# Patient Record
Sex: FEMALE | Race: WHITE | NOT HISPANIC OR LATINO | Employment: OTHER | URBAN - METROPOLITAN AREA
[De-identification: names, ages, dates, MRNs, and addresses within clinical notes are randomized per-mention and may not be internally consistent; named-entity substitution may affect disease eponyms.]

---

## 2017-02-01 ENCOUNTER — NURSE TRIAGE (OUTPATIENT)
Dept: ADMINISTRATIVE | Facility: CLINIC | Age: 77
End: 2017-02-01

## 2017-02-02 NOTE — TELEPHONE ENCOUNTER
"  Reason for Disposition   Harmless swallowed FB and no symptoms  (all triage questions negative)    Answer Assessment - Initial Assessment Questions  1. OBJECT: "What is it?"       Small pieces of glass  2. SIZE: "How large is it?" (inches or cm, or compare it to standard coins)       Very small  3. ONSET: "How long ago did you swallow it?" (e.g., minutes, hours)       Not sure if swallowed but if anything was, happened within last 20 min  4. MECHANISM: "Tell me how it happened."       Jar of rice was broken along top edge and pieces were in w/ rice  5. OTHER SYMPTOMS: "Are there any other symptoms?" (e.g., pain in neck or chest, difficulty breathing, difficulty swallowing)      denies  6. PREGNANCY: "Is there any chance you are pregnant?" "When was your last menstrual period?"      n/a    Protocols used: ST SWALLOWED FOREIGN BODY-A-  pt found small pieces of glass in cooked rice/ not sure if any swallowed or not/ asymptomatic    Call back for any changes or concerns    Cherie Gallo RN  "

## 2017-02-07 ENCOUNTER — TELEPHONE (OUTPATIENT)
Dept: INTERNAL MEDICINE | Facility: CLINIC | Age: 77
End: 2017-02-07

## 2017-02-07 NOTE — TELEPHONE ENCOUNTER
----- Message from Karlie Shelton sent at 2/7/2017  8:30 AM CST -----  Contact: Patient  Patient would like to get medical advice.  Symptoms (please be specific):  Sinus pain, rash, sore throat   How long has patient had these symptoms:  Over 1 week  Pharmacy name and phone #:  [id number] [place], [place] - [address] [facility] [phone] (Phone)  [phone] (Fax  Any drug allergies:  Please see chart.   Comments: Please call the patient at [phone].     Thanks!

## 2017-02-09 RX ORDER — FLUCONAZOLE 150 MG/1
150 TABLET ORAL DAILY
Qty: 1 TABLET | Refills: 0 | Status: SHIPPED | OUTPATIENT
Start: 2017-02-09 | End: 2017-02-10

## 2017-02-09 RX ORDER — DOXYCYCLINE 100 MG/1
100 CAPSULE ORAL EVERY 12 HOURS
Qty: 20 CAPSULE | Refills: 0 | Status: SHIPPED | OUTPATIENT
Start: 2017-02-09 | End: 2018-10-10

## 2017-02-09 NOTE — TELEPHONE ENCOUNTER
----- Message from Althea Lala sent at 2/8/2017  4:06 PM CST -----  Contact: self/[phone]  Pt called in regards to getting a Rx refill for doxycycline (VIBRA-TABS) 100 MG tablet/ fluconazole (DIFLUCAN) 150 MG Tab.      [facility]  Please advise

## 2017-04-21 ENCOUNTER — OFFICE VISIT (OUTPATIENT)
Dept: DERMATOLOGY | Facility: CLINIC | Age: 77
End: 2017-04-21
Payer: MEDICARE

## 2017-04-21 VITALS — WEIGHT: 139 LBS | BODY MASS INDEX: 24.62 KG/M2

## 2017-04-21 DIAGNOSIS — B00.9 HERPES SIMPLEX: Primary | ICD-10-CM

## 2017-04-21 DIAGNOSIS — L71.9 ROSACEA: ICD-10-CM

## 2017-04-21 DIAGNOSIS — Z87.2 HISTORY OF ACTINIC KERATOSES: ICD-10-CM

## 2017-04-21 DIAGNOSIS — Z85.828 HISTORY OF SKIN CANCER: ICD-10-CM

## 2017-04-21 PROCEDURE — 1159F MED LIST DOCD IN RCRD: CPT | Mod: S$GLB,,, | Performed by: DERMATOLOGY

## 2017-04-21 PROCEDURE — 99999 PR PBB SHADOW E&M-EST. PATIENT-LVL II: CPT | Mod: PBBFAC,,, | Performed by: DERMATOLOGY

## 2017-04-21 PROCEDURE — 1157F ADVNC CARE PLAN IN RCRD: CPT | Mod: S$GLB,,, | Performed by: DERMATOLOGY

## 2017-04-21 PROCEDURE — 1160F RVW MEDS BY RX/DR IN RCRD: CPT | Mod: S$GLB,,, | Performed by: DERMATOLOGY

## 2017-04-21 PROCEDURE — 99213 OFFICE O/P EST LOW 20 MIN: CPT | Mod: S$GLB,,, | Performed by: DERMATOLOGY

## 2017-04-21 RX ORDER — PIMECROLIMUS 10 MG/G
CREAM TOPICAL
Qty: 30 G | Refills: 3 | Status: SHIPPED | OUTPATIENT
Start: 2017-04-21 | End: 2019-01-16

## 2017-04-21 RX ORDER — ACYCLOVIR 200 MG/1
CAPSULE ORAL
Qty: 6 CAPSULE | Refills: 11 | Status: SHIPPED | OUTPATIENT
Start: 2017-04-21 | End: 2017-06-16

## 2017-04-21 NOTE — PROGRESS NOTES
Subjective:       Patient ID:  Betzy Cooley is a 76 y.o. female who presents for   Chief Complaint   Patient presents with    Skin Check     UBSE     HPI Comments: .History of Present Illness: The patient presents with chief complaint of cold sore.  Location: lower lip  Duration: weeks  Signs/Symptoms: stings    Prior treatments: none    No recurrences of the actinic keratoses on her arms, she has flares of rosacea off and on using metrogel.         Review of Systems   Constitutional: Negative for fever.   Skin: Negative for itching and rash.   Hematologic/Lymphatic: Does not bruise/bleed easily.        Objective:    Physical Exam   Constitutional: She appears well-developed and well-nourished. No distress.   Neurological: She is alert and oriented to person, place, and time. She is not disoriented.   Psychiatric: She has a normal mood and affect.   Skin:   Areas Examined (abnormalities noted in diagram):   Head / Face Inspection Performed  Neck Inspection Performed  Chest / Axilla Inspection Performed  Back Inspection Performed  RUE Inspected  LUE Inspection Performed                   Diagram Legend       Surgical scar with no sign of skin cancer recurrence       See annotation      Assessment / Plan:        Herpes simplex  -     acyclovir (ZOVIRAX) 200 MG capsule; Take 2 a day for 3 days prn  Dispense: 6 capsule; Refill: 11    Rosacea  Cont metrogel  -     ELIDEL 1 % cream; AAA bid  Dispense: 30 g; Refill: 3 prn flares    History of skin cancer  Comments:  scc right arm 2007    History of actinic keratoses               Return in about 6 months (around 10/21/2017).

## 2017-04-21 NOTE — MR AVS SNAPSHOT
[place] [facility]   [facility]  [place] [address]  Phone: [phone]  Fax: [phone]                  Betzy Cooley   2017 3:30 PM   Office Visit    Description:  Female : 1940   Provider:  Deneen Pat MD   Department:  [place] [facility]           Reason for Visit     Skin Check           Diagnoses this Visit        Comments    Herpes simplex    -  Primary     Rosacea         History of skin cancer         History of actinic keratoses                To Do List           Future Appointments        Provider Department Dept Phone    2017 10:15 AM [facility] - Optometry [phone]    2017 2:30 PM AUDIOGRAM, AUDIO [facility] - Audiology [phone]    2017 3:00 PM Renan Kuhn MD [facility] - Otorhinolaryngology [phone]      Goals (5 Years of Data)     None      Follow-Up and Disposition     Return in about 6 months (around 10/21/2017).       These Medications        Disp Refills Start End    acyclovir (ZOVIRAX) 200 MG capsule 6 capsule 11 2017     Take 2 a day for 3 days prn    Pharmacy: [facility] [place] [address] [address] [facility] Ph #: [phone]       ELIDEL 1 % cream 30 g 3 2017     AAA bid    Pharmacy: [facility] [address] [place] [address] - [address] [facility] Ph #: [phone]       Notes to Pharmacy: $35i/$75/$100/$125u      [facility]     [facility] Care [facility] -  Assistance  Unless otherwise directed by your provider, please contact [facility], our nurse care line that is available for  assistance.     Registered nurses in the [facility] provide: appointment scheduling, clinical advisement, health education, and other advisory services.  Call: [phone] (toll free)               Medications           Message regarding Medications     Verify the changes and/or additions to your medication regime listed below are the same as  discussed with your clinician today.  If any of these changes or additions are incorrect, please notify your healthcare provider.        START taking these NEW medications        Refills    acyclovir (ZOVIRAX) 200 MG capsule 11    Sig: Take 2 a day for 3 days prn    Class: Normal    ELIDEL 1 % cream 3    Sig: AAA bid    Class: Normal           Verify that the below list of medications is an accurate representation of the medications you are currently taking.  If none reported, the list may be blank. If incorrect, please contact your healthcare provider. Carry this list with you in case of emergency.           Current Medications     aspirin 81 MG Chew Take 81 mg by mouth once daily.    atenolol (TENORMIN) 25 MG tablet Take 1 tablet (25 mg total) by mouth once daily.    azithromycin (ZITHROMAX Z-JOE) 250 MG tablet Use two first day then one a day    calcium-vitamin D3 500 mg(1,250mg) -200 unit per tablet Take 1 tablet by mouth 2 (two) times daily with meals.    co-enzyme Q-10 30 mg capsule Take 30 mg by mouth once daily.    doxycycline (VIBRAMYCIN) 100 MG Cap Take 1 capsule (100 mg total) by mouth every 12 (twelve) hours.    ERGOCALCIFEROL, VITAMIN D2, (VITAMIN D2 ORAL) Take 1 tablet by mouth once daily.    meclizine (ANTIVERT) 25 mg tablet Take 1 tablet (25 mg total) by mouth 3 (three) times daily as needed.    multivitamin (MULTIVITAMIN) per tablet Take 1 tablet by mouth once daily.    ranitidine (ZANTAC) 150 MG capsule Take 1 capsule by mouth Daily.    vitamin A-vitamin C-vit E-min (VISION) Tab Take 1 tablet by mouth once daily.    acyclovir (ZOVIRAX) 200 MG capsule Take 2 a day for 3 days prn    ELIDEL 1 % cream AAA bid    fexofenadine (ALLEGRA) 180 MG tablet Take 1 tablet (180 mg total) by mouth once daily.    metronidazole 1% (METROGEL) 1 % Gel Apply topically once daily.           Clinical Reference Information           Your Vitals Were     Weight BMI             63 kg (139 lb) 24.62 kg/m2         Allergies  as of 4/21/2017     Sulfa (Sulfonamide Antibiotics)      Immunizations Administered on Date of Encounter - 4/21/2017     None      Language Assistance Services     ATTENTION: Language assistance services are available, free of charge. Please call [phone].      ATENCIÓN: Si habla minoo, tiene a zamora disposición servicios gratuitos de asistencia lingüística. Llame al [phone].     CHÚ Ý: N?u b?n nói Ti?ng Vi?t, có các d?ch v? h? tr? ngôn ng? mi?n phí dành cho b?n. G?i s? [phone].         [place] [facility] complies with applicable Federal civil rights laws and does not discriminate on the basis of race, color, national origin, age, disability, or sex.

## 2017-05-08 ENCOUNTER — TELEPHONE (OUTPATIENT)
Dept: INTERNAL MEDICINE | Facility: CLINIC | Age: 77
End: 2017-05-08

## 2017-05-08 ENCOUNTER — OFFICE VISIT (OUTPATIENT)
Dept: OPTOMETRY | Facility: CLINIC | Age: 77
End: 2017-05-08
Payer: MEDICARE

## 2017-05-08 DIAGNOSIS — H43.392 VITREOUS FLOATER, LEFT: ICD-10-CM

## 2017-05-08 DIAGNOSIS — D31.32 CHOROIDAL NEVUS OF LEFT EYE: ICD-10-CM

## 2017-05-08 DIAGNOSIS — H52.4 HYPEROPIA WITH PRESBYOPIA, BILATERAL: ICD-10-CM

## 2017-05-08 DIAGNOSIS — H52.03 HYPEROPIA WITH PRESBYOPIA, BILATERAL: ICD-10-CM

## 2017-05-08 DIAGNOSIS — H43.812 POSTERIOR VITREOUS DETACHMENT, LEFT: ICD-10-CM

## 2017-05-08 DIAGNOSIS — H25.013 CORTICAL SENILE CATARACT, BILATERAL: ICD-10-CM

## 2017-05-08 DIAGNOSIS — H25.13 NUCLEAR SCLEROSIS, BILATERAL: Primary | ICD-10-CM

## 2017-05-08 PROCEDURE — 92015 DETERMINE REFRACTIVE STATE: CPT | Mod: S$GLB,,, | Performed by: OPTOMETRIST

## 2017-05-08 PROCEDURE — 92014 COMPRE OPH EXAM EST PT 1/>: CPT | Mod: S$GLB,,, | Performed by: OPTOMETRIST

## 2017-05-08 PROCEDURE — 99999 PR PBB SHADOW E&M-EST. PATIENT-LVL III: CPT | Mod: PBBFAC,,, | Performed by: OPTOMETRIST

## 2017-05-08 NOTE — PROGRESS NOTES
HPI     Concerns About Ocular Health    Additional comments: Eye exam and refraction.  No acute problems other   than watering/tearing in both eyes secondary to allergy problems.            Comments   Patient's age: 76 y.o. WF  Occupation: retired  Approximate date of last eye examination:  2/11/2016  Name of last eye doctor seen: [facility]/State: [place]  Wears glasses? Yes     If yes, wears  Full-time or part-time?  Part-time  Present glasses are: Bifocal, SV Distance, SV Reading?  PAL  Approximate age of present glasses:  3 years   Got new glasses following last exam, or subsequently?:  no   Any problem with VA with glasses?  no  Wears CLs?:  no  Headaches?  no  Eye pain/discomfort?  no                                                                                     Flashes?  no  Floaters?  Yes-OS-occassionally  Diplopia/Double vision?  no  Patient's Ocular History:         Any eye surgeries? no         Any eye injury?  no         Any treatment for eye disease?  no  Family history of eye disease?  no  Significant patient medical history:         1. Diabetes?  no       If yes, IDDM or NIDDM? no   2. HBP?  no              3. Other (describe):  Acid reflux   ! OTC eyedrops currently using:  Systane   ! Prescription eye meds currently using:  Optivar as needed for   allergy-related symptoms   ! Any history of allergy/adverse reaction to any eye meds used   previously?  no    ! Any history of allergy/adverse reaction to eyedrops used during prior   eye exam(s)? no    ! Any history of allergy/adverse reaction to Novacaine or similar meds?   no   ! Any history of allergy/adverse reaction to Epinephrine or similar meds?   no    ! Patient okay with use of anesthetic eyedrops to check eye pressure?    yes        ! Patient okay with use of eyedrops to dilate pupils today?  yes   !  Allergies/Medications/Medical History/Family History reviewed today?    yes      PD =   61/58  Desired reading distance =   "14.5"                                                                    Last edited by Shashank Camarena, OD on 5/8/2017 11:38 AM. (History)            Assessment /Plan     For exam results, see Encounter Report.    1. Nuclear sclerosis, bilateral     2. Cortical senile cataract, bilateral     3. Choroidal nevus of left eye     4. Posterior vitreous detachment, left     5. Vitreous floater, left     6. Hyperopia with presbyopia, bilateral                    Early nuclear sclerotic/peripheral cortical cataract in both eyes. No need for cataract surgery.  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 Stable.  Otherwise, ocular health appears good in each eye.  Hyperopia in each eye, and presbyopia consistent with age.  New spectacle lens Rx issued for use as desired.   Recheck in 12 -18 months.         "

## 2017-05-08 NOTE — PATIENT INSTRUCTIONS
Early nuclear sclerotic/peripheral cortical cataract in both eyes. No need for cataract surgery.  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 Stable.  Otherwise, ocular health appears good in each eye.  Hyperopia in each eye, and presbyopia consistent with age.  New spectacle lens Rx issued for use as desired.   Recheck in 12 -18 months.

## 2017-05-08 NOTE — MR AVS SNAPSHOT
[facility] - Optometry  [address]  [facility] [address]  Phone: [phone]  Fax: [phone]                  Betzy Cooley   2017 10:15 AM   Office Visit    Description:  Female : 1940   Provider:  Shashank Camarena OD   Department:  [facility]           Reason for Visit     Concerns About Ocular Health                To Do List           Future Appointments        Provider Department Dept Phone    2017 10:30 AM AUDIOGRAM, AUDIO [facility] Audiology [phone]    2017 11:15 AM Renan Kuhn MD [facility] Otorhinolaryngology [phone]      Goals (5 Years of Data)     None      [facility]     [facility] Care Line -  Assistance  Unless otherwise directed by your provider, please contact [facility], our nurse care line that is available for  assistance.     Registered nurses in the [facility] provide: appointment scheduling, clinical advisement, health education, and other advisory services.  Call: [phone] (toll free)               Medications           Message regarding Medications     Verify the changes and/or additions to your medication regime listed below are the same as discussed with your clinician today.  If any of these changes or additions are incorrect, please notify your healthcare provider.             Verify that the below list of medications is an accurate representation of the medications you are currently taking.  If none reported, the list may be blank. If incorrect, please contact your healthcare provider. Carry this list with you in case of emergency.           Current Medications     acyclovir (ZOVIRAX) 200 MG capsule Take 2 a day for 3 days prn    aspirin 81 MG Chew Take 81 mg by mouth once daily.    atenolol (TENORMIN) 25 MG tablet Take 1 tablet (25 mg total) by mouth once daily.    azithromycin (ZITHROMAX Z-JOE) 250 MG tablet Use two first day then one a day    calcium-vitamin D3 500  mg(1,250mg) -200 unit per tablet Take 1 tablet by mouth 2 (two) times daily with meals.    co-enzyme Q-10 30 mg capsule Take 30 mg by mouth once daily.    doxycycline (VIBRAMYCIN) 100 MG Cap Take 1 capsule (100 mg total) by mouth every 12 (twelve) hours.    ELIDEL 1 % cream AAA bid    ERGOCALCIFEROL, VITAMIN D2, (VITAMIN D2 ORAL) Take 1 tablet by mouth once daily.    fexofenadine (ALLEGRA) 180 MG tablet Take 1 tablet (180 mg total) by mouth once daily.    meclizine (ANTIVERT) 25 mg tablet Take 1 tablet (25 mg total) by mouth 3 (three) times daily as needed.    metronidazole 1% (METROGEL) 1 % Gel Apply topically once daily.    multivitamin (MULTIVITAMIN) per tablet Take 1 tablet by mouth once daily.    ranitidine (ZANTAC) 150 MG capsule Take 1 capsule by mouth Daily.    vitamin A-vitamin C-vit E-min (VISION) Tab Take 1 tablet by mouth once daily.           Clinical Reference Information           Allergies as of 5/8/2017     Sulfa (Sulfonamide Antibiotics)      Immunizations Administered on Date of Encounter - 5/8/2017     None      Instructions    Early nuclear sclerotic/peripheral cortical cataract in both eyes. No need for cataract surgery.  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 Stable.  Otherwise, ocular health appears good in each eye.  Hyperopia in each eye, and presbyopia consistent with age.  New spectacle lens Rx issued for use as desired.   Recheck in 12 -18 months.              Language Assistance Services     ATTENTION: Language assistance services are available, free of charge. Please call [phone].      ATENCIÓN: Si habla minoo, tiene a zamora disposición servicios gratuitos de asistencia lingüística. Llame al [phone].     BONNIE Ý: N?u b?n nói Ti?ng Vi?t, có các d?ch v? h? tr? ngôn ng? mi?n phí dành cho b?n. G?i s? [phone].         [facility] complies with applicable Federal civil rights laws and does not discriminate on the basis of  race, color, national origin, age, disability, or sex.

## 2017-05-09 DIAGNOSIS — Z13.9 ENCOUNTER FOR SCREENING: Primary | ICD-10-CM

## 2017-05-09 RX ORDER — ATENOLOL 25 MG/1
25 TABLET ORAL DAILY
Qty: 30 TABLET | Refills: 6 | Status: SHIPPED | OUTPATIENT
Start: 2017-05-09 | End: 2018-07-12 | Stop reason: SDUPTHER

## 2017-05-09 NOTE — TELEPHONE ENCOUNTER
----- Message from Yolanda Moreno sent at 5/9/2017 11:27 AM CDT -----  Contact: Self  Pt is calling in regards of seeing if she can get mmg orders placed in. The pt can be reached at [phone]. Thanks KG

## 2017-05-12 ENCOUNTER — PATIENT OUTREACH (OUTPATIENT)
Dept: ADMINISTRATIVE | Facility: HOSPITAL | Age: 77
End: 2017-05-12

## 2017-05-18 ENCOUNTER — TELEPHONE (OUTPATIENT)
Dept: INTERNAL MEDICINE | Facility: CLINIC | Age: 77
End: 2017-05-18

## 2017-05-18 ENCOUNTER — PATIENT OUTREACH (OUTPATIENT)
Dept: ADMINISTRATIVE | Facility: HOSPITAL | Age: 77
End: 2017-05-18

## 2017-05-18 DIAGNOSIS — Z00.00 PREVENTATIVE HEALTH CARE: Primary | ICD-10-CM

## 2017-05-18 NOTE — PROGRESS NOTES
Spoke with patient. Scheduled labs & EPP w/Dr. Dudley. Had some concerns regarding the ability for them to see Dr. Dudley when they have an urgent issue. I informed her that if there is ever any problems, that she could call and ask for me and I would do what I could to assist.   Also, seeing Dr. Fortune in June and she states that she will get her to order/schedule Mammo & DXA.

## 2017-05-22 ENCOUNTER — LAB VISIT (OUTPATIENT)
Dept: LAB | Facility: HOSPITAL | Age: 77
End: 2017-05-22
Attending: FAMILY MEDICINE
Payer: MEDICARE

## 2017-05-22 DIAGNOSIS — Z00.00 PREVENTATIVE HEALTH CARE: ICD-10-CM

## 2017-05-22 LAB
ALBUMIN SERPL BCP-MCNC: 3.9 G/DL
ALP SERPL-CCNC: 93 U/L
ALT SERPL W/O P-5'-P-CCNC: 15 U/L
ANION GAP SERPL CALC-SCNC: 8 MMOL/L
AST SERPL-CCNC: 21 U/L
BASOPHILS # BLD AUTO: 0.08 K/UL
BASOPHILS NFR BLD: 0.8 %
BILIRUB SERPL-MCNC: 0.5 MG/DL
BUN SERPL-MCNC: 13 MG/DL
CALCIUM SERPL-MCNC: 9.6 MG/DL
CHLORIDE SERPL-SCNC: 106 MMOL/L
CHOLEST/HDLC SERPL: 3 {RATIO}
CO2 SERPL-SCNC: 27 MMOL/L
CREAT SERPL-MCNC: 0.8 MG/DL
DIFFERENTIAL METHOD: NORMAL
EOSINOPHIL # BLD AUTO: 0.3 K/UL
EOSINOPHIL NFR BLD: 2.7 %
ERYTHROCYTE [DISTWIDTH] IN BLOOD BY AUTOMATED COUNT: 13.4 %
EST. GFR  (AFRICAN AMERICAN): >60 ML/MIN/1.73 M^2
EST. GFR  (NON AFRICAN AMERICAN): >60 ML/MIN/1.73 M^2
GLUCOSE SERPL-MCNC: 103 MG/DL
HCT VFR BLD AUTO: 41.4 %
HDL/CHOLESTEROL RATIO: 33 %
HDLC SERPL-MCNC: 176 MG/DL
HDLC SERPL-MCNC: 58 MG/DL
HGB BLD-MCNC: 13.8 G/DL
LDLC SERPL CALC-MCNC: 93.4 MG/DL
LYMPHOCYTES # BLD AUTO: 2.1 K/UL
LYMPHOCYTES NFR BLD: 22.6 %
MCH RBC QN AUTO: 29.9 PG
MCHC RBC AUTO-ENTMCNC: 33.3 %
MCV RBC AUTO: 90 FL
MONOCYTES # BLD AUTO: 1 K/UL
MONOCYTES NFR BLD: 10.1 %
NEUTROPHILS # BLD AUTO: 6 K/UL
NEUTROPHILS NFR BLD: 63.5 %
NONHDLC SERPL-MCNC: 118 MG/DL
PLATELET # BLD AUTO: 279 K/UL
PMV BLD AUTO: 9.9 FL
POTASSIUM SERPL-SCNC: 4 MMOL/L
PROT SERPL-MCNC: 7.7 G/DL
RBC # BLD AUTO: 4.61 M/UL
SODIUM SERPL-SCNC: 141 MMOL/L
TRIGL SERPL-MCNC: 123 MG/DL
WBC # BLD AUTO: 9.47 K/UL

## 2017-05-22 PROCEDURE — 85025 COMPLETE CBC W/AUTO DIFF WBC: CPT

## 2017-05-22 PROCEDURE — 80053 COMPREHEN METABOLIC PANEL: CPT

## 2017-05-22 PROCEDURE — 80061 LIPID PANEL: CPT

## 2017-05-22 PROCEDURE — 36415 COLL VENOUS BLD VENIPUNCTURE: CPT

## 2017-05-25 ENCOUNTER — OFFICE VISIT (OUTPATIENT)
Dept: INTERNAL MEDICINE | Facility: CLINIC | Age: 77
End: 2017-05-25
Payer: MEDICARE

## 2017-05-25 VITALS — HEART RATE: 70 BPM | DIASTOLIC BLOOD PRESSURE: 75 MMHG | SYSTOLIC BLOOD PRESSURE: 130 MMHG

## 2017-05-25 DIAGNOSIS — N36.41 URETHRAL HYPERMOBILITY: Primary | ICD-10-CM

## 2017-05-25 DIAGNOSIS — H93.19 TINNITUS, UNSPECIFIED LATERALITY: ICD-10-CM

## 2017-05-25 DIAGNOSIS — M85.80 OSTEOPENIA, UNSPECIFIED LOCATION: ICD-10-CM

## 2017-05-25 DIAGNOSIS — Z00.00 PREVENTATIVE HEALTH CARE: ICD-10-CM

## 2017-05-25 PROCEDURE — 99397 PER PM REEVAL EST PAT 65+ YR: CPT | Mod: S$GLB,,, | Performed by: FAMILY MEDICINE

## 2017-05-25 PROCEDURE — 99999 PR PBB SHADOW E&M-EST. PATIENT-LVL III: CPT | Mod: PBBFAC,,, | Performed by: FAMILY MEDICINE

## 2017-05-25 NOTE — PROGRESS NOTES
Subjective:       Patient ID: Betzy Cooley is a 76 y.o. female.    Chief Complaint: No chief complaint on file.  Betzy Cooley 76 y.o. female is here for office visit to review care and physical exam, feeling well other than tinnitus, noted for some time now, has seen ENT.  States getting worse, noted at night more.      HPI  Review of Systems   Constitutional: Negative for activity change, fatigue, fever and unexpected weight change.   HENT: Negative for congestion, hearing loss, postnasal drip and rhinorrhea.    Eyes: Negative for redness and visual disturbance.   Respiratory: Negative for chest tightness, shortness of breath and wheezing.    Cardiovascular: Negative for chest pain, palpitations and leg swelling.   Gastrointestinal: Negative for abdominal distention.   Genitourinary: Negative for decreased urine volume, dysuria, flank pain, hematuria, pelvic pain and urgency.   Musculoskeletal: Negative for back pain, gait problem, joint swelling and neck stiffness.   Skin: Negative for color change, rash and wound.   Neurological: Negative for dizziness, syncope, weakness and headaches.   Psychiatric/Behavioral: Negative for behavioral problems, confusion and sleep disturbance. The patient is not nervous/anxious.        Objective:      Physical Exam   Constitutional: She is oriented to person, place, and time. She appears well-developed and well-nourished. No distress.   HENT:   Head: Normocephalic.   Mouth/Throat: No oropharyngeal exudate.   Eyes: EOM are normal. Pupils are equal, round, and reactive to light. No scleral icterus.   Neck: Neck supple. No JVD present. No thyromegaly present.   Cardiovascular: Normal rate, regular rhythm and normal heart sounds.  Exam reveals no gallop and no friction rub.    No murmur heard.  Pulmonary/Chest: Effort normal and breath sounds normal. She has no wheezes. She has no rales.   Abdominal: Soft. Bowel sounds are normal. She exhibits no distension and no mass. There  is no tenderness. There is no guarding.   Musculoskeletal: Normal range of motion. She exhibits no edema.   Lymphadenopathy:     She has no cervical adenopathy.   Neurological: She is alert and oriented to person, place, and time. She has normal reflexes. She displays normal reflexes. No cranial nerve deficit. She exhibits normal muscle tone.   Skin: Skin is warm. No rash noted. No erythema.   Psychiatric: She has a normal mood and affect. Thought content normal.       Assessment:       No diagnosis found.    Plan:       Diagnoses and all orders for this visit:    Preventative health care  -     DXA Bone Density Spine And Hip; Future  -     Mammo Digital Screening Bilateral With CAD; Future    Diagnoses and all orders for this visit:    Urethral hypermobility  - Has gyn appt  Preventative health care  -     DXA Bone Density Spine And Hip; Future  -     Mammo Digital Screening Bilateral With CAD; Future    Osteopenia, unspecified location  - reviewed  Tinnitus, unspecified laterality  - Can see ENT

## 2017-05-25 NOTE — PATIENT INSTRUCTIONS
Results for orders placed or performed in visit on 05/22/17   Comprehensive metabolic panel   Result Value Ref Range    Sodium 141 136 - 145 mmol/L    Potassium 4.0 3.5 - 5.1 mmol/L    Chloride 106 95 - 110 mmol/L    CO2 27 23 - 29 mmol/L    Glucose 103 70 - 110 mg/dL    BUN, Bld 13 8 - 23 mg/dL    Creatinine 0.8 0.5 - 1.4 mg/dL    Calcium 9.6 8.7 - 10.5 mg/dL    Total Protein 7.7 6.0 - 8.4 g/dL    Albumin 3.9 3.5 - 5.2 g/dL    Total Bilirubin 0.5 0.1 - 1.0 mg/dL    Alkaline Phosphatase 93 55 - 135 U/L    AST 21 10 - 40 U/L    ALT 15 10 - 44 U/L    Anion Gap 8 8 - 16 mmol/L    eGFR if African American >60.0 >60 mL/min/1.73 m^2    eGFR if non African American >60.0 >60 mL/min/1.73 m^2   CBC auto differential   Result Value Ref Range    WBC 9.47 3.90 - 12.70 K/uL    RBC 4.61 4.00 - 5.40 M/uL    Hemoglobin 13.8 12.0 - 16.0 g/dL    Hematocrit 41.4 37.0 - 48.5 %    MCV 90 82 - 98 fL    MCH 29.9 27.0 - 31.0 pg    MCHC 33.3 32.0 - 36.0 %    RDW 13.4 11.5 - 14.5 %    Platelets 279 150 - 350 K/uL    MPV 9.9 9.2 - 12.9 fL    Gran # 6.0 1.8 - 7.7 K/uL    Lymph # 2.1 1.0 - 4.8 K/uL    Mono # 1.0 0.3 - 1.0 K/uL    Eos # 0.3 0.0 - 0.5 K/uL    Baso # 0.08 0.00 - 0.20 K/uL    Gran% 63.5 38.0 - 73.0 %    Lymph% 22.6 18.0 - 48.0 %    Mono% 10.1 4.0 - 15.0 %    Eosinophil% 2.7 0.0 - 8.0 %    Basophil% 0.8 0.0 - 1.9 %    Differential Method Automated    Lipid panel   Result Value Ref Range    Cholesterol 176 120 - 199 mg/dL    Triglycerides 123 30 - 150 mg/dL    HDL 58 40 - 75 mg/dL    LDL Cholesterol 93.4 63.0 - 159.0 mg/dL    HDL/Chol Ratio 33.0 20.0 - 50.0 %    Total Cholesterol/HDL Ratio 3.0 2.0 - 5.0    Non-HDL Cholesterol 118 mg/dL

## 2017-05-31 ENCOUNTER — TELEPHONE (OUTPATIENT)
Dept: INTERNAL MEDICINE | Facility: CLINIC | Age: 77
End: 2017-05-31

## 2017-05-31 NOTE — TELEPHONE ENCOUNTER
----- Message from Karlie Shelton sent at 5/30/2017  3:37 PM CDT -----  Contact: Patient, phone [phone]  The patient says Dr. Dudley filled out a form for her but it was not completely filled out.  She would like a call back.     Thanks!

## 2017-05-31 NOTE — TELEPHONE ENCOUNTER
Patient handicap order was not completely filled out. There were areas that need detailed information. Patient  will email form with the other parts needed filling out and a blank form to do redo everything over. Please email completed forms to [email or web address] The forms will be faxed to you at [phone] Please advise

## 2017-06-13 ENCOUNTER — HOSPITAL ENCOUNTER (OUTPATIENT)
Dept: RADIOLOGY | Facility: HOSPITAL | Age: 77
Discharge: HOME | End: 2017-06-13
Attending: FAMILY MEDICINE
Payer: MEDICARE

## 2017-06-13 ENCOUNTER — HOSPITAL ENCOUNTER (OUTPATIENT)
Dept: RADIOLOGY | Facility: CLINIC | Age: 77
Discharge: HOME | End: 2017-06-13
Attending: FAMILY MEDICINE
Payer: MEDICARE

## 2017-06-13 DIAGNOSIS — Z00.00 PREVENTATIVE HEALTH CARE: ICD-10-CM

## 2017-06-13 DIAGNOSIS — Z12.31 VISIT FOR SCREENING MAMMOGRAM: ICD-10-CM

## 2017-06-13 PROCEDURE — 77080 DXA BONE DENSITY AXIAL: CPT | Mod: 26,GA,S$GLB, | Performed by: INTERNAL MEDICINE

## 2017-06-13 PROCEDURE — 77067 SCR MAMMO BI INCL CAD: CPT | Mod: 26,,, | Performed by: RADIOLOGY

## 2017-06-13 PROCEDURE — 77067 SCR MAMMO BI INCL CAD: CPT | Mod: TC

## 2017-06-13 PROCEDURE — 77080 DXA BONE DENSITY AXIAL: CPT | Mod: GA,TC

## 2017-06-16 ENCOUNTER — OFFICE VISIT (OUTPATIENT)
Dept: OBSTETRICS AND GYNECOLOGY | Facility: CLINIC | Age: 77
End: 2017-06-16
Payer: MEDICARE

## 2017-06-16 VITALS
HEIGHT: 62 IN | SYSTOLIC BLOOD PRESSURE: 140 MMHG | WEIGHT: 136.88 LBS | DIASTOLIC BLOOD PRESSURE: 60 MMHG | BODY MASS INDEX: 25.19 KG/M2

## 2017-06-16 DIAGNOSIS — Z90.710 S/P HYSTERECTOMY: ICD-10-CM

## 2017-06-16 DIAGNOSIS — N81.9 VAGINAL VAULT PROLAPSE: ICD-10-CM

## 2017-06-16 DIAGNOSIS — Z01.419 ENCOUNTER FOR GYNECOLOGICAL EXAMINATION WITHOUT ABNORMAL FINDING: Primary | ICD-10-CM

## 2017-06-16 PROCEDURE — G0101 CA SCREEN;PELVIC/BREAST EXAM: HCPCS | Mod: S$GLB,,, | Performed by: OBSTETRICS & GYNECOLOGY

## 2017-06-16 PROCEDURE — 99999 PR PBB SHADOW E&M-EST. PATIENT-LVL III: CPT | Mod: PBBFAC,,, | Performed by: OBSTETRICS & GYNECOLOGY

## 2017-06-19 ENCOUNTER — TELEPHONE (OUTPATIENT)
Dept: INTERNAL MEDICINE | Facility: CLINIC | Age: 77
End: 2017-06-19

## 2017-06-19 DIAGNOSIS — M85.80 OSTEOPENIA, UNSPECIFIED LOCATION: Primary | ICD-10-CM

## 2017-06-19 RX ORDER — ALENDRONATE SODIUM 70 MG/1
70 TABLET ORAL
Qty: 4 TABLET | Refills: 11 | Status: SHIPPED | OUTPATIENT
Start: 2017-06-19 | End: 2018-10-10

## 2017-06-19 NOTE — TELEPHONE ENCOUNTER
Please advise bone density showed decreased bone density, but not to the point of Osteoporosis, Fosamax recommended none the less, sent to pharm, thanks

## 2017-06-22 ENCOUNTER — TELEPHONE (OUTPATIENT)
Dept: INTERNAL MEDICINE | Facility: CLINIC | Age: 77
End: 2017-06-22

## 2017-06-22 DIAGNOSIS — M85.80 DECREASED BONE DENSITY: Primary | ICD-10-CM

## 2017-06-22 NOTE — TELEPHONE ENCOUNTER
----- Message from Shelby Barbosa sent at 6/22/2017 11:58 AM CDT -----  Contact: Self/[phone]  Patient would like a call for clarity on the RX for bone density. Please advise.    Thanks

## 2017-06-30 NOTE — PROGRESS NOTES
"CC: Well woman exam    Betzy Cooley is a 76 y.o. female  presents for a well woman exam.  No issues, problems, or complaints.      Past Medical History:   Diagnosis Date    Arthritis     Cataract     GERD (gastroesophageal reflux disease)     HEARING LOSS     Hypertension     Osteoporosis, postmenopausal     SQUAMOUS CELL CARCINOMA     right forearm    Urinary incontinence     rare mixed     Past Surgical History:   Procedure Laterality Date    FRACTURE SURGERY Left 2008    open radius/ulna fracture    HYSTERECTOMY      TANIA/ovaries remain--fibroids     Family History   Problem Relation Age of Onset    Heart failure Mother     Macular degeneration Mother     Heart disease Mother     COPD Mother     Diabetes Sister     Arthritis Sister      rheumatoid arthritis    COPD Father     No Known Problems Brother     No Known Problems Son     No Known Problems Daughter     No Known Problems Sister     No Known Problems Sister     No Known Problems Sister     No Known Problems Brother     No Known Problems Brother     No Known Problems Brother     No Known Problems Brother     No Known Problems Brother     No Known Problems Daughter     No Known Problems Son     Breast cancer Neg Hx     Ovarian cancer Neg Hx     Amblyopia Neg Hx     Blindness Neg Hx     Cancer Neg Hx     Cataracts Neg Hx     Glaucoma Neg Hx     Hypertension Neg Hx     Retinal detachment Neg Hx     Strabismus Neg Hx     Stroke Neg Hx     Thyroid disease Neg Hx     Cervical cancer Neg Hx     Endometrial cancer Neg Hx     Vaginal cancer Neg Hx     Colon cancer Neg Hx      Social History   Substance Use Topics    Smoking status: Never Smoker    Smokeless tobacco: Never Used    Alcohol use Yes      Comment: 1-2 glasses wine weekly     OB History      Para Term  AB Living    4 4 4     4    SAB TAB Ectopic Multiple Live Births                       BP (!) 140/60   Ht 5' 2" (1.575 m)   Wt " 62.1 kg (136 lb 14.4 oz)   BMI 25.04 kg/m²     ROS:  GENERAL: Denies weight gain or weight loss. Feeling well overall.   SKIN: Denies rash or lesions.   HEAD: Denies head injury or headache.   NODES: Denies enlarged lymph nodes.   CHEST: Denies chest pain or shortness of breath.   CARDIOVASCULAR: Denies palpitations or left sided chest pain.   ABDOMEN: No abdominal pain, constipation, diarrhea, nausea, vomiting or rectal bleeding.   URINARY: No frequency, dysuria, hematuria, or burning on urination.  REPRODUCTIVE: See HPI.   BREASTS: The patient performs breast self-examination and denies pain, lumps, or nipple discharge.   HEMATOLOGIC: No easy bruisability or excessive bleeding.   MUSCULOSKELETAL: Denies joint pain or swelling.   NEUROLOGIC: Denies syncope or weakness.   PSYCHIATRIC: Denies depression, anxiety or mood swings.    PE:   APPEARANCE: Well nourished, well developed, in no acute distress.  AFFECT: WNL, alert and oriented x 3.  SKIN: No acne or hirsutism.  NECK: Neck symmetric without masses or thyromegaly.  NODES: No inguinal, cervical, axillary or femoral lymph node enlargement.  CHEST: Good respiratory effort.   ABDOMEN: Soft. No tenderness or masses. No hepatosplenomegaly. No hernias.  BREASTS: Symmetrical, no skin changes or visible lesions. No palpable masses, nipple discharge bilaterally.  PELVIC: Normal external female genitalia without lesions. Normal hair distribution. Adequate perineal body, normal urethral meatus. Vagina atrophic without lesions or discharge. Vaginal wall prolapse.  Significant cystocele +  rectocele. Bimanual exam shows uterus and cervix to be surgically absent. Adnexa without masses or tenderness.  RECTAL: Rectovaginal exam confirms above with normal sphincter tone, no masses.  EXTREMITIES: No edema.      ICD-10-CM ICD-9-CM    1. Encounter for gynecological examination without abnormal finding Z01.419 V72.31    2. Vaginal vault prolapse N81.9 618.00    3. S/P hysterectomy  Z90.710 V88.01      Discussed possible vaginal wall prolapse.  Pessary treatment vs surgical intervention   Kegel exercises  Consider further management if becomes more problematic      Patient was counseled today on A.C.S. Pap guidelines and recommendations for yearly pelvic exams, mammograms and monthly self breast exams; to see her PCP for other health maintenance.     F/U PRN

## 2017-07-15 NOTE — TELEPHONE ENCOUNTER
----- Message from Sandy Fine LPN sent at 5/18/2017 10:50 AM CDT -----  Dr. Dudley,     Mrs. Cooley is coming to see you on 5/24 for EPP and 5/22 for labs. Can you please place orders for her to have these done prior to her appointment?   She is seeing OB/GYN in June and will have Dr. Fortune order Mammo & DXA.     Sandy   monitor rhythm, currently SR  hold all AV  ángel blockers  f/u EP recs

## 2017-08-28 PROBLEM — Z00.00 PREVENTATIVE HEALTH CARE: Status: RESOLVED | Noted: 2017-05-25 | Resolved: 2017-08-28

## 2017-12-29 ENCOUNTER — OFFICE VISIT (OUTPATIENT)
Dept: URGENT CARE | Facility: CLINIC | Age: 77
End: 2017-12-29
Payer: MEDICARE

## 2017-12-29 VITALS
HEIGHT: 62 IN | TEMPERATURE: 98 F | RESPIRATION RATE: 18 BRPM | OXYGEN SATURATION: 98 % | BODY MASS INDEX: 25.03 KG/M2 | SYSTOLIC BLOOD PRESSURE: 160 MMHG | WEIGHT: 136 LBS | DIASTOLIC BLOOD PRESSURE: 99 MMHG | HEART RATE: 75 BPM

## 2017-12-29 DIAGNOSIS — J02.9 SORE THROAT: ICD-10-CM

## 2017-12-29 DIAGNOSIS — J32.9 SINUSITIS, UNSPECIFIED CHRONICITY, UNSPECIFIED LOCATION: Primary | ICD-10-CM

## 2017-12-29 LAB
CTP QC/QA: YES
S PYO RRNA THROAT QL PROBE: NEGATIVE

## 2017-12-29 PROCEDURE — 87880 STREP A ASSAY W/OPTIC: CPT | Mod: QW,S$GLB,, | Performed by: PHYSICIAN ASSISTANT

## 2017-12-29 PROCEDURE — 99214 OFFICE O/P EST MOD 30 MIN: CPT | Mod: 25,S$GLB,, | Performed by: PHYSICIAN ASSISTANT

## 2017-12-29 NOTE — PATIENT INSTRUCTIONS
- Rest.    - Drink plenty of fluids.    - Tylenol or Ibuprofen as directed as needed for fever/pain.    - Take over-the-counter claritin, zyrtec, allegra, or xyzal as directed.  - Use over the counter Flonase as directed for sinus congestion and postnasal drip.  - use nasal saline prior to Flonase.  - Antibiotics are not needed at this time.  - Usual course of cold symptom is 10-14 days, but longer if patient is a smoker.   - Use salt water gargle for sore throat.   - Follow up with your PCP or specialty clinic as directed in the next 1-2 weeks if not improved or as needed.  You can call [phone] to schedule an appointment with the appropriate provider.    - Go to the ED if your symptoms worsen.    Sinusitis (No Antibiotics)    The sinuses are air-filled spaces within the bones of the face. They connect to the inside of the nose. Sinusitis is an inflammation of the tissue lining the sinus cavity. Sinus inflammation can occur during a cold. It can also be due to allergies to pollens and other particles in the air. It can cause symptoms such as sinus congestion, headache, sore throat, facial swelling and fullness. It may also cause a low-grade fever. No infection is present, and no antibiotic treatment is needed.  Home care  · Drink plenty of water, hot tea, and other liquids. This may help thin mucus. It also may promote sinus drainage.  · Heat may help soothe painful areas of the face. Use a towel soaked in hot water. Or,  the shower and direct the hot spray onto your face. Using a vaporizer along with a menthol rub at night may also help.   · An expectorant containing guaifenesin may help thin the mucus and promote drainage from the sinuses.  · Over-the-counter decongestants may be used unless a similar medicine was prescribed. Nasal sprays work the fastest. Use one that contains phenylephrine or oxymetazoline. First blow the nose gently. Then use the spray. Do not use these medicines more often than  directed on the label or symptoms may get worse. You may also use tablets containing pseudoephedrine. Avoid products that combine ingredients, because side effects may be increased. Read labels. You can also ask the pharmacist for help. (NOTE: Persons with high blood pressure should not use decongestants. They can raise blood pressure.)  · Over-the-counter antihistamines may help if allergies contributed to your sinusitis.    · Use acetaminophen or ibuprofen to control pain, unless another pain medicine was prescribed. (If you have chronic liver or kidney disease or ever had a stomach ulcer, talk with your doctor before using these medicines. Aspirin should never be used in anyone under 18 years of age who is ill with a fever. It may cause severe liver damage.)  · Use nasal rinses or irrigation as instructed by your health care provider.  · Don't smoke. This can worsen symptoms.  Follow-up care  Follow up with your healthcare provider or our staff if you are not improving within the next week.  When to seek medical advice  Call your healthcare provider if any of these occur:  · Green or yellow discharge from the nose or into the throat  · Facial pain or headache becoming more severe  · Stiff neck  · Unusual drowsiness or confusion  · Swelling of the forehead or eyelids  · Vision problems, including blurred or double vision  · Fever of 100.4ºF (38ºC) or higher, or as directed by your healthcare provider  · Seizure  · Breathing problems  · Symptoms not resolving within 10 days  Date Last Reviewed: 4/13/2015  © 1201-5743 [employer]. [address] [place] [address], [place] [address]. All rights reserved. This information is not intended as a substitute for professional medical care. Always follow your healthcare professional's instructions.

## 2017-12-29 NOTE — PROGRESS NOTES
"Subjective:       Patient ID: Betzy Cooley is a 77 y.o. female.    Vitals:  height is 5' 2" (1.575 m) and weight is 61.7 kg (136 lb). Her tympanic temperature is 97.5 °F (36.4 °C). Her blood pressure is 160/99 (abnormal) and her pulse is 75. Her respiration is 18 and oxygen saturation is 98%.     Chief Complaint: Sinus Problem (sore throat and ear ache 3-4 days)    Present with right side sore throat and ear pain for 3-4 days.        Sinus Problem   This is a new problem. The current episode started in the past 7 days (3-4 days ). The problem has been gradually worsening since onset. There has been no fever. Her pain is at a severity of 3/10. The pain is mild. Associated symptoms include congestion, coughing, ear pain (right ), headaches, sinus pressure and a sore throat (right ). Pertinent negatives include no chills, hoarse voice or shortness of breath. Past treatments include spray decongestants. The treatment provided no relief.     Review of Systems   Constitution: Positive for malaise/fatigue and night sweats. Negative for chills and fever.   HENT: Positive for congestion, ear pain (right ), sinus pressure and sore throat (right ). Negative for hoarse voice.    Eyes: Negative for discharge and redness.   Cardiovascular: Negative for chest pain, dyspnea on exertion and leg swelling.   Respiratory: Positive for cough. Negative for shortness of breath, sputum production and wheezing.    Musculoskeletal: Negative for myalgias.   Gastrointestinal: Negative for abdominal pain and nausea.   Neurological: Positive for headaches.       Objective:      Physical Exam   Constitutional: She is oriented to person, place, and time. She appears well-developed and well-nourished.   HENT:   Head: Normocephalic and atraumatic.   Right Ear: Hearing, tympanic membrane, external ear and ear canal normal.   Left Ear: Hearing, tympanic membrane, external ear and ear canal normal.   Nose: Right sinus exhibits maxillary sinus " tenderness and frontal sinus tenderness. Left sinus exhibits no maxillary sinus tenderness and no frontal sinus tenderness.   Mouth/Throat: Uvula is midline and oropharynx is clear and moist.   Eyes: Conjunctivae are normal.   Neck: Normal range of motion. Neck supple. No thyromegaly present.   Cardiovascular: Normal rate and regular rhythm.  Exam reveals no gallop and no friction rub.    No murmur heard.  Pulmonary/Chest: Effort normal and breath sounds normal. She has no wheezes. She has no rales.   Musculoskeletal: Normal range of motion.   Lymphadenopathy:     She has no cervical adenopathy.   Neurological: She is alert and oriented to person, place, and time.   Skin: Skin is warm and dry. No rash noted. No erythema.   Psychiatric: She has a normal mood and affect. Her behavior is normal. Judgment and thought content normal.   Nursing note and vitals reviewed.      Assessment:       1. Sinusitis, unspecified chronicity, unspecified location    2. Sore throat        Plan:         Sinusitis, unspecified chronicity, unspecified location    Sore throat  -     POCT rapid strep A      Betzy was seen today for sinus problem.    Diagnoses and all orders for this visit:    Sinusitis, unspecified chronicity, unspecified location    Sore throat  -     POCT rapid strep A      Patient Instructions   - Rest.    - Drink plenty of fluids.    - Tylenol or Ibuprofen as directed as needed for fever/pain.    - Take over-the-counter claritin, zyrtec, allegra, or xyzal as directed.  - Use over the counter Flonase as directed for sinus congestion and postnasal drip.  - use nasal saline prior to Flonase.  - Antibiotics are not needed at this time.  - Usual course of cold symptom is 10-14 days, but longer if patient is a smoker.   - Use salt water gargle for sore throat.   - Follow up with your PCP or specialty clinic as directed in the next 1-2 weeks if not improved or as needed.  You can call [phone] to schedule an  appointment with the appropriate provider.    - Go to the ED if your symptoms worsen.    Sinusitis (No Antibiotics)    The sinuses are air-filled spaces within the bones of the face. They connect to the inside of the nose. Sinusitis is an inflammation of the tissue lining the sinus cavity. Sinus inflammation can occur during a cold. It can also be due to allergies to pollens and other particles in the air. It can cause symptoms such as sinus congestion, headache, sore throat, facial swelling and fullness. It may also cause a low-grade fever. No infection is present, and no antibiotic treatment is needed.  Home care  · Drink plenty of water, hot tea, and other liquids. This may help thin mucus. It also may promote sinus drainage.  · Heat may help soothe painful areas of the face. Use a towel soaked in hot water. Or,  the shower and direct the hot spray onto your face. Using a vaporizer along with a menthol rub at night may also help.   · An expectorant containing guaifenesin may help thin the mucus and promote drainage from the sinuses.  · Over-the-counter decongestants may be used unless a similar medicine was prescribed. Nasal sprays work the fastest. Use one that contains phenylephrine or oxymetazoline. First blow the nose gently. Then use the spray. Do not use these medicines more often than directed on the label or symptoms may get worse. You may also use tablets containing pseudoephedrine. Avoid products that combine ingredients, because side effects may be increased. Read labels. You can also ask the pharmacist for help. (NOTE: Persons with high blood pressure should not use decongestants. They can raise blood pressure.)  · Over-the-counter antihistamines may help if allergies contributed to your sinusitis.    · Use acetaminophen or ibuprofen to control pain, unless another pain medicine was prescribed. (If you have chronic liver or kidney disease or ever had a stomach ulcer, talk with your doctor  before using these medicines. Aspirin should never be used in anyone under 18 years of age who is ill with a fever. It may cause severe liver damage.)  · Use nasal rinses or irrigation as instructed by your health care provider.  · Don't smoke. This can worsen symptoms.  Follow-up care  Follow up with your healthcare provider or our staff if you are not improving within the next week.  When to seek medical advice  Call your healthcare provider if any of these occur:  · Green or yellow discharge from the nose or into the throat  · Facial pain or headache becoming more severe  · Stiff neck  · Unusual drowsiness or confusion  · Swelling of the forehead or eyelids  · Vision problems, including blurred or double vision  · Fever of 100.4ºF (38ºC) or higher, or as directed by your healthcare provider  · Seizure  · Breathing problems  · Symptoms not resolving within 10 days  Date Last Reviewed: 4/13/2015  © 6199-8925 [employer]. [address] [place] [address], [place] [address]. All rights reserved. This information is not intended as a substitute for professional medical care. Always follow your healthcare professional's instructions.

## 2018-01-18 RX ORDER — TRIAMCINOLONE ACETONIDE 1 MG/G
CREAM TOPICAL
Qty: 454 G | Refills: 4 | Status: SHIPPED | OUTPATIENT
Start: 2018-01-18 | End: 2019-01-16

## 2018-01-29 ENCOUNTER — PES CALL (OUTPATIENT)
Dept: ADMINISTRATIVE | Facility: CLINIC | Age: 78
End: 2018-01-29

## 2018-04-17 ENCOUNTER — PES CALL (OUTPATIENT)
Dept: ADMINISTRATIVE | Facility: CLINIC | Age: 78
End: 2018-04-17

## 2018-05-22 ENCOUNTER — OFFICE VISIT (OUTPATIENT)
Dept: INTERNAL MEDICINE | Facility: CLINIC | Age: 78
End: 2018-05-22
Payer: MEDICARE

## 2018-05-22 VITALS — BODY MASS INDEX: 24.8 KG/M2 | DIASTOLIC BLOOD PRESSURE: 84 MMHG | WEIGHT: 135.56 LBS | SYSTOLIC BLOOD PRESSURE: 126 MMHG

## 2018-05-22 DIAGNOSIS — I10 ESSENTIAL HYPERTENSION: Primary | ICD-10-CM

## 2018-05-22 DIAGNOSIS — H65.01 RIGHT ACUTE SEROUS OTITIS MEDIA, RECURRENCE NOT SPECIFIED: ICD-10-CM

## 2018-05-22 DIAGNOSIS — M85.80 OSTEOPENIA, UNSPECIFIED LOCATION: ICD-10-CM

## 2018-05-22 DIAGNOSIS — Z00.00 PREVENTATIVE HEALTH CARE: ICD-10-CM

## 2018-05-22 DIAGNOSIS — N36.41 URETHRAL HYPERMOBILITY: ICD-10-CM

## 2018-05-22 DIAGNOSIS — K21.9 GASTROESOPHAGEAL REFLUX DISEASE WITHOUT ESOPHAGITIS: ICD-10-CM

## 2018-05-22 PROCEDURE — 99215 OFFICE O/P EST HI 40 MIN: CPT | Mod: S$GLB,,, | Performed by: FAMILY MEDICINE

## 2018-05-22 PROCEDURE — 3074F SYST BP LT 130 MM HG: CPT | Mod: CPTII,S$GLB,, | Performed by: FAMILY MEDICINE

## 2018-05-22 PROCEDURE — 99999 PR PBB SHADOW E&M-EST. PATIENT-LVL III: CPT | Mod: PBBFAC,,, | Performed by: FAMILY MEDICINE

## 2018-05-22 PROCEDURE — 99499 UNLISTED E&M SERVICE: CPT | Mod: S$PBB,,, | Performed by: FAMILY MEDICINE

## 2018-05-22 PROCEDURE — 3079F DIAST BP 80-89 MM HG: CPT | Mod: CPTII,S$GLB,, | Performed by: FAMILY MEDICINE

## 2018-05-22 RX ORDER — PREDNISOLONE ACETATE 10 MG/ML
1 SUSPENSION/ DROPS OPHTHALMIC 4 TIMES DAILY
Qty: 15 ML | Refills: 3 | Status: SHIPPED | OUTPATIENT
Start: 2018-05-22 | End: 2018-06-01

## 2018-05-22 RX ORDER — CIPROFLOXACIN HYDROCHLORIDE 3 MG/ML
1 SOLUTION/ DROPS OPHTHALMIC
Qty: 10 ML | Refills: 3 | Status: SHIPPED | OUTPATIENT
Start: 2018-05-22 | End: 2019-01-16

## 2018-05-22 RX ORDER — FLUCONAZOLE 150 MG/1
150 TABLET ORAL EVERY OTHER DAY
Qty: 3 TABLET | Refills: 0 | Status: SHIPPED | OUTPATIENT
Start: 2018-05-22 | End: 2018-05-23

## 2018-05-22 NOTE — PROGRESS NOTES
Subjective:       Patient ID: Betzy Cooley is a 77 y.o. female.    Chief Complaint: Follow-up  Betzy Cooley 77 y.o. female is here for office visit to review care and physical exam, reports doing well other than ear discomfort.  Has pessary, sees Gyn, has frequent yeast infections.  ROS otherwise unremarkable.      HPI  Review of Systems   Constitutional: Negative for activity change, fatigue, fever and unexpected weight change.   HENT: Negative for congestion, hearing loss, postnasal drip and rhinorrhea.    Eyes: Negative for redness and visual disturbance.   Respiratory: Negative for chest tightness, shortness of breath and wheezing.    Cardiovascular: Negative for chest pain, palpitations and leg swelling.   Gastrointestinal: Negative for abdominal distention.   Genitourinary: Negative for decreased urine volume, dysuria, flank pain, hematuria, pelvic pain and urgency.   Musculoskeletal: Negative for back pain, gait problem, joint swelling and neck stiffness.   Skin: Negative for color change, rash and wound.   Neurological: Negative for dizziness, syncope, weakness and headaches.   Psychiatric/Behavioral: Negative for behavioral problems, confusion and sleep disturbance. The patient is not nervous/anxious.        Objective:      Physical Exam   Constitutional: She is oriented to person, place, and time. She appears well-developed and well-nourished. No distress.   HENT:   Head: Normocephalic.   Mouth/Throat: No oropharyngeal exudate.   Eyes: EOM are normal. Pupils are equal, round, and reactive to light. No scleral icterus.   Neck: Neck supple. No JVD present. No thyromegaly present.   Cardiovascular: Normal rate, regular rhythm and normal heart sounds.  Exam reveals no gallop and no friction rub.    No murmur heard.  Pulmonary/Chest: Effort normal and breath sounds normal. She has no wheezes. She has no rales.   Abdominal: Soft. Bowel sounds are normal. She exhibits no distension and no mass. There is  no tenderness. There is no guarding.   Musculoskeletal: Normal range of motion. She exhibits no edema.   Lymphadenopathy:     She has no cervical adenopathy.   Neurological: She is alert and oriented to person, place, and time. She has normal reflexes. She displays normal reflexes. No cranial nerve deficit. She exhibits normal muscle tone.   Skin: Skin is warm. No rash noted. No erythema.   Psychiatric: She has a normal mood and affect. Thought content normal.       Assessment:       1. Essential hypertension    2. Osteopenia, unspecified location    3. Gastroesophageal reflux disease without esophagitis    4. Preventative health care    5. Right acute serous otitis media, recurrence not specified    6. Urethral hypermobility        Plan:       Betzy was seen today for follow-up.    Diagnoses and all orders for this visit:    Essential hypertension  - controled  Osteopenia, unspecified location  - discussed  Gastroesophageal reflux disease without esophagitis  - no new concern  Preventative health care  -     Comprehensive metabolic panel; Future  -     Lipid panel; Future  -     CBC auto differential; Future  -     TSH; Future  -     Mammo Digital Screening Bilateral With CAD; Future    Right acute serous otitis media, recurrence not specified  -     prednisoLONE acetate (PRED FORTE) 1 % DrpS; Place 1 drop into the right eye 4 (four) times daily.    Other orders  -     ciprofloxacin HCl (CILOXAN) 0.3 % ophthalmic solution; Place 1 drop into the right eye every 2 (two) hours.

## 2018-06-15 ENCOUNTER — HOSPITAL ENCOUNTER (OUTPATIENT)
Dept: RADIOLOGY | Facility: HOSPITAL | Age: 78
Discharge: HOME | End: 2018-06-15
Attending: FAMILY MEDICINE
Payer: MEDICARE

## 2018-06-15 DIAGNOSIS — Z00.00 PREVENTATIVE HEALTH CARE: ICD-10-CM

## 2018-06-15 DIAGNOSIS — Z12.31 VISIT FOR SCREENING MAMMOGRAM: ICD-10-CM

## 2018-06-15 PROCEDURE — 77067 SCR MAMMO BI INCL CAD: CPT | Mod: 26,,, | Performed by: RADIOLOGY

## 2018-06-15 PROCEDURE — 77067 SCR MAMMO BI INCL CAD: CPT | Mod: TC

## 2018-06-15 PROCEDURE — 77063 BREAST TOMOSYNTHESIS BI: CPT | Mod: 26,,, | Performed by: RADIOLOGY

## 2018-07-02 ENCOUNTER — OFFICE VISIT (OUTPATIENT)
Dept: OPTOMETRY | Facility: CLINIC | Age: 78
End: 2018-07-02
Payer: COMMERCIAL

## 2018-07-02 DIAGNOSIS — H25.13 NUCLEAR SCLEROSIS, BILATERAL: Primary | ICD-10-CM

## 2018-07-02 DIAGNOSIS — H52.203 HYPEROPIA OF BOTH EYES WITH ASTIGMATISM: ICD-10-CM

## 2018-07-02 DIAGNOSIS — D31.32 CHOROIDAL NEVUS OF LEFT EYE: ICD-10-CM

## 2018-07-02 DIAGNOSIS — H52.4 PRESBYOPIA OF BOTH EYES: ICD-10-CM

## 2018-07-02 DIAGNOSIS — H52.03 HYPEROPIA OF BOTH EYES WITH ASTIGMATISM: ICD-10-CM

## 2018-07-02 DIAGNOSIS — H25.013 CORTICAL SENILE CATARACT, BILATERAL: ICD-10-CM

## 2018-07-02 PROCEDURE — 99999 PR PBB SHADOW E&M-EST. PATIENT-LVL III: CPT | Mod: PBBFAC,,, | Performed by: OPTOMETRIST

## 2018-07-02 PROCEDURE — 92014 COMPRE OPH EXAM EST PT 1/>: CPT | Mod: S$GLB,,, | Performed by: OPTOMETRIST

## 2018-07-02 PROCEDURE — 92015 DETERMINE REFRACTIVE STATE: CPT | Mod: S$GLB,,, | Performed by: OPTOMETRIST

## 2018-07-02 NOTE — PATIENT INSTRUCTIONS
Early nuclear sclerotic/peripheral cortical cataract in both eyes. No need for cataract surgery.  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 Stable.  Otherwise, ocular health appears good in each eye.  Hyperopia with astigmatism in each eye, and presbyopia consistent with age.  New spectacle lens Rx issued for use as desired.   Recheck in 12 -18 months.

## 2018-07-02 NOTE — PROGRESS NOTES
"HPI     Concerns About Ocular Health    Additional comments: General eye examination and refraction           Comments   Patient's age: 77 y.o. WF  Occupation: retired  Approximate date of last eye examination:  05/08/2017  Name of last eye doctor seen:    City/State: [place]  Wears glasses? Yes     If yes, wears  Full-time or part-time?  Part-time  Present glasses are: Bifocal, SV Distance, SV Reading?  PAL  Approximate age of present glasses:  3 years   Got new glasses following last exam, or subsequently?:  no   Any problem with [facility] with glasses?  no  Wears CLs?:  no  Headaches?  no  Eye pain/discomfort?  no                                                                                     Flashes?  No  Floaters?  No  Diplopia/Double vision?  no  Patient's Ocular History:         Any eye surgeries? no         Any eye injury?  no         Any treatment for eye disease?  no  Family history of eye disease?  no  Significant patient medical history:         1. Diabetes?  no       If yes, IDDM or NIDDM? no   2. HBP?  no              3. Other (describe):  Acid reflux   ! OTC eyedrops currently using:  Systane   ! Prescription eye meds currently using:  Optivar as needed for   allergy-related symptoms   ! Any history of allergy/adverse reaction to any eye meds used   previously?  no    ! Any history of allergy/adverse reaction to eyedrops used during prior   eye exam(s)? no    ! Any history of allergy/adverse reaction to Novacaine or similar meds?   no   ! Any history of allergy/adverse reaction to Epinephrine or similar meds?   no    ! Patient okay with use of anesthetic eyedrops to check eye pressure?    yes        ! Patient okay with use of eyedrops to dilate pupils today?  yes   !  Allergies/Medications/Medical History/Family History reviewed today?    yes      PD =   61/58  Desired reading distance =  14.5"                                                                    Last edited by Shashank LAZCANO " Nicol, OD on 7/2/2018 11:04 AM. (History)            Assessment /Plan     For exam results, see Encounter Report.    1. Nuclear sclerosis, bilateral     2. Cortical senile cataract, bilateral     3. Choroidal nevus of left eye     4. Hyperopia of both eyes with astigmatism     5. Presbyopia of both eyes                    Early nuclear sclerotic/peripheral cortical cataract in both eyes. No need for cataract surgery.  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 Stable.  Otherwise, ocular health appears good in each eye.  Hyperopia with astigmatism in each eye, and presbyopia consistent with age.  New spectacle lens Rx issued for use as desired.   Recheck in 12 -18 months.

## 2018-07-12 ENCOUNTER — PES CALL (OUTPATIENT)
Dept: ADMINISTRATIVE | Facility: CLINIC | Age: 78
End: 2018-07-12

## 2018-07-12 ENCOUNTER — OFFICE VISIT (OUTPATIENT)
Dept: DERMATOLOGY | Facility: CLINIC | Age: 78
End: 2018-07-12
Payer: MEDICARE

## 2018-07-12 VITALS — BODY MASS INDEX: 24.69 KG/M2 | WEIGHT: 135 LBS

## 2018-07-12 DIAGNOSIS — Z85.828 HISTORY OF SKIN CANCER: ICD-10-CM

## 2018-07-12 DIAGNOSIS — L85.3 XEROSIS CUTIS: ICD-10-CM

## 2018-07-12 DIAGNOSIS — L71.9 ROSACEA: Primary | ICD-10-CM

## 2018-07-12 DIAGNOSIS — Z87.2 HISTORY OF ACTINIC KERATOSES: ICD-10-CM

## 2018-07-12 PROCEDURE — 99999 PR PBB SHADOW E&M-EST. PATIENT-LVL III: CPT | Mod: PBBFAC,,, | Performed by: DERMATOLOGY

## 2018-07-12 PROCEDURE — 99213 OFFICE O/P EST LOW 20 MIN: CPT | Mod: S$GLB,,, | Performed by: DERMATOLOGY

## 2018-07-12 RX ORDER — METRONIDAZOLE 10 MG/G
GEL TOPICAL DAILY
Qty: 60 G | Refills: 3 | Status: SHIPPED | OUTPATIENT
Start: 2018-07-12 | End: 2020-07-30 | Stop reason: SDUPTHER

## 2018-07-12 RX ORDER — ATENOLOL 25 MG/1
25 TABLET ORAL DAILY
Qty: 30 TABLET | Refills: 5 | Status: SHIPPED | OUTPATIENT
Start: 2018-07-12 | End: 2019-04-30 | Stop reason: SDUPTHER

## 2018-07-12 NOTE — PROGRESS NOTES
Subjective:       Patient ID:  Betzy Cooley is a 77 y.o. female who presents for   Chief Complaint   Patient presents with    Skin Check     UBSE    Rosacea     History of Present Illness: The patient presents with chief complaint of dry skin.  Location: face(temples)  Duration: months  Signs/Symptoms: none    Prior treatments: none          Review of Systems   Constitutional: Negative for fever.   Skin: Positive for rash. Negative for itching.   Hematologic/Lymphatic: Does not bruise/bleed easily.        Objective:    Physical Exam   Constitutional: She appears well-developed and well-nourished. No distress.   Neurological: She is alert and oriented to person, place, and time. She is not disoriented.   Psychiatric: She has a normal mood and affect.   Skin:   Areas Examined (abnormalities noted in diagram):   Head / Face Inspection Performed  Neck Inspection Performed  Chest / Axilla Inspection Performed  Abdomen Inspection Performed  Back Inspection Performed  RUE Inspected  LUE Inspection Performed  RLE Inspected  LLE Inspection Performed                   Diagram Legend     Erythematous scaling macule/papule c/w actinic keratosis       Vascular papule c/w angioma      Pigmented verrucoid papule/plaque c/w seborrheic keratosis      Yellow umbilicated papule c/w sebaceous hyperplasia      Irregularly shaped tan macule c/w lentigo     1-2 mm smooth white papules consistent with Milia      Movable subcutaneous cyst with punctum c/w epidermal inclusion cyst      Subcutaneous movable cyst c/w pilar cyst      Firm pink to brown papule c/w dermatofibroma      Pedunculated fleshy papule(s) c/w skin tag(s)      Evenly pigmented macule c/w junctional nevus     Mildly variegated pigmented, slightly irregular-bordered macule c/w mildly atypical nevus      Flesh colored to evenly pigmented papule c/w intradermal nevus       Pink pearly papule/plaque c/w basal cell carcinoma      Erythematous hyperkeratotic cursted  plaque c/w SCC      Surgical scar with no sign of skin cancer recurrence      Open and closed comedones      Inflammatory papules and pustules      Verrucoid papule consistent consistent with wart     Erythematous eczematous patches and plaques     Dystrophic onycholytic nail with subungual debris c/w onychomycosis     Umbilicated papule    Erythematous-base heme-crusted tan verrucoid plaque consistent with inflamed seborrheic keratosis     Erythematous Silvery Scaling Plaque c/w Psoriasis     See annotation      Assessment / Plan:        Rosacea  metronidazole 1% (METROGEL) 1 % Gel; Apply topically once daily.  Dispense: 60 g; Refill: 3 mid face      History of skin cancer  Scc right arm 2007     History of actinic keratoses    Xerosis  cerave healing ointment for lips and cheeks (see PE)  -                Follow-up in about 1 year (around 7/12/2019).

## 2018-10-02 ENCOUNTER — TELEPHONE (OUTPATIENT)
Dept: INTERNAL MEDICINE | Facility: CLINIC | Age: 78
End: 2018-10-02

## 2018-10-02 NOTE — TELEPHONE ENCOUNTER
Spoke with pt to confirm upcoming appt and to advise of over due Health Maintenance. Pt agreed to additional services. Pt will receive Pneumococcal vaccine at appt.

## 2018-10-10 ENCOUNTER — IMMUNIZATION (OUTPATIENT)
Dept: INTERNAL MEDICINE | Facility: CLINIC | Age: 78
End: 2018-10-10
Payer: MEDICARE

## 2018-10-10 ENCOUNTER — TELEPHONE (OUTPATIENT)
Dept: INTERNAL MEDICINE | Facility: CLINIC | Age: 78
End: 2018-10-10

## 2018-10-10 ENCOUNTER — OFFICE VISIT (OUTPATIENT)
Dept: INTERNAL MEDICINE | Facility: CLINIC | Age: 78
End: 2018-10-10
Payer: MEDICARE

## 2018-10-10 VITALS
HEIGHT: 62 IN | WEIGHT: 135.38 LBS | SYSTOLIC BLOOD PRESSURE: 134 MMHG | OXYGEN SATURATION: 98 % | DIASTOLIC BLOOD PRESSURE: 77 MMHG | BODY MASS INDEX: 24.91 KG/M2 | HEART RATE: 69 BPM

## 2018-10-10 DIAGNOSIS — N99.3 VAGINAL VAULT PROLAPSE, POSTHYSTERECTOMY: Primary | ICD-10-CM

## 2018-10-10 DIAGNOSIS — R03.0 ELEVATED BLOOD PRESSURE READING: ICD-10-CM

## 2018-10-10 DIAGNOSIS — Z46.89 PESSARY MAINTENANCE: ICD-10-CM

## 2018-10-10 DIAGNOSIS — B37.31 VAGINAL CANDIDA: ICD-10-CM

## 2018-10-10 DIAGNOSIS — Z23 IMMUNIZATION DUE: ICD-10-CM

## 2018-10-10 PROCEDURE — 99999 PR PBB SHADOW E&M-EST. PATIENT-LVL IV: CPT | Mod: PBBFAC,,, | Performed by: INTERNAL MEDICINE

## 2018-10-10 PROCEDURE — 99214 OFFICE O/P EST MOD 30 MIN: CPT | Mod: PBBFAC,25 | Performed by: INTERNAL MEDICINE

## 2018-10-10 PROCEDURE — 1101F PT FALLS ASSESS-DOCD LE1/YR: CPT | Mod: CPTII,,, | Performed by: INTERNAL MEDICINE

## 2018-10-10 PROCEDURE — 3075F SYST BP GE 130 - 139MM HG: CPT | Mod: CPTII,,, | Performed by: INTERNAL MEDICINE

## 2018-10-10 PROCEDURE — 3078F DIAST BP <80 MM HG: CPT | Mod: CPTII,,, | Performed by: INTERNAL MEDICINE

## 2018-10-10 PROCEDURE — 99214 OFFICE O/P EST MOD 30 MIN: CPT | Mod: S$PBB,,, | Performed by: INTERNAL MEDICINE

## 2018-10-10 PROCEDURE — 90670 PCV13 VACCINE IM: CPT | Mod: PBBFAC

## 2018-10-10 RX ORDER — FLUCONAZOLE 150 MG/1
TABLET ORAL
Qty: 2 TABLET | Refills: 1 | Status: SHIPPED | OUTPATIENT
Start: 2018-10-10 | End: 2021-02-11

## 2018-10-10 NOTE — PROGRESS NOTES
Subjective:      Patient ID: Betzy Cooley is a 78 y.o. female.    Chief Complaint: Follow-up    HPI:  HPI   Patient is here to establish care:  The day before he left she had a sinus infection: Zithromax and developed a yeast infection.    She uses atenolol for increased heart rate.    Annual exam: 5/2018  Colonoscopy 4/9/2014 follow up 7 years  Mammogram 6/2018  Gyn 2017 Pessary   Optho 2018 Dr. Camarena  Flu: done  Tetanus: done  Shingrix: discussed  Pneumovax: done  Prevnar: today  Bone Density        Patient Active Problem List   Diagnosis    Hearing loss, sensorineural    Nuclear sclerosis - Both Eyes    Cortical senile cataract - Both Eyes    Choroidal nevus - Both Eyes    Cystocele    Rectocele    Vaginal vault prolapse, posthysterectomy    Urethral hypermobility    Palpitations    Osteopenia    Vaginal candida    Encounter for screening colonoscopy    GERD (gastroesophageal reflux disease)    History of skin cancer    Tinnitus    Elevated blood pressure reading     Past Medical History:   Diagnosis Date    Arthritis     Cataract     GERD (gastroesophageal reflux disease)     HEARING LOSS     Hypertension     Osteoporosis, postmenopausal     Squamous Cell Carcinoma 2007    right forearm    Urinary incontinence     rare mixed     Past Surgical History:   Procedure Laterality Date    COLONOSCOPY N/A 4/9/2014    Performed by [facility] at [facility] ([facility] F[facility])    FRACTURE SURGERY Left 2008    open radius/ulna fracture    HYSTERECTOMY      TANIA/ovaries remain--fibroids     Family History   Problem Relation Age of Onset    Heart failure Mother     Macular degeneration Mother     Heart disease Mother     COPD Mother     Diabetes Sister     Arthritis Sister         rheumatoid arthritis    COPD Father     No Known Problems Brother     No Known Problems Son     No Known Problems Daughter     No Known Problems Sister     No Known Problems Sister     No Known Problems  "Sister     No Known Problems Brother     No Known Problems Brother     No Known Problems Brother     No Known Problems Brother     No Known Problems Brother     No Known Problems Daughter     No Known Problems Son     Breast cancer Neg Hx     Ovarian cancer Neg Hx     Amblyopia Neg Hx     Blindness Neg Hx     Cancer Neg Hx     Cataracts Neg Hx     Glaucoma Neg Hx     Hypertension Neg Hx     Retinal detachment Neg Hx     Strabismus Neg Hx     Stroke Neg Hx     Thyroid disease Neg Hx     Cervical cancer Neg Hx     Endometrial cancer Neg Hx     Vaginal cancer Neg Hx     Colon cancer Neg Hx      Review of Systems   Constitutional: Negative for chills, fever and unexpected weight change.   HENT: Negative for trouble swallowing.    Respiratory: Negative for cough, shortness of breath and wheezing.    Cardiovascular: Negative for chest pain and palpitations.   Gastrointestinal: Negative for abdominal distention, abdominal pain, blood in stool and vomiting.   Musculoskeletal: Negative for back pain.     Objective:     Vitals:    10/10/18 0836 10/10/18 0929   BP: (!) 158/84 134/77   Pulse: 69    SpO2: 98%    Weight: 61.4 kg (135 lb 5.8 oz)    Height: 5' 2" (1.575 m)    PainSc: 0-No pain      Body mass index is 24.76 kg/m².  Physical Exam   Constitutional: She is oriented to person, place, and time. She appears well-developed and well-nourished. No distress.   Neck: Carotid bruit is not present. No thyromegaly present.   Cardiovascular: Normal rate, regular rhythm and normal heart sounds. PMI is not displaced.   Pulmonary/Chest: Effort normal and breath sounds normal. No respiratory distress.   Abdominal: Soft. Bowel sounds are normal. She exhibits no distension. There is no tenderness.   Musculoskeletal: She exhibits no edema.   Neurological: She is alert and oriented to person, place, and time.     Assessment:     1. Vaginal vault prolapse, posthysterectomy    2. Pessary maintenance    3. Elevated " blood pressure reading    4. Immunization due    5. Vaginal candida      Plan:   Betzy was seen today for follow-up.    Diagnoses and all orders for this visit:    Vaginal vault prolapse, posthysterectomy  Comments:  A; Pessary  P: NP in [facility] for check  Orders:  -     Ambulatory consult to Urogynecology    Pessary maintenance  Comments:  A;Needs yearly monitoring  P Referral to NP in [facility]  Orders:  -     Ambulatory consult to Urogynecology    Elevated blood pressure reading  Comments:  A: For years felt to be white coat as patient checks it 3 times a week  P Check 3 times a week and let me know over 140/90    Immunization due    Vaginal candida  Comments:  A; Needs treatment  P Diflucan    Other orders  -     fluconazole (DIFLUCAN) 150 MG Tab; Take one weekly for 2 weeks        Problem List Items Addressed This Visit     Vaginal vault prolapse, posthysterectomy - Primary    Relevant Orders    Ambulatory consult to Urogynecology    Vaginal candida    Elevated blood pressure reading    Overview     Patient takes BP three times a week at [place] and it is always less than 140/90           Other Visit Diagnoses     Pessary maintenance        A;Needs yearly monitoring  P Referral to NP in [facility]    Relevant Orders    Ambulatory consult to Urogynecology    Immunization due            Orders Placed This Encounter   Procedures    Ambulatory consult to Urogynecology     Referral Priority:   Routine     Referral Type:   Consultation     Referral Reason:   Specialty Services Required     Requested Specialty:   Gynecology     Number of Visits Requested:   1     Follow-up in about 7 months (around 5/22/2019).     Medication List           Accurate as of 10/10/18  5:04 PM. If you have any questions, ask your nurse or doctor.               START taking these medications    fluconazole 150 MG Tab  Commonly known as:  DIFLUCAN  Take one weekly for 2 weeks  Started by:  Nancy Maguire MD        CONTINUE taking these  medications    aspirin 81 MG Chew     atenolol 25 MG tablet  Commonly known as:  TENORMIN  Take 1 tablet (25 mg total) by mouth once daily.     calcium-vitamin D3 500 mg(1,250mg) -200 unit per tablet  Commonly known as:  OS-ESTIVEN 500 + D3     ciprofloxacin HCl 0.3 % ophthalmic solution  Commonly known as:  CILOXAN  Place 1 drop into the right eye every 2 (two) hours.     co-enzyme Q-10 30 mg capsule     ELIDEL 1 % cream  Generic drug:  pimecrolimus  AAA bid     fexofenadine 180 MG tablet  Commonly known as:  ALLEGRA  Take 1 tablet (180 mg total) by mouth once daily.     meclizine 25 mg tablet  Commonly known as:  ANTIVERT  Take 1 tablet (25 mg total) by mouth 3 (three) times daily as needed.     metronidazole 1% 1 % Gel  Commonly known as:  METROGEL  Apply topically once daily.     ONE DAILY MULTIVITAMIN per tablet  Generic drug:  multivitamin     ranitidine 150 MG capsule  Commonly known as:  ZANTAC     triamcinolone acetonide 0.1% 0.1 % cream  Commonly known as:  KENALOG  Apply topically every day for itching     VISION Tab  Generic drug:  vitamin A-vitamin C-vit E-min     VITAMIN D2 ORAL        STOP taking these medications    alendronate 70 MG tablet  Commonly known as:  FOSAMAX  Stopped by:  Nancy Maguire MD     doxycycline 100 MG Cap  Commonly known as:  VIBRAMYCIN  Stopped by:  Nancy Maguire MD           Where to Get Your Medications      These medications were sent to [id number] - [place], [place] - [address]  [address] [address]    Phone:  [phone]   · fluconazole 150 MG Tab

## 2018-10-10 NOTE — PATIENT INSTRUCTIONS
New shingles vaccine: SHINGRIX ( 2018) not live, 90%,  2 shots, one at day zero and the 2nd at 2-6 months: any pharmacy can give it.    Ask for SHINGRIX

## 2019-01-04 ENCOUNTER — HOSPITAL ENCOUNTER (EMERGENCY)
Facility: HOSPITAL | Age: 79
Discharge: HOME | End: 2019-01-04
Attending: EMERGENCY MEDICINE
Payer: MEDICARE

## 2019-01-04 VITALS
RESPIRATION RATE: 16 BRPM | SYSTOLIC BLOOD PRESSURE: 208 MMHG | HEIGHT: 62 IN | OXYGEN SATURATION: 99 % | WEIGHT: 134 LBS | TEMPERATURE: 98 F | DIASTOLIC BLOOD PRESSURE: 97 MMHG | HEART RATE: 73 BPM | BODY MASS INDEX: 24.66 KG/M2

## 2019-01-04 DIAGNOSIS — K62.5 RECTAL BLEEDING: Primary | ICD-10-CM

## 2019-01-04 DIAGNOSIS — K64.9 HEMORRHOIDS, UNSPECIFIED HEMORRHOID TYPE: ICD-10-CM

## 2019-01-04 LAB
ABO + RH BLD: NORMAL
ALBUMIN SERPL BCP-MCNC: 4.2 G/DL
ALP SERPL-CCNC: 102 U/L
ALT SERPL W/O P-5'-P-CCNC: 17 U/L
ANION GAP SERPL CALC-SCNC: 10 MMOL/L
APTT BLDCRRT: 23.4 SEC
AST SERPL-CCNC: 25 U/L
BASOPHILS # BLD AUTO: 0.07 K/UL
BASOPHILS NFR BLD: 0.9 %
BILIRUB SERPL-MCNC: 0.5 MG/DL
BLD GP AB SCN CELLS X3 SERPL QL: NORMAL
BUN SERPL-MCNC: 14 MG/DL
CALCIUM SERPL-MCNC: 10 MG/DL
CHLORIDE SERPL-SCNC: 107 MMOL/L
CO2 SERPL-SCNC: 25 MMOL/L
CREAT SERPL-MCNC: 0.8 MG/DL
DIFFERENTIAL METHOD: NORMAL
EOSINOPHIL # BLD AUTO: 0.1 K/UL
EOSINOPHIL NFR BLD: 1 %
ERYTHROCYTE [DISTWIDTH] IN BLOOD BY AUTOMATED COUNT: 12.9 %
EST. GFR  (AFRICAN AMERICAN): >60 ML/MIN/1.73 M^2
EST. GFR  (NON AFRICAN AMERICAN): >60 ML/MIN/1.73 M^2
GLUCOSE SERPL-MCNC: 104 MG/DL
HCT VFR BLD AUTO: 44.1 %
HGB BLD-MCNC: 14.1 G/DL
IMM GRANULOCYTES # BLD AUTO: 0.02 K/UL
IMM GRANULOCYTES NFR BLD AUTO: 0.2 %
INR PPP: 0.9
LYMPHOCYTES # BLD AUTO: 1.8 K/UL
LYMPHOCYTES NFR BLD: 22.3 %
MCH RBC QN AUTO: 29.6 PG
MCHC RBC AUTO-ENTMCNC: 32 G/DL
MCV RBC AUTO: 93 FL
MONOCYTES # BLD AUTO: 0.6 K/UL
MONOCYTES NFR BLD: 7.2 %
NEUTROPHILS # BLD AUTO: 5.6 K/UL
NEUTROPHILS NFR BLD: 68.4 %
NRBC BLD-RTO: 0 /100 WBC
PLATELET # BLD AUTO: 304 K/UL
PMV BLD AUTO: 9.6 FL
POTASSIUM SERPL-SCNC: 4 MMOL/L
PROT SERPL-MCNC: 8.5 G/DL
PROTHROMBIN TIME: 9.9 SEC
RBC # BLD AUTO: 4.76 M/UL
SODIUM SERPL-SCNC: 142 MMOL/L
WBC # BLD AUTO: 8.21 K/UL

## 2019-01-04 PROCEDURE — 80053 COMPREHEN METABOLIC PANEL: CPT | Mod: HCNC

## 2019-01-04 PROCEDURE — 99283 PR EMERGENCY DEPT VISIT,LEVEL III: ICD-10-PCS | Mod: ,,,

## 2019-01-04 PROCEDURE — 85610 PROTHROMBIN TIME: CPT | Mod: HCNC

## 2019-01-04 PROCEDURE — 99283 EMERGENCY DEPT VISIT LOW MDM: CPT | Mod: HCNC

## 2019-01-04 PROCEDURE — 85025 COMPLETE CBC W/AUTO DIFF WBC: CPT | Mod: HCNC

## 2019-01-04 PROCEDURE — 86901 BLOOD TYPING SEROLOGIC RH(D): CPT | Mod: HCNC

## 2019-01-04 PROCEDURE — 99283 EMERGENCY DEPT VISIT LOW MDM: CPT | Mod: ,,,

## 2019-01-04 PROCEDURE — 85730 THROMBOPLASTIN TIME PARTIAL: CPT | Mod: HCNC

## 2019-01-04 NOTE — DISCHARGE INSTRUCTIONS
Please continue take Metamucil and also take a stool softener such as colace. Encourage follow up with primary care provider in 3 days for repeat hemoglobin and hematocrit.       Our goal in the emergency department is to always give you outstanding care and exceptional service. You may receive a survey by mail or e-mail in the next week regarding your experience in our ED. We would greatly appreciate your completing and returning the survey. Your feedback provides us with a way to recognize our staff who give very good care and it helps us learn how to improve when your experience was below our aspiration of excellence.

## 2019-01-04 NOTE — ED TRIAGE NOTES
Betzy Cooley, a 78 y.o. female presents to the ED c/o hard BM with light bleed after bm.  Reports < 1 tbsp      Chief Complaint   Patient presents with    Rectal Bleeding     Patient passed a large hard stool, then noted bright red blood in the toilet and dripping from her rectum.  Patient states bleeding has now stopped.  Patient states > 1 tbsp.     Review of patient's allergies indicates:   Allergen Reactions    Sulfa (sulfonamide antibiotics) Hives     Other reaction(s): Anaphylaxis     Past Medical History:   Diagnosis Date    Arthritis     Cataract     GERD (gastroesophageal reflux disease)     HEARING LOSS     Hypertension     Osteoporosis, postmenopausal     Squamous Cell Carcinoma 2007    right forearm    Urinary incontinence     rare mixed     LOC: Patient name and date of birth verified. The patient is awake, alert and aware of environment with an appropriate affect, the patient is oriented x 3 and speaking appropriately.   APPEARANCE: Patient resting comfortably, patient is clean and well groomed, patient's clothing is properly fastened.  SKIN: The skin is warm and dry, color consistent with ethnicity, patient has normal skin turgor and moist mucus membranes, skin intact, no breakdown or bruising noted.  MUSCULOSKELETAL: Patient moving all extremities well, no obvious swelling or deformities noted.   RESPIRATORY: Respirations are spontaneous, patient has a normal effort and rate, no accessory muscle use noted.  CARDIAC: Patient has a normal rate and rhythm, no periphreal edema noted, capillary refill < 3 seconds.  ABDOMEN: Soft and non tender to palpation, no distention noted. Bowel sounds present in all four quadrants.  NEUROLOGIC: Eyes open spontaneously, behavior appropriate to situation, follows commands, facial expression symmetrical, bilateral hand grasp equal and even, purposeful motor response noted, normal sensation in all extremities when touched with a finger.

## 2019-01-05 ENCOUNTER — PATIENT MESSAGE (OUTPATIENT)
Dept: INTERNAL MEDICINE | Facility: CLINIC | Age: 79
End: 2019-01-05

## 2019-01-07 ENCOUNTER — OFFICE VISIT (OUTPATIENT)
Dept: INTERNAL MEDICINE | Facility: CLINIC | Age: 79
End: 2019-01-07
Payer: MEDICARE

## 2019-01-07 VITALS
DIASTOLIC BLOOD PRESSURE: 82 MMHG | BODY MASS INDEX: 25.36 KG/M2 | HEART RATE: 78 BPM | WEIGHT: 137.81 LBS | OXYGEN SATURATION: 96 % | SYSTOLIC BLOOD PRESSURE: 132 MMHG | HEIGHT: 62 IN

## 2019-01-07 DIAGNOSIS — K62.5 RECTAL BLEED: Primary | ICD-10-CM

## 2019-01-07 PROCEDURE — 1101F PR PT FALLS ASSESS DOC 0-1 FALLS W/OUT INJ PAST YR: ICD-10-PCS | Mod: CPTII,HCNC,S$GLB, | Performed by: INTERNAL MEDICINE

## 2019-01-07 PROCEDURE — 99999 PR PBB SHADOW E&M-EST. PATIENT-LVL V: CPT | Mod: PBBFAC,HCNC,, | Performed by: INTERNAL MEDICINE

## 2019-01-07 PROCEDURE — 1101F PT FALLS ASSESS-DOCD LE1/YR: CPT | Mod: CPTII,HCNC,S$GLB, | Performed by: INTERNAL MEDICINE

## 2019-01-07 PROCEDURE — 3079F PR MOST RECENT DIASTOLIC BLOOD PRESSURE 80-89 MM HG: ICD-10-PCS | Mod: CPTII,HCNC,S$GLB, | Performed by: INTERNAL MEDICINE

## 2019-01-07 PROCEDURE — 99213 PR OFFICE/OUTPT VISIT, EST, LEVL III, 20-29 MIN: ICD-10-PCS | Mod: HCNC,S$GLB,, | Performed by: INTERNAL MEDICINE

## 2019-01-07 PROCEDURE — 99999 PR PBB SHADOW E&M-EST. PATIENT-LVL V: ICD-10-PCS | Mod: PBBFAC,HCNC,, | Performed by: INTERNAL MEDICINE

## 2019-01-07 PROCEDURE — 3075F SYST BP GE 130 - 139MM HG: CPT | Mod: CPTII,HCNC,S$GLB, | Performed by: INTERNAL MEDICINE

## 2019-01-07 PROCEDURE — 99213 OFFICE O/P EST LOW 20 MIN: CPT | Mod: HCNC,S$GLB,, | Performed by: INTERNAL MEDICINE

## 2019-01-07 PROCEDURE — 3079F DIAST BP 80-89 MM HG: CPT | Mod: CPTII,HCNC,S$GLB, | Performed by: INTERNAL MEDICINE

## 2019-01-07 PROCEDURE — 3075F PR MOST RECENT SYSTOLIC BLOOD PRESS GE 130-139MM HG: ICD-10-PCS | Mod: CPTII,HCNC,S$GLB, | Performed by: INTERNAL MEDICINE

## 2019-01-07 NOTE — PROGRESS NOTES
Subjective:      Patient ID: Betzy Cooley is a 78 y.o. female.    Chief Complaint: Hospital Follow Up    HPI:  HPI   Patient is here for hospital follow up.    She was seen in the ER on 1/4/2019. Reviewing the history :After a very hard bowel movement she had an episode of bleeding. It was felt to be.hemorrhoidal. She is on stool softener now take it twice a day and stool is soft and no further blood.    Her BP was elevated in the ER and now is normal.    Her last colonoscopy was in 4/2014. She states that she has never had any polyps. She does have a pessary    Labs reviewed: normal        Patient Active Problem List   Diagnosis    Hearing loss, sensorineural    Nuclear sclerosis - Both Eyes    Cortical senile cataract - Both Eyes    Choroidal nevus - Both Eyes    Cystocele    Rectocele    Vaginal vault prolapse, posthysterectomy    Urethral hypermobility    Palpitations    Osteopenia    Vaginal candida    Encounter for screening colonoscopy    GERD (gastroesophageal reflux disease)    History of skin cancer    Tinnitus    Elevated blood pressure reading     Past Medical History:   Diagnosis Date    Arthritis     Cataract     GERD (gastroesophageal reflux disease)     HEARING LOSS     Hypertension     Osteoporosis, postmenopausal     Squamous Cell Carcinoma 2007    right forearm    Urinary incontinence     rare mixed     Past Surgical History:   Procedure Laterality Date    COLONOSCOPY N/A 4/9/2014    Performed by [facility] at [facility] ([facility] F[facility])    FRACTURE SURGERY Left 2008    open radius/ulna fracture    HYSTERECTOMY      TANIA/ovaries remain--fibroids     Family History   Problem Relation Age of Onset    Heart failure Mother     Macular degeneration Mother     Heart disease Mother     COPD Mother     Diabetes Sister     Arthritis Sister         rheumatoid arthritis    COPD Father     No Known Problems Brother     No Known Problems Son     No Known Problems  "Daughter     No Known Problems Sister     No Known Problems Sister     No Known Problems Sister     No Known Problems Brother     No Known Problems Brother     No Known Problems Brother     No Known Problems Brother     No Known Problems Brother     No Known Problems Daughter     No Known Problems Son     Breast cancer Neg Hx     Ovarian cancer Neg Hx     Amblyopia Neg Hx     Blindness Neg Hx     Cancer Neg Hx     Cataracts Neg Hx     Glaucoma Neg Hx     Hypertension Neg Hx     Retinal detachment Neg Hx     Strabismus Neg Hx     Stroke Neg Hx     Thyroid disease Neg Hx     Cervical cancer Neg Hx     Endometrial cancer Neg Hx     Vaginal cancer Neg Hx     Colon cancer Neg Hx      Review of Systems   Constitutional: Negative for activity change, chills, fever and unexpected weight change.   Respiratory: Negative for cough, chest tightness, shortness of breath and wheezing.    Cardiovascular: Negative for chest pain, palpitations and leg swelling.     Objective:     Vitals:    01/07/19 1303   BP: 132/82   Pulse: 78   SpO2: 96%   Weight: 62.5 kg (137 lb 12.6 oz)   Height: 5' 2" (1.575 m)   PainSc: 0-No pain     Body mass index is 25.2 kg/m².  Physical Exam   Constitutional: She appears well-developed and well-nourished.   Neck: No JVD present. No thyromegaly present.   Cardiovascular: Normal rate, normal heart sounds and intact distal pulses.   Pulmonary/Chest: Effort normal and breath sounds normal. No respiratory distress.    Rectal, external hemorrhoids no obvious thrombosed hemorrhoids, digital not completed  Assessment:     1. Rectal bleed      Plan:   Bezty was seen today for hospital follow up.    Diagnoses and all orders for this visit:    Rectal bleed  Comments:  Patient doing well, no further bleeding has passed stool, may need anoscopy  Orders:  -     Ambulatory consult to Colorectal Surgery        Problem List Items Addressed This Visit     None      Visit Diagnoses     Rectal " bleed    -  Primary    Patient doing well, no further bleeding has passed stool, may need anoscopy    Relevant Orders    Ambulatory consult to Colorectal Surgery        Orders Placed This Encounter   Procedures    Ambulatory consult to Colorectal Surgery     Referral Priority:   Routine     Referral Type:   Surgical     Referral Reason:   Specialty Services Required     Requested Specialty:   Colon and Rectal Surgery     Number of Visits Requested:   1     No Follow-up on file.     Medication List           Accurate as of 1/7/19  2:19 PM. If you have any questions, ask your nurse or doctor.               CONTINUE taking these medications    aspirin 81 MG Chew     atenolol 25 MG tablet  Commonly known as:  TENORMIN  Take 1 tablet (25 mg total) by mouth once daily.     calcium-vitamin D3 500 mg(1,250mg) -200 unit per tablet  Commonly known as:  OS-ESTIVEN 500 + D3     ciprofloxacin HCl 0.3 % ophthalmic solution  Commonly known as:  CILOXAN  Place 1 drop into the right eye every 2 (two) hours.     co-enzyme Q-10 30 mg capsule     ELIDEL 1 % cream  Generic drug:  pimecrolimus  AAA bid     fexofenadine 180 MG tablet  Commonly known as:  ALLEGRA  Take 1 tablet (180 mg total) by mouth once daily.     fluconazole 150 MG Tab  Commonly known as:  DIFLUCAN  Take one weekly for 2 weeks     meclizine 25 mg tablet  Commonly known as:  ANTIVERT  Take 1 tablet (25 mg total) by mouth 3 (three) times daily as needed.     metronidazole 1% 1 % Gel  Commonly known as:  METROGEL  Apply topically once daily.     ONE DAILY MULTIVITAMIN per tablet  Generic drug:  multivitamin     ranitidine 150 MG capsule  Commonly known as:  ZANTAC     triamcinolone acetonide 0.1% 0.1 % cream  Commonly known as:  KENALOG  Apply topically every day for itching     VISION Tab  Generic drug:  vitamin A-vitamin C-vit E-min     VITAMIN D2 ORAL

## 2019-01-16 ENCOUNTER — OFFICE VISIT (OUTPATIENT)
Dept: SURGERY | Facility: CLINIC | Age: 79
End: 2019-01-16
Payer: MEDICARE

## 2019-01-16 VITALS
WEIGHT: 141.13 LBS | HEART RATE: 62 BPM | BODY MASS INDEX: 25.97 KG/M2 | DIASTOLIC BLOOD PRESSURE: 90 MMHG | HEIGHT: 62 IN | SYSTOLIC BLOOD PRESSURE: 143 MMHG

## 2019-01-16 DIAGNOSIS — K64.8 INTERNAL HEMORRHOIDS: Primary | ICD-10-CM

## 2019-01-16 PROCEDURE — 99999 PR PBB SHADOW E&M-EST. PATIENT-LVL III: ICD-10-PCS | Mod: PBBFAC,HCNC,, | Performed by: NURSE PRACTITIONER

## 2019-01-16 PROCEDURE — 3077F PR MOST RECENT SYSTOLIC BLOOD PRESSURE >= 140 MM HG: ICD-10-PCS | Mod: CPTII,HCNC,S$GLB, | Performed by: NURSE PRACTITIONER

## 2019-01-16 PROCEDURE — 46600 DIAGNOSTIC ANOSCOPY SPX: CPT | Mod: HCNC,S$GLB,, | Performed by: NURSE PRACTITIONER

## 2019-01-16 PROCEDURE — 3077F SYST BP >= 140 MM HG: CPT | Mod: CPTII,HCNC,S$GLB, | Performed by: NURSE PRACTITIONER

## 2019-01-16 PROCEDURE — 3080F PR MOST RECENT DIASTOLIC BLOOD PRESSURE >= 90 MM HG: ICD-10-PCS | Mod: CPTII,HCNC,S$GLB, | Performed by: NURSE PRACTITIONER

## 2019-01-16 PROCEDURE — 1101F PT FALLS ASSESS-DOCD LE1/YR: CPT | Mod: CPTII,HCNC,S$GLB, | Performed by: NURSE PRACTITIONER

## 2019-01-16 PROCEDURE — 99999 PR PBB SHADOW E&M-EST. PATIENT-LVL III: CPT | Mod: PBBFAC,HCNC,, | Performed by: NURSE PRACTITIONER

## 2019-01-16 PROCEDURE — 99204 OFFICE O/P NEW MOD 45 MIN: CPT | Mod: 25,HCNC,S$GLB, | Performed by: NURSE PRACTITIONER

## 2019-01-16 PROCEDURE — 99204 PR OFFICE/OUTPT VISIT, NEW, LEVL IV, 45-59 MIN: ICD-10-PCS | Mod: 25,HCNC,S$GLB, | Performed by: NURSE PRACTITIONER

## 2019-01-16 PROCEDURE — 1101F PR PT FALLS ASSESS DOC 0-1 FALLS W/OUT INJ PAST YR: ICD-10-PCS | Mod: CPTII,HCNC,S$GLB, | Performed by: NURSE PRACTITIONER

## 2019-01-16 PROCEDURE — 46600 PR DIAG2STIC A2SCOPY: ICD-10-PCS | Mod: HCNC,S$GLB,, | Performed by: NURSE PRACTITIONER

## 2019-01-16 PROCEDURE — 3080F DIAST BP >= 90 MM HG: CPT | Mod: CPTII,HCNC,S$GLB, | Performed by: NURSE PRACTITIONER

## 2019-01-16 NOTE — PROGRESS NOTES
Subjective:       Patient ID: Betzy Cooley is a 78 y.o. female.    Chief Complaint: No chief complaint on file.    HPI     78 F who was evaluated in the ER on 1/4/2019. After a very hard bowel movement she had an episode of bleeding. It was felt to be hemorrhoidal. She is on stool softener now takes it twice a day and stool is soft and no further blood. Some occasional rectal pain with sitting for extended periods of time. Denies any pain with bowel movements.        Her last colonoscopy was in 4/2014. She states that she has never had any polyps. She does have a pessary  No family history of colon or rectal cancer       Labs reviewed: normal    Review of Systems   Constitutional: Negative for fatigue, fever and unexpected weight change.   Respiratory: Negative for shortness of breath.    Cardiovascular: Negative for chest pain.   Gastrointestinal: Positive for blood in stool. Negative for abdominal distention, abdominal pain, anal bleeding, constipation, diarrhea, nausea, rectal pain and vomiting.       Objective:      Physical Exam   Constitutional: She is oriented to person, place, and time. She appears well-developed and well-nourished. No distress.   Eyes: Conjunctivae and EOM are normal.   Pulmonary/Chest: Effort normal. No respiratory distress.   Abdominal: Soft. She exhibits no distension. There is no tenderness.   Genitourinary:   Genitourinary Comments: Normal perianal skin. eversion of anus revealed no abnormality or fissure, OSIRIS revealed no masses, blood or stool in vault, normal sphincter tone, anoscopy revealed grade II internal hemorrhoids (right anterior) with no bleeding or stigmata of same   Musculoskeletal: Normal range of motion.   Neurological: She is alert and oriented to person, place, and time.   Skin: Skin is warm and dry.   Psychiatric: She has a normal mood and affect. Her behavior is normal.       Assessment:       1. Internal hemorrhoids        Plan:       Continue stool softeners  and metamucil  Avoid straining or sitting on the toilet for prolonged periods of time   RTC if bleeding returns, would consider RBL in the future

## 2019-01-16 NOTE — LETTER
January 16, 2019      Nancy Maguire MD  [address]  [facility] [address]           [facility]  [address]  [facility] [address]  Phone: [phone]          Patient: Betzy Cooley   MR Number: [id number]   YOB: 1940   Date of Visit: 1/16/2019       Dear Dr. Nancy Maguire:    Thank you for referring Betzy Cooley to me for evaluation. Attached you will find relevant portions of my assessment and plan of care.    If you have questions, please do not hesitate to call me. I look forward to following Betzy Cooley along with you.    Sincerely,    Jenny Kelly, NP    Enclosure  CC:  No Recipients    If you would like to receive this communication electronically, please contact [email or web address] or [phone] to request more information on [employer] Link access.    For providers and/or their staff who would like to refer a patient to [facility], please contact us through our one-stop-shop provider referral line, [facility], at [phone].    If you feel you have received this communication in error or would no longer like to receive these types of communications, please e-mail [email or web address]

## 2019-02-12 ENCOUNTER — OFFICE VISIT (OUTPATIENT)
Dept: SURGERY | Facility: CLINIC | Age: 79
End: 2019-02-12
Payer: MEDICARE

## 2019-02-12 VITALS
SYSTOLIC BLOOD PRESSURE: 155 MMHG | HEART RATE: 67 BPM | HEIGHT: 62 IN | DIASTOLIC BLOOD PRESSURE: 87 MMHG | WEIGHT: 139.75 LBS | BODY MASS INDEX: 25.72 KG/M2

## 2019-02-12 DIAGNOSIS — K64.4 RESIDUAL HEMORRHOIDAL SKIN TAGS: Primary | ICD-10-CM

## 2019-02-12 PROCEDURE — 99213 PR OFFICE/OUTPT VISIT, EST, LEVL III, 20-29 MIN: ICD-10-PCS | Mod: HCNC,S$GLB,, | Performed by: NURSE PRACTITIONER

## 2019-02-12 PROCEDURE — 99999 PR PBB SHADOW E&M-EST. PATIENT-LVL III: ICD-10-PCS | Mod: PBBFAC,HCNC,, | Performed by: NURSE PRACTITIONER

## 2019-02-12 PROCEDURE — 99213 OFFICE O/P EST LOW 20 MIN: CPT | Mod: HCNC,S$GLB,, | Performed by: NURSE PRACTITIONER

## 2019-02-12 PROCEDURE — 3077F SYST BP >= 140 MM HG: CPT | Mod: HCNC,CPTII,S$GLB, | Performed by: NURSE PRACTITIONER

## 2019-02-12 PROCEDURE — 1101F PR PT FALLS ASSESS DOC 0-1 FALLS W/OUT INJ PAST YR: ICD-10-PCS | Mod: HCNC,CPTII,S$GLB, | Performed by: NURSE PRACTITIONER

## 2019-02-12 PROCEDURE — 99999 PR PBB SHADOW E&M-EST. PATIENT-LVL III: CPT | Mod: PBBFAC,HCNC,, | Performed by: NURSE PRACTITIONER

## 2019-02-12 PROCEDURE — 3079F PR MOST RECENT DIASTOLIC BLOOD PRESSURE 80-89 MM HG: ICD-10-PCS | Mod: HCNC,CPTII,S$GLB, | Performed by: NURSE PRACTITIONER

## 2019-02-12 PROCEDURE — 3077F PR MOST RECENT SYSTOLIC BLOOD PRESSURE >= 140 MM HG: ICD-10-PCS | Mod: HCNC,CPTII,S$GLB, | Performed by: NURSE PRACTITIONER

## 2019-02-12 PROCEDURE — 3079F DIAST BP 80-89 MM HG: CPT | Mod: HCNC,CPTII,S$GLB, | Performed by: NURSE PRACTITIONER

## 2019-02-12 PROCEDURE — 1101F PT FALLS ASSESS-DOCD LE1/YR: CPT | Mod: HCNC,CPTII,S$GLB, | Performed by: NURSE PRACTITIONER

## 2019-02-12 RX ORDER — HYDROCORTISONE 25 MG/G
CREAM TOPICAL 2 TIMES DAILY
Qty: 20 G | Refills: 0 | Status: SHIPPED | OUTPATIENT
Start: 2019-02-12 | End: 2024-01-22

## 2019-02-12 NOTE — LETTER
February 12, 2019      Nancy Maguire MD  [address]  [facility] [address]           [facility]  [address]  [facility] [address]  Phone: [phone]          Patient: Betzy Cooley   MR Number: [id number]   YOB: 1940   Date of Visit: 2/12/2019       Dear Dr. Nancy Maguire:    Thank you for referring Betzy Cooley to me for evaluation. Attached you will find relevant portions of my assessment and plan of care.    If you have questions, please do not hesitate to call me. I look forward to following Betzy Cooley along with you.    Sincerely,    Jenny Kelly, NP    Enclosure  CC:  No Recipients    If you would like to receive this communication electronically, please contact [email or web address] or [phone] to request more information on [employer] Link access.    For providers and/or their staff who would like to refer a patient to [facility], please contact us through our one-stop-shop provider referral line, [facility], at [phone].    If you feel you have received this communication in error or would no longer like to receive these types of communications, please e-mail [email or web address]

## 2019-02-12 NOTE — PROGRESS NOTES
Subjective:       Patient ID: Betzy Cooley is a 78 y.o. female.    Chief Complaint: No chief complaint on file.    HPI     78 F who was evaluated in the ER on 1/4/2019. After a very hard bowel movement she had an episode of bleeding. It was felt to be hemorrhoidal. She is on stool softener now takes it twice a day and stool is soft and no further blood. Some occasional rectal pain with sitting for extended periods of time. Denies any pain with bowel movements.        Her last colonoscopy was in 4/2014. She states that she has never had any polyps. She does have a pessary  No family history of colon or rectal cancer    Interval history 2/12/19  Rectal bleeding has resolved. Taking fiber and stool softeners, one daily BM. Thinks she may have external hemorrhoids, would like to get it checked out. Denies any rectal pain. Sometime difficulty with cleaning/     Review of Systems   Constitutional: Negative for fatigue, fever and unexpected weight change.   Respiratory: Negative for shortness of breath.    Cardiovascular: Negative for chest pain.   Gastrointestinal: Positive for blood in stool. Negative for abdominal distention, abdominal pain, anal bleeding, constipation, diarrhea, nausea, rectal pain and vomiting.       Objective:      Physical Exam   Constitutional: She is oriented to person, place, and time. She appears well-developed and well-nourished. No distress.   Eyes: Conjunctivae and EOM are normal.   Pulmonary/Chest: Effort normal. No respiratory distress.   Abdominal: Soft. She exhibits no distension. There is no tenderness.   Genitourinary:   Genitourinary Comments: Residual hemorrhoid skin tags Normal perianal skin. eversion of anus revealed no abnormality or fissure, OSIRIS revealed no masses, blood or stool in vault, normal sphincter tone,   Musculoskeletal: Normal range of motion.   Neurological: She is alert and oriented to person, place, and time.   Skin: Skin is warm and dry.   Psychiatric: She has a  normal mood and affect. Her behavior is normal.       Assessment:       1. Residual hemorrhoidal skin tags        Plan:       Continue stool softeners and metamucil  Hydrocortisone cream BID   RTC prn

## 2019-04-02 ENCOUNTER — PES CALL (OUTPATIENT)
Dept: ADMINISTRATIVE | Facility: CLINIC | Age: 79
End: 2019-04-02

## 2019-04-18 DIAGNOSIS — H93.19 TINNITUS, UNSPECIFIED LATERALITY: Primary | ICD-10-CM

## 2019-04-23 ENCOUNTER — OFFICE VISIT (OUTPATIENT)
Dept: OTOLARYNGOLOGY | Facility: CLINIC | Age: 79
End: 2019-04-23
Payer: MEDICARE

## 2019-04-23 ENCOUNTER — CLINICAL SUPPORT (OUTPATIENT)
Dept: AUDIOLOGY | Facility: CLINIC | Age: 79
End: 2019-04-23
Payer: MEDICARE

## 2019-04-23 VITALS
WEIGHT: 134.5 LBS | DIASTOLIC BLOOD PRESSURE: 84 MMHG | SYSTOLIC BLOOD PRESSURE: 162 MMHG | HEART RATE: 50 BPM | BODY MASS INDEX: 24.6 KG/M2

## 2019-04-23 DIAGNOSIS — H93.19 TINNITUS, UNSPECIFIED LATERALITY: Primary | ICD-10-CM

## 2019-04-23 DIAGNOSIS — H90.3 SENSORINEURAL HEARING LOSS, BILATERAL: Primary | ICD-10-CM

## 2019-04-23 DIAGNOSIS — H90.3 SENSORINEURAL HEARING LOSS (SNHL) OF BOTH EARS: ICD-10-CM

## 2019-04-23 PROCEDURE — 3079F DIAST BP 80-89 MM HG: CPT | Mod: HCNC,CPTII,S$GLB, | Performed by: OTOLARYNGOLOGY

## 2019-04-23 PROCEDURE — 99999 PR PBB SHADOW E&M-EST. PATIENT-LVL III: ICD-10-PCS | Mod: PBBFAC,HCNC,, | Performed by: OTOLARYNGOLOGY

## 2019-04-23 PROCEDURE — 3079F PR MOST RECENT DIASTOLIC BLOOD PRESSURE 80-89 MM HG: ICD-10-PCS | Mod: HCNC,CPTII,S$GLB, | Performed by: OTOLARYNGOLOGY

## 2019-04-23 PROCEDURE — 3077F SYST BP >= 140 MM HG: CPT | Mod: HCNC,CPTII,S$GLB, | Performed by: OTOLARYNGOLOGY

## 2019-04-23 PROCEDURE — 92567 TYMPANOMETRY: CPT | Mod: HCNC,S$GLB,, | Performed by: AUDIOLOGIST

## 2019-04-23 PROCEDURE — 1101F PT FALLS ASSESS-DOCD LE1/YR: CPT | Mod: HCNC,CPTII,S$GLB, | Performed by: OTOLARYNGOLOGY

## 2019-04-23 PROCEDURE — 92557 COMPREHENSIVE HEARING TEST: CPT | Mod: HCNC,S$GLB,, | Performed by: AUDIOLOGIST

## 2019-04-23 PROCEDURE — 99202 PR OFFICE/OUTPT VISIT, NEW, LEVL II, 15-29 MIN: ICD-10-PCS | Mod: HCNC,S$GLB,, | Performed by: OTOLARYNGOLOGY

## 2019-04-23 PROCEDURE — 3077F PR MOST RECENT SYSTOLIC BLOOD PRESSURE >= 140 MM HG: ICD-10-PCS | Mod: HCNC,CPTII,S$GLB, | Performed by: OTOLARYNGOLOGY

## 2019-04-23 PROCEDURE — 92567 PR TYMPA2METRY: ICD-10-PCS | Mod: HCNC,S$GLB,, | Performed by: AUDIOLOGIST

## 2019-04-23 PROCEDURE — 99202 OFFICE O/P NEW SF 15 MIN: CPT | Mod: HCNC,S$GLB,, | Performed by: OTOLARYNGOLOGY

## 2019-04-23 PROCEDURE — 1101F PR PT FALLS ASSESS DOC 0-1 FALLS W/OUT INJ PAST YR: ICD-10-PCS | Mod: HCNC,CPTII,S$GLB, | Performed by: OTOLARYNGOLOGY

## 2019-04-23 PROCEDURE — 92557 PR COMPREHENSIVE HEARING TEST: ICD-10-PCS | Mod: HCNC,S$GLB,, | Performed by: AUDIOLOGIST

## 2019-04-23 PROCEDURE — 99999 PR PBB SHADOW E&M-EST. PATIENT-LVL III: CPT | Mod: PBBFAC,HCNC,, | Performed by: OTOLARYNGOLOGY

## 2019-04-23 NOTE — PROGRESS NOTES
Subjective:       Patient ID: Betzy Cooley is a 78 y.o. female.    Chief Complaint: Hearing Loss and Tinnitus    Hearing Loss:   Chronicity:  Chronic  Onset:  Over 10 years ago  Progression since onset:  Waxing and waning  Frequency:  Constantly  Severity:  Moderate  Hearing loss characteristics:  Moderate, muffled, trouble hearing TV, difficult on telephone and worse background noise   Associated symptoms: tinnitus.  No dizziness, no ear pain, no fever, no headaches and no rhinorrhea.  Aggravated by:  Noise  Risk factors: Presbycusis.  Treatments tried:  NothingNo dizziness.  Ringing in Ears:   Chronicity:  Chronic  Onset:  More than 1 year ago  Progression since onset:  Waxing and waning  Frequency - weeks/days included: Intermittent.  Severity:  Moderate  Ringing in ear characteristics:  Moderate, worse background noise and trouble hearing TV   Associated symptoms: tinnitus.  No dizziness, no ear pain, no fever, no headaches and no rhinorrhea.  Aggravated by:  Noise  Treatments tried:  NothingNo dizziness.    Review of Systems   Constitutional: Negative for activity change, appetite change and fever.   HENT: Positive for hearing loss and tinnitus. Negative for congestion, ear discharge, ear pain, nosebleeds, postnasal drip, rhinorrhea and sneezing.    Eyes: Negative for redness and visual disturbance.   Respiratory: Negative for apnea, cough, shortness of breath and wheezing.    Cardiovascular: Negative for chest pain and palpitations.   Gastrointestinal: Negative for diarrhea and vomiting.   Genitourinary: Negative for difficulty urinating and frequency.   Musculoskeletal: Negative for arthralgias, back pain, gait problem and neck pain.   Skin: Negative for color change and rash.   Neurological: Negative for dizziness, speech difficulty, weakness and headaches.   Hematological: Negative for adenopathy. Does not bruise/bleed easily.   Psychiatric/Behavioral: Negative for agitation and behavioral problems.        Objective:        Constitutional:   Vital signs are normal. She appears well-developed and well-nourished. She is active. Normal speech.      Head:  Normocephalic and atraumatic. Salivary glands normal.  Facial strength is normal.      Ears:  Hearing normal to normal and whispered voice; external ear normal without scars, lesions, or masses; ear canal, tympanic membrane, and middle ear normal..     Nose:  Nose normal including turbinates, nasal mucosa, sinuses and nasal septum.     Mouth/Throat  Oropharynx clear and moist without lesions or asymmetry and normal uvula midline.     Neck:  Neck normal without thyromegaly masses, asymmetry, normal tracheal structure, crepitus, and tenderness, thyroid normal, trachea normal, phonation normal and full range of motion with neck supple.     Psychiatric:   She has a normal mood and affect. Her speech is normal and behavior is normal.     Neurological:   She has neurological normal, alert and oriented.     Skin:   No abrasions, lacerations, lesions, or rashes.          As a result of this patients history and examination findings, a comprehensive audiogram was ordered to determine the level of hearing/hearing loss.          Unchanged from prior study performed in 3/2016.      Assessment:       1. Tinnitus, unspecified laterality    2. Sensorineural hearing loss (SNHL) of both ears        Plan:       1. Hearing conservation strongly recommended.  2. Trial of amplification bilaterally also recommended.  3. Re-check of hearing in 18-24 months or sooner if subjective change noted.  4. F/U with PCP as per schedule.   5. Tinnitus education materials provided along with hearing loss discussion and the benefit of getting fitted with hearing aids.

## 2019-04-23 NOTE — PROGRESS NOTES
Ms. Cooley was seen today for a hearing evaluation.     Pure tone audiometry revealed a mild-moderately severe SNHL, AU  SRT and PTA are in good agreement bilaterally.  Excellent speech discrimination scores bilaterally   Tympanometry revealed Type A (normal middle ear function), bilaterally      Recommendations:  1. Otologic Evaluation  2. Annual Audiogram or repeat audiogram as needed  3. Hearing Protection  4. Hearing Aid Consult

## 2019-04-30 RX ORDER — ATENOLOL 25 MG/1
25 TABLET ORAL DAILY
Qty: 90 TABLET | Refills: 3 | Status: SHIPPED | OUTPATIENT
Start: 2019-04-30 | End: 2020-05-31

## 2019-06-17 ENCOUNTER — PES CALL (OUTPATIENT)
Dept: ADMINISTRATIVE | Facility: CLINIC | Age: 79
End: 2019-06-17

## 2019-06-26 ENCOUNTER — OFFICE VISIT (OUTPATIENT)
Dept: URGENT CARE | Facility: CLINIC | Age: 79
End: 2019-06-26
Payer: MEDICARE

## 2019-06-26 ENCOUNTER — TELEPHONE (OUTPATIENT)
Dept: INTERNAL MEDICINE | Facility: CLINIC | Age: 79
End: 2019-06-26

## 2019-06-26 VITALS
OXYGEN SATURATION: 99 % | DIASTOLIC BLOOD PRESSURE: 86 MMHG | WEIGHT: 142 LBS | BODY MASS INDEX: 26.13 KG/M2 | SYSTOLIC BLOOD PRESSURE: 151 MMHG | HEIGHT: 62 IN | RESPIRATION RATE: 20 BRPM | HEART RATE: 64 BPM | TEMPERATURE: 97 F

## 2019-06-26 DIAGNOSIS — L08.9 INFECTED ABRASION OF LEFT KNEE, INITIAL ENCOUNTER: Primary | ICD-10-CM

## 2019-06-26 DIAGNOSIS — Z12.31 ENCOUNTER FOR SCREENING MAMMOGRAM FOR BREAST CANCER: Primary | ICD-10-CM

## 2019-06-26 DIAGNOSIS — S80.212A INFECTED ABRASION OF LEFT KNEE, INITIAL ENCOUNTER: Primary | ICD-10-CM

## 2019-06-26 PROCEDURE — 3077F PR MOST RECENT SYSTOLIC BLOOD PRESSURE >= 140 MM HG: ICD-10-PCS | Mod: CPTII,S$GLB,, | Performed by: PHYSICIAN ASSISTANT

## 2019-06-26 PROCEDURE — 3079F DIAST BP 80-89 MM HG: CPT | Mod: CPTII,S$GLB,, | Performed by: PHYSICIAN ASSISTANT

## 2019-06-26 PROCEDURE — 3077F SYST BP >= 140 MM HG: CPT | Mod: CPTII,S$GLB,, | Performed by: PHYSICIAN ASSISTANT

## 2019-06-26 PROCEDURE — 99214 PR OFFICE/OUTPT VISIT, EST, LEVL IV, 30-39 MIN: ICD-10-PCS | Mod: S$GLB,,, | Performed by: PHYSICIAN ASSISTANT

## 2019-06-26 PROCEDURE — 3079F PR MOST RECENT DIASTOLIC BLOOD PRESSURE 80-89 MM HG: ICD-10-PCS | Mod: CPTII,S$GLB,, | Performed by: PHYSICIAN ASSISTANT

## 2019-06-26 PROCEDURE — 1101F PT FALLS ASSESS-DOCD LE1/YR: CPT | Mod: CPTII,S$GLB,, | Performed by: PHYSICIAN ASSISTANT

## 2019-06-26 PROCEDURE — 99214 OFFICE O/P EST MOD 30 MIN: CPT | Mod: S$GLB,,, | Performed by: PHYSICIAN ASSISTANT

## 2019-06-26 PROCEDURE — 1101F PR PT FALLS ASSESS DOC 0-1 FALLS W/OUT INJ PAST YR: ICD-10-PCS | Mod: CPTII,S$GLB,, | Performed by: PHYSICIAN ASSISTANT

## 2019-06-26 NOTE — TELEPHONE ENCOUNTER
----- Message from Blanca Zamora sent at 6/26/2019  8:53 AM CDT -----  Contact: [phone]  Type: Orders Request    What orders/ testing are being requested? Mammogram    Is there a future appointment scheduled for the patient with PCP? no    When?    Would you prefer a response via [email or web address]?    Comments: Please call patient to schedule when order is in.  Please advise, thank you.

## 2019-06-27 NOTE — PATIENT INSTRUCTIONS
Apply bactroban ointment 3 times daily.  Return to clinic if for any worsening signs of infection (surrounding redness, pain, swelling, pus drainage, fever).    Please follow up with your primary care provider within 2-5 days if your signs and symptoms have not resolved or worsen.     If your condition worsens or fails to improve we recommend that you receive another evaluation at the emergency room immediately or contact your primary medical clinic to discuss your concerns.   You must understand that you have received an Urgent Care treatment only and that you may be released before all of your medical problems are known or treated. You, the patient, will arrange for follow up care as instructed.         Wound Check, Infection  You have a wound that has become infected. The wound will not heal properly unless the infection is cleared. Infection in a wound may also spread if it is not treated. In most cases, antibiotic medicines are prescribed to treat a wound infection.   Symptoms of a wound infection include:  · Redness or swelling around the wound  · Warmth coming from the wound  · New or worsening pain  · Red streaks around the wound  · Draining pus  · Fever  Home care  Follow all directions you are given to treat the infection.  Medicines  Take all medicines as prescribed.   · If you were given antibiotics, take them until they are gone or your healthcare provider tells you to stop. It is vital to finish the antibiotics even if you feel better. If you do not finish them, the infection may come back and be harder to treat.  · If your infection is not responding to the medicines you are taking, you may be prescribed new medicines.  · Take medicine for pain as directed by your healthcare provider.  Wound care  Care for your wound as directed by your healthcare provider.  · Apply a warm compress (clean cloth soaked in hot water) to the infected area for about 5 to 10 minutes at a time. Be very careful not to burn  yourself. Test the cloth on a non-infected area to make sure it is not too hot.  · Continue to change the dressing daily. If it becomes wet, stained with wound fluid, or dirty, change it sooner. To change it:  ¨ Wash your hands with soap and water before changing the dressing.  ¨ Carefully remove the dressing and tape. If it sticks to the wound, you may need to wet it a little to remove it. (Do not do this if your healthcare provider has told you not to.)  ¨ Gently clean the wound with clean water (or saline) using gauze, a clean washcloth, or cotton swab.  ¨ Do not use soap, alcohol, peroxide or other cleansers.  ¨ If you were told to dry the wound before putting on a new dressing, gently pat. Do not rub.  ¨ Throw out the old dressing.  ¨ Wash your hands again before opening the new, clean dressing.  ¨ Wash your hands again when you are done.  Follow-up care  Follow up with your healthcare provider as advised. If a culture was done, you will be notified if your treatment needs to change. Call as directed for the results.  When to seek medical advice  Call your health care provider right away if any of these occur:  · Symptoms of infection don't start to improve within 2 days of starting antibiotics  · Symptoms of infection get worse  · New symptoms, such as red streaks around the wound  · Fever of 100.4°F (38.0°C) or higher for more than 2 days after starting the antibiotics  Date Last Reviewed: 8/10/2015  © 8697-0685 [employer]. [address] [place] [address], [place] [address]. All rights reserved. This information is not intended as a substitute for professional medical care. Always follow your healthcare professional's instructions.

## 2019-06-27 NOTE — PROGRESS NOTES
"Subjective:       Patient ID: Betzy Cooley is a 78 y.o. female.    Vitals:  height is 5' 2" (1.575 m) and weight is 64.4 kg (142 lb). Her oral temperature is 97.3 °F (36.3 °C). Her blood pressure is 151/86 (abnormal) and her pulse is 64. Her respiration is 20 and oxygen saturation is 99%.     Chief Complaint: Knee Injury (Left Knee, )    This is a 78 y.o. female who presents today with a chief complaint of left knee injury.  Patient states she slipped going upstairs on Monday.  Patient's left knee is red, swollen, and has some abrasions.  She has taken Aleve and Neosporin without relief.     Knee Injury   This is a new problem. The current episode started in the past 7 days (Monday). The problem occurs constantly. The problem has been unchanged. Associated symptoms include arthralgias and joint swelling. Pertinent negatives include no abdominal pain, fatigue, vertigo or weakness. The symptoms are aggravated by walking. Treatments tried: Aleve. The treatment provided mild relief.       Constitution: Negative for fatigue.   HENT: Negative for facial swelling and facial trauma.    Neck: Negative for neck stiffness.   Cardiovascular: Negative for chest trauma.   Eyes: Negative for eye trauma, double vision and blurred vision.   Gastrointestinal: Negative for abdominal trauma, abdominal pain and rectal bleeding.   Genitourinary: Negative for hematuria, missed menses, genital trauma and pelvic pain.   Musculoskeletal: Positive for pain, trauma, joint pain and joint swelling. Negative for abnormal ROM of joint.   Skin: Negative for color change, wound, abrasion, laceration, erythema and bruising.   Neurological: Negative for dizziness, history of vertigo, light-headedness, coordination disturbances, altered mental status and loss of consciousness.   Hematologic/Lymphatic: Negative for history of bleeding disorder.   Psychiatric/Behavioral: Negative for altered mental status.       Objective:      Physical Exam "   Constitutional: She is oriented to person, place, and time. Vital signs are normal. She appears well-developed and well-nourished. She does not appear ill. No distress.   HENT:   Head: Normocephalic and atraumatic.   Right Ear: External ear normal.   Left Ear: External ear normal.   Nose: Nose normal.   Eyes: Conjunctivae, EOM and lids are normal. Right eye exhibits no discharge. Left eye exhibits no discharge.   Neck: Normal range of motion. Neck supple.   Cardiovascular: Normal rate, regular rhythm and normal heart sounds. Exam reveals no gallop and no friction rub.   No murmur heard.  Pulmonary/Chest: Effort normal and breath sounds normal. No stridor. No respiratory distress. She has no decreased breath sounds. She has no wheezes. She has no rhonchi. She has no rales.   Musculoskeletal: Normal range of motion.        Left knee: She exhibits erythema. She exhibits normal range of motion, no swelling, no effusion, no ecchymosis, no deformity, normal alignment, no LCL laxity, normal patellar mobility, no bony tenderness, normal meniscus and no MCL laxity. No tenderness found.   Neurological: She is alert and oriented to person, place, and time.   Skin: Skin is warm and dry. Abrasion noted. No rash noted. She is not diaphoretic. No erythema. No pallor.   Two abrasions of anterior left knee with mild surrounding erythema; no purulent drainage; no edema; no tenderness to palpation   Psychiatric: She has a normal mood and affect. Her behavior is normal.   Nursing note and vitals reviewed.          Assessment:       1. Infected abrasion of left knee, initial encounter        Plan:       No edema or tenderness to palpation.  I believe this infection can be treated with topical antibiotics at this time.  Discussed symptoms for which patient should return to clinic for oral antibiotics.    Infected abrasion of left knee, initial encounter      Patient Instructions   Apply bactroban ointment 3 times daily.  Return to  clinic if for any worsening signs of infection (surrounding redness, pain, swelling, pus drainage, fever).    Please follow up with your primary care provider within 2-5 days if your signs and symptoms have not resolved or worsen.     If your condition worsens or fails to improve we recommend that you receive another evaluation at the emergency room immediately or contact your primary medical clinic to discuss your concerns.   You must understand that you have received an Urgent Care treatment only and that you may be released before all of your medical problems are known or treated. You, the patient, will arrange for follow up care as instructed.         Wound Check, Infection  You have a wound that has become infected. The wound will not heal properly unless the infection is cleared. Infection in a wound may also spread if it is not treated. In most cases, antibiotic medicines are prescribed to treat a wound infection.   Symptoms of a wound infection include:  · Redness or swelling around the wound  · Warmth coming from the wound  · New or worsening pain  · Red streaks around the wound  · Draining pus  · Fever  Home care  Follow all directions you are given to treat the infection.  Medicines  Take all medicines as prescribed.   · If you were given antibiotics, take them until they are gone or your healthcare provider tells you to stop. It is vital to finish the antibiotics even if you feel better. If you do not finish them, the infection may come back and be harder to treat.  · If your infection is not responding to the medicines you are taking, you may be prescribed new medicines.  · Take medicine for pain as directed by your healthcare provider.  Wound care  Care for your wound as directed by your healthcare provider.  · Apply a warm compress (clean cloth soaked in hot water) to the infected area for about 5 to 10 minutes at a time. Be very careful not to burn yourself. Test the cloth on a non-infected area to  make sure it is not too hot.  · Continue to change the dressing daily. If it becomes wet, stained with wound fluid, or dirty, change it sooner. To change it:  ¨ Wash your hands with soap and water before changing the dressing.  ¨ Carefully remove the dressing and tape. If it sticks to the wound, you may need to wet it a little to remove it. (Do not do this if your healthcare provider has told you not to.)  ¨ Gently clean the wound with clean water (or saline) using gauze, a clean washcloth, or cotton swab.  ¨ Do not use soap, alcohol, peroxide or other cleansers.  ¨ If you were told to dry the wound before putting on a new dressing, gently pat. Do not rub.  ¨ Throw out the old dressing.  ¨ Wash your hands again before opening the new, clean dressing.  ¨ Wash your hands again when you are done.  Follow-up care  Follow up with your healthcare provider as advised. If a culture was done, you will be notified if your treatment needs to change. Call as directed for the results.  When to seek medical advice  Call your health care provider right away if any of these occur:  · Symptoms of infection don't start to improve within 2 days of starting antibiotics  · Symptoms of infection get worse  · New symptoms, such as red streaks around the wound  · Fever of 100.4°F (38.0°C) or higher for more than 2 days after starting the antibiotics  Date Last Reviewed: 8/10/2015  © 1616-4304 [employer]. [address] [place] [address], [place] [address]. All rights reserved. This information is not intended as a substitute for professional medical care. Always follow your healthcare professional's instructions.

## 2019-07-02 ENCOUNTER — HOSPITAL ENCOUNTER (OUTPATIENT)
Dept: RADIOLOGY | Facility: HOSPITAL | Age: 79
Discharge: HOME | End: 2019-07-02
Attending: INTERNAL MEDICINE
Payer: MEDICARE

## 2019-07-02 DIAGNOSIS — Z12.31 ENCOUNTER FOR SCREENING MAMMOGRAM FOR BREAST CANCER: ICD-10-CM

## 2019-07-02 PROCEDURE — 77063 BREAST TOMOSYNTHESIS BI: CPT | Mod: 26,HCNC,, | Performed by: RADIOLOGY

## 2019-07-02 PROCEDURE — 77067 SCR MAMMO BI INCL CAD: CPT | Mod: 26,HCNC,, | Performed by: RADIOLOGY

## 2019-07-02 PROCEDURE — 77067 SCR MAMMO BI INCL CAD: CPT | Mod: TC,HCNC

## 2019-07-02 PROCEDURE — 77063 MAMMO DIGITAL SCREENING BILAT WITH TOMOSYNTHESIS_CAD: ICD-10-PCS | Mod: 26,HCNC,, | Performed by: RADIOLOGY

## 2019-07-02 PROCEDURE — 77067 MAMMO DIGITAL SCREENING BILAT WITH TOMOSYNTHESIS_CAD: ICD-10-PCS | Mod: 26,HCNC,, | Performed by: RADIOLOGY

## 2019-07-15 NOTE — PROGRESS NOTES
"CHIEF COMPLAINT:    Mrs. Cooley is a 78 y.o. female presenting for a consultation at the request of Dr. Jovel. Patient presents with pelvic organ prolapse.    PRESENTING ILLNESS:    Betzy Cooley is a 78 y.o. female who states she used to see Dr. Satinder Mcdonough who placed a pessary approximately 10 years.  She removes it herself to clean every 2 weeks. She finds that it shifts within the vagina and is uncomfortable and she is to the point that she is ready for surgical intervention.  She saw Dr. Chatterjee in the remote past and has been seeing Marie Coles NP annually for the pessary checks.  No history of vaginitis.  She does not leak urine with the pessary in place. Frequency x 8, nocturia x 0.  She drinks water copiously.  States she is a "water girl."      Denies any gross hematuria or recurrent UTIs    She states her  sees Dr. Jovel and he gave him my card.      , hysterectomy for fibroids, rarely sexually active,  55 years, bowels are normal    REVIEW OF SYSTEMS:    Review of Systems   Constitutional: Negative.    HENT: Negative.    Eyes: Negative.    Respiratory: Negative.    Cardiovascular: Negative.    Gastrointestinal: Negative.    Genitourinary: Positive for frequency.   Musculoskeletal: Negative.    Skin: Negative.    Neurological: Negative.    Endo/Heme/Allergies: Negative.    Psychiatric/Behavioral: Negative.        PATIENT HISTORY:    Past Medical History:   Diagnosis Date    Arthritis     Cataract     GERD (gastroesophageal reflux disease)     HEARING LOSS     Hypertension     Osteoporosis, postmenopausal     Squamous Cell Carcinoma     right forearm    Urinary incontinence     rare mixed       Past Surgical History:   Procedure Laterality Date    COLONOSCOPY N/A 2014    Performed by [facility] at [facility] ([address] [place] [address])    FRACTURE SURGERY Left 2008    open radius/ulna fracture    HYSTERECTOMY      TANIA/ovaries remain--fibroids       Family History "   Problem Relation Age of Onset    Heart failure Mother     Macular degeneration Mother     Heart disease Mother     COPD Mother     Diabetes Sister     Arthritis Sister         rheumatoid arthritis    COPD Father      Socioeconomic History    Marital status:    Tobacco Use    Smoking status: Never Smoker    Smokeless tobacco: Never Used   Substance and Sexual Activity    Alcohol use: Yes     Comment: 1-2 glasses wine weekly    Drug use: No    Sexual activity: Yes     Partners: Male     Birth control/protection: None       Allergies:  Sulfa (sulfonamide antibiotics)    Medications:  Outpatient Encounter Medications as of 7/19/2019   Medication Sig Dispense Refill    aspirin 81 MG Chew Take 81 mg by mouth once daily.      atenolol (TENORMIN) 25 MG tablet Take 1 tablet (25 mg total) by mouth once daily. 90 tablet 3    calcium-vitamin D3 500 mg(1,250mg) -200 unit per tablet Take 1 tablet by mouth 2 (two) times daily with meals.      co-enzyme Q-10 30 mg capsule Take 30 mg by mouth once daily.      multivitamin (MULTIVITAMIN) per tablet Take 1 tablet by mouth once daily.      ranitidine (ZANTAC) 150 MG capsule Take 1 capsule by mouth Daily.      vitamin A-vitamin C-vit E-min (VISION) Tab Take 1 tablet by mouth once daily.      fexofenadine (ALLEGRA) 180 MG tablet Take 1 tablet (180 mg total) by mouth once daily. 30 tablet 11    fluconazole (DIFLUCAN) 150 MG Tab Take one weekly for 2 weeks 2 tablet 1    hydrocortisone 2.5 % cream Apply topically 2 (two) times daily. for 10 days 20 g 0    metronidazole 1% (METROGEL) 1 % Gel Apply topically once daily. 60 g 3    [DISCONTINUED] ERGOCALCIFEROL, VITAMIN D2, (VITAMIN D2 ORAL) Take 1 tablet by mouth once daily.       No facility-administered encounter medications on file as of 7/19/2019.          PHYSICAL EXAMINATION:    The patient generally appears in good health, is appropriately interactive, and is in no apparent distress.    Skin: No  lesions.    Mental: Cooperative with normal affect.    Neuro: Grossly intact.    HEENT: Normal. No evidence of lymphadenopathy.    Chest:  normal inspiratory effort.    Abdomen:  Soft, non-tender. No masses or organomegaly. Bladder is not palpable. No evidence of flank discomfort. No evidence of inguinal hernia.    Extremities: No clubbing, cyanosis, or edema    Normal external female genitalia  Urethral meatus is normal  Urethra and bladder are nontender to bimanual exam  Stage II cystocele  Rectocele present best appreciated on rectal exam. The defect extends over the lower part of the vagina.   Uterus and cervix are surgically absent comes down 4 cm. TVL 10 cm   No adnexal masses  PVR by catheterization was 195 ml    LABS:    Lab Results   Component Value Date    BUN 14 01/04/2019    CREATININE 0.8 01/04/2019     UA 1.000, pH 6, tr protein, otherwise, negative    IMPRESSION:    Encounter Diagnoses   Name Primary?    Vaginal vault prolapse, posthysterectomy Yes    Lateral cystocele     Rectocele        PLAN:    1.  SUDS to see if she generates a pressure to empty, doubt she will have occult stress incontinence but will go ahead and check   2.  Discussed that she has triple compartment prolapse.  Can be addressed vaginally.    3.  The patient replaced her pessary herself.

## 2019-07-15 NOTE — H&P (VIEW-ONLY)
"CHIEF COMPLAINT:    Mrs. Cooley is a 78 y.o. female presenting for a consultation at the request of Dr. Jovel. Patient presents with pelvic organ prolapse.    PRESENTING ILLNESS:    Betzy Cooley is a 78 y.o. female who states she used to see Dr. Satinder Mcdonough who placed a pessary approximately 10 years.  She removes it herself to clean every 2 weeks. She finds that it shifts within the vagina and is uncomfortable and she is to the point that she is ready for surgical intervention.  She saw Dr. Chatterjee in the remote past and has been seeing Marie Coles NP annually for the pessary checks.  No history of vaginitis.  She does not leak urine with the pessary in place. Frequency x 8, nocturia x 0.  She drinks water copiously.  States she is a "water girl."      Denies any gross hematuria or recurrent UTIs    She states her  sees Dr. Jovel and he gave him my card.      , hysterectomy for fibroids, rarely sexually active,  56 years, bowels are normal    REVIEW OF SYSTEMS:    Review of Systems   Constitutional: Negative.    HENT: Negative.    Eyes: Negative.    Respiratory: Negative.    Cardiovascular: Negative.    Gastrointestinal: Negative.    Genitourinary: Positive for frequency.   Musculoskeletal: Negative.    Skin: Negative.    Neurological: Negative.    Endo/Heme/Allergies: Negative.    Psychiatric/Behavioral: Negative.        PATIENT HISTORY:    Past Medical History:   Diagnosis Date    Arthritis     Cataract     GERD (gastroesophageal reflux disease)     HEARING LOSS     Hypertension     Osteoporosis, postmenopausal     Squamous Cell Carcinoma     right forearm    Urinary incontinence     rare mixed       Past Surgical History:   Procedure Laterality Date    COLONOSCOPY N/A 2014    Performed by Bony Singh MD at [facility] ([address] [place] [address])    FRACTURE SURGERY Left 2008    open radius/ulna fracture    HYSTERECTOMY      TANIA/ovaries remain--fibroids       Family History "   Problem Relation Age of Onset    Heart failure Mother     Macular degeneration Mother     Heart disease Mother     COPD Mother     Diabetes Sister     Arthritis Sister         rheumatoid arthritis    COPD Father      Socioeconomic History    Marital status:    Tobacco Use    Smoking status: Never Smoker    Smokeless tobacco: Never Used   Substance and Sexual Activity    Alcohol use: Yes     Comment: 1-2 glasses wine weekly    Drug use: No    Sexual activity: Yes     Partners: Male     Birth control/protection: None       Allergies:  Sulfa (sulfonamide antibiotics)    Medications:  Outpatient Encounter Medications as of 7/19/2019   Medication Sig Dispense Refill    aspirin 81 MG Chew Take 81 mg by mouth once daily.      atenolol (TENORMIN) 25 MG tablet Take 1 tablet (25 mg total) by mouth once daily. 90 tablet 3    calcium-vitamin D3 500 mg(1,250mg) -200 unit per tablet Take 1 tablet by mouth 2 (two) times daily with meals.      co-enzyme Q-10 30 mg capsule Take 30 mg by mouth once daily.      multivitamin (MULTIVITAMIN) per tablet Take 1 tablet by mouth once daily.      ranitidine (ZANTAC) 150 MG capsule Take 1 capsule by mouth Daily.      vitamin A-vitamin C-vit E-min (VISION) Tab Take 1 tablet by mouth once daily.      fexofenadine (ALLEGRA) 180 MG tablet Take 1 tablet (180 mg total) by mouth once daily. 30 tablet 11    fluconazole (DIFLUCAN) 150 MG Tab Take one weekly for 2 weeks 2 tablet 1    hydrocortisone 2.5 % cream Apply topically 2 (two) times daily. for 10 days 20 g 0    metronidazole 1% (METROGEL) 1 % Gel Apply topically once daily. 60 g 3    [DISCONTINUED] ERGOCALCIFEROL, VITAMIN D2, (VITAMIN D2 ORAL) Take 1 tablet by mouth once daily.       No facility-administered encounter medications on file as of 7/19/2019.          PHYSICAL EXAMINATION:    The patient generally appears in good health, is appropriately interactive, and is in no apparent distress.    Skin: No  lesions.    Mental: Cooperative with normal affect.    Neuro: Grossly intact.    HEENT: Normal. No evidence of lymphadenopathy.    Chest:  normal inspiratory effort.    Abdomen:  Soft, non-tender. No masses or organomegaly. Bladder is not palpable. No evidence of flank discomfort. No evidence of inguinal hernia.    Extremities: No clubbing, cyanosis, or edema    Normal external female genitalia  Urethral meatus is normal  Urethra and bladder are nontender to bimanual exam  Stage II cystocele  Rectocele present best appreciated on rectal exam. The defect extends over the lower part of the vagina.   Uterus and cervix are surgically absent comes down 4 cm. TVL 10 cm   No adnexal masses  PVR by catheterization was 195 ml    LABS:    Lab Results   Component Value Date    BUN 14 01/04/2019    CREATININE 0.8 01/04/2019     UA 1.000, pH 6, tr protein, otherwise, negative    IMPRESSION:    Encounter Diagnoses   Name Primary?    Vaginal vault prolapse, posthysterectomy Yes    Lateral cystocele     Rectocele        PLAN:    1.  SUDS to see if she generates a pressure to empty, doubt she will have occult stress incontinence but will go ahead and check   2.  Discussed that she has triple compartment prolapse.  Can be addressed vaginally.    3.  The patient replaced her pessary herself.

## 2019-07-19 ENCOUNTER — OFFICE VISIT (OUTPATIENT)
Dept: UROLOGY | Facility: CLINIC | Age: 79
End: 2019-07-19
Payer: MEDICARE

## 2019-07-19 VITALS
HEART RATE: 66 BPM | HEIGHT: 62 IN | DIASTOLIC BLOOD PRESSURE: 86 MMHG | WEIGHT: 135.38 LBS | BODY MASS INDEX: 24.91 KG/M2 | SYSTOLIC BLOOD PRESSURE: 140 MMHG

## 2019-07-19 DIAGNOSIS — N81.12 LATERAL CYSTOCELE: ICD-10-CM

## 2019-07-19 DIAGNOSIS — N99.3 VAGINAL VAULT PROLAPSE, POSTHYSTERECTOMY: Primary | ICD-10-CM

## 2019-07-19 DIAGNOSIS — N81.6 RECTOCELE: ICD-10-CM

## 2019-07-19 PROCEDURE — 1101F PT FALLS ASSESS-DOCD LE1/YR: CPT | Mod: HCNC,CPTII,S$GLB, | Performed by: UROLOGY

## 2019-07-19 PROCEDURE — 99999 PR PBB SHADOW E&M-EST. PATIENT-LVL IV: ICD-10-PCS | Mod: PBBFAC,HCNC,, | Performed by: UROLOGY

## 2019-07-19 PROCEDURE — 3077F SYST BP >= 140 MM HG: CPT | Mod: HCNC,CPTII,S$GLB, | Performed by: UROLOGY

## 2019-07-19 PROCEDURE — 3077F PR MOST RECENT SYSTOLIC BLOOD PRESSURE >= 140 MM HG: ICD-10-PCS | Mod: HCNC,CPTII,S$GLB, | Performed by: UROLOGY

## 2019-07-19 PROCEDURE — 99205 OFFICE O/P NEW HI 60 MIN: CPT | Mod: 25,HCNC,S$GLB, | Performed by: UROLOGY

## 2019-07-19 PROCEDURE — 3079F PR MOST RECENT DIASTOLIC BLOOD PRESSURE 80-89 MM HG: ICD-10-PCS | Mod: HCNC,CPTII,S$GLB, | Performed by: UROLOGY

## 2019-07-19 PROCEDURE — 99999 PR PBB SHADOW E&M-EST. PATIENT-LVL IV: CPT | Mod: PBBFAC,HCNC,, | Performed by: UROLOGY

## 2019-07-19 PROCEDURE — 3079F DIAST BP 80-89 MM HG: CPT | Mod: HCNC,CPTII,S$GLB, | Performed by: UROLOGY

## 2019-07-19 PROCEDURE — 99205 PR OFFICE/OUTPT VISIT, NEW, LEVL V, 60-74 MIN: ICD-10-PCS | Mod: 25,HCNC,S$GLB, | Performed by: UROLOGY

## 2019-07-19 PROCEDURE — 51701 INSERT BLADDER CATHETER: CPT | Mod: HCNC,S$GLB,, | Performed by: UROLOGY

## 2019-07-19 PROCEDURE — 1101F PR PT FALLS ASSESS DOC 0-1 FALLS W/OUT INJ PAST YR: ICD-10-PCS | Mod: HCNC,CPTII,S$GLB, | Performed by: UROLOGY

## 2019-07-19 PROCEDURE — 51701 PR INSERTION OF NON-INDWELLING BLADDER CATHETERIZATION FOR RESIDUAL UR: ICD-10-PCS | Mod: HCNC,S$GLB,, | Performed by: UROLOGY

## 2019-07-19 RX ORDER — AMOXICILLIN 250 MG/1
500 CAPSULE ORAL ONCE
Status: CANCELLED | OUTPATIENT
Start: 2019-07-19 | End: 2019-07-19

## 2019-07-19 RX ORDER — LIDOCAINE HYDROCHLORIDE 20 MG/ML
JELLY TOPICAL ONCE
Status: CANCELLED | OUTPATIENT
Start: 2019-07-19 | End: 2019-07-19

## 2019-07-19 NOTE — PATIENT INSTRUCTIONS
Urodynamics Studies     The bladder holds urine until it leaves the body through the urethra.     Urodynamics studies are a series of tests that give your doctor a close look at the working of your bladder and urethra. The tests can help your doctor learn about any problems storing urine or voiding (eliminating) urine from your body.  Understanding the lower urinary tract  The lower part of the urinary tract has several parts.  · The bladder stores urine until youre ready to release it.  · The urethra is the tube that carries urine from the bladder out of the body.  · The sphincter is made up of muscles around the opening of the bladder. The sphincter muscles tighten to hold urine in the bladder. They relax to let urine flow. Signals from the brain tell the sphincter when to tighten and relax. These signals also tell the bladder when to contract to let urine flow out of the body.  Why you need a urodynamics study  This test may be ordered if you:  · Are incontinent (leak urine)  · Have a bladder that does not empty all the way.  · Have symptoms such as the need to urinate often or a constant strong need to urinate  · Have intermittent or weak urine stream  · Have persistent urinary tract infections  Preparing for the study  · Tell your doctor about any medicine youre taking. Ask if you should stop them before the study.  · Keep a diary of your bathroom habits. Do this for a few days before the study. This diary can be a helpful part of the evaluation.  · Ask if you need to arrive for the study with a full bladder.  Date Last Reviewed: 1/1/2017  © 3887-4943 [employer] [address] [place] [address], [place] [address]. All rights reserved. This information is not intended as a substitute for professional medical care. Always follow your healthcare professional's instructions.          Urodynamic Studies     The equipment used for the study varies depending upon the facility and what tests are done.      Urodynamic studies may be done in your doctors office, a clinic, or a hospital. The studies may take up to an hour or more. This depends on which tests your doctor does. The tests are generally painless. You wont need sedating medicine.  Tests that may be done  Uroflowmetry. This measures the amount and speed of urine you void from your bladder. You urinate into a funnel. Its attached to a computer that records your urine flow over time. The amount of urine left in your bladder after you void may also be measured right after this test.  Cystometry. This test evaluates how much your bladder can hold. It also measures how strong your bladder muscle is and how well the signals work that tell you when your bladder is full. Your healthcare provider fills your bladder with sterile water or saline solution, through a catheter. Your doctor will instruct you to report any sensations you feel. Mention if theyre similar to symptoms youve felt at home. Your doctor may ask you to cough, stand and walk, or bear down during this test.  Electromyogram. This helps evaluate the muscle contractions that control urination, such as sphincter muscle contractions. Your healthcare provider may place electrode patches or wires near your rectum or urethra to make the recording. He or she may ask you to try to tighten or relax your sphincter muscles during this test.  Pressure flow study. This test measures your detrusor, urethral, and abdominal pressures. Detrusor is the muscle surrounding the bladder walls that relaxes to allow your bladder to fill, and and contracts to squeeze out urine. A pressure flow study is often done after cystometry. Youre asked to urinate while a probe in your urethra measures pressures.  Video cystourethrography. This takes video pictures of urine flow through your urinary tract. It can help identify blockages or other problems. The bladder is filled with an X-ray contrast fluid. Then X-ray video  pictures are taken as the fluid is urinated out. Ultrasound imaging may also be combined with routine urodynamic studies.  Ambulatory urodynamics. This test can be used to evaluate you while doing usual activities.  Getting your results  After the study, youll get dressed and return to the consultation room. Test results may be ready soon after the study is finished. Or, you may return to your doctors office in a few days for your results. Your doctor can talk with you about the study report and your options.   Date Last Reviewed: 1/1/2017 © 2000-2017 [employer]. [address] [place] [address]. All rights reserved. This information is not intended as a substitute for professional medical care. Always follow your healthcare professional's instructions.          Cystoscopy    Cystoscopy is a procedure that lets your doctor look directly inside your urethra and bladder. It can be used to:  · Help diagnose a problem with your urethra, bladder, or kidneys.  · Take a sample (biopsy) of bladder or urethral tissue.  · Treat certain problems (such as removing kidney stones).  · Place a stent to bypass an obstruction.  · Take special X-rays of the kidneys.  Based on the findings, your doctor may recommend other tests or treatments.  What is a cystoscope?  A cystoscope is a telescope-like instrument that contains lenses and fiberoptics (small glass wires that make bright light). The cystoscope may be straight and rigid, or flexible to bend around curves in the urethra. The doctor may look directly into the cystoscope, or project the image onto a monitor.  Getting ready  · Ask your doctor if you should stop taking any medicines before the procedure.  · Ask whether you should avoid eating or drinking anything after midnight before the procedure.  · Follow any other instructions your doctor gives you.  Tell your doctor before the exam if you:  · Take any medicines, such as aspirin or blood  thinners  · Have allergies to any medicines  · Are pregnant   The procedure  Cystoscopy is done in the doctors office, surgery center, or hospital. The doctor and a nurse are present during the procedure. It takes only a few minutes, longer if a biopsy, X-ray, or treatment needs to be done.  During the procedure:  · You lie on an exam table on your back, knees bent and legs apart. You are covered with a drape.  · Your urethra and the area around it are washed. Anesthetic jelly may be applied to numb the urethra. Other pain medicine is usually not needed. In some cases, you may be offered a mild sedative to help you relax. If a more extensive procedure is to be done, such as a biopsy or kidney stone removal, general anesthesia may be needed.  · The cystoscope is inserted. A sterile fluid is put into the bladder to expand it. You may feel pressure from this fluid.  · When the procedure is done, the cystoscope is removed.  After the procedure  If you had a sedative, general anesthesia, or spinal anesthesia, you must have someone drive you home. Once youre home:  · Drink plenty of fluids.  · You may have burning or light bleeding when you urinate--this is normal.  · Medicines may be prescribed to ease any discomfort or prevent infection. Take these as directed.  · Call your doctor if you have heavy bleeding or blood clots, burning that lasts more than a day, a fever over 100°F  (38° C), or trouble urinating.  Date Last Reviewed: 1/1/2017  © 5662-8724 [employer]. [address] [place] [address], [place] [address]. All rights reserved. This information is not intended as a substitute for professional medical care. Always follow your healthcare professional's instructions.

## 2019-07-19 NOTE — LETTER
July 19, 2019      Nancy Maguire MD  [address]  [facility] [address]           [facility] 4th Floor  [address]  [facility] [address]  Phone: [phone]          Patient: Betzy Cooley   MR Number: [id number]   YOB: 1940   Date of Visit: 7/19/2019       Dear Dr. Nancy Maguire:    Thank you for referring Betzy Cooley to me for evaluation. Attached you will find relevant portions of my assessment and plan of care.    If you have questions, please do not hesitate to call me. I look forward to following Betzy Cooley along with you.    Sincerely,    Alma Dorsey MD    Enclosure  CC:  No Recipients    If you would like to receive this communication electronically, please contact [email or web address] or [phone] to request more information on [employer] access.    For providers and/or their staff who would like to refer a patient to [facility], please contact us through our one-stop-shop provider referral line, [facility], at [phone].    If you feel you have received this communication in error or would no longer like to receive these types of communications, please e-mail [email or web address]

## 2019-07-20 ENCOUNTER — OFFICE VISIT (OUTPATIENT)
Dept: URGENT CARE | Facility: CLINIC | Age: 79
End: 2019-07-20
Payer: MEDICARE

## 2019-07-20 ENCOUNTER — TELEPHONE (OUTPATIENT)
Dept: URGENT CARE | Facility: CLINIC | Age: 79
End: 2019-07-20

## 2019-07-20 VITALS
RESPIRATION RATE: 20 BRPM | DIASTOLIC BLOOD PRESSURE: 83 MMHG | WEIGHT: 132 LBS | TEMPERATURE: 98 F | OXYGEN SATURATION: 98 % | BODY MASS INDEX: 24.29 KG/M2 | SYSTOLIC BLOOD PRESSURE: 122 MMHG | HEART RATE: 69 BPM | HEIGHT: 62 IN

## 2019-07-20 DIAGNOSIS — J01.00 ACUTE MAXILLARY SINUSITIS, RECURRENCE NOT SPECIFIED: Primary | ICD-10-CM

## 2019-07-20 DIAGNOSIS — J01.00 ACUTE MAXILLARY SINUSITIS, RECURRENCE NOT SPECIFIED: ICD-10-CM

## 2019-07-20 PROCEDURE — 3074F PR MOST RECENT SYSTOLIC BLOOD PRESSURE < 130 MM HG: ICD-10-PCS | Mod: CPTII,S$GLB,, | Performed by: NURSE PRACTITIONER

## 2019-07-20 PROCEDURE — 1101F PR PT FALLS ASSESS DOC 0-1 FALLS W/OUT INJ PAST YR: ICD-10-PCS | Mod: CPTII,S$GLB,, | Performed by: NURSE PRACTITIONER

## 2019-07-20 PROCEDURE — 99214 PR OFFICE/OUTPT VISIT, EST, LEVL IV, 30-39 MIN: ICD-10-PCS | Mod: S$GLB,,, | Performed by: NURSE PRACTITIONER

## 2019-07-20 PROCEDURE — 99214 OFFICE O/P EST MOD 30 MIN: CPT | Mod: S$GLB,,, | Performed by: NURSE PRACTITIONER

## 2019-07-20 PROCEDURE — 3079F DIAST BP 80-89 MM HG: CPT | Mod: CPTII,S$GLB,, | Performed by: NURSE PRACTITIONER

## 2019-07-20 PROCEDURE — 3079F PR MOST RECENT DIASTOLIC BLOOD PRESSURE 80-89 MM HG: ICD-10-PCS | Mod: CPTII,S$GLB,, | Performed by: NURSE PRACTITIONER

## 2019-07-20 PROCEDURE — 1101F PT FALLS ASSESS-DOCD LE1/YR: CPT | Mod: CPTII,S$GLB,, | Performed by: NURSE PRACTITIONER

## 2019-07-20 PROCEDURE — 3074F SYST BP LT 130 MM HG: CPT | Mod: CPTII,S$GLB,, | Performed by: NURSE PRACTITIONER

## 2019-07-20 RX ORDER — FLUTICASONE PROPIONATE 50 MCG
SPRAY, SUSPENSION (ML) NASAL
Qty: 48 ML | Refills: 0 | Status: SHIPPED | OUTPATIENT
Start: 2019-07-20 | End: 2023-07-25

## 2019-07-20 RX ORDER — FLUTICASONE PROPIONATE 50 MCG
2 SPRAY, SUSPENSION (ML) NASAL DAILY
Qty: 1 BOTTLE | Refills: 0 | Status: SHIPPED | OUTPATIENT
Start: 2019-07-20 | End: 2019-07-20 | Stop reason: SDUPTHER

## 2019-07-20 RX ORDER — AMOXICILLIN AND CLAVULANATE POTASSIUM 875; 125 MG/1; MG/1
1 TABLET, FILM COATED ORAL 2 TIMES DAILY
Qty: 20 TABLET | Refills: 0 | Status: SHIPPED | OUTPATIENT
Start: 2019-07-20 | End: 2019-07-30

## 2019-07-20 NOTE — PROGRESS NOTES
"Subjective:       Patient ID: Betzy Cooley is a 78 y.o. female.    Vitals:  height is 5' 2" (1.575 m) and weight is 59.9 kg (132 lb). Her tympanic temperature is 98 °F (36.7 °C). Her blood pressure is 122/83 and her pulse is 69. Her respiration is 20 and oxygen saturation is 98%.     Chief Complaint: Sinus Problem    This is a 78 y.o. female who presents today with a chief complaint of sinus problems since last week with [facility].  Patient has sinus pain/pressure, ear ache, nasal congestion, and post nasal drip. Patient denies fever and body aches.   She has tried Aleve and Allegra without relief.     Sinus Problem   This is a new problem. The current episode started in the past 7 days (Wednesday). The problem has been gradually worsening since onset. There has been no fever. Her pain is at a severity of 8/10. The pain is severe. Associated symptoms include congestion, coughing, ear pain, headaches, sinus pressure and a sore throat. Pertinent negatives include no chills, diaphoresis, hoarse voice, neck pain, shortness of breath, sneezing or swollen glands. Treatments tried: Allegra, Aleve. The treatment provided no relief.       Constitution: Negative for chills, sweating, fatigue and fever.   HENT: Positive for ear pain, congestion, postnasal drip, sinus pain, sinus pressure and sore throat. Negative for voice change.    Neck: Negative for neck pain and painful lymph nodes.   Eyes: Negative for eye redness.   Respiratory: Positive for cough. Negative for chest tightness, sputum production, bloody sputum, COPD, shortness of breath, stridor, wheezing and asthma.    Gastrointestinal: Negative for nausea and vomiting.   Musculoskeletal: Negative for muscle ache.   Skin: Negative for rash.   Allergic/Immunologic: Negative for seasonal allergies, asthma and sneezing.   Neurological: Positive for headaches.   Hematologic/Lymphatic: Negative for swollen lymph nodes.       Objective:      Physical Exam   Constitutional: " She is oriented to person, place, and time. She appears well-developed and well-nourished. She is cooperative.  Non-toxic appearance. She does not appear ill. No distress.   HENT:   Head: Normocephalic and atraumatic.   Right Ear: Hearing, external ear and ear canal normal. Tympanic membrane is injected. A middle ear effusion is present.   Left Ear: Hearing, tympanic membrane, external ear and ear canal normal.   Nose: Mucosal edema and rhinorrhea present. No nasal deformity. No epistaxis. Right sinus exhibits maxillary sinus tenderness. Right sinus exhibits no frontal sinus tenderness. Left sinus exhibits no maxillary sinus tenderness and no frontal sinus tenderness.   Mouth/Throat: Uvula is midline and mucous membranes are normal. No trismus in the jaw. Normal dentition. No uvula swelling. Posterior oropharyngeal edema and posterior oropharyngeal erythema (cobblestone with postnasal drip) present. No oropharyngeal exudate.   Eyes: Conjunctivae and lids are normal. No scleral icterus.   Sclera clear bilat   Neck: Trachea normal, full passive range of motion without pain and phonation normal. Neck supple.   Cardiovascular: Normal rate, regular rhythm, normal heart sounds, intact distal pulses and normal pulses.   Pulmonary/Chest: Effort normal and breath sounds normal. No respiratory distress. She has no wheezes.   Abdominal: Soft. Normal appearance and bowel sounds are normal. She exhibits no distension. There is no tenderness.   Musculoskeletal: Normal range of motion. She exhibits no edema or deformity.   Lymphadenopathy:     She has no cervical adenopathy.   Neurological: She is alert and oriented to person, place, and time. She exhibits normal muscle tone. Coordination normal.   Skin: Skin is warm, dry and intact. She is not diaphoretic. No pallor.   Psychiatric: She has a normal mood and affect. Her speech is normal and behavior is normal. Judgment and thought content normal. Cognition and memory are normal.    Nursing note and vitals reviewed.      Assessment:       1. Acute maxillary sinusitis, recurrence not specified        Plan:         Acute maxillary sinusitis, recurrence not specified  -     fluticasone propionate (FLONASE) 50 mcg/actuation nasal spray; 2 sprays (100 mcg total) by Each Nare route once daily.  Dispense: 1 Bottle; Refill: 0  -     amoxicillin-clavulanate 875-125mg (AUGMENTIN) 875-125 mg per tablet; Take 1 tablet by mouth 2 (two) times daily. for 10 days  Dispense: 20 tablet; Refill: 0      Patient Instructions   Please drink plenty of fluids.  Please get plenty of rest.  Please return here or go to the Emergency Department for any concerns or worsening of condition.  If you were prescribed antibiotics, please take them to completion.  If you do not have Hypertension or any history of palpitations, it is ok to take over the counter Sudafed or Mucinex D or Allegra-D or Claritin-D or Zyrtec-D.  If you do take one of the above, it is ok to combine that with plain over the counter Mucinex or Allegra or Claritin or Zyrtec.  If for example you are taking Zyrtec -D, you can combine that with Mucinex, but not Mucinex-D.  If you are taking Mucinex-D, you can combine that with plain Allegra or Claritin or Zyrtec.   If you do have Hypertension or palpitations, it is safe to take Coricidin HBP for relief of sinus symptoms.  We recommend you take over the counter Flonase (Fluticasone) or another nasally inhaled steroid unless you are already taking one.  Nasal irrigation with a saline spray or Netti Pot like device per their directions is also recommended.  If not allergic, please take over the counter Tylenol (Acetaminophen) and/or Motrin (Ibuprofen) as directed for control of pain and/or fever.  Please follow up with your primary care doctor or specialist as needed.    If you  smoke, please stop smoking.  Sinusitis (Antibiotic Treatment)    The sinuses are air-filled spaces within the bones of the face. They  connect to the inside of the nose. Sinusitis is an inflammation of the tissue lining the sinus cavity. Sinus inflammation can occur during a cold. It can also be due to allergies to pollens and other particles in the air. Sinusitis can cause symptoms of sinus congestion and fullness. A sinus infection causes fever, headache and facial pain. There is often green or yellow drainage from the nose or into the back of the throat (post-nasal drip). You have been given antibiotics to treat this condition.  Home care:  · Take the full course of antibiotics as instructed. Do not stop taking them, even if you feel better.  · Drink plenty of water, hot tea, and other liquids. This may help thin mucus. It also may promote sinus drainage.  · Heat may help soothe painful areas of the face. Use a towel soaked in hot water. Or,  the shower and direct the hot spray onto your face. Using a vaporizer along with a menthol rub at night may also help.   · An expectorant containing guaifenesin may help thin the mucus and promote drainage from the sinuses.  · Over-the-counter decongestants may be used unless a similar medicine was prescribed. Nasal sprays work the fastest. Use one that contains phenylephrine or oxymetazoline. First blow the nose gently. Then use the spray. Do not use these medicines more often than directed on the label or symptoms may get worse. You may also use tablets containing pseudoephedrine. Avoid products that combine ingredients, because side effects may be increased. Read labels. You can also ask the pharmacist for help. (NOTE: Persons with high blood pressure should not use decongestants. They can raise blood pressure.)  · Over-the-counter antihistamines may help if allergies contributed to your sinusitis.    · Do not use nasal rinses or irrigation during an acute sinus infection, unless told to by your health care provider. Rinsing may spread the infection to other sinuses.  · Use acetaminophen or  ibuprofen to control pain, unless another pain medicine was prescribed. (If you have chronic liver or kidney disease or ever had a stomach ulcer, talk with your doctor before using these medicines. Aspirin should never be used in anyone under 18 years of age who is ill with a fever. It may cause severe liver damage.)  · Don't smoke. This can worsen symptoms.  Follow-up care  Follow up with your healthcare provider or our staff if you are not improving within the next week.  When to seek medical advice  Call your healthcare provider if any of these occur:  · Facial pain or headache becoming more severe  · Stiff neck  · Unusual drowsiness or confusion  · Swelling of the forehead or eyelids  · Vision problems, including blurred or double vision  · Fever of 100.4ºF (38ºC) or higher, or as directed by your healthcare provider  · Seizure  · Breathing problems  · Symptoms not resolving within 10 days  Date Last Reviewed: 4/13/2015  © 9046-6117 [employer]. [address] [place] [address], [place] [address]. All rights reserved. This information is not intended as a substitute for professional medical care. Always follow your healthcare professional's instructions.

## 2019-07-20 NOTE — PATIENT INSTRUCTIONS
Please drink plenty of fluids.  Please get plenty of rest.  Please return here or go to the Emergency Department for any concerns or worsening of condition.  If you were prescribed antibiotics, please take them to completion.  If you do not have Hypertension or any history of palpitations, it is ok to take over the counter Sudafed or Mucinex D or Allegra-D or Claritin-D or Zyrtec-D.  If you do take one of the above, it is ok to combine that with plain over the counter Mucinex or Allegra or Claritin or Zyrtec.  If for example you are taking Zyrtec -D, you can combine that with Mucinex, but not Mucinex-D.  If you are taking Mucinex-D, you can combine that with plain Allegra or Claritin or Zyrtec.   If you do have Hypertension or palpitations, it is safe to take Coricidin HBP for relief of sinus symptoms.  We recommend you take over the counter Flonase (Fluticasone) or another nasally inhaled steroid unless you are already taking one.  Nasal irrigation with a saline spray or Netti Pot like device per their directions is also recommended.  If not allergic, please take over the counter Tylenol (Acetaminophen) and/or Motrin (Ibuprofen) as directed for control of pain and/or fever.  Please follow up with your primary care doctor or specialist as needed.    If you  smoke, please stop smoking.  Sinusitis (Antibiotic Treatment)    The sinuses are air-filled spaces within the bones of the face. They connect to the inside of the nose. Sinusitis is an inflammation of the tissue lining the sinus cavity. Sinus inflammation can occur during a cold. It can also be due to allergies to pollens and other particles in the air. Sinusitis can cause symptoms of sinus congestion and fullness. A sinus infection causes fever, headache and facial pain. There is often green or yellow drainage from the nose or into the back of the throat (post-nasal drip). You have been given antibiotics to treat this condition.  Home care:  · Take the full  course of antibiotics as instructed. Do not stop taking them, even if you feel better.  · Drink plenty of water, hot tea, and other liquids. This may help thin mucus. It also may promote sinus drainage.  · Heat may help soothe painful areas of the face. Use a towel soaked in hot water. Or,  the shower and direct the hot spray onto your face. Using a vaporizer along with a menthol rub at night may also help.   · An expectorant containing guaifenesin may help thin the mucus and promote drainage from the sinuses.  · Over-the-counter decongestants may be used unless a similar medicine was prescribed. Nasal sprays work the fastest. Use one that contains phenylephrine or oxymetazoline. First blow the nose gently. Then use the spray. Do not use these medicines more often than directed on the label or symptoms may get worse. You may also use tablets containing pseudoephedrine. Avoid products that combine ingredients, because side effects may be increased. Read labels. You can also ask the pharmacist for help. (NOTE: Persons with high blood pressure should not use decongestants. They can raise blood pressure.)  · Over-the-counter antihistamines may help if allergies contributed to your sinusitis.    · Do not use nasal rinses or irrigation during an acute sinus infection, unless told to by your health care provider. Rinsing may spread the infection to other sinuses.  · Use acetaminophen or ibuprofen to control pain, unless another pain medicine was prescribed. (If you have chronic liver or kidney disease or ever had a stomach ulcer, talk with your doctor before using these medicines. Aspirin should never be used in anyone under 18 years of age who is ill with a fever. It may cause severe liver damage.)  · Don't smoke. This can worsen symptoms.  Follow-up care  Follow up with your healthcare provider or our staff if you are not improving within the next week.  When to seek medical advice  Call your healthcare provider  if any of these occur:  · Facial pain or headache becoming more severe  · Stiff neck  · Unusual drowsiness or confusion  · Swelling of the forehead or eyelids  · Vision problems, including blurred or double vision  · Fever of 100.4ºF (38ºC) or higher, or as directed by your healthcare provider  · Seizure  · Breathing problems  · Symptoms not resolving within 10 days  Date Last Reviewed: 4/13/2015  © 8857-3271 [employer]. [address] [place] [address], [place] [id number]. All rights reserved. This information is not intended as a substitute for professional medical care. Always follow your healthcare professional's instructions.

## 2019-07-20 NOTE — TELEPHONE ENCOUNTER
Patient requesting Diflucan for after she takes antibiotic.  She states that often times she gets a yeast infection after taking antibiotics.  Advised her previously on taking a probiotic over-the-counter while taking this medication.  Prescription for Diflucan 150 mg x1 with no refills given.

## 2019-07-23 ENCOUNTER — TELEPHONE (OUTPATIENT)
Dept: URGENT CARE | Facility: CLINIC | Age: 79
End: 2019-07-23

## 2019-07-24 ENCOUNTER — PROCEDURE VISIT (OUTPATIENT)
Dept: UROLOGY | Facility: CLINIC | Age: 79
End: 2019-07-24
Payer: MEDICARE

## 2019-07-24 VITALS
WEIGHT: 132 LBS | TEMPERATURE: 98 F | BODY MASS INDEX: 24.29 KG/M2 | DIASTOLIC BLOOD PRESSURE: 83 MMHG | HEIGHT: 62 IN | HEART RATE: 58 BPM | SYSTOLIC BLOOD PRESSURE: 146 MMHG

## 2019-07-24 DIAGNOSIS — N81.6 RECTOCELE: ICD-10-CM

## 2019-07-24 DIAGNOSIS — N99.3 VAGINAL VAULT PROLAPSE, POSTHYSTERECTOMY: ICD-10-CM

## 2019-07-24 DIAGNOSIS — N81.12 LATERAL CYSTOCELE: ICD-10-CM

## 2019-07-24 PROCEDURE — 51728 CYSTOMETROGRAM W/VP: CPT | Mod: HCNC,S$GLB,, | Performed by: UROLOGY

## 2019-07-24 PROCEDURE — 51797 INTRAABDOMINAL PRESSURE TEST: CPT | Mod: 51,HCNC,S$GLB, | Performed by: UROLOGY

## 2019-07-24 PROCEDURE — 51741 ELECTRO-UROFLOWMETRY FIRST: CPT | Mod: 51,HCNC,S$GLB, | Performed by: UROLOGY

## 2019-07-24 PROCEDURE — 52000 CYSTOURETHROSCOPY: CPT | Mod: 51,HCNC,S$GLB, | Performed by: UROLOGY

## 2019-07-24 PROCEDURE — 51784 PR ANAL/URINARY MUSCLE STUDY: ICD-10-PCS | Mod: 51,HCNC,S$GLB, | Performed by: UROLOGY

## 2019-07-24 PROCEDURE — 51784 ANAL/URINARY MUSCLE STUDY: CPT | Mod: 51,HCNC,S$GLB, | Performed by: UROLOGY

## 2019-07-24 PROCEDURE — 52000 PR CYSTOURETHROSCOPY: ICD-10-PCS | Mod: 51,HCNC,S$GLB, | Performed by: UROLOGY

## 2019-07-24 PROCEDURE — 51797 PR VOIDING PRESS STUDY INTRA-ABDOMINAL VOID: ICD-10-PCS | Mod: 51,HCNC,S$GLB, | Performed by: UROLOGY

## 2019-07-24 PROCEDURE — 51728 PR COMPLEX CYSTOMETROGRAM VOIDING PRESSURE STUDIES: ICD-10-PCS | Mod: HCNC,S$GLB,, | Performed by: UROLOGY

## 2019-07-24 PROCEDURE — 51741 PR UROFLOWMETRY, COMPLEX: ICD-10-PCS | Mod: 51,HCNC,S$GLB, | Performed by: UROLOGY

## 2019-07-24 RX ORDER — LIDOCAINE HYDROCHLORIDE 20 MG/ML
JELLY TOPICAL ONCE
Status: COMPLETED | OUTPATIENT
Start: 2019-07-24 | End: 2019-07-24

## 2019-07-24 RX ORDER — AMOXICILLIN 250 MG/1
500 CAPSULE ORAL ONCE
Status: COMPLETED | OUTPATIENT
Start: 2019-07-24 | End: 2019-07-24

## 2019-07-24 RX ADMIN — LIDOCAINE HYDROCHLORIDE: 20 JELLY TOPICAL at 04:07

## 2019-07-24 RX ADMIN — AMOXICILLIN 500 MG: 250 CAPSULE ORAL at 04:07

## 2019-07-24 NOTE — PATIENT INSTRUCTIONS
SIMPLE URODYNAMIC STUDY (SUDS) & CYSTOSCOPY  UROLOGY CLINIC DISCHARGE INSTRUCTIONS    You have had a procedure that will require time to properly heal. Follow the instructions you have been given on how to care for yourself once you are home. Below is additional information to help in your recovery.    ACTIVITY  · There are no restrictions in activity. Start doing again the things you did before the procedure.  · You may experience a slight burning sensation. You may notice a small amount of blood in your urine. This will clear up within a day. Call the clinic if this continues beyond 48 hours.    DIET  · Continue your normal diet. You may eat the same foods you ate before your procedure.  · Drink plenty of fluids during the first 24-48 hours following your procedure.    MEDICATIONS  · Resume all other previous medications from your prescribing physician.  · Continue any pre=procedure antibiotics until they are all gone.    SIGNS AND SYMPTOMS TO REPORT TO THE DOCTOR  · Chills or fever greater than 101° F within 24 hours of procedure.  · Changes in urination, such as increased bleeding, foul smell, cloudy urine, or painful urination.  · Call your doctor with any questions or concerns.    For any emergency situation, call [phone] immediately or go to your nearest emergency room.    [facility]  [phone]    _                                                                                                                                                                                             If any problems after hours or weekends, you may call [phone] and ask for the urology resident on call.

## 2019-07-25 ENCOUNTER — TELEPHONE (OUTPATIENT)
Dept: UROLOGY | Facility: CLINIC | Age: 79
End: 2019-07-25

## 2019-07-25 DIAGNOSIS — N99.3 VAGINAL VAULT PROLAPSE, POSTHYSTERECTOMY: Primary | ICD-10-CM

## 2019-07-25 DIAGNOSIS — N81.12 LATERAL CYSTOCELE: ICD-10-CM

## 2019-07-25 DIAGNOSIS — N81.6 RECTOCELE: ICD-10-CM

## 2019-07-25 NOTE — PROCEDURES
Procedures     Urodynamic Report    Indication:  Triple compartment pelvic organ prolapse, incomplete bladder emptying    Patient was taken to the Urodynamic Suite with a comfortably full bladder and asked to perform a free uroflow.  Next, the patient was prepped and the urinary residual was drained with a 14 Fr catheter.  A 7 Fr dual lumen catheter was placed to measure intravesical pressures.  A 10 Fr balloon manometer was placed into the rectum for abdominal pressure measurements.  Patch EMG electrodes were placed on the perineum.  The patient was connected to the [employer] Urodynamic machine, using a multichannel technique, the data were interpreted.  The bladder was filled with sterile water at room temperature at a rate of 30 ml/min.  Patient is filled to urgency.  Filling is performed with the patient in the seated position.  Abdominal leak point pressures are checked at 1st desire, then serially at 50cc increments first with Valsalva then with coughing.  The patient was then asked to sit and void for a pressure flow study.    The following are the results of the study:  1.  Uroflow       Q max:  79.7 ml/sec       Voided volume:  273.4 ml       Pattern of the curve:  intermittent    2.  PVR:  125 ml    3.  CMG       Sensation:         First Desire:  168.6 ml         Normal Desire:  302.8 ml         Strong Desire:  406.1 ml         Urgency:  436.9 ml       Capacity:  507.2 ml       Abnormal Contractions:  none       Compliance:  normal    4.  Abdominal Leak Point Pressure:  There was no stress incontinence, reducing the prolapse with a half speculum    5.  EMG:  Normal guarding reflex, relaxed with voiding, there was an aspect of strain pattern at the end.      6.  Voiding phase, note a small vaginal pack was placed for the voiding portion of the study.         Q max:  57.1 ml/sec       P det at Q max:  28.6 cm H2O       Pattern of the curve:  continuous       Voided volume:  282.2 ml       PVR:  225 ml    7.   Analysis:  Normal sensation and capacity.  No occult stress incontinence.  She does generate a small pressure to void    8.  Recommendations:       A.  See cystoscopy       B.  Recommended A/P repair with SSL fixation and cystoscopy       C.  Risks and benefits were discussed and she signed the consent freely    CYSTOSCOPY REPORT    Pre Procedure Diagnosis:  Triple compartment prolapse    Post Procedure Diagnosis:   Cystocele noted just behind the bladder neck, involves the trigone.  UOs were seen readily    Anesthesia: 10 cc 2% lidocaine jelly applied per urethra.    14 FR Flexible Olympus cystoscope used.    FINDINGS:  Dome, anterior, posterior, lateral walls and bladder base free of urothelial abnormalities. Right and left ureteral orifices in the normal postion and configuration, both effluxed clear urine. There is a deep cystocele just behind the bladder neck and involves the trigone.     Specimen:  none    The patient was taken to the cystoscopy suite and placed in dorsal lithotomy position.  The genitalia was prepped and draped  in the usual sterile fashion.  Two percent lidocaine jelly was inserted in the urethra.  After sufficent time had passed to allow good local anesthesia, the cystoscope was inserted in the urethra and passed into the bladder visualizing the urethra along its entire course.  The dome, anterior, posterior and lateral walls were examined systematically.  The ureteral orifices were in their usual position and configuration.  The cystoscope was turned upon itself 180 degrees to visualize the bladder neck.  The cystoscope was then brought to the level of the bladder neck, the water was turned on and the urethra was visualized.  The cystoscope was removed and the patient was instructed to urinate prior to leaving the office.     Post procedure medication:  Amoxicillin 500 mg x 1     ASSESSMENT/PLAN:  79 year old woman status post flexible cystoscopy.  1. Push fluids for 24 hours.  2. May see  blood in the urine, this should gradually improve over the next 2-3 days.  3. The patient was instructed to return to the office or go to the emergency should fever, chills, cloudy urine, or inability to urinate develop.  4. Follow up for surgery.  Will obtain a renal ultrasound though her creatinine in January was 0.8 mg/dL

## 2019-07-26 ENCOUNTER — ANESTHESIA EVENT (OUTPATIENT)
Dept: SURGERY | Facility: HOSPITAL | Age: 79
End: 2019-07-26
Payer: MEDICARE

## 2019-07-26 DIAGNOSIS — Z01.818 PREOPERATIVE TESTING: Primary | ICD-10-CM

## 2019-07-26 NOTE — PRE ADMISSION SCREENING
Anesthesia Assessment: Preoperative EQUATION    Planned Procedure: Procedure(s) (LRB):  COLPORRHAPHY, COMBINED ANTEROPOSTERIOR POSS INTERPOSITION GRAFT (N/A)  CYSTOSCOPY (N/A)  COLPOPEXY SSL FIXATION (N/A)  Requested Anesthesia Type:General  Surgeon: Alma Dorsey MD  Service: Urology  Known or anticipated Date of Surgery:8/6/2019    Surgeon notes: reviewed    Electronic QUestionnaire Assessment completed via nurse interview with patient.      NO AQ    Triage considerations:     The patient has no apparent active cardiac condition (No unstable coronary Syndrome such as severe unstable angina or recent [<1 month] myocardial infarction, decompensated CHF, severe valvular   disease or significant arrhythmia)    Previous anesthesia records:MAC and No problems  4/9/2014 Colonoscopy  Airway/Jaw/Neck:  Airway Findings: Mouth Opening: Normal Tongue: Normal  General Airway Assessment: Adult  Mallampati: I  Jaw/Neck Findings:  Neck ROM: Normal ROM     Last PCP note: 6-12 months ago , within [facility]  Dr. Maguire  Subspecialty notes: ENT, CRS    Other important co-morbidities:  Arthritis, GERD, HTN, hearing loss     Tests already available:  Available tests,  6-12 months ago , within [facility] .  1/4/2019 PT/INR, CBC, CMP            Instructions given. (See in Nurse's note)    Optimization:  Anesthesia Preop Clinic Assessment  Indicated: Not required for this procedure      Plan:    Testing:  Hematology Profile, BMP and EKG      Patient  has previously scheduled Medical Appointment:  8/2/2019    Navigation: Tests Scheduled.              Results will be tracked by Preop Clinic.

## 2019-07-26 NOTE — ANESTHESIA PREPROCEDURE EVALUATION
[facility]  Anesthesia Pre-Operative Evaluation         Patient Name: Betzy Cooley  YOB: 1940  MRN: [id number]    SUBJECTIVE:     Pre-operative evaluation for Procedure(s) (LRB):  COLPORRHAPHY, COMBINED ANTEROPOSTERIOR POSS INTERPOSITION GRAFT (N/A)  CYSTOSCOPY (N/A)  COLPOPEXY SSL FIXATION (N/A)     08/06/2019    Betzy Cooley is a 79 y.o. female w/ a significant PMHx of GERD, HTN, urinary incontinence, vaginal vault prolapse, lateral cystocele, and rectocele who now presents for the above procedure(s).      LDA:        Peripheral IV - Single Lumen 08/06/19 0600 20 G Left Wrist (Active)   Site Assessment Clean;Dry;Intact 8/6/2019  5:59 AM   Line Status Blood return noted 8/6/2019  5:59 AM   Dressing Status Clean;Dry;Intact 8/6/2019  5:59 AM   Dressing Intervention New dressing 8/6/2019  5:59 AM   Number of days: 0       Prev airway: None documented.    Drips:   sodium chloride 0.9%         Patient Active Problem List   Diagnosis    Hearing loss, sensorineural    Nuclear sclerosis - Both Eyes    Cortical senile cataract - Both Eyes    Choroidal nevus - Both Eyes    Lateral cystocele    Rectocele    Vaginal vault prolapse, posthysterectomy    Urethral hypermobility    Palpitations    Osteopenia    Vaginal candida    Encounter for screening colonoscopy    GERD (gastroesophageal reflux disease)    History of skin cancer    Tinnitus    Elevated blood pressure reading    Abnormal EKG    Prolapse of female pelvic organs       Review of patient's allergies indicates:   Allergen Reactions    Sulfa (sulfonamide antibiotics) Hives     Other reaction(s): Anaphylaxis  Itching   Shortness of breath        Current Inpatient Medications:      No current facility-administered medications on file prior to encounter.      Current Outpatient Medications on File Prior to Encounter   Medication Sig Dispense Refill    aspirin 81 MG Chew Take 81 mg by mouth once daily. Not  chewable      atenolol (TENORMIN) 25 MG tablet Take 1 tablet (25 mg total) by mouth once daily. (Patient taking differently: Take 25 mg by mouth as needed. Does not take it on a regular basis   Takes as needed for heart palpations once a week) 90 tablet 3    calcium-vitamin D3 500 mg(1,250mg) -200 unit per tablet Take 1 tablet by mouth 2 (two) times daily with meals.      co-enzyme Q-10 30 mg capsule Take 30 mg by mouth once daily.      fluconazole (DIFLUCAN) 150 MG Tab Take one weekly for 2 weeks 2 tablet 1    fluticasone propionate (FLONASE) 50 mcg/actuation nasal spray SHAKE LIQUID AND USE 2 SPRAYS(100 MCG) IN EACH NOSTRIL EVERY DAY 48 mL 0    multivitamin (MULTIVITAMIN) per tablet Take 1 tablet by mouth once daily.      ranitidine (ZANTAC) 150 MG capsule Take 1 capsule by mouth nightly.       vitamin A-vitamin C-vit E-min (VISION) Tab Take 1 tablet by mouth once daily.      fexofenadine (ALLEGRA) 180 MG tablet Take 1 tablet (180 mg total) by mouth once daily. (Patient taking differently: Take 180 mg by mouth once daily. Takes as needed) 30 tablet 11    hydrocortisone 2.5 % cream Apply topically 2 (two) times daily. for 10 days 20 g 0    metronidazole 1% (METROGEL) 1 % Gel Apply topically once daily. 60 g 3       Past Surgical History:   Procedure Laterality Date    COLONOSCOPY N/A 4/9/2014    Performed [facility] ([facility])    FRACTURE SURGERY Left 2008    open radius/ulna fracture    HYSTERECTOMY      TANIA/ovaries remain--fibroids- in her late 30's / early 40's       Social History     Socioeconomic History    Marital status:      Spouse name: Not on file    Number of children: Not on file    Years of education: Not on file    Highest education level: Not on file   Occupational History    Not on file   Social Needs    Financial resource strain: Not on file    Food insecurity:     Worry: Not on file     Inability: Not on file    Transportation needs:      Medical: Not on file     Non-medical: Not on file   Tobacco Use    Smoking status: Never Smoker    Smokeless tobacco: Never Used   Substance and Sexual Activity    Alcohol use: Yes     Comment: 1-2 glasses wine weekly    Drug use: No    Sexual activity: Yes     Partners: Male     Birth control/protection: None   Lifestyle    Physical activity:     Days per week: Not on file     Minutes per session: Not on file    Stress: Not on file   Relationships    Social connections:     Talks on phone: Not on file     Gets together: Not on file     Attends [employer] service: Not on file     Active member of club or organization: Not on file     Attends meetings of clubs or organizations: Not on file     Relationship status: Not on file   Other Topics Concern    Are you pregnant or think you may be? Not Asked    Breast-feeding Not Asked   Social History Narrative    Not on file       OBJECTIVE:     Vital Signs Range (Last 24H):  Temp:  [36.4 °C (97.6 °F)]   Pulse:  [50]   Resp:  [16]   BP: (156)/(81)   SpO2:  [97 %]       Significant Labs:  Lab Results   Component Value Date    WBC 8.45 2019    HGB 13.3 2019    HCT 41.6 2019     2019    CHOL 195 2018    TRIG 114 2018    HDL 63 2018    ALT 17 2019    AST 25 2019     2019    K 4.0 2019     2019    CREATININE 0.7 2019    BUN 14 2019    CO2 26 2019    TSH 2.121 2018    INR 0.9 2019       Diagnostic Studies:     EK/6/19    Test Reason : Z01.818,    Vent. Rate : 057 BPM     Atrial Rate : 057 BPM     P-R Int : 216 ms          QRS Dur : 090 ms      QT Int : 396 ms       P-R-T Axes : 040 -29 -09 degrees     QTc Int : 385 ms    Sinus bradycardia with 1st degree A-V block  Moderate voltage criteria for LVH, may be normal variant  ST and/or T wave abnormalities suggesting myocardial ischemia  Abnormal ECG  When compared with ECG of 19-SEP-2006 08:24,  T  wave inversion now evident in Anterior leads  Confirmed by Robin Leavitt MD ([id number]) on 7/30/2019 11:12:47 AM    Referred By: RHONDA LEOPOLD           Confirmed By:Robin Leavitt MD    2D ECHO:  No results found for this or any previous visit.      ASSESSMENT/PLAN:         Anesthesia Evaluation         Review of Systems  Anesthesia Hx:  No problems with previous Anesthesia History of prior surgery of interest to airway management or planning: Previous anesthesia: MAC  4/9/2014 Colonoscopy with MAC.  Procedure performed at an [facility] Facility. Denies Family Hx of Anesthesia complications.   Denies Personal Hx of Anesthesia complications.   Social:  No Alcohol Use, Non-Smoker    Hematology/Oncology:  Hematology Normal   Oncology Normal     EENT/Dental:   Ears General/Symptom(s) Hearing Impairment: (hearing loss)    Cardiovascular:   Hypertension, well controlled Denies MI.   Denies CABG/stent.   Denies Angina. ECG has been reviewed.  Functional Capacity good / => 4 METS    Pulmonary:  Pulmonary Normal  Denies Asthma.  Denies Shortness of breath. Pt stated she has a home in [place] and climbs multiple flights stairs, numerous times per day.   Renal/:   Rectocele/cystocele   Hepatic/GI:   GERD, well controlled    Musculoskeletal:   Arthritis     OB/GYN/PEDS:  Vaginal vault prolapse   Neurological:  Neurology Normal Denies TIA.  Denies CVA.    Endocrine:  Endocrine Normal    Dermatological:  Skin Normal    Psych:  Psychiatric Normal           Physical Exam  General:  Well nourished    Airway/Jaw/Neck:  Airway Findings: Mouth Opening: Normal Tongue: Normal  General Airway Assessment: Adult  Mallampati: II  TM Distance: Normal, at least 6 cm  Jaw/Neck Findings:  Neck ROM: Normal ROM  Neck Findings: Normal    Eyes/Ears/Nose:  EYES/EARS/NOSE FINDINGS: Normal   Dental:  Dental Findings: Periodontal disease, Mild   Chest/Lungs:  Chest/Lungs Findings: Clear to auscultation, Normal Respiratory Rate     Heart/Vascular:  Heart  Findings: Rate: Normal  Rhythm: Regular Rhythm  Sounds: Normal  Heart murmur: negative Vascular Findings: Normal    Abdomen:  Abdomen Findings: Normal    Musculoskeletal:  Musculoskeletal Findings: Normal   Skin:  Skin Findings: Normal    Mental Status:  Mental Status Findings:  Cooperative, Alert and Oriented         Anesthesia Plan  Type of Anesthesia, risks & benefits discussed:  Anesthesia Type:  general, MAC  Patient's Preference:   Intra-op Monitoring Plan: standard ASA monitors  Intra-op Monitoring Plan Comments:   Post Op Pain Control Plan: multimodal analgesia and per primary service following discharge from PACU  Post Op Pain Control Plan Comments:   Induction:   IV  Beta Blocker:  Patient is not currently on a Beta-Blocker (No further documentation required).       Informed Consent: Patient understands risks and agrees with Anesthesia plan.  Questions answered. Anesthesia consent signed with patient.  ASA Score: 3     Day of Surgery Review of History & Physical:  There are no significant changes.  H&P update referred to the surgeon.     Anesthesia Plan Notes: Patient has good functional capacity and is without history of CAD also NO renal impairment, cerebrovascular disease, heart failure, or diabetes.          Ready For Surgery From Anesthesia Perspective.     Patient seen by Dr. Knapp on 8/1/19 for optimization.

## 2019-07-27 ENCOUNTER — PATIENT MESSAGE (OUTPATIENT)
Dept: SURGERY | Facility: HOSPITAL | Age: 79
End: 2019-07-27

## 2019-07-29 ENCOUNTER — TELEPHONE (OUTPATIENT)
Dept: PREADMISSION TESTING | Facility: HOSPITAL | Age: 79
End: 2019-07-29

## 2019-07-29 NOTE — TELEPHONE ENCOUNTER
----- Message from Sia Hebert RN sent at 7/26/2019  2:01 PM CDT -----  Surgery date: 8/6/2019  PreOp appt:N/A    Please call Pt and schedule the following preop appts:    Lab  EKG    Thank you!  Sia

## 2019-07-30 ENCOUNTER — HOSPITAL ENCOUNTER (OUTPATIENT)
Dept: CARDIOLOGY | Facility: CLINIC | Age: 79
Discharge: HOME | End: 2019-07-30
Attending: ANESTHESIOLOGY
Payer: MEDICARE

## 2019-07-30 DIAGNOSIS — Z01.818 PREOPERATIVE TESTING: ICD-10-CM

## 2019-07-30 PROCEDURE — 93005 EKG 12-LEAD: ICD-10-PCS | Mod: HCNC,S$GLB,, | Performed by: ANESTHESIOLOGY

## 2019-07-30 PROCEDURE — 93010 ELECTROCARDIOGRAM REPORT: CPT | Mod: HCNC,S$GLB,, | Performed by: INTERNAL MEDICINE

## 2019-07-30 PROCEDURE — 93010 EKG 12-LEAD: ICD-10-PCS | Mod: HCNC,S$GLB,, | Performed by: INTERNAL MEDICINE

## 2019-07-30 PROCEDURE — 93005 ELECTROCARDIOGRAM TRACING: CPT | Mod: HCNC,S$GLB,, | Performed by: ANESTHESIOLOGY

## 2019-08-01 ENCOUNTER — INITIAL CONSULT (OUTPATIENT)
Dept: INTERNAL MEDICINE | Facility: CLINIC | Age: 79
End: 2019-08-01
Payer: MEDICARE

## 2019-08-01 VITALS
WEIGHT: 133.63 LBS | HEIGHT: 62 IN | HEART RATE: 56 BPM | SYSTOLIC BLOOD PRESSURE: 128 MMHG | DIASTOLIC BLOOD PRESSURE: 80 MMHG | OXYGEN SATURATION: 99 % | BODY MASS INDEX: 24.59 KG/M2 | TEMPERATURE: 97 F

## 2019-08-01 DIAGNOSIS — B37.31 VAGINAL CANDIDA: ICD-10-CM

## 2019-08-01 DIAGNOSIS — R94.31 ABNORMAL EKG: ICD-10-CM

## 2019-08-01 DIAGNOSIS — R03.0 ELEVATED BLOOD PRESSURE READING: ICD-10-CM

## 2019-08-01 DIAGNOSIS — Z01.818 PREOP EXAMINATION: Primary | ICD-10-CM

## 2019-08-01 DIAGNOSIS — K21.9 GASTROESOPHAGEAL REFLUX DISEASE, ESOPHAGITIS PRESENCE NOT SPECIFIED: ICD-10-CM

## 2019-08-01 DIAGNOSIS — R00.2 PALPITATIONS: ICD-10-CM

## 2019-08-01 PROCEDURE — 3079F PR MOST RECENT DIASTOLIC BLOOD PRESSURE 80-89 MM HG: ICD-10-PCS | Mod: HCNC,CPTII,S$GLB, | Performed by: HOSPITALIST

## 2019-08-01 PROCEDURE — 99999 PR PBB SHADOW E&M-EST. PATIENT-LVL III: CPT | Mod: PBBFAC,HCNC,, | Performed by: HOSPITALIST

## 2019-08-01 PROCEDURE — 99999 PR PBB SHADOW E&M-EST. PATIENT-LVL III: ICD-10-PCS | Mod: PBBFAC,HCNC,, | Performed by: HOSPITALIST

## 2019-08-01 PROCEDURE — 3074F PR MOST RECENT SYSTOLIC BLOOD PRESSURE < 130 MM HG: ICD-10-PCS | Mod: HCNC,CPTII,S$GLB, | Performed by: HOSPITALIST

## 2019-08-01 PROCEDURE — 1101F PT FALLS ASSESS-DOCD LE1/YR: CPT | Mod: HCNC,CPTII,S$GLB, | Performed by: HOSPITALIST

## 2019-08-01 PROCEDURE — 3079F DIAST BP 80-89 MM HG: CPT | Mod: HCNC,CPTII,S$GLB, | Performed by: HOSPITALIST

## 2019-08-01 PROCEDURE — 3074F SYST BP LT 130 MM HG: CPT | Mod: HCNC,CPTII,S$GLB, | Performed by: HOSPITALIST

## 2019-08-01 PROCEDURE — 1101F PR PT FALLS ASSESS DOC 0-1 FALLS W/OUT INJ PAST YR: ICD-10-PCS | Mod: HCNC,CPTII,S$GLB, | Performed by: HOSPITALIST

## 2019-08-01 PROCEDURE — 99214 OFFICE O/P EST MOD 30 MIN: CPT | Mod: HCNC,S$GLB,, | Performed by: HOSPITALIST

## 2019-08-01 PROCEDURE — 99214 PR OFFICE/OUTPT VISIT, EST, LEVL IV, 30-39 MIN: ICD-10-PCS | Mod: HCNC,S$GLB,, | Performed by: HOSPITALIST

## 2019-08-01 NOTE — LETTER
August 1, 2019      Rhonda G Leopold, MD  [address]  [place] [address]           [facility] - Pre Op Consult  [address] [facility] [address]  Phone: [phone]          Patient: Betzy Cooley   MR Number: [id number]   YOB: 1940   Date of Visit: 8/1/2019       Dear Dr. Rhonda G Leopold:    Thank you for referring Betzy Cooley to me for evaluation. Attached you will find relevant portions of my assessment and plan of care.    If you have questions, please do not hesitate to call me. I look forward to following Betzy Cooley along with you.    Sincerely,    Becky Knapp MD    Enclosure  CC:  Alma Dorsey MD    If you would like to receive this communication electronically, please contact [email or web address] or [phone] to request more information on [employer] access.    For providers and/or their staff who would like to refer a patient to [facility], please contact us through our one-stop-shop provider referral line, [facility], at [phone].    If you feel you have received this communication in error or would no longer like to receive these types of communications, please e-mail [email or web address]

## 2019-08-01 NOTE — OUTPATIENT SUBJECTIVE & OBJECTIVE
"Outpatient Subjective & Objective     Chief complaint-Preoperative evaluation, Perioperative Medical management, complication reduction plan     Active cardiac conditions- none    Revised cardiac risk index predictors- none    Functional capacity -Examples of physical activity , stays active , goes to fitness center three days a week,  can take a flight of stairs holding on to the railing----- She can undertake all the above activities without  chest pain,chest tightness, Shortness of breath ,dizziness,lightheadedness making her exercise tolerance more,   than 4 Mets.        Review of Systems   Constitutional: Negative for chills and fever.          Weight loss intentionally    HENT:        STOPBANG score  1/ 8    Age over 50        Eyes:        No new visual changes   Respiratory:        No cough , phlegm    No Hemoptysis   Cardiovascular:        As noted   Gastrointestinal:        No overt GI/ blood losses  Bowel movements- Regular    Endocrine:        Prednisone use > 20 mg daily for 3 weeks- none    Genitourinary: Negative for dysuria.        No urinary hesitancy    Musculoskeletal:          No unusual, muscle, joint pains   Skin: Negative for rash.   Neurological: Negative for syncope.        No unilateral weakness   Hematological:          Aspirin use for preventive reasons - holding 1 week pre op   Psychiatric/Behavioral:        No Depression,Anxiety     No vascular stenting             No anesthesia, bleeding, cardiac problems , PONV with previous surgeries/procedures.  Medications and Allergies reviewed in epic.   FH- No anesthesia,bleeding / venous thrombosis , early onset heart disease in family      can help post op     Physical Exam  Blood pressure 128/80, pulse (!) 56, temperature 97.4 °F (36.3 °C), height 5' 2" (1.575 m), weight 60.6 kg (133 lb 9.6 oz), SpO2 99 %.     Stress test 2006 - noted       Physical Exam  Constitutional- Vitals - Body mass index is 24.44 kg/m².,   Vitals:    08/01/19 " 1157   BP: 128/80   Pulse: (!) 56   Temp: 97.4 °F (36.3 °C)     General appearance-Conscious,Coherent  Eyes- No conjunctival icterus,pupils  round  and reactive to light   ENT-Oral cavity- moist  , Hearing grossly normal   Neck- No thyromegaly ,Trachea -central, No jugular venous distension,   No Carotid Bruit   Cardiovascular -Heart Sounds- Normal  and  no murmur   , No gallop rhythm   Respiratory - Normal Respiratory Effort, Normal breath sounds,  CrepitationsRt base ,  no wheeze  and  no forced expiratory wheeze    Peripheral pitting pedal edema-- none , no calf pain   Gastrointestinal -Soft abdomen, No palpable masses, Non Tender,Liver,Spleen not palpable. No-- free fluid and shifting dullness  Musculoskeletal- No finger Clubbing. Strength grossly normal   Lymphatic-No Palpable cervical, axillary,Inguinal lymphadenopathy   Psychiatric - normal effect,Orientation  Rt Dorsalis pedis pulses-palpable    Lt Dorsalis pedis pulses- palpable   Rt Posterior tibial pulses -palpable   Left posterior tibial pulses -palpable   Miscellaneous -  no asterixis,  Surgical scarabdomen  ,  no renal bruit and  bowel sounds positive       Investigations  Lab and Imaging have been reviewed in [facility].    Review of Medicine tests    EKG- I had independently reviewed the EKG from--7/30/2019   It was reported to be showing     Sinus bradycardia with 1st degree A-V block  Moderate voltage criteria for LVH, may be normal variant  ST and/or T wave abnormalities suggesting myocardial ischemia  Abnormal ECG  When compared with ECG of 19-SEP-2006 08:24,  T wave inversion now evident in Anterior leads    Review of clinical lab tests:  Lab Results   Component Value Date    CREATININE 0.7 07/30/2019    HGB 13.3 07/30/2019     07/30/2019           Review of old records- Was done and information gathered regards to events leading to surgery and health conditions of significance in the perioperative period.    Outpatient Subjective & Objective

## 2019-08-01 NOTE — HPI
"History of present illness- I had the pleasure of meeting this pleasant 79 y.o. lady in the pre op clinic prior to her elective urogynecological procedure . The patient is new to me . I met her in the past when she accompanied her  for his pre op evaluation   Goes by " Alexia "    I have obtained the history by speaking to the patient and by reviewing the electronic health records.    Events leading up to surgery / History of presenting illness -    Vaginal vault prolapse, posthysterectomy   Rectocele   Lateral cystocele     Started with prolapse ( noticed a bulge in the vaginal area ) about 8- 10 years ago  Started using a pessary since them   She stays active and activity displaces the pessary and she has to reposition     No urinary leakage    Had to strain for bowel movement in Jan 2019 , had rectal bleeding that has since resolved. Had evaluation for it   Keeps bowels moving  By taking Metamucil every day      No pain from this .    Relevant health conditions of significance for the perioperative period/ History of presenting illness -    Subjectively describes health as' very good - excellent     Health conditions of significance for the perioperative period - Acid reflux, Beta blocker     Not known to have heart disease , Diabetes Mellitus    Lives with     for 56 years   2 story house with bed room upstairs   Has a big yard and loves flowers      "

## 2019-08-01 NOTE — PROGRESS NOTES
"[facility] - Pre Op Consult  Progress Note    Patient Name: Betzy Cooley  MRN: [id number]  Date of Evaluation- 08/01/2019  PCP- Nancy Maguire MD    Future cases for Betzy Cooley [[id number]]     Case ID Status Date Time Jack Procedure Provider Location    [id number] [facility] 8/6/2019  9:00  COLPORRHAPHY, COMBINED ANTEROPOSTERIOR POSS INTERPOSITION GRAFT Alma Dorsey MD [[id number]] [facility] OR 2ND FLR          HPI:  History of present illness- I had the pleasure of meeting this pleasant 79 y.o. lady in the pre op clinic prior to her elective urogynecological procedure . The patient is new to me . I met her in the past when she accompanied her  for his pre op evaluation   Goes by " Alexia "    I have obtained the history by speaking to the patient and by reviewing the electronic health records.    Events leading up to surgery / History of presenting illness -    Vaginal vault prolapse, posthysterectomy   Rectocele   Lateral cystocele     Started with prolapse ( noticed a bulge in the vaginal area ) about 8- 10 years ago  Started using a pessary since them   She stays active and activity displaces the pessary and she has to reposition     No urinary leakage    Had to strain for bowel movement in Jan 2019 , had rectal bleeding that has since resolved. Had evaluation for it   Keeps bowels moving  By taking Metamucil every day      No pain from this .    Relevant health conditions of significance for the perioperative period/ History of presenting illness -    Subjectively describes health as' very good - excellent     Health conditions of significance for the perioperative period - Acid reflux, Beta blocker     Not known to have heart disease , Diabetes Mellitus    Lives with     for 56 years   2 story house with bed room upstairs   Has a big yard and loves flowers          Subjective/ Objective:          Chief complaint-Preoperative evaluation, Perioperative Medical management, complication reduction plan " "    Active cardiac conditions- none    Revised cardiac risk index predictors- none    Functional capacity -Examples of physical activity , stays active , goes to fitness center three days a week,  can take a flight of stairs holding on to the railing----- She can undertake all the above activities without  chest pain,chest tightness, Shortness of breath ,dizziness,lightheadedness making her exercise tolerance more,   than 4 Mets.        Review of Systems   Constitutional: Negative for chills and fever.          Weight loss intentionally    HENT:        STOPBANG score  1/ 8    Age over 50        Eyes:        No new visual changes   Respiratory:        No cough , phlegm    No Hemoptysis   Cardiovascular:        As noted   Gastrointestinal:        No overt GI/ blood losses  Bowel movements- Regular    Endocrine:        Prednisone use > 20 mg daily for 3 weeks- none    Genitourinary: Negative for dysuria.        No urinary hesitancy    Musculoskeletal:          No unusual, muscle, joint pains   Skin: Negative for rash.   Neurological: Negative for syncope.        No unilateral weakness   Hematological:          Aspirin use for preventive reasons - holding 1 week pre op   Psychiatric/Behavioral:        No Depression,Anxiety     No vascular stenting             No anesthesia, bleeding, cardiac problems , PONV with previous surgeries/procedures.  Medications and Allergies reviewed in epic.   FH- No anesthesia,bleeding / venous thrombosis , early onset heart disease in family      can help post op     Physical Exam  Blood pressure 128/80, pulse (!) 56, temperature 97.4 °F (36.3 °C), height 5' 2" (1.575 m), weight 60.6 kg (133 lb 9.6 oz), SpO2 99 %.     Stress test 2006 - noted       Physical Exam  Constitutional- Vitals - Body mass index is 24.44 kg/m².,   Vitals:    08/01/19 1157   BP: 128/80   Pulse: (!) 56   Temp: 97.4 °F (36.3 °C)     General appearance-Conscious,Coherent  Eyes- No conjunctival icterus,pupils  " round  and reactive to light   ENT-Oral cavity- moist  , Hearing grossly normal   Neck- No thyromegaly ,Trachea -central, No jugular venous distension,   No Carotid Bruit   Cardiovascular -Heart Sounds- Normal  and  no murmur   , No gallop rhythm   Respiratory - Normal Respiratory Effort, Normal breath sounds,  CrepitationsRt base ,  no wheeze  and  no forced expiratory wheeze    Peripheral pitting pedal edema-- none , no calf pain   Gastrointestinal -Soft abdomen, No palpable masses, Non Tender,Liver,Spleen not palpable. No-- free fluid and shifting dullness  Musculoskeletal- No finger Clubbing. Strength grossly normal   Lymphatic-No Palpable cervical, axillary,Inguinal lymphadenopathy   Psychiatric - normal effect,Orientation  Rt Dorsalis pedis pulses-palpable    Lt Dorsalis pedis pulses- palpable   Rt Posterior tibial pulses -palpable   Left posterior tibial pulses -palpable   Miscellaneous -  no asterixis,  Surgical scarabdomen  ,  no renal bruit and  bowel sounds positive       Investigations  Lab and Imaging have been reviewed in epic.    Review of Medicine tests    EKG- I had independently reviewed the EKG from--7/30/2019   It was reported to be showing     Sinus bradycardia with 1st degree A-V block  Moderate voltage criteria for LVH, may be normal variant  ST and/or T wave abnormalities suggesting myocardial ischemia  Abnormal ECG  When compared with ECG of 19-SEP-2006 08:24,  T wave inversion now evident in Anterior leads    Review of clinical lab tests:  Lab Results   Component Value Date    CREATININE 0.7 07/30/2019    HGB 13.3 07/30/2019     07/30/2019           Review of old records- Was done and information gathered regards to events leading to surgery and health conditions of significance in the perioperative period.        Preoperative cardiac risk assessment-  The patient does not have any active cardiac conditions . Revised cardiac risk index predictors- -0--.Functional capacity is more  than 4 Mets. She will be undergoing a Urological procedure that carries a intermediate risk     Risk of a major Cardiac event ( Defined as death, myocardial infarction, or cardiac arrest at 30 days after noncardiac surgery), based on RCRI score     3.9%       No further cardiac work up is indicated prior to proceeding with the surgery          American Society of Anesthesiologists Physical status classification ( ASA ) class: 2     Postoperative pulmonary complication risk assessment:      ARISCAT ( Canet) risk index- risk class -  Low, if duration of surgery is under 3 hours, intermediate, if duration of surgery is over 3 hours       Assessment/Plan:     Elevated blood pressure reading  Has elevated BP in the health care settings   BP at the [facility] center 130/70-80 , 3 times a week     Palpitations  Occasional skipped beat   No symptoms   Long standing   Had Cardiac evaluation done - To her knowledge no CAD, no heart failure   Takes Atenolol when she is aware of heart beating fast with Physical activity- No Chest pain, SOB   Does not take it on a regular basis   Takes as needed for heart palpations once a week    She plans on taking Atenolol on the AM of surgery   Suggest Cardiac monitoring brianna op       Vaginal candida  With antibiotic use   No recent problem     GERD (gastroesophageal reflux disease)  Zantac with evening meal   Not troubled recently     Abnormal EKG  EKG from 7/30/2019 showed possible ischemic changes   No chest pain history  Stays very active and goes to gym 3 days a week   Asymptomatic with good functional capacity   We discussed pre op Cardiology evaluation , but given the above , we deferred it           Preventive perioperative care    Thromboembolic prophylaxis:  Her risk factors for thrombosis include surgical procedure and age.I suggest  thromboembolic prophylaxis ( mechanical/pharmacological, weighing the risk benefits of pharmacological agent use considering brianna procedural bleeding )   during the perioperative period.I suggested being active in the post operative period.      Postoperative pulmonary complication prophylaxis-Risk factors for post operative pulmonary complications include age over 65 years- I suggest incentive spirometry use, early ambulation and end tidal carbon dioxide monitoring  , oral care , head end of bed elevation      Renal complication prophylaxis-. I suggest keeping her well hydrated  in the perioperative period  Stays hydrated      Surgical site Infection Prophylaxis-I  suggest appropriate antibiotic for Prophylaxis against Surgical site infections     Delirium prophylaxis-Risk factors - Advanced Age - I suggest avoidance / minimizing the use of  Benzodiazepines ( unless the patient has been taking it on a regular basis ),Anticholinergic medication,Antihistamines ( like  Benadryl).I suggest minimizing the use of opioid medication and use of IV tylenol,if it is appropriate. I suggest using the lowest possible dose of opioids for the shortest duration possible in the perioperative period. I suggest to Keep shades/blinds open during the day, lights off and shades closed at night to encourage normal sleep/wake cycle.I encourage the presence of the family member with the patient at all times, if at all possible as mental status changes can be picked up early by the family members and they help with reorientation. I encouraged the presence of family to help with orientation in the perioperative period. Benadryl avoidance suggested      In view of urological procedure the patient  is at risk of postoperative urinary retention.  I suggest avoidance / minimizing the of  Benzodiazepines,Anticholinergic medication,antihistamines ( Benadryl) , if possible in the perioperative period. I suggest using the minimum possible use of opioids for the minimum period of time in the perioperative period. Benadryl avoidance suggested      This visit was focused on Preoperative evaluation,  Perioperative Medical management, complication reduction plans. I suggest that the patient follows up with primary care or relevant sub specialists for ongoing health care.    I appreciate the opportunity to be involved in this patients care. Please feel free to contact me if there were any questions about this consultation.    Patient is optimized     Patient was instructed to call and update me about any changes to health,  medication, office visits ,testing out side of the [facility] operative care center , hospitalizations between now and surgery     Becky Knapp MD  Perioperative Medicine  [facility]   Pager [phone]

## 2019-08-01 NOTE — ASSESSMENT & PLAN NOTE
EKG from 7/30/2019 showed possible ischemic changes   No chest pain history  Stays very active and goes to gym 3 days a week   Asymptomatic with good functional capacity   We discussed pre op Cardiology evaluation , but given the above , we deferred it

## 2019-08-01 NOTE — ASSESSMENT & PLAN NOTE
Has elevated BP in the health care settings   BP at the fitness center 130/70-80 , 3 times a week

## 2019-08-01 NOTE — ASSESSMENT & PLAN NOTE
Occasional skipped beat   No symptoms   Long standing   Had Cardiac evaluation done - To her knowledge no CAD, no heart failure   Takes Atenolol when she is aware of heart beating fast with Physical activity- No Chest pain, SOB   Does not take it on a regular basis   Takes as needed for heart palpations once a week    She plans on taking Atenolol on the AM of surgery   Suggest Cardiac monitoring brianna op

## 2019-08-02 ENCOUNTER — OFFICE VISIT (OUTPATIENT)
Dept: DERMATOLOGY | Facility: CLINIC | Age: 79
End: 2019-08-02
Payer: MEDICARE

## 2019-08-02 VITALS — WEIGHT: 133 LBS | BODY MASS INDEX: 24.33 KG/M2

## 2019-08-02 DIAGNOSIS — Z85.828 HISTORY OF SKIN CANCER: ICD-10-CM

## 2019-08-02 DIAGNOSIS — L71.9 ROSACEA: ICD-10-CM

## 2019-08-02 DIAGNOSIS — L57.0 MULTIPLE ACTINIC KERATOSES: Primary | ICD-10-CM

## 2019-08-02 DIAGNOSIS — L81.4 LENTIGINES: ICD-10-CM

## 2019-08-02 PROCEDURE — 17003 DESTRUCTION, PREMALIGNANT LESIONS; SECOND THROUGH 14 LESIONS: ICD-10-PCS | Mod: HCNC,S$GLB,, | Performed by: DERMATOLOGY

## 2019-08-02 PROCEDURE — 99999 PR PBB SHADOW E&M-EST. PATIENT-LVL III: CPT | Mod: PBBFAC,HCNC,, | Performed by: DERMATOLOGY

## 2019-08-02 PROCEDURE — 1101F PT FALLS ASSESS-DOCD LE1/YR: CPT | Mod: HCNC,CPTII,S$GLB, | Performed by: DERMATOLOGY

## 2019-08-02 PROCEDURE — 1101F PR PT FALLS ASSESS DOC 0-1 FALLS W/OUT INJ PAST YR: ICD-10-PCS | Mod: HCNC,CPTII,S$GLB, | Performed by: DERMATOLOGY

## 2019-08-02 PROCEDURE — 99213 OFFICE O/P EST LOW 20 MIN: CPT | Mod: 25,HCNC,S$GLB, | Performed by: DERMATOLOGY

## 2019-08-02 PROCEDURE — 17000 DESTRUCT PREMALG LESION: CPT | Mod: HCNC,S$GLB,, | Performed by: DERMATOLOGY

## 2019-08-02 PROCEDURE — 99999 PR PBB SHADOW E&M-EST. PATIENT-LVL III: ICD-10-PCS | Mod: PBBFAC,HCNC,, | Performed by: DERMATOLOGY

## 2019-08-02 PROCEDURE — 99213 PR OFFICE/OUTPT VISIT, EST, LEVL III, 20-29 MIN: ICD-10-PCS | Mod: 25,HCNC,S$GLB, | Performed by: DERMATOLOGY

## 2019-08-02 PROCEDURE — 17003 DESTRUCT PREMALG LES 2-14: CPT | Mod: HCNC,S$GLB,, | Performed by: DERMATOLOGY

## 2019-08-02 PROCEDURE — 17000 PR DESTRUCTION(LASER SURGERY,CRYOSURGERY,CHEMOSURGERY),PREMALIGNANT LESIONS,FIRST LESION: ICD-10-PCS | Mod: HCNC,S$GLB,, | Performed by: DERMATOLOGY

## 2019-08-02 NOTE — PROGRESS NOTES
Subjective:       Patient ID:  Betzy Cooley is a 79 y.o. female who presents for   Chief Complaint   Patient presents with    Follow-up     face. UBSE     This is a high risk patient here to check for the development of new lesions.  History of Present Illness: The patient presents with chief complaint of spots.  Location: face  Duration: months  Signs/Symptoms: none    Prior treatments: none        Review of Systems   Constitutional: Negative for fever, chills, weight loss, weight gain, fatigue, night sweats and malaise.   Skin: Positive for daily sunscreen use, activity-related sunscreen use and wears hat.   Hematologic/Lymphatic: Bruises/bleeds easily.        Objective:    Physical Exam   Constitutional: She appears well-developed and well-nourished. No distress.   Neurological: She is alert and oriented to person, place, and time. She is not disoriented.   Psychiatric: She has a normal mood and affect.   Skin:   Areas Examined (abnormalities noted in diagram):   Head / Face Inspection Performed  Neck Inspection Performed  Chest / Axilla Inspection Performed  Abdomen Inspection Performed  Back Inspection Performed  RUE Inspected  LUE Inspection Performed  RLE Inspected  LLE Inspection Performed                   Diagram Legend     Erythematous scaling macule/papule c/w actinic keratosis       Vascular papule c/w angioma      Pigmented verrucoid papule/plaque c/w seborrheic keratosis      Yellow umbilicated papule c/w sebaceous hyperplasia      Irregularly shaped tan macule c/w lentigo     1-2 mm smooth white papules consistent with Milia      Movable subcutaneous cyst with punctum c/w epidermal inclusion cyst      Subcutaneous movable cyst c/w pilar cyst      Firm pink to brown papule c/w dermatofibroma      Pedunculated fleshy papule(s) c/w skin tag(s)      Evenly pigmented macule c/w junctional nevus     Mildly variegated pigmented, slightly irregular-bordered macule c/w mildly atypical nevus      Flesh  "colored to evenly pigmented papule c/w intradermal nevus       Pink pearly papule/plaque c/w basal cell carcinoma      Erythematous hyperkeratotic cursted plaque c/w SCC      Surgical scar with no sign of skin cancer recurrence      Open and closed comedones      Inflammatory papules and pustules      Verrucoid papule consistent consistent with wart     Erythematous eczematous patches and plaques     Dystrophic onycholytic nail with subungual debris c/w onychomycosis     Umbilicated papule    Erythematous-base heme-crusted tan verrucoid plaque consistent with inflamed seborrheic keratosis     Erythematous Silvery Scaling Plaque c/w Psoriasis     See annotation      Assessment / Plan:        Multiple actinic keratoses   Cryosurgery Procedure Note    Verbal consent from the patient is obtained and the patient is aware of the precancerous quality and need for treatment of these lesions. Liquid nitrogen cryosurgery is applied to the 6 actinic keratoses, as detailed in the physical exam, to produce a freeze injury.      Lentigines  Sunscreen  The "ABCD" rules to observe pigmented lesions were reviewed.      History of skin cancer  Scc right arm no recurrence    Rosacea  Cont metrogel             Follow up in about 6 months (around 2/2/2020).  "

## 2019-08-05 ENCOUNTER — TELEPHONE (OUTPATIENT)
Dept: UROLOGY | Facility: CLINIC | Age: 79
End: 2019-08-05

## 2019-08-05 NOTE — TELEPHONE ENCOUNTER
Called pt to confirm 5am arrival time for procedure. Gave pt NPO instructions and gave pt oportunity to ask questions. Pt verbalized understanding.

## 2019-08-06 ENCOUNTER — HOSPITAL ENCOUNTER (OUTPATIENT)
Facility: HOSPITAL | Age: 79
Discharge: HOME | End: 2019-08-07
Attending: UROLOGY | Admitting: UROLOGY
Payer: MEDICARE

## 2019-08-06 ENCOUNTER — ANESTHESIA (OUTPATIENT)
Dept: SURGERY | Facility: HOSPITAL | Age: 79
End: 2019-08-06
Payer: MEDICARE

## 2019-08-06 DIAGNOSIS — N81.9 PROLAPSE OF FEMALE PELVIC ORGANS: ICD-10-CM

## 2019-08-06 DIAGNOSIS — N81.12 LATERAL CYSTOCELE: Primary | ICD-10-CM

## 2019-08-06 DIAGNOSIS — N81.6 RECTOCELE: ICD-10-CM

## 2019-08-06 DIAGNOSIS — N99.3 VAGINAL VAULT PROLAPSE, POSTHYSTERECTOMY: ICD-10-CM

## 2019-08-06 DIAGNOSIS — N36.41 URETHRAL HYPERMOBILITY: ICD-10-CM

## 2019-08-06 PROCEDURE — 71000033 HC RECOVERY, INTIAL HOUR: Mod: HCNC | Performed by: UROLOGY

## 2019-08-06 PROCEDURE — 63600175 PHARM REV CODE 636 W HCPCS: Mod: HCNC | Performed by: ANESTHESIOLOGY

## 2019-08-06 PROCEDURE — 94761 N-INVAS EAR/PLS OXIMETRY MLT: CPT | Mod: HCNC

## 2019-08-06 PROCEDURE — 88305 TISSUE EXAM BY PATHOLOGIST: CPT | Mod: 26,HCNC,, | Performed by: PATHOLOGY

## 2019-08-06 PROCEDURE — 27201423 OPTIME MED/SURG SUP & DEVICES STERILE SUPPLY: Mod: HCNC | Performed by: UROLOGY

## 2019-08-06 PROCEDURE — 88305 TISSUE EXAM BY PATHOLOGIST: CPT | Mod: HCNC | Performed by: PATHOLOGY

## 2019-08-06 PROCEDURE — 25000003 PHARM REV CODE 250: Mod: HCNC | Performed by: STUDENT IN AN ORGANIZED HEALTH CARE EDUCATION/TRAINING PROGRAM

## 2019-08-06 PROCEDURE — 25000003 PHARM REV CODE 250: Mod: HCNC | Performed by: NURSE ANESTHETIST, CERTIFIED REGISTERED

## 2019-08-06 PROCEDURE — 63600175 PHARM REV CODE 636 W HCPCS: Mod: HCNC | Performed by: UROLOGY

## 2019-08-06 PROCEDURE — 57260 PR COMBINED ANT/POST COLPORRHAPHY: ICD-10-PCS | Mod: HCNC,,, | Performed by: UROLOGY

## 2019-08-06 PROCEDURE — 36000708 HC OR TIME LEV III 1ST 15 MIN: Mod: HCNC | Performed by: UROLOGY

## 2019-08-06 PROCEDURE — 37000009 HC ANESTHESIA EA ADD 15 MINS: Mod: HCNC | Performed by: UROLOGY

## 2019-08-06 PROCEDURE — 37000008 HC ANESTHESIA 1ST 15 MINUTES: Mod: HCNC | Performed by: UROLOGY

## 2019-08-06 PROCEDURE — 71000039 HC RECOVERY, EACH ADD'L HOUR: Mod: HCNC | Performed by: UROLOGY

## 2019-08-06 PROCEDURE — 63600175 PHARM REV CODE 636 W HCPCS: Mod: HCNC | Performed by: NURSE ANESTHETIST, CERTIFIED REGISTERED

## 2019-08-06 PROCEDURE — 88305 TISSUE SPECIMEN TO PATHOLOGY - SURGERY: ICD-10-PCS | Mod: 26,HCNC,, | Performed by: PATHOLOGY

## 2019-08-06 PROCEDURE — D9220A PRA ANESTHESIA: Mod: HCNC,ANES,, | Performed by: ANESTHESIOLOGY

## 2019-08-06 PROCEDURE — 57260 CMBN ANT PST COLPRHY: CPT | Mod: HCNC,,, | Performed by: UROLOGY

## 2019-08-06 PROCEDURE — 36000709 HC OR TIME LEV III EA ADD 15 MIN: Mod: HCNC | Performed by: UROLOGY

## 2019-08-06 PROCEDURE — D9220A PRA ANESTHESIA: ICD-10-PCS | Mod: HCNC,ANES,, | Performed by: ANESTHESIOLOGY

## 2019-08-06 PROCEDURE — 25000003 PHARM REV CODE 250: Mod: HCNC | Performed by: UROLOGY

## 2019-08-06 PROCEDURE — C2631 REP DEV, URINARY, W/O SLING: HCPCS | Mod: HCNC | Performed by: UROLOGY

## 2019-08-06 PROCEDURE — 63600175 PHARM REV CODE 636 W HCPCS: Mod: HCNC | Performed by: STUDENT IN AN ORGANIZED HEALTH CARE EDUCATION/TRAINING PROGRAM

## 2019-08-06 RX ORDER — LIDOCAINE HYDROCHLORIDE 10 MG/ML
INJECTION, SOLUTION EPIDURAL; INFILTRATION; INTRACAUDAL; PERINEURAL
Status: DISCONTINUED | OUTPATIENT
Start: 2019-08-06 | End: 2019-08-06 | Stop reason: HOSPADM

## 2019-08-06 RX ORDER — PHENYLEPHRINE HYDROCHLORIDE 10 MG/ML
INJECTION INTRAVENOUS
Status: DISCONTINUED | OUTPATIENT
Start: 2019-08-06 | End: 2019-08-06

## 2019-08-06 RX ORDER — ROCURONIUM BROMIDE 10 MG/ML
INJECTION, SOLUTION INTRAVENOUS
Status: DISCONTINUED | OUTPATIENT
Start: 2019-08-06 | End: 2019-08-06

## 2019-08-06 RX ORDER — HYDROCODONE BITARTRATE AND ACETAMINOPHEN 5; 325 MG/1; MG/1
1 TABLET ORAL EVERY 4 HOURS PRN
Status: DISCONTINUED | OUTPATIENT
Start: 2019-08-06 | End: 2019-08-07 | Stop reason: HOSPADM

## 2019-08-06 RX ORDER — ONDANSETRON 2 MG/ML
INJECTION INTRAMUSCULAR; INTRAVENOUS
Status: DISCONTINUED | OUTPATIENT
Start: 2019-08-06 | End: 2019-08-06

## 2019-08-06 RX ORDER — POLYETHYLENE GLYCOL 3350 17 G/17G
17 POWDER, FOR SOLUTION ORAL DAILY
Status: DISCONTINUED | OUTPATIENT
Start: 2019-08-07 | End: 2019-08-07 | Stop reason: HOSPADM

## 2019-08-06 RX ORDER — LIDOCAINE HCL/PF 100 MG/5ML
SYRINGE (ML) INTRAVENOUS
Status: DISCONTINUED | OUTPATIENT
Start: 2019-08-06 | End: 2019-08-06

## 2019-08-06 RX ORDER — CEFAZOLIN SODIUM 1 G/3ML
2 INJECTION, POWDER, FOR SOLUTION INTRAMUSCULAR; INTRAVENOUS
Status: COMPLETED | OUTPATIENT
Start: 2019-08-06 | End: 2019-08-06

## 2019-08-06 RX ORDER — SODIUM CHLORIDE 9 MG/ML
INJECTION, SOLUTION INTRAVENOUS CONTINUOUS
Status: DISCONTINUED | OUTPATIENT
Start: 2019-08-06 | End: 2019-08-07 | Stop reason: HOSPADM

## 2019-08-06 RX ORDER — BUPIVACAINE HYDROCHLORIDE 2.5 MG/ML
INJECTION, SOLUTION EPIDURAL; INFILTRATION; INTRACAUDAL
Status: DISCONTINUED | OUTPATIENT
Start: 2019-08-06 | End: 2019-08-06 | Stop reason: HOSPADM

## 2019-08-06 RX ORDER — GLYCOPYRROLATE 0.2 MG/ML
INJECTION INTRAMUSCULAR; INTRAVENOUS
Status: DISCONTINUED | OUTPATIENT
Start: 2019-08-06 | End: 2019-08-06

## 2019-08-06 RX ORDER — NEOSTIGMINE METHYLSULFATE 1 MG/ML
INJECTION, SOLUTION INTRAVENOUS
Status: DISCONTINUED | OUTPATIENT
Start: 2019-08-06 | End: 2019-08-06

## 2019-08-06 RX ORDER — HYDROMORPHONE HYDROCHLORIDE 1 MG/ML
0.2 INJECTION, SOLUTION INTRAMUSCULAR; INTRAVENOUS; SUBCUTANEOUS EVERY 5 MIN PRN
Status: DISCONTINUED | OUTPATIENT
Start: 2019-08-06 | End: 2019-08-06 | Stop reason: HOSPADM

## 2019-08-06 RX ORDER — FAMOTIDINE 20 MG/1
20 TABLET, FILM COATED ORAL NIGHTLY
Status: DISCONTINUED | OUTPATIENT
Start: 2019-08-06 | End: 2019-08-07 | Stop reason: HOSPADM

## 2019-08-06 RX ORDER — LIDOCAINE HYDROCHLORIDE 10 MG/ML
1 INJECTION, SOLUTION EPIDURAL; INFILTRATION; INTRACAUDAL; PERINEURAL ONCE
Status: COMPLETED | OUTPATIENT
Start: 2019-08-06 | End: 2019-08-06

## 2019-08-06 RX ORDER — ONDANSETRON 2 MG/ML
4 INJECTION INTRAMUSCULAR; INTRAVENOUS EVERY 12 HOURS PRN
Status: DISCONTINUED | OUTPATIENT
Start: 2019-08-06 | End: 2019-08-07 | Stop reason: HOSPADM

## 2019-08-06 RX ORDER — ATENOLOL 25 MG/1
25 TABLET ORAL DAILY PRN
Status: DISCONTINUED | OUTPATIENT
Start: 2019-08-06 | End: 2019-08-07 | Stop reason: HOSPADM

## 2019-08-06 RX ORDER — PROPOFOL 10 MG/ML
VIAL (ML) INTRAVENOUS
Status: DISCONTINUED | OUTPATIENT
Start: 2019-08-06 | End: 2019-08-06

## 2019-08-06 RX ORDER — BACITRACIN 50000 [IU]/1
INJECTION, POWDER, FOR SOLUTION INTRAMUSCULAR
Status: DISCONTINUED | OUTPATIENT
Start: 2019-08-06 | End: 2019-08-06 | Stop reason: HOSPADM

## 2019-08-06 RX ORDER — FENTANYL CITRATE 50 UG/ML
INJECTION, SOLUTION INTRAMUSCULAR; INTRAVENOUS
Status: DISCONTINUED | OUTPATIENT
Start: 2019-08-06 | End: 2019-08-06

## 2019-08-06 RX ORDER — DEXAMETHASONE SODIUM PHOSPHATE 4 MG/ML
INJECTION, SOLUTION INTRA-ARTICULAR; INTRALESIONAL; INTRAMUSCULAR; INTRAVENOUS; SOFT TISSUE
Status: DISCONTINUED | OUTPATIENT
Start: 2019-08-06 | End: 2019-08-06

## 2019-08-06 RX ORDER — POLYETHYLENE GLYCOL 3350 17 G/17G
17 POWDER, FOR SOLUTION ORAL DAILY
Status: DISCONTINUED | OUTPATIENT
Start: 2019-08-06 | End: 2019-08-06

## 2019-08-06 RX ADMIN — LIDOCAINE HYDROCHLORIDE 2 MG: 10 INJECTION, SOLUTION EPIDURAL; INFILTRATION; INTRACAUDAL; PERINEURAL at 06:08

## 2019-08-06 RX ADMIN — INDIGO CARMINE 5 ML: 8 INJECTION, SOLUTION INTRAMUSCULAR; INTRAVENOUS at 09:08

## 2019-08-06 RX ADMIN — POLYETHYLENE GLYCOL 3350 17 G: 17 POWDER, FOR SOLUTION ORAL at 05:08

## 2019-08-06 RX ADMIN — SODIUM CHLORIDE 1000 ML: 0.9 INJECTION, SOLUTION INTRAVENOUS at 06:08

## 2019-08-06 RX ADMIN — ROCURONIUM BROMIDE 20 MG: 10 INJECTION, SOLUTION INTRAVENOUS at 07:08

## 2019-08-06 RX ADMIN — SODIUM CHLORIDE, SODIUM GLUCONATE, SODIUM ACETATE, POTASSIUM CHLORIDE, MAGNESIUM CHLORIDE, SODIUM PHOSPHATE, DIBASIC, AND POTASSIUM PHOSPHATE: .53; .5; .37; .037; .03; .012; .00082 INJECTION, SOLUTION INTRAVENOUS at 07:08

## 2019-08-06 RX ADMIN — FAMOTIDINE 20 MG: 20 TABLET, FILM COATED ORAL at 08:08

## 2019-08-06 RX ADMIN — HYDROMORPHONE HYDROCHLORIDE 0.2 MG: 1 INJECTION, SOLUTION INTRAMUSCULAR; INTRAVENOUS; SUBCUTANEOUS at 02:08

## 2019-08-06 RX ADMIN — PHENYLEPHRINE HYDROCHLORIDE 100 MCG: 10 INJECTION INTRAVENOUS at 08:08

## 2019-08-06 RX ADMIN — LIDOCAINE HYDROCHLORIDE 80 MG: 20 INJECTION, SOLUTION INTRAVENOUS at 07:08

## 2019-08-06 RX ADMIN — PHENYLEPHRINE HYDROCHLORIDE 100 MCG: 10 INJECTION INTRAVENOUS at 07:08

## 2019-08-06 RX ADMIN — ROCURONIUM BROMIDE 50 MG: 10 INJECTION, SOLUTION INTRAVENOUS at 07:08

## 2019-08-06 RX ADMIN — CEFAZOLIN 2 G: 330 INJECTION, POWDER, FOR SOLUTION INTRAMUSCULAR; INTRAVENOUS at 07:08

## 2019-08-06 RX ADMIN — ONDANSETRON 4 MG: 2 INJECTION INTRAMUSCULAR; INTRAVENOUS at 10:08

## 2019-08-06 RX ADMIN — HYDROCODONE BITARTRATE AND ACETAMINOPHEN 1 TABLET: 5; 325 TABLET ORAL at 03:08

## 2019-08-06 RX ADMIN — FENTANYL CITRATE 50 MCG: 50 INJECTION, SOLUTION INTRAMUSCULAR; INTRAVENOUS at 07:08

## 2019-08-06 RX ADMIN — FENTANYL CITRATE 50 MCG: 50 INJECTION, SOLUTION INTRAMUSCULAR; INTRAVENOUS at 08:08

## 2019-08-06 RX ADMIN — HYDROCODONE BITARTRATE AND ACETAMINOPHEN 1 TABLET: 5; 325 TABLET ORAL at 11:08

## 2019-08-06 RX ADMIN — NEOSTIGMINE METHYLSULFATE 4 MG: 1 INJECTION INTRAVENOUS at 10:08

## 2019-08-06 RX ADMIN — GLYCOPYRROLATE 0.2 MG: 0.2 INJECTION, SOLUTION INTRAMUSCULAR; INTRAVENOUS at 07:08

## 2019-08-06 RX ADMIN — GLYCOPYRROLATE 0.4 MG: 0.2 INJECTION, SOLUTION INTRAMUSCULAR; INTRAVENOUS at 10:08

## 2019-08-06 RX ADMIN — FENTANYL CITRATE 50 MCG: 50 INJECTION, SOLUTION INTRAMUSCULAR; INTRAVENOUS at 10:08

## 2019-08-06 RX ADMIN — PHENYLEPHRINE HYDROCHLORIDE 50 MCG: 10 INJECTION INTRAVENOUS at 09:08

## 2019-08-06 RX ADMIN — FENTANYL CITRATE 50 MCG: 50 INJECTION, SOLUTION INTRAMUSCULAR; INTRAVENOUS at 09:08

## 2019-08-06 RX ADMIN — PHENYLEPHRINE HYDROCHLORIDE 100 MCG: 10 INJECTION INTRAVENOUS at 09:08

## 2019-08-06 RX ADMIN — PROPOFOL 150 MG: 10 INJECTION, EMULSION INTRAVENOUS at 07:08

## 2019-08-06 RX ADMIN — PHENYLEPHRINE HYDROCHLORIDE 100 MCG: 10 INJECTION INTRAVENOUS at 10:08

## 2019-08-06 RX ADMIN — INDIGO CARMINE 5 ML: 8 INJECTION, SOLUTION INTRAMUSCULAR; INTRAVENOUS at 08:08

## 2019-08-06 RX ADMIN — DEXAMETHASONE SODIUM PHOSPHATE 4 MG: 4 INJECTION, SOLUTION INTRAMUSCULAR; INTRAVENOUS at 07:08

## 2019-08-06 NOTE — TRANSFER OF CARE
"Anesthesia Transfer of Care Note    Patient: Betzy Cooley    Procedure(s) Performed: Procedure(s) (LRB):  COLPORRHAPHY, COMBINED ANTEROPOSTERIOR (N/A)  CYSTOSCOPY (N/A)  COLPOPEXY SSL FIXATION (N/A)    Patient location: PACU    Anesthesia Type: general    Transport from OR: Transported from OR on 6-10 L/min O2 by face mask with adequate spontaneous ventilation    Post pain: adequate analgesia    Post assessment: no apparent anesthetic complications    Post vital signs: stable    Level of consciousness: awake, alert and oriented    Nausea/Vomiting: no nausea/vomiting    Complications: none    Transfer of care protocol was followed      Last vitals:   Visit Vitals  BP (!) 156/81 (BP Location: Right arm, Patient Position: Lying)   Pulse (!) 50   Temp 36.4 °C (97.6 °F) (Oral)   Resp 16   Ht 5' 2" (1.575 m)   Wt 59.9 kg (132 lb)   SpO2 97%   Breastfeeding? No   BMI 24.14 kg/m²     "

## 2019-08-06 NOTE — INTERVAL H&P NOTE
The patient has been examined and the H&P has been reviewed:    I concur with the findings and no changes have occurred since H&P was written.     UA dip negative for all components.     Anesthesia/Surgery risks, benefits and alternative options discussed and understood by patient/family.          Active Hospital Problems    Diagnosis  POA    Prolapse of female pelvic organs [N81.9]  Yes      Resolved Hospital Problems   No resolved problems to display.     Patient seen in holding.  No changes in clinical condition.  Proceed with planned procedure.

## 2019-08-06 NOTE — NURSING TRANSFER
Nursing Transfer Note      8/6/2019     Transfer To: 540B    Transfer via stretcher    Transfer with  to O2 2 Liters NC    Transported by PCT    Medicines sent: None    Chart send with patient: Yes    Notified: spouse    Patient reassessed at: 8/6/19 1415    Upon arrival to floor: patient oriented to room, call bell in reach and bed in lowest position

## 2019-08-06 NOTE — ANESTHESIA POSTPROCEDURE EVALUATION
Anesthesia Post Evaluation    Patient: Betzy Cooley    Procedure(s) Performed: Procedure(s) (LRB):  COLPORRHAPHY, COMBINED ANTEROPOSTERIOR (N/A)  CYSTOSCOPY (N/A)  COLPOPEXY SSL FIXATION (N/A)    Final Anesthesia Type: general  Patient location during evaluation: PACU  Patient participation: Yes- Able to Participate  Level of consciousness: awake and alert  Post-procedure vital signs: reviewed and stable  Pain management: adequate  Airway patency: patent  PONV status at discharge: No PONV  Anesthetic complications: no      Cardiovascular status: blood pressure returned to baseline and hemodynamically stable  Respiratory status: unassisted and spontaneous ventilation  Hydration status: euvolemic  Follow-up not needed.          Vitals Value Taken Time   /63 8/6/2019 11:46 AM   Temp 36.4 °C (97.5 °F) 8/6/2019 10:47 AM   Pulse 46 8/6/2019 11:57 AM   Resp 12 8/6/2019 11:57 AM   SpO2 99 % 8/6/2019 11:57 AM   Vitals shown include unvalidated device data.      No case tracking events are documented in the log.      Pain/Bambi Score: Pain Rating Prior to Med Admin: 4 (8/6/2019 11:13 AM)  Bambi Score: 9 (8/6/2019 11:45 AM)

## 2019-08-06 NOTE — PLAN OF CARE
Problem: Pain Acute  Goal: Optimal Pain Control  Outcome: Ongoing (interventions implemented as appropriate)  Monitor and assess pt pain. Admin pain medication as needed prn

## 2019-08-06 NOTE — OP NOTE
[facility]  Operative Note    Date: 08/06/2019    Pre-Op Diagnosis: anterior compartment prolapse    Post-Op Diagnosis: same    Procedure(s) Performed:   1. Anterior colporrhaphy  2. Posterior colporrhaphy   2. Cystoscopy    Specimen(s): none    Staff Surgeon: Alma Dorsey MD    Assistant Surgeon: Marjan Katz MD    Anesthesia: General endotracheal anesthesia    Indications: Betzy Cooley is a 79 y.o. female with anteroposterior compartment and vaginal vault prolapse, electing for repair.     Findings:   - cystocele and reduced   - SSL fixation performed without complication    Estimated Blood Loss: minimal    Drains: 16 Fr ortiz catheter    Procedure in detail:  After the risks and benefits of the procedure were explained, informed consent was obtained. The patient was taken to the operating suite and placed in the supine position.  General anesthesia was administered.  The patient was then placed in the dorsal lithotomy position and prepped and draped in the usual sterile fashion. Timeout was performed and pre-operative antibiotics were confirmed.    A rigid cystoscope was inserted per urethra. The cystocele was demonstrated on cystoscopy and visualized after manual reduction. The bladder was emptied and the scope was removed.     Vaginal length was measured and found to be 8 cm.     A 16 Fr ortiz catheter was placed.      The apex was identified and two 2-0 silk sutures were placed on each fornix of the apex. An allis clamp was placed on the vaginal epithelium between the urethra and the cystocele.  Hydro-dissection was then performed on the anterior vaginal epithelium with 1% lidocaine and 0.25% marcaine.  An incision was made with a 15 blade extending along the vaginal epithelium over cystocele.  The [place] retractor was assembled and helped to display the pubocervical fascia. Dissection was then begun using tenotomy scissors in the plane between the pubocervical fascia and the  vaginal epithelium.  This dissection was performed circumferentially and taken down to the apex on both sides.     At this point we then entered the endopelvic fascia on each side and palpated for the Iliac spine. The sacrospinous ligament could be palpated medial to the spine. A capio needle was then used to enter the sacrospinous ligament. This was then repeated a second time.    The cystocele was then reduced and the pubocervical fascia was imbricated using 3-0 vicryl sutures in a simple interrupted fashion. Once the cystocele was adequately reduced, the uterosacral ligaments on the underside of the vaginal epithelium was identified on each side. The other end of the capio suture was then taken to uterosacrals using 3 half hitches. This was repeated on the contralateral side. The vagina was closed with 2-0 Vicryl sutures in interrupted vertical mattress sutures about half way.  The Ethibond sutures were then tied down to the SSL suspending the apex.      Indigo carmine had been administered 10 minutes previously.  A cystoscopy was repeated which demonstrated brisk efflux from both the right and left ureteral orifices however the blue was diminished likely due to early administration. Her bladder appeared normal. The cystocele was reduced as evidenced on the cystoscope. The urethra was examined. The cystoscope was then removed and the ortiz was replaced in the bladder.     The capio sutures were then cut and the remaining overlying vaginal epithelium was closed with 2-0 vicryl in an  interrupted vertical mattress sutures.      The rectocele was then examined on digital rectal exam and demonstrated again and identifying the defect.  The midline was marked. Hydro-dissection was then performed on the posterior vaginal epithelium with 1% lidocaine and 0.25% marcaine. An incision was made with a 15 blade extending along the vaginal epithelium over rectocele. Dissection was then begun using tenotomy scissors in the  plane between the pubocervical fascia and the vaginal epithelium. This dissection was performed circumferentially and taken down to the apex on both sides.     The rectocele was then reduced and the pubocervical fascia was brought together in a site specific fashion, using 3-0 vicryl sutures in multiple simple interrupted and U-stitch fashion. The rectocele was satisfactorily reduced. During the repair, multiple rectal exams were done to be assured that all of the defects were repaired.  Gloves were changed after each exam.       The posteror vaginal epithelium was closed with 2-0 vicryl vertical mattress interrupted sutures.     A vaginal pack was placed. The patient tolerated the procedure well and was transferred back to recovery in stable condition.       Disposition:  The patient will be admitted overnight for observation.  Plan for ortiz removal and vaginal packing removal in AM.    MD ANITA Figueroa was present for the entire case and agree with the above note.

## 2019-08-06 NOTE — NURSING
Pt arrived on floor from PACU. Nad. resp unlabored. Pt and  oriented to room and call bell instructed to call for assistance when needed. Will monitor

## 2019-08-07 VITALS
BODY MASS INDEX: 24.29 KG/M2 | RESPIRATION RATE: 18 BRPM | TEMPERATURE: 96 F | HEART RATE: 54 BPM | OXYGEN SATURATION: 96 % | HEIGHT: 62 IN | WEIGHT: 132 LBS | DIASTOLIC BLOOD PRESSURE: 62 MMHG | SYSTOLIC BLOOD PRESSURE: 141 MMHG

## 2019-08-07 LAB
ANION GAP SERPL CALC-SCNC: 7 MMOL/L (ref 8–16)
BASOPHILS # BLD AUTO: 0.04 K/UL (ref 0–0.2)
BASOPHILS NFR BLD: 0.2 % (ref 0–1.9)
BUN SERPL-MCNC: 18 MG/DL (ref 8–23)
CALCIUM SERPL-MCNC: 8.6 MG/DL (ref 8.7–10.5)
CHLORIDE SERPL-SCNC: 101 MMOL/L (ref 95–110)
CO2 SERPL-SCNC: 25 MMOL/L (ref 23–29)
CREAT SERPL-MCNC: 0.6 MG/DL (ref 0.5–1.4)
DIFFERENTIAL METHOD: ABNORMAL
EOSINOPHIL # BLD AUTO: 0 K/UL (ref 0–0.5)
EOSINOPHIL NFR BLD: 0.1 % (ref 0–8)
ERYTHROCYTE [DISTWIDTH] IN BLOOD BY AUTOMATED COUNT: 13 % (ref 11.5–14.5)
EST. GFR  (AFRICAN AMERICAN): >60 ML/MIN/1.73 M^2
EST. GFR  (NON AFRICAN AMERICAN): >60 ML/MIN/1.73 M^2
GLUCOSE SERPL-MCNC: 88 MG/DL (ref 70–110)
HCT VFR BLD AUTO: 34.4 % (ref 37–48.5)
HGB BLD-MCNC: 11 G/DL (ref 12–16)
IMM GRANULOCYTES # BLD AUTO: 0.07 K/UL (ref 0–0.04)
IMM GRANULOCYTES NFR BLD AUTO: 0.4 % (ref 0–0.5)
LYMPHOCYTES # BLD AUTO: 1.8 K/UL (ref 1–4.8)
LYMPHOCYTES NFR BLD: 11 % (ref 18–48)
MAGNESIUM SERPL-MCNC: 2 MG/DL (ref 1.6–2.6)
MCH RBC QN AUTO: 30 PG (ref 27–31)
MCHC RBC AUTO-ENTMCNC: 32 G/DL (ref 32–36)
MCV RBC AUTO: 94 FL (ref 82–98)
MONOCYTES # BLD AUTO: 1.2 K/UL (ref 0.3–1)
MONOCYTES NFR BLD: 7.4 % (ref 4–15)
NEUTROPHILS # BLD AUTO: 13.4 K/UL (ref 1.8–7.7)
NEUTROPHILS NFR BLD: 80.9 % (ref 38–73)
NRBC BLD-RTO: 0 /100 WBC
PHOSPHATE SERPL-MCNC: 2.9 MG/DL (ref 2.7–4.5)
PLATELET # BLD AUTO: 217 K/UL (ref 150–350)
PMV BLD AUTO: 10 FL (ref 9.2–12.9)
POTASSIUM SERPL-SCNC: 4.5 MMOL/L (ref 3.5–5.1)
RBC # BLD AUTO: 3.67 M/UL (ref 4–5.4)
SODIUM SERPL-SCNC: 133 MMOL/L (ref 136–145)
WBC # BLD AUTO: 16.62 K/UL (ref 3.9–12.7)

## 2019-08-07 PROCEDURE — 83735 ASSAY OF MAGNESIUM: CPT | Mod: HCNC

## 2019-08-07 PROCEDURE — 85025 COMPLETE CBC W/AUTO DIFF WBC: CPT | Mod: HCNC

## 2019-08-07 PROCEDURE — 80048 BASIC METABOLIC PNL TOTAL CA: CPT | Mod: HCNC

## 2019-08-07 PROCEDURE — 36415 COLL VENOUS BLD VENIPUNCTURE: CPT | Mod: HCNC

## 2019-08-07 PROCEDURE — 84100 ASSAY OF PHOSPHORUS: CPT | Mod: HCNC

## 2019-08-07 PROCEDURE — 25000003 PHARM REV CODE 250: Mod: HCNC | Performed by: STUDENT IN AN ORGANIZED HEALTH CARE EDUCATION/TRAINING PROGRAM

## 2019-08-07 RX ORDER — POLYETHYLENE GLYCOL 3350 17 G/17G
17 POWDER, FOR SOLUTION ORAL DAILY
Qty: 507 G | Refills: 0 | Status: SHIPPED | OUTPATIENT
Start: 2019-08-07 | End: 2020-05-21

## 2019-08-07 RX ORDER — HYDROCODONE BITARTRATE AND ACETAMINOPHEN 5; 325 MG/1; MG/1
1 TABLET ORAL EVERY 4 HOURS PRN
Qty: 5 TABLET | Refills: 0 | Status: SHIPPED | OUTPATIENT
Start: 2019-08-07 | End: 2019-09-05 | Stop reason: ALTCHOICE

## 2019-08-07 RX ORDER — CEPHALEXIN 500 MG/1
500 CAPSULE ORAL EVERY 12 HOURS
Qty: 6 CAPSULE | Refills: 0 | Status: SHIPPED | OUTPATIENT
Start: 2019-08-07 | End: 2019-08-10

## 2019-08-07 RX ADMIN — POLYETHYLENE GLYCOL 3350 17 G: 17 POWDER, FOR SOLUTION ORAL at 09:08

## 2019-08-07 NOTE — NURSING
Discharge instructions given to pt. RX given to pt. MD came to assess vaginal bleeding at bedside. Okay to discharge home at this time. Pt and  aware to contact MD with any problems or concerns. Awaiting transport to escort pt to car via wheelchair. Pt awake, alert, oriented, ambulating in room nad

## 2019-08-07 NOTE — SUBJECTIVE & OBJECTIVE
Interval History:   S/p AP repair and SSL fixation yesterday  +ambulating, tolerating diet, pain controlled   Urine clear yellow  Stewart catheter and vaginal packing removed at bedside     Review of Systems  Objective:     Temp:  [95.7 °F (35.4 °C)-97.5 °F (36.4 °C)] 95.7 °F (35.4 °C)  Pulse:  [50-70] 70  Resp:  [10-20] 18  SpO2:  [92 %-100 %] 96 %  BP: (113-149)/(59-91) 149/91     Body mass index is 24.14 kg/m².           Drains     Drain                 Urethral Catheter 08/06/19 0802 Non-latex;Straight-tip 16 Fr. less than 1 day                Physical Exam   Constitutional: She is oriented to person, place, and time. She appears well-developed and well-nourished. No distress.   HENT:   Head: Normocephalic and atraumatic.   Eyes: No scleral icterus.   Neck: No tracheal deviation present.   Cardiovascular: Normal rate.    Pulmonary/Chest: Effort normal. No respiratory distress.   Abdominal: Soft. She exhibits no distension. There is no tenderness.   Musculoskeletal: Normal range of motion.   Neurological: She is alert and oriented to person, place, and time.   Skin: Skin is warm and dry.     Psychiatric: She has a normal mood and affect. Her behavior is normal.       Significant Labs:    BMP:  Recent Labs   Lab 08/07/19  0354   *   K 4.5      CO2 25   BUN 18   CREATININE 0.6   CALCIUM 8.6*       CBC:   Recent Labs   Lab 08/07/19  0354   WBC 16.62*   HGB 11.0*   HCT 34.4*          All pertinent labs results from the past 24 hours have been reviewed.    Significant Imaging:  All pertinent imaging results/findings from the past 24 hours have been reviewed.

## 2019-08-07 NOTE — DISCHARGE INSTRUCTIONS
Post Op Instructions     ACTIVITY   The first six weeks is a critical time period for wound healing.  We depend on the scar tissue to grow into the sling to provide the necessary support to treat the stress urinary incontinence.     After receiving an anesthetic you may feel sleepy or nauseated.  It is best to have little activity for the first 24-48 hours.  Gradually increase activity.   No heavy lifting (nothing greater than 10 pounds or a gallon of milk) for 6 weeks.   Pelvic rest (no sexual intercourse, douching or tampons) for 6 weeks.   It is OK to walk around the house.  Avoid riding a bicycle or putting similar pressure over this area.  No strenuous workouts.     Do not drive for the first 48 hours or if you are taking narcotic pain medication.  Before driving, be sure you can press the brakes without difficulty or pain.    WOUND CARE INSTRUCTIONS   You may have small incisions that were closed with a medical grade superglue.  You may wash these and pat them dry after 24 hours.   To protect the sling material from infection, do not immerse yourself in water, (i.e. no tub baths, swimming or hot tubbing) for 6 weeks.  You may shower.     It is normal to have some light bleeding which should gradually resolve over the next week.  This will look like a light period.    DIET   In the first 24 hours, it is common to experience some nausea, so take clear liquids.  Once the nausea has resolved, a soft diet is recommended with a gradual increase to your normal diet.    MEDICATIONS   Pain medication should be taken on an as needed basis.  If the narcotic is too much, over-the-counter pain relievers may be taken.  However, do not take additional Tylenol/acetaminophen while taking narcotic pain medication as this can lead to an overdose of the Tylenol/acetaminophen.   Antibiotics, if ordered, should be taken as directed until the prescription has been completed.     A stool softener (Colace or docusate  sodium) is recommended to maintain soft stools after surgery.   If you do not have a bowel movement within 36-48 hours of surgery, take 2 tablespoons of Milk of Magnesia.  If there is still no bowel movement by the next day, take ½ bottle of Magnesium Citrate (refrigerate and drink with a straw.  This is available over-the-counter.)    WHEN TO CALL THE DOCTOR:   For heavy bleeding (vaginal discharge like a light period is expected for the 3rd week)   Redness or swelling around the incision.   Fever over 101oF (38.5oC.)   Significant vaginal drainage.    Persistent pain unrelieved by the pain medication.   Leg swelling.   Shortness of breath.   Burning with urination.   Inability to urinate.   Abdominal pain not improving day by day.      FOR EMERGENCIES   If any unusual problems, questions or concerns, please contact your physician or the resident on call at [phone] after hours or during office hours, [phone].

## 2019-08-07 NOTE — ASSESSMENT & PLAN NOTE
S/p AP repair and SSL fixation 8/6    - vaginal packing and ortiz catheter removed on morning rounds   - Voiding trial today  - DC today if passes voiding trial   - keflex x 3 days   - follow up with Dr. Dorsey in 2 weeks

## 2019-08-07 NOTE — PROGRESS NOTES
[facility]  Urology  Progress Note    Patient Name: Betzy Cooley  MRN: [id number]  Admission Date: 8/6/2019  Hospital Length of Stay: 0 days  Code Status: No Order   Attending Provider: Alma Dorsey MD   Primary Care Physician: Nancy Maguire MD    Subjective:     HPI:  No notes on file    Interval History:   S/p AP repair and SSL fixation yesterday  +ambulating, tolerating diet, pain controlled   Urine clear yellow  Stewart catheter and vaginal packing removed at bedside     Review of Systems  Objective:     Temp:  [95.7 °F (35.4 °C)-97.5 °F (36.4 °C)] 95.7 °F (35.4 °C)  Pulse:  [50-70] 70  Resp:  [10-20] 18  SpO2:  [92 %-100 %] 96 %  BP: (113-149)/(59-91) 149/91     Body mass index is 24.14 kg/m².           Drains     Drain                 Urethral Catheter 08/06/19 0802 Non-latex;Straight-tip 16 Fr. less than 1 day                Physical Exam   Constitutional: She is oriented to person, place, and time. She appears well-developed and well-nourished. No distress.   HENT:   Head: Normocephalic and atraumatic.   Eyes: No scleral icterus.   Neck: No tracheal deviation present.   Cardiovascular: Normal rate.    Pulmonary/Chest: Effort normal. No respiratory distress.   Abdominal: Soft. She exhibits no distension. There is no tenderness.   Musculoskeletal: Normal range of motion.   Neurological: She is alert and oriented to person, place, and time.   Skin: Skin is warm and dry.     Psychiatric: She has a normal mood and affect. Her behavior is normal.       Significant Labs:    BMP:  Recent Labs   Lab 08/07/19  0354   *   K 4.5      CO2 25   BUN 18   CREATININE 0.6   CALCIUM 8.6*       CBC:   Recent Labs   Lab 08/07/19  0354   WBC 16.62*   HGB 11.0*   HCT 34.4*          All pertinent labs results from the past 24 hours have been reviewed.    Significant Imaging:  All pertinent imaging results/findings from the past 24 hours have been  reviewed.                  Assessment/Plan:     * Prolapse of female pelvic organs  S/p AP repair and SSL fixation 8/6    - vaginal packing and ortiz catheter removed on morning rounds   - Voiding trial today  - DC today if passes voiding trial   - keflex x 3 days   - follow up with Dr. Dorsey in 2 weeks          Marjan Katz MD  Urology  [facility]

## 2019-08-07 NOTE — NURSING
Pt ambulated to restroom, complaints of vaginal  bleeding after having bowel movement. Paged Dr. Muller he will come and assess pt prior to dishcharge

## 2019-08-07 NOTE — NURSING
Spoke with Dr. Muller on call for urology informed of pt heart rate decreased this am and pt has some complaints of dizziness. Informed that pt ambulated around unit this am with  without difficulty and pt took atenolol weekly dose on yesterday prior to surgery. Informed MD that pt had small blood clot noted in urine output this am. Okay to discharge home at this time per MD. Pt blood pressure stable this am. NAD. Will inform pt to contact MD office upon discharge with any complications. Pt reports scant amount of blood noted after urinating this am.

## 2019-08-08 NOTE — DISCHARGE SUMMARY
[facility]  DISCHARGE SUMMARY      Admit Date:  8/6/2019    Discharge Date and Time:  8/7/2019  12:00 PM    Attending Physician:  Alma Dorsey MD    Discharge Provider:  Marjan Katz MD     Reason for Admission:  Prolapse of female pelvic organs     Procedures Performed:  Procedure(s) (LRB):  COLPORRHAPHY, COMBINED ANTEROPOSTERIOR (N/A)  CYSTOSCOPY (N/A)  COLPOPEXY SSL FIXATION (N/A)    Hospital Course:  Please see the preoperative H&P and other available documentation for full details related to history prior to this admission.  Briefly, Betzy Cooley is a 79 y.o. female who was admitted following scheduled elective surgery for Prolapse of female pelvic organs    Following a complete preoperative discussion of the risks and benefits of surgery with signed informed consent, the patient was taken to the operating room on 8/6/2019 and underwent the above stated procedures.  The patient tolerated surgery well and there were no complications.  Please see the operative report for full intraoperative findings and details.  Postoperatively, the patient did well and was transferred from the PACU to the floor in stable condition where they had a stable and uncomplicated hospital course.  Labs and vital signs remained stable and appropriate throughout course.  Diet was advanced as tolerated and the patient's pain was controlled on oral pain medications without problem.  Stewart and vaginal packing were removed on POD1. She completed her voiding trial. Currently, the patient is doing well at 1 Days Post-Op and is stable and appropriate for discharge home at this time.      Consults:  None.    Significant Diagnostic Studies:   Recent Labs   Lab 08/07/19  0354   WBC 16.62*   HGB 11.0*   HCT 34.4*        Recent Labs   Lab 08/07/19  0354   *   K 4.5      CO2 25   BUN 18   CREATININE 0.6   GLU 88   CALCIUM 8.6*   MG 2.0   PHOS 2.9   No results for input(s): INR, PTT, LABHEPA,  LACTATE, TROPONINI, CPK, CPKMB, MB, BNP in the last 72 hours.No results for input(s): PH, PCO2, PO2, HCO3 in the last 72 hours.      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       Discharged Condition:  Good    Disposition:  Home or Self Care    Follow Up/Patient Instructions:     Medications:  Reconciled Home Medications:    Discharge Medication List as of 8/7/2019 10:39 AM      START taking these medications    Details   cephALEXin (KEFLEX) 500 MG capsule Take 1 capsule (500 mg total) by mouth every 12 (twelve) hours. for 3 days, Starting Wed 8/7/2019, Until Sat 8/10/2019, Print      HYDROcodone-acetaminophen (NORCO) 5-325 mg per tablet Take 1 tablet by mouth every 4 (four) hours as needed for Pain., Starting Wed 8/7/2019, Print      polyethylene glycol (GLYCOLAX) 17 gram/dose powder Take 17 g by mouth once daily., Starting Wed 8/7/2019, Print         CONTINUE these medications which have NOT CHANGED    Details   aspirin 81 MG Chew Take 81 mg by mouth once daily. Not chewable, Historical Med      atenolol (TENORMIN) 25 MG tablet Take 1 tablet (25 mg total) by mouth once daily., Starting Tue 4/30/2019, Normal      calcium-vitamin D3 500 mg(1,250mg) -200 unit per tablet Take 1 tablet by mouth 2 (two) times daily with meals., Until Discontinued, Historical Med      co-enzyme Q-10 30 mg capsule Take 30 mg by mouth once daily., Until Discontinued, Historical Med      fluconazole (DIFLUCAN) 150 MG Tab Take one weekly for 2 weeks, Normal      fluticasone propionate (FLONASE) 50 mcg/actuation nasal spray SHAKE LIQUID AND  USE 2 SPRAYS(100 MCG) IN EACH NOSTRIL EVERY DAY, Normal      multivitamin (MULTIVITAMIN) per tablet Take 1 tablet by mouth once daily., Until Discontinued, Historical Med      psyllium (KONSYL) Powd Take by mouth once daily. 2 table spoons in 8 ounces of water, Historical Med      ranitidine (ZANTAC) 150 MG capsule Take 1 capsule by mouth nightly. , Starting Mon 3/5/2012, Historical Med      vitamin A-vitamin C-vit E-min (VISION) Tab Take 1 tablet by mouth once daily., Until Discontinued, Historical Med      fexofenadine (ALLEGRA) 180 MG tablet Take 1 tablet (180 mg total) by mouth once daily., Starting 3/29/2016, Until Wed 6/15/16, Normal      hydrocortisone 2.5 % cream Apply topically 2 (two) times daily. for 10 days, Starting Tue 2/12/2019, Until Fri 2/22/2019, Normal      metronidazole 1% (METROGEL) 1 % Gel Apply topically once daily., Starting Thu 7/12/2018, Until Fri 7/12/2019, Print           Discharge Procedure Orders   Notify your health care provider if you experience any of the following:  temperature >100.4     Notify your health care provider if you experience any of the following:  persistent nausea and vomiting or diarrhea     Notify your health care provider if you experience any of the following:  severe uncontrolled pain     Notify your health care provider if you experience any of the following:  redness, tenderness, or signs of infection (pain, swelling, redness, odor or green/yellow discharge around incision site)     Activity as tolerated     Follow-up Information     Alma Dorsey MD In 2 weeks.    Specialty:  Urology  Why:  post op  Contact information:  [address]  [place] [address]  [phone]                   Marjan Katz MD     As above.

## 2019-08-19 ENCOUNTER — OFFICE VISIT (OUTPATIENT)
Dept: UROLOGY | Facility: CLINIC | Age: 79
End: 2019-08-19
Payer: MEDICARE

## 2019-08-19 VITALS
WEIGHT: 130.06 LBS | DIASTOLIC BLOOD PRESSURE: 75 MMHG | BODY MASS INDEX: 23.93 KG/M2 | SYSTOLIC BLOOD PRESSURE: 128 MMHG | HEART RATE: 57 BPM | HEIGHT: 62 IN

## 2019-08-19 DIAGNOSIS — Z98.890 POST-OPERATIVE STATE: Primary | ICD-10-CM

## 2019-08-19 PROCEDURE — 99999 PR PBB SHADOW E&M-EST. PATIENT-LVL III: ICD-10-PCS | Mod: PBBFAC,HCNC,, | Performed by: UROLOGY

## 2019-08-19 PROCEDURE — 99024 POSTOP FOLLOW-UP VISIT: CPT | Mod: HCNC,S$GLB,, | Performed by: UROLOGY

## 2019-08-19 PROCEDURE — 99024 PR POST-OP FOLLOW-UP VISIT: ICD-10-PCS | Mod: HCNC,S$GLB,, | Performed by: UROLOGY

## 2019-08-19 PROCEDURE — 99999 PR PBB SHADOW E&M-EST. PATIENT-LVL III: CPT | Mod: PBBFAC,HCNC,, | Performed by: UROLOGY

## 2019-08-19 PROCEDURE — 81002 PR URINALYSIS NONAUTO W/O SCOPE: ICD-10-PCS | Mod: HCNC,S$GLB,, | Performed by: UROLOGY

## 2019-08-19 PROCEDURE — 81002 URINALYSIS NONAUTO W/O SCOPE: CPT | Mod: HCNC,S$GLB,, | Performed by: UROLOGY

## 2019-08-19 NOTE — PROGRESS NOTES
CHIEF COMPLAINT:    Mrs. Cooley is a 79 y.o. female presenting for a follow up status post A/P repair and cystoscopy on 8/6/2019.    PRESENTING ILLNESS:    Betzy Cooley is a 79 y.o. female who returns for follow up.  She states she is doing very well.  She has no further bleeding (was minimal for the first week or so).  She is able to urinate and defecate with ease.  No longer using the glycolax.  Is now on her normal regimen with Metamucil.  She wears a panty liner mostly for the discharge but finds there is nothing on it now. No symptoms of prolapse bulge or other discomfort.     Allergies:  Sulfa (sulfonamide antibiotics)    Medications:  Outpatient Encounter Medications as of 8/19/2019   Medication Sig Dispense Refill    aspirin 81 MG Chew Take 81 mg by mouth once daily. Not chewable      atenolol (TENORMIN) 25 MG tablet Take 1 tablet (25 mg total) by mouth once daily. (Patient taking differently: Take 25 mg by mouth as needed. Does not take it on a regular basis   Takes as needed for heart palpations once a week) 90 tablet 3    calcium-vitamin D3 500 mg(1,250mg) -200 unit per tablet Take 1 tablet by mouth 2 (two) times daily with meals.      co-enzyme Q-10 30 mg capsule Take 30 mg by mouth once daily.      fexofenadine (ALLEGRA) 180 MG tablet Take 1 tablet (180 mg total) by mouth once daily. (Patient taking differently: Take 180 mg by mouth once daily. Takes as needed) 30 tablet 11    fluconazole (DIFLUCAN) 150 MG Tab Take one weekly for 2 weeks 2 tablet 1    fluticasone propionate (FLONASE) 50 mcg/actuation nasal spray SHAKE LIQUID AND USE 2 SPRAYS(100 MCG) IN EACH NOSTRIL EVERY DAY 48 mL 0    HYDROcodone-acetaminophen (NORCO) 5-325 mg per tablet Take 1 tablet by mouth every 4 (four) hours as needed for Pain. 5 tablet 0    hydrocortisone 2.5 % cream Apply topically 2 (two) times daily. for 10 days 20 g 0    metronidazole 1% (METROGEL) 1 % Gel Apply topically once daily. 60 g 3    multivitamin  (MULTIVITAMIN) per tablet Take 1 tablet by mouth once daily.      polyethylene glycol (GLYCOLAX) 17 gram/dose powder Take 17 g by mouth once daily. 507 g 0    psyllium (KONSYL) Powd Take by mouth once daily. 2 table spoons in 8 ounces of water      ranitidine (ZANTAC) 150 MG capsule Take 1 capsule by mouth nightly.       vitamin A-vitamin C-vit E-min (VISION) Tab Take 1 tablet by mouth once daily.       No facility-administered encounter medications on file as of 8/19/2019.          PHYSICAL EXAMINATION:    The patient generally appears in good health, is appropriately interactive, and is in no apparent distress.    Skin: No lesions.    Mental: Cooperative with normal affect.    Neuro: Grossly intact.    HEENT: Normal. No evidence of lymphadenopathy.    Chest:  normal inspiratory effort.    Abdomen:  Soft, non-tender. No masses or organomegaly. Bladder is not palpable. No evidence of flank discomfort. No evidence of inguinal hernia.    Extremities: No clubbing, cyanosis, or edema      LABS:    Lab Results   Component Value Date    BUN 18 08/07/2019    CREATININE 0.6 08/07/2019     UA 1.005, pH 7, ++ leuk, 250 blood, otherwise, negative    IMPRESSION:    Encounter Diagnoses   Name Primary?    Post-operative state Yes       PLAN:    1.  Reiterated the post op limitations  2.  Discussed she may see the absorbable sutures closer to the 6 week hai  3.  Follow up for 6 week post op for the exam.

## 2019-09-04 ENCOUNTER — PATIENT MESSAGE (OUTPATIENT)
Dept: UROLOGY | Facility: CLINIC | Age: 79
End: 2019-09-04

## 2019-09-05 ENCOUNTER — OFFICE VISIT (OUTPATIENT)
Dept: OPTOMETRY | Facility: CLINIC | Age: 79
End: 2019-09-05
Payer: COMMERCIAL

## 2019-09-05 DIAGNOSIS — H52.203 HYPEROPIA OF BOTH EYES WITH ASTIGMATISM: ICD-10-CM

## 2019-09-05 DIAGNOSIS — H25.13 NUCLEAR SCLEROSIS, BILATERAL: Primary | ICD-10-CM

## 2019-09-05 DIAGNOSIS — D31.32 CHOROIDAL NEVUS OF LEFT EYE: ICD-10-CM

## 2019-09-05 DIAGNOSIS — H52.03 HYPEROPIA OF BOTH EYES WITH ASTIGMATISM: ICD-10-CM

## 2019-09-05 DIAGNOSIS — H52.4 PRESBYOPIA OF BOTH EYES: ICD-10-CM

## 2019-09-05 DIAGNOSIS — H25.013 CORTICAL SENILE CATARACT, BILATERAL: ICD-10-CM

## 2019-09-05 PROCEDURE — 92015 DETERMINE REFRACTIVE STATE: CPT | Mod: S$GLB,,, | Performed by: OPTOMETRIST

## 2019-09-05 PROCEDURE — 92014 COMPRE OPH EXAM EST PT 1/>: CPT | Mod: S$GLB,,, | Performed by: OPTOMETRIST

## 2019-09-05 PROCEDURE — 92015 PR REFRACTION: ICD-10-PCS | Mod: S$GLB,,, | Performed by: OPTOMETRIST

## 2019-09-05 PROCEDURE — 92014 PR EYE EXAM, EST PATIENT,COMPREHESV: ICD-10-PCS | Mod: S$GLB,,, | Performed by: OPTOMETRIST

## 2019-09-05 PROCEDURE — 99999 PR PBB SHADOW E&M-EST. PATIENT-LVL III: CPT | Mod: PBBFAC,,, | Performed by: OPTOMETRIST

## 2019-09-05 PROCEDURE — 99999 PR PBB SHADOW E&M-EST. PATIENT-LVL III: ICD-10-PCS | Mod: PBBFAC,,, | Performed by: OPTOMETRIST

## 2019-09-05 NOTE — PROGRESS NOTES
HPI     Concerns About Ocular Health      Additional comments: General eye examination and refraction.   Patient states vision is stable.  No acute ocular/vision problems.               Comments     Patient's age: 79 y.o. WF  Occupation: Retired  Approximate date of last eye examination:  07/02/2018  Name of last eye doctor seen:    City/State: [facility]  Wears glasses? Yes     If yes, wears  Full-time or part-time?  Part-time  Present glasses are: Bifocal, SV Distance, SV Reading?  Bifocals lens  Approximate age of present glasses:  4+ years   Got new glasses following last exam, or subsequently?:  no   Any problem with VA with glasses?  No changes with vision  Wears CLs?:  no  Headaches?  no  Eye pain/discomfort?  no                                                                                     Flashes?  No  Floaters?  No  Diplopia/Double vision?  no  Patient's Ocular History:         Any eye surgeries? no         Any eye injury?  no         Any treatment for eye disease?  no  Family history of eye disease?  no  Significant patient medical history:         1. Diabetes?  no       If yes, IDDM or NIDDM? no   2. HBP?  no              3. Other (describe):  Acid reflux   ! OTC eyedrops currently using:  Systane prn    ! Prescription eye meds currently using:  Optivar as needed for   allergy-related symptoms   ! Any history of allergy/adverse reaction to any eye meds used   previously?  no    ! Any history of allergy/adverse reaction to eyedrops used during prior   eye exam(s)? no    ! Any history of allergy/adverse reaction to Novacaine or similar meds?   no   ! Any history of allergy/adverse reaction to Epinephrine or similar meds?   no    ! Patient okay with use of anesthetic eyedrops to check eye pressure?    yes        ! Patient okay with use of eyedrops to dilate pupils today?  yes   !  Allergies/Medications/Medical History/Family History reviewed today?    yes      PD =   61/58  Desired reading  "distance =  14.5"                                                                        Last edited by Shashank Camarena, OD on 9/5/2019 10:24 AM. (History)            Assessment /Plan     For exam results, see Encounter Report.    1. Nuclear sclerosis, bilateral     2. Cortical senile cataract, bilateral     3. Choroidal nevus of left eye     4. Hyperopia of both eyes with astigmatism     5. Presbyopia of both eyes                    Early nuclear sclerotic/peripheral cortical cataract in both eyes.   Still no need for cataract surgery.  Continue to monitor.    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   Stable.    Otherwise, ocular health appears good in each eye.    Hyperopia with astigmatism in each eye, and presbyopia consistent with age.  New spectacle lens Rx issued for use as desired.   Recheck in 12 -18 months.        "

## 2019-09-05 NOTE — PATIENT INSTRUCTIONS
Early nuclear sclerotic/peripheral cortical cataract in both eyes.   Still no need for cataract surgery.  Continue to monitor.    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   Stable.    Otherwise, ocular health appears good in each eye.    Hyperopia with astigmatism in each eye, and presbyopia consistent with age.  New spectacle lens Rx issued for use as desired.   Recheck in 12 -18 months.

## 2019-09-09 NOTE — PROGRESS NOTES
CHIEF COMPLAINT:    Mrs. Cooley is a 79 y.o. female presenting for a follow up on POP status post A/P repair and cystoscopy on 8/6/2019.    PRESENTING ILLNESS:    Betzy Cooley is a 79 y.o. female who states she is doing well.  No problems urinating or defecating.  She is very comfortable now that she does not have the prolapse hanging down.  She does note a small amount of spotting on occasion.  She thought it resolved but then had it again.      Allergies:  Sulfa (sulfonamide antibiotics)    Medications:  Outpatient Encounter Medications as of 9/13/2019   Medication Sig Dispense Refill    atenolol (TENORMIN) 25 MG tablet Take 1 tablet (25 mg total) by mouth once daily. (Patient taking differently: Take 25 mg by mouth as needed. Does not take it on a regular basis   Takes as needed for heart palpations once a week) 90 tablet 3    calcium-vitamin D3 500 mg(1,250mg) -200 unit per tablet Take 1 tablet by mouth 2 (two) times daily with meals.      co-enzyme Q-10 30 mg capsule Take 30 mg by mouth once daily.      multivitamin (MULTIVITAMIN) per tablet Take 1 tablet by mouth once daily.      psyllium (KONSYL) Powd Take by mouth once daily. 2 table spoons in 8 ounces of water      ranitidine (ZANTAC) 150 MG capsule Take 1 capsule by mouth nightly.       vitamin A-vitamin C-vit E-min (VISION) Tab Take 1 tablet by mouth once daily.      aspirin 81 MG Chew Take 81 mg by mouth once daily. Not chewable      fexofenadine (ALLEGRA) 180 MG tablet Take 1 tablet (180 mg total) by mouth once daily. (Patient taking differently: Take 180 mg by mouth once daily. Takes as needed) 30 tablet 11    fluconazole (DIFLUCAN) 150 MG Tab Take one weekly for 2 weeks 2 tablet 1    fluticasone propionate (FLONASE) 50 mcg/actuation nasal spray SHAKE LIQUID AND USE 2 SPRAYS(100 MCG) IN EACH NOSTRIL EVERY DAY 48 mL 0    hydrocortisone 2.5 % cream Apply topically 2 (two) times daily. for 10 days 20 g 0    metronidazole 1% (METROGEL) 1 %  Gel Apply topically once daily. 60 g 3    polyethylene glycol (GLYCOLAX) 17 gram/dose powder Take 17 g by mouth once daily. 507 g 0    [DISCONTINUED] HYDROcodone-acetaminophen (NORCO) 5-325 mg per tablet Take 1 tablet by mouth every 4 (four) hours as needed for Pain. 5 tablet 0     No facility-administered encounter medications on file as of 9/13/2019.          PHYSICAL EXAMINATION:    The patient generally appears in good health, is appropriately interactive, and is in no apparent distress.    Skin: No lesions.    Mental: Cooperative with normal affect.    Neuro: Grossly intact.    HEENT: Normal. No evidence of lymphadenopathy.    Chest:  normal inspiratory effort.    Abdomen:  Soft, non-tender. No masses or organomegaly. Bladder is not palpable. No evidence of flank discomfort. No evidence of inguinal hernia.    Extremities: No clubbing, cyanosis, or edema    Normal external female genitalia  Urethral meatus is normal  Urethra and bladder are nontender to bimanual exam  Well supported anteriorly and posteriorly. On the anterior wall there was a small amount of hyperplastic granulation tissue.  Silver nitrate was applied.  She still has some sutures from the anterior repair.  The posterior side is well supported and well healed.   Uterus and cervix are surgically absent and well supported  No adnexal masses    LABS:    Lab Results   Component Value Date    BUN 18 08/07/2019    CREATININE 0.6 08/07/2019     UA 1.005, pH 6, ++ leuk, tr blood, otherwise, negative    IMPRESSION:    Encounter Diagnoses   Name Primary?    Post-operative state Yes       PLAN:    1.  Warned she may see black since the silver nitrate was applied.  The sutures will come out.  2.  They are going to [facility].  She will not be lifting the luggage.  No concerns for the trip  3.  Follow up in 3 months.    4.  May resume normal house keeping activities.

## 2019-09-13 ENCOUNTER — OFFICE VISIT (OUTPATIENT)
Dept: UROLOGY | Facility: CLINIC | Age: 79
End: 2019-09-13
Payer: MEDICARE

## 2019-09-13 VITALS
WEIGHT: 132.25 LBS | DIASTOLIC BLOOD PRESSURE: 79 MMHG | HEART RATE: 58 BPM | BODY MASS INDEX: 24.34 KG/M2 | SYSTOLIC BLOOD PRESSURE: 153 MMHG | HEIGHT: 62 IN

## 2019-09-13 DIAGNOSIS — Z98.890 POST-OPERATIVE STATE: Primary | ICD-10-CM

## 2019-09-13 PROBLEM — N81.9 PROLAPSE OF FEMALE PELVIC ORGANS: Status: RESOLVED | Noted: 2019-08-06 | Resolved: 2019-09-13

## 2019-09-13 PROCEDURE — 81002 URINALYSIS NONAUTO W/O SCOPE: CPT | Mod: HCNC,S$GLB,, | Performed by: UROLOGY

## 2019-09-13 PROCEDURE — 99999 PR PBB SHADOW E&M-EST. PATIENT-LVL III: ICD-10-PCS | Mod: PBBFAC,HCNC,, | Performed by: UROLOGY

## 2019-09-13 PROCEDURE — 99999 PR PBB SHADOW E&M-EST. PATIENT-LVL III: CPT | Mod: PBBFAC,HCNC,, | Performed by: UROLOGY

## 2019-09-13 PROCEDURE — 99024 POSTOP FOLLOW-UP VISIT: CPT | Mod: HCNC,S$GLB,, | Performed by: UROLOGY

## 2019-09-13 PROCEDURE — 99024 PR POST-OP FOLLOW-UP VISIT: ICD-10-PCS | Mod: HCNC,S$GLB,, | Performed by: UROLOGY

## 2019-09-13 PROCEDURE — 81002 PR URINALYSIS NONAUTO W/O SCOPE: ICD-10-PCS | Mod: HCNC,S$GLB,, | Performed by: UROLOGY

## 2019-09-13 NOTE — LETTER
September 13, 2019      Nancy Maguire MD  [address]  [facility] [address]           [facility]rology 4th Floor  [address]  [facility] [address]  Phone: [phone]          Patient: Betzy Cooley   MR Number: [id number]   YOB: 1940   Date of Visit: 9/13/2019       Dear Dr. Nancy Maguire:    Thank you for referring Betzy Cooley to me for evaluation. Attached you will find relevant portions of my assessment and plan of care.    If you have questions, please do not hesitate to call me. I look forward to following Betzy Cooley along with you.    Sincerely,    Alma Dorsey MD    Enclosure  CC:  No Recipients    If you would like to receive this communication electronically, please contact [email or web address] or [phone] to request more information on [employer] Link access.    For providers and/or their staff who would like to refer a patient to [facility], please contact us through our one-stop-shop provider referral line, [facility], at [phone].    If you feel you have received this communication in error or would no longer like to receive these types of communications, please e-mail [email or web address]

## 2019-10-15 ENCOUNTER — PATIENT MESSAGE (OUTPATIENT)
Dept: UROLOGY | Facility: CLINIC | Age: 79
End: 2019-10-15

## 2019-10-15 NOTE — TELEPHONE ENCOUNTER
Called the patient and discussed that there bicycle has a chair type seat not a bicycle seat.  I told her to start with the lowest setting and gradually increase it.  She also had questions about how to have intercourse comfortably.  Recommended olive oil and suggested ways to be relaxed (woman on top or relaxing pelvis into the mattress when having intercourse.)  If it persists, I would like her to return to the office to re examine her.  She expressed understanding.

## 2019-10-24 ENCOUNTER — PATIENT OUTREACH (OUTPATIENT)
Dept: ADMINISTRATIVE | Facility: HOSPITAL | Age: 79
End: 2019-10-24

## 2019-10-24 DIAGNOSIS — M94.9 DISORDER OF BONE AND ARTICULAR CARTILAGE: Primary | ICD-10-CM

## 2019-10-24 DIAGNOSIS — M89.9 DISORDER OF BONE AND ARTICULAR CARTILAGE: Primary | ICD-10-CM

## 2019-12-06 ENCOUNTER — OFFICE VISIT (OUTPATIENT)
Dept: URGENT CARE | Facility: CLINIC | Age: 79
End: 2019-12-06
Payer: MEDICARE

## 2019-12-06 VITALS
TEMPERATURE: 98 F | BODY MASS INDEX: 23.92 KG/M2 | HEIGHT: 62 IN | RESPIRATION RATE: 17 BRPM | HEART RATE: 68 BPM | WEIGHT: 130 LBS | OXYGEN SATURATION: 98 % | DIASTOLIC BLOOD PRESSURE: 89 MMHG | SYSTOLIC BLOOD PRESSURE: 161 MMHG

## 2019-12-06 DIAGNOSIS — R30.0 DYSURIA: Primary | ICD-10-CM

## 2019-12-06 LAB
BILIRUB UR QL STRIP: NEGATIVE
GLUCOSE UR QL STRIP: NEGATIVE
KETONES UR QL STRIP: NEGATIVE
LEUKOCYTE ESTERASE UR QL STRIP: NEGATIVE
PH, POC UA: 7 (ref 5–8)
POC BLOOD, URINE: NEGATIVE
POC NITRATES, URINE: NEGATIVE
PROT UR QL STRIP: NEGATIVE
SP GR UR STRIP: 1.01 (ref 1–1.03)
UROBILINOGEN UR STRIP-ACNC: NORMAL (ref 0.1–1.1)

## 2019-12-06 PROCEDURE — 81003 URINALYSIS AUTO W/O SCOPE: CPT | Mod: QW,S$GLB,, | Performed by: NURSE PRACTITIONER

## 2019-12-06 PROCEDURE — 87086 URINE CULTURE/COLONY COUNT: CPT | Mod: HCNC

## 2019-12-06 PROCEDURE — 99213 OFFICE O/P EST LOW 20 MIN: CPT | Mod: S$GLB,,, | Performed by: NURSE PRACTITIONER

## 2019-12-06 PROCEDURE — 81003 POCT URINALYSIS, DIPSTICK, AUTOMATED, W/O SCOPE: ICD-10-PCS | Mod: QW,S$GLB,, | Performed by: NURSE PRACTITIONER

## 2019-12-06 PROCEDURE — 99213 PR OFFICE/OUTPT VISIT, EST, LEVL III, 20-29 MIN: ICD-10-PCS | Mod: S$GLB,,, | Performed by: NURSE PRACTITIONER

## 2019-12-06 NOTE — PATIENT INSTRUCTIONS
"Return to Urgent Care or go to ER if symptoms worsen or fail to improve.  Follow up with PCP as recommended for further management.   We will contact you with your urine culture results in 3-5 days.       Dysuria     Painful urination (dysuria) is often caused by a problem in the urinary tract.   Dysuria is pain felt during urination. It is often described as a burning. Learn more about this problem and how it can be treated.  What causes dysuria?  Possible causes include:  · Infection with a bacteria or virus such as a urinary tract infection (UTI or a sexually transmitted infection (STI)  · Sensitivity or allergy to chemicals such as those found in lotions and other products  · Prostate or bladder problems  · Radiation therapy to the pelvic area  How is dysuria diagnosed?  Your healthcare provider will examine you. He or she will ask about your symptoms and health. After talking with you and doing a physical exam, your healthcare provider may know what is causing your dysuria. He or she will usually request  a sample of your urine. Tests of your urine, or a "urinalysis," are done. A urinalysis may include:  · Looking at the urine sample (visual exam)  · Checking for substances (chemical exam)  · Looking at a small amount under a microscope (microscopic exam)  Some parts of the urinalysis may be done in the provider's office and some in a lab. And, the urine sample may be checked for bacteria and yeast (urine culture). Your healthcare provider will tell you more about these tests if they are needed.  How is dysuria treated?  Treatment depends on the cause. If you have a bacterial infection, you may need antibiotics. You may be given medicines to make it easier for you to urinate and help relieve pain. Your healthcare provider can tell you more about your treatment options. Untreated, symptoms may get worse.  When to call your healthcare provider  Call the healthcare provider right away if you have any of the " following:  · Fever of 100.4°F (38°C) or higher   · No improvement after three days of treatment  · Trouble urinating because of pain  · New or increased discharge from the vagina or penis  · Rash or joint pain  · Increased back or abdominal pain  · Enlarged painful lymph nodes (lumps) in the groin   Date Last Reviewed: 1/1/2017  © 0638-2311 [employer]. [address] [place] [address]. All rights reserved. This information is not intended as a substitute for professional medical care. Always follow your healthcare professional's instructions.

## 2019-12-06 NOTE — PROGRESS NOTES
"Subjective:       Patient ID: Betzy Cooley is a 79 y.o. female.    Vitals:  height is 5' 2" (1.575 m) and weight is 59 kg (130 lb). Her oral temperature is 98.4 °F (36.9 °C). Her blood pressure is 161/89 (abnormal) and her pulse is 68. Her respiration is 17 and oxygen saturation is 98%.     Chief Complaint: Dysuria    This is a 79 y.o. female who presents today with a chief complaint of   Urinary frequency, burning and pressure  When urinating. Sx started yesterday. No fever.  No flank pain.  She had bladder suspension surgery in August.  Hasn't had any significant symptoms since surgery until now.  She has an appointment with her urologist in 7 days.     Dysuria    This is a new problem. The current episode started yesterday. The problem occurs every urination. The problem has been gradually worsening. The quality of the pain is described as burning. The pain is at a severity of 3/10. The pain is mild. There has been no fever. She is not sexually active. There is no history of pyelonephritis. Associated symptoms include frequency and urgency. Pertinent negatives include no chills, flank pain, hematuria, nausea, vomiting or rash. She has tried nothing for the symptoms. The treatment provided no relief.       Constitution: Negative for chills and fever.   Neck: Negative for painful lymph nodes.   Gastrointestinal: Negative for abdominal pain, nausea and vomiting.   Genitourinary: Positive for dysuria, frequency and urgency. Negative for urine decreased, flank pain, hematuria, history of kidney stones, painful menstruation, irregular menstruation, missed menses, heavy menstrual bleeding, ovarian cysts, genital trauma, vaginal pain, vaginal discharge, vaginal bleeding, vaginal odor, painful intercourse, genital sore, painful ejaculation and pelvic pain.   Musculoskeletal: Negative for back pain.   Skin: Negative for rash and lesion.   Hematologic/Lymphatic: Negative for swollen lymph nodes.       Objective:    "   Physical Exam   Constitutional: She is oriented to person, place, and time. She appears well-developed and well-nourished.   HENT:   Head: Normocephalic and atraumatic.   Right Ear: External ear normal.   Left Ear: External ear normal.   Nose: Nose normal. No nasal deformity. No epistaxis.   Mouth/Throat: Oropharynx is clear and moist and mucous membranes are normal.   Eyes: Lids are normal.   Neck: Trachea normal, normal range of motion and phonation normal. Neck supple.   Cardiovascular: Normal pulses.   Pulmonary/Chest: Effort normal.   Abdominal: Soft. Normal appearance and bowel sounds are normal. She exhibits no distension. There is no tenderness. There is no rigidity, no rebound, no guarding and no CVA tenderness.   Neurological: She is alert and oriented to person, place, and time.   Skin: Skin is warm, dry and intact.   Psychiatric: She has a normal mood and affect. Her speech is normal and behavior is normal. Cognition and memory are normal.   Nursing note and vitals reviewed.        Results for orders placed or performed in visit on 12/06/19   POCT Urinalysis, Dipstick, Automated, W/O Scope   Result Value Ref Range    POC Blood, Urine Negative Negative    POC Bilirubin, Urine Negative Negative    POC Urobilinogen, Urine normal 0.1 - 1.1    POC Ketones, Urine Negative Negative    POC Protein, Urine Negative Negative    POC Nitrates, Urine Negative Negative    POC Glucose, Urine Negative Negative    pH, UA 7.0 5 - 8    POC Specific Gravity, Urine 1.010 1.003 - 1.029    POC Leukocytes, Urine Negative Negative       Assessment:       1. Dysuria        Plan:         Dysuria  -     POCT Urinalysis, Dipstick, Automated, W/O Scope  -     Culture, Urine

## 2019-12-08 LAB — BACTERIA UR CULT: NORMAL

## 2019-12-09 NOTE — PROGRESS NOTES
CHIEF COMPLAINT:    Mrs. Cooley is a 79 y.o. female presenting for a follow up status post A/P repair and cystoscopy on 8/6/2019..    PRESENTING ILLNESS:    Betzy Cooley is a 79 y.o. female who returns for follow up.  She feels that everything is up.  She and her  went to visit their grandson in [place] on a road trip which took 3 days.  She had a little discomfort around the urethra from vaginal dryness.  She actually went to an urgent care and a UA and urine culture were done and both were negative.    Otherwise, she has no problems urinating or defecating.  The first time she had intercourse, she was a little tender.  Did not try again afterwards. She did use the olive oil.    REVIEW OF SYSTEMS:    Review of Systems   Constitutional: Negative.    HENT: Negative.    Eyes: Negative.    Respiratory: Negative.    Cardiovascular: Negative.    Gastrointestinal: Positive for heartburn.   Genitourinary:        Urethral discomfort from vaginal dryness   Musculoskeletal: Negative.    Skin: Negative.    Neurological: Negative.    Endo/Heme/Allergies: Negative.    Psychiatric/Behavioral: Negative.        PATIENT HISTORY:    Past Medical History:   Diagnosis Date    Arthritis     Cataract     GERD (gastroesophageal reflux disease)     HEARING LOSS     Hypertension     Osteoporosis, postmenopausal     Squamous Cell Carcinoma 2007    right forearm    Urinary incontinence     rare mixed       Past Surgical History:   Procedure Laterality Date    COLPOPEXY N/A 8/6/2019    Procedure: COLPOPEXY SSL FIXATION;  Surgeon: Alma Dorsey MD;  Location: [address];  Service: Urology;  Laterality: N/A;    CYSTOSCOPY N/A 8/6/2019    Procedure: CYSTOSCOPY;  Surgeon: Alma Dorsey MD;  Location: [address];  Service: Urology;  Laterality: N/A;    FRACTURE SURGERY Left 2008    open radius/ulna fracture    HYSTERECTOMY      TANIA/ovaries remain--fibroids- in her late 30's / early 40's       Family  History   Problem Relation Age of Onset    Heart failure Mother     Macular degeneration Mother     Heart disease Mother     COPD Mother     Diabetes Sister     Arthritis Sister         rheumatoid arthritis    COPD Father      Socioeconomic History    Marital status:    Tobacco Use    Smoking status: Never Smoker    Smokeless tobacco: Never Used   Substance and Sexual Activity    Alcohol use: Yes     Comment: 1-2 glasses wine weekly    Drug use: No    Sexual activity: Yes     Partners: Male     Birth control/protection: None       Allergies:  Sulfa (sulfonamide antibiotics)    Medications:  Outpatient Encounter Medications as of 12/13/2019   Medication Sig Dispense Refill    aspirin 81 MG Chew Take 81 mg by mouth once daily. Not chewable      atenolol (TENORMIN) 25 MG tablet Take 1 tablet (25 mg total) by mouth once daily. (Patient taking differently: Take 25 mg by mouth as needed. Does not take it on a regular basis   Takes as needed for heart palpations once a week) 90 tablet 3    calcium-vitamin D3 500 mg(1,250mg) -200 unit per tablet Take 1 tablet by mouth 2 (two) times daily with meals.      co-enzyme Q-10 30 mg capsule Take 30 mg by mouth once daily.      fluticasone propionate (FLONASE) 50 mcg/actuation nasal spray SHAKE LIQUID AND USE 2 SPRAYS(100 MCG) IN EACH NOSTRIL EVERY DAY 48 mL 0    multivitamin (MULTIVITAMIN) per tablet Take 1 tablet by mouth once daily.      polyethylene glycol (GLYCOLAX) 17 gram/dose powder Take 17 g by mouth once daily. 507 g 0    psyllium (KONSYL) Powd Take by mouth once daily. 2 table spoons in 8 ounces of water      ranitidine (ZANTAC) 150 MG capsule Take 1 capsule by mouth nightly.       vitamin A-vitamin C-vit E-min (VISION) Tab Take 1 tablet by mouth once daily.      estradiol (ESTRACE) 0.01 % (0.1 mg/gram) vaginal cream Place 0.5 g vaginally 3 (three) times a week. Place by fingertip application before bedtime three times a week (Monday,  Wednesday, Friday) 45 g 3    fexofenadine (ALLEGRA) 180 MG tablet Take 1 tablet (180 mg total) by mouth once daily. (Patient taking differently: Take 180 mg by mouth once daily. Takes as needed) 30 tablet 11    fluconazole (DIFLUCAN) 150 MG Tab Take one weekly for 2 weeks (Patient not taking: Reported on 12/13/2019) 2 tablet 1    hydrocortisone 2.5 % cream Apply topically 2 (two) times daily. for 10 days 20 g 0    metronidazole 1% (METROGEL) 1 % Gel Apply topically once daily. 60 g 3     No facility-administered encounter medications on file as of 12/13/2019.          PHYSICAL EXAMINATION:    The patient generally appears in good health, is appropriately interactive, and is in no apparent distress.    Skin: No lesions.    Mental: Cooperative with normal affect.    Neuro: Grossly intact.    HEENT: Normal. No evidence of lymphadenopathy.    Chest:  normal inspiratory effort.    Abdomen:  Soft, non-tender. No masses or organomegaly. Bladder is not palpable. No evidence of flank discomfort. No evidence of inguinal hernia.    Extremities: No clubbing, cyanosis, or edema    Normal external female genitalia  Grade II urogenital atrophy  Urethral meatus is normal, small urethral caruncle  Urethra and bladder are nontender to bimanual exam  Well supported anteriorly and posteriorly   Uterus and cervix are surgically absent, descends about 2 cm  No adnexal masses    LABS:    Lab Results   Component Value Date    BUN 18 08/07/2019    CREATININE 0.6 08/07/2019     UA 1.015, pH 6, otherwise, negative    IMPRESSION:    Encounter Diagnoses   Name Primary?    Follow-up examination after urological surgery Yes    Vaginal atrophy     Urethral caruncle        PLAN:    1.  Estrace cream sent to [facility] for compounded rx.  Information given to the patient  2.  Follow up in 6 months  3.  Discussed strategies to retry having intercourse.

## 2019-12-10 ENCOUNTER — TELEPHONE (OUTPATIENT)
Dept: URGENT CARE | Facility: CLINIC | Age: 79
End: 2019-12-10

## 2019-12-10 NOTE — TELEPHONE ENCOUNTER
----- Message from Madalyn Bruner NP sent at 12/8/2019  5:58 PM CST -----  Please let patient know that her urine culture is normal-- no growth.

## 2019-12-13 ENCOUNTER — OFFICE VISIT (OUTPATIENT)
Dept: UROLOGY | Facility: CLINIC | Age: 79
End: 2019-12-13
Payer: MEDICARE

## 2019-12-13 VITALS — BODY MASS INDEX: 24.83 KG/M2 | WEIGHT: 134.94 LBS | HEIGHT: 62 IN

## 2019-12-13 DIAGNOSIS — N36.2 URETHRAL CARUNCLE: ICD-10-CM

## 2019-12-13 DIAGNOSIS — N95.2 VAGINAL ATROPHY: ICD-10-CM

## 2019-12-13 DIAGNOSIS — Z09 FOLLOW-UP EXAMINATION AFTER UROLOGICAL SURGERY: Primary | ICD-10-CM

## 2019-12-13 PROCEDURE — 1159F MED LIST DOCD IN RCRD: CPT | Mod: HCNC,S$GLB,, | Performed by: UROLOGY

## 2019-12-13 PROCEDURE — 1126F PR PAIN SEVERITY QUANTIFIED, NO PAIN PRESENT: ICD-10-PCS | Mod: HCNC,S$GLB,, | Performed by: UROLOGY

## 2019-12-13 PROCEDURE — 1101F PR PT FALLS ASSESS DOC 0-1 FALLS W/OUT INJ PAST YR: ICD-10-PCS | Mod: HCNC,CPTII,S$GLB, | Performed by: UROLOGY

## 2019-12-13 PROCEDURE — 99213 OFFICE O/P EST LOW 20 MIN: CPT | Mod: HCNC,25,S$GLB, | Performed by: UROLOGY

## 2019-12-13 PROCEDURE — 1101F PT FALLS ASSESS-DOCD LE1/YR: CPT | Mod: HCNC,CPTII,S$GLB, | Performed by: UROLOGY

## 2019-12-13 PROCEDURE — 81002 PR URINALYSIS NONAUTO W/O SCOPE: ICD-10-PCS | Mod: HCNC,S$GLB,, | Performed by: UROLOGY

## 2019-12-13 PROCEDURE — 99213 PR OFFICE/OUTPT VISIT, EST, LEVL III, 20-29 MIN: ICD-10-PCS | Mod: HCNC,25,S$GLB, | Performed by: UROLOGY

## 2019-12-13 PROCEDURE — 81002 URINALYSIS NONAUTO W/O SCOPE: CPT | Mod: HCNC,S$GLB,, | Performed by: UROLOGY

## 2019-12-13 PROCEDURE — 99999 PR PBB SHADOW E&M-EST. PATIENT-LVL III: ICD-10-PCS | Mod: PBBFAC,HCNC,, | Performed by: UROLOGY

## 2019-12-13 PROCEDURE — 1159F PR MEDICATION LIST DOCUMENTED IN MEDICAL RECORD: ICD-10-PCS | Mod: HCNC,S$GLB,, | Performed by: UROLOGY

## 2019-12-13 PROCEDURE — 1126F AMNT PAIN NOTED NONE PRSNT: CPT | Mod: HCNC,S$GLB,, | Performed by: UROLOGY

## 2019-12-13 PROCEDURE — 99999 PR PBB SHADOW E&M-EST. PATIENT-LVL III: CPT | Mod: PBBFAC,HCNC,, | Performed by: UROLOGY

## 2019-12-13 RX ORDER — ESTRADIOL 0.1 MG/G
0.5 CREAM VAGINAL
Qty: 45 G | Refills: 3 | Status: SHIPPED | OUTPATIENT
Start: 2019-12-13 | End: 2022-08-01

## 2019-12-13 NOTE — LETTER
December 13, 2019      Nancy Maguire MD  [address]  [facility] [address]           [facility] 4[facility]  [address]  [facility] [address]  Phone: [phone]          Patient: Betzy Cooley   MR Number: [id number]   YOB: 1940   Date of Visit: 12/13/2019       Dear Dr. Nancy Maguire:    Thank you for referring Betzy Cooley to me for evaluation. Attached you will find relevant portions of my assessment and plan of care.    If you have questions, please do not hesitate to call me. I look forward to following Betzy Cooley along with you.    Sincerely,    Alma Dorsey MD    Enclosure  CC:  No Recipients    If you would like to receive this communication electronically, please contact [email or web address] or [phone] to request more information on [employer] Link access.    For providers and/or their staff who would like to refer a patient to [facility], please contact us through our one-stop-shop provider referral line, [facility], at [phone].    If you feel you have received this communication in error or would no longer like to receive these types of communications, please e-mail [email or web address]

## 2020-01-03 ENCOUNTER — OFFICE VISIT (OUTPATIENT)
Dept: URGENT CARE | Facility: CLINIC | Age: 80
End: 2020-01-03
Payer: MEDICARE

## 2020-01-03 VITALS
WEIGHT: 134 LBS | BODY MASS INDEX: 24.66 KG/M2 | HEART RATE: 69 BPM | RESPIRATION RATE: 17 BRPM | DIASTOLIC BLOOD PRESSURE: 79 MMHG | TEMPERATURE: 98 F | SYSTOLIC BLOOD PRESSURE: 124 MMHG | HEIGHT: 62 IN | OXYGEN SATURATION: 98 %

## 2020-01-03 DIAGNOSIS — R14.0 ABDOMINAL BLOATING: Primary | ICD-10-CM

## 2020-01-03 DIAGNOSIS — R10.10 PAIN OF UPPER ABDOMEN: ICD-10-CM

## 2020-01-03 DIAGNOSIS — R19.7 WATERY DIARRHEA: ICD-10-CM

## 2020-01-03 PROCEDURE — 99214 PR OFFICE/OUTPT VISIT, EST, LEVL IV, 30-39 MIN: ICD-10-PCS | Mod: S$GLB,,, | Performed by: PHYSICIAN ASSISTANT

## 2020-01-03 PROCEDURE — 99214 OFFICE O/P EST MOD 30 MIN: CPT | Mod: S$GLB,,, | Performed by: PHYSICIAN ASSISTANT

## 2020-01-03 PROCEDURE — 74019 XR ABDOMEN FLAT AND ERECT: ICD-10-PCS | Mod: S$GLB,,, | Performed by: RADIOLOGY

## 2020-01-03 PROCEDURE — 74019 RADEX ABDOMEN 2 VIEWS: CPT | Mod: S$GLB,,, | Performed by: RADIOLOGY

## 2020-01-03 RX ORDER — SIMETHICONE 80 MG
80 TABLET,CHEWABLE ORAL EVERY 6 HOURS PRN
Qty: 30 TABLET | Refills: 0 | Status: SHIPPED | OUTPATIENT
Start: 2020-01-03 | End: 2023-07-25

## 2020-01-03 NOTE — PATIENT INSTRUCTIONS
General Discharge Instructions   If you were prescribed a narcotic or controlled medication, do not drive or operate heavy equipment or machinery while taking these medications.  If you were prescribed antibiotics, please take them to completion.  You must understand that you've received an Urgent Care treatment only and that you may be released before all your medical problems are known or treated. You, the patient, will arrange for follow up care as instructed.  Follow up with your PCP or specialty clinic as directed in the next 1-2 weeks if not improved or as needed.  You can call [phone] to schedule an appointment with the appropriate provider.  If your condition worsens we recommend that you receive another evaluation at the emergency room immediately or contact your primary medical clinics after hours call service to discuss your concerns.  Please return here or go to the Emergency Department for any concerns or worsening of condition.      Excess Gas  Certain foods produce gas when digested. In some people, these foods make an excessive amount of gas. This may cause bloating, burping, or increased gas passing through the rectum (flatulence).     Foods that cause gas  The following foods are more likely to cause this problem. Limit them, or remove them from your diet:  · Broccoli  · Cauliflower  · [place] sprouts  · Cabbage  · Cooked dried beans  · Fizzy (carbonated) drinks, such as sparkling water, soda, beer, and champagne  Other causes  Other causes of excess gas include:  · Eating too fast or talking while you chew. This may cause you to swallow air. This increases the amount of gas in your stomach. And it may make your symptoms worse. Chew each mouthful completely before you swallow. Take your time.  · Chewing on gum or sucking on hard candy. These cause you to swallow more often. And some of what you are swallowing is air. This leads to more gas in your stomach. Avoid chewing gum and hard  candy.  · Overeating. This may increase the feeling of being bloated and cause more gas. When you are full, stop eating.   · Being constipated. This can increase the amount of normal intestinal gas. Avoid constipation by getting more fiber in your diet. Good sources of fiber include whole-grain cereal, fresh vegetables (except those in the above list), and fresh fruits. High-fiber foods absorb water and carry it out of the body. When adding more fiber to your diet, you also need to drink more water. You should drink at least 8, 8-ounce glasses of water (2 quarts) per day.  Date Last Reviewed: 8/1/2016  © 7153-5529 [employer]. [address] [place] [address], [place] [address]. All rights reserved. This information is not intended as a substitute for professional medical care. Always follow your healthcare professional's instructions.

## 2020-01-03 NOTE — PROGRESS NOTES
"Subjective:       Patient ID: Betzy Cooley is a 79 y.o. female.    Vitals:  height is 5' 2" (1.575 m) and weight is 60.8 kg (134 lb). Her oral temperature is 97.7 °F (36.5 °C). Her blood pressure is 124/79 and her pulse is 69. Her respiration is 17 and oxygen saturation is 98%.     Chief Complaint: Diarrhea    This is a 79 y.o. female who presents today with a chief complaint of abdominal pain, gas, and diarrhea x 3 days. States that SHALA, her and her  had some shrimp cocktails and meatballs with crackers and she felt fine. The next morning around 4am, she woke up feeling "quesy" and had to go to the bathroom. States that since then, she's been going to the bathroom more than 5 times a day. States that it's always been watery with little bits of stool. Taking Imodium helps a little. States that her last BM was this morning and had a little stool with watery diarrhea as well.    Diarrhea    This is a new problem. The current episode started in the past 7 days. The problem occurs 5 to 10 times per day. The problem has been unchanged. The stool consistency is described as watery. The patient states that diarrhea does not awaken her from sleep. Associated symptoms include abdominal pain. Pertinent negatives include no chills, fever or vomiting. Nothing aggravates the symptoms. There are no known risk factors. She has tried anti-motility drug for the symptoms. The treatment provided mild relief.       Constitution: Negative for appetite change, chills, sweating and fever.   HENT: Negative for trouble swallowing.    Cardiovascular: Negative for chest pain.   Respiratory: Negative for shortness of breath.    Gastrointestinal: Positive for abdominal pain and diarrhea. Negative for abdominal trauma, abdominal bloating, history of abdominal surgery, nausea, vomiting, constipation, dark colored stools and heartburn.   Genitourinary: Negative for dysuria, missed menses and pelvic pain.   Musculoskeletal: Negative for " back pain.       Objective:      Physical Exam   Constitutional: She is oriented to person, place, and time. She appears well-developed and well-nourished.   HENT:   Head: Normocephalic and atraumatic.   Right Ear: External ear normal.   Left Ear: External ear normal.   Nose: Nose normal.   Mouth/Throat: Mucous membranes are normal.   Eyes: Conjunctivae and lids are normal.   Neck: Trachea normal and full passive range of motion without pain. Neck supple.   Cardiovascular: Normal rate, regular rhythm and normal heart sounds.   Pulmonary/Chest: Effort normal and breath sounds normal. No respiratory distress.   Abdominal: Soft. Normal appearance and bowel sounds are normal. She exhibits no distension, no abdominal bruit, no pulsatile midline mass and no mass. There is tenderness (mild discomfort) in the right upper quadrant, epigastric area and left upper quadrant.   Musculoskeletal: Normal range of motion. She exhibits no edema.   Neurological: She is alert and oriented to person, place, and time. She has normal strength.   Skin: Skin is warm, dry, intact, not diaphoretic and not pale.   Psychiatric: She has a normal mood and affect. Her speech is normal and behavior is normal. Judgment and thought content normal. Cognition and memory are normal.   Nursing note and vitals reviewed.      X-ray Abdomen Flat And Erect    Result Date: 1/3/2020  EXAMINATION: XR ABDOMEN FLAT AND ERECT CLINICAL HISTORY: Upper abdominal pain, unspecified TECHNIQUE: Flat and erect AP views of the abdomen were performed. COMPARISON: None FINDINGS: No free air in the abdomen.  Scattered bowel gas is identified in the colon and small bowel.  No localized bowel dilatation.  Vascular calcifications noted.     See above Electronically signed by: Bravo Serrano MD Date:    01/03/2020 Time:    12:33    Assessment:       1. Abdominal bloating    2. Pain of upper abdomen    3. Watery diarrhea        Plan:         Abdominal bloating  -     simethicone  (MYLICON) 80 MG chewable tablet; Take 1 tablet (80 mg total) by mouth every 6 (six) hours as needed for Flatulence.  Dispense: 30 tablet; Refill: 0    Pain of upper abdomen  -     X-Ray Abdomen Flat And Erect; Future; Expected date: 01/03/2020    Watery diarrhea  -     Ambulatory referral to Gastroenterology      Patient Instructions   General Discharge Instructions   If you were prescribed a narcotic or controlled medication, do not drive or operate heavy equipment or machinery while taking these medications.  If you were prescribed antibiotics, please take them to completion.  You must understand that you've received an Urgent Care treatment only and that you may be released before all your medical problems are known or treated. You, the patient, will arrange for follow up care as instructed.  Follow up with your PCP or specialty clinic as directed in the next 1-2 weeks if not improved or as needed.  You can call [phone] to schedule an appointment with the appropriate provider.  If your condition worsens we recommend that you receive another evaluation at the emergency room immediately or contact your primary medical clinics after hours call service to discuss your concerns.  Please return here or go to the Emergency Department for any concerns or worsening of condition.      Excess Gas  Certain foods produce gas when digested. In some people, these foods make an excessive amount of gas. This may cause bloating, burping, or increased gas passing through the rectum (flatulence).     Foods that cause gas  The following foods are more likely to cause this problem. Limit them, or remove them from your diet:  · Broccoli  · Cauliflower  · [place] sprouts  · Cabbage  · Cooked dried beans  · Fizzy (carbonated) drinks, such as sparkling water, soda, beer, and champagne  Other causes  Other causes of excess gas include:  · Eating too fast or talking while you chew. This may cause you to swallow air. This increases  the amount of gas in your stomach. And it may make your symptoms worse. Chew each mouthful completely before you swallow. Take your time.  · Chewing on gum or sucking on hard candy. These cause you to swallow more often. And some of what you are swallowing is air. This leads to more gas in your stomach. Avoid chewing gum and hard candy.  · Overeating. This may increase the feeling of being bloated and cause more gas. When you are full, stop eating.   · Being constipated. This can increase the amount of normal intestinal gas. Avoid constipation by getting more fiber in your diet. Good sources of fiber include whole-grain cereal, fresh vegetables (except those in the above list), and fresh fruits. High-fiber foods absorb water and carry it out of the body. When adding more fiber to your diet, you also need to drink more water. You should drink at least 8, 8-ounce glasses of water (2 quarts) per day.  Date Last Reviewed: 8/1/2016  © 2159-7022 [employer]. [address] [place] [address], [place] [address]. All rights reserved. This information is not intended as a substitute for professional medical care. Always follow your healthcare professional's instructions.

## 2020-05-21 ENCOUNTER — OFFICE VISIT (OUTPATIENT)
Dept: UROLOGY | Facility: CLINIC | Age: 80
End: 2020-05-21
Payer: MEDICARE

## 2020-05-21 VITALS
WEIGHT: 139.31 LBS | DIASTOLIC BLOOD PRESSURE: 87 MMHG | HEIGHT: 62 IN | SYSTOLIC BLOOD PRESSURE: 144 MMHG | BODY MASS INDEX: 25.64 KG/M2 | HEART RATE: 60 BPM

## 2020-05-21 DIAGNOSIS — Z09 FOLLOW-UP EXAMINATION AFTER UROLOGICAL SURGERY: Primary | ICD-10-CM

## 2020-05-21 PROCEDURE — 3079F PR MOST RECENT DIASTOLIC BLOOD PRESSURE 80-89 MM HG: ICD-10-PCS | Mod: HCNC,CPTII,S$GLB, | Performed by: UROLOGY

## 2020-05-21 PROCEDURE — 99213 OFFICE O/P EST LOW 20 MIN: CPT | Mod: HCNC,S$GLB,, | Performed by: UROLOGY

## 2020-05-21 PROCEDURE — 1126F PR PAIN SEVERITY QUANTIFIED, NO PAIN PRESENT: ICD-10-PCS | Mod: HCNC,S$GLB,, | Performed by: UROLOGY

## 2020-05-21 PROCEDURE — 99999 PR PBB SHADOW E&M-EST. PATIENT-LVL III: CPT | Mod: PBBFAC,HCNC,, | Performed by: UROLOGY

## 2020-05-21 PROCEDURE — 99999 PR PBB SHADOW E&M-EST. PATIENT-LVL III: ICD-10-PCS | Mod: PBBFAC,HCNC,, | Performed by: UROLOGY

## 2020-05-21 PROCEDURE — 99213 PR OFFICE/OUTPT VISIT, EST, LEVL III, 20-29 MIN: ICD-10-PCS | Mod: HCNC,S$GLB,, | Performed by: UROLOGY

## 2020-05-21 PROCEDURE — 1126F AMNT PAIN NOTED NONE PRSNT: CPT | Mod: HCNC,S$GLB,, | Performed by: UROLOGY

## 2020-05-21 PROCEDURE — 3079F DIAST BP 80-89 MM HG: CPT | Mod: HCNC,CPTII,S$GLB, | Performed by: UROLOGY

## 2020-05-21 PROCEDURE — 1159F MED LIST DOCD IN RCRD: CPT | Mod: HCNC,S$GLB,, | Performed by: UROLOGY

## 2020-05-21 PROCEDURE — 1101F PR PT FALLS ASSESS DOC 0-1 FALLS W/OUT INJ PAST YR: ICD-10-PCS | Mod: HCNC,CPTII,S$GLB, | Performed by: UROLOGY

## 2020-05-21 PROCEDURE — 1159F PR MEDICATION LIST DOCUMENTED IN MEDICAL RECORD: ICD-10-PCS | Mod: HCNC,S$GLB,, | Performed by: UROLOGY

## 2020-05-21 PROCEDURE — 1101F PT FALLS ASSESS-DOCD LE1/YR: CPT | Mod: HCNC,CPTII,S$GLB, | Performed by: UROLOGY

## 2020-05-21 PROCEDURE — 3077F PR MOST RECENT SYSTOLIC BLOOD PRESSURE >= 140 MM HG: ICD-10-PCS | Mod: HCNC,CPTII,S$GLB, | Performed by: UROLOGY

## 2020-05-21 PROCEDURE — 3077F SYST BP >= 140 MM HG: CPT | Mod: HCNC,CPTII,S$GLB, | Performed by: UROLOGY

## 2020-05-21 NOTE — PROGRESS NOTES
CHIEF COMPLAINT:    Mrs. Cooley is a 79 y.o. female presenting for a follow up after A/P repair and cystoscopy on 8/6/2019.    PRESENTING ILLNESS:    Betzy Cooley is a 79 y.o. female who returns for follow up.  She is doing well, feels like everything is up.  No problems urinating, or defecating.  Continues to use the estradiol cream.      COVID check in--She and her  went to their camp in [place], just returned a few days ago. While she was there, was able to garden and tend to her roses. Coping well.      REVIEW OF SYSTEMS:    Review of Systems   Constitutional: Negative.    HENT: Positive for hearing loss.    Eyes: Negative.    Respiratory: Negative.    Cardiovascular: Negative.    Gastrointestinal: Negative.    Genitourinary: Negative.    Musculoskeletal: Negative.    Skin: Negative.    Neurological: Negative.    Endo/Heme/Allergies: Negative.    Psychiatric/Behavioral: Negative.        PATIENT HISTORY:    Past Medical History:   Diagnosis Date    Arthritis     Cataract     GERD (gastroesophageal reflux disease)     HEARING LOSS     Hypertension     Osteoporosis, postmenopausal     Squamous Cell Carcinoma 2007    right forearm    Urinary incontinence     rare mixed       Past Surgical History:   Procedure Laterality Date    COLPOPEXY N/A 8/6/2019    Procedure: COLPOPEXY SSL FIXATION;  Surgeon: Alma Dorsey MD;  Location: [facility] OR [address] [place] [address];  Service: Urology;  Laterality: N/A;    CYSTOSCOPY N/A 8/6/2019    Procedure: CYSTOSCOPY;  Surgeon: Alma Dorsey MD;  Location: [facility] OR [address] [place] [address];  Service: Urology;  Laterality: N/A;    FRACTURE SURGERY Left 2008    open radius/ulna fracture    HYSTERECTOMY      TANIA/ovaries remain--fibroids- in her late 30's / early 40's       Family History   Problem Relation Age of Onset    Heart failure Mother     Macular degeneration Mother     Heart disease Mother     COPD Mother     Diabetes Sister     Arthritis Sister         rheumatoid arthritis     COPD Father      Socioeconomic History    Marital status:    Tobacco Use    Smoking status: Never Smoker    Smokeless tobacco: Never Used   Substance and Sexual Activity    Alcohol use: Yes     Comment: 1-2 glasses wine weekly    Drug use: No    Sexual activity: Yes     Partners: Male     Birth control/protection: None       Allergies:  Sulfa (sulfonamide antibiotics)    Medications:  Outpatient Encounter Medications as of 5/21/2020   Medication Sig Dispense Refill    aspirin 81 MG Chew Take 81 mg by mouth once daily. Not chewable      calcium-vitamin D3 500 mg(1,250mg) -200 unit per tablet Take 1 tablet by mouth 2 (two) times daily with meals.      co-enzyme Q-10 30 mg capsule Take 30 mg by mouth once daily.      estradiol (ESTRACE) 0.01 % (0.1 mg/gram) vaginal cream Place 0.5 g vaginally 3 (three) times a week. Place by fingertip application before bedtime three times a week (Monday, Wednesday, Friday) 45 g 3    multivitamin (MULTIVITAMIN) per tablet Take 1 tablet by mouth once daily.      psyllium (KONSYL) Powd Take by mouth once daily. 2 table spoons in 8 ounces of water      simethicone (MYLICON) 80 MG chewable tablet Take 1 tablet (80 mg total) by mouth every 6 (six) hours as needed for Flatulence. 30 tablet 0    vitamin A-vitamin C-vit E-min (VISION) Tab Take 1 tablet by mouth once daily.      atenolol (TENORMIN) 25 MG tablet Take 1 tablet (25 mg total) by mouth once daily. (Patient not taking: Reported on 5/21/2020) 90 tablet 3    fexofenadine (ALLEGRA) 180 MG tablet Take 1 tablet (180 mg total) by mouth once daily. (Patient taking differently: Take 180 mg by mouth once daily. Takes as needed) 30 tablet 11    fluconazole (DIFLUCAN) 150 MG Tab Take one weekly for 2 weeks (Patient not taking: Reported on 5/21/2020) 2 tablet 1    fluticasone propionate (FLONASE) 50 mcg/actuation nasal spray SHAKE LIQUID AND USE 2 SPRAYS(100 MCG) IN EACH NOSTRIL EVERY DAY (Patient not taking: Reported on  5/21/2020) 48 mL 0    hydrocortisone 2.5 % cream Apply topically 2 (two) times daily. for 10 days 20 g 0    metronidazole 1% (METROGEL) 1 % Gel Apply topically once daily. 60 g 3     No facility-administered encounter medications on file as of 5/21/2020.          PHYSICAL EXAMINATION:    The patient generally appears in good health, is appropriately interactive, and is in no apparent distress.    Skin: No lesions.    Mental: Cooperative with normal affect.    Neuro: Grossly intact.    HEENT: Normal. No evidence of lymphadenopathy.    Chest:  normal inspiratory effort.    Abdomen:  Soft, non-tender. No masses or organomegaly. Bladder is not palpable. No evidence of flank discomfort. No evidence of inguinal hernia.    Extremities: No clubbing, cyanosis, or edema    Normal external female genitalia  Grade II urogenital atrophy  Urethral meatus is normal  Urethra and bladder are nontender to bimanual exam  Well supported posteriorly   Stage I cystocele  Uterus and cervix are surgically absent, cuff descends 1-2 cm.   No adnexal masses    LABS:    Lab Results   Component Value Date    BUN 18 08/07/2019    CREATININE 0.6 08/07/2019     UA 1.005, pH 7, otherwise, negative    IMPRESSION:    Encounter Diagnoses   Name Primary?    Follow-up examination after urological surgery Yes       PLAN:    1.  Follow up in 6 months

## 2020-05-31 RX ORDER — ATENOLOL 25 MG/1
TABLET ORAL
Qty: 90 TABLET | Refills: 3 | Status: SHIPPED | OUTPATIENT
Start: 2020-05-31 | End: 2020-08-25 | Stop reason: SDUPTHER

## 2020-07-14 ENCOUNTER — HOSPITAL ENCOUNTER (OUTPATIENT)
Dept: RADIOLOGY | Facility: HOSPITAL | Age: 80
Discharge: HOME | End: 2020-07-14
Attending: INTERNAL MEDICINE
Payer: MEDICARE

## 2020-07-14 ENCOUNTER — HOSPITAL ENCOUNTER (OUTPATIENT)
Dept: RADIOLOGY | Facility: CLINIC | Age: 80
Discharge: HOME | End: 2020-07-14
Attending: INTERNAL MEDICINE
Payer: MEDICARE

## 2020-07-14 ENCOUNTER — OFFICE VISIT (OUTPATIENT)
Dept: INTERNAL MEDICINE | Facility: CLINIC | Age: 80
End: 2020-07-14
Payer: MEDICARE

## 2020-07-14 VITALS
HEART RATE: 60 BPM | WEIGHT: 135.38 LBS | DIASTOLIC BLOOD PRESSURE: 70 MMHG | BODY MASS INDEX: 24.76 KG/M2 | SYSTOLIC BLOOD PRESSURE: 118 MMHG | OXYGEN SATURATION: 97 %

## 2020-07-14 DIAGNOSIS — H90.5 SENSORINEURAL HEARING LOSS (SNHL), UNSPECIFIED LATERALITY: ICD-10-CM

## 2020-07-14 DIAGNOSIS — D31.30 NEVUS OF CHOROID, UNSPECIFIED LATERALITY: ICD-10-CM

## 2020-07-14 DIAGNOSIS — Z12.31 ENCOUNTER FOR SCREENING MAMMOGRAM FOR MALIGNANT NEOPLASM OF BREAST: ICD-10-CM

## 2020-07-14 DIAGNOSIS — H25.013 CORTICAL SENILE CATARACT OF BOTH EYES: ICD-10-CM

## 2020-07-14 DIAGNOSIS — Z78.0 POSTMENOPAUSAL: ICD-10-CM

## 2020-07-14 DIAGNOSIS — K21.9 GASTROESOPHAGEAL REFLUX DISEASE, ESOPHAGITIS PRESENCE NOT SPECIFIED: ICD-10-CM

## 2020-07-14 DIAGNOSIS — Z00.00 ROUTINE PHYSICAL EXAMINATION: Primary | ICD-10-CM

## 2020-07-14 DIAGNOSIS — E78.5 HYPERLIPIDEMIA, UNSPECIFIED HYPERLIPIDEMIA TYPE: ICD-10-CM

## 2020-07-14 PROCEDURE — 99999 PR PBB SHADOW E&M-EST. PATIENT-LVL IV: ICD-10-PCS | Mod: PBBFAC,HCNC,, | Performed by: INTERNAL MEDICINE

## 2020-07-14 PROCEDURE — 99397 PR PREVENTIVE VISIT,EST,65 & OVER: ICD-10-PCS | Mod: HCNC,S$GLB,, | Performed by: INTERNAL MEDICINE

## 2020-07-14 PROCEDURE — 77080 DXA BONE DENSITY AXIAL: CPT | Mod: 26,HCNC,, | Performed by: INTERNAL MEDICINE

## 2020-07-14 PROCEDURE — 77067 MAMMO DIGITAL SCREENING BILAT WITH TOMOSYNTHESIS_CAD: ICD-10-PCS | Mod: 26,HCNC,, | Performed by: RADIOLOGY

## 2020-07-14 PROCEDURE — 77063 BREAST TOMOSYNTHESIS BI: CPT | Mod: 26,HCNC,, | Performed by: RADIOLOGY

## 2020-07-14 PROCEDURE — 77080 DXA BONE DENSITY AXIAL: CPT | Mod: TC,HCNC

## 2020-07-14 PROCEDURE — 99999 PR PBB SHADOW E&M-EST. PATIENT-LVL IV: CPT | Mod: PBBFAC,HCNC,, | Performed by: INTERNAL MEDICINE

## 2020-07-14 PROCEDURE — 3074F PR MOST RECENT SYSTOLIC BLOOD PRESSURE < 130 MM HG: ICD-10-PCS | Mod: HCNC,CPTII,S$GLB, | Performed by: INTERNAL MEDICINE

## 2020-07-14 PROCEDURE — 3078F PR MOST RECENT DIASTOLIC BLOOD PRESSURE < 80 MM HG: ICD-10-PCS | Mod: HCNC,CPTII,S$GLB, | Performed by: INTERNAL MEDICINE

## 2020-07-14 PROCEDURE — 77063 MAMMO DIGITAL SCREENING BILAT WITH TOMOSYNTHESIS_CAD: ICD-10-PCS | Mod: 26,HCNC,, | Performed by: RADIOLOGY

## 2020-07-14 PROCEDURE — 77067 SCR MAMMO BI INCL CAD: CPT | Mod: TC,HCNC

## 2020-07-14 PROCEDURE — 77067 SCR MAMMO BI INCL CAD: CPT | Mod: 26,HCNC,, | Performed by: RADIOLOGY

## 2020-07-14 PROCEDURE — 99397 PER PM REEVAL EST PAT 65+ YR: CPT | Mod: HCNC,S$GLB,, | Performed by: INTERNAL MEDICINE

## 2020-07-14 PROCEDURE — 3074F SYST BP LT 130 MM HG: CPT | Mod: HCNC,CPTII,S$GLB, | Performed by: INTERNAL MEDICINE

## 2020-07-14 PROCEDURE — 77080 DEXA BONE DENSITY SPINE HIP: ICD-10-PCS | Mod: 26,HCNC,, | Performed by: INTERNAL MEDICINE

## 2020-07-14 PROCEDURE — 3078F DIAST BP <80 MM HG: CPT | Mod: HCNC,CPTII,S$GLB, | Performed by: INTERNAL MEDICINE

## 2020-07-14 NOTE — PROGRESS NOTES
Subjective:       Patient ID: Betzy Cooley is a 79 y.o. female.    Chief Complaint: Establish Care    Patient here to establish care.  New to me.  I see her .  She sees Dermatology optometry and Urology.  She is up-to-date with colon screening but is due for mammogram bone density.  She has a remote history of tachycardia but takes atenolol and that seems to do well.  We will update some blood labs.    Review of Systems   Constitutional: Negative for appetite change, chills and fever.   HENT: Negative for nosebleeds and sore throat.    Eyes: Negative for visual disturbance.   Respiratory: Negative for cough, shortness of breath and wheezing.    Cardiovascular: Positive for palpitations (None recently). Negative for chest pain and leg swelling.   Gastrointestinal: Negative for abdominal pain, constipation and diarrhea.   Genitourinary: Negative for difficulty urinating and hematuria.   Musculoskeletal: Negative for neck pain and neck stiffness.   Integumentary:  Negative for pallor and rash.   Neurological: Negative for headaches.   Psychiatric/Behavioral: Negative for dysphoric mood and suicidal ideas. The patient is not nervous/anxious.          Objective:      Physical Exam  Constitutional:       General: She is not in acute distress.     Appearance: She is well-developed.   HENT:      Head: Normocephalic and atraumatic.      Right Ear: Tympanic membrane, ear canal and external ear normal.      Left Ear: Tympanic membrane, ear canal and external ear normal.      Mouth/Throat:      Pharynx: No oropharyngeal exudate or posterior oropharyngeal erythema.   Eyes:      General: No scleral icterus.     Conjunctiva/sclera: Conjunctivae normal.      Pupils: Pupils are equal, round, and reactive to light.   Neck:      Musculoskeletal: Normal range of motion and neck supple.      Thyroid: No thyromegaly.   Cardiovascular:      Rate and Rhythm: Normal rate and regular rhythm.      Heart sounds: No murmur.    Pulmonary:      Effort: Pulmonary effort is normal.      Breath sounds: Normal breath sounds. No wheezing.   Abdominal:      General: Bowel sounds are normal. There is no distension.      Palpations: Abdomen is soft.      Tenderness: There is no abdominal tenderness.   Musculoskeletal:         General: No tenderness.   Lymphadenopathy:      Cervical: No cervical adenopathy.   Skin:     Findings: No rash.   Neurological:      Mental Status: She is alert and oriented to person, place, and time.         Assessment:       1. Routine physical examination    2. Gastroesophageal reflux disease, esophagitis presence not specified    3. Sensorineural hearing loss (SNHL), unspecified laterality    4. Cortical senile cataract of both eyes    5. Nevus of choroid, unspecified laterality    6. Encounter for screening mammogram for malignant neoplasm of breast    7. Postmenopausal    8. Hyperlipidemia, unspecified hyperlipidemia type        Plan:       Betzy was seen today for establish care.    Diagnoses and all orders for this visit:    Routine physical examination  -     Lipid Panel; Future  -     CBC auto differential; Future  -     Comprehensive metabolic panel; Future    Gastroesophageal reflux disease, esophagitis presence not specified  -     CBC auto differential; Future  -     Comprehensive metabolic panel; Future    Sensorineural hearing loss (SNHL), unspecified laterality  -     CBC auto differential; Future  -     Comprehensive metabolic panel; Future    Cortical senile cataract of both eyes  -     CBC auto differential; Future  -     Comprehensive metabolic panel; Future    Nevus of choroid, unspecified laterality  -     CBC auto differential; Future  -     Comprehensive metabolic panel; Future    Encounter for screening mammogram for malignant neoplasm of breast  -     Mammo Digital Screening Bilat w/ Good; Future  -     CBC auto differential; Future  -     Comprehensive metabolic panel;  Future    Postmenopausal  -     CBC auto differential; Future  -     Comprehensive metabolic panel; Future  -     DXA Bone Density Spine And Hip; Future    Hyperlipidemia, unspecified hyperlipidemia type  -     Lipid Panel; Future

## 2020-07-24 ENCOUNTER — OFFICE VISIT (OUTPATIENT)
Dept: URGENT CARE | Facility: CLINIC | Age: 80
End: 2020-07-24
Payer: MEDICARE

## 2020-07-24 VITALS
RESPIRATION RATE: 18 BRPM | HEART RATE: 68 BPM | BODY MASS INDEX: 24.84 KG/M2 | OXYGEN SATURATION: 97 % | SYSTOLIC BLOOD PRESSURE: 147 MMHG | TEMPERATURE: 99 F | HEIGHT: 62 IN | WEIGHT: 135 LBS | DIASTOLIC BLOOD PRESSURE: 81 MMHG

## 2020-07-24 DIAGNOSIS — J01.90 ACUTE VIRAL SINUSITIS: Primary | ICD-10-CM

## 2020-07-24 DIAGNOSIS — B97.89 ACUTE VIRAL SINUSITIS: Primary | ICD-10-CM

## 2020-07-24 LAB
CTP QC/QA: YES
MOLECULAR STREP A: NEGATIVE

## 2020-07-24 PROCEDURE — U0003 INFECTIOUS AGENT DETECTION BY NUCLEIC ACID (DNA OR RNA); SEVERE ACUTE RESPIRATORY SYNDROME CORONAVIRUS 2 (SARS-COV-2) (CORONAVIRUS DISEASE [COVID-19]), AMPLIFIED PROBE TECHNIQUE, MAKING USE OF HIGH THROUGHPUT TECHNOLOGIES AS DESCRIBED BY CMS-2020-01-R: HCPCS | Mod: HCNC

## 2020-07-24 PROCEDURE — 99214 PR OFFICE/OUTPT VISIT, EST, LEVL IV, 30-39 MIN: ICD-10-PCS | Mod: S$GLB,,, | Performed by: FAMILY MEDICINE

## 2020-07-24 PROCEDURE — 99214 OFFICE O/P EST MOD 30 MIN: CPT | Mod: S$GLB,,, | Performed by: FAMILY MEDICINE

## 2020-07-24 PROCEDURE — 87651 STREP A DNA AMP PROBE: CPT | Mod: QW,S$GLB,, | Performed by: FAMILY MEDICINE

## 2020-07-24 PROCEDURE — 87651 POCT STREP A MOLECULAR: ICD-10-PCS | Mod: QW,S$GLB,, | Performed by: FAMILY MEDICINE

## 2020-07-24 RX ORDER — FLUCONAZOLE 150 MG/1
150 TABLET ORAL DAILY
Qty: 1 TABLET | Refills: 0 | Status: SHIPPED | OUTPATIENT
Start: 2020-07-24 | End: 2020-07-25

## 2020-07-24 RX ORDER — FLUTICASONE PROPIONATE 50 MCG
1 SPRAY, SUSPENSION (ML) NASAL DAILY
Qty: 9.9 ML | Refills: 0 | Status: SHIPPED | OUTPATIENT
Start: 2020-07-24 | End: 2023-07-25

## 2020-07-24 RX ORDER — AMOXICILLIN AND CLAVULANATE POTASSIUM 875; 125 MG/1; MG/1
1 TABLET, FILM COATED ORAL 2 TIMES DAILY
Qty: 20 TABLET | Refills: 0 | Status: SHIPPED | OUTPATIENT
Start: 2020-07-24 | End: 2020-08-03

## 2020-07-24 NOTE — PROGRESS NOTES
"Subjective:       Patient ID: Betzy Cooley is a 80 y.o. female.    Vitals:  height is 5' 2" (1.575 m) and weight is 61.2 kg (135 lb). Her temperature is 98.5 °F (36.9 °C). Her blood pressure is 147/81 (abnormal) and her pulse is 68. Her respiration is 18 and oxygen saturation is 97%.     Chief Complaint: Sinus Problem    This is a 80 y.o. female   with Past Medical History:  No date: Arthritis  No date: Cataract  No date: GERD (gastroesophageal reflux disease)  No date: HEARING LOSS  No date: Hypertension  No date: Osteoporosis, postmenopausal  2007: Squamous Cell Carcinoma      Comment:  right forearm  No date: Urinary incontinence      Comment:  rare mixed   and Past Surgical History:  8/6/2019: COLPOPEXY; N/A      Comment:  Procedure: COLPOPEXY SSL FIXATION;  Surgeon: Alma Dorsey MD;  Location: [facility] OR [address] [place] [address];  Service:                Urology;  Laterality: N/A;  8/6/2019: CYSTOSCOPY; N/A      Comment:  Procedure: CYSTOSCOPY;  Surgeon: Alma Dorsey MD;                 Location: [facility] OR [address] [place] [address];  Service: Urology;                 Laterality: N/A;  2008: FRACTURE SURGERY; Left      Comment:  open radius/ulna fracture  No date: HYSTERECTOMY      Comment:  TANIA/ovaries remain--fibroids- in her late 30's / early                40's  who presents today with a chief complaint of a sinus problem that began five days ago. She's complaining of sinus pressure, sinus pain, congestion, ear pain and a sore throat. She's been using nasal spray to help relieve her symptoms.     Sinus Problem  This is a new problem. The current episode started in the past 7 days. The problem has been gradually worsening since onset. There has been no fever. Her pain is at a severity of 4/10. The pain is mild. Associated symptoms include congestion, ear pain, sinus pressure and a sore throat. Pertinent negatives include no chills, coughing, diaphoresis or shortness of breath. Treatments tried: nasal spray. The " treatment provided no relief.       Constitution: Negative for chills, sweating, fatigue and fever.   HENT: Positive for ear pain, congestion, sinus pain, sinus pressure and sore throat. Negative for trouble swallowing and voice change.    Neck: Negative for painful lymph nodes.   Eyes: Negative for eye redness.   Respiratory: Negative for chest tightness, cough, sputum production, bloody sputum, COPD, shortness of breath, stridor, wheezing and asthma.    Gastrointestinal: Negative for nausea and vomiting.   Musculoskeletal: Negative for muscle ache.   Skin: Negative for rash.   Allergic/Immunologic: Negative for seasonal allergies and asthma.   Hematologic/Lymphatic: Negative for swollen lymph nodes.       Objective:      Physical Exam   HENT:   Head: Normocephalic and atraumatic.   Ears:   Right Ear: Tympanic membrane normal.   Left Ear: Tympanic membrane normal.   Nose: Congestion present. Right sinus exhibits maxillary sinus tenderness.   Mouth/Throat: Mucous membranes are moist. Posterior oropharyngeal edema and posterior oropharyngeal erythema present.   Eyes: Pupils are equal, round, and reactive to light.   Neck: Normal range of motion. Neck supple.   Cardiovascular: Normal rate, regular rhythm, normal heart sounds and normal pulses.   Pulmonary/Chest: Effort normal.   Abdominal: Normal appearance.   Neurological: She is alert.   Nursing note and vitals reviewed.    Results for orders placed or performed in visit on 07/24/20   POCT Strep A, Molecular   Result Value Ref Range    Molecular Strep A, POC Negative Negative     Acceptable Yes          Assessment:       1. Acute viral sinusitis        Plan:         Acute viral sinusitis  -     POCT Strep A, Molecular  -     COVID-19 Routine Screening  -     fluticasone propionate (FLONASE) 50 mcg/actuation nasal spray; 1 spray (50 mcg total) by Each Nostril route once daily.  Dispense: 9.9 mL; Refill: 0  -     amoxicillin-clavulanate 875-125mg  (AUGMENTIN) 875-125 mg per tablet; Take 1 tablet by mouth 2 (two) times daily. for 10 days  Dispense: 20 tablet; Refill: 0  -     fluconazole (DIFLUCAN) 150 MG Tab; Take 1 tablet (150 mg total) by mouth once daily. for 1 day  Dispense: 1 tablet; Refill: 0    to hold antibiotics pending worsening symptoms

## 2020-07-24 NOTE — PATIENT INSTRUCTIONS

## 2020-07-25 ENCOUNTER — TELEPHONE (OUTPATIENT)
Dept: URGENT CARE | Facility: CLINIC | Age: 80
End: 2020-07-25

## 2020-07-25 LAB — SARS-COV-2 RNA RESP QL NAA+PROBE: NOT DETECTED

## 2020-07-25 NOTE — TELEPHONE ENCOUNTER
----- Message from Jose Manuel Muller MD sent at 7/25/2020 11:55 AM CDT -----  These results are normal. Please notify the patient.

## 2020-07-30 RX ORDER — METRONIDAZOLE 10 MG/G
GEL TOPICAL DAILY
Qty: 60 G | Refills: 3 | Status: SHIPPED | OUTPATIENT
Start: 2020-07-30 | End: 2022-08-30 | Stop reason: SDUPTHER

## 2020-07-30 NOTE — TELEPHONE ENCOUNTER
----- Message from Malina Arenas sent at 7/30/2020 11:49 AM CDT -----  Contact: self @ [phone]  Calling to schedule yearly f/u in August around 10:00 - 10:30.  Would like an appt for her  right before or after hers.  Zander Cooley [id number].  Pls call.

## 2020-08-20 ENCOUNTER — PATIENT MESSAGE (OUTPATIENT)
Dept: INTERNAL MEDICINE | Facility: CLINIC | Age: 80
End: 2020-08-20

## 2020-08-21 ENCOUNTER — PATIENT MESSAGE (OUTPATIENT)
Dept: INTERNAL MEDICINE | Facility: CLINIC | Age: 80
End: 2020-08-21

## 2020-08-25 ENCOUNTER — OFFICE VISIT (OUTPATIENT)
Dept: INTERNAL MEDICINE | Facility: CLINIC | Age: 80
End: 2020-08-25
Payer: MEDICARE

## 2020-08-25 VITALS
DIASTOLIC BLOOD PRESSURE: 88 MMHG | SYSTOLIC BLOOD PRESSURE: 134 MMHG | BODY MASS INDEX: 24.19 KG/M2 | WEIGHT: 132.25 LBS | OXYGEN SATURATION: 97 % | HEART RATE: 63 BPM

## 2020-08-25 DIAGNOSIS — K21.9 GASTROESOPHAGEAL REFLUX DISEASE, ESOPHAGITIS PRESENCE NOT SPECIFIED: Primary | ICD-10-CM

## 2020-08-25 DIAGNOSIS — R14.3 INTESTINAL GAS EXCRETION: ICD-10-CM

## 2020-08-25 PROCEDURE — 99999 PR PBB SHADOW E&M-EST. PATIENT-LVL IV: ICD-10-PCS | Mod: PBBFAC,HCNC,, | Performed by: INTERNAL MEDICINE

## 2020-08-25 PROCEDURE — 1126F AMNT PAIN NOTED NONE PRSNT: CPT | Mod: HCNC,S$GLB,, | Performed by: INTERNAL MEDICINE

## 2020-08-25 PROCEDURE — 99214 PR OFFICE/OUTPT VISIT, EST, LEVL IV, 30-39 MIN: ICD-10-PCS | Mod: HCNC,S$GLB,, | Performed by: INTERNAL MEDICINE

## 2020-08-25 PROCEDURE — 99214 OFFICE O/P EST MOD 30 MIN: CPT | Mod: HCNC,S$GLB,, | Performed by: INTERNAL MEDICINE

## 2020-08-25 PROCEDURE — 3079F DIAST BP 80-89 MM HG: CPT | Mod: HCNC,CPTII,S$GLB, | Performed by: INTERNAL MEDICINE

## 2020-08-25 PROCEDURE — 1126F PR PAIN SEVERITY QUANTIFIED, NO PAIN PRESENT: ICD-10-PCS | Mod: HCNC,S$GLB,, | Performed by: INTERNAL MEDICINE

## 2020-08-25 PROCEDURE — 99999 PR PBB SHADOW E&M-EST. PATIENT-LVL IV: CPT | Mod: PBBFAC,HCNC,, | Performed by: INTERNAL MEDICINE

## 2020-08-25 PROCEDURE — 3075F SYST BP GE 130 - 139MM HG: CPT | Mod: HCNC,CPTII,S$GLB, | Performed by: INTERNAL MEDICINE

## 2020-08-25 PROCEDURE — 1101F PT FALLS ASSESS-DOCD LE1/YR: CPT | Mod: HCNC,CPTII,S$GLB, | Performed by: INTERNAL MEDICINE

## 2020-08-25 PROCEDURE — 3075F PR MOST RECENT SYSTOLIC BLOOD PRESS GE 130-139MM HG: ICD-10-PCS | Mod: HCNC,CPTII,S$GLB, | Performed by: INTERNAL MEDICINE

## 2020-08-25 PROCEDURE — 3079F PR MOST RECENT DIASTOLIC BLOOD PRESSURE 80-89 MM HG: ICD-10-PCS | Mod: HCNC,CPTII,S$GLB, | Performed by: INTERNAL MEDICINE

## 2020-08-25 PROCEDURE — 1101F PR PT FALLS ASSESS DOC 0-1 FALLS W/OUT INJ PAST YR: ICD-10-PCS | Mod: HCNC,CPTII,S$GLB, | Performed by: INTERNAL MEDICINE

## 2020-08-25 PROCEDURE — 1159F PR MEDICATION LIST DOCUMENTED IN MEDICAL RECORD: ICD-10-PCS | Mod: HCNC,S$GLB,, | Performed by: INTERNAL MEDICINE

## 2020-08-25 PROCEDURE — 1159F MED LIST DOCD IN RCRD: CPT | Mod: HCNC,S$GLB,, | Performed by: INTERNAL MEDICINE

## 2020-08-25 RX ORDER — ATENOLOL 25 MG/1
25 TABLET ORAL DAILY
Qty: 90 TABLET | Refills: 3 | Status: SHIPPED | OUTPATIENT
Start: 2020-08-25 | End: 2021-09-20

## 2020-08-25 RX ORDER — HYDROGEN PEROXIDE 3 %
20 SOLUTION, NON-ORAL MISCELLANEOUS
Start: 2020-08-25 | End: 2024-01-22

## 2020-08-25 NOTE — PROGRESS NOTES
Subjective:       Patient ID: Betzy Cooley is a 80 y.o. female.    Chief Complaint: Gas and Diarrhea    Intermittent upset stomach with increased gas and occas diarrhea. Last episode was 1 stool about 1 week. And prior was a few months. No blood or mucus. She is due for a C-scope in the Spring. She eats a lot of beans, bananas, broccoli and cauliflower.   At times she was given Nexium for acid reflux but has not been using that.  She also was given simethicone for gas but she has not been using that either.  She was given a consult for gastro but says her symptoms got better so she did not go.    Abdominal Pain  This is a new problem. The current episode started 1 to 4 weeks ago. The onset quality is gradual. The problem occurs 2 to 4 times per day. The problem has been waxing and waning. The pain is located in the epigastric region. The pain is at a severity of 4/10. The pain is moderate. Associated symptoms include anorexia, arthralgias, belching, diarrhea (Intermittent see HPI) and flatus. Pertinent negatives include no constipation, dysuria, fever, frequency, headaches, hematochezia, hematuria, melena, myalgias, nausea, vomiting or weight loss. The pain is aggravated by certain positions and drinking alcohol. The pain is relieved by passing flatus and sitting up. She has tried antacids for the symptoms. The treatment provided no relief. Her past medical history is significant for abdominal surgery and GERD. There is no history of colon cancer, Crohn's disease, gallstones, irritable bowel syndrome, pancreatitis, PUD or ulcerative colitis. Patient's medical history does not include kidney stones and UTI.     Review of Systems   Constitutional: Negative for appetite change, chills, fever and weight loss.   HENT: Negative for nosebleeds and sore throat.    Eyes: Negative for visual disturbance.   Respiratory: Negative for cough, shortness of breath and wheezing.    Cardiovascular: Negative for chest pain and leg  swelling.   Gastrointestinal: Positive for abdominal pain, anorexia, diarrhea (Intermittent see HPI) and flatus. Negative for constipation, hematochezia, melena, nausea and vomiting.   Genitourinary: Negative for difficulty urinating, dysuria, frequency and hematuria.   Musculoskeletal: Positive for arthralgias. Negative for myalgias, neck pain and neck stiffness.   Integumentary:  Negative for pallor and rash.   Neurological: Negative for headaches.   Psychiatric/Behavioral: Negative for dysphoric mood and suicidal ideas. The patient is not nervous/anxious.          Objective:      Physical Exam  Constitutional:       General: She is not in acute distress.     Appearance: She is well-developed.   HENT:      Head: Normocephalic and atraumatic.      Right Ear: External ear normal.      Left Ear: External ear normal.      Mouth/Throat:      Pharynx: No oropharyngeal exudate.   Eyes:      General: No scleral icterus.     Conjunctiva/sclera: Conjunctivae normal.      Pupils: Pupils are equal, round, and reactive to light.   Neck:      Musculoskeletal: Normal range of motion and neck supple.      Thyroid: No thyromegaly.   Cardiovascular:      Rate and Rhythm: Normal rate and regular rhythm.   Pulmonary:      Effort: Pulmonary effort is normal.      Breath sounds: Normal breath sounds. No wheezing.   Abdominal:      General: Abdomen is flat. Bowel sounds are normal. There is no distension.      Palpations: Abdomen is soft. There is no mass.      Tenderness: There is no abdominal tenderness. There is no guarding or rebound.      Hernia: No hernia is present.   Musculoskeletal:         General: No tenderness.   Lymphadenopathy:      Cervical: No cervical adenopathy.   Skin:     Findings: No rash.   Neurological:      Mental Status: She is alert and oriented to person, place, and time.         Assessment:       1. Gastroesophageal reflux disease, esophagitis presence not specified    2. Intestinal gas excretion        Plan:        Betzy was seen today for gas and diarrhea.    Diagnoses and all orders for this visit:    Gastroesophageal reflux disease, esophagitis presence not specified    Intestinal gas excretion    Other orders  -     esomeprazole (NEXIUM) 20 MG capsule; Take 1 capsule (20 mg total) by mouth before breakfast.  -     atenoloL (TENORMIN) 25 MG tablet; Take 1 tablet (25 mg total) by mouth once daily.          Monitor diet including dairy, vegetables.  See patient information sheet.  May use Nexium and simethicone as needed.  Consider gastro consult depending on response as well as colonoscopy in the spring

## 2020-09-06 ENCOUNTER — PATIENT OUTREACH (OUTPATIENT)
Dept: ADMINISTRATIVE | Facility: OTHER | Age: 80
End: 2020-09-06

## 2020-09-09 ENCOUNTER — OFFICE VISIT (OUTPATIENT)
Dept: DERMATOLOGY | Facility: CLINIC | Age: 80
End: 2020-09-09
Payer: MEDICARE

## 2020-09-09 VITALS — BODY MASS INDEX: 24.14 KG/M2 | WEIGHT: 132 LBS

## 2020-09-09 DIAGNOSIS — L71.9 ROSACEA: Primary | ICD-10-CM

## 2020-09-09 DIAGNOSIS — L81.4 LENTIGINES: ICD-10-CM

## 2020-09-09 DIAGNOSIS — L57.0 MULTIPLE ACTINIC KERATOSES: ICD-10-CM

## 2020-09-09 DIAGNOSIS — Z85.828 HISTORY OF SKIN CANCER: ICD-10-CM

## 2020-09-09 PROCEDURE — 1126F AMNT PAIN NOTED NONE PRSNT: CPT | Mod: HCNC,S$GLB,, | Performed by: DERMATOLOGY

## 2020-09-09 PROCEDURE — 1126F PR PAIN SEVERITY QUANTIFIED, NO PAIN PRESENT: ICD-10-PCS | Mod: HCNC,S$GLB,, | Performed by: DERMATOLOGY

## 2020-09-09 PROCEDURE — 99213 PR OFFICE/OUTPT VISIT, EST, LEVL III, 20-29 MIN: ICD-10-PCS | Mod: 25,HCNC,S$GLB, | Performed by: DERMATOLOGY

## 2020-09-09 PROCEDURE — 17000 DESTRUCT PREMALG LESION: CPT | Mod: HCNC,S$GLB,, | Performed by: DERMATOLOGY

## 2020-09-09 PROCEDURE — 17000 PR DESTRUCTION(LASER SURGERY,CRYOSURGERY,CHEMOSURGERY),PREMALIGNANT LESIONS,FIRST LESION: ICD-10-PCS | Mod: HCNC,S$GLB,, | Performed by: DERMATOLOGY

## 2020-09-09 PROCEDURE — 99999 PR PBB SHADOW E&M-EST. PATIENT-LVL III: ICD-10-PCS | Mod: PBBFAC,HCNC,, | Performed by: DERMATOLOGY

## 2020-09-09 PROCEDURE — 17003 DESTRUCT PREMALG LES 2-14: CPT | Mod: HCNC,S$GLB,, | Performed by: DERMATOLOGY

## 2020-09-09 PROCEDURE — 99213 OFFICE O/P EST LOW 20 MIN: CPT | Mod: 25,HCNC,S$GLB, | Performed by: DERMATOLOGY

## 2020-09-09 PROCEDURE — 99999 PR PBB SHADOW E&M-EST. PATIENT-LVL III: CPT | Mod: PBBFAC,HCNC,, | Performed by: DERMATOLOGY

## 2020-09-09 PROCEDURE — 1159F PR MEDICATION LIST DOCUMENTED IN MEDICAL RECORD: ICD-10-PCS | Mod: HCNC,S$GLB,, | Performed by: DERMATOLOGY

## 2020-09-09 PROCEDURE — 1159F MED LIST DOCD IN RCRD: CPT | Mod: HCNC,S$GLB,, | Performed by: DERMATOLOGY

## 2020-09-09 PROCEDURE — 17003 DESTRUCTION, PREMALIGNANT LESIONS; SECOND THROUGH 14 LESIONS: ICD-10-PCS | Mod: HCNC,S$GLB,, | Performed by: DERMATOLOGY

## 2020-09-09 PROCEDURE — 1101F PT FALLS ASSESS-DOCD LE1/YR: CPT | Mod: HCNC,CPTII,S$GLB, | Performed by: DERMATOLOGY

## 2020-09-09 PROCEDURE — 1101F PR PT FALLS ASSESS DOC 0-1 FALLS W/OUT INJ PAST YR: ICD-10-PCS | Mod: HCNC,CPTII,S$GLB, | Performed by: DERMATOLOGY

## 2020-09-09 RX ORDER — OXYMETAZOLINE HYDROCHLORIDE 1 G/100G
CREAM TOPICAL
Qty: 30 G | Refills: 3 | Status: SHIPPED | OUTPATIENT
Start: 2020-09-09 | End: 2023-07-25

## 2020-09-09 NOTE — PROGRESS NOTES
Subjective:       Patient ID:  Betzy Cooley is a 80 y.o. female who presents for   Chief Complaint   Patient presents with    Skin Check     UBSE     Spots on left temple not painful, also would like rx for rosacea.       Review of Systems   Constitutional: Negative for fever, chills, weight loss, weight gain, fatigue, night sweats and malaise.   Skin: Positive for daily sunscreen use, activity-related sunscreen use and wears hat.   Hematologic/Lymphatic: Bruises/bleeds easily.        Objective:    Physical Exam   Constitutional: She appears well-developed and well-nourished. No distress.   Neurological: She is alert and oriented to person, place, and time. She is not disoriented.   Psychiatric: She has a normal mood and affect.   Skin:   Areas Examined (abnormalities noted in diagram):   Head / Face Inspection Performed  Neck Inspection Performed  Chest / Axilla Inspection Performed  Abdomen Inspection Performed  Back Inspection Performed  RUE Inspected  LUE Inspection Performed  RLE Inspected  LLE Inspection Performed                   Diagram Legend     Erythematous scaling macule/papule c/w actinic keratosis       Vascular papule c/w angioma      Pigmented verrucoid papule/plaque c/w seborrheic keratosis      Yellow umbilicated papule c/w sebaceous hyperplasia      Irregularly shaped tan macule c/w lentigo     1-2 mm smooth white papules consistent with Milia      Movable subcutaneous cyst with punctum c/w epidermal inclusion cyst      Subcutaneous movable cyst c/w pilar cyst      Firm pink to brown papule c/w dermatofibroma      Pedunculated fleshy papule(s) c/w skin tag(s)      Evenly pigmented macule c/w junctional nevus     Mildly variegated pigmented, slightly irregular-bordered macule c/w mildly atypical nevus      Flesh colored to evenly pigmented papule c/w intradermal nevus       Pink pearly papule/plaque c/w basal cell carcinoma      Erythematous hyperkeratotic cursted plaque c/w SCC       "Surgical scar with no sign of skin cancer recurrence      Open and closed comedones      Inflammatory papules and pustules      Verrucoid papule consistent consistent with wart     Erythematous eczematous patches and plaques     Dystrophic onycholytic nail with subungual debris c/w onychomycosis     Umbilicated papule    Erythematous-base heme-crusted tan verrucoid plaque consistent with inflamed seborrheic keratosis     Erythematous Silvery Scaling Plaque c/w Psoriasis     See annotation      Assessment / Plan:        Rosacea  -     RHOFADE 1 % Crea; AAA face qday  Dispense: 30 g; Refill: 3    Multiple actinic keratoses   Cryosurgery Procedure Note    Verbal consent from the patient is obtained and the patient is aware of the precancerous quality and need for treatment of these lesions. Liquid nitrogen cryosurgery is applied to the 5 actinic keratoses, as detailed in the physical exam, to produce a freeze injury.      History of skin cancer  Comments:  scc right arm    Lentigines  The "ABCD" rules to observe pigmented lesions were reviewed.                 Follow up in about 6 months (around 3/9/2021).  "

## 2020-09-28 ENCOUNTER — OFFICE VISIT (OUTPATIENT)
Dept: GASTROENTEROLOGY | Facility: CLINIC | Age: 80
End: 2020-09-28
Payer: MEDICARE

## 2020-09-28 ENCOUNTER — TELEPHONE (OUTPATIENT)
Dept: GASTROENTEROLOGY | Facility: CLINIC | Age: 80
End: 2020-09-28

## 2020-09-28 VITALS
WEIGHT: 132.5 LBS | SYSTOLIC BLOOD PRESSURE: 154 MMHG | DIASTOLIC BLOOD PRESSURE: 84 MMHG | HEART RATE: 57 BPM | HEIGHT: 62 IN | BODY MASS INDEX: 24.38 KG/M2

## 2020-09-28 DIAGNOSIS — R14.0 GASSINESS: ICD-10-CM

## 2020-09-28 DIAGNOSIS — R19.7 DIARRHEA, UNSPECIFIED TYPE: ICD-10-CM

## 2020-09-28 DIAGNOSIS — R10.9 ABDOMINAL PAIN, UNSPECIFIED ABDOMINAL LOCATION: Primary | ICD-10-CM

## 2020-09-28 DIAGNOSIS — K21.9 GASTROESOPHAGEAL REFLUX DISEASE, ESOPHAGITIS PRESENCE NOT SPECIFIED: ICD-10-CM

## 2020-09-28 PROCEDURE — 99999 PR PBB SHADOW E&M-EST. PATIENT-LVL IV: CPT | Mod: PBBFAC,HCNC,, | Performed by: NURSE PRACTITIONER

## 2020-09-28 PROCEDURE — 1126F AMNT PAIN NOTED NONE PRSNT: CPT | Mod: HCNC,S$GLB,, | Performed by: NURSE PRACTITIONER

## 2020-09-28 PROCEDURE — 3077F PR MOST RECENT SYSTOLIC BLOOD PRESSURE >= 140 MM HG: ICD-10-PCS | Mod: HCNC,CPTII,S$GLB, | Performed by: NURSE PRACTITIONER

## 2020-09-28 PROCEDURE — 1101F PR PT FALLS ASSESS DOC 0-1 FALLS W/OUT INJ PAST YR: ICD-10-PCS | Mod: HCNC,CPTII,S$GLB, | Performed by: NURSE PRACTITIONER

## 2020-09-28 PROCEDURE — 1126F PR PAIN SEVERITY QUANTIFIED, NO PAIN PRESENT: ICD-10-PCS | Mod: HCNC,S$GLB,, | Performed by: NURSE PRACTITIONER

## 2020-09-28 PROCEDURE — 99204 PR OFFICE/OUTPT VISIT, NEW, LEVL IV, 45-59 MIN: ICD-10-PCS | Mod: HCNC,S$GLB,, | Performed by: NURSE PRACTITIONER

## 2020-09-28 PROCEDURE — 3079F DIAST BP 80-89 MM HG: CPT | Mod: HCNC,CPTII,S$GLB, | Performed by: NURSE PRACTITIONER

## 2020-09-28 PROCEDURE — 3077F SYST BP >= 140 MM HG: CPT | Mod: HCNC,CPTII,S$GLB, | Performed by: NURSE PRACTITIONER

## 2020-09-28 PROCEDURE — 1159F MED LIST DOCD IN RCRD: CPT | Mod: HCNC,S$GLB,, | Performed by: NURSE PRACTITIONER

## 2020-09-28 PROCEDURE — 99999 PR PBB SHADOW E&M-EST. PATIENT-LVL IV: ICD-10-PCS | Mod: PBBFAC,HCNC,, | Performed by: NURSE PRACTITIONER

## 2020-09-28 PROCEDURE — 3079F PR MOST RECENT DIASTOLIC BLOOD PRESSURE 80-89 MM HG: ICD-10-PCS | Mod: HCNC,CPTII,S$GLB, | Performed by: NURSE PRACTITIONER

## 2020-09-28 PROCEDURE — 1101F PT FALLS ASSESS-DOCD LE1/YR: CPT | Mod: HCNC,CPTII,S$GLB, | Performed by: NURSE PRACTITIONER

## 2020-09-28 PROCEDURE — 1159F PR MEDICATION LIST DOCUMENTED IN MEDICAL RECORD: ICD-10-PCS | Mod: HCNC,S$GLB,, | Performed by: NURSE PRACTITIONER

## 2020-09-28 PROCEDURE — 99204 OFFICE O/P NEW MOD 45 MIN: CPT | Mod: HCNC,S$GLB,, | Performed by: NURSE PRACTITIONER

## 2020-09-28 RX ORDER — INFLUENZA A VIRUS A/MICHIGAN/45/2015 X-275 (H1N1) ANTIGEN (FORMALDEHYDE INACTIVATED), INFLUENZA A VIRUS A/SINGAPORE/INFIMH-16-0019/2016 IVR-186 (H3N2) ANTIGEN (FORMALDEHYDE INACTIVATED), INFLUENZA B VIRUS B/PHUKET/3073/2013 ANTIGEN (FORMALDEHYDE INACTIVATED), AND INFLUENZA B VIRUS B/MARYLAND/15/2016 BX-69A ANTIGEN (FORMALDEHYDE INACTIVATED) 60; 60; 60; 60 UG/.7ML; UG/.7ML; UG/.7ML; UG/.7ML
INJECTION, SUSPENSION INTRAMUSCULAR
COMMUNITY
Start: 2020-09-10 | End: 2022-08-01

## 2020-09-28 NOTE — PROGRESS NOTES
[facility] Consultation Note    Reason for Consult:  The primary encounter diagnosis was Abdominal pain, unspecified abdominal location. Diagnoses of Diarrhea, unspecified type, Gassiness, and Gastroesophageal reflux disease, esophagitis presence not specified were also pertinent to this visit.    PCP:   Praful Paul   No address on file    Referring MD:  Aaareferral Self  No address on file    Initial History of Present Illness (HPI):  This is a 80 y.o. female here for evaluation of a number of GI complaints. New patient.   Gas and diarrhea started in Feb. Seems to come and go. She isnt sure what triggers. But happens Maybe once a month. Resolves with imodium.  Long hx of GERD, maybe 15 years. Hx of Nexium use. Will take Tums PRN. Recently started taking Nexium QAM for the past two weeks and it does help with the acid reflux. Had normal EGD for GERD in 2006  Abdominal pain - no  Dysphagia - no   Bowel habits - normal except for the once a month episodes of diarrhea.   GI bleeding - none  NSAID usage - occasionally    ROS:  Constitutional: No fevers, chills, No weight loss  ENT:  +sore throat, + PND  CV: + chest pain, no palpitation  Pulm: No cough, No shortness of breath, no wheezing  Ophtho: No vision changes  GI: see HPI  Derm: No rash, no itching  Heme: No easy bruising  MSK: + arthritis  : No dysuria, No hematuria  Neuro: No syncope, No seizure  Psych:+ anxiety, No uncontrolled depression    Medical History:  has a past medical history of Arthritis, Cataract, GERD (gastroesophageal reflux disease), HEARING LOSS, Hypertension, Osteoporosis, postmenopausal, Squamous Cell Carcinoma (2007), and Urinary incontinence.    Surgical History:  has a past surgical history that includes Fracture surgery (Left, 2008); Hysterectomy; Cystoscopy (N/A, 8/6/2019); and Colpopexy (N/A, 8/6/2019).    Family History: family history includes Arthritis in her sister; COPD in her father and mother;  Diabetes in her sister; Heart disease in her mother; Heart failure in her mother; Macular degeneration in her mother; No Known Problems in her brother, brother, brother, brother, brother, brother, daughter, daughter, sister, sister, sister, son, and son..     Social History:  reports that she has never smoked. She has never used smokeless tobacco. She reports current alcohol use. She reports that she does not use drugs.    Review of patient's allergies indicates:   Allergen Reactions    Sulfa (sulfonamide antibiotics) Hives     Other reaction(s): Anaphylaxis  Itching   Shortness of breath        Medication List with Changes/Refills   Current Medications    ASPIRIN 81 MG CHEW    Take 81 mg by mouth once daily. Not chewable    ATENOLOL (TENORMIN) 25 MG TABLET    Take 1 tablet (25 mg total) by mouth once daily.    CALCIUM-VITAMIN D3 500 MG(1,250MG) -200 UNIT PER TABLET    Take 1 tablet by mouth 2 (two) times daily with meals.    CO-ENZYME Q-10 30 MG CAPSULE    Take 30 mg by mouth once daily.    ESOMEPRAZOLE (NEXIUM) 20 MG CAPSULE    Take 1 capsule (20 mg total) by mouth before breakfast.    ESTRADIOL (ESTRACE) 0.01 % (0.1 MG/GRAM) VAGINAL CREAM    Place 0.5 g vaginally 3 (three) times a week. Place by fingertip application before bedtime three times a week (Monday, Wednesday, Friday)    FEXOFENADINE (ALLEGRA) 180 MG TABLET    Take 1 tablet (180 mg total) by mouth once daily.    FLUCONAZOLE (DIFLUCAN) 150 MG TAB    Take one weekly for 2 weeks    FLUTICASONE PROPIONATE (FLONASE) 50 MCG/ACTUATION NASAL SPRAY    SHAKE LIQUID AND USE 2 SPRAYS(100 MCG) IN EACH NOSTRIL EVERY DAY    FLUTICASONE PROPIONATE (FLONASE) 50 MCG/ACTUATION NASAL SPRAY    1 spray (50 mcg total) by Each Nostril route once daily.    FLUZONE HIGHDOSE QUAD 20-21  MCG/0.7 ML SYRG    ADM 0.7ML IM UTD    HYDROCORTISONE 2.5 % CREAM    Apply topically 2 (two) times daily. for 10 days    METRONIDAZOLE 1% (METROGEL) 1 % GEL    Apply topically once daily.  "   MULTIVITAMIN (MULTIVITAMIN) PER TABLET    Take 1 tablet by mouth once daily.    PSYLLIUM (KONSYL) POWD    Take by mouth once daily. 2 table spoons in 8 ounces of water    RHOFADE 1 % CREA    AAA face qday    SIMETHICONE (MYLICON) 80 MG CHEWABLE TABLET    Take 1 tablet (80 mg total) by mouth every 6 (six) hours as needed for Flatulence.    VITAMIN A-VITAMIN C-VIT E-MIN (VISION) TAB    Take 1 tablet by mouth once daily.         Objective Findings:    Vital Signs:  BP (!) 154/84 (BP Location: Left arm)   Pulse (!) 57   Ht 5' 2" (1.575 m)   Wt 60.1 kg (132 lb 7.9 oz)   BMI 24.23 kg/m²   Body mass index is 24.23 kg/m².    Physical Exam:  General Appearance: Well appearing, NAD noted  Eyes:    No scleral icterus  ENT:  No lesions or masses   Lungs: BBS CTA ,  no wheezes  Heart:  HRRR, S1 & S2 normal, no murmurs heard  Abdomen:  Non distended, soft, no guarding, no rebound, no tenderness, no appreciated ascites  Musculoskeletal:  No major joint deformities  Skin: No petechiae or rash on exposed skin areas  Neurologic:  Alert and oriented x4  Psychiatric:  Normal speech mentation and affect    Labs reviewed:  Lab Results   Component Value Date    WBC 9.12 07/14/2020    HGB 13.1 07/14/2020    HCT 41.5 07/14/2020     07/14/2020    CHOL 174 07/14/2020    TRIG 78 07/14/2020    HDL 58 07/14/2020    ALT 16 07/14/2020    AST 25 07/14/2020     07/14/2020    K 4.4 07/14/2020     07/14/2020    CREATININE 0.9 07/14/2020    BUN 9 07/14/2020    CO2 27 07/14/2020    TSH 2.121 05/22/2018    INR 0.9 01/04/2019           Imaging reviewed:  Abd xray 1/2020  No free air in the abdomen.  Scattered bowel gas is identified in the colon and small bowel.  No localized bowel dilatation.  Vascular calcifications noted.    Endoscopy reviewed:    Colonoscopy 2014.  Poor prep to the cecum.  No specimens removed.  Recommended repeat in 7 years.    Normal EGD in 2006    Medical Decision Making:    Assessment:    Betzy Cooley" is a 80 y.o. female here for:    1. Abdominal pain, unspecified abdominal location    2. Diarrhea, unspecified type    3. Gassiness    4. Gastroesophageal reflux disease, esophagitis presence not specified       GERD improved with getting back on Nexium QAM. She does not have alarm sx, so invasive testing is not necessary. However we did discuss testing to include labs, stool test and EGD. She wants to discuss with her  first before proceeding with testing.       Recommendations:  1. Cut back of veggies as this can cause gas  2. Continue Nexium  3. Labs, stool and EGD in the future if pt would like  4. Continue imodium PRN for the day she has diarrhea once a month  5. Heathers Tummy tamers    F/u prn if sx worsen or change    Order summary:    40 MINUTES TOTAL FACE TO FACE >50% SPENT IN COUNSELING AND COORDINATION OF CARE Discussed her sx and potential treatment plan. Discussed causes of gas           Thank you for allowing me to participate in the care of Betzy Mccollum, LILIANP-C

## 2020-09-28 NOTE — PATIENT INSTRUCTIONS
There is a natural supplement called Heathers Tummy tamers. Found on amazon, this may help with gas.     These are things than can cause bloating and gas  -No artificial sweeteners  -No dairy (milk, cheese, butter, margarine, yogurt, ice cream, coffee creamer, etc.)  No carbonated beverages  No drinking from straws  No chewing gum    If your symptoms resolve completely with the above items, then you may try to slowly introduce the above ONLY one at a time to help determine what may or may not be potentially causing your symptoms. Only one item at a time should be tried for a full week before introducing another item into your diet.

## 2020-10-01 ENCOUNTER — OFFICE VISIT (OUTPATIENT)
Dept: OPTOMETRY | Facility: CLINIC | Age: 80
End: 2020-10-01
Payer: MEDICARE

## 2020-10-01 DIAGNOSIS — H52.4 PRESBYOPIA OF BOTH EYES: ICD-10-CM

## 2020-10-01 DIAGNOSIS — H52.202 HYPEROPIA OF LEFT EYE WITH ASTIGMATISM: ICD-10-CM

## 2020-10-01 DIAGNOSIS — H25.13 NUCLEAR SCLEROSIS, BILATERAL: Primary | ICD-10-CM

## 2020-10-01 DIAGNOSIS — H25.013 CORTICAL SENILE CATARACT, BILATERAL: ICD-10-CM

## 2020-10-01 DIAGNOSIS — D31.32 CHOROIDAL NEVUS OF LEFT EYE: ICD-10-CM

## 2020-10-01 DIAGNOSIS — H52.01 HYPEROPIA OF RIGHT EYE: ICD-10-CM

## 2020-10-01 DIAGNOSIS — H52.02 HYPEROPIA OF LEFT EYE WITH ASTIGMATISM: ICD-10-CM

## 2020-10-01 PROCEDURE — 92014 PR EYE EXAM, EST PATIENT,COMPREHESV: ICD-10-PCS | Mod: HCNC,S$GLB,, | Performed by: OPTOMETRIST

## 2020-10-01 PROCEDURE — 92015 PR REFRACTION: ICD-10-PCS | Mod: HCNC,S$GLB,, | Performed by: OPTOMETRIST

## 2020-10-01 PROCEDURE — 92014 COMPRE OPH EXAM EST PT 1/>: CPT | Mod: HCNC,S$GLB,, | Performed by: OPTOMETRIST

## 2020-10-01 PROCEDURE — 99999 PR PBB SHADOW E&M-EST. PATIENT-LVL III: ICD-10-PCS | Mod: PBBFAC,HCNC,, | Performed by: OPTOMETRIST

## 2020-10-01 PROCEDURE — 92015 DETERMINE REFRACTIVE STATE: CPT | Mod: HCNC,S$GLB,, | Performed by: OPTOMETRIST

## 2020-10-01 PROCEDURE — 99999 PR PBB SHADOW E&M-EST. PATIENT-LVL III: CPT | Mod: PBBFAC,HCNC,, | Performed by: OPTOMETRIST

## 2020-10-01 NOTE — PROGRESS NOTES
"HPI     Annual Exam      Additional comments: Annual general eye exam and refraction.  Uses glasses mostly for reading.  Happy with unaided distance VA.  No complaints.              Comments     Patient's age: 80 y.o. WF  Occupation: Retired  Approximate date of last eye examination:  09/05/2019  Name of last eye doctor seen:    City/State: [facility]  Wears glasses? Yes     If yes, wears  Full-time or part-time?  Part-time  Present glasses are: Bifocal, SV Distance, SV Reading?  Bifocals lens  Approximate age of present glasses:  5 + years   Got new glasses following last exam, or subsequently?:  no   Any problem with VA with glasses?  Pt states vision is stable  Wears CLs?:  no  Headaches?  no  Eye pain/discomfort?  no                                                                        Flashes?  No  Floaters?  No  Diplopia/Double vision?  no  Patient's Ocular History:         Any eye surgeries? no         Any eye injury?  no         Any treatment for eye disease?  no  Family history of eye disease?  no  Significant patient medical history:         1. Diabetes?  no       If yes, IDDM or NIDDM? no   2. HBP?  no              3. Other (describe):  Acid reflux   ! OTC eyedrops currently using:  Systane prn    ! Prescription eye meds currently using:  Optivar as needed for   allergy-related symptoms   ! Any history of allergy/adverse reaction to any eye meds used   previously?  no    ! Any history of allergy/adverse reaction to eyedrops used during prior   eye exam(s)? no    ! Any history of allergy/adverse reaction to Novacaine or similar meds?   no   ! Any history of allergy/adverse reaction to Epinephrine or similar meds?   no    ! Patient okay with use of anesthetic eyedrops to check eye pressure?    yes        ! Patient okay with use of eyedrops to dilate pupils today?  yes   !  Allergies/Medications/Medical History/Family History reviewed today?    yes      PD =   61/58  Desired reading distance =  14.5" "                                                                        Last edited by Shashank Camarena, OD on 10/1/2020 10:04 AM. (History)            Assessment /Plan     For exam results, see Encounter Report.    1. Nuclear sclerosis, bilateral     2. Cortical senile cataract, bilateral     3. Choroidal nevus of left eye     4. Hyperopia of right eye     5. Hyperopia of left eye with astigmatism     6. Presbyopia of both eyes                    Early nuclear sclerotic/peripheral cortical cataract in both eyes.   Still no need for cataract surgery.  Continue to monitor.     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   Stable.     Otherwise, ocular health appears good in each eye.     Hyperopia in the right eye, and hyperopia with astigmatism in the left eye.  Satisfactory best-corrected VA in each eye.  Presbyopia consistent with age.  New spectacle lens Rx issued for use as desired.   Recheck in 12 -18 months.

## 2020-10-01 NOTE — PATIENT INSTRUCTIONS
Early nuclear sclerotic/peripheral cortical cataract in both eyes.   Still no need for cataract surgery.  Continue to monitor.     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   Stable.     Otherwise, ocular health appears good in each eye.     Hyperopia in the right eye, and hyperopia with astigmatism in the left eye.  Satisfactory best-corrected VA in each eye.  Presbyopia consistent with age.  New spectacle lens Rx issued for use as desired.   Recheck in 12 -18 months.

## 2020-10-19 ENCOUNTER — TELEPHONE (OUTPATIENT)
Dept: UROLOGY | Facility: CLINIC | Age: 80
End: 2020-10-19

## 2020-10-19 NOTE — TELEPHONE ENCOUNTER
----- Message from Zbigniew Lemus sent at 10/19/2020  3:07 PM CDT -----  Patient called to confirm she'd be able to make her appt on 11/4 @1:40p

## 2020-11-04 ENCOUNTER — OFFICE VISIT (OUTPATIENT)
Dept: UROLOGY | Facility: CLINIC | Age: 80
End: 2020-11-04
Payer: MEDICARE

## 2020-11-04 VITALS
HEART RATE: 54 BPM | BODY MASS INDEX: 23.53 KG/M2 | DIASTOLIC BLOOD PRESSURE: 77 MMHG | WEIGHT: 127.88 LBS | HEIGHT: 62 IN | SYSTOLIC BLOOD PRESSURE: 137 MMHG

## 2020-11-04 DIAGNOSIS — Z09 FOLLOW-UP EXAMINATION AFTER UROLOGICAL SURGERY: Primary | ICD-10-CM

## 2020-11-04 PROCEDURE — 3078F PR MOST RECENT DIASTOLIC BLOOD PRESSURE < 80 MM HG: ICD-10-PCS | Mod: HCNC,CPTII,S$GLB, | Performed by: UROLOGY

## 2020-11-04 PROCEDURE — 99999 PR PBB SHADOW E&M-EST. PATIENT-LVL III: CPT | Mod: PBBFAC,HCNC,, | Performed by: UROLOGY

## 2020-11-04 PROCEDURE — 99213 OFFICE O/P EST LOW 20 MIN: CPT | Mod: HCNC,S$GLB,, | Performed by: UROLOGY

## 2020-11-04 PROCEDURE — 3075F PR MOST RECENT SYSTOLIC BLOOD PRESS GE 130-139MM HG: ICD-10-PCS | Mod: HCNC,CPTII,S$GLB, | Performed by: UROLOGY

## 2020-11-04 PROCEDURE — 99213 PR OFFICE/OUTPT VISIT, EST, LEVL III, 20-29 MIN: ICD-10-PCS | Mod: HCNC,S$GLB,, | Performed by: UROLOGY

## 2020-11-04 PROCEDURE — 1159F PR MEDICATION LIST DOCUMENTED IN MEDICAL RECORD: ICD-10-PCS | Mod: HCNC,S$GLB,, | Performed by: UROLOGY

## 2020-11-04 PROCEDURE — 1101F PR PT FALLS ASSESS DOC 0-1 FALLS W/OUT INJ PAST YR: ICD-10-PCS | Mod: HCNC,CPTII,S$GLB, | Performed by: UROLOGY

## 2020-11-04 PROCEDURE — 1101F PT FALLS ASSESS-DOCD LE1/YR: CPT | Mod: HCNC,CPTII,S$GLB, | Performed by: UROLOGY

## 2020-11-04 PROCEDURE — 3075F SYST BP GE 130 - 139MM HG: CPT | Mod: HCNC,CPTII,S$GLB, | Performed by: UROLOGY

## 2020-11-04 PROCEDURE — 99999 PR PBB SHADOW E&M-EST. PATIENT-LVL III: ICD-10-PCS | Mod: PBBFAC,HCNC,, | Performed by: UROLOGY

## 2020-11-04 PROCEDURE — 3078F DIAST BP <80 MM HG: CPT | Mod: HCNC,CPTII,S$GLB, | Performed by: UROLOGY

## 2020-11-04 PROCEDURE — 1159F MED LIST DOCD IN RCRD: CPT | Mod: HCNC,S$GLB,, | Performed by: UROLOGY

## 2020-11-04 NOTE — PROGRESS NOTES
CHIEF COMPLAINT:    Mrs. Cooley is a 80 y.o. female presenting for a follow up status post A/P repair and cystoscopy on 8/6/2019.    PRESENTING ILLNESS:    Betzy Cooley is a 80 y.o. female who returns stating that sometimes when she has a bowel movement she feels the prolapse coming down but it goes right back up.  She has no difficulty Michelle or urinary complaints.  She and her  are rarely sexually active, but when they are, there is no pain.  She has no vaginal discharge.    REVIEW OF SYSTEMS:    Review of Systems   Constitutional: Negative.    HENT: Negative.    Eyes: Negative.    Respiratory: Negative.    Cardiovascular: Negative.    Gastrointestinal: Negative.  Negative for constipation.   Genitourinary: Negative.    Musculoskeletal: Positive for joint pain.   Skin: Negative.    Neurological: Negative.    Endo/Heme/Allergies: Negative.    Psychiatric/Behavioral: Negative.        PATIENT HISTORY:    Past Medical History:   Diagnosis Date    Arthritis     Cataract     GERD (gastroesophageal reflux disease)     HEARING LOSS     Hypertension     Osteoporosis, postmenopausal     Squamous Cell Carcinoma 2007    right forearm    Urinary incontinence     rare mixed       Past Surgical History:   Procedure Laterality Date    COLPOPEXY N/A 8/6/2019    Procedure: COLPOPEXY SSL FIXATION;  Surgeon: Alma Dorsey MD;  Location: [facility] OR [address] [place] [address];  Service: Urology;  Laterality: N/A;    CYSTOSCOPY N/A 8/6/2019    Procedure: CYSTOSCOPY;  Surgeon: Alma Dorsey MD;  Location: [facility] OR [address] [place] [address];  Service: Urology;  Laterality: N/A;    FRACTURE SURGERY Left 2008    open radius/ulna fracture    HYSTERECTOMY      TANIA/ovaries remain--fibroids- in her late 30's / early 40's       Family History   Problem Relation Age of Onset    Heart failure Mother     Macular degeneration Mother     Heart disease Mother     COPD Mother     Diabetes Sister     Arthritis Sister         rheumatoid arthritis    COPD  Father      Socioeconomic History    Marital status:    Social Needs    Financial resource strain: Not hard at all    Food insecurity     Worry: Never true     Inability: Never true    Transportation needs     Medical: No     Non-medical: No   Tobacco Use    Smoking status: Never Smoker    Smokeless tobacco: Never Used   Substance and Sexual Activity    Alcohol use: Yes     Frequency: 2-4 times a month     Drinks per session: 1 or 2     Binge frequency: Never     Comment: 1-2 glasses wine weekly    Drug use: No    Sexual activity: Yes     Partners: Male     Birth control/protection: None   Lifestyle    Physical activity     Days per week: 3 days     Minutes per session: 150+ min    Stress: Only a little   Relationships    Social connections     Talks on phone: Twice a week     Gets together: Once a week     Attends Christianity service: Not on file     Active member of club or organization: No     Attends meetings of clubs or organizations: Never     Relationship status:        Allergies:  Sulfa (sulfonamide antibiotics)    Medications:  Outpatient Encounter Medications as of 11/4/2020   Medication Sig Dispense Refill    aspirin 81 MG Chew Take 81 mg by mouth once daily. Not chewable      atenoloL (TENORMIN) 25 MG tablet Take 1 tablet (25 mg total) by mouth once daily. 90 tablet 3    calcium-vitamin D3 500 mg(1,250mg) -200 unit per tablet Take 1 tablet by mouth 2 (two) times daily with meals.      co-enzyme Q-10 30 mg capsule Take 30 mg by mouth once daily.      esomeprazole (NEXIUM) 20 MG capsule Take 1 capsule (20 mg total) by mouth before breakfast.      estradiol (ESTRACE) 0.01 % (0.1 mg/gram) vaginal cream Place 0.5 g vaginally 3 (three) times a week. Place by fingertip application before bedtime three times a week (Monday, Wednesday, Friday) 45 g 3    fluconazole (DIFLUCAN) 150 MG Tab Take one weekly for 2 weeks 2 tablet 1    fluticasone propionate (FLONASE) 50 mcg/actuation  nasal spray SHAKE LIQUID AND USE 2 SPRAYS(100 MCG) IN EACH NOSTRIL EVERY DAY 48 mL 0    fluticasone propionate (FLONASE) 50 mcg/actuation nasal spray 1 spray (50 mcg total) by Each Nostril route once daily. 9.9 mL 0    FLUZONE HIGHDOSE QUAD 20-21  mcg/0.7 mL Syrg ADM 0.7ML IM UTD      metronidazole 1% (METROGEL) 1 % Gel Apply topically once daily. 60 g 3    multivitamin (MULTIVITAMIN) per tablet Take 1 tablet by mouth once daily.      psyllium (KONSYL) Powd Take by mouth once daily. 2 table spoons in 8 ounces of water      RHOFADE 1 % Crea AAA face qday 30 g 3    simethicone (MYLICON) 80 MG chewable tablet Take 1 tablet (80 mg total) by mouth every 6 (six) hours as needed for Flatulence. 30 tablet 0    vitamin A-vitamin C-vit E-min (VISION) Tab Take 1 tablet by mouth once daily.      fexofenadine (ALLEGRA) 180 MG tablet Take 1 tablet (180 mg total) by mouth once daily. (Patient taking differently: Take 180 mg by mouth once daily. Takes as needed) 30 tablet 11    hydrocortisone 2.5 % cream Apply topically 2 (two) times daily. for 10 days 20 g 0     No facility-administered encounter medications on file as of 11/4/2020.          PHYSICAL EXAMINATION:    The patient generally appears in good health, is appropriately interactive, and is in no apparent distress.    Skin: No lesions.    Mental: Cooperative with normal affect.    Neuro: Grossly intact.    HEENT: Normal. No evidence of lymphadenopathy.    Chest:  normal inspiratory effort.    Abdomen:  Soft, non-tender. No masses or organomegaly. Bladder is not palpable. No evidence of flank discomfort. No evidence of inguinal hernia.    Extremities: No clubbing, cyanosis, or edema    Normal external female genitalia  Urethral meatus is normal  Urethra and bladder are nontender to bimanual exam  Well supported posteriorly   Anteriorly, has a stage II Aa is -1, cystocele  Uterus and cervix are surgically absent  No adnexal masses    LABS:    Lab Results    Component Value Date    BUN 9 07/14/2020    CREATININE 0.9 07/14/2020     UA 1.025, pH 5, otherwise, negative    IMPRESSION:    Encounter Diagnoses   Name Primary?    Follow-up examination after urological surgery Yes       PLAN:    1.  Follow up in 1 year, sooner if any concerns

## 2020-11-19 ENCOUNTER — TELEPHONE (OUTPATIENT)
Dept: INTERNAL MEDICINE | Facility: CLINIC | Age: 80
End: 2020-11-19

## 2021-01-15 ENCOUNTER — IMMUNIZATION (OUTPATIENT)
Dept: OBSTETRICS AND GYNECOLOGY | Facility: CLINIC | Age: 81
End: 2021-01-15
Payer: MEDICARE

## 2021-01-15 DIAGNOSIS — Z23 NEED FOR VACCINATION: Primary | ICD-10-CM

## 2021-01-15 PROCEDURE — 91300 COVID-19, MRNA, LNP-S, PF, 30 MCG/0.3 ML DOSE VACCINE: CPT | Mod: PBBFAC | Performed by: FAMILY MEDICINE

## 2021-02-05 ENCOUNTER — IMMUNIZATION (OUTPATIENT)
Dept: OBSTETRICS AND GYNECOLOGY | Facility: CLINIC | Age: 81
End: 2021-02-05
Payer: MEDICARE

## 2021-02-05 DIAGNOSIS — Z23 NEED FOR VACCINATION: Primary | ICD-10-CM

## 2021-02-05 PROCEDURE — 91300 COVID-19, MRNA, LNP-S, PF, 30 MCG/0.3 ML DOSE VACCINE: CPT | Mod: PBBFAC | Performed by: FAMILY MEDICINE

## 2021-02-05 PROCEDURE — 0002A COVID-19, MRNA, LNP-S, PF, 30 MCG/0.3 ML DOSE VACCINE: CPT | Mod: PBBFAC | Performed by: FAMILY MEDICINE

## 2021-02-11 ENCOUNTER — TELEPHONE (OUTPATIENT)
Dept: UROLOGY | Facility: CLINIC | Age: 81
End: 2021-02-11

## 2021-02-11 DIAGNOSIS — B37.31 YEAST VAGINITIS: Primary | ICD-10-CM

## 2021-02-12 RX ORDER — FLUCONAZOLE 150 MG/1
150 TABLET ORAL ONCE
Qty: 1 TABLET | Refills: 0 | Status: SHIPPED | OUTPATIENT
Start: 2021-02-12 | End: 2021-02-12

## 2021-05-17 ENCOUNTER — OFFICE VISIT (OUTPATIENT)
Dept: URGENT CARE | Facility: CLINIC | Age: 81
End: 2021-05-17
Payer: MEDICARE

## 2021-05-17 VITALS
OXYGEN SATURATION: 99 % | HEART RATE: 57 BPM | DIASTOLIC BLOOD PRESSURE: 77 MMHG | BODY MASS INDEX: 23.92 KG/M2 | TEMPERATURE: 98 F | WEIGHT: 130 LBS | SYSTOLIC BLOOD PRESSURE: 149 MMHG | HEIGHT: 62 IN

## 2021-05-17 DIAGNOSIS — M25.511 ACUTE PAIN OF RIGHT SHOULDER: Primary | ICD-10-CM

## 2021-05-17 DIAGNOSIS — S46.811A STRAIN OF RIGHT SUPRASPINATUS MUSCLE, INITIAL ENCOUNTER: ICD-10-CM

## 2021-05-17 DIAGNOSIS — S46.219A: ICD-10-CM

## 2021-05-17 DIAGNOSIS — M75.30 CALCIFIC TENDINITIS OF SHOULDER REGION, UNSPECIFIED LATERALITY: ICD-10-CM

## 2021-05-17 PROCEDURE — 99214 OFFICE O/P EST MOD 30 MIN: CPT | Mod: S$GLB,,, | Performed by: INTERNAL MEDICINE

## 2021-05-17 PROCEDURE — 99214 PR OFFICE/OUTPT VISIT, EST, LEVL IV, 30-39 MIN: ICD-10-PCS | Mod: S$GLB,,, | Performed by: INTERNAL MEDICINE

## 2021-05-17 PROCEDURE — 73030 X-RAY EXAM OF SHOULDER: CPT | Mod: RT,S$GLB,, | Performed by: RADIOLOGY

## 2021-05-17 PROCEDURE — 73030 XR SHOULDER COMPLETE 2 OR MORE VIEWS RIGHT: ICD-10-PCS | Mod: RT,S$GLB,, | Performed by: RADIOLOGY

## 2021-05-17 RX ORDER — NAPROXEN SODIUM 220 MG
220 TABLET ORAL 2 TIMES DAILY WITH MEALS
Qty: 10 TABLET | Refills: 0 | Status: SHIPPED | OUTPATIENT
Start: 2021-05-17 | End: 2021-05-22

## 2021-05-17 RX ORDER — ACETAMINOPHEN 500 MG
500 TABLET ORAL EVERY 6 HOURS PRN
Refills: 0
Start: 2021-05-17 | End: 2023-08-28

## 2021-05-17 RX ORDER — KETOROLAC TROMETHAMINE 30 MG/ML
15 INJECTION, SOLUTION INTRAMUSCULAR; INTRAVENOUS
Status: DISCONTINUED | OUTPATIENT
Start: 2021-05-17 | End: 2021-05-17

## 2021-05-17 RX ORDER — FLUCONAZOLE 150 MG/1
TABLET ORAL
Status: ON HOLD | COMMUNITY
Start: 2021-02-12 | End: 2024-04-01

## 2021-05-18 ENCOUNTER — CLINICAL SUPPORT (OUTPATIENT)
Dept: REHABILITATION | Facility: HOSPITAL | Age: 81
End: 2021-05-18
Attending: INTERNAL MEDICINE
Payer: MEDICARE

## 2021-05-18 DIAGNOSIS — S46.811A STRAIN OF RIGHT SUPRASPINATUS MUSCLE, INITIAL ENCOUNTER: ICD-10-CM

## 2021-05-18 DIAGNOSIS — R29.3 POSTURE IMBALANCE: ICD-10-CM

## 2021-05-18 DIAGNOSIS — S46.219A: ICD-10-CM

## 2021-05-18 DIAGNOSIS — M75.30 CALCIFIC TENDINITIS OF SHOULDER REGION, UNSPECIFIED LATERALITY: ICD-10-CM

## 2021-05-18 DIAGNOSIS — R29.898 SHOULDER WEAKNESS: ICD-10-CM

## 2021-05-18 PROCEDURE — 97161 PT EVAL LOW COMPLEX 20 MIN: CPT

## 2021-05-18 PROCEDURE — 97110 THERAPEUTIC EXERCISES: CPT

## 2021-05-19 ENCOUNTER — OFFICE VISIT (OUTPATIENT)
Dept: DERMATOLOGY | Facility: CLINIC | Age: 81
End: 2021-05-19
Payer: MEDICARE

## 2021-05-19 VITALS — BODY MASS INDEX: 23.78 KG/M2 | WEIGHT: 130 LBS

## 2021-05-19 DIAGNOSIS — L57.0 MULTIPLE ACTINIC KERATOSES: Primary | ICD-10-CM

## 2021-05-19 DIAGNOSIS — Z85.828 HISTORY OF SKIN CANCER: ICD-10-CM

## 2021-05-19 DIAGNOSIS — L81.4 LENTIGINES: ICD-10-CM

## 2021-05-19 DIAGNOSIS — D18.01 CHERRY ANGIOMA: ICD-10-CM

## 2021-05-19 DIAGNOSIS — L82.1 SEBORRHEIC KERATOSES: ICD-10-CM

## 2021-05-19 PROCEDURE — 3288F FALL RISK ASSESSMENT DOCD: CPT | Mod: CPTII,S$GLB,, | Performed by: DERMATOLOGY

## 2021-05-19 PROCEDURE — 1126F AMNT PAIN NOTED NONE PRSNT: CPT | Mod: S$GLB,,, | Performed by: DERMATOLOGY

## 2021-05-19 PROCEDURE — 1159F MED LIST DOCD IN RCRD: CPT | Mod: S$GLB,,, | Performed by: DERMATOLOGY

## 2021-05-19 PROCEDURE — 99999 PR PBB SHADOW E&M-EST. PATIENT-LVL IV: ICD-10-PCS | Mod: PBBFAC,,, | Performed by: DERMATOLOGY

## 2021-05-19 PROCEDURE — 1101F PR PT FALLS ASSESS DOC 0-1 FALLS W/OUT INJ PAST YR: ICD-10-PCS | Mod: CPTII,S$GLB,, | Performed by: DERMATOLOGY

## 2021-05-19 PROCEDURE — 1101F PT FALLS ASSESS-DOCD LE1/YR: CPT | Mod: CPTII,S$GLB,, | Performed by: DERMATOLOGY

## 2021-05-19 PROCEDURE — 99213 OFFICE O/P EST LOW 20 MIN: CPT | Mod: S$GLB,,, | Performed by: DERMATOLOGY

## 2021-05-19 PROCEDURE — 99999 PR PBB SHADOW E&M-EST. PATIENT-LVL IV: CPT | Mod: PBBFAC,,, | Performed by: DERMATOLOGY

## 2021-05-19 PROCEDURE — 99213 PR OFFICE/OUTPT VISIT, EST, LEVL III, 20-29 MIN: ICD-10-PCS | Mod: S$GLB,,, | Performed by: DERMATOLOGY

## 2021-05-19 PROCEDURE — 3288F PR FALLS RISK ASSESSMENT DOCUMENTED: ICD-10-PCS | Mod: CPTII,S$GLB,, | Performed by: DERMATOLOGY

## 2021-05-19 PROCEDURE — 1159F PR MEDICATION LIST DOCUMENTED IN MEDICAL RECORD: ICD-10-PCS | Mod: S$GLB,,, | Performed by: DERMATOLOGY

## 2021-05-19 PROCEDURE — 1126F PR PAIN SEVERITY QUANTIFIED, NO PAIN PRESENT: ICD-10-PCS | Mod: S$GLB,,, | Performed by: DERMATOLOGY

## 2021-05-19 RX ORDER — FLUOROURACIL 50 MG/G
CREAM TOPICAL
Qty: 40 G | Refills: 3 | Status: SHIPPED | OUTPATIENT
Start: 2021-05-19 | End: 2023-08-28

## 2021-05-21 ENCOUNTER — TELEPHONE (OUTPATIENT)
Dept: DERMATOLOGY | Facility: CLINIC | Age: 81
End: 2021-05-21

## 2021-05-31 ENCOUNTER — CLINICAL SUPPORT (OUTPATIENT)
Dept: REHABILITATION | Facility: HOSPITAL | Age: 81
End: 2021-05-31
Attending: INTERNAL MEDICINE
Payer: MEDICARE

## 2021-05-31 DIAGNOSIS — R29.898 SHOULDER WEAKNESS: ICD-10-CM

## 2021-05-31 DIAGNOSIS — R29.3 POSTURE IMBALANCE: ICD-10-CM

## 2021-05-31 PROCEDURE — 97110 THERAPEUTIC EXERCISES: CPT

## 2021-05-31 PROCEDURE — 97140 MANUAL THERAPY 1/> REGIONS: CPT

## 2021-06-07 ENCOUNTER — CLINICAL SUPPORT (OUTPATIENT)
Dept: REHABILITATION | Facility: HOSPITAL | Age: 81
End: 2021-06-07
Attending: INTERNAL MEDICINE
Payer: MEDICARE

## 2021-06-07 DIAGNOSIS — R29.898 SHOULDER WEAKNESS: ICD-10-CM

## 2021-06-07 DIAGNOSIS — R29.3 POSTURE IMBALANCE: ICD-10-CM

## 2021-06-07 PROCEDURE — 97110 THERAPEUTIC EXERCISES: CPT

## 2021-06-07 PROCEDURE — 97140 MANUAL THERAPY 1/> REGIONS: CPT

## 2021-06-10 ENCOUNTER — CLINICAL SUPPORT (OUTPATIENT)
Dept: REHABILITATION | Facility: HOSPITAL | Age: 81
End: 2021-06-10
Attending: INTERNAL MEDICINE
Payer: MEDICARE

## 2021-06-10 DIAGNOSIS — R29.898 SHOULDER WEAKNESS: ICD-10-CM

## 2021-06-10 DIAGNOSIS — R29.3 POSTURE IMBALANCE: ICD-10-CM

## 2021-06-10 PROCEDURE — 97110 THERAPEUTIC EXERCISES: CPT

## 2021-06-10 PROCEDURE — 97140 MANUAL THERAPY 1/> REGIONS: CPT

## 2021-06-17 ENCOUNTER — CLINICAL SUPPORT (OUTPATIENT)
Dept: REHABILITATION | Facility: HOSPITAL | Age: 81
End: 2021-06-17
Attending: INTERNAL MEDICINE
Payer: MEDICARE

## 2021-06-17 DIAGNOSIS — R29.898 SHOULDER WEAKNESS: ICD-10-CM

## 2021-06-17 DIAGNOSIS — R29.3 POSTURE IMBALANCE: ICD-10-CM

## 2021-06-17 PROCEDURE — 97140 MANUAL THERAPY 1/> REGIONS: CPT

## 2021-06-17 PROCEDURE — 97110 THERAPEUTIC EXERCISES: CPT

## 2021-06-21 ENCOUNTER — CLINICAL SUPPORT (OUTPATIENT)
Dept: REHABILITATION | Facility: HOSPITAL | Age: 81
End: 2021-06-21
Attending: INTERNAL MEDICINE
Payer: MEDICARE

## 2021-06-21 DIAGNOSIS — R29.3 POSTURE IMBALANCE: ICD-10-CM

## 2021-06-21 DIAGNOSIS — R29.898 SHOULDER WEAKNESS: ICD-10-CM

## 2021-06-21 PROCEDURE — 97140 MANUAL THERAPY 1/> REGIONS: CPT

## 2021-06-21 PROCEDURE — 97110 THERAPEUTIC EXERCISES: CPT

## 2021-06-30 ENCOUNTER — PES CALL (OUTPATIENT)
Dept: ADMINISTRATIVE | Facility: CLINIC | Age: 81
End: 2021-06-30

## 2021-07-07 ENCOUNTER — CLINICAL SUPPORT (OUTPATIENT)
Dept: REHABILITATION | Facility: HOSPITAL | Age: 81
End: 2021-07-07
Attending: INTERNAL MEDICINE
Payer: MEDICARE

## 2021-07-07 DIAGNOSIS — R29.3 POSTURE IMBALANCE: ICD-10-CM

## 2021-07-07 DIAGNOSIS — R29.898 SHOULDER WEAKNESS: ICD-10-CM

## 2021-07-07 PROCEDURE — 97140 MANUAL THERAPY 1/> REGIONS: CPT

## 2021-07-07 PROCEDURE — 97110 THERAPEUTIC EXERCISES: CPT

## 2021-07-08 ENCOUNTER — DOCUMENTATION ONLY (OUTPATIENT)
Dept: REHABILITATION | Facility: HOSPITAL | Age: 81
End: 2021-07-08

## 2021-07-09 ENCOUNTER — CLINICAL SUPPORT (OUTPATIENT)
Dept: REHABILITATION | Facility: HOSPITAL | Age: 81
End: 2021-07-09
Attending: INTERNAL MEDICINE
Payer: MEDICARE

## 2021-07-09 DIAGNOSIS — R29.3 POSTURE IMBALANCE: ICD-10-CM

## 2021-07-09 DIAGNOSIS — R29.898 SHOULDER WEAKNESS: ICD-10-CM

## 2021-07-09 PROCEDURE — 97110 THERAPEUTIC EXERCISES: CPT

## 2021-07-09 PROCEDURE — 97140 MANUAL THERAPY 1/> REGIONS: CPT

## 2021-07-12 ENCOUNTER — CLINICAL SUPPORT (OUTPATIENT)
Dept: REHABILITATION | Facility: HOSPITAL | Age: 81
End: 2021-07-12
Attending: INTERNAL MEDICINE
Payer: MEDICARE

## 2021-07-12 DIAGNOSIS — R29.3 POSTURE IMBALANCE: ICD-10-CM

## 2021-07-12 DIAGNOSIS — R29.898 SHOULDER WEAKNESS: ICD-10-CM

## 2021-07-12 PROCEDURE — 97110 THERAPEUTIC EXERCISES: CPT

## 2021-07-12 PROCEDURE — 97140 MANUAL THERAPY 1/> REGIONS: CPT

## 2021-07-14 ENCOUNTER — CLINICAL SUPPORT (OUTPATIENT)
Dept: REHABILITATION | Facility: HOSPITAL | Age: 81
End: 2021-07-14
Attending: INTERNAL MEDICINE
Payer: MEDICARE

## 2021-07-14 DIAGNOSIS — R29.3 POSTURE IMBALANCE: ICD-10-CM

## 2021-07-14 DIAGNOSIS — R29.898 SHOULDER WEAKNESS: ICD-10-CM

## 2021-07-14 PROCEDURE — 97140 MANUAL THERAPY 1/> REGIONS: CPT

## 2021-07-14 PROCEDURE — 97110 THERAPEUTIC EXERCISES: CPT

## 2021-07-19 ENCOUNTER — CLINICAL SUPPORT (OUTPATIENT)
Dept: REHABILITATION | Facility: HOSPITAL | Age: 81
End: 2021-07-19
Attending: INTERNAL MEDICINE
Payer: MEDICARE

## 2021-07-19 DIAGNOSIS — R29.3 POSTURE IMBALANCE: ICD-10-CM

## 2021-07-19 DIAGNOSIS — R29.898 SHOULDER WEAKNESS: ICD-10-CM

## 2021-07-19 PROCEDURE — 97110 THERAPEUTIC EXERCISES: CPT

## 2021-07-19 PROCEDURE — 97140 MANUAL THERAPY 1/> REGIONS: CPT

## 2021-07-20 ENCOUNTER — TELEPHONE (OUTPATIENT)
Dept: INTERNAL MEDICINE | Facility: CLINIC | Age: 81
End: 2021-07-20

## 2021-07-30 ENCOUNTER — OFFICE VISIT (OUTPATIENT)
Dept: INTERNAL MEDICINE | Facility: CLINIC | Age: 81
End: 2021-07-30
Payer: MEDICARE

## 2021-07-30 ENCOUNTER — HOSPITAL ENCOUNTER (OUTPATIENT)
Dept: RADIOLOGY | Facility: HOSPITAL | Age: 81
Discharge: HOME | End: 2021-07-30
Attending: INTERNAL MEDICINE
Payer: MEDICARE

## 2021-07-30 VITALS
OXYGEN SATURATION: 98 % | HEART RATE: 64 BPM | WEIGHT: 133.38 LBS | DIASTOLIC BLOOD PRESSURE: 58 MMHG | SYSTOLIC BLOOD PRESSURE: 100 MMHG | HEIGHT: 62 IN | BODY MASS INDEX: 24.54 KG/M2

## 2021-07-30 VITALS — WEIGHT: 132 LBS | HEIGHT: 62 IN | BODY MASS INDEX: 24.29 KG/M2

## 2021-07-30 DIAGNOSIS — E78.5 HYPERLIPIDEMIA, UNSPECIFIED HYPERLIPIDEMIA TYPE: ICD-10-CM

## 2021-07-30 DIAGNOSIS — R29.898 SHOULDER WEAKNESS: ICD-10-CM

## 2021-07-30 DIAGNOSIS — H93.19 TINNITUS, UNSPECIFIED LATERALITY: ICD-10-CM

## 2021-07-30 DIAGNOSIS — Z00.00 ROUTINE PHYSICAL EXAMINATION: Primary | ICD-10-CM

## 2021-07-30 DIAGNOSIS — R29.3 POSTURE IMBALANCE: ICD-10-CM

## 2021-07-30 DIAGNOSIS — Z12.31 ENCOUNTER FOR SCREENING MAMMOGRAM FOR MALIGNANT NEOPLASM OF BREAST: ICD-10-CM

## 2021-07-30 DIAGNOSIS — H25.13 NUCLEAR SCLEROSIS OF BOTH EYES: ICD-10-CM

## 2021-07-30 DIAGNOSIS — K21.9 GASTROESOPHAGEAL REFLUX DISEASE, UNSPECIFIED WHETHER ESOPHAGITIS PRESENT: ICD-10-CM

## 2021-07-30 PROCEDURE — 99499 UNLISTED E&M SERVICE: CPT | Mod: HCNC,S$GLB,, | Performed by: INTERNAL MEDICINE

## 2021-07-30 PROCEDURE — 99397 PR PREVENTIVE VISIT,EST,65 & OVER: ICD-10-PCS | Mod: S$GLB,,, | Performed by: INTERNAL MEDICINE

## 2021-07-30 PROCEDURE — 1160F RVW MEDS BY RX/DR IN RCRD: CPT | Mod: CPTII,S$GLB,, | Performed by: INTERNAL MEDICINE

## 2021-07-30 PROCEDURE — 99397 PER PM REEVAL EST PAT 65+ YR: CPT | Mod: S$GLB,,, | Performed by: INTERNAL MEDICINE

## 2021-07-30 PROCEDURE — 3074F SYST BP LT 130 MM HG: CPT | Mod: CPTII,S$GLB,, | Performed by: INTERNAL MEDICINE

## 2021-07-30 PROCEDURE — 1101F PR PT FALLS ASSESS DOC 0-1 FALLS W/OUT INJ PAST YR: ICD-10-PCS | Mod: CPTII,S$GLB,, | Performed by: INTERNAL MEDICINE

## 2021-07-30 PROCEDURE — 3074F PR MOST RECENT SYSTOLIC BLOOD PRESSURE < 130 MM HG: ICD-10-PCS | Mod: CPTII,S$GLB,, | Performed by: INTERNAL MEDICINE

## 2021-07-30 PROCEDURE — 3288F PR FALLS RISK ASSESSMENT DOCUMENTED: ICD-10-PCS | Mod: CPTII,S$GLB,, | Performed by: INTERNAL MEDICINE

## 2021-07-30 PROCEDURE — 1101F PT FALLS ASSESS-DOCD LE1/YR: CPT | Mod: CPTII,S$GLB,, | Performed by: INTERNAL MEDICINE

## 2021-07-30 PROCEDURE — 77067 SCR MAMMO BI INCL CAD: CPT | Mod: 26,,, | Performed by: RADIOLOGY

## 2021-07-30 PROCEDURE — 77067 SCR MAMMO BI INCL CAD: CPT | Mod: TC

## 2021-07-30 PROCEDURE — 1126F PR PAIN SEVERITY QUANTIFIED, NO PAIN PRESENT: ICD-10-PCS | Mod: CPTII,S$GLB,, | Performed by: INTERNAL MEDICINE

## 2021-07-30 PROCEDURE — 99999 PR PBB SHADOW E&M-EST. PATIENT-LVL V: ICD-10-PCS | Mod: PBBFAC,,, | Performed by: INTERNAL MEDICINE

## 2021-07-30 PROCEDURE — 3078F PR MOST RECENT DIASTOLIC BLOOD PRESSURE < 80 MM HG: ICD-10-PCS | Mod: CPTII,S$GLB,, | Performed by: INTERNAL MEDICINE

## 2021-07-30 PROCEDURE — 77063 MAMMO DIGITAL SCREENING BILAT WITH TOMO: ICD-10-PCS | Mod: 26,,, | Performed by: RADIOLOGY

## 2021-07-30 PROCEDURE — 1159F PR MEDICATION LIST DOCUMENTED IN MEDICAL RECORD: ICD-10-PCS | Mod: CPTII,S$GLB,, | Performed by: INTERNAL MEDICINE

## 2021-07-30 PROCEDURE — 99999 PR PBB SHADOW E&M-EST. PATIENT-LVL V: CPT | Mod: PBBFAC,,, | Performed by: INTERNAL MEDICINE

## 2021-07-30 PROCEDURE — 3288F FALL RISK ASSESSMENT DOCD: CPT | Mod: CPTII,S$GLB,, | Performed by: INTERNAL MEDICINE

## 2021-07-30 PROCEDURE — 77063 BREAST TOMOSYNTHESIS BI: CPT | Mod: 26,,, | Performed by: RADIOLOGY

## 2021-07-30 PROCEDURE — 1126F AMNT PAIN NOTED NONE PRSNT: CPT | Mod: CPTII,S$GLB,, | Performed by: INTERNAL MEDICINE

## 2021-07-30 PROCEDURE — 77067 MAMMO DIGITAL SCREENING BILAT WITH TOMO: ICD-10-PCS | Mod: 26,,, | Performed by: RADIOLOGY

## 2021-07-30 PROCEDURE — 3078F DIAST BP <80 MM HG: CPT | Mod: CPTII,S$GLB,, | Performed by: INTERNAL MEDICINE

## 2021-07-30 PROCEDURE — 1160F PR REVIEW ALL MEDS BY PRESCRIBER/CLIN PHARMACIST DOCUMENTED: ICD-10-PCS | Mod: CPTII,S$GLB,, | Performed by: INTERNAL MEDICINE

## 2021-07-30 PROCEDURE — 1159F MED LIST DOCD IN RCRD: CPT | Mod: CPTII,S$GLB,, | Performed by: INTERNAL MEDICINE

## 2021-07-30 PROCEDURE — 99499 RISK ADDL DX/OHS AUDIT: ICD-10-PCS | Mod: HCNC,S$GLB,, | Performed by: INTERNAL MEDICINE

## 2021-08-02 ENCOUNTER — CLINICAL SUPPORT (OUTPATIENT)
Dept: REHABILITATION | Facility: HOSPITAL | Age: 81
End: 2021-08-02
Attending: INTERNAL MEDICINE
Payer: MEDICARE

## 2021-08-02 DIAGNOSIS — R29.898 SHOULDER WEAKNESS: ICD-10-CM

## 2021-08-02 DIAGNOSIS — R29.3 POSTURE IMBALANCE: ICD-10-CM

## 2021-08-02 PROCEDURE — 97140 MANUAL THERAPY 1/> REGIONS: CPT

## 2021-08-02 PROCEDURE — 97110 THERAPEUTIC EXERCISES: CPT

## 2021-08-03 ENCOUNTER — OFFICE VISIT (OUTPATIENT)
Dept: URGENT CARE | Facility: CLINIC | Age: 81
End: 2021-08-03
Payer: MEDICARE

## 2021-08-03 VITALS
BODY MASS INDEX: 24.29 KG/M2 | SYSTOLIC BLOOD PRESSURE: 184 MMHG | OXYGEN SATURATION: 96 % | DIASTOLIC BLOOD PRESSURE: 102 MMHG | WEIGHT: 132 LBS | RESPIRATION RATE: 14 BRPM | HEIGHT: 62 IN | TEMPERATURE: 98 F | HEART RATE: 67 BPM

## 2021-08-03 DIAGNOSIS — J02.9 SORE THROAT: Primary | ICD-10-CM

## 2021-08-03 LAB
CTP QC/QA: YES
SARS-COV-2 RDRP RESP QL NAA+PROBE: NEGATIVE

## 2021-08-03 PROCEDURE — 1159F MED LIST DOCD IN RCRD: CPT | Mod: CPTII,S$GLB,, | Performed by: FAMILY MEDICINE

## 2021-08-03 PROCEDURE — 99211 OFF/OP EST MAY X REQ PHY/QHP: CPT | Mod: S$GLB,CS,, | Performed by: FAMILY MEDICINE

## 2021-08-03 PROCEDURE — U0002: ICD-10-PCS | Mod: QW,S$GLB,, | Performed by: FAMILY MEDICINE

## 2021-08-03 PROCEDURE — U0002 COVID-19 LAB TEST NON-CDC: HCPCS | Mod: QW,S$GLB,, | Performed by: FAMILY MEDICINE

## 2021-08-03 PROCEDURE — 1159F PR MEDICATION LIST DOCUMENTED IN MEDICAL RECORD: ICD-10-PCS | Mod: CPTII,S$GLB,, | Performed by: FAMILY MEDICINE

## 2021-08-03 PROCEDURE — 3077F PR MOST RECENT SYSTOLIC BLOOD PRESSURE >= 140 MM HG: ICD-10-PCS | Mod: CPTII,S$GLB,, | Performed by: FAMILY MEDICINE

## 2021-08-03 PROCEDURE — 3080F DIAST BP >= 90 MM HG: CPT | Mod: CPTII,S$GLB,, | Performed by: FAMILY MEDICINE

## 2021-08-03 PROCEDURE — 99211 PR OFFICE/OUTPT VISIT, EST, LEVL I: ICD-10-PCS | Mod: S$GLB,CS,, | Performed by: FAMILY MEDICINE

## 2021-08-03 PROCEDURE — 3080F PR MOST RECENT DIASTOLIC BLOOD PRESSURE >= 90 MM HG: ICD-10-PCS | Mod: CPTII,S$GLB,, | Performed by: FAMILY MEDICINE

## 2021-08-03 PROCEDURE — 3077F SYST BP >= 140 MM HG: CPT | Mod: CPTII,S$GLB,, | Performed by: FAMILY MEDICINE

## 2021-09-20 ENCOUNTER — PATIENT MESSAGE (OUTPATIENT)
Dept: INTERNAL MEDICINE | Facility: CLINIC | Age: 81
End: 2021-09-20

## 2021-09-20 RX ORDER — ATENOLOL 25 MG/1
TABLET ORAL
Qty: 90 TABLET | Refills: 3 | Status: SHIPPED | OUTPATIENT
Start: 2021-09-20 | End: 2022-08-01 | Stop reason: SDUPTHER

## 2021-10-02 ENCOUNTER — IMMUNIZATION (OUTPATIENT)
Dept: INTERNAL MEDICINE | Facility: CLINIC | Age: 81
End: 2021-10-02
Payer: MEDICARE

## 2021-10-02 DIAGNOSIS — Z23 NEED FOR VACCINATION: Primary | ICD-10-CM

## 2021-10-02 PROCEDURE — 0003A COVID-19, MRNA, LNP-S, PF, 30 MCG/0.3 ML DOSE VACCINE: CPT | Mod: HCNC,CV19,PBBFAC

## 2021-10-02 PROCEDURE — 91300 COVID-19, MRNA, LNP-S, PF, 30 MCG/0.3 ML DOSE VACCINE: CPT | Mod: HCNC,PBBFAC

## 2021-11-19 ENCOUNTER — OFFICE VISIT (OUTPATIENT)
Dept: UROLOGY | Facility: CLINIC | Age: 81
End: 2021-11-19
Payer: MEDICARE

## 2021-11-19 VITALS
SYSTOLIC BLOOD PRESSURE: 160 MMHG | WEIGHT: 133.63 LBS | HEIGHT: 62 IN | BODY MASS INDEX: 24.59 KG/M2 | HEART RATE: 58 BPM | DIASTOLIC BLOOD PRESSURE: 84 MMHG

## 2021-11-19 DIAGNOSIS — Z09 FOLLOW-UP EXAMINATION AFTER UROLOGICAL SURGERY: Primary | ICD-10-CM

## 2021-11-19 PROCEDURE — 99999 PR PBB SHADOW E&M-EST. PATIENT-LVL IV: ICD-10-PCS | Mod: PBBFAC,HCNC,, | Performed by: UROLOGY

## 2021-11-19 PROCEDURE — 1101F PT FALLS ASSESS-DOCD LE1/YR: CPT | Mod: HCNC,CPTII,S$GLB, | Performed by: UROLOGY

## 2021-11-19 PROCEDURE — 1101F PR PT FALLS ASSESS DOC 0-1 FALLS W/OUT INJ PAST YR: ICD-10-PCS | Mod: HCNC,CPTII,S$GLB, | Performed by: UROLOGY

## 2021-11-19 PROCEDURE — 3077F PR MOST RECENT SYSTOLIC BLOOD PRESSURE >= 140 MM HG: ICD-10-PCS | Mod: HCNC,CPTII,S$GLB, | Performed by: UROLOGY

## 2021-11-19 PROCEDURE — 3288F PR FALLS RISK ASSESSMENT DOCUMENTED: ICD-10-PCS | Mod: HCNC,CPTII,S$GLB, | Performed by: UROLOGY

## 2021-11-19 PROCEDURE — 1126F AMNT PAIN NOTED NONE PRSNT: CPT | Mod: HCNC,CPTII,S$GLB, | Performed by: UROLOGY

## 2021-11-19 PROCEDURE — 99213 PR OFFICE/OUTPT VISIT, EST, LEVL III, 20-29 MIN: ICD-10-PCS | Mod: HCNC,S$GLB,, | Performed by: UROLOGY

## 2021-11-19 PROCEDURE — 99999 PR PBB SHADOW E&M-EST. PATIENT-LVL IV: CPT | Mod: PBBFAC,HCNC,, | Performed by: UROLOGY

## 2021-11-19 PROCEDURE — 3079F PR MOST RECENT DIASTOLIC BLOOD PRESSURE 80-89 MM HG: ICD-10-PCS | Mod: HCNC,CPTII,S$GLB, | Performed by: UROLOGY

## 2021-11-19 PROCEDURE — 1159F MED LIST DOCD IN RCRD: CPT | Mod: HCNC,CPTII,S$GLB, | Performed by: UROLOGY

## 2021-11-19 PROCEDURE — 3288F FALL RISK ASSESSMENT DOCD: CPT | Mod: HCNC,CPTII,S$GLB, | Performed by: UROLOGY

## 2021-11-19 PROCEDURE — 1126F PR PAIN SEVERITY QUANTIFIED, NO PAIN PRESENT: ICD-10-PCS | Mod: HCNC,CPTII,S$GLB, | Performed by: UROLOGY

## 2021-11-19 PROCEDURE — 1159F PR MEDICATION LIST DOCUMENTED IN MEDICAL RECORD: ICD-10-PCS | Mod: HCNC,CPTII,S$GLB, | Performed by: UROLOGY

## 2021-11-19 PROCEDURE — 99213 OFFICE O/P EST LOW 20 MIN: CPT | Mod: HCNC,S$GLB,, | Performed by: UROLOGY

## 2021-11-19 PROCEDURE — 3079F DIAST BP 80-89 MM HG: CPT | Mod: HCNC,CPTII,S$GLB, | Performed by: UROLOGY

## 2021-11-19 PROCEDURE — 3077F SYST BP >= 140 MM HG: CPT | Mod: HCNC,CPTII,S$GLB, | Performed by: UROLOGY

## 2021-12-15 ENCOUNTER — OFFICE VISIT (OUTPATIENT)
Dept: INTERNAL MEDICINE | Facility: CLINIC | Age: 81
End: 2021-12-15
Payer: MEDICARE

## 2021-12-15 VITALS
SYSTOLIC BLOOD PRESSURE: 138 MMHG | HEART RATE: 65 BPM | DIASTOLIC BLOOD PRESSURE: 82 MMHG | OXYGEN SATURATION: 99 % | HEIGHT: 62 IN | BODY MASS INDEX: 24.75 KG/M2 | WEIGHT: 134.5 LBS

## 2021-12-15 DIAGNOSIS — J32.9 SINUSITIS, UNSPECIFIED CHRONICITY, UNSPECIFIED LOCATION: Primary | ICD-10-CM

## 2021-12-15 PROCEDURE — 99999 PR PBB SHADOW E&M-EST. PATIENT-LVL V: CPT | Mod: PBBFAC,HCNC,, | Performed by: INTERNAL MEDICINE

## 2021-12-15 PROCEDURE — 99999 PR PBB SHADOW E&M-EST. PATIENT-LVL V: ICD-10-PCS | Mod: PBBFAC,HCNC,, | Performed by: INTERNAL MEDICINE

## 2021-12-15 PROCEDURE — 99213 OFFICE O/P EST LOW 20 MIN: CPT | Mod: HCNC,S$GLB,, | Performed by: INTERNAL MEDICINE

## 2021-12-15 PROCEDURE — 99213 PR OFFICE/OUTPT VISIT, EST, LEVL III, 20-29 MIN: ICD-10-PCS | Mod: HCNC,S$GLB,, | Performed by: INTERNAL MEDICINE

## 2021-12-15 RX ORDER — AMOXICILLIN 875 MG/1
875 TABLET, FILM COATED ORAL EVERY 12 HOURS
Qty: 20 TABLET | Refills: 0 | Status: SHIPPED | OUTPATIENT
Start: 2021-12-15 | End: 2022-08-01

## 2021-12-22 ENCOUNTER — OFFICE VISIT (OUTPATIENT)
Dept: OPTOMETRY | Facility: CLINIC | Age: 81
End: 2021-12-22
Payer: COMMERCIAL

## 2021-12-22 DIAGNOSIS — H52.4 PRESBYOPIA OF BOTH EYES: ICD-10-CM

## 2021-12-22 DIAGNOSIS — D31.32 CHOROIDAL NEVUS OF LEFT EYE: ICD-10-CM

## 2021-12-22 DIAGNOSIS — H43.812 POSTERIOR VITREOUS DETACHMENT OF LEFT EYE: ICD-10-CM

## 2021-12-22 DIAGNOSIS — H52.01 HYPEROPIA OF RIGHT EYE: ICD-10-CM

## 2021-12-22 DIAGNOSIS — H25.13 NUCLEAR SCLEROSIS, BILATERAL: Primary | ICD-10-CM

## 2021-12-22 DIAGNOSIS — H52.02 HYPEROPIA OF LEFT EYE WITH ASTIGMATISM: ICD-10-CM

## 2021-12-22 DIAGNOSIS — H25.013 CORTICAL SENILE CATARACT, BILATERAL: ICD-10-CM

## 2021-12-22 DIAGNOSIS — H52.202 HYPEROPIA OF LEFT EYE WITH ASTIGMATISM: ICD-10-CM

## 2021-12-22 PROCEDURE — 99999 PR PBB SHADOW E&M-EST. PATIENT-LVL IV: CPT | Mod: PBBFAC,,, | Performed by: OPTOMETRIST

## 2021-12-22 PROCEDURE — 92015 PR REFRACTION: ICD-10-PCS | Mod: S$GLB,,, | Performed by: OPTOMETRIST

## 2021-12-22 PROCEDURE — 92014 COMPRE OPH EXAM EST PT 1/>: CPT | Mod: S$GLB,,, | Performed by: OPTOMETRIST

## 2021-12-22 PROCEDURE — 92014 PR EYE EXAM, EST PATIENT,COMPREHESV: ICD-10-PCS | Mod: S$GLB,,, | Performed by: OPTOMETRIST

## 2021-12-22 PROCEDURE — 99999 PR PBB SHADOW E&M-EST. PATIENT-LVL IV: ICD-10-PCS | Mod: PBBFAC,,, | Performed by: OPTOMETRIST

## 2021-12-22 PROCEDURE — 92015 DETERMINE REFRACTIVE STATE: CPT | Mod: S$GLB,,, | Performed by: OPTOMETRIST

## 2021-12-22 RX ORDER — INFLUENZA A VIRUS A/VICTORIA/2570/2019 IVR-215 (H1N1) ANTIGEN (FORMALDEHYDE INACTIVATED), INFLUENZA A VIRUS A/TASMANIA/503/2020 IVR-221 (H3N2) ANTIGEN (FORMALDEHYDE INACTIVATED), INFLUENZA B VIRUS B/PHUKET/3073/2013 ANTIGEN (FORMALDEHYDE INACTIVATED), AND INFLUENZA B VIRUS B/WASHINGTON/02/2019 ANTIGEN (FORMALDEHYDE INACTIVATED) 60; 60; 60; 60 UG/.7ML; UG/.7ML; UG/.7ML; UG/.7ML
INJECTION, SUSPENSION INTRAMUSCULAR
COMMUNITY
Start: 2021-10-06 | End: 2022-08-01

## 2021-12-27 ENCOUNTER — TELEPHONE (OUTPATIENT)
Dept: INTERNAL MEDICINE | Facility: CLINIC | Age: 81
End: 2021-12-27
Payer: MEDICARE

## 2022-01-19 ENCOUNTER — OFFICE VISIT (OUTPATIENT)
Dept: OTOLARYNGOLOGY | Facility: CLINIC | Age: 82
End: 2022-01-19
Payer: MEDICARE

## 2022-01-19 VITALS — HEART RATE: 65 BPM | SYSTOLIC BLOOD PRESSURE: 139 MMHG | DIASTOLIC BLOOD PRESSURE: 81 MMHG

## 2022-01-19 DIAGNOSIS — H93.8X3 PRESSURE SENSATION IN BOTH EARS: Primary | ICD-10-CM

## 2022-01-19 DIAGNOSIS — H93.13 TINNITUS OF BOTH EARS: ICD-10-CM

## 2022-01-19 DIAGNOSIS — H61.22 IMPACTED CERUMEN, LEFT EAR: ICD-10-CM

## 2022-01-19 DIAGNOSIS — H61.21 CERUMEN DEBRIS ON TYMPANIC MEMBRANE OF RIGHT EAR: ICD-10-CM

## 2022-01-19 PROCEDURE — 1160F RVW MEDS BY RX/DR IN RCRD: CPT | Mod: HCNC,CPTII,S$GLB, | Performed by: OTOLARYNGOLOGY

## 2022-01-19 PROCEDURE — 1126F AMNT PAIN NOTED NONE PRSNT: CPT | Mod: HCNC,CPTII,S$GLB, | Performed by: OTOLARYNGOLOGY

## 2022-01-19 PROCEDURE — 3075F PR MOST RECENT SYSTOLIC BLOOD PRESS GE 130-139MM HG: ICD-10-PCS | Mod: HCNC,CPTII,S$GLB, | Performed by: OTOLARYNGOLOGY

## 2022-01-19 PROCEDURE — 3079F PR MOST RECENT DIASTOLIC BLOOD PRESSURE 80-89 MM HG: ICD-10-PCS | Mod: HCNC,CPTII,S$GLB, | Performed by: OTOLARYNGOLOGY

## 2022-01-19 PROCEDURE — 1126F PR PAIN SEVERITY QUANTIFIED, NO PAIN PRESENT: ICD-10-PCS | Mod: HCNC,CPTII,S$GLB, | Performed by: OTOLARYNGOLOGY

## 2022-01-19 PROCEDURE — 3288F FALL RISK ASSESSMENT DOCD: CPT | Mod: HCNC,CPTII,S$GLB, | Performed by: OTOLARYNGOLOGY

## 2022-01-19 PROCEDURE — 99999 PR PBB SHADOW E&M-EST. PATIENT-LVL IV: CPT | Mod: PBBFAC,HCNC,, | Performed by: OTOLARYNGOLOGY

## 2022-01-19 PROCEDURE — 69210 PR REMOVAL IMPACTED CERUMEN REQUIRING INSTRUMENTATION, UNILATERAL: ICD-10-PCS | Mod: HCNC,S$GLB,, | Performed by: OTOLARYNGOLOGY

## 2022-01-19 PROCEDURE — 69210 REMOVE IMPACTED EAR WAX UNI: CPT | Mod: HCNC,S$GLB,, | Performed by: OTOLARYNGOLOGY

## 2022-01-19 PROCEDURE — 1101F PT FALLS ASSESS-DOCD LE1/YR: CPT | Mod: HCNC,CPTII,S$GLB, | Performed by: OTOLARYNGOLOGY

## 2022-01-19 PROCEDURE — 3079F DIAST BP 80-89 MM HG: CPT | Mod: HCNC,CPTII,S$GLB, | Performed by: OTOLARYNGOLOGY

## 2022-01-19 PROCEDURE — 1101F PR PT FALLS ASSESS DOC 0-1 FALLS W/OUT INJ PAST YR: ICD-10-PCS | Mod: HCNC,CPTII,S$GLB, | Performed by: OTOLARYNGOLOGY

## 2022-01-19 PROCEDURE — 1159F MED LIST DOCD IN RCRD: CPT | Mod: HCNC,CPTII,S$GLB, | Performed by: OTOLARYNGOLOGY

## 2022-01-19 PROCEDURE — 3288F PR FALLS RISK ASSESSMENT DOCUMENTED: ICD-10-PCS | Mod: HCNC,CPTII,S$GLB, | Performed by: OTOLARYNGOLOGY

## 2022-01-19 PROCEDURE — 1159F PR MEDICATION LIST DOCUMENTED IN MEDICAL RECORD: ICD-10-PCS | Mod: HCNC,CPTII,S$GLB, | Performed by: OTOLARYNGOLOGY

## 2022-01-19 PROCEDURE — 1160F PR REVIEW ALL MEDS BY PRESCRIBER/CLIN PHARMACIST DOCUMENTED: ICD-10-PCS | Mod: HCNC,CPTII,S$GLB, | Performed by: OTOLARYNGOLOGY

## 2022-01-19 PROCEDURE — 99999 PR PBB SHADOW E&M-EST. PATIENT-LVL IV: ICD-10-PCS | Mod: PBBFAC,HCNC,, | Performed by: OTOLARYNGOLOGY

## 2022-01-19 PROCEDURE — 99213 OFFICE O/P EST LOW 20 MIN: CPT | Mod: 25,HCNC,S$GLB, | Performed by: OTOLARYNGOLOGY

## 2022-01-19 PROCEDURE — 3075F SYST BP GE 130 - 139MM HG: CPT | Mod: HCNC,CPTII,S$GLB, | Performed by: OTOLARYNGOLOGY

## 2022-01-19 PROCEDURE — 99213 PR OFFICE/OUTPT VISIT, EST, LEVL III, 20-29 MIN: ICD-10-PCS | Mod: 25,HCNC,S$GLB, | Performed by: OTOLARYNGOLOGY

## 2022-01-19 NOTE — PATIENT INSTRUCTIONS
Reviewed ear care. She knows to avoid q-tip usage. She may use a drop of baby oil or olive oil in the ear once a week to loosen the wax and moisturize the skin.    Reviewed causes of long time tinnitus. Recommend minimize caffeine and stress. Use relaxation techniques. Mask sound with other noises. Avoid loud noise.  Control hypertension.    Otherwise, follow up with our clinic for any ear, nose or throat problems ([phone]).

## 2022-01-19 NOTE — PROGRESS NOTES
82 y/o female presents due to ear pressure/ blockage feeling. Feels she may have wax. Did use a q-tip a few weeks. No ear pain or drainage.  No ear surgery. Feels she hears ok.    Last had ears checked 2-3 years ago. Assessed for tinnitus - slightly different from side to side. Unchanged.  No new medical problems.     PMHx, PSHx, Meds, Allergies, SocHx, FamHx reviewed in EPIC    ROS:  Gen - no fever or chills  ENT - as above    PE: /81   Pulse 65    Gen: female, well nourished, well developed, NAD, cooperative, good historian  Ears:  EAC soft cerumen on TM medial and anterior on right; cerumen 65% obstructive laterally on left (cerumen cleared - see below)  & TM translucent with normal bony landmarks bilaterally  Respiratory: Breathing comfortably without retractions  Neuro:  facial movement symmetric, speech fluid, gait stable  Psych: alert & oriented x 3, reasonable, normal affect    Procedure: Removal of cerumen impaction using microsurgical instrumentation.  After explaining the procedure and obtaining verbal assent, the patient was positioned in chair and both external auditory canal visualized with magnification. The obstructing cerumen was removed with microsurgical instrumentation (suction on right, curette and forcep on left) to reveal normal and healthy external auditory canals.  Both ears cleared. The patient tolerated this procedure well without complication.      Impression:   1. Pressure sensation in both ears     2. Tinnitus of both ears     3. Cerumen debris on tympanic membrane of right ear     4. Impacted cerumen, left ear         Discussion and Plan:       Reviewed ear care. She knows to avoid q-tip usage. She may use a drop of baby oil or olive oil in the ear once a week to loosen the wax and moisturize the skin.    Reviewed causes of long time tinnitus. Recommend minimize caffeine and stress. Use relaxation techniques. Mask sound with other noises. Avoid loud noise.  Control  hypertension.    Otherwise, follow up with our clinic for any ear, nose or throat problems ([phone]).        Parts or all of this note were created by voice recognition software; typographical errors in translating may be present.

## 2022-04-28 ENCOUNTER — IMMUNIZATION (OUTPATIENT)
Dept: INTERNAL MEDICINE | Facility: CLINIC | Age: 82
End: 2022-04-28
Payer: MEDICARE

## 2022-04-28 DIAGNOSIS — Z23 NEED FOR VACCINATION: Primary | ICD-10-CM

## 2022-04-28 PROCEDURE — 91300 COVID-19, MRNA, LNP-S, PF, 30 MCG/0.3 ML DOSE VACCINE: CPT | Mod: PBBFAC | Performed by: INTERNAL MEDICINE

## 2022-06-15 ENCOUNTER — PATIENT MESSAGE (OUTPATIENT)
Dept: INTERNAL MEDICINE | Facility: CLINIC | Age: 82
End: 2022-06-15
Payer: MEDICARE

## 2022-06-15 ENCOUNTER — NURSE TRIAGE (OUTPATIENT)
Dept: ADMINISTRATIVE | Facility: CLINIC | Age: 82
End: 2022-06-15
Payer: MEDICARE

## 2022-06-15 ENCOUNTER — TELEPHONE (OUTPATIENT)
Dept: INTERNAL MEDICINE | Facility: CLINIC | Age: 82
End: 2022-06-15
Payer: MEDICARE

## 2022-06-15 NOTE — TELEPHONE ENCOUNTER
----- Message from Kerrie Her sent at 6/15/2022  3:47 PM CDT -----  Contact: [phone]  Pt called to get a status update on the medication that was offered to her for her positive C-19 testing. Please Advise

## 2022-06-15 NOTE — TELEPHONE ENCOUNTER
Contacted pts  r/t his response to [facility] text message, states he thought he was messaging the doctor about his wife, offered triage and accepted.   Pt on the line, states she tested positive for COVID today, feels like her head is stopped up, sneezing a lot and has post nasal drip. Endorses subjective fever yesterday but is asking for paxlovid like her  got. Per protocol advised that I will notify PCP with urgent message and she should get call back by their nurse within 1 hour. verbalized understanding. Denies any further questions or concerns at this time, advised to call back if they have any that come up. Advised pt to call back with any other concerns or worsening symptoms. Verbalized understanding and will route message to provider.        Reason for Disposition   HIGH RISK for severe COVID complications (e.g., weak immune system, age > 64 years, obesity with BMI > 25, pregnant, chronic lung disease or other chronic medical condition) (Exception: Already seen by PCP and no new or worsening symptoms.)    Additional Information   Negative: SEVERE difficulty breathing (e.g., struggling for each breath, speaks in single words)   Negative: Difficult to awaken or acting confused (e.g., disoriented, slurred speech)   Negative: Bluish (or gray) lips or face now   Negative: Shock suspected (e.g., cold/pale/clammy skin, too weak to stand, low BP, rapid pulse)   Negative: Sounds like a life-threatening emergency to the triager   Negative: SEVERE or constant chest pain or pressure  (Exception: Mild central chest pain, present only when coughing.)   Negative: MODERATE difficulty breathing (e.g., speaks in phrases, SOB even at rest, pulse 100-120)   Negative: Headache and stiff neck (can't touch chin to chest)   Negative: Oxygen level (e.g., pulse oximetry) 90 percent or lower   Negative: Chest pain or pressure   Negative: Patient sounds very sick or weak to the triager   Negative: MILD difficulty  breathing (e.g., minimal/no SOB at rest, SOB with walking, pulse <100)   Negative: Fever > 103 F (39.4 C)   Negative: [1] Fever > 101 F (38.3 C) AND [2] over 60 years of age   Negative: [1] Fever > 100.0 F (37.8 C) AND [2] bedridden (e.g., nursing home patient, CVA, chronic illness, recovering from surgery)    Protocols used: CORONAVIRUS (COVID-19) DIAGNOSED OR [id number]

## 2022-06-16 ENCOUNTER — PATIENT MESSAGE (OUTPATIENT)
Dept: INTERNAL MEDICINE | Facility: CLINIC | Age: 82
End: 2022-06-16
Payer: MEDICARE

## 2022-08-01 ENCOUNTER — OFFICE VISIT (OUTPATIENT)
Dept: INTERNAL MEDICINE | Facility: CLINIC | Age: 82
End: 2022-08-01
Payer: MEDICARE

## 2022-08-01 ENCOUNTER — LAB VISIT (OUTPATIENT)
Dept: LAB | Facility: HOSPITAL | Age: 82
End: 2022-08-01
Attending: INTERNAL MEDICINE
Payer: MEDICARE

## 2022-08-01 VITALS
SYSTOLIC BLOOD PRESSURE: 108 MMHG | OXYGEN SATURATION: 99 % | WEIGHT: 133.63 LBS | BODY MASS INDEX: 24.59 KG/M2 | DIASTOLIC BLOOD PRESSURE: 64 MMHG | HEIGHT: 62 IN | HEART RATE: 71 BPM

## 2022-08-01 DIAGNOSIS — Z86.16 HISTORY OF COVID-19: ICD-10-CM

## 2022-08-01 DIAGNOSIS — S80.811A ABRASION OF RIGHT LOWER EXTREMITY, INITIAL ENCOUNTER: ICD-10-CM

## 2022-08-01 DIAGNOSIS — K21.9 GASTROESOPHAGEAL REFLUX DISEASE, UNSPECIFIED WHETHER ESOPHAGITIS PRESENT: ICD-10-CM

## 2022-08-01 DIAGNOSIS — Z00.00 ROUTINE PHYSICAL EXAMINATION: Primary | ICD-10-CM

## 2022-08-01 DIAGNOSIS — Z00.00 ROUTINE PHYSICAL EXAMINATION: ICD-10-CM

## 2022-08-01 DIAGNOSIS — E78.5 HYPERLIPIDEMIA, UNSPECIFIED HYPERLIPIDEMIA TYPE: ICD-10-CM

## 2022-08-01 DIAGNOSIS — Z12.31 ENCOUNTER FOR SCREENING MAMMOGRAM FOR MALIGNANT NEOPLASM OF BREAST: ICD-10-CM

## 2022-08-01 LAB
ALBUMIN SERPL BCP-MCNC: 4.2 G/DL (ref 3.5–5.2)
ALP SERPL-CCNC: 99 U/L (ref 55–135)
ALT SERPL W/O P-5'-P-CCNC: 14 U/L (ref 10–44)
ANION GAP SERPL CALC-SCNC: 11 MMOL/L (ref 8–16)
AST SERPL-CCNC: 20 U/L (ref 10–40)
BASOPHILS # BLD AUTO: 0.11 K/UL (ref 0–0.2)
BASOPHILS NFR BLD: 1.2 % (ref 0–1.9)
BILIRUB SERPL-MCNC: 0.5 MG/DL (ref 0.1–1)
BUN SERPL-MCNC: 12 MG/DL (ref 8–23)
CALCIUM SERPL-MCNC: 9.8 MG/DL (ref 8.7–10.5)
CHLORIDE SERPL-SCNC: 103 MMOL/L (ref 95–110)
CHOLEST SERPL-MCNC: 170 MG/DL (ref 120–199)
CHOLEST/HDLC SERPL: 2.9 {RATIO} (ref 2–5)
CO2 SERPL-SCNC: 27 MMOL/L (ref 23–29)
CREAT SERPL-MCNC: 0.8 MG/DL (ref 0.5–1.4)
DIFFERENTIAL METHOD: NORMAL
EOSINOPHIL # BLD AUTO: 0.3 K/UL (ref 0–0.5)
EOSINOPHIL NFR BLD: 2.9 % (ref 0–8)
ERYTHROCYTE [DISTWIDTH] IN BLOOD BY AUTOMATED COUNT: 13.4 % (ref 11.5–14.5)
EST. GFR  (NO RACE VARIABLE): >60 ML/MIN/1.73 M^2
GLUCOSE SERPL-MCNC: 89 MG/DL (ref 70–110)
HCT VFR BLD AUTO: 39.4 % (ref 37–48.5)
HDLC SERPL-MCNC: 58 MG/DL (ref 40–75)
HDLC SERPL: 34.1 % (ref 20–50)
HGB BLD-MCNC: 12.9 G/DL (ref 12–16)
IMM GRANULOCYTES # BLD AUTO: 0.03 K/UL (ref 0–0.04)
IMM GRANULOCYTES NFR BLD AUTO: 0.3 % (ref 0–0.5)
LDLC SERPL CALC-MCNC: 99.8 MG/DL (ref 63–159)
LYMPHOCYTES # BLD AUTO: 2.3 K/UL (ref 1–4.8)
LYMPHOCYTES NFR BLD: 25.8 % (ref 18–48)
MCH RBC QN AUTO: 30.6 PG (ref 27–31)
MCHC RBC AUTO-ENTMCNC: 32.7 G/DL (ref 32–36)
MCV RBC AUTO: 93 FL (ref 82–98)
MONOCYTES # BLD AUTO: 0.8 K/UL (ref 0.3–1)
MONOCYTES NFR BLD: 8.4 % (ref 4–15)
NEUTROPHILS # BLD AUTO: 5.5 K/UL (ref 1.8–7.7)
NEUTROPHILS NFR BLD: 61.4 % (ref 38–73)
NONHDLC SERPL-MCNC: 112 MG/DL
NRBC BLD-RTO: 0 /100 WBC
PLATELET # BLD AUTO: 303 K/UL (ref 150–450)
PMV BLD AUTO: 10 FL (ref 9.2–12.9)
POTASSIUM SERPL-SCNC: 4.6 MMOL/L (ref 3.5–5.1)
PROT SERPL-MCNC: 7.7 G/DL (ref 6–8.4)
RBC # BLD AUTO: 4.22 M/UL (ref 4–5.4)
SODIUM SERPL-SCNC: 141 MMOL/L (ref 136–145)
TRIGL SERPL-MCNC: 61 MG/DL (ref 30–150)
WBC # BLD AUTO: 9.02 K/UL (ref 3.9–12.7)

## 2022-08-01 PROCEDURE — 1159F PR MEDICATION LIST DOCUMENTED IN MEDICAL RECORD: ICD-10-PCS | Mod: CPTII,S$GLB,, | Performed by: INTERNAL MEDICINE

## 2022-08-01 PROCEDURE — 3078F PR MOST RECENT DIASTOLIC BLOOD PRESSURE < 80 MM HG: ICD-10-PCS | Mod: CPTII,S$GLB,, | Performed by: INTERNAL MEDICINE

## 2022-08-01 PROCEDURE — 3288F FALL RISK ASSESSMENT DOCD: CPT | Mod: CPTII,S$GLB,, | Performed by: INTERNAL MEDICINE

## 2022-08-01 PROCEDURE — 99999 PR PBB SHADOW E&M-EST. PATIENT-LVL IV: ICD-10-PCS | Mod: PBBFAC,,, | Performed by: INTERNAL MEDICINE

## 2022-08-01 PROCEDURE — 1101F PR PT FALLS ASSESS DOC 0-1 FALLS W/OUT INJ PAST YR: ICD-10-PCS | Mod: CPTII,S$GLB,, | Performed by: INTERNAL MEDICINE

## 2022-08-01 PROCEDURE — 99999 PR PBB SHADOW E&M-EST. PATIENT-LVL IV: CPT | Mod: PBBFAC,,, | Performed by: INTERNAL MEDICINE

## 2022-08-01 PROCEDURE — 99397 PR PREVENTIVE VISIT,EST,65 & OVER: ICD-10-PCS | Mod: S$GLB,,, | Performed by: INTERNAL MEDICINE

## 2022-08-01 PROCEDURE — 99397 PER PM REEVAL EST PAT 65+ YR: CPT | Mod: S$GLB,,, | Performed by: INTERNAL MEDICINE

## 2022-08-01 PROCEDURE — 3078F DIAST BP <80 MM HG: CPT | Mod: CPTII,S$GLB,, | Performed by: INTERNAL MEDICINE

## 2022-08-01 PROCEDURE — 1126F AMNT PAIN NOTED NONE PRSNT: CPT | Mod: CPTII,S$GLB,, | Performed by: INTERNAL MEDICINE

## 2022-08-01 PROCEDURE — 1160F PR REVIEW ALL MEDS BY PRESCRIBER/CLIN PHARMACIST DOCUMENTED: ICD-10-PCS | Mod: CPTII,S$GLB,, | Performed by: INTERNAL MEDICINE

## 2022-08-01 PROCEDURE — 85025 COMPLETE CBC W/AUTO DIFF WBC: CPT | Performed by: INTERNAL MEDICINE

## 2022-08-01 PROCEDURE — 3074F PR MOST RECENT SYSTOLIC BLOOD PRESSURE < 130 MM HG: ICD-10-PCS | Mod: CPTII,S$GLB,, | Performed by: INTERNAL MEDICINE

## 2022-08-01 PROCEDURE — 1126F PR PAIN SEVERITY QUANTIFIED, NO PAIN PRESENT: ICD-10-PCS | Mod: CPTII,S$GLB,, | Performed by: INTERNAL MEDICINE

## 2022-08-01 PROCEDURE — 3288F PR FALLS RISK ASSESSMENT DOCUMENTED: ICD-10-PCS | Mod: CPTII,S$GLB,, | Performed by: INTERNAL MEDICINE

## 2022-08-01 PROCEDURE — 1160F RVW MEDS BY RX/DR IN RCRD: CPT | Mod: CPTII,S$GLB,, | Performed by: INTERNAL MEDICINE

## 2022-08-01 PROCEDURE — 80053 COMPREHEN METABOLIC PANEL: CPT | Performed by: INTERNAL MEDICINE

## 2022-08-01 PROCEDURE — 1101F PT FALLS ASSESS-DOCD LE1/YR: CPT | Mod: CPTII,S$GLB,, | Performed by: INTERNAL MEDICINE

## 2022-08-01 PROCEDURE — 80061 LIPID PANEL: CPT | Performed by: INTERNAL MEDICINE

## 2022-08-01 PROCEDURE — 1159F MED LIST DOCD IN RCRD: CPT | Mod: CPTII,S$GLB,, | Performed by: INTERNAL MEDICINE

## 2022-08-01 PROCEDURE — 36415 COLL VENOUS BLD VENIPUNCTURE: CPT | Performed by: INTERNAL MEDICINE

## 2022-08-01 PROCEDURE — 3074F SYST BP LT 130 MM HG: CPT | Mod: CPTII,S$GLB,, | Performed by: INTERNAL MEDICINE

## 2022-08-01 RX ORDER — ATENOLOL 25 MG/1
25 TABLET ORAL DAILY
Qty: 90 TABLET | Refills: 3 | Status: SHIPPED | OUTPATIENT
Start: 2022-08-01 | End: 2022-10-10

## 2022-08-01 NOTE — PROGRESS NOTES
Subjective:       Patient ID: Betzy Cooley is a 82 y.o. female.    Chief Complaint: Annual Exam    Patient here for annual exam.  Overall says she is doing well for 82. 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  She did have COVID but recovered with Paxlovid  No lingering side effects.  She denies any chest pain shortness of breath fevers or chills.  She would like to update labs and mammogram.  She did have a small abrasion or possibly puncture wound from and edge of a small branch she had trimmed.  It stuck into the right lower leg.  No residual drainage.  She is keeping it clean and covered with a Band-Aid.  She has not had a tetanus shot in over 10 years  She does need her atenolol refilled for blood pressure    Review of Systems   Constitutional: Negative for appetite change, chills and fever.   HENT: Negative for nosebleeds and sore throat.    Eyes: Negative for pain and visual disturbance.   Respiratory: Negative for cough, shortness of breath and wheezing.    Cardiovascular: Negative for chest pain and leg swelling.   Gastrointestinal: Negative for abdominal pain, constipation and diarrhea.   Endocrine: Negative for polyuria.   Genitourinary: Negative for difficulty urinating, hematuria and vaginal bleeding.   Musculoskeletal: Negative for arthralgias, back pain, gait problem and neck pain.   Integumentary:  Positive for wound. Negative for pallor and rash.   Neurological: Negative for tremors, seizures and headaches.   Hematological: Does not bruise/bleed easily.   Psychiatric/Behavioral: Negative for dysphoric mood. The patient is not nervous/anxious.            Past Medical History:   Diagnosis Date    Arthritis     Cataract     GERD (gastroesophageal reflux disease)     HEARING LOSS     Hypertension     Osteoporosis, postmenopausal     Squamous Cell Carcinoma 2007    right forearm    Urinary incontinence     rare mixed     Past Surgical History:   Procedure Laterality Date    COLPOPEXY N/A 8/6/2019     Procedure: COLPOPEXY SSL FIXATION;  Surgeon: Alma Dorsey MD;  Location: [facility] OR [address] [place] [address];  Service: Urology;  Laterality: N/A;    CYSTOSCOPY N/A 8/6/2019    Procedure: CYSTOSCOPY;  Surgeon: Alma Dorsey MD;  Location: [facility] OR [address] [place] [address];  Service: Urology;  Laterality: N/A;    FRACTURE SURGERY Left 2008    open radius/ulna fracture    HYSTERECTOMY      TANIA/ovaries remain--fibroids- in her late 30's / early 40's      Patient Active Problem List   Diagnosis    Hearing loss, sensorineural    Nuclear sclerosis - Both Eyes    Cortical senile cataract - Both Eyes    Choroidal nevus - Both Eyes    Palpitations    Osteopenia    Encounter for screening colonoscopy    GERD (gastroesophageal reflux disease)    History of skin cancer    Tinnitus    Elevated blood pressure reading    Abnormal EKG    Posture imbalance    Shoulder weakness        Objective:      Physical Exam  Constitutional:       General: She is not in acute distress.     Appearance: She is well-developed.   HENT:      Head: Normocephalic and atraumatic.      Right Ear: Tympanic membrane, ear canal and external ear normal.      Left Ear: Tympanic membrane, ear canal and external ear normal.      Mouth/Throat:      Pharynx: No oropharyngeal exudate or posterior oropharyngeal erythema.   Eyes:      General: No scleral icterus.     Conjunctiva/sclera: Conjunctivae normal.      Pupils: Pupils are equal, round, and reactive to light.   Neck:      Thyroid: No thyromegaly.   Cardiovascular:      Rate and Rhythm: Normal rate and regular rhythm.      Pulses: Normal pulses.      Heart sounds: No murmur heard.  Pulmonary:      Effort: Pulmonary effort is normal.      Breath sounds: Normal breath sounds. No wheezing.   Abdominal:      General: Bowel sounds are normal. There is no distension.      Palpations: Abdomen is soft.      Tenderness: There is no abdominal tenderness.   Musculoskeletal:         General: No tenderness.      Cervical back:  Normal range of motion and neck supple.      Right lower leg: No edema.      Left lower leg: No edema.   Lymphadenopathy:      Cervical: No cervical adenopathy.   Skin:     Coloration: Skin is not jaundiced.      Findings: Lesion ( small superficial puncture verses abrasion.  No tenderness or drainage.  Just to the lateral aspect of the right shin) present. No rash.   Neurological:      General: No focal deficit present.      Mental Status: She is alert and oriented to person, place, and time.   Psychiatric:         Mood and Affect: Mood normal.         Behavior: Behavior normal.         Assessment:       Problem List Items Addressed This Visit        GI    GERD (gastroesophageal reflux disease)    Relevant Orders    CBC Auto Differential    Comprehensive Metabolic Panel    Lipid Panel      Other Visit Diagnoses     Routine physical examination    -  Primary    Relevant Orders    CBC Auto Differential    Comprehensive Metabolic Panel    Lipid Panel    Hyperlipidemia, unspecified hyperlipidemia type        Relevant Orders    CBC Auto Differential    Comprehensive Metabolic Panel    Lipid Panel    History of COVID-19        June 2022 - Recovered with Paxlovid.     Encounter for screening mammogram for malignant neoplasm of breast        Relevant Orders    Mammo Digital Screening Bilat w/ Good          Plan:         Betzy was seen today for annual exam.    Diagnoses and all orders for this visit:    Routine physical examination  -     CBC Auto Differential; Future  -     Comprehensive Metabolic Panel; Future  -     Lipid Panel; Future    Gastroesophageal reflux disease, unspecified whether esophagitis present  -     CBC Auto Differential; Future  -     Comprehensive Metabolic Panel; Future  -     Lipid Panel; Future    Hyperlipidemia, unspecified hyperlipidemia type  -     CBC Auto Differential; Future  -     Comprehensive Metabolic Panel; Future  -     Lipid Panel; Future    History of COVID-19  Comments:  June  "2022 - Recovered with Paxlovid.     Encounter for screening mammogram for malignant neoplasm of breast  -     Mammo Digital Screening Bilat w/ Good; Future    Other orders  -     atenoloL (TENORMIN) 25 MG tablet; Take 1 tablet (25 mg total) by mouth once daily.             I think the right lower leg skin abrasion is healing but we will update her tetanus shot.  Keep area clean and dry.  May use Neosporin  Mammogram and labs.  Follow-up in 6-12 months        Portions of this note may have been created with voice recognition software. Occasional "wrong-word" or "sound-a-like" substitutions may have occurred due to the inherent limitations of voice recognition software. Please, read the note carefully and recognize, using context, where substitutions have occurred.  "

## 2022-08-16 ENCOUNTER — HOSPITAL ENCOUNTER (OUTPATIENT)
Dept: RADIOLOGY | Facility: HOSPITAL | Age: 82
Discharge: HOME | End: 2022-08-16
Attending: INTERNAL MEDICINE
Payer: MEDICARE

## 2022-08-16 ENCOUNTER — PATIENT MESSAGE (OUTPATIENT)
Dept: INTERNAL MEDICINE | Facility: CLINIC | Age: 82
End: 2022-08-16
Payer: MEDICARE

## 2022-08-16 DIAGNOSIS — Z12.31 ENCOUNTER FOR SCREENING MAMMOGRAM FOR MALIGNANT NEOPLASM OF BREAST: ICD-10-CM

## 2022-08-16 PROCEDURE — 77067 SCR MAMMO BI INCL CAD: CPT | Mod: 26,,, | Performed by: RADIOLOGY

## 2022-08-16 PROCEDURE — 77063 MAMMO DIGITAL SCREENING BILAT WITH TOMO: ICD-10-PCS | Mod: 26,,, | Performed by: RADIOLOGY

## 2022-08-16 PROCEDURE — 77063 BREAST TOMOSYNTHESIS BI: CPT | Mod: TC

## 2022-08-16 PROCEDURE — 77067 MAMMO DIGITAL SCREENING BILAT WITH TOMO: ICD-10-PCS | Mod: 26,,, | Performed by: RADIOLOGY

## 2022-08-16 PROCEDURE — 77067 SCR MAMMO BI INCL CAD: CPT | Mod: TC

## 2022-08-16 PROCEDURE — 77063 BREAST TOMOSYNTHESIS BI: CPT | Mod: 26,,, | Performed by: RADIOLOGY

## 2022-08-17 ENCOUNTER — PATIENT MESSAGE (OUTPATIENT)
Dept: INTERNAL MEDICINE | Facility: CLINIC | Age: 82
End: 2022-08-17
Payer: MEDICARE

## 2022-08-19 ENCOUNTER — PATIENT MESSAGE (OUTPATIENT)
Dept: INTERNAL MEDICINE | Facility: CLINIC | Age: 82
End: 2022-08-19
Payer: MEDICARE

## 2022-08-30 ENCOUNTER — OFFICE VISIT (OUTPATIENT)
Dept: DERMATOLOGY | Facility: CLINIC | Age: 82
End: 2022-08-30
Payer: MEDICARE

## 2022-08-30 VITALS — BODY MASS INDEX: 24.33 KG/M2 | WEIGHT: 133 LBS

## 2022-08-30 DIAGNOSIS — T14.8XXA ABRASION: ICD-10-CM

## 2022-08-30 DIAGNOSIS — L81.4 LENTIGINES: ICD-10-CM

## 2022-08-30 DIAGNOSIS — D18.01 CHERRY ANGIOMA: ICD-10-CM

## 2022-08-30 DIAGNOSIS — L82.1 SEBORRHEIC KERATOSES: ICD-10-CM

## 2022-08-30 DIAGNOSIS — Z85.828 HISTORY OF SKIN CANCER: ICD-10-CM

## 2022-08-30 DIAGNOSIS — L71.9 ROSACEA: Primary | ICD-10-CM

## 2022-08-30 PROCEDURE — 1160F PR REVIEW ALL MEDS BY PRESCRIBER/CLIN PHARMACIST DOCUMENTED: ICD-10-PCS | Mod: CPTII,S$GLB,, | Performed by: DERMATOLOGY

## 2022-08-30 PROCEDURE — 1159F PR MEDICATION LIST DOCUMENTED IN MEDICAL RECORD: ICD-10-PCS | Mod: CPTII,S$GLB,, | Performed by: DERMATOLOGY

## 2022-08-30 PROCEDURE — 99214 OFFICE O/P EST MOD 30 MIN: CPT | Mod: S$GLB,,, | Performed by: DERMATOLOGY

## 2022-08-30 PROCEDURE — 99999 PR PBB SHADOW E&M-EST. PATIENT-LVL III: CPT | Mod: PBBFAC,,, | Performed by: DERMATOLOGY

## 2022-08-30 PROCEDURE — 3288F FALL RISK ASSESSMENT DOCD: CPT | Mod: CPTII,S$GLB,, | Performed by: DERMATOLOGY

## 2022-08-30 PROCEDURE — 99999 PR PBB SHADOW E&M-EST. PATIENT-LVL III: ICD-10-PCS | Mod: PBBFAC,,, | Performed by: DERMATOLOGY

## 2022-08-30 PROCEDURE — 3288F PR FALLS RISK ASSESSMENT DOCUMENTED: ICD-10-PCS | Mod: CPTII,S$GLB,, | Performed by: DERMATOLOGY

## 2022-08-30 PROCEDURE — 99214 PR OFFICE/OUTPT VISIT, EST, LEVL IV, 30-39 MIN: ICD-10-PCS | Mod: S$GLB,,, | Performed by: DERMATOLOGY

## 2022-08-30 PROCEDURE — 1159F MED LIST DOCD IN RCRD: CPT | Mod: CPTII,S$GLB,, | Performed by: DERMATOLOGY

## 2022-08-30 PROCEDURE — 1126F PR PAIN SEVERITY QUANTIFIED, NO PAIN PRESENT: ICD-10-PCS | Mod: CPTII,S$GLB,, | Performed by: DERMATOLOGY

## 2022-08-30 PROCEDURE — 1160F RVW MEDS BY RX/DR IN RCRD: CPT | Mod: CPTII,S$GLB,, | Performed by: DERMATOLOGY

## 2022-08-30 PROCEDURE — 1101F PT FALLS ASSESS-DOCD LE1/YR: CPT | Mod: CPTII,S$GLB,, | Performed by: DERMATOLOGY

## 2022-08-30 PROCEDURE — 1126F AMNT PAIN NOTED NONE PRSNT: CPT | Mod: CPTII,S$GLB,, | Performed by: DERMATOLOGY

## 2022-08-30 PROCEDURE — 1101F PR PT FALLS ASSESS DOC 0-1 FALLS W/OUT INJ PAST YR: ICD-10-PCS | Mod: CPTII,S$GLB,, | Performed by: DERMATOLOGY

## 2022-08-30 RX ORDER — METRONIDAZOLE 10 MG/G
GEL TOPICAL
Qty: 60 G | Refills: 3 | Status: SHIPPED | OUTPATIENT
Start: 2022-08-30 | End: 2023-07-25

## 2022-08-30 RX ORDER — METRONIDAZOLE 10 MG/G
GEL TOPICAL
Qty: 60 G | Refills: 3 | Status: SHIPPED | OUTPATIENT
Start: 2022-08-30 | End: 2022-08-30 | Stop reason: SDUPTHER

## 2022-08-30 NOTE — PROGRESS NOTES
Subjective:       Patient ID:  Betzy Cooley is a 82 y.o. female who presents for   Chief Complaint   Patient presents with    Skin Check     UBSE     Would like skin check, has abrasion on nose for 2 weeks from hitting her glasses with her hand.  She used the efudex on her cheeks worked well. Has a flare of her rosacea with heat out of metrogel.       Review of Systems   Constitutional:  Negative for fever, chills, weight loss, weight gain, fatigue, night sweats and malaise.   Skin:  Positive for daily sunscreen use and activity-related sunscreen use. Negative for wears hat.      Objective:    Physical Exam   Constitutional: She appears well-developed and well-nourished. No distress.   Neurological: She is alert and oriented to person, place, and time. She is not disoriented.   Psychiatric: She has a normal mood and affect.   Skin:   Areas Examined (abnormalities noted in diagram):   Head / Face Inspection Performed  Neck Inspection Performed  Chest / Axilla Inspection Performed  Abdomen Inspection Performed  Back Inspection Performed  RUE Inspected  LUE Inspection Performed                 Diagram Legend     Erythematous scaling macule/papule c/w actinic keratosis       Vascular papule c/w angioma      Pigmented verrucoid papule/plaque c/w seborrheic keratosis      Yellow umbilicated papule c/w sebaceous hyperplasia      Irregularly shaped tan macule c/w lentigo     1-2 mm smooth white papules consistent with Milia      Movable subcutaneous cyst with punctum c/w epidermal inclusion cyst      Subcutaneous movable cyst c/w pilar cyst      Firm pink to brown papule c/w dermatofibroma      Pedunculated fleshy papule(s) c/w skin tag(s)      Evenly pigmented macule c/w junctional nevus     Mildly variegated pigmented, slightly irregular-bordered macule c/w mildly atypical nevus      Flesh colored to evenly pigmented papule c/w intradermal nevus       Pink pearly papule/plaque c/w basal cell carcinoma       "Erythematous hyperkeratotic cursted plaque c/w SCC      Surgical scar with no sign of skin cancer recurrence      Open and closed comedones      Inflammatory papules and pustules      Verrucoid papule consistent consistent with wart     Erythematous eczematous patches and plaques     Dystrophic onycholytic nail with subungual debris c/w onychomycosis     Umbilicated papule    Erythematous-base heme-crusted tan verrucoid plaque consistent with inflamed seborrheic keratosis     Erythematous Silvery Scaling Plaque c/w Psoriasis     See annotation      Assessment / Plan:        Rosacea  -     metronidazole 1% (METROGEL) 1 % Gel; Use hs on face  Dispense: 60 g; Refill: 3        Cerave hydrating cleanser    Abrasion  Call if does not resolve in 2-3 weeks    Lentigines  The "ABCD" rules to observe pigmented lesions were reviewed.      History of skin cancer  Comments:  scc left arm  Area(s) of previous NMSC evaluated with no signs of recurrence.     No lesions suspicious for malignancy noted.    Recommend daily sun protection/avoidance and use of at least SPF 30, broad spectrum sunscreen (OTC drug).       Cherry angiomas  reassurance      Seborrheic keratoses  reassurance             Follow up in about 6 months (around 2/28/2023).  "

## 2022-08-31 DIAGNOSIS — Z78.0 MENOPAUSE: ICD-10-CM

## 2022-10-07 ENCOUNTER — HOSPITAL ENCOUNTER (OUTPATIENT)
Dept: RADIOLOGY | Facility: CLINIC | Age: 82
Discharge: HOME | End: 2022-10-07
Attending: INTERNAL MEDICINE
Payer: MEDICARE

## 2022-10-07 DIAGNOSIS — Z78.0 MENOPAUSE: ICD-10-CM

## 2022-10-07 PROCEDURE — 77080 DXA BONE DENSITY AXIAL: CPT | Mod: 26,,, | Performed by: INTERNAL MEDICINE

## 2022-10-07 PROCEDURE — 77080 DXA BONE DENSITY AXIAL: CPT | Mod: TC

## 2022-10-07 PROCEDURE — 77080 DEXA BONE DENSITY SPINE HIP: ICD-10-PCS | Mod: 26,,, | Performed by: INTERNAL MEDICINE

## 2022-11-17 ENCOUNTER — PATIENT MESSAGE (OUTPATIENT)
Dept: INTERNAL MEDICINE | Facility: CLINIC | Age: 82
End: 2022-11-17
Payer: MEDICARE

## 2022-11-29 ENCOUNTER — HOSPITAL ENCOUNTER (EMERGENCY)
Facility: HOSPITAL | Age: 82
Discharge: HOME | End: 2022-11-29
Attending: EMERGENCY MEDICINE
Payer: MEDICARE

## 2022-11-29 VITALS
SYSTOLIC BLOOD PRESSURE: 182 MMHG | TEMPERATURE: 98 F | RESPIRATION RATE: 17 BRPM | OXYGEN SATURATION: 98 % | DIASTOLIC BLOOD PRESSURE: 87 MMHG | HEART RATE: 54 BPM | BODY MASS INDEX: 24.29 KG/M2 | WEIGHT: 132 LBS | HEIGHT: 62 IN

## 2022-11-29 DIAGNOSIS — S92.511A CLOSED DISPLACED FRACTURE OF PROXIMAL PHALANX OF LESSER TOE OF RIGHT FOOT, INITIAL ENCOUNTER: Primary | ICD-10-CM

## 2022-11-29 DIAGNOSIS — S99.921A RIGHT FOOT INJURY, INITIAL ENCOUNTER: ICD-10-CM

## 2022-11-29 PROCEDURE — 99284 PR EMERGENCY DEPT VISIT,LEVEL IV: ICD-10-PCS | Mod: 57,,, | Performed by: PHYSICIAN ASSISTANT

## 2022-11-29 PROCEDURE — 99283 EMERGENCY DEPT VISIT LOW MDM: CPT

## 2022-11-29 PROCEDURE — 25000003 PHARM REV CODE 250: Performed by: PHYSICIAN ASSISTANT

## 2022-11-29 PROCEDURE — 28510 PR CLOSED RX TOE FX: ICD-10-PCS | Mod: 54,T7,, | Performed by: PHYSICIAN ASSISTANT

## 2022-11-29 PROCEDURE — 99284 EMERGENCY DEPT VISIT MOD MDM: CPT | Mod: 57,,, | Performed by: PHYSICIAN ASSISTANT

## 2022-11-29 PROCEDURE — 28510 TREATMENT OF TOE FRACTURE: CPT | Mod: 54,T7,, | Performed by: PHYSICIAN ASSISTANT

## 2022-11-29 RX ORDER — ACETAMINOPHEN 500 MG
1000 TABLET ORAL
Status: COMPLETED | OUTPATIENT
Start: 2022-11-29 | End: 2022-11-29

## 2022-11-29 RX ADMIN — ACETAMINOPHEN 1000 MG: 500 TABLET ORAL at 05:11

## 2022-11-29 NOTE — ED NOTES
Stubbed right foot on coffee table.  Right foot 2nd and 3rd toe swelling noted.  Able to walk on right foot but it is stiff per patient.

## 2022-11-29 NOTE — ED PROVIDER NOTES
Encounter Date: 11/29/2022       History     Chief Complaint   Patient presents with    Foot Pain     R foot pain x 10 days after stubbing on coffee table.      5:06 PM  Patient is an 82-year-old female with a history of HTN, osteoporosis, arthritis who presents to [facility] ED with right foot pain about 9-10 days ago when she hit her foot on the coffee table.  She has had pain and swelling.  Currently her pain is 2/10.  Last dose of Aleve yesterday.  She denies any paresthesias.  She is still ambulatory on her feet.  She is concerned due to the swelling and discoloration.  She is here with her .  She has no other complaints or concerns at this time.  She took Aleve at home.    Review of patient's allergies indicates:   Allergen Reactions    Sulfa (sulfonamide antibiotics) Hives     Other reaction(s): Anaphylaxis  Itching   Shortness of breath      Past Medical History:   Diagnosis Date    Arthritis     Cataract     GERD (gastroesophageal reflux disease)     HEARING LOSS     Hypertension     Osteoporosis, postmenopausal     Squamous Cell Carcinoma 2007    right forearm    Urinary incontinence     rare mixed     Past Surgical History:   Procedure Laterality Date    COLPOPEXY N/A 8/6/2019    Procedure: COLPOPEXY SSL FIXATION;  Surgeon: Alma Dorsey MD;  Location: [address];  Service: Urology;  Laterality: N/A;    CYSTOSCOPY N/A 8/6/2019    Procedure: CYSTOSCOPY;  Surgeon: Alma Dorsey MD;  Location: [facility] OR [address] [place] [address];  Service: Urology;  Laterality: N/A;    FRACTURE SURGERY Left 2008    open radius/ulna fracture    HYSTERECTOMY      TANIA/ovaries remain--fibroids- in her late 30's / early 40's     Family History   Problem Relation Age of Onset    Heart failure Mother     Macular degeneration Mother     Heart disease Mother     COPD Mother     Diabetes Sister     Arthritis Sister         rheumatoid arthritis    COPD Father     No Known Problems Brother     No Known Problems Son     No Known Problems  Daughter     No Known Problems Sister     No Known Problems Sister     No Known Problems Sister     No Known Problems Brother     No Known Problems Brother     No Known Problems Brother     No Known Problems Brother     No Known Problems Brother     No Known Problems Daughter     No Known Problems Son     Breast cancer Neg Hx     Ovarian cancer Neg Hx     Amblyopia Neg Hx     Blindness Neg Hx     Cancer Neg Hx     Cataracts Neg Hx     Glaucoma Neg Hx     Hypertension Neg Hx     Retinal detachment Neg Hx     Strabismus Neg Hx     Stroke Neg Hx     Thyroid disease Neg Hx     Cervical cancer Neg Hx     Endometrial cancer Neg Hx     Vaginal cancer Neg Hx     Colon cancer Neg Hx      Social History     Tobacco Use    Smoking status: Never    Smokeless tobacco: Never   Substance Use Topics    Alcohol use: Yes     Comment: 1-2 glasses wine weekly    Drug use: No     Review of Systems   Constitutional:  Positive for activity change. Negative for chills and fever.   HENT:  Negative for sore throat.    Respiratory:  Negative for shortness of breath.    Cardiovascular:  Negative for chest pain.   Gastrointestinal:  Negative for nausea.   Genitourinary:  Negative for dysuria.   Musculoskeletal:  Positive for arthralgias and joint swelling. Negative for back pain.   Skin:  Negative for rash.   Neurological:  Negative for weakness.   Hematological:  Does not bruise/bleed easily.     Physical Exam     Initial Vitals [11/29/22 1541]   BP Pulse Resp Temp SpO2   (!) 187/91 68 18 97.6 °F (36.4 °C) 98 %      MAP       --         Physical Exam    Vitals reviewed.  Constitutional: She appears well-developed and well-nourished. She is not diaphoretic. She is cooperative.  Non-toxic appearance. She does not have a sickly appearance. She does not appear ill. No distress. Face mask in place.   HENT:   Head: Normocephalic and atraumatic.   Nose: Nose normal.   Mouth/Throat: No trismus in the jaw.   Eyes: Conjunctivae and EOM are normal.    Neck:   Normal range of motion.  Cardiovascular:            Pulses:       Dorsalis pedis pulses are 2+ on the right side.   Pulmonary/Chest: No accessory muscle usage. No tachypnea. No respiratory distress.   Abdominal: She exhibits no distension.   Musculoskeletal:      Cervical back: Normal range of motion.      Right foot: Decreased range of motion. Swelling, tenderness and bony tenderness present. No laceration.        Feet:      Neurological: She is alert. She has normal strength.   Skin: Skin is warm and dry. No erythema. No pallor.       ED Course   Procedures  Labs Reviewed - No data to display       Imaging Results               X-Ray Foot Complete Right (Final result)  Result time 11/29/22 19:09:41      Final result by Deangelo Dwyer MD (11/29/22 19:09:41)                   Impression:      Acute impacted fracture of the base of the 3rd right proximal phalanx with intra-articular extension, as above.    This report was flagged in Epic as abnormal.    Electronically signed by resident: Paul Lema  Date:    11/29/2022  Time:    18:44    Electronically signed by: Deangelo Dwyer MD  Date:    11/29/2022  Time:    19:09               Narrative:    EXAMINATION:  XR FOOT COMPLETE 3 VIEW RIGHT    CLINICAL HISTORY:  . Unspecified injury of right foot, initial encounter    TECHNIQUE:  AP, lateral, and oblique views of the right foot were performed.    COMPARISON:  None    FINDINGS:  Acute impacted fracture of the base of 3rd right proximal phalanx.  Fracture line extends to the 3rd MTP joint.  Overlying soft tissue edema.  Normal alignment elsewhere.  Lisfranc articulation is congruent.  Baseline minimal DJD elsewhere.  Dorsal and plantar calcaneal enthesophyte.                                       Medications   acetaminophen tablet 1,000 mg (1,000 mg Oral Given 11/29/22 7066)     Medical Decision Making:   History:   Old Medical Records: I decided to obtain old medical records.  Initial Assessment:   Patient is  an 82-year-old female with a history of HTN, osteoporosis, arthritis who presents to [facility] ED with right foot pain about 9-10 days ago when she hit her foot on the coffee table.   Differential Diagnosis:   Includes but is not limited to fractures, dislocations, soft tissue contusion, bony contusion, sprain.  Clinical Tests:   Radiological Study: Ordered and Reviewed  ED Management:  Will obtain x-ray, give Tylenol for pain relief, and continue monitor.           ED Course as of 11/30/22 0107 Tue Nov 29, 2022   1653 BP(!): 187/91 [CL]   1653 Temp: 97.6 °F (36.4 °C) [CL]   1653 Pulse: 68 [CL]   1653 Resp: 18 [CL]   1653 SpO2: 98 % [CL]      ED Course User Index  [CL] Bronwyn Raza PA-C          Patient's x-ray shows acute impacted fracture of the base of the 3rd right proximal phalanx with intra-articular extension.    Patient has a close fracture.  She was updated with her results.  I will lluvia tape her fractured toe to the 3rd toe.  I also Ace wrapped her foot to help with the edema.  She was placed in a postop shoe.  She ambulated without pain and does not need crutches. We discussed follow with Podiatry.  Referral placed.  Her pain is mild and controlled with OTC medication.  Will have her continue that.  Ice.  Elevate higher than the level of the heart.  All of her questions were answered.  Patient  are comfortable with plan, and patient is stable for discharge.       Clinical Impression:   Final diagnoses:  [S99.921A] Right foot injury, initial encounter  [S92.511A] Closed displaced fracture of proximal phalanx of lesser toe of right foot, initial encounter (Primary)        ED Disposition Condition    Discharge Stable          ED Prescriptions    None       Follow-up Information       Follow up With Specialties Details Why Contact Info Additional Information    [facility] Schedule an appointment as soon as possible for a visit   [id number]  [facility]  [id number] [phone] [facility] - Main Building, 5th Floor Please park in [address] and use Atrium elevator    [facility] - Emergency Dept Emergency Medicine  If symptoms worsen [address]  [facility] [id number] [phone]           Future Appointments   Date Time Provider Department Center   12/8/2022 10:00 AM Alma Dorsey MD [facility] UROLOGY [facility]   3/20/2023  8:45 AM Shashank Camarena OD [facility]          Bronwyn Raza PA-C  11/30/22 0107

## 2022-11-29 NOTE — FIRST PROVIDER EVALUATION
Emergency Department TeleTriage Encounter Note      CHIEF COMPLAINT    Chief Complaint   Patient presents with    Foot Pain     R foot pain x 10 days after stubbing on coffee table.        VITAL SIGNS   Initial Vitals [11/29/22 1541]   BP Pulse Resp Temp SpO2   (!) 187/91 68 18 97.6 °F (36.4 °C) 98 %      MAP       --            ALLERGIES    Review of patient's allergies indicates:   Allergen Reactions    Sulfa (sulfonamide antibiotics) Hives     Other reaction(s): Anaphylaxis  Itching   Shortness of breath        PROVIDER TRIAGE NOTE  Neg for n/t.  Able to walk on.  Has taken aleve for discomfort.        ORDERS  Labs Reviewed - No data to display    ED Orders (720h ago, onward)      Start Ordered     Status Ordering Provider    11/29/22 1614 11/29/22 1613  X-Ray Foot Complete Right  1 time imaging         Ordered [facility]              Virtual Visit Note: The provider triage portion of this emergency department evaluation and documentation was performed via [employer], a HIPAA-compliant telemedicine application, in concert with a tele-presenter in the room. A face to face patient evaluation with one of my colleagues will occur once the patient is placed in an emergency department room.      DISCLAIMER: This note was prepared with [employer] voice recognition transcription software. Garbled syntax, mangled pronouns, and other bizarre constructions may be attributed to that software system.

## 2022-11-30 NOTE — DISCHARGE INSTRUCTIONS
You have a fractured 3rd toe at the base.   Keep this lluvia-taped and in a shoe for comfort.  You can take acetaminophen/tylenol 650 mg every 6 hours or 1000 mg every 8 hours for added relief.  Apply ice to the area for 10-20 minutes every 4 hours. You can apply heat 2 days after for the same duration and frequency.  ELEVATE foot higher than the level to your heart to help with swelling.  Follow up with Podiatry.   Return to the ER for new or worsening symptoms.  Future Appointments   Date Time Provider Department Center   12/8/2022 10:00 AM Alma Dorsey MD [facility] UROLOGY [facility]   3/20/2023  8:45 AM Shashank Camarena OD [facility]     Imaging Results               X-Ray Foot Complete Right (Final result)  Result time 11/29/22 19:09:41      Final result by Deangelo Dwyer MD (11/29/22 19:09:41)                   Impression:      Acute impacted fracture of the base of the 3rd right proximal phalanx with intra-articular extension, as above.    This report was flagged in Epic as abnormal.    Electronically signed by resident: Paul Lema  Date:    11/29/2022  Time:    18:44    Electronically signed by: Deangelo Dwyer MD  Date:    11/29/2022  Time:    19:09               Narrative:    EXAMINATION:  XR FOOT COMPLETE 3 VIEW RIGHT    CLINICAL HISTORY:  . Unspecified injury of right foot, initial encounter    TECHNIQUE:  AP, lateral, and oblique views of the right foot were performed.    COMPARISON:  None    FINDINGS:  Acute impacted fracture of the base of 3rd right proximal phalanx.  Fracture line extends to the 3rd MTP joint.  Overlying soft tissue edema.  Normal alignment elsewhere.  Lisfranc articulation is congruent.  Baseline minimal DJD elsewhere.  Dorsal and plantar calcaneal enthesophyte.

## 2022-12-01 ENCOUNTER — OFFICE VISIT (OUTPATIENT)
Dept: PODIATRY | Facility: CLINIC | Age: 82
End: 2022-12-01
Payer: MEDICARE

## 2022-12-01 VITALS
DIASTOLIC BLOOD PRESSURE: 81 MMHG | SYSTOLIC BLOOD PRESSURE: 142 MMHG | WEIGHT: 132.06 LBS | HEART RATE: 69 BPM | BODY MASS INDEX: 24.15 KG/M2

## 2022-12-01 DIAGNOSIS — S92.511A CLOSED DISPLACED FRACTURE OF PROXIMAL PHALANX OF LESSER TOE OF RIGHT FOOT, INITIAL ENCOUNTER: ICD-10-CM

## 2022-12-01 PROCEDURE — 99999 PR PBB SHADOW E&M-EST. PATIENT-LVL IV: CPT | Mod: PBBFAC,,, | Performed by: PODIATRIST

## 2022-12-01 PROCEDURE — 3077F SYST BP >= 140 MM HG: CPT | Mod: CPTII,S$GLB,, | Performed by: PODIATRIST

## 2022-12-01 PROCEDURE — 28510 TREATMENT OF TOE FRACTURE: CPT | Mod: T7,S$GLB,, | Performed by: PODIATRIST

## 2022-12-01 PROCEDURE — 28510 PR CLOSED RX TOE FX: ICD-10-PCS | Mod: T7,S$GLB,, | Performed by: PODIATRIST

## 2022-12-01 PROCEDURE — 99999 PR PBB SHADOW E&M-EST. PATIENT-LVL IV: ICD-10-PCS | Mod: PBBFAC,,, | Performed by: PODIATRIST

## 2022-12-01 PROCEDURE — 3079F DIAST BP 80-89 MM HG: CPT | Mod: CPTII,S$GLB,, | Performed by: PODIATRIST

## 2022-12-01 PROCEDURE — 3077F PR MOST RECENT SYSTOLIC BLOOD PRESSURE >= 140 MM HG: ICD-10-PCS | Mod: CPTII,S$GLB,, | Performed by: PODIATRIST

## 2022-12-01 PROCEDURE — 1125F AMNT PAIN NOTED PAIN PRSNT: CPT | Mod: CPTII,S$GLB,, | Performed by: PODIATRIST

## 2022-12-01 PROCEDURE — 99203 OFFICE O/P NEW LOW 30 MIN: CPT | Mod: 25,S$GLB,, | Performed by: PODIATRIST

## 2022-12-01 PROCEDURE — 99203 PR OFFICE/OUTPT VISIT, NEW, LEVL III, 30-44 MIN: ICD-10-PCS | Mod: 25,S$GLB,, | Performed by: PODIATRIST

## 2022-12-01 PROCEDURE — 3079F PR MOST RECENT DIASTOLIC BLOOD PRESSURE 80-89 MM HG: ICD-10-PCS | Mod: CPTII,S$GLB,, | Performed by: PODIATRIST

## 2022-12-01 PROCEDURE — 1125F PR PAIN SEVERITY QUANTIFIED, PAIN PRESENT: ICD-10-PCS | Mod: CPTII,S$GLB,, | Performed by: PODIATRIST

## 2022-12-01 RX ORDER — COVID-19 MOLECULAR TEST ASSAY
KIT MISCELLANEOUS
COMMUNITY
Start: 2022-06-10 | End: 2022-12-08

## 2022-12-01 RX ORDER — DICLOFENAC SODIUM 10 MG/G
2 GEL TOPICAL 4 TIMES DAILY
Qty: 100 G | Refills: 2 | Status: SHIPPED | OUTPATIENT
Start: 2022-12-01 | End: 2023-08-28

## 2022-12-08 ENCOUNTER — OFFICE VISIT (OUTPATIENT)
Dept: UROLOGY | Facility: CLINIC | Age: 82
End: 2022-12-08
Payer: MEDICARE

## 2022-12-08 VITALS
DIASTOLIC BLOOD PRESSURE: 79 MMHG | HEART RATE: 64 BPM | SYSTOLIC BLOOD PRESSURE: 139 MMHG | WEIGHT: 132.06 LBS | BODY MASS INDEX: 24.3 KG/M2 | HEIGHT: 62 IN

## 2022-12-08 DIAGNOSIS — Z09 FOLLOW-UP EXAMINATION AFTER UROLOGICAL SURGERY: Primary | ICD-10-CM

## 2022-12-08 PROCEDURE — 1101F PT FALLS ASSESS-DOCD LE1/YR: CPT | Mod: CPTII,S$GLB,, | Performed by: UROLOGY

## 2022-12-08 PROCEDURE — 1126F PR PAIN SEVERITY QUANTIFIED, NO PAIN PRESENT: ICD-10-PCS | Mod: CPTII,S$GLB,, | Performed by: UROLOGY

## 2022-12-08 PROCEDURE — 1126F AMNT PAIN NOTED NONE PRSNT: CPT | Mod: CPTII,S$GLB,, | Performed by: UROLOGY

## 2022-12-08 PROCEDURE — 1101F PR PT FALLS ASSESS DOC 0-1 FALLS W/OUT INJ PAST YR: ICD-10-PCS | Mod: CPTII,S$GLB,, | Performed by: UROLOGY

## 2022-12-08 PROCEDURE — 1159F MED LIST DOCD IN RCRD: CPT | Mod: CPTII,S$GLB,, | Performed by: UROLOGY

## 2022-12-08 PROCEDURE — 3075F PR MOST RECENT SYSTOLIC BLOOD PRESS GE 130-139MM HG: ICD-10-PCS | Mod: CPTII,S$GLB,, | Performed by: UROLOGY

## 2022-12-08 PROCEDURE — 81002 PR URINALYSIS NONAUTO W/O SCOPE: ICD-10-PCS | Mod: S$GLB,,, | Performed by: UROLOGY

## 2022-12-08 PROCEDURE — 3288F FALL RISK ASSESSMENT DOCD: CPT | Mod: CPTII,S$GLB,, | Performed by: UROLOGY

## 2022-12-08 PROCEDURE — 99999 PR PBB SHADOW E&M-EST. PATIENT-LVL III: ICD-10-PCS | Mod: PBBFAC,,, | Performed by: UROLOGY

## 2022-12-08 PROCEDURE — 99213 PR OFFICE/OUTPT VISIT, EST, LEVL III, 20-29 MIN: ICD-10-PCS | Mod: S$GLB,,, | Performed by: UROLOGY

## 2022-12-08 PROCEDURE — 3078F DIAST BP <80 MM HG: CPT | Mod: CPTII,S$GLB,, | Performed by: UROLOGY

## 2022-12-08 PROCEDURE — 3288F PR FALLS RISK ASSESSMENT DOCUMENTED: ICD-10-PCS | Mod: CPTII,S$GLB,, | Performed by: UROLOGY

## 2022-12-08 PROCEDURE — 81002 URINALYSIS NONAUTO W/O SCOPE: CPT | Mod: S$GLB,,, | Performed by: UROLOGY

## 2022-12-08 PROCEDURE — 99213 OFFICE O/P EST LOW 20 MIN: CPT | Mod: S$GLB,,, | Performed by: UROLOGY

## 2022-12-08 PROCEDURE — 3075F SYST BP GE 130 - 139MM HG: CPT | Mod: CPTII,S$GLB,, | Performed by: UROLOGY

## 2022-12-08 PROCEDURE — 3078F PR MOST RECENT DIASTOLIC BLOOD PRESSURE < 80 MM HG: ICD-10-PCS | Mod: CPTII,S$GLB,, | Performed by: UROLOGY

## 2022-12-08 PROCEDURE — 99999 PR PBB SHADOW E&M-EST. PATIENT-LVL III: CPT | Mod: PBBFAC,,, | Performed by: UROLOGY

## 2022-12-08 PROCEDURE — 1159F PR MEDICATION LIST DOCUMENTED IN MEDICAL RECORD: ICD-10-PCS | Mod: CPTII,S$GLB,, | Performed by: UROLOGY

## 2022-12-08 NOTE — PROGRESS NOTES
CHIEF COMPLAINT:    Mrs. Cooley is a 82 y.o. female presenting for a follow up after A/P repair 2019  PRESENTING ILLNESS:    Betzy Cooley is a 82 y.o. female who is presents with a history of pelvic organ prolapse who is status post A/P repair and cystoscopy on 8/6/2019.  She states she is doing very well from the standpoint of the prolapse.  She has no problems urinating or having a bowel movement.  If she strains on the toilet, sometimes she feels that the prolapse comes down and she will push it back but it does not come beyond the hymenal ring.  She states that if she is ill, she may have a small amount of stress incontinence. Its not all the time.    She recently broke her right toe after getting caught in a blanket while watching TV, she kicked it off her foot and accidentally kicked the leg of the coffee table.      REVIEW OF SYSTEMS:    Review of Systems   Constitutional: Negative.    HENT: Negative.     Eyes: Negative.    Respiratory: Negative.     Cardiovascular: Negative.    Gastrointestinal: Negative.    Genitourinary: Negative.    Musculoskeletal:  Positive for joint pain.   Skin: Negative.    Neurological: Negative.    Endo/Heme/Allergies: Negative.    Psychiatric/Behavioral: Negative.       PATIENT HISTORY:    Past Medical History:   Diagnosis Date    Arthritis     Cataract     GERD (gastroesophageal reflux disease)     HEARING LOSS     Hypertension     Osteoporosis, postmenopausal     Squamous Cell Carcinoma 2007    right forearm    Urinary incontinence     rare mixed       Past Surgical History:   Procedure Laterality Date    COLPOPEXY N/A 8/6/2019    Procedure: COLPOPEXY SSL FIXATION;  Surgeon: Alma Dorsey MD;  Location: [address];  Service: Urology;  Laterality: N/A;    CYSTOSCOPY N/A 8/6/2019    Procedure: CYSTOSCOPY;  Surgeon: Alma Dorsey MD;  Location: [facility] OR [address] [place] [address];  Service: Urology;  Laterality: N/A;    FRACTURE SURGERY Left 2008    open radius/ulna fracture     HYSTERECTOMY      TANIA/ovaries remain--fibroids- in her late 30's / early 40's       Family History   Problem Relation Age of Onset    Heart failure Mother     Macular degeneration Mother     Heart disease Mother     COPD Mother     Diabetes Sister     Arthritis Sister         rheumatoid arthritis    COPD Father      Social History     Socioeconomic History    Marital status:    Tobacco Use    Smoking status: Never    Smokeless tobacco: Never   Substance and Sexual Activity    Alcohol use: Yes     Comment: 1-2 glasses wine weekly    Drug use: No    Sexual activity: Yes     Partners: Male     Birth control/protection: None     Social Determinants of Health     Financial Resource Strain: Low Risk     Difficulty of Paying Living Expenses: Not hard at all   Food Insecurity: No Food Insecurity    Worried About Running Out of Food in the Last Year: Never true    Ran Out of Food in the Last Year: Never true   Transportation Needs: No Transportation Needs    Lack of Transportation (Medical): No    Lack of Transportation (Non-Medical): No   Physical Activity: Sufficiently Active    Days of Exercise per Week: 6 days    Minutes of Exercise per Session: 60 min   Stress: No Stress Concern Present    Feeling of Stress : Only a little   Social Connections: Unknown    Frequency of Communication with Friends and Family: More than three times a week    Frequency of Social Gatherings with Friends and Family: Twice a week    Active Member of Clubs or Organizations: No    Attends Club or Organization Meetings: Never    Marital Status:    Housing Stability: Low Risk     Unable to Pay for Housing in the Last Year: No    Number of Places Lived in the Last Year: 1    Unstable Housing in the Last Year: No       Allergies:  Sulfa (sulfonamide antibiotics)    Medications:  Outpatient Encounter Medications as of 12/8/2022   Medication Sig Dispense Refill    acetaminophen (TYLENOL) 500 MG tablet Take 1 tablet (500 mg total) by mouth  every 6 (six) hours as needed for Pain.  0    aspirin 81 MG Chew Take 81 mg by mouth once daily. Not chewable      atenoloL (TENORMIN) 25 MG tablet TAKE 1 TABLET(25 MG) BY MOUTH EVERY DAY 90 tablet 3    calcium-vitamin D3 500 mg(1,250mg) -200 unit per tablet Take 1 tablet by mouth 2 (two) times daily with meals.      co-enzyme Q-10 30 mg capsule Take 30 mg by mouth once daily.      diclofenac sodium (VOLTAREN) 1 % Gel Apply 2 g topically 4 (four) times daily. 100 g 2    fluconazole (DIFLUCAN) 150 MG Tab       fluorouraciL (EFUDEX) 5 % cream Use hs for 2 weeks 40 g 3    fluticasone propionate (FLONASE) 50 mcg/actuation nasal spray SHAKE LIQUID AND USE 2 SPRAYS(100 MCG) IN EACH NOSTRIL EVERY DAY 48 mL 0    fluticasone propionate (FLONASE) 50 mcg/actuation nasal spray 1 spray (50 mcg total) by Each Nostril route once daily. 9.9 mL 0    metronidazole 1% (METROGEL) 1 % Gel Use hs on face 60 g 3    multivitamin (THERAGRAN) per tablet Take 1 tablet by mouth once daily.      RHOFADE 1 % Crea AAA face qday 30 g 3    simethicone (MYLICON) 80 MG chewable tablet Take 1 tablet (80 mg total) by mouth every 6 (six) hours as needed for Flatulence. 30 tablet 0    vitamin A-vitamin C-vit E-min Tab Take 1 tablet by mouth once daily.      BINAXNOW COVID-19 AG SELF TEST Kit TEST AS DIRECTED TODAY      esomeprazole (NEXIUM) 20 MG capsule Take 1 capsule (20 mg total) by mouth before breakfast.      fexofenadine (ALLEGRA) 180 MG tablet Take 1 tablet (180 mg total) by mouth once daily. (Patient taking differently: Take 180 mg by mouth once daily. Takes as needed) 30 tablet 11    hydrocortisone 2.5 % cream Apply topically 2 (two) times daily. for 10 days 20 g 0     No facility-administered encounter medications on file as of 12/8/2022.         PHYSICAL EXAMINATION:    The patient generally appears in good health, is appropriately interactive, and is in no apparent distress.    Skin: No lesions.    Mental: Cooperative with normal  affect.    Neuro: Grossly intact.    HEENT: Normal. No evidence of lymphadenopathy.    Chest:  normal inspiratory effort.    Abdomen: Soft, non-tender. No masses or organomegaly. Bladder is not palpable. No evidence of flank discomfort. No evidence of inguinal hernia.    Extremities: No clubbing, cyanosis, or edema    Normal external female genitalia  Grade II urogenital atrophy  Urethral meatus is normal  Urethra and bladder are nontender to bimanual exam  Well supported posteriorly   Stage II midline cystocele  Uterus and cervix are surgically absent.  [place] is well supported  No adnexal masses    LABS:    Lab Results   Component Value Date    BUN 12 08/01/2022    CREATININE 0.8 08/01/2022       UA 1.005, pH 6, tr protein, tr blood, otherwise, negative.     IMPRESSION:    Follow up exam after urologic surgery    PLAN:    1.  Follow up in 1 year

## 2022-12-15 ENCOUNTER — OFFICE VISIT (OUTPATIENT)
Dept: PODIATRY | Facility: CLINIC | Age: 82
End: 2022-12-15
Payer: MEDICARE

## 2022-12-15 ENCOUNTER — HOSPITAL ENCOUNTER (OUTPATIENT)
Dept: RADIOLOGY | Facility: HOSPITAL | Age: 82
Discharge: HOME | End: 2022-12-15
Attending: PODIATRIST
Payer: MEDICARE

## 2022-12-15 VITALS
DIASTOLIC BLOOD PRESSURE: 81 MMHG | HEART RATE: 66 BPM | HEIGHT: 62 IN | BODY MASS INDEX: 24.34 KG/M2 | WEIGHT: 132.25 LBS | SYSTOLIC BLOOD PRESSURE: 144 MMHG

## 2022-12-15 DIAGNOSIS — S92.511A CLOSED DISPLACED FRACTURE OF PROXIMAL PHALANX OF LESSER TOE OF RIGHT FOOT, INITIAL ENCOUNTER: Primary | ICD-10-CM

## 2022-12-15 DIAGNOSIS — S92.511A CLOSED DISPLACED FRACTURE OF PROXIMAL PHALANX OF LESSER TOE OF RIGHT FOOT, INITIAL ENCOUNTER: ICD-10-CM

## 2022-12-15 PROCEDURE — 73630 XR FOOT COMPLETE 3 VIEW RIGHT: ICD-10-PCS | Mod: 26,RT,, | Performed by: RADIOLOGY

## 2022-12-15 PROCEDURE — 99024 POSTOP FOLLOW-UP VISIT: CPT | Mod: S$GLB,,, | Performed by: PODIATRIST

## 2022-12-15 PROCEDURE — 99024 PR POST-OP FOLLOW-UP VISIT: ICD-10-PCS | Mod: S$GLB,,, | Performed by: PODIATRIST

## 2022-12-15 PROCEDURE — 99999 PR PBB SHADOW E&M-EST. PATIENT-LVL IV: ICD-10-PCS | Mod: PBBFAC,,, | Performed by: PODIATRIST

## 2022-12-15 PROCEDURE — 3079F PR MOST RECENT DIASTOLIC BLOOD PRESSURE 80-89 MM HG: ICD-10-PCS | Mod: CPTII,S$GLB,, | Performed by: PODIATRIST

## 2022-12-15 PROCEDURE — 3079F DIAST BP 80-89 MM HG: CPT | Mod: CPTII,S$GLB,, | Performed by: PODIATRIST

## 2022-12-15 PROCEDURE — 1160F RVW MEDS BY RX/DR IN RCRD: CPT | Mod: CPTII,S$GLB,, | Performed by: PODIATRIST

## 2022-12-15 PROCEDURE — 73630 X-RAY EXAM OF FOOT: CPT | Mod: TC,RT

## 2022-12-15 PROCEDURE — 3077F SYST BP >= 140 MM HG: CPT | Mod: CPTII,S$GLB,, | Performed by: PODIATRIST

## 2022-12-15 PROCEDURE — 3077F PR MOST RECENT SYSTOLIC BLOOD PRESSURE >= 140 MM HG: ICD-10-PCS | Mod: CPTII,S$GLB,, | Performed by: PODIATRIST

## 2022-12-15 PROCEDURE — 1126F PR PAIN SEVERITY QUANTIFIED, NO PAIN PRESENT: ICD-10-PCS | Mod: CPTII,S$GLB,, | Performed by: PODIATRIST

## 2022-12-15 PROCEDURE — 73630 X-RAY EXAM OF FOOT: CPT | Mod: 26,RT,, | Performed by: RADIOLOGY

## 2022-12-15 PROCEDURE — 1126F AMNT PAIN NOTED NONE PRSNT: CPT | Mod: CPTII,S$GLB,, | Performed by: PODIATRIST

## 2022-12-15 PROCEDURE — 1160F PR REVIEW ALL MEDS BY PRESCRIBER/CLIN PHARMACIST DOCUMENTED: ICD-10-PCS | Mod: CPTII,S$GLB,, | Performed by: PODIATRIST

## 2022-12-15 PROCEDURE — 1159F PR MEDICATION LIST DOCUMENTED IN MEDICAL RECORD: ICD-10-PCS | Mod: CPTII,S$GLB,, | Performed by: PODIATRIST

## 2022-12-15 PROCEDURE — 99999 PR PBB SHADOW E&M-EST. PATIENT-LVL IV: CPT | Mod: PBBFAC,,, | Performed by: PODIATRIST

## 2022-12-15 PROCEDURE — 1159F MED LIST DOCD IN RCRD: CPT | Mod: CPTII,S$GLB,, | Performed by: PODIATRIST

## 2022-12-15 RX ORDER — MELOXICAM 15 MG/1
15 TABLET ORAL DAILY
Qty: 30 TABLET | Refills: 0 | Status: SHIPPED | OUTPATIENT
Start: 2022-12-15 | End: 2022-12-16

## 2022-12-18 NOTE — PROGRESS NOTES
Subjective:      Patient ID: Betzy Cooley is a 82 y.o. female.    Chief Complaint:   Follow-up (Right foot big toe broken )    Betzy is a 82 y.o. female who presents to the podiatry clinic  with complaint of  right foot pain. Onset of the symptoms was several days ago. Precipitating event: injured bluntly . Current symptoms include: ability to bear weight, but with some pain. Aggravating factors: any weight bearing. Symptoms have gradually worsened. Patient has had no prior foot problems. Evaluation to date: none. Treatment to date: ice. Patients rates pain 5/10 on pain scale.    Review of Systems   Constitutional: Negative for chills, decreased appetite, fever and malaise/fatigue.   HENT:  Negative for congestion, hearing loss, nosebleeds and tinnitus.    Eyes:  Negative for double vision, pain, photophobia and visual disturbance.   Cardiovascular:  Negative for chest pain, claudication, cyanosis and leg swelling.   Respiratory:  Negative for cough, hemoptysis, shortness of breath and wheezing.    Endocrine: Negative for cold intolerance and heat intolerance.   Hematologic/Lymphatic: Negative for adenopathy and bleeding problem.   Skin:  Positive for dry skin and nail changes. Negative for color change, itching and suspicious lesions.   Musculoskeletal:  Negative for arthritis, joint pain, myalgias and stiffness.   Gastrointestinal:  Negative for abdominal pain, jaundice, nausea and vomiting.   Genitourinary:  Negative for dysuria, frequency and hematuria.   Neurological:  Negative for difficulty with concentration, loss of balance, numbness, paresthesias and sensory change.   Psychiatric/Behavioral:  Negative for altered mental status, hallucinations and suicidal ideas. The patient is not nervous/anxious.    Allergic/Immunologic: Negative for environmental allergies and persistent infections.         Objective:      Physical Exam  Vitals reviewed.   Constitutional:       Appearance: She is  well-developed.   HENT:      Head: Normocephalic and atraumatic.   Cardiovascular:      Pulses:           Dorsalis pedis pulses are 2+ on the right side and 2+ on the left side.        Posterior tibial pulses are 2+ on the right side and 2+ on the left side.   Pulmonary:      Effort: Pulmonary effort is normal.   Musculoskeletal:         General: Normal range of motion.      Comments: Inspection and palpation of the muscles joints and bones of both lower extremities reveal that muscle strength for the anterior lateral and posterior muscle groups and intrinsic muscle groups of the foot are all 5 over 5 symmetrical.    Tenderness right 3rd toe and mpj.   Skin:     General: Skin is warm and dry.      Capillary Refill: Capillary refill takes 2 to 3 seconds.      Comments: Skin turgor is normal bilaterally.  Skin texture is well hydrated to both lower extremities.  No lesions or rashes or wounds appreciated bilaterally.  Nail plates 1 through 5 bilaterally are within normal limits for length and thickness.  No nail clubbing or incurvation noted.   Neurological:      Mental Status: She is alert and oriented to person, place, and time.      Comments: Sharp dull light touch vibratory proprioceptive sensation are intact bilaterally.  Deep tendon reflexes to patellar and Achilles tendon are symmetrical 2 over 4 bilaterally.  No ankle clonus or Babinski reflexes noted bilaterally.  Coordination is normal to both feet and lower extremities.   Psychiatric:         Behavior: Behavior normal.           Assessment:       Encounter Diagnosis   Name Primary?    Closed displaced fracture of proximal phalanx of lesser toe of right foot, initial encounter      Independent visualization of imaging was performed.  Results were reviewed in detail with patient.       Plan:       Betzy was seen today for follow-up.    Diagnoses and all orders for this visit:    Closed displaced fracture of proximal phalanx of lesser toe of right foot,  initial encounter  -     Ambulatory referral/consult to Podiatry  -     X-Ray Foot Complete Right; Future    Other orders  -     diclofenac sodium (VOLTAREN) 1 % Gel; Apply 2 g topically 4 (four) times daily.      I counseled the patient on her conditions, their implications and medical management.    The nature of the condition, options for management, as well as potential risks and complications were discussed in detail with patient. Patient was amenable to my recommendations and left my office fully informed and will follow up as instructed or sooner if necessary.      Injured toe was lluvia splinted to the adjacent toe. Instructions on daily splint changes given to patient.    Fracture care performed: xray evaluated and fracture noted to be in good alignment with minimal displacement. Fracture healing prognosis and education discussed. Risks and benefits and alternative treatments discussed. Patients elects to undergo non surgical treatment.     Follow 3 weeks.

## 2022-12-26 NOTE — PROGRESS NOTES
Subjective:      Patient ID: Betzy Cooley is a 82 y.o. female.    Chief Complaint:   Foot Injury    Betzy is a 82 y.o. female who presents to the podiatry clinic  with complaint of  right foot pain. Onset of the symptoms was several days ago. Precipitating event: injured bluntly . Current symptoms include: ability to bear weight, but with some pain. Aggravating factors: any weight bearing. Symptoms have gradually worsened. Patient has had no prior foot problems. Evaluation to date: none. Treatment to date: ice rest and walking boot.  She is improving.    Review of Systems   Constitutional: Negative for chills, decreased appetite, fever and malaise/fatigue.   HENT:  Negative for congestion, hearing loss, nosebleeds and tinnitus.    Eyes:  Negative for double vision, pain, photophobia and visual disturbance.   Cardiovascular:  Negative for chest pain, claudication, cyanosis and leg swelling.   Respiratory:  Negative for cough, hemoptysis, shortness of breath and wheezing.    Endocrine: Negative for cold intolerance and heat intolerance.   Hematologic/Lymphatic: Negative for adenopathy and bleeding problem.   Skin:  Positive for dry skin and nail changes. Negative for color change, itching and suspicious lesions.   Musculoskeletal:  Negative for arthritis, joint pain, myalgias and stiffness.   Gastrointestinal:  Negative for abdominal pain, jaundice, nausea and vomiting.   Genitourinary:  Negative for dysuria, frequency and hematuria.   Neurological:  Negative for difficulty with concentration, loss of balance, numbness, paresthesias and sensory change.   Psychiatric/Behavioral:  Negative for altered mental status, hallucinations and suicidal ideas. The patient is not nervous/anxious.    Allergic/Immunologic: Negative for environmental allergies and persistent infections.         Objective:      Physical Exam  Vitals reviewed.   Constitutional:       Appearance: She is well-developed.   HENT:      Head:  Normocephalic and atraumatic.   Cardiovascular:      Pulses:           Dorsalis pedis pulses are 2+ on the right side and 2+ on the left side.        Posterior tibial pulses are 2+ on the right side and 2+ on the left side.   Pulmonary:      Effort: Pulmonary effort is normal.   Musculoskeletal:         General: Normal range of motion.      Comments: Inspection and palpation of the muscles joints and bones of both lower extremities reveal that muscle strength for the anterior lateral and posterior muscle groups and intrinsic muscle groups of the foot are all 5 over 5 symmetrical.    Tenderness right 3rd toe and mpj.   Skin:     General: Skin is warm and dry.      Capillary Refill: Capillary refill takes 2 to 3 seconds.      Comments: Skin turgor is normal bilaterally.  Skin texture is well hydrated to both lower extremities.  No lesions or rashes or wounds appreciated bilaterally.  Nail plates 1 through 5 bilaterally are within normal limits for length and thickness.  No nail clubbing or incurvation noted.   Neurological:      Mental Status: She is alert and oriented to person, place, and time.      Comments: Sharp dull light touch vibratory proprioceptive sensation are intact bilaterally.  Deep tendon reflexes to patellar and Achilles tendon are symmetrical 2 over 4 bilaterally.  No ankle clonus or Babinski reflexes noted bilaterally.  Coordination is normal to both feet and lower extremities.   Psychiatric:         Behavior: Behavior normal.           Assessment:       Encounter Diagnosis   Name Primary?    Closed displaced fracture of proximal phalanx of lesser toe of right foot, initial encounter Yes     Independent visualization of imaging was performed.  Results were reviewed in detail with patient.       Plan:       Betzy was seen today for foot injury.    Diagnoses and all orders for this visit:    Closed displaced fracture of proximal phalanx of lesser toe of right foot, initial encounter    Other  orders  -     Discontinue: meloxicam (MOBIC) 15 MG tablet; Take 1 tablet (15 mg total) by mouth once daily.    I counseled the patient on her conditions, their implications and medical management.    The nature of the condition, options for management, as well as potential risks and complications were discussed in detail with patient. Patient was amenable to my recommendations and left my office fully informed and will follow up as instructed or sooner if necessary.      Injured toe was lluvia splinted to the adjacent toe. Instructions on daily splint changes given to patient.    Fracture care performed: xray evaluated and fracture noted to be in good alignment with minimal displacement. Fracture healing prognosis and education discussed. Risks and benefits and alternative treatments discussed. Patients elects to undergo non surgical treatment.     Follow 3 weeks.

## 2022-12-29 ENCOUNTER — PATIENT MESSAGE (OUTPATIENT)
Dept: PODIATRY | Facility: CLINIC | Age: 82
End: 2022-12-29
Payer: MEDICARE

## 2022-12-29 DIAGNOSIS — S92.511A CLOSED DISPLACED FRACTURE OF PROXIMAL PHALANX OF LESSER TOE OF RIGHT FOOT, INITIAL ENCOUNTER: Primary | ICD-10-CM

## 2022-12-30 ENCOUNTER — PATIENT MESSAGE (OUTPATIENT)
Dept: PODIATRY | Facility: CLINIC | Age: 82
End: 2022-12-30
Payer: MEDICARE

## 2023-01-11 ENCOUNTER — HOSPITAL ENCOUNTER (OUTPATIENT)
Dept: RADIOLOGY | Facility: HOSPITAL | Age: 83
Discharge: HOME | End: 2023-01-11
Attending: PODIATRIST
Payer: MEDICARE

## 2023-01-11 DIAGNOSIS — S92.511A CLOSED DISPLACED FRACTURE OF PROXIMAL PHALANX OF LESSER TOE OF RIGHT FOOT, INITIAL ENCOUNTER: ICD-10-CM

## 2023-01-11 PROCEDURE — 73630 XR FOOT COMPLETE 3 VIEW RIGHT: ICD-10-PCS | Mod: 26,HCNC,RT, | Performed by: RADIOLOGY

## 2023-01-11 PROCEDURE — 73630 X-RAY EXAM OF FOOT: CPT | Mod: TC,HCNC,RT

## 2023-01-11 PROCEDURE — 73630 X-RAY EXAM OF FOOT: CPT | Mod: 26,HCNC,RT, | Performed by: RADIOLOGY

## 2023-01-12 ENCOUNTER — OFFICE VISIT (OUTPATIENT)
Dept: PODIATRY | Facility: CLINIC | Age: 83
End: 2023-01-12
Payer: MEDICARE

## 2023-01-12 VITALS
DIASTOLIC BLOOD PRESSURE: 75 MMHG | WEIGHT: 132.25 LBS | SYSTOLIC BLOOD PRESSURE: 128 MMHG | HEART RATE: 57 BPM | BODY MASS INDEX: 24.19 KG/M2

## 2023-01-12 DIAGNOSIS — S92.511A CLOSED DISPLACED FRACTURE OF PROXIMAL PHALANX OF LESSER TOE OF RIGHT FOOT, INITIAL ENCOUNTER: Primary | ICD-10-CM

## 2023-01-12 PROCEDURE — 1126F PR PAIN SEVERITY QUANTIFIED, NO PAIN PRESENT: ICD-10-PCS | Mod: HCNC,CPTII,S$GLB, | Performed by: PODIATRIST

## 2023-01-12 PROCEDURE — 3078F DIAST BP <80 MM HG: CPT | Mod: HCNC,CPTII,S$GLB, | Performed by: PODIATRIST

## 2023-01-12 PROCEDURE — 99999 PR PBB SHADOW E&M-EST. PATIENT-LVL III: CPT | Mod: PBBFAC,HCNC,, | Performed by: PODIATRIST

## 2023-01-12 PROCEDURE — 1159F PR MEDICATION LIST DOCUMENTED IN MEDICAL RECORD: ICD-10-PCS | Mod: HCNC,CPTII,S$GLB, | Performed by: PODIATRIST

## 2023-01-12 PROCEDURE — 1159F MED LIST DOCD IN RCRD: CPT | Mod: HCNC,CPTII,S$GLB, | Performed by: PODIATRIST

## 2023-01-12 PROCEDURE — 1160F PR REVIEW ALL MEDS BY PRESCRIBER/CLIN PHARMACIST DOCUMENTED: ICD-10-PCS | Mod: HCNC,CPTII,S$GLB, | Performed by: PODIATRIST

## 2023-01-12 PROCEDURE — 99024 PR POST-OP FOLLOW-UP VISIT: ICD-10-PCS | Mod: HCNC,S$GLB,, | Performed by: PODIATRIST

## 2023-01-12 PROCEDURE — 99024 POSTOP FOLLOW-UP VISIT: CPT | Mod: HCNC,S$GLB,, | Performed by: PODIATRIST

## 2023-01-12 PROCEDURE — 3074F PR MOST RECENT SYSTOLIC BLOOD PRESSURE < 130 MM HG: ICD-10-PCS | Mod: HCNC,CPTII,S$GLB, | Performed by: PODIATRIST

## 2023-01-12 PROCEDURE — 3074F SYST BP LT 130 MM HG: CPT | Mod: HCNC,CPTII,S$GLB, | Performed by: PODIATRIST

## 2023-01-12 PROCEDURE — 1160F RVW MEDS BY RX/DR IN RCRD: CPT | Mod: HCNC,CPTII,S$GLB, | Performed by: PODIATRIST

## 2023-01-12 PROCEDURE — 1126F AMNT PAIN NOTED NONE PRSNT: CPT | Mod: HCNC,CPTII,S$GLB, | Performed by: PODIATRIST

## 2023-01-12 PROCEDURE — 99999 PR PBB SHADOW E&M-EST. PATIENT-LVL III: ICD-10-PCS | Mod: PBBFAC,HCNC,, | Performed by: PODIATRIST

## 2023-01-12 PROCEDURE — 3078F PR MOST RECENT DIASTOLIC BLOOD PRESSURE < 80 MM HG: ICD-10-PCS | Mod: HCNC,CPTII,S$GLB, | Performed by: PODIATRIST

## 2023-01-12 RX ORDER — INFLUENZA A VIRUS A/VICTORIA/2570/2019 IVR-215 (H1N1) ANTIGEN (FORMALDEHYDE INACTIVATED), INFLUENZA A VIRUS A/DARWIN/9/2021 SAN-010 (H3N2) ANTIGEN (FORMALDEHYDE INACTIVATED), INFLUENZA B VIRUS B/PHUKET/3073/2013 ANTIGEN (FORMALDEHYDE INACTIVATED), AND INFLUENZA B VIRUS B/MICHIGAN/01/2021 ANTIGEN (FORMALDEHYDE INACTIVATED) 60; 60; 60; 60 UG/.7ML; UG/.7ML; UG/.7ML; UG/.7ML
INJECTION, SUSPENSION INTRAMUSCULAR
COMMUNITY
Start: 2022-09-29 | End: 2023-07-25

## 2023-01-12 RX ORDER — COVID-19 MOLECULAR TEST ASSAY
KIT MISCELLANEOUS
COMMUNITY
Start: 2022-12-15

## 2023-01-12 NOTE — PROGRESS NOTES
Subjective:      Patient ID: Betzy Cooley is a 82 y.o. female.    Chief Complaint:   Follow-up (Broken toe right middle toe discuss xray )    Betzy is a 82 y.o. female who presents to the podiatry clinic for follow-up right foot middle toe fracture, much improved.  Recently on a cruise with no pain.  Review of Systems   Constitutional: Negative for chills, decreased appetite, fever and malaise/fatigue.   HENT:  Negative for congestion, hearing loss, nosebleeds and tinnitus.    Eyes:  Negative for double vision, pain, photophobia and visual disturbance.   Cardiovascular:  Negative for chest pain, claudication, cyanosis and leg swelling.   Respiratory:  Negative for cough, hemoptysis, shortness of breath and wheezing.    Endocrine: Negative for cold intolerance and heat intolerance.   Hematologic/Lymphatic: Negative for adenopathy and bleeding problem.   Skin:  Positive for dry skin and nail changes. Negative for color change, itching and suspicious lesions.   Musculoskeletal:  Negative for arthritis, joint pain, myalgias and stiffness.   Gastrointestinal:  Negative for abdominal pain, jaundice, nausea and vomiting.   Genitourinary:  Negative for dysuria, frequency and hematuria.   Neurological:  Negative for difficulty with concentration, loss of balance, numbness, paresthesias and sensory change.   Psychiatric/Behavioral:  Negative for altered mental status, hallucinations and suicidal ideas. The patient is not nervous/anxious.    Allergic/Immunologic: Negative for environmental allergies and persistent infections.         Objective:      Physical Exam  Vitals reviewed.   Constitutional:       Appearance: She is well-developed.   HENT:      Head: Normocephalic and atraumatic.   Cardiovascular:      Pulses:           Dorsalis pedis pulses are 2+ on the right side and 2+ on the left side.        Posterior tibial pulses are 2+ on the right side and 2+ on the left side.   Pulmonary:      Effort: Pulmonary  effort is normal.   Musculoskeletal:         General: Normal range of motion.      Comments: Inspection and palpation of the muscles joints and bones of both lower extremities reveal that muscle strength for the anterior lateral and posterior muscle groups and intrinsic muscle groups of the foot are all 5 over 5 symmetrical.    Minimal to no tenderness right 3rd toe and mpj.   Skin:     General: Skin is warm and dry.      Capillary Refill: Capillary refill takes 2 to 3 seconds.      Comments: Skin turgor is normal bilaterally.  Skin texture is well hydrated to both lower extremities.  No lesions or rashes or wounds appreciated bilaterally.  Nail plates 1 through 5 bilaterally are within normal limits for length and thickness.  No nail clubbing or incurvation noted.   Neurological:      Mental Status: She is alert and oriented to person, place, and time.      Comments: Sharp dull light touch vibratory proprioceptive sensation are intact bilaterally.  Deep tendon reflexes to patellar and Achilles tendon are symmetrical 2 over 4 bilaterally.  No ankle clonus or Babinski reflexes noted bilaterally.  Coordination is normal to both feet and lower extremities.   Psychiatric:         Behavior: Behavior normal.           Assessment:       Encounter Diagnosis   Name Primary?    Closed displaced fracture of proximal phalanx of lesser toe of right foot, initial encounter Yes     Independent visualization of imaging was performed.  Results were reviewed in detail with patient.       Plan:       Betzy was seen today for follow-up.    Diagnoses and all orders for this visit:    Closed displaced fracture of proximal phalanx of lesser toe of right foot, initial encounter    I counseled the patient on her conditions, their implications and medical management.    Resume normal shoe gear and activity to tolerance.  Follow-up as needed.

## 2023-02-09 DIAGNOSIS — Z00.00 ENCOUNTER FOR MEDICARE ANNUAL WELLNESS EXAM: ICD-10-CM

## 2023-03-17 ENCOUNTER — PATIENT MESSAGE (OUTPATIENT)
Dept: RESEARCH | Facility: HOSPITAL | Age: 83
End: 2023-03-17
Payer: MEDICARE

## 2023-03-20 ENCOUNTER — OFFICE VISIT (OUTPATIENT)
Dept: OPTOMETRY | Facility: CLINIC | Age: 83
End: 2023-03-20
Payer: COMMERCIAL

## 2023-03-20 DIAGNOSIS — H52.4 PRESBYOPIA OF BOTH EYES: ICD-10-CM

## 2023-03-20 DIAGNOSIS — H43.393 VITREOUS FLOATERS OF BOTH EYES: ICD-10-CM

## 2023-03-20 DIAGNOSIS — H25.013 CORTICAL SENILE CATARACT, BILATERAL: ICD-10-CM

## 2023-03-20 DIAGNOSIS — H52.203 HYPEROPIA OF BOTH EYES WITH ASTIGMATISM: ICD-10-CM

## 2023-03-20 DIAGNOSIS — Z13.5 SCREENING FOR EYE CONDITION: ICD-10-CM

## 2023-03-20 DIAGNOSIS — D31.32 CHOROIDAL NEVUS OF LEFT EYE: ICD-10-CM

## 2023-03-20 DIAGNOSIS — H25.13 NUCLEAR SCLEROSIS, BILATERAL: Primary | ICD-10-CM

## 2023-03-20 DIAGNOSIS — H52.03 HYPEROPIA OF BOTH EYES WITH ASTIGMATISM: ICD-10-CM

## 2023-03-20 PROCEDURE — 92015 DETERMINE REFRACTIVE STATE: CPT | Mod: S$GLB,,, | Performed by: OPTOMETRIST

## 2023-03-20 PROCEDURE — 92015 PR REFRACTION: ICD-10-PCS | Mod: S$GLB,,, | Performed by: OPTOMETRIST

## 2023-03-20 PROCEDURE — 92014 COMPRE OPH EXAM EST PT 1/>: CPT | Mod: S$GLB,,, | Performed by: OPTOMETRIST

## 2023-03-20 PROCEDURE — 99999 PR PBB SHADOW E&M-EST. PATIENT-LVL IV: CPT | Mod: PBBFAC,,, | Performed by: OPTOMETRIST

## 2023-03-20 PROCEDURE — 92014 PR EYE EXAM, EST PATIENT,COMPREHESV: ICD-10-PCS | Mod: S$GLB,,, | Performed by: OPTOMETRIST

## 2023-03-20 PROCEDURE — 99999 PR PBB SHADOW E&M-EST. PATIENT-LVL IV: ICD-10-PCS | Mod: PBBFAC,,, | Performed by: OPTOMETRIST

## 2023-03-20 NOTE — PROGRESS NOTES
"HPI     eye exam            Comments: General eye exam and refraction.    No acute problems.  Wears glasses for reading/near work  Happy with unaided distance VA.           Comments    Patient's age: 82 y.o. WF  Occupation: Retired  Approximate date of last eye examination:  12/22/2021  Name of last eye doctor seen:    City/State: [facility]  Wears glasses? Yes     If yes, wears  Full-time or part-time?  Part-time  Present glasses are: Bifocal, SV Distance, SV Reading?  Bifocals lens  Approximate age of present glasses: 6-8 years (?)  Got new glasses following last exam, or subsequently?:  no (!)   Any problem with VA with glasses?  No   Wears CLs?:  no  Headaches?  no  Eye pain/discomfort?  no                                                                        Flashes?  No  Floaters?  No  Diplopia/Double vision?  no  Patient's Ocular History:         Any eye surgeries? no         Any eye injury?  no         Any treatment for eye disease?  no  Family history of eye disease?  no  Significant patient medical history:         1. Diabetes?  no       If yes, IDDM or NIDDM? no   2. HBP?  no              3. Other (describe):  Acid reflux   ! OTC eyedrops currently using:  Systane prn    ! Prescription eye meds currently using:  Optivar as needed for   allergy-related symptoms   ! Any history of allergy/adverse reaction to any eye meds used   previously?  no    ! Any history of allergy/adverse reaction to eyedrops used during prior   eye exam(s)? no    ! Any history of allergy/adverse reaction to Novacaine or similar meds?   no   ! Any history of allergy/adverse reaction to Epinephrine or similar meds?   no    ! Patient okay with use of anesthetic eyedrops to check eye pressure?    yes        ! Patient okay with use of eyedrops to dilate pupils today?  yes   !  Allergies/Medications/Medical History/Family History reviewed today?    yes      PD =   61/58  Desired reading distance =  14.5"                             "                                            Last edited by Shashank Camarena, OD on 3/20/2023  9:14 AM.            Assessment /Plan     For exam results, see Encounter Report.    1. Nuclear sclerosis, bilateral        2. Cortical senile cataract, bilateral        3. Choroidal nevus of left eye        4. Vitreous floaters of both eyes        5. Screening for eye condition        6. Hyperopia of both eyes with astigmatism        7. Presbyopia of both eyes                         Early nuclear sclerotic/peripheral cortical cataract in both eyes.   Still no need for cataract surgery.  Continue to monitor.     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   Appears stable.    Vitreous floaters OD and OS.  No evidence of retinal etiology.      Otherwise, ocular health appears good in each eye.     Hyperopia with astigmatism in each eye.  Greater astigmatic correction in the left eye than in the right eye.   Satisfactory best-corrected VA in each eye.  Presbyopia consistent with age.  New spectacle lens Rx issued for use as desired.   Recheck in 12 -18 months.      Mrs. Cooley is currently taking AREDS-2 antioxidant/vitamin supplement by mouth.  Continue to take as directed on package.

## 2023-03-20 NOTE — PATIENT INSTRUCTIONS
Early nuclear sclerotic/peripheral cortical cataract in both eyes.   Still no need for cataract surgery.  Continue to monitor.     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   Appears stable.    Vitreous floaters OD and OS.  No evidence of retinal etiology.      Otherwise, ocular health appears good in each eye.     Hyperopia with astigmatism in each eye.  Greater astigmatic correction in the left eye than in the right eye.   Satisfactory best-corrected VA in each eye.  Presbyopia consistent with age.  New spectacle lens Rx issued for use as desired.   Recheck in 12 -18 months.      Mrs. Cooley is currently taking AREDS-2 antioxidant/vitamin supplement by mouth.  Continue to take as directed on package.

## 2023-06-26 ENCOUNTER — OFFICE VISIT (OUTPATIENT)
Dept: URGENT CARE | Facility: CLINIC | Age: 83
End: 2023-06-26
Payer: MEDICARE

## 2023-06-26 VITALS
BODY MASS INDEX: 24.29 KG/M2 | TEMPERATURE: 98 F | HEART RATE: 76 BPM | WEIGHT: 132 LBS | DIASTOLIC BLOOD PRESSURE: 96 MMHG | SYSTOLIC BLOOD PRESSURE: 178 MMHG | OXYGEN SATURATION: 96 % | RESPIRATION RATE: 16 BRPM | HEIGHT: 62 IN

## 2023-06-26 DIAGNOSIS — J32.9 SINUSITIS, UNSPECIFIED CHRONICITY, UNSPECIFIED LOCATION: Primary | ICD-10-CM

## 2023-06-26 LAB
CTP QC/QA: YES
SARS-COV-2 AG RESP QL IA.RAPID: NEGATIVE

## 2023-06-26 PROCEDURE — 99212 PR OFFICE/OUTPT VISIT, EST, LEVL II, 10-19 MIN: ICD-10-PCS | Mod: S$GLB,,, | Performed by: FAMILY MEDICINE

## 2023-06-26 PROCEDURE — 99212 OFFICE O/P EST SF 10 MIN: CPT | Mod: S$GLB,,, | Performed by: FAMILY MEDICINE

## 2023-06-26 PROCEDURE — 87811 SARS-COV-2 COVID19 W/OPTIC: CPT | Mod: QW,S$GLB,, | Performed by: FAMILY MEDICINE

## 2023-06-26 PROCEDURE — 87811 SARS CORONAVIRUS 2 ANTIGEN POCT, MANUAL READ: ICD-10-PCS | Mod: QW,S$GLB,, | Performed by: FAMILY MEDICINE

## 2023-06-26 RX ORDER — AZITHROMYCIN 250 MG/1
TABLET, FILM COATED ORAL
Qty: 6 TABLET | Refills: 0 | Status: SHIPPED | OUTPATIENT
Start: 2023-06-26 | End: 2023-07-01

## 2023-06-27 NOTE — PROGRESS NOTES
"Subjective:      Patient ID: Betzy Cooley is a 82 y.o. female.    Vitals:  height is 5' 2" (1.575 m) and weight is 59.9 kg (132 lb). Her oral temperature is 97.9 °F (36.6 °C). Her blood pressure is 178/96 (abnormal) and her pulse is 76. Her respiration is 16 and oxygen saturation is 96%.     Chief Complaint: Sinus Problem    81 yo female who has been self treating at home with OTC meds for a frontal sinus infection for the last 10-12 days. She started having fevers last night and came in to get evaluated. She took her bp med at five pm this afternoon.     Sinus Problem  This is a new problem. The current episode started yesterday. The problem is unchanged. There has been no fever. Her pain is at a severity of 5/10. The pain is moderate. Associated symptoms include chills, congestion, coughing, headaches and sinus pressure. Past treatments include acetaminophen. The treatment provided mild relief.     Constitution: Positive for chills.   HENT:  Positive for congestion and sinus pressure.    Respiratory:  Positive for cough.    Neurological:  Positive for headaches.    Objective:     Physical Exam   Constitutional: She is oriented to person, place, and time.  Non-toxic appearance. She appears ill. No distress.   HENT:   Head: Normocephalic.   Ears:   Right Ear: External ear normal.   Left Ear: External ear normal.   Nose: Nose normal.   Mouth/Throat: Mucous membranes are moist.   Eyes: Conjunctivae are normal.   Cardiovascular: Normal rate.   Pulmonary/Chest: Effort normal.   Musculoskeletal: Normal range of motion.         General: Normal range of motion.   Neurological: She is alert and oriented to person, place, and time.   Skin: Skin is dry.   Psychiatric: Her behavior is normal.     Assessment:     1. Sinusitis, unspecified chronicity, unspecified location      Results for orders placed or performed in visit on 06/26/23   SARS Coronavirus 2 Antigen, POCT Manual Read   Result Value Ref Range    SARS " Coronavirus 2 Antigen Negative Negative     Acceptable Yes        Plan:       Sinusitis, unspecified chronicity, unspecified location  -     SARS Coronavirus 2 Antigen, POCT Manual Read  -     azithromycin (Z-JOE) 250 MG tablet; Take 2 tablets by mouth on day 1; Take 1 tablet by mouth on days 2-5  Dispense: 6 tablet; Refill: 0      Fever  with persistent worsening symptoms clinically warrants use of abx in high risk patient.   RTC as needed.

## 2023-07-25 ENCOUNTER — PATIENT OUTREACH (OUTPATIENT)
Dept: ADMINISTRATIVE | Facility: HOSPITAL | Age: 83
End: 2023-07-25
Payer: MEDICARE

## 2023-07-25 ENCOUNTER — LAB VISIT (OUTPATIENT)
Dept: LAB | Facility: HOSPITAL | Age: 83
End: 2023-07-25
Attending: INTERNAL MEDICINE
Payer: MEDICARE

## 2023-07-25 ENCOUNTER — OFFICE VISIT (OUTPATIENT)
Dept: INTERNAL MEDICINE | Facility: CLINIC | Age: 83
End: 2023-07-25
Payer: MEDICARE

## 2023-07-25 VITALS
WEIGHT: 130.5 LBS | HEIGHT: 62 IN | BODY MASS INDEX: 24.01 KG/M2 | SYSTOLIC BLOOD PRESSURE: 134 MMHG | DIASTOLIC BLOOD PRESSURE: 82 MMHG | OXYGEN SATURATION: 99 % | HEART RATE: 71 BPM

## 2023-07-25 DIAGNOSIS — R03.0 ELEVATED BLOOD PRESSURE READING: ICD-10-CM

## 2023-07-25 DIAGNOSIS — K21.9 GASTROESOPHAGEAL REFLUX DISEASE, UNSPECIFIED WHETHER ESOPHAGITIS PRESENT: ICD-10-CM

## 2023-07-25 DIAGNOSIS — Z00.00 ROUTINE PHYSICAL EXAMINATION: ICD-10-CM

## 2023-07-25 DIAGNOSIS — J30.9 ALLERGIC RHINITIS, UNSPECIFIED SEASONALITY, UNSPECIFIED TRIGGER: ICD-10-CM

## 2023-07-25 DIAGNOSIS — E78.5 HYPERLIPIDEMIA, UNSPECIFIED HYPERLIPIDEMIA TYPE: ICD-10-CM

## 2023-07-25 DIAGNOSIS — Z00.00 ROUTINE PHYSICAL EXAMINATION: Primary | ICD-10-CM

## 2023-07-25 LAB
ANION GAP SERPL CALC-SCNC: 13 MMOL/L (ref 8–16)
BUN SERPL-MCNC: 11 MG/DL (ref 8–23)
CALCIUM SERPL-MCNC: 10 MG/DL (ref 8.7–10.5)
CHLORIDE SERPL-SCNC: 103 MMOL/L (ref 95–110)
CHOLEST SERPL-MCNC: 176 MG/DL (ref 120–199)
CO2 SERPL-SCNC: 26 MMOL/L (ref 23–29)
CREAT SERPL-MCNC: 0.8 MG/DL (ref 0.5–1.4)
EST. GFR  (NO RACE VARIABLE): >60 ML/MIN/1.73 M^2
GLUCOSE SERPL-MCNC: 97 MG/DL (ref 70–110)
POTASSIUM SERPL-SCNC: 4.5 MMOL/L (ref 3.5–5.1)
SODIUM SERPL-SCNC: 142 MMOL/L (ref 136–145)

## 2023-07-25 PROCEDURE — 99397 PER PM REEVAL EST PAT 65+ YR: CPT | Mod: HCNC,S$GLB,, | Performed by: INTERNAL MEDICINE

## 2023-07-25 PROCEDURE — 1101F PT FALLS ASSESS-DOCD LE1/YR: CPT | Mod: HCNC,CPTII,S$GLB, | Performed by: INTERNAL MEDICINE

## 2023-07-25 PROCEDURE — 1159F PR MEDICATION LIST DOCUMENTED IN MEDICAL RECORD: ICD-10-PCS | Mod: HCNC,CPTII,S$GLB, | Performed by: INTERNAL MEDICINE

## 2023-07-25 PROCEDURE — 82465 ASSAY BLD/SERUM CHOLESTEROL: CPT | Mod: HCNC | Performed by: INTERNAL MEDICINE

## 2023-07-25 PROCEDURE — 1160F PR REVIEW ALL MEDS BY PRESCRIBER/CLIN PHARMACIST DOCUMENTED: ICD-10-PCS | Mod: HCNC,CPTII,S$GLB, | Performed by: INTERNAL MEDICINE

## 2023-07-25 PROCEDURE — 99999 PR PBB SHADOW E&M-EST. PATIENT-LVL IV: ICD-10-PCS | Mod: PBBFAC,HCNC,, | Performed by: INTERNAL MEDICINE

## 2023-07-25 PROCEDURE — 1160F RVW MEDS BY RX/DR IN RCRD: CPT | Mod: HCNC,CPTII,S$GLB, | Performed by: INTERNAL MEDICINE

## 2023-07-25 PROCEDURE — 1126F AMNT PAIN NOTED NONE PRSNT: CPT | Mod: HCNC,CPTII,S$GLB, | Performed by: INTERNAL MEDICINE

## 2023-07-25 PROCEDURE — 1101F PR PT FALLS ASSESS DOC 0-1 FALLS W/OUT INJ PAST YR: ICD-10-PCS | Mod: HCNC,CPTII,S$GLB, | Performed by: INTERNAL MEDICINE

## 2023-07-25 PROCEDURE — 99397 PR PREVENTIVE VISIT,EST,65 & OVER: ICD-10-PCS | Mod: HCNC,S$GLB,, | Performed by: INTERNAL MEDICINE

## 2023-07-25 PROCEDURE — 3075F SYST BP GE 130 - 139MM HG: CPT | Mod: HCNC,CPTII,S$GLB, | Performed by: INTERNAL MEDICINE

## 2023-07-25 PROCEDURE — 1159F MED LIST DOCD IN RCRD: CPT | Mod: HCNC,CPTII,S$GLB, | Performed by: INTERNAL MEDICINE

## 2023-07-25 PROCEDURE — 80048 BASIC METABOLIC PNL TOTAL CA: CPT | Mod: HCNC | Performed by: INTERNAL MEDICINE

## 2023-07-25 PROCEDURE — 1126F PR PAIN SEVERITY QUANTIFIED, NO PAIN PRESENT: ICD-10-PCS | Mod: HCNC,CPTII,S$GLB, | Performed by: INTERNAL MEDICINE

## 2023-07-25 PROCEDURE — 3079F PR MOST RECENT DIASTOLIC BLOOD PRESSURE 80-89 MM HG: ICD-10-PCS | Mod: HCNC,CPTII,S$GLB, | Performed by: INTERNAL MEDICINE

## 2023-07-25 PROCEDURE — 3288F FALL RISK ASSESSMENT DOCD: CPT | Mod: HCNC,CPTII,S$GLB, | Performed by: INTERNAL MEDICINE

## 2023-07-25 PROCEDURE — 3075F PR MOST RECENT SYSTOLIC BLOOD PRESS GE 130-139MM HG: ICD-10-PCS | Mod: HCNC,CPTII,S$GLB, | Performed by: INTERNAL MEDICINE

## 2023-07-25 PROCEDURE — 36415 COLL VENOUS BLD VENIPUNCTURE: CPT | Mod: HCNC | Performed by: INTERNAL MEDICINE

## 2023-07-25 PROCEDURE — 99999 PR PBB SHADOW E&M-EST. PATIENT-LVL IV: CPT | Mod: PBBFAC,HCNC,, | Performed by: INTERNAL MEDICINE

## 2023-07-25 PROCEDURE — 3288F PR FALLS RISK ASSESSMENT DOCUMENTED: ICD-10-PCS | Mod: HCNC,CPTII,S$GLB, | Performed by: INTERNAL MEDICINE

## 2023-07-25 PROCEDURE — 3079F DIAST BP 80-89 MM HG: CPT | Mod: HCNC,CPTII,S$GLB, | Performed by: INTERNAL MEDICINE

## 2023-07-25 NOTE — PROGRESS NOTES
Subjective:       Patient ID: Betzy Cooley is a 83 y.o. female.    Chief Complaint: Annual Exam    Here for annual exam.  We would like to update some blood work.  We reviewed prescriptions.  She is having some sinus congestion postnasal drip.  She is having some arthritis symptoms.  She feels she and her  are overall getting along okay however. Was seen in urgent care in the last few weeks and placed on an antibiotic.  That has improved a bit.    Review of Systems   Constitutional:  Negative for appetite change, chills and fever.   HENT:  Positive for nasal congestion, postnasal drip and sinus pressure/congestion. Negative for nosebleeds and sore throat.    Eyes:  Negative for pain and visual disturbance.   Respiratory:  Negative for cough, shortness of breath and wheezing.    Cardiovascular:  Negative for chest pain and leg swelling.   Gastrointestinal:  Negative for abdominal pain, constipation and diarrhea.   Endocrine: Negative for polyuria.   Genitourinary:  Negative for difficulty urinating, hematuria and vaginal bleeding.   Musculoskeletal:  Positive for arthralgias. Negative for back pain, gait problem and neck pain.   Integumentary:  Negative for pallor and rash.   Neurological:  Negative for tremors, seizures and headaches.   Hematological:  Does not bruise/bleed easily.   Psychiatric/Behavioral:  Negative for dysphoric mood. The patient is not nervous/anxious.          Past Medical History:   Diagnosis Date    Arthritis     Cataract     GERD (gastroesophageal reflux disease)     HEARING LOSS     Hypertension     Osteoporosis, postmenopausal     Squamous Cell Carcinoma 2007    right forearm    Urinary incontinence     rare mixed     Past Surgical History:   Procedure Laterality Date    COLPOPEXY N/A 8/6/2019    Procedure: COLPOPEXY SSL FIXATION;  Surgeon: Alma Dorsey MD;  Location: [facility] OR [address] [place] [address];  Service: Urology;  Laterality: N/A;    CYSTOSCOPY N/A 8/6/2019    Procedure: CYSTOSCOPY;   Surgeon: Alma Dorsey MD;  Location: [facility] OR [address] [place] [address];  Service: Urology;  Laterality: N/A;    FRACTURE SURGERY Left 2008    open radius/ulna fracture    HYSTERECTOMY      TANIA/ovaries remain--fibroids- in her late 30's / early 40's      Patient Active Problem List   Diagnosis    Hearing loss, sensorineural    Nuclear sclerosis - Both Eyes    Cortical senile cataract - Both Eyes    Choroidal nevus - Both Eyes    Palpitations    Osteopenia    Encounter for screening colonoscopy    GERD (gastroesophageal reflux disease)    History of skin cancer    Tinnitus    Elevated blood pressure reading    Abnormal EKG    Posture imbalance    Shoulder weakness        Objective:      Physical Exam  Constitutional:       General: She is not in acute distress.     Appearance: She is well-developed.   HENT:      Head: Normocephalic and atraumatic.      Right Ear: Tympanic membrane, ear canal and external ear normal.      Left Ear: Tympanic membrane, ear canal and external ear normal.      Nose: Rhinorrhea present.      Mouth/Throat:      Pharynx: No oropharyngeal exudate or posterior oropharyngeal erythema.   Eyes:      General: No scleral icterus.     Conjunctiva/sclera: Conjunctivae normal.      Pupils: Pupils are equal, round, and reactive to light.   Neck:      Thyroid: No thyromegaly.   Cardiovascular:      Rate and Rhythm: Normal rate and regular rhythm.      Pulses: Normal pulses.      Heart sounds: No murmur heard.  Pulmonary:      Effort: Pulmonary effort is normal.      Breath sounds: Normal breath sounds. No wheezing.   Abdominal:      General: Bowel sounds are normal. There is no distension.      Palpations: Abdomen is soft.      Tenderness: There is no abdominal tenderness.   Musculoskeletal:         General: No tenderness.      Cervical back: Normal range of motion and neck supple.      Right lower leg: No edema.      Left lower leg: No edema.   Lymphadenopathy:      Cervical: No cervical adenopathy.   Skin:      "Coloration: Skin is not jaundiced.      Findings: No rash.   Neurological:      General: No focal deficit present.      Mental Status: She is alert and oriented to person, place, and time.   Psychiatric:         Mood and Affect: Mood normal.         Behavior: Behavior normal.       Assessment:       Problem List Items Addressed This Visit          Cardiac/Vascular    Elevated blood pressure reading    Relevant Orders    Basic Metabolic Panel    Cholesterol, Total       GI    GERD (gastroesophageal reflux disease)    Relevant Orders    Basic Metabolic Panel    Cholesterol, Total     Other Visit Diagnoses       Routine physical examination    -  Primary    Relevant Orders    Basic Metabolic Panel    Cholesterol, Total    Hyperlipidemia, unspecified hyperlipidemia type        Relevant Orders    Basic Metabolic Panel    Cholesterol, Total    Allergic rhinitis, unspecified seasonality, unspecified trigger                Plan:         Betzy was seen today for annual exam.    Diagnoses and all orders for this visit:    Routine physical examination  -     Basic Metabolic Panel; Future  -     Cholesterol, Total; Future    Hyperlipidemia, unspecified hyperlipidemia type  -     Basic Metabolic Panel; Future  -     Cholesterol, Total; Future    Gastroesophageal reflux disease, unspecified whether esophagitis present  -     Basic Metabolic Panel; Future  -     Cholesterol, Total; Future    Elevated blood pressure reading  -     Basic Metabolic Panel; Future  -     Cholesterol, Total; Future    Allergic rhinitis, unspecified seasonality, unspecified trigger       Follow-up 6-12 months              Portions of this note may have been created with voice recognition software. Occasional "wrong-word" or "sound-a-like" substitutions may have occurred due to the inherent limitations of voice recognition software. Please, read the note carefully and recognize, using context, where substitutions have occurred.  "

## 2023-08-11 DIAGNOSIS — R00.2 PALPITATIONS: Primary | ICD-10-CM

## 2023-08-28 NOTE — PROGRESS NOTES
Cardiology Clinic Note  Reason for Visit: tachycardia    HPI:     Betzy Cooley is a 83 y.o. F, who presents for tachycardia.    She and her  go to the gym.  Exercises cardio without issue.    Two episodes of fast heart rates.  #1 occurred after climbing stairs. She was working outside in the garden before this.  #2 occurred after a dream, upon waking up.  Both episodes - heart rate gradually decreased over 5 minutes.  No sustained episodes or assoc symptoms.    Medical: HTN  Surgical: Reviewed, as below.  Family: Reviewed, as below. Mother with HF.  Social: Reviewed, as below. Never smoked.    ROS:    Pertinent ROS included in HPI and below.  PMH:     Past Medical History:   Diagnosis Date    Arthritis     Cataract     GERD (gastroesophageal reflux disease)     HEARING LOSS     Hypertension     Osteoporosis, postmenopausal     Squamous Cell Carcinoma 2007    right forearm    Urinary incontinence     rare mixed     Past Surgical History:   Procedure Laterality Date    COLPOPEXY N/A 8/6/2019    Procedure: COLPOPEXY SSL FIXATION;  Surgeon: Alma Dorsey MD;  Location: [facility] OR [address] [place] [address];  Service: Urology;  Laterality: N/A;    CYSTOSCOPY N/A 8/6/2019    Procedure: CYSTOSCOPY;  Surgeon: Alma Doresy MD;  Location: [facility] OR [address] [place] [address];  Service: Urology;  Laterality: N/A;    FRACTURE SURGERY Left 2008    open radius/ulna fracture    HYSTERECTOMY      TANIA/ovaries remain--fibroids- in her late 30's / early 40's     Allergies:     Review of patient's allergies indicates:   Allergen Reactions    Sulfa (sulfonamide antibiotics) Hives     Other reaction(s): Anaphylaxis  Itching   Shortness of breath      Medications:     Current Outpatient Medications:     aspirin 81 MG Chew, Take 81 mg by mouth once daily. Not chewable, Disp: , Rfl:     atenoloL (TENORMIN) 25 MG tablet, TAKE 1 TABLET(25 MG) BY MOUTH EVERY DAY, Disp: 90 tablet, Rfl: 3    BINAXNOW COVID-19 AG SELF TEST Kit, TEST AS DIRECTED TODAY, Disp: ,  Rfl:     calcium-vitamin D3 500 mg(1,250mg) -200 unit per tablet, Take 1 tablet by mouth 2 (two) times daily with meals., Disp: , Rfl:     co-enzyme Q-10 30 mg capsule, Take 30 mg by mouth once daily., Disp: , Rfl:     esomeprazole (NEXIUM) 20 MG capsule, Take 1 capsule (20 mg total) by mouth before breakfast., Disp: , Rfl:     fexofenadine (ALLEGRA) 180 MG tablet, Take 1 tablet (180 mg total) by mouth once daily. (Patient taking differently: Take 180 mg by mouth once daily. Takes as needed), Disp: 30 tablet, Rfl: 11    fluconazole (DIFLUCAN) 150 MG Tab, , Disp: , Rfl:     hydrocortisone 2.5 % cream, Apply topically 2 (two) times daily. for 10 days, Disp: 20 g, Rfl: 0    multivitamin (THERAGRAN) per tablet, Take 1 tablet by mouth once daily., Disp: , Rfl:     vitamin A-vitamin C-vit E-min Tab, Take 1 tablet by mouth once daily., Disp: , Rfl:    Social History:     Social History     Tobacco Use    Smoking status: Never    Smokeless tobacco: Never   Substance Use Topics    Alcohol use: Yes     Comment: 1-2 glasses wine weekly     Family History:     Family History   Problem Relation Age of Onset    Heart failure Mother     Macular degeneration Mother     Heart disease Mother     COPD Mother     Diabetes Sister     Arthritis Sister         rheumatoid arthritis    COPD Father     No Known Problems Brother     No Known Problems Son     No Known Problems Daughter     No Known Problems Sister     No Known Problems Sister     No Known Problems Sister     No Known Problems Brother     No Known Problems Brother     No Known Problems Brother     No Known Problems Brother     No Known Problems Brother     No Known Problems Daughter     No Known Problems Son     Breast cancer Neg Hx     Ovarian cancer Neg Hx     Amblyopia Neg Hx     Blindness Neg Hx     Cancer Neg Hx     Cataracts Neg Hx     Glaucoma Neg Hx     Hypertension Neg Hx     Retinal detachment Neg Hx     Strabismus Neg Hx     Stroke Neg Hx     Thyroid disease Neg Hx   "   Cervical cancer Neg Hx     Endometrial cancer Neg Hx     Vaginal cancer Neg Hx     Colon cancer Neg Hx      Physical Exam:   /80   Pulse 74   Ht 5' 2" (1.575 m)   Wt 59.4 kg (130 lb 15.3 oz)   SpO2 97%   BMI 23.95 kg/m²      Constitutional: No apparent distress, conversant  Neck: No jugular venous distension, no carotid bruits  CV: Regular rate and rhythm, 2/6 systolic murmur, normal S1/S2  Pulm: Clear to auscultation bilaterally  Extremities: No lower extremity edema, warm with palpable pulses    Labs:     Blood Tests:  Lab Results   Component Value Date    BNP 21 10/27/2015     2023    K 4.5 2023     2023    CO2 26 2023    BUN 11 2023    CREATININE 0.8 2023    GLU 97 2023    MG 2.0 2019    AST 20 2022    ALT 14 2022    ALBUMIN 4.2 2022    PROT 7.7 2022    BILITOT 0.5 2022    WBC 9.02 2022    HGB 12.9 2022    HCT 39.4 2022    MCV 93 2022     2022    INR 0.9 2019    TSH 2.569 2021       Lab Results   Component Value Date    CHOL 176 2023    HDL 58 2022    TRIG 61 2022       Lab Results   Component Value Date    LDLCALC 99.8 2022       Urine Tests:  Lab Results   Component Value Date    COLORU YELLOW 2013    APPEARANCEUA CLEAR 2013    PHUR 7.0 2019    PHUR 6.5 2013    SPECGRAV 1.005 2013    PROTEINUA Negative 2013    GLUCUA Negative 2013    KETONESU Negative 2013    BILIRUBINUA Negative 2013    OCCULTUA Negative 2013    NITRITE Negative 2013    UROBILINOGEN 0.2 2013    LEUKOCYTESUR Negative 2013       Imaging:     Echocardiogram  None    Stress testing  ERLIN    Negative for ischemia  EF 60%    Cath Lab  None    Other  CACS   Agatston 0    EK23 - NSR with 1st deg AVB, LAFB (personally reviewed)  19 - sinus екатерина with 1st deg AVB, NSTTA, minimal " criteria LVH in aVL (personally reviewed)  9/19/06 - NSR (personally reviewed)    Assessment:     1. Palpitations    2. Systolic murmur      Plan:     Palpitations  By history, suspect this is sinus tachycardia  No further cardiac evaluation at this time  Discussed hydration, regular exercise  Will notify me has increase in frequency, flavio if excessive or while at rest    Systolic murmur  Aortic murmur, mild  No echo at this time    Signed:  Jayden Leavitt MD  Cardiology     Follow-up:     Future Appointments   Date Time Provider Department Center   8/29/2023  8:30 AM EKG, APPT [facility] EKG [facility]   8/29/2023  9:00 AM Robin Leavitt III, MD [facility] CARDIO [facility]   10/10/2023  2:10 PM Deneen Pat MD [facility]

## 2023-08-29 ENCOUNTER — HOSPITAL ENCOUNTER (OUTPATIENT)
Dept: CARDIOLOGY | Facility: CLINIC | Age: 83
Discharge: HOME | End: 2023-08-29
Payer: MEDICARE

## 2023-08-29 ENCOUNTER — OFFICE VISIT (OUTPATIENT)
Dept: CARDIOLOGY | Facility: CLINIC | Age: 83
End: 2023-08-29
Payer: MEDICARE

## 2023-08-29 VITALS
DIASTOLIC BLOOD PRESSURE: 80 MMHG | SYSTOLIC BLOOD PRESSURE: 130 MMHG | HEART RATE: 74 BPM | OXYGEN SATURATION: 97 % | HEIGHT: 62 IN | WEIGHT: 130.94 LBS | BODY MASS INDEX: 24.09 KG/M2

## 2023-08-29 DIAGNOSIS — R01.1 SYSTOLIC MURMUR: ICD-10-CM

## 2023-08-29 DIAGNOSIS — R00.2 PALPITATIONS: Primary | ICD-10-CM

## 2023-08-29 DIAGNOSIS — R00.2 PALPITATIONS: ICD-10-CM

## 2023-08-29 PROCEDURE — 93000 ELECTROCARDIOGRAM COMPLETE: CPT | Mod: HCNC,S$GLB,, | Performed by: INTERNAL MEDICINE

## 2023-08-29 PROCEDURE — 3288F FALL RISK ASSESSMENT DOCD: CPT | Mod: HCNC,CPTII,S$GLB, | Performed by: INTERNAL MEDICINE

## 2023-08-29 PROCEDURE — 3079F DIAST BP 80-89 MM HG: CPT | Mod: HCNC,CPTII,S$GLB, | Performed by: INTERNAL MEDICINE

## 2023-08-29 PROCEDURE — 3075F PR MOST RECENT SYSTOLIC BLOOD PRESS GE 130-139MM HG: ICD-10-PCS | Mod: HCNC,CPTII,S$GLB, | Performed by: INTERNAL MEDICINE

## 2023-08-29 PROCEDURE — 99999 PR PBB SHADOW E&M-EST. PATIENT-LVL IV: CPT | Mod: PBBFAC,HCNC,, | Performed by: INTERNAL MEDICINE

## 2023-08-29 PROCEDURE — 1159F MED LIST DOCD IN RCRD: CPT | Mod: HCNC,CPTII,S$GLB, | Performed by: INTERNAL MEDICINE

## 2023-08-29 PROCEDURE — 1101F PT FALLS ASSESS-DOCD LE1/YR: CPT | Mod: HCNC,CPTII,S$GLB, | Performed by: INTERNAL MEDICINE

## 2023-08-29 PROCEDURE — 99204 OFFICE O/P NEW MOD 45 MIN: CPT | Mod: HCNC,S$GLB,, | Performed by: INTERNAL MEDICINE

## 2023-08-29 PROCEDURE — 99204 PR OFFICE/OUTPT VISIT, NEW, LEVL IV, 45-59 MIN: ICD-10-PCS | Mod: HCNC,S$GLB,, | Performed by: INTERNAL MEDICINE

## 2023-08-29 PROCEDURE — 3079F PR MOST RECENT DIASTOLIC BLOOD PRESSURE 80-89 MM HG: ICD-10-PCS | Mod: HCNC,CPTII,S$GLB, | Performed by: INTERNAL MEDICINE

## 2023-08-29 PROCEDURE — 1159F PR MEDICATION LIST DOCUMENTED IN MEDICAL RECORD: ICD-10-PCS | Mod: HCNC,CPTII,S$GLB, | Performed by: INTERNAL MEDICINE

## 2023-08-29 PROCEDURE — 3288F PR FALLS RISK ASSESSMENT DOCUMENTED: ICD-10-PCS | Mod: HCNC,CPTII,S$GLB, | Performed by: INTERNAL MEDICINE

## 2023-08-29 PROCEDURE — 1101F PR PT FALLS ASSESS DOC 0-1 FALLS W/OUT INJ PAST YR: ICD-10-PCS | Mod: HCNC,CPTII,S$GLB, | Performed by: INTERNAL MEDICINE

## 2023-08-29 PROCEDURE — 99999 PR PBB SHADOW E&M-EST. PATIENT-LVL IV: ICD-10-PCS | Mod: PBBFAC,HCNC,, | Performed by: INTERNAL MEDICINE

## 2023-08-29 PROCEDURE — 93000 EKG 12-LEAD: ICD-10-PCS | Mod: HCNC,S$GLB,, | Performed by: INTERNAL MEDICINE

## 2023-08-29 PROCEDURE — 3075F SYST BP GE 130 - 139MM HG: CPT | Mod: HCNC,CPTII,S$GLB, | Performed by: INTERNAL MEDICINE

## 2023-08-29 PROCEDURE — 1126F AMNT PAIN NOTED NONE PRSNT: CPT | Mod: HCNC,CPTII,S$GLB, | Performed by: INTERNAL MEDICINE

## 2023-08-29 PROCEDURE — 1126F PR PAIN SEVERITY QUANTIFIED, NO PAIN PRESENT: ICD-10-PCS | Mod: HCNC,CPTII,S$GLB, | Performed by: INTERNAL MEDICINE

## 2023-10-03 ENCOUNTER — IMMUNIZATION (OUTPATIENT)
Dept: URGENT CARE | Facility: CLINIC | Age: 83
End: 2023-10-03
Payer: MEDICARE

## 2023-10-03 PROCEDURE — G0008 ADMIN INFLUENZA VIRUS VAC: HCPCS | Mod: S$GLB,,, | Performed by: NURSE PRACTITIONER

## 2023-10-03 PROCEDURE — G0008 FLU VACCINE - QUADRIVALENT - ADJUVANTED: ICD-10-PCS | Mod: S$GLB,,, | Performed by: NURSE PRACTITIONER

## 2023-10-03 PROCEDURE — 90694 VACC AIIV4 NO PRSRV 0.5ML IM: CPT | Mod: S$GLB,,, | Performed by: NURSE PRACTITIONER

## 2023-10-03 PROCEDURE — 90694 FLU VACCINE - QUADRIVALENT - ADJUVANTED: ICD-10-PCS | Mod: S$GLB,,, | Performed by: NURSE PRACTITIONER

## 2023-10-10 ENCOUNTER — OFFICE VISIT (OUTPATIENT)
Dept: DERMATOLOGY | Facility: CLINIC | Age: 83
End: 2023-10-10
Payer: MEDICARE

## 2023-10-10 VITALS — BODY MASS INDEX: 23.78 KG/M2 | WEIGHT: 130 LBS

## 2023-10-10 DIAGNOSIS — Z85.828 HISTORY OF SKIN CANCER: ICD-10-CM

## 2023-10-10 DIAGNOSIS — L71.9 ROSACEA: ICD-10-CM

## 2023-10-10 DIAGNOSIS — L82.1 SEBORRHEIC KERATOSES: ICD-10-CM

## 2023-10-10 DIAGNOSIS — L30.9 DERMATITIS: ICD-10-CM

## 2023-10-10 DIAGNOSIS — D48.5 NEOPLASM OF UNCERTAIN BEHAVIOR OF SKIN: Primary | ICD-10-CM

## 2023-10-10 DIAGNOSIS — L81.4 LENTIGINES: ICD-10-CM

## 2023-10-10 DIAGNOSIS — D18.01 CHERRY ANGIOMA: ICD-10-CM

## 2023-10-10 PROCEDURE — 1160F PR REVIEW ALL MEDS BY PRESCRIBER/CLIN PHARMACIST DOCUMENTED: ICD-10-PCS | Mod: HCNC,CPTII,S$GLB, | Performed by: DERMATOLOGY

## 2023-10-10 PROCEDURE — 1160F RVW MEDS BY RX/DR IN RCRD: CPT | Mod: HCNC,CPTII,S$GLB, | Performed by: DERMATOLOGY

## 2023-10-10 PROCEDURE — 99214 PR OFFICE/OUTPT VISIT, EST, LEVL IV, 30-39 MIN: ICD-10-PCS | Mod: 25,HCNC,S$GLB, | Performed by: DERMATOLOGY

## 2023-10-10 PROCEDURE — 3288F FALL RISK ASSESSMENT DOCD: CPT | Mod: HCNC,CPTII,S$GLB, | Performed by: DERMATOLOGY

## 2023-10-10 PROCEDURE — 1159F MED LIST DOCD IN RCRD: CPT | Mod: HCNC,CPTII,S$GLB, | Performed by: DERMATOLOGY

## 2023-10-10 PROCEDURE — 11102 TANGNTL BX SKIN SINGLE LES: CPT | Mod: HCNC,S$GLB,, | Performed by: DERMATOLOGY

## 2023-10-10 PROCEDURE — 11102 PR TANGENTIAL BIOPSY, SKIN, SINGLE LESION: ICD-10-PCS | Mod: HCNC,S$GLB,, | Performed by: DERMATOLOGY

## 2023-10-10 PROCEDURE — 3288F PR FALLS RISK ASSESSMENT DOCUMENTED: ICD-10-PCS | Mod: HCNC,CPTII,S$GLB, | Performed by: DERMATOLOGY

## 2023-10-10 PROCEDURE — 88342 CHG IMMUNOCYTOCHEMISTRY: ICD-10-PCS | Mod: 26,HCNC,, | Performed by: PATHOLOGY

## 2023-10-10 PROCEDURE — 88305 TISSUE EXAM BY PATHOLOGIST: CPT | Mod: HCNC | Performed by: PATHOLOGY

## 2023-10-10 PROCEDURE — 88305 TISSUE EXAM BY PATHOLOGIST: CPT | Mod: 26,HCNC,, | Performed by: PATHOLOGY

## 2023-10-10 PROCEDURE — 88342 IMHCHEM/IMCYTCHM 1ST ANTB: CPT | Mod: HCNC | Performed by: PATHOLOGY

## 2023-10-10 PROCEDURE — 88341 PR IHC OR ICC EACH ADD'L SINGLE ANTIBODY  STAINPR: ICD-10-PCS | Mod: 26,HCNC,, | Performed by: PATHOLOGY

## 2023-10-10 PROCEDURE — 1101F PR PT FALLS ASSESS DOC 0-1 FALLS W/OUT INJ PAST YR: ICD-10-PCS | Mod: HCNC,CPTII,S$GLB, | Performed by: DERMATOLOGY

## 2023-10-10 PROCEDURE — 1101F PT FALLS ASSESS-DOCD LE1/YR: CPT | Mod: HCNC,CPTII,S$GLB, | Performed by: DERMATOLOGY

## 2023-10-10 PROCEDURE — 99999 PR PBB SHADOW E&M-EST. PATIENT-LVL III: ICD-10-PCS | Mod: PBBFAC,HCNC,, | Performed by: DERMATOLOGY

## 2023-10-10 PROCEDURE — 1126F PR PAIN SEVERITY QUANTIFIED, NO PAIN PRESENT: ICD-10-PCS | Mod: HCNC,CPTII,S$GLB, | Performed by: DERMATOLOGY

## 2023-10-10 PROCEDURE — 99999 PR PBB SHADOW E&M-EST. PATIENT-LVL III: CPT | Mod: PBBFAC,HCNC,, | Performed by: DERMATOLOGY

## 2023-10-10 PROCEDURE — 88341 IMHCHEM/IMCYTCHM EA ADD ANTB: CPT | Mod: HCNC | Performed by: PATHOLOGY

## 2023-10-10 PROCEDURE — 88341 IMHCHEM/IMCYTCHM EA ADD ANTB: CPT | Mod: 26,HCNC,, | Performed by: PATHOLOGY

## 2023-10-10 PROCEDURE — 88342 IMHCHEM/IMCYTCHM 1ST ANTB: CPT | Mod: 26,HCNC,, | Performed by: PATHOLOGY

## 2023-10-10 PROCEDURE — 1126F AMNT PAIN NOTED NONE PRSNT: CPT | Mod: HCNC,CPTII,S$GLB, | Performed by: DERMATOLOGY

## 2023-10-10 PROCEDURE — 1159F PR MEDICATION LIST DOCUMENTED IN MEDICAL RECORD: ICD-10-PCS | Mod: HCNC,CPTII,S$GLB, | Performed by: DERMATOLOGY

## 2023-10-10 PROCEDURE — 99214 OFFICE O/P EST MOD 30 MIN: CPT | Mod: 25,HCNC,S$GLB, | Performed by: DERMATOLOGY

## 2023-10-10 PROCEDURE — 88305 TISSUE EXAM BY PATHOLOGIST: ICD-10-PCS | Mod: 26,HCNC,, | Performed by: PATHOLOGY

## 2023-10-10 RX ORDER — MOMETASONE FUROATE 1 MG/G
CREAM TOPICAL
Qty: 50 G | Refills: 3 | Status: ON HOLD | OUTPATIENT
Start: 2023-10-10 | End: 2024-04-01

## 2023-10-10 NOTE — PROGRESS NOTES
Subjective:      Patient ID:  Betzy Cooley is a 83 y.o. female who presents for   Chief Complaint   Patient presents with    Rash     Chest, several months,     Skin Check     UBSE     Has a rash on her chest for over a month would like tx, started after working in yard itches some.  Would like skin check, spot on nose which is scaly does not bleed is where her glasses rest.     Rash - Initial  Affected locations: chest, right elbow, left elbow, left cheek, right cheek, right arm and left arm  Signs / symptoms: asymptomatic      Review of Systems   Constitutional:  Negative for fever, chills, weight loss, weight gain, fatigue, night sweats and malaise.   Skin:  Positive for rash, daily sunscreen use, activity-related sunscreen use and wears hat.   Hematologic/Lymphatic: Does not bruise/bleed easily.       Objective:   Physical Exam   Constitutional: She appears well-developed and well-nourished. No distress.   Neurological: She is alert and oriented to person, place, and time. She is not disoriented.   Psychiatric: She has a normal mood and affect.   Skin:   Areas Examined (abnormalities noted in diagram):   Head / Face Inspection Performed  Neck Inspection Performed  Chest / Axilla Inspection Performed  Abdomen Inspection Performed  Back Inspection Performed  RUE Inspected  LUE Inspection Performed                 Diagram Legend     Erythematous scaling macule/papule c/w actinic keratosis       Vascular papule c/w angioma      Pigmented verrucoid papule/plaque c/w seborrheic keratosis      Yellow umbilicated papule c/w sebaceous hyperplasia      Irregularly shaped tan macule c/w lentigo     1-2 mm smooth white papules consistent with Milia      Movable subcutaneous cyst with punctum c/w epidermal inclusion cyst      Subcutaneous movable cyst c/w pilar cyst      Firm pink to brown papule c/w dermatofibroma      Pedunculated fleshy papule(s) c/w skin tag(s)      Evenly pigmented macule c/w junctional nevus      "Mildly variegated pigmented, slightly irregular-bordered macule c/w mildly atypical nevus      Flesh colored to evenly pigmented papule c/w intradermal nevus       Pink pearly papule/plaque c/w basal cell carcinoma      Erythematous hyperkeratotic cursted plaque c/w SCC      Surgical scar with no sign of skin cancer recurrence      Open and closed comedones      Inflammatory papules and pustules      Verrucoid papule consistent consistent with wart     Erythematous eczematous patches and plaques     Dystrophic onycholytic nail with subungual debris c/w onychomycosis     Umbilicated papule    Erythematous-base heme-crusted tan verrucoid plaque consistent with inflamed seborrheic keratosis     Erythematous Silvery Scaling Plaque c/w Psoriasis     See annotation      Assessment / Plan:      Pathology Orders:       Normal Orders This Visit    Specimen to Pathology, Dermatology     Comments:    Number of Specimens:->1  ------------------------->-------------------------  Spec 1 Procedure:->Biopsy  Spec 1 Clinical Impression:->actinic keratosis  Spec 1 Source:->nasal bridge    Questions:    Procedure Type: Dermatology and skin neoplasms    Number of Specimens: 1    ------------------------: -------------------------    Spec 1 Procedure: Biopsy    Spec 1 Clinical Impression: actinic keratosis    Spec 1 Source: nasal bridge    Release to patient:           Neoplasm of uncertain behavior of skin  -     Specimen to Pathology, Dermatology  Shave biopsy performed after verbal consent including risk of infection, scar, recurrence, need for additional treatment of site. Area prepped with alcohol, anesthetized with 1% lidocaine with epinephrine. . Hemostasis achieved with monsels. No complications. Dressing applied. Wound care explained.           Dermatitis  -     mometasone 0.1% (ELOCON) 0.1 % cream; Use daily for chest prn rash  Dispense: 50 g; Refill: 3    Lentigines  The "ABCD" rules to observe pigmented lesions were " reviewed.      Rosacea  Cont metrogel    History of skin cancer  Comments:  scc right arm  No recurrence    Seborrheic keratoses  Seborrheic keratosis scattered, told benign no treatment needed.  Brochure provided.      Butts angiomas  This is a benign vascular lesion. Reassurance given. No treatment required.                Follow up in about 6 months (around 4/10/2024).

## 2023-10-14 ENCOUNTER — PATIENT MESSAGE (OUTPATIENT)
Dept: DERMATOLOGY | Facility: CLINIC | Age: 83
End: 2023-10-14
Payer: MEDICARE

## 2023-10-15 ENCOUNTER — OFFICE VISIT (OUTPATIENT)
Dept: URGENT CARE | Facility: CLINIC | Age: 83
End: 2023-10-15
Payer: MEDICARE

## 2023-10-15 VITALS
WEIGHT: 130 LBS | DIASTOLIC BLOOD PRESSURE: 82 MMHG | HEART RATE: 66 BPM | TEMPERATURE: 99 F | OXYGEN SATURATION: 98 % | SYSTOLIC BLOOD PRESSURE: 165 MMHG | BODY MASS INDEX: 23.92 KG/M2 | HEIGHT: 62 IN | RESPIRATION RATE: 18 BRPM

## 2023-10-15 DIAGNOSIS — L08.9 WOUND INFECTION: ICD-10-CM

## 2023-10-15 DIAGNOSIS — L03.116 CELLULITIS OF LEFT LEG WITHOUT FOOT: Primary | ICD-10-CM

## 2023-10-15 DIAGNOSIS — T14.8XXA WOUND INFECTION: ICD-10-CM

## 2023-10-15 PROCEDURE — 99213 PR OFFICE/OUTPT VISIT, EST, LEVL III, 20-29 MIN: ICD-10-PCS | Mod: S$GLB,,, | Performed by: FAMILY MEDICINE

## 2023-10-15 PROCEDURE — 99213 OFFICE O/P EST LOW 20 MIN: CPT | Mod: S$GLB,,, | Performed by: FAMILY MEDICINE

## 2023-10-15 RX ORDER — CLINDAMYCIN HYDROCHLORIDE 300 MG/1
300 CAPSULE ORAL 3 TIMES DAILY
Qty: 30 CAPSULE | Refills: 0 | Status: SHIPPED | OUTPATIENT
Start: 2023-10-15 | End: 2023-10-25

## 2023-10-15 RX ORDER — MUPIROCIN 20 MG/G
OINTMENT TOPICAL
Qty: 22 G | Refills: 1 | Status: ON HOLD | OUTPATIENT
Start: 2023-10-15 | End: 2024-04-01

## 2023-10-15 NOTE — PROGRESS NOTES
"Subjective:      Patient ID: Betzy Cooley is a 83 y.o. female.    Vitals:  height is 5' 2" (1.575 m) and weight is 59 kg (130 lb). Her oral temperature is 98.7 °F (37.1 °C). Her blood pressure is 165/82 (abnormal) and her pulse is 66. Her respiration is 18 and oxygen saturation is 98%.     Chief Complaint: Ankle Injury    This is a 83 y.o. female who presents today with a chief complaint of injury on top of left ankle. Patient states she was gardening and step into a brick and she injured her skin above her left ankle. Patient states she is using topical cream and this accident happened 5 days ago. Patient states cream is not helping.      Ankle Injury   The incident occurred 2 days ago. The incident occurred at home. There was no injury mechanism. The pain is present in the left ankle. The quality of the pain is described as burning. The pain is at a severity of 0/10. The patient is experiencing no pain. The pain has been Constant since onset. Pertinent negatives include no inability to bear weight, loss of motion, loss of sensation, muscle weakness, numbness or tingling. It is unknown if a foreign body is present. The symptoms are aggravated by palpation. She has tried NSAIDs for the symptoms. The treatment provided no relief.       Neurological:  Negative for numbness.      Objective:     Physical Exam   Constitutional: She does not appear ill. No distress. normal  Cardiovascular: Normal rate, regular rhythm, normal heart sounds and normal pulses.   Pulmonary/Chest: Effort normal and breath sounds normal.   Abdominal: Normal appearance.   Neurological: She is alert.   Skin: lesion (left calf region)   Nursing note and vitals reviewed.        Assessment:     1. Cellulitis of left leg without foot        Plan:       Cellulitis of left leg without foot  -     clindamycin (CLEOCIN) 300 MG capsule; Take 1 capsule (300 mg total) by mouth 3 (three) times daily. for 10 days  Dispense: 30 capsule; Refill: 0  -     " mupirocin (BACTROBAN) 2 % ointment; Apply to affected area 3 times daily  Dispense: 22 g; Refill: 1    Discussed wound care and signs and symptoms of worsening infection

## 2023-10-23 LAB
FINAL PATHOLOGIC DIAGNOSIS: NORMAL
GROSS: NORMAL
Lab: NORMAL
MICROSCOPIC EXAM: NORMAL

## 2023-10-25 ENCOUNTER — PATIENT MESSAGE (OUTPATIENT)
Dept: DERMATOLOGY | Facility: CLINIC | Age: 83
End: 2023-10-25
Payer: MEDICARE

## 2024-01-16 ENCOUNTER — TELEPHONE (OUTPATIENT)
Dept: INTERNAL MEDICINE | Facility: CLINIC | Age: 84
End: 2024-01-16
Payer: MEDICARE

## 2024-01-16 DIAGNOSIS — R10.9 ABDOMINAL PAIN, UNSPECIFIED ABDOMINAL LOCATION: ICD-10-CM

## 2024-01-16 DIAGNOSIS — K21.9 GASTROESOPHAGEAL REFLUX DISEASE, UNSPECIFIED WHETHER ESOPHAGITIS PRESENT: Primary | ICD-10-CM

## 2024-01-22 ENCOUNTER — OFFICE VISIT (OUTPATIENT)
Dept: INTERNAL MEDICINE | Facility: CLINIC | Age: 84
End: 2024-01-22
Payer: MEDICARE

## 2024-01-22 VITALS
SYSTOLIC BLOOD PRESSURE: 150 MMHG | WEIGHT: 128.06 LBS | HEIGHT: 62 IN | BODY MASS INDEX: 23.57 KG/M2 | HEART RATE: 63 BPM | OXYGEN SATURATION: 98 % | DIASTOLIC BLOOD PRESSURE: 76 MMHG

## 2024-01-22 DIAGNOSIS — K21.9 GASTROESOPHAGEAL REFLUX DISEASE, UNSPECIFIED WHETHER ESOPHAGITIS PRESENT: Primary | ICD-10-CM

## 2024-01-22 DIAGNOSIS — R03.0 ELEVATED BLOOD PRESSURE READING: ICD-10-CM

## 2024-01-22 PROCEDURE — 1126F AMNT PAIN NOTED NONE PRSNT: CPT | Mod: HCNC,CPTII,S$GLB, | Performed by: PHYSICIAN ASSISTANT

## 2024-01-22 PROCEDURE — 1101F PT FALLS ASSESS-DOCD LE1/YR: CPT | Mod: HCNC,CPTII,S$GLB, | Performed by: PHYSICIAN ASSISTANT

## 2024-01-22 PROCEDURE — 1159F MED LIST DOCD IN RCRD: CPT | Mod: HCNC,CPTII,S$GLB, | Performed by: PHYSICIAN ASSISTANT

## 2024-01-22 PROCEDURE — 1160F RVW MEDS BY RX/DR IN RCRD: CPT | Mod: HCNC,CPTII,S$GLB, | Performed by: PHYSICIAN ASSISTANT

## 2024-01-22 PROCEDURE — 99999 PR PBB SHADOW E&M-EST. PATIENT-LVL IV: CPT | Mod: PBBFAC,HCNC,, | Performed by: PHYSICIAN ASSISTANT

## 2024-01-22 PROCEDURE — 3078F DIAST BP <80 MM HG: CPT | Mod: HCNC,CPTII,S$GLB, | Performed by: PHYSICIAN ASSISTANT

## 2024-01-22 PROCEDURE — 3077F SYST BP >= 140 MM HG: CPT | Mod: HCNC,CPTII,S$GLB, | Performed by: PHYSICIAN ASSISTANT

## 2024-01-22 PROCEDURE — 99214 OFFICE O/P EST MOD 30 MIN: CPT | Mod: HCNC,S$GLB,, | Performed by: PHYSICIAN ASSISTANT

## 2024-01-22 PROCEDURE — 3288F FALL RISK ASSESSMENT DOCD: CPT | Mod: HCNC,CPTII,S$GLB, | Performed by: PHYSICIAN ASSISTANT

## 2024-01-22 RX ORDER — PANTOPRAZOLE SODIUM 40 MG/1
40 TABLET, DELAYED RELEASE ORAL DAILY
Qty: 30 TABLET | Refills: 2 | Status: SHIPPED | OUTPATIENT
Start: 2024-01-22 | End: 2024-03-06 | Stop reason: SDUPTHER

## 2024-01-22 NOTE — PROGRESS NOTES
"    Subjective     Patient ID: Betzy Cooley is a 83 y.o. female.    Chief Complaint: Gastroesophageal Reflux    HPI      Established pt of Praful Paul MD (new to me)    Pt attended by spouse    Here with concerns of GERD, chronic issue for years, comes and goes, flared since Thanksgiving/Christmas holidays, burning sensation with excessive flatus. Worse with alcohol and sauces/gravy. Taking OTC nexium and li qid antacid no longer helpful. Inquiries about Protonix, as a family friend recommended it.  Referred to GI by PCP last week, appt not until May.       No stool changes. No n/v/d  No melena or brbpr  Weight is stable, down about 2lbs from baseline,     Past Medical History:   Diagnosis Date    Arthritis     Cataract     GERD (gastroesophageal reflux disease)     HEARING LOSS     Hypertension     Osteoporosis, postmenopausal     Squamous Cell Carcinoma 2007    right forearm    Urinary incontinence     rare mixed     Social History     Tobacco Use    Smoking status: Never    Smokeless tobacco: Never   Substance Use Topics    Alcohol use: Yes     Comment: 1-2 glasses wine weekly    Drug use: No     Review of patient's allergies indicates:   Allergen Reactions    Sulfa (sulfonamide antibiotics) Hives     Other reaction(s): Anaphylaxis  Itching   Shortness of breath          Review of Systems   Constitutional:  Negative for fever.   Gastrointestinal:  Positive for reflux. Negative for constipation, nausea and vomiting.   Genitourinary:  Negative for dysuria, frequency and hematuria.   Musculoskeletal:  Negative for arthralgias and myalgias.   Integumentary:  Negative for rash.   Neurological:  Negative for headaches.          Objective  BP (!) 150/76 (BP Location: Left arm, Patient Position: Sitting, BP Method: Medium (Manual))   Pulse 63   Ht 5' 2" (1.575 m)   Wt 58.1 kg (128 lb 1.4 oz)   SpO2 98%   BMI 23.43 kg/m²       Physical Exam  Vitals reviewed.   Constitutional:       General: She is not in " "acute distress.     Appearance: She is well-developed.   HENT:      Head: Normocephalic and atraumatic.   Cardiovascular:      Rate and Rhythm: Normal rate and regular rhythm.      Heart sounds: No murmur heard.  Pulmonary:      Effort: Pulmonary effort is normal.      Breath sounds: Normal breath sounds. No wheezing or rales.   Abdominal:      General: Bowel sounds are normal.      Palpations: Abdomen is soft.      Tenderness: There is no abdominal tenderness. There is no guarding.   Musculoskeletal:      Right lower leg: No edema.      Left lower leg: No edema.   Skin:     General: Skin is warm and dry.      Findings: No rash.   Neurological:      Mental Status: She is alert.   Psychiatric:         Mood and Affect: Mood normal.            Assessment and Plan     1. Gastroesophageal reflux disease, unspecified whether esophagitis present  Trial of protonix  Reviewed avoiding GERD exacerbating foods  Staff was able to schedule earlier GI appt as below  Red flags reviewed  -     pantoprazole (PROTONIX) 40 MG tablet; Take 1 tablet (40 mg total) by mouth once daily.  Dispense: 30 tablet; Refill: 2    2. Elevated blood pressure reading  Pt reports "white coat htn"  Overview:  Patient takes BP three times a week at [place] and it is always less than 140/90      Future Appointments   Date Time Provider Department Center   2/21/2024  1:00 PM Leta Jerry MD [facility] ENT [facility]   3/19/2024  2:00 PM Zbigniew Dougherty MD [facility] GASTRO [place] [facility]       Rachel Shore PA-C      "

## 2024-02-21 ENCOUNTER — OFFICE VISIT (OUTPATIENT)
Dept: OTOLARYNGOLOGY | Facility: CLINIC | Age: 84
End: 2024-02-21
Payer: MEDICARE

## 2024-02-21 VITALS — HEART RATE: 59 BPM | SYSTOLIC BLOOD PRESSURE: 161 MMHG | DIASTOLIC BLOOD PRESSURE: 86 MMHG

## 2024-02-21 DIAGNOSIS — J31.0 CHRONIC RHINITIS: ICD-10-CM

## 2024-02-21 DIAGNOSIS — H61.21 RIGHT EAR IMPACTED CERUMEN: Primary | ICD-10-CM

## 2024-02-21 DIAGNOSIS — H93.8X9 SENSATION OF FULLNESS IN EAR, UNSPECIFIED LATERALITY: ICD-10-CM

## 2024-02-21 PROCEDURE — 1101F PT FALLS ASSESS-DOCD LE1/YR: CPT | Mod: HCNC,CPTII,S$GLB, | Performed by: OTOLARYNGOLOGY

## 2024-02-21 PROCEDURE — 99999 PR PBB SHADOW E&M-EST. PATIENT-LVL III: CPT | Mod: PBBFAC,HCNC,, | Performed by: OTOLARYNGOLOGY

## 2024-02-21 PROCEDURE — 3079F DIAST BP 80-89 MM HG: CPT | Mod: HCNC,CPTII,S$GLB, | Performed by: OTOLARYNGOLOGY

## 2024-02-21 PROCEDURE — 3288F FALL RISK ASSESSMENT DOCD: CPT | Mod: HCNC,CPTII,S$GLB, | Performed by: OTOLARYNGOLOGY

## 2024-02-21 PROCEDURE — 1159F MED LIST DOCD IN RCRD: CPT | Mod: HCNC,CPTII,S$GLB, | Performed by: OTOLARYNGOLOGY

## 2024-02-21 PROCEDURE — 1160F RVW MEDS BY RX/DR IN RCRD: CPT | Mod: HCNC,CPTII,S$GLB, | Performed by: OTOLARYNGOLOGY

## 2024-02-21 PROCEDURE — 1126F AMNT PAIN NOTED NONE PRSNT: CPT | Mod: HCNC,CPTII,S$GLB, | Performed by: OTOLARYNGOLOGY

## 2024-02-21 PROCEDURE — 3077F SYST BP >= 140 MM HG: CPT | Mod: HCNC,CPTII,S$GLB, | Performed by: OTOLARYNGOLOGY

## 2024-02-21 PROCEDURE — 69210 REMOVE IMPACTED EAR WAX UNI: CPT | Mod: HCNC,S$GLB,, | Performed by: OTOLARYNGOLOGY

## 2024-02-21 PROCEDURE — 99213 OFFICE O/P EST LOW 20 MIN: CPT | Mod: 25,HCNC,S$GLB, | Performed by: OTOLARYNGOLOGY

## 2024-02-21 NOTE — PATIENT INSTRUCTIONS
Ear care discussed. Avoid q-tips. Follow up in about a year for another cleaning.     Nasal drainage causes discussed. Options for management discussed. Patient will continue to use saline as needed.    Otherwise, follow up with our clinic for any ear, nose or throat problems ([phone]).

## 2024-02-21 NOTE — PROGRESS NOTES
83 y.o. female who presents for checkup on her ears.  She has had wax impactions in past.  There has been has been asking her to get her ears checked.  She feels she is hearing okay but has some fullness.  No significant change in her medical history.  She is on aspirin.  She does not have diabetes.  She has not had ear surgery.    She would like to know if there is anything she can take for her nose.  She does not have a lot of itching.  She sometimes will sit down her nose just drips.  She uses saline to clean her nose.  She has used some allergy pills in the past and does use them when she goes out to work in her garden.  She does not have sinus pressure or pain.  She does not have any nasal bleeding.  Mucus that comes from her nose is clear.  Does not recall drainage from nose with eating. Has had issue for a long time. Some days are worse than others.    PMHx, PSHx, Meds, Allergies, SocHx, FamHx reviewed in EPIC    ROS:  Gen: no f/c  ENT: as above    PE: BP (!) 161/86   Pulse (!) 59    Gen: female, well nourished, well developed, NAD, cooperative, good historian  Ears:  EAC AD with near complete soft impaction in middle canal; EAC AS min cerumen and patent (cerumen cleared from right side  - see below)  & TM translucent with normal bony landmarks bilaterally  Nose: external nose wnl, clear drainage minimal, nasal septum some deviation, inferior turbinates mild edema, pink mucosa, no visible purulence or polyps  OC/OP:  MMM, tongue protrudes midline, palate raises symmetrically, tonsils absent, no erythema or exudate  Neck: supple, no TTP, no LAD or masses  Face:  no TTP, no erythema or flushing  Respiratory: Breathing comfortably without retractions  Skin: facial skin intact without visible lesions or flushing  Lymph: no neck lympadenopathy  Neuro:  facial movement symmetric, speech fluid, gait stable, tongue protrudes midline  Psych: alert & oriented x 3, reasonable, normal affect    Procedure: Removal of  cerumen impaction using microsurgical instrumentation.  After explaining the procedure and obtaining verbal assent, the patient was positioned in chair and right external auditory canal visualized with magnification. The obstructing cerumen was removed with microsurgical instrumentation (suction) to reveal patent external auditory canals.  Right ear cleared. The patient tolerated this procedure well without complication.      Impression:   1. Right ear impacted cerumen        2. Chronic rhinitis        3. Sensation of fullness in ear, unspecified laterality            Discussion and Plan:    Ear care discussed. Avoid q-tips. Follow up in about a year for another cleaning.     Nasal drainage causes discussed. Options for management discussed. Patient will continue to use saline as needed.    Otherwise, follow up with our clinic for any ear, nose or throat problems ([phone]).     Parts or all of this note were created by voice recognition software; typographical errors in translating may be present.

## 2024-03-04 ENCOUNTER — PATIENT MESSAGE (OUTPATIENT)
Dept: INTERNAL MEDICINE | Facility: CLINIC | Age: 84
End: 2024-03-04
Payer: MEDICARE

## 2024-03-04 DIAGNOSIS — K21.9 GASTROESOPHAGEAL REFLUX DISEASE, UNSPECIFIED WHETHER ESOPHAGITIS PRESENT: ICD-10-CM

## 2024-03-04 NOTE — TELEPHONE ENCOUNTER
Pt was prescribed pantoprazole     Pt reports Rx ineffective  Should Rx dosage be changed or replaced with another medication?

## 2024-03-06 RX ORDER — PANTOPRAZOLE SODIUM 40 MG/1
40 TABLET, DELAYED RELEASE ORAL 2 TIMES DAILY
Qty: 60 TABLET | Refills: 1 | Status: SHIPPED | OUTPATIENT
Start: 2024-03-06 | End: 2024-04-23

## 2024-03-19 ENCOUNTER — OFFICE VISIT (OUTPATIENT)
Dept: GASTROENTEROLOGY | Facility: CLINIC | Age: 84
End: 2024-03-19
Payer: MEDICARE

## 2024-03-19 VITALS — HEIGHT: 62 IN | BODY MASS INDEX: 23.57 KG/M2 | WEIGHT: 128.06 LBS

## 2024-03-19 DIAGNOSIS — R10.9 ABDOMINAL PAIN, UNSPECIFIED ABDOMINAL LOCATION: ICD-10-CM

## 2024-03-19 DIAGNOSIS — K21.9 GASTROESOPHAGEAL REFLUX DISEASE, UNSPECIFIED WHETHER ESOPHAGITIS PRESENT: ICD-10-CM

## 2024-03-19 PROCEDURE — 1101F PT FALLS ASSESS-DOCD LE1/YR: CPT | Mod: HCNC,CPTII,S$GLB, | Performed by: INTERNAL MEDICINE

## 2024-03-19 PROCEDURE — 3288F FALL RISK ASSESSMENT DOCD: CPT | Mod: HCNC,CPTII,S$GLB, | Performed by: INTERNAL MEDICINE

## 2024-03-19 PROCEDURE — 1159F MED LIST DOCD IN RCRD: CPT | Mod: HCNC,CPTII,S$GLB, | Performed by: INTERNAL MEDICINE

## 2024-03-19 PROCEDURE — 99204 OFFICE O/P NEW MOD 45 MIN: CPT | Mod: HCNC,S$GLB,, | Performed by: INTERNAL MEDICINE

## 2024-03-19 PROCEDURE — 1126F AMNT PAIN NOTED NONE PRSNT: CPT | Mod: HCNC,CPTII,S$GLB, | Performed by: INTERNAL MEDICINE

## 2024-03-19 PROCEDURE — 99999 PR PBB SHADOW E&M-EST. PATIENT-LVL III: CPT | Mod: PBBFAC,HCNC,, | Performed by: INTERNAL MEDICINE

## 2024-03-19 NOTE — PATIENT INSTRUCTIONS
EGD Instructions        You cannot have anything to eat or drink after Midnight. You can brush your teeth with a sip of water.     An adult friend/family member must come with you to drive you home.  You cannot drive, take a taxi, Uber/Lyft or bus to leave the [facility] alone.  If you do not have someone to drive you home, your test will be cancelled.     Please follow the directions of your doctor if you take any pills that thin your blood. If you take these meds: Aggrenox, Brilinta, Effient, Eliquis, Lovenox, Plavix, Pletal, Pradaxa, Ticilid, Xarelto or Coumadin, let the doctor's office know.    Please hold any GLP-1 medications prior to the procedure: Dulaglutide Trulicity(hold week prior), Exenatide Byetta (hold the morning of procedure), Semaglutide Ozempic (hold week prior), Liraglutide Victoza, Saxenda(hold week prior), Lixisenatide Adlyxin (hold the morning of procedure), Semaglutide Rybelsus (hold the morning of procedure), Tirzepatide Mounjaro (hold week prior)     DON'T: On the morning of the test do not take insulin or pills for diabetes.     DO: On the morning of the test, do take any pills for blood pressure, heart, anti-rejection and or seizures with a small sip of water. Bring any inhalers with you.    Leave all valuables and jewelry at home. You will be at the hospital for 2-4 hours.    Thank you for choosing [facility].

## 2024-03-19 NOTE — PROGRESS NOTES
Subjective:       Patient ID: Betzy Cooley is a 83 y.o. female.    Chief Complaint: Gastroesophageal Reflux    Patient here today to establish care with aforementioned complaints.    Acid reflux. Was doing well on nexium, however after over doing eating for the holidays symptoms seemed worsen. Nexium was no longer helping -> start pepcid pm in addition. Helped briefly however stopped. D/w Dr. Paul: changed to protonix, first daily now bid. Continues to have breakthrough, however hasn't completely resolved.     Does eat later in the evening.     Denies weight loss. Denies d/o.       Patient is accompanied by her  who corroborates above history.    Past Medical History:   Diagnosis Date    Arthritis     Cataract     GERD (gastroesophageal reflux disease)     HEARING LOSS     Hypertension     Osteoporosis, postmenopausal     Squamous Cell Carcinoma 2007    right forearm    Urinary incontinence     rare mixed       Past Surgical History:   Procedure Laterality Date    COLPOPEXY N/A 8/6/2019    Procedure: COLPOPEXY SSL FIXATION;  Surgeon: Alma Dorsey MD;  Location: [facility] OR [address] [place] [address];  Service: Urology;  Laterality: N/A;    CYSTOSCOPY N/A 8/6/2019    Procedure: CYSTOSCOPY;  Surgeon: Alma Dorsey MD;  Location: [facility] OR [address] [place] [address];  Service: Urology;  Laterality: N/A;    FRACTURE SURGERY Left 2008    open radius/ulna fracture    HYSTERECTOMY      TANIA/ovaries remain--fibroids- in her late 30's / early 40's       Social History  Social History     Tobacco Use    Smoking status: Never    Smokeless tobacco: Never   Substance Use Topics    Alcohol use: Yes     Comment: 1-2 glasses wine weekly    Drug use: No       Family History   Problem Relation Age of Onset    Heart failure Mother     Macular degeneration Mother     Heart disease Mother     COPD Mother     Diabetes Sister     Arthritis Sister         rheumatoid arthritis    COPD Father     No Known Problems Brother     No Known Problems Son     No Known  Problems Daughter     No Known Problems Sister     No Known Problems Sister     No Known Problems Sister     No Known Problems Brother     No Known Problems Brother     No Known Problems Brother     No Known Problems Brother     No Known Problems Brother     No Known Problems Daughter     No Known Problems Son     Breast cancer Neg Hx     Ovarian cancer Neg Hx     Amblyopia Neg Hx     Blindness Neg Hx     Cancer Neg Hx     Cataracts Neg Hx     Glaucoma Neg Hx     Hypertension Neg Hx     Retinal detachment Neg Hx     Strabismus Neg Hx     Stroke Neg Hx     Thyroid disease Neg Hx     Cervical cancer Neg Hx     Endometrial cancer Neg Hx     Vaginal cancer Neg Hx     Colon cancer Neg Hx        Review of Systems   Cardiovascular:  Positive for chest pain.   Gastrointestinal:  Positive for abdominal distention and abdominal pain.           Objective:      Physical Exam  Constitutional:       Appearance: She is well-developed.   HENT:      Head: Normocephalic and atraumatic.   Eyes:      Conjunctiva/sclera: Conjunctivae normal.   Pulmonary:      Effort: Pulmonary effort is normal. No respiratory distress.   Musculoskeletal:      Cervical back: Normal range of motion.   Neurological:      Mental Status: She is alert and oriented to person, place, and time.   Psychiatric:         Behavior: Behavior normal.         Thought Content: Thought content normal.         Judgment: Judgment normal.           Pertinent labs and imaging studies reviewed    Assessment:       1. Gastroesophageal reflux disease, unspecified whether esophagitis present    2. Abdominal pain, unspecified abdominal location        Plan:       Schedule EGD  Continue b.i.d. Protonix   Okay to take Pepcid for breakthrough    (Portions of this note were dictated using voice recognition software and may contain dictation related errors in spelling/grammar/syntax not found on text review)

## 2024-03-20 ENCOUNTER — PATIENT MESSAGE (OUTPATIENT)
Dept: GASTROENTEROLOGY | Facility: CLINIC | Age: 84
End: 2024-03-20
Payer: MEDICARE

## 2024-04-23 DIAGNOSIS — K21.9 GASTROESOPHAGEAL REFLUX DISEASE, UNSPECIFIED WHETHER ESOPHAGITIS PRESENT: ICD-10-CM

## 2024-04-23 RX ORDER — PANTOPRAZOLE SODIUM 40 MG/1
40 TABLET, DELAYED RELEASE ORAL 2 TIMES DAILY
Qty: 60 TABLET | Refills: 1 | Status: SHIPPED | OUTPATIENT
Start: 2024-04-23 | End: 2024-04-29 | Stop reason: SDUPTHER

## 2024-04-23 NOTE — TELEPHONE ENCOUNTER
Refill Routing Note   Medication(s) are not appropriate for processing by [facility] for the following reason(s):        Outside of protocol    [facility] action(s):  Route        Medication Therapy Plan: PER [facility] PROTOCOL, MAX DAILY AMOUNT IS 40MG PER DAY      Appointments  past 12m or future 3m with PCP    Date Provider   Last Visit   7/25/2023 Praful Paul MD   Next Visit   4/29/2024 Praful Paul MD   ED visits in past 90 days: 0        Note composed:12:27 PM 04/23/2024

## 2024-04-29 ENCOUNTER — OFFICE VISIT (OUTPATIENT)
Dept: INTERNAL MEDICINE | Facility: CLINIC | Age: 84
End: 2024-04-29
Payer: MEDICARE

## 2024-04-29 VITALS
DIASTOLIC BLOOD PRESSURE: 82 MMHG | OXYGEN SATURATION: 98 % | SYSTOLIC BLOOD PRESSURE: 138 MMHG | HEIGHT: 62 IN | BODY MASS INDEX: 22.63 KG/M2 | HEART RATE: 67 BPM | WEIGHT: 123 LBS

## 2024-04-29 DIAGNOSIS — K21.9 GASTROESOPHAGEAL REFLUX DISEASE, UNSPECIFIED WHETHER ESOPHAGITIS PRESENT: Primary | ICD-10-CM

## 2024-04-29 DIAGNOSIS — I10 HYPERTENSION, UNSPECIFIED TYPE: ICD-10-CM

## 2024-04-29 PROCEDURE — 3079F DIAST BP 80-89 MM HG: CPT | Mod: HCNC,CPTII,S$GLB, | Performed by: INTERNAL MEDICINE

## 2024-04-29 PROCEDURE — 1101F PT FALLS ASSESS-DOCD LE1/YR: CPT | Mod: HCNC,CPTII,S$GLB, | Performed by: INTERNAL MEDICINE

## 2024-04-29 PROCEDURE — 99214 OFFICE O/P EST MOD 30 MIN: CPT | Mod: HCNC,S$GLB,, | Performed by: INTERNAL MEDICINE

## 2024-04-29 PROCEDURE — 3075F SYST BP GE 130 - 139MM HG: CPT | Mod: HCNC,CPTII,S$GLB, | Performed by: INTERNAL MEDICINE

## 2024-04-29 PROCEDURE — 99999 PR PBB SHADOW E&M-EST. PATIENT-LVL IV: CPT | Mod: PBBFAC,HCNC,, | Performed by: INTERNAL MEDICINE

## 2024-04-29 PROCEDURE — 1160F RVW MEDS BY RX/DR IN RCRD: CPT | Mod: HCNC,CPTII,S$GLB, | Performed by: INTERNAL MEDICINE

## 2024-04-29 PROCEDURE — 3288F FALL RISK ASSESSMENT DOCD: CPT | Mod: HCNC,CPTII,S$GLB, | Performed by: INTERNAL MEDICINE

## 2024-04-29 PROCEDURE — 1126F AMNT PAIN NOTED NONE PRSNT: CPT | Mod: HCNC,CPTII,S$GLB, | Performed by: INTERNAL MEDICINE

## 2024-04-29 PROCEDURE — 1159F MED LIST DOCD IN RCRD: CPT | Mod: HCNC,CPTII,S$GLB, | Performed by: INTERNAL MEDICINE

## 2024-04-29 RX ORDER — PANTOPRAZOLE SODIUM 40 MG/1
40 TABLET, DELAYED RELEASE ORAL 2 TIMES DAILY
Qty: 180 TABLET | Refills: 3 | Status: SHIPPED | OUTPATIENT
Start: 2024-04-29

## 2024-04-29 NOTE — PROGRESS NOTES
Subjective:       Patient ID: Betzy Cooley is a 83 y.o. female.    Chief Complaint: Follow-up    Patient here for follow-up of acid reflux.  She saw 1 partners who adjusted her treatment from Nexium to Protonix and she saw gastro and ended up having a upper GI scope.  Biopsy samplings were negative for malignancy or H pylori.    She said things have stabilized and she feels mostly well controlled other than if she eats something that she knows gives her problems.  No blood in the urine or stool.  No emesis, no spitting up blood.    Follow-up  Pertinent negatives include no arthralgias, chest pain, headaches, joint swelling, neck pain or vomiting.     Review of Systems   Constitutional:  Negative for activity change and unexpected weight change.   HENT:  Positive for rhinorrhea. Negative for hearing loss and trouble swallowing.    Eyes:  Negative for discharge and visual disturbance.   Respiratory:  Negative for chest tightness and wheezing.    Cardiovascular:  Negative for chest pain.   Gastrointestinal:  Positive for reflux. Negative for blood in stool, constipation, diarrhea and vomiting.        Acid reflux   Endocrine: Negative for polydipsia and polyuria.   Genitourinary:  Negative for difficulty urinating, dysuria, hematuria and menstrual problem.   Musculoskeletal:  Negative for arthralgias, joint swelling and neck pain.   Neurological:  Negative for headaches.   Psychiatric/Behavioral:  Negative for dysphoric mood.            Past Medical History:   Diagnosis Date    Arthritis     Cataract     GERD (gastroesophageal reflux disease)     HEARING LOSS     Hypertension     Osteoporosis, postmenopausal     Squamous Cell Carcinoma 2007    right forearm    Urinary incontinence     rare mixed     Past Surgical History:   Procedure Laterality Date    COLPOPEXY N/A 08/06/2019    Procedure: COLPOPEXY SSL FIXATION;  Surgeon: Alma Dorsey MD;  Location: [facility] OR [address] [place] [address];  Service: Urology;  Laterality: N/A;     CYSTOSCOPY N/A 08/06/2019    Procedure: CYSTOSCOPY;  Surgeon: Alma Dorsey MD;  Location: [address];  Service: Urology;  Laterality: N/A;    ESOPHAGOGASTRODUODENOSCOPY N/A 4/1/2024    Procedure: EGD (ESOPHAGOGASTRODUODENOSCOPY);  Surgeon: Zbigniew Dougherty MD;  Location: [facility];  Service: Endoscopy;  Laterality: N/A;    FRACTURE SURGERY Left 2008    open radius/ulna fracture    HYSTERECTOMY      TANIA/ovaries remain--fibroids- in her late 30's / early 40's      Patient Active Problem List   Diagnosis    Hearing loss, sensorineural    Nuclear sclerosis - Both Eyes    Cortical senile cataract - Both Eyes    Choroidal nevus - Both Eyes    Palpitations    Osteopenia    Encounter for screening colonoscopy    GERD (gastroesophageal reflux disease)    History of skin cancer    Tinnitus    Elevated blood pressure reading    Abnormal EKG    Posture imbalance    Shoulder weakness    Systolic murmur    Hypertension        Objective:      Physical Exam  Constitutional:       General: She is not in acute distress.     Appearance: She is well-developed.   HENT:      Head: Normocephalic and atraumatic.      Right Ear: Tympanic membrane, ear canal and external ear normal.      Left Ear: Tympanic membrane, ear canal and external ear normal.      Mouth/Throat:      Pharynx: No oropharyngeal exudate or posterior oropharyngeal erythema.   Eyes:      General: No scleral icterus.     Conjunctiva/sclera: Conjunctivae normal.      Pupils: Pupils are equal, round, and reactive to light.   Neck:      Thyroid: No thyromegaly.   Cardiovascular:      Rate and Rhythm: Normal rate and regular rhythm.      Pulses: Normal pulses.      Heart sounds: No murmur heard.  Pulmonary:      Effort: Pulmonary effort is normal.      Breath sounds: Normal breath sounds. No wheezing.   Abdominal:      General: Bowel sounds are normal. There is no distension.      Palpations: Abdomen is soft.      Tenderness: There is no abdominal tenderness.  "  Musculoskeletal:         General: No tenderness.      Cervical back: Normal range of motion and neck supple.      Right lower leg: No edema.      Left lower leg: No edema.   Lymphadenopathy:      Cervical: No cervical adenopathy.   Skin:     Coloration: Skin is not jaundiced.      Findings: No rash.   Neurological:      General: No focal deficit present.      Mental Status: She is alert and oriented to person, place, and time.   Psychiatric:         Mood and Affect: Mood normal.         Behavior: Behavior normal.         Assessment:       Problem List Items Addressed This Visit          Cardiac/Vascular    Hypertension       GI    GERD (gastroesophageal reflux disease) - Primary    Relevant Medications    pantoprazole (PROTONIX) 40 MG tablet       Plan:         Betzy was seen today for follow-up.    Diagnoses and all orders for this visit:    Gastroesophageal reflux disease, unspecified whether esophagitis present  -     pantoprazole (PROTONIX) 40 MG tablet; Take 1 tablet (40 mg total) by mouth 2 (two) times daily.    Hypertension, unspecified type       Follow-up with labs in August or September.  Continue meds.    As this patient's PCP, I am actively managing and/or treating their chronic medical conditions including GERD and HTN and have been for at least 1 year. This includes, but is not limited to, medication management, coordination of care, documentation review from their specialists and labs/imaging review where pertinent.                Portions of this note may have been created with voice recognition software. Occasional "wrong-word" or "sound-a-like" substitutions may have occurred due to the inherent limitations of voice recognition software. Please, read the note carefully and recognize, using context, where substitutions have occurred.  "

## 2024-05-06 ENCOUNTER — HOSPITAL ENCOUNTER (OUTPATIENT)
Facility: HOSPITAL | Age: 84
Discharge: HOME | End: 2024-05-07
Attending: EMERGENCY MEDICINE | Admitting: EMERGENCY MEDICINE
Payer: MEDICARE

## 2024-05-06 DIAGNOSIS — R07.9 CHEST PAIN: Primary | ICD-10-CM

## 2024-05-06 DIAGNOSIS — R10.9 ABDOMINAL PAIN: ICD-10-CM

## 2024-05-06 PROBLEM — K29.70 GASTRITIS: Status: ACTIVE | Noted: 2024-05-06

## 2024-05-06 LAB
ALBUMIN SERPL BCP-MCNC: 4.1 G/DL (ref 3.5–5.2)
ALP SERPL-CCNC: 96 U/L (ref 55–135)
ALT SERPL W/O P-5'-P-CCNC: 16 U/L (ref 10–44)
ANION GAP SERPL CALC-SCNC: 10 MMOL/L (ref 8–16)
AST SERPL-CCNC: 23 U/L (ref 10–40)
BASOPHILS # BLD AUTO: 0.09 K/UL (ref 0–0.2)
BASOPHILS NFR BLD: 0.8 % (ref 0–1.9)
BILIRUB SERPL-MCNC: 0.4 MG/DL (ref 0.1–1)
BILIRUB UR QL STRIP: NEGATIVE
BUN SERPL-MCNC: 12 MG/DL (ref 8–23)
CALCIUM SERPL-MCNC: 10.3 MG/DL (ref 8.7–10.5)
CHLORIDE SERPL-SCNC: 105 MMOL/L (ref 95–110)
CLARITY UR REFRACT.AUTO: ABNORMAL
CO2 SERPL-SCNC: 26 MMOL/L (ref 23–29)
COLOR UR AUTO: YELLOW
CREAT SERPL-MCNC: 0.9 MG/DL (ref 0.5–1.4)
DIFFERENTIAL METHOD BLD: ABNORMAL
EOSINOPHIL # BLD AUTO: 0.2 K/UL (ref 0–0.5)
EOSINOPHIL NFR BLD: 2 % (ref 0–8)
ERYTHROCYTE [DISTWIDTH] IN BLOOD BY AUTOMATED COUNT: 12.7 % (ref 11.5–14.5)
EST. GFR  (NO RACE VARIABLE): >60 ML/MIN/1.73 M^2
GLUCOSE SERPL-MCNC: 118 MG/DL (ref 70–110)
GLUCOSE UR QL STRIP: NEGATIVE
HCT VFR BLD AUTO: 41.2 % (ref 37–48.5)
HGB BLD-MCNC: 13 G/DL (ref 12–16)
HGB UR QL STRIP: NEGATIVE
IMM GRANULOCYTES # BLD AUTO: 0.04 K/UL (ref 0–0.04)
IMM GRANULOCYTES NFR BLD AUTO: 0.4 % (ref 0–0.5)
KETONES UR QL STRIP: NEGATIVE
LACTATE SERPL-SCNC: 1.8 MMOL/L (ref 0.5–2.2)
LEUKOCYTE ESTERASE UR QL STRIP: NEGATIVE
LIPASE SERPL-CCNC: 15 U/L (ref 4–60)
LYMPHOCYTES # BLD AUTO: 2.6 K/UL (ref 1–4.8)
LYMPHOCYTES NFR BLD: 24.1 % (ref 18–48)
MCH RBC QN AUTO: 29.8 PG (ref 27–31)
MCHC RBC AUTO-ENTMCNC: 31.6 G/DL (ref 32–36)
MCV RBC AUTO: 95 FL (ref 82–98)
MONOCYTES # BLD AUTO: 0.9 K/UL (ref 0.3–1)
MONOCYTES NFR BLD: 8.5 % (ref 4–15)
NEUTROPHILS # BLD AUTO: 6.8 K/UL (ref 1.8–7.7)
NEUTROPHILS NFR BLD: 64.2 % (ref 38–73)
NITRITE UR QL STRIP: NEGATIVE
NRBC BLD-RTO: 0 /100 WBC
OHS QRS DURATION: 90 MS
OHS QTC CALCULATION: 424 MS
PH UR STRIP: 8 [PH] (ref 5–8)
PLATELET # BLD AUTO: 308 K/UL (ref 150–450)
PMV BLD AUTO: 9.5 FL (ref 9.2–12.9)
POTASSIUM SERPL-SCNC: 3.7 MMOL/L (ref 3.5–5.1)
PROT SERPL-MCNC: 8.4 G/DL (ref 6–8.4)
PROT UR QL STRIP: NEGATIVE
RBC # BLD AUTO: 4.36 M/UL (ref 4–5.4)
SODIUM SERPL-SCNC: 141 MMOL/L (ref 136–145)
SP GR UR STRIP: 1.01 (ref 1–1.03)
TROPONIN I SERPL DL<=0.01 NG/ML-MCNC: 0.08 NG/ML (ref 0–0.03)
TROPONIN I SERPL DL<=0.01 NG/ML-MCNC: 0.08 NG/ML (ref 0–0.03)
URN SPEC COLLECT METH UR: ABNORMAL
WBC # BLD AUTO: 10.61 K/UL (ref 3.9–12.7)

## 2024-05-06 PROCEDURE — 96372 THER/PROPH/DIAG INJ SC/IM: CPT | Performed by: STUDENT IN AN ORGANIZED HEALTH CARE EDUCATION/TRAINING PROGRAM

## 2024-05-06 PROCEDURE — 25500020 PHARM REV CODE 255: Mod: HCNC | Performed by: EMERGENCY MEDICINE

## 2024-05-06 PROCEDURE — G0378 HOSPITAL OBSERVATION PER HR: HCPCS | Mod: HCNC

## 2024-05-06 PROCEDURE — 63600175 PHARM REV CODE 636 W HCPCS: Mod: HCNC | Performed by: STUDENT IN AN ORGANIZED HEALTH CARE EDUCATION/TRAINING PROGRAM

## 2024-05-06 PROCEDURE — 85025 COMPLETE CBC W/AUTO DIFF WBC: CPT | Mod: HCNC | Performed by: EMERGENCY MEDICINE

## 2024-05-06 PROCEDURE — 25000003 PHARM REV CODE 250: Mod: HCNC | Performed by: EMERGENCY MEDICINE

## 2024-05-06 PROCEDURE — 93005 ELECTROCARDIOGRAM TRACING: CPT | Mod: HCNC

## 2024-05-06 PROCEDURE — 93010 ELECTROCARDIOGRAM REPORT: CPT | Mod: HCNC,,, | Performed by: INTERNAL MEDICINE

## 2024-05-06 PROCEDURE — 81003 URINALYSIS AUTO W/O SCOPE: CPT | Mod: HCNC | Performed by: EMERGENCY MEDICINE

## 2024-05-06 PROCEDURE — 84484 ASSAY OF TROPONIN QUANT: CPT | Mod: 91,HCNC | Performed by: EMERGENCY MEDICINE

## 2024-05-06 PROCEDURE — 99285 EMERGENCY DEPT VISIT HI MDM: CPT | Mod: 25,HCNC

## 2024-05-06 PROCEDURE — 84484 ASSAY OF TROPONIN QUANT: CPT | Mod: HCNC | Performed by: EMERGENCY MEDICINE

## 2024-05-06 PROCEDURE — 25000003 PHARM REV CODE 250: Mod: HCNC | Performed by: STUDENT IN AN ORGANIZED HEALTH CARE EDUCATION/TRAINING PROGRAM

## 2024-05-06 PROCEDURE — 83690 ASSAY OF LIPASE: CPT | Mod: HCNC | Performed by: EMERGENCY MEDICINE

## 2024-05-06 PROCEDURE — 83605 ASSAY OF LACTIC ACID: CPT | Mod: HCNC | Performed by: EMERGENCY MEDICINE

## 2024-05-06 PROCEDURE — 80053 COMPREHEN METABOLIC PANEL: CPT | Mod: HCNC | Performed by: EMERGENCY MEDICINE

## 2024-05-06 RX ORDER — IBUPROFEN 200 MG
24 TABLET ORAL
Status: DISCONTINUED | OUTPATIENT
Start: 2024-05-06 | End: 2024-05-07 | Stop reason: HOSPADM

## 2024-05-06 RX ORDER — SODIUM CHLORIDE, SODIUM LACTATE, POTASSIUM CHLORIDE, CALCIUM CHLORIDE 600; 310; 30; 20 MG/100ML; MG/100ML; MG/100ML; MG/100ML
INJECTION, SOLUTION INTRAVENOUS CONTINUOUS
Status: ACTIVE | OUTPATIENT
Start: 2024-05-06 | End: 2024-05-07

## 2024-05-06 RX ORDER — TALC
6 POWDER (GRAM) TOPICAL NIGHTLY PRN
Status: DISCONTINUED | OUTPATIENT
Start: 2024-05-06 | End: 2024-05-07 | Stop reason: HOSPADM

## 2024-05-06 RX ORDER — SODIUM CHLORIDE 0.9 % (FLUSH) 0.9 %
10 SYRINGE (ML) INJECTION EVERY 8 HOURS PRN
Status: DISCONTINUED | OUTPATIENT
Start: 2024-05-06 | End: 2024-05-07 | Stop reason: HOSPADM

## 2024-05-06 RX ORDER — ATENOLOL 25 MG/1
25 TABLET ORAL DAILY
Status: DISCONTINUED | OUTPATIENT
Start: 2024-05-06 | End: 2024-05-07

## 2024-05-06 RX ORDER — ACETAMINOPHEN 325 MG/1
650 TABLET ORAL EVERY 6 HOURS PRN
Status: DISCONTINUED | OUTPATIENT
Start: 2024-05-06 | End: 2024-05-07 | Stop reason: HOSPADM

## 2024-05-06 RX ORDER — PROCHLORPERAZINE EDISYLATE 5 MG/ML
5 INJECTION INTRAMUSCULAR; INTRAVENOUS EVERY 6 HOURS PRN
Status: DISCONTINUED | OUTPATIENT
Start: 2024-05-06 | End: 2024-05-07 | Stop reason: HOSPADM

## 2024-05-06 RX ORDER — FERROUS SULFATE, DRIED 160(50) MG
1 TABLET, EXTENDED RELEASE ORAL 2 TIMES DAILY WITH MEALS
Status: DISCONTINUED | OUTPATIENT
Start: 2024-05-06 | End: 2024-05-07 | Stop reason: HOSPADM

## 2024-05-06 RX ORDER — ONDANSETRON 8 MG/1
8 TABLET, ORALLY DISINTEGRATING ORAL EVERY 8 HOURS PRN
Status: DISCONTINUED | OUTPATIENT
Start: 2024-05-06 | End: 2024-05-07 | Stop reason: HOSPADM

## 2024-05-06 RX ORDER — GLUCAGON 1 MG
1 KIT INJECTION
Status: DISCONTINUED | OUTPATIENT
Start: 2024-05-06 | End: 2024-05-07 | Stop reason: HOSPADM

## 2024-05-06 RX ORDER — ASPIRIN 325 MG
325 TABLET ORAL
Status: COMPLETED | OUTPATIENT
Start: 2024-05-06 | End: 2024-05-06

## 2024-05-06 RX ORDER — NALOXONE HCL 0.4 MG/ML
0.02 VIAL (ML) INJECTION
Status: DISCONTINUED | OUTPATIENT
Start: 2024-05-06 | End: 2024-05-07 | Stop reason: HOSPADM

## 2024-05-06 RX ORDER — NAPROXEN SODIUM 220 MG/1
81 TABLET, FILM COATED ORAL DAILY
Status: DISCONTINUED | OUTPATIENT
Start: 2024-05-06 | End: 2024-05-07 | Stop reason: HOSPADM

## 2024-05-06 RX ORDER — ENOXAPARIN SODIUM 100 MG/ML
40 INJECTION SUBCUTANEOUS EVERY 24 HOURS
Status: DISCONTINUED | OUTPATIENT
Start: 2024-05-06 | End: 2024-05-07 | Stop reason: HOSPADM

## 2024-05-06 RX ORDER — IPRATROPIUM BROMIDE AND ALBUTEROL SULFATE 2.5; .5 MG/3ML; MG/3ML
3 SOLUTION RESPIRATORY (INHALATION) EVERY 6 HOURS PRN
Status: DISCONTINUED | OUTPATIENT
Start: 2024-05-06 | End: 2024-05-07 | Stop reason: HOSPADM

## 2024-05-06 RX ORDER — POLYETHYLENE GLYCOL 3350 17 G/17G
17 POWDER, FOR SOLUTION ORAL DAILY
Status: DISCONTINUED | OUTPATIENT
Start: 2024-05-06 | End: 2024-05-07 | Stop reason: HOSPADM

## 2024-05-06 RX ORDER — HYDRALAZINE HYDROCHLORIDE 25 MG/1
25 TABLET, FILM COATED ORAL EVERY 8 HOURS PRN
Status: DISCONTINUED | OUTPATIENT
Start: 2024-05-06 | End: 2024-05-07 | Stop reason: HOSPADM

## 2024-05-06 RX ORDER — IBUPROFEN 200 MG
16 TABLET ORAL
Status: DISCONTINUED | OUTPATIENT
Start: 2024-05-06 | End: 2024-05-07 | Stop reason: HOSPADM

## 2024-05-06 RX ORDER — SIMETHICONE 80 MG
1 TABLET,CHEWABLE ORAL 3 TIMES DAILY PRN
Status: DISCONTINUED | OUTPATIENT
Start: 2024-05-06 | End: 2024-05-07 | Stop reason: HOSPADM

## 2024-05-06 RX ORDER — PANTOPRAZOLE SODIUM 40 MG/1
40 TABLET, DELAYED RELEASE ORAL 2 TIMES DAILY
Status: DISCONTINUED | OUTPATIENT
Start: 2024-05-06 | End: 2024-05-07 | Stop reason: HOSPADM

## 2024-05-06 RX ORDER — ATENOLOL 25 MG/1
25 TABLET ORAL
Status: COMPLETED | OUTPATIENT
Start: 2024-05-06 | End: 2024-05-06

## 2024-05-06 RX ADMIN — ASPIRIN 81 MG CHEWABLE TABLET 81 MG: 81 TABLET CHEWABLE at 01:05

## 2024-05-06 RX ADMIN — ENOXAPARIN SODIUM 40 MG: 40 INJECTION SUBCUTANEOUS at 05:05

## 2024-05-06 RX ADMIN — Medication 1 TABLET: at 05:05

## 2024-05-06 RX ADMIN — Medication 6 MG: at 08:05

## 2024-05-06 RX ADMIN — POLYETHYLENE GLYCOL 3350 17 G: 17 POWDER, FOR SOLUTION ORAL at 01:05

## 2024-05-06 RX ADMIN — ATENOLOL 25 MG: 25 TABLET ORAL at 09:05

## 2024-05-06 RX ADMIN — PANTOPRAZOLE SODIUM 40 MG: 40 TABLET, DELAYED RELEASE ORAL at 08:05

## 2024-05-06 RX ADMIN — IOHEXOL 75 ML: 350 INJECTION, SOLUTION INTRAVENOUS at 07:05

## 2024-05-06 RX ADMIN — THERA TABS 1 TABLET: TAB at 01:05

## 2024-05-06 RX ADMIN — ASPIRIN 325 MG ORAL TABLET 325 MG: 325 PILL ORAL at 11:05

## 2024-05-06 RX ADMIN — HYDRALAZINE HYDROCHLORIDE 25 MG: 25 TABLET, FILM COATED ORAL at 05:05

## 2024-05-06 RX ADMIN — ATENOLOL 25 MG: 25 TABLET ORAL at 01:05

## 2024-05-06 RX ADMIN — SODIUM CHLORIDE, POTASSIUM CHLORIDE, SODIUM LACTATE AND CALCIUM CHLORIDE: 600; 310; 30; 20 INJECTION, SOLUTION INTRAVENOUS at 03:05

## 2024-05-06 NOTE — HPI
This is a 83-year-old patient with a history of gastritis, HTN who presents with upper abdominal pain.  Has been experiencing heartburn chronically for over 20 years.  As of late symptoms have progressed prompting her to obtain EGD earlier this month.  Studies were negative for H pylori, did note mild gastritis.  Was transitioned from Nexium to pantoprazole b.i.d..  Denies any dysphagia, recent weight loss, melena, use of NSAIDs.  Last night states that she experienced significant nonradiating substernal burning type pain.  She ate a ham sandwich around 6:00 p.m., did not go to sleep right away.  She went to bed at 9:15 p.m. where she started experiencing these symptoms.  Symptoms lasted for at least a couple hours and subsided on their own, not actively experiencing any pain.  Compliant on PPI.  Denies any cardiac history    ED course:  Upon arrival, troponin was 0.08 which downtrended without intervention.  EKG showing diffuse T-wave inversions, similar from previous.  CTA/P negative for acute abnormality.  Lipase WNL

## 2024-05-06 NOTE — PLAN OF CARE
[facility] - Emergency Dept  Initial Discharge Assessment       Primary Care Provider: Praful Paul MD    Admission Diagnosis: Abdominal pain [R10.9]    Admission Date: 5/6/2024  Expected Discharge Date:     Pt stated she is independent with her ambulation and ADL's and does not require assistance or equipment.    Pt to d/c home with no needs when ready    Transition of Care Barriers: (P) None    Payor: [employer] MANAGED MEDICARE / Plan: [employer] MEDICARE HMO / Product Type: Capitation /     Extended Emergency Contact Information  Primary Emergency Contact: DeliaZander wall  Address: [address], [place] [address] [place] [address]  Home Phone: [phone]  Mobile Phone: [phone]  Relation: Spouse   needed? No    Discharge Plan A: (P) Home  Discharge Plan B: (P) Home      [id number] [place], [place] - [address] [facility]  [address] [facility] [address]  Phone: [phone] Fax: [phone]    [facility] [address] [facility]  [address] [place] [address]  Phone: [phone] Fax: [phone]      Initial Assessment (most recent)       Adult Discharge Assessment - 05/06/24 1425          Discharge Assessment    Assessment Type Discharge Planning Assessment (P)      Confirmed/corrected address, phone number and insurance Yes (P)      Confirmed Demographics Correct on Facesheet (P)      Source of Information patient (P)      Reason For Admission Gastritis (P)      People in Home spouse (P)      Facility Arrived From: home (P)      Do you expect to return to your current living situation? Yes (P)      Do you have help at home or someone to help you manage your care at home? No (P)      Prior to hospitilization cognitive status: Alert/Oriented;No Deficits (P)      Current cognitive status: Alert/Oriented;No Deficits (P)      Walking or Climbing Stairs Difficulty no (P)      Dressing/Bathing Difficulty no (P)       Home Accessibility not wheelchair accessible;stairs within home (P)      Number of Stairs, Within Home, Primary ten (P)      Home Layout Able to live on 1st floor;Bathroom on 2nd floor;Bedroom on 2nd floor (P)      Equipment Currently Used at Home none (P)      Patient currently being followed by outpatient case management? No (P)      Do you currently have service(s) that help you manage your care at home? No (P)      Do you have any problems affording any of your prescribed medications? No (P)      Is the patient taking medications as prescribed? yes (P)      Who is going to help you get home at discharge? family/friends (P)      How do you get to doctors appointments? car, drives self (P)      Are you on dialysis? No (P)      Do you take coumadin? No (P)      Discharge Plan A Home (P)      Discharge Plan B Home (P)      DME Needed Upon Discharge  none (P)      Discharge Plan discussed with: Patient (P)      Transition of Care Barriers None (P)         Physical Activity    On average, how many days per week do you engage in moderate to strenuous exercise (like a brisk walk)? 3 days (P)      On average, how many minutes do you engage in exercise at this level? 90 min (P)         Financial Resource Strain    How hard is it for you to pay for the very basics like food, housing, medical care, and heating? Not very hard (P)         Housing Stability    In the last 12 months, was there a time when you were not able to pay the mortgage or rent on time? No (P)      At any time in the past 12 months, were you homeless or living in a shelter (including now)? No (P)         Transportation Needs    In the past 12 months, has lack of transportation kept you from medical appointments or from getting medications? No (P)      In the past 12 months, has lack of transportation kept you from meetings, work, or from getting things needed for daily living? No (P)         Food Insecurity    Within the past 12 months, you worried that  your food would run out before you got the money to buy more. Never true (P)      Within the past 12 months, the food you bought just didn't last and you didn't have money to get more. Never true (P)         Stress    Do you feel stress - tense, restless, nervous, or anxious, or unable to sleep at night because your mind is troubled all the time - these days? To some extent (P)         Alcohol Use    Q1: How often do you have a drink containing alcohol? Never (P)      Q2: How many drinks containing alcohol do you have on a typical day when you are drinking? Patient does not drink (P)      Q3: How often do you have six or more drinks on one occasion? Never (P)         OTHER    Name(s) of People in Home spouse Zander (P)                    Cari Ballard CD, MSW, LMSW, RSW   Case Management  [facility]  Email: [email or web address]

## 2024-05-06 NOTE — SUBJECTIVE & OBJECTIVE
Past Medical History:   Diagnosis Date    Arthritis     Cataract     GERD (gastroesophageal reflux disease)     HEARING LOSS     Hypertension     Osteoporosis, postmenopausal     Squamous Cell Carcinoma 2007    right forearm    Urinary incontinence     rare mixed       Past Surgical History:   Procedure Laterality Date    COLPOPEXY N/A 08/06/2019    Procedure: COLPOPEXY SSL FIXATION;  Surgeon: Alma Dorsey MD;  Location: [address];  Service: Urology;  Laterality: N/A;    CYSTOSCOPY N/A 08/06/2019    Procedure: CYSTOSCOPY;  Surgeon: Alma Dorsey MD;  Location: [address];  Service: Urology;  Laterality: N/A;    ESOPHAGOGASTRODUODENOSCOPY N/A 4/1/2024    Procedure: EGD (ESOPHAGOGASTRODUODENOSCOPY);  Surgeon: Zbigniew Dougherty MD;  Location: [facility];  Service: Endoscopy;  Laterality: N/A;    FRACTURE SURGERY Left 2008    open radius/ulna fracture    HYSTERECTOMY      TANIA/ovaries remain--fibroids- in her late 30's / early 40's       Review of patient's allergies indicates:   Allergen Reactions    Sulfa (sulfonamide antibiotics) Hives     Other reaction(s): Anaphylaxis  Itching   Shortness of breath        Current Facility-Administered Medications   Medication Dose Route Frequency Provider Last Rate Last Admin    acetaminophen tablet 650 mg  650 mg Oral Q6H PRN Magan Lawson, DO        albuterol-ipratropium 2.5 mg-0.5 mg/3 mL nebulizer solution 3 mL  3 mL Nebulization Q6H PRN Magan Lawson, DO        aspirin chewable tablet 81 mg  81 mg Oral Daily Magan Lawson, DO        atenoloL tablet 25 mg  25 mg Oral Daily Magan Lawson, DO        calcium-vitamin D3 500 mg-5 mcg (200 unit) per tablet 1 tablet  1 tablet Oral BID WM Magan Lawson, DO        dextrose 10% bolus 125 mL 125 mL  12.5 g Intravenous PRN Magan Lawson, DO        dextrose 10% bolus 250 mL 250 mL  25 g Intravenous PRN Magan Lawson, DO        enoxaparin injection 40 mg  40 mg Subcutaneous Daily Magan Lawson, DO         glucagon (human recombinant) injection 1 mg  1 mg Intramuscular PRN Renny, Magan, DO        glucose chewable tablet 16 g  16 g Oral PRN Renny, Magan, DO        glucose chewable tablet 24 g  24 g Oral PRN Renny, Magan, DO        hydrALAZINE tablet 25 mg  25 mg Oral Q8H PRN Renny, Magan, DO        melatonin tablet 6 mg  6 mg Oral Nightly PRN Renny, Magan, DO        multivitamin tablet  1 tablet Oral Daily Renny, Magan, DO        naloxone 0.4 mg/mL injection 0.02 mg  0.02 mg Intravenous PRN Renny, Magan, DO        ondansetron disintegrating tablet 8 mg  8 mg Oral Q8H PRN Renny, Magan, DO        pantoprazole EC tablet 40 mg  40 mg Oral BID Renny, Magan, DO        polyethylene glycol packet 17 g  17 g Oral Daily Renny, Magan, DO        prochlorperazine injection Soln 5 mg  5 mg Intravenous Q6H PRN Renny, Magan, DO        sodium chloride 0.9% flush 10 mL  10 mL Intravenous Q8H PRN Renny, Magan, DO         Current Outpatient Medications   Medication Sig Dispense Refill    aspirin 81 MG Chew Take 81 mg by mouth once daily. Not chewable      atenoloL (TENORMIN) 25 MG tablet TAKE 1 TABLET BY MOUTH DAILY 90 tablet 2    calcium-vitamin D3 500 mg(1,250mg) -200 unit per tablet Take 1 tablet by mouth 2 (two) times daily with meals.      co-enzyme Q-10 30 mg capsule Take 30 mg by mouth once daily.      fexofenadine (ALLEGRA) 180 MG tablet Take 1 tablet (180 mg total) by mouth once daily. (Patient taking differently: Take 180 mg by mouth once daily. Takes as needed) 30 tablet 11    multivitamin (THERAGRAN) per tablet Take 1 tablet by mouth once daily.      pantoprazole (PROTONIX) 40 MG tablet Take 1 tablet (40 mg total) by mouth 2 (two) times daily. 180 tablet 3    vitamin A-vitamin C-vit E-min Tab Take 1 tablet by mouth once daily.       Facility-Administered Medications Ordered in Other Encounters   Medication Dose Route Frequency Provider Last Rate Last Admin    lactated ringers infusion    Intravenous Continuous Zbigniew Dougherty MD         Family History       Problem Relation (Age of Onset)    Arthritis Sister    COPD Mother, Father    Diabetes Sister    Heart disease Mother    Heart failure Mother    Macular degeneration Mother    No Known Problems Brother, Son, Daughter, Sister, Sister, Sister, Brother, Brother, Brother, Brother, Brother, Daughter, Son          Tobacco Use    Smoking status: Never    Smokeless tobacco: Never   Substance and Sexual Activity    Alcohol use: Not Currently     Comment: 1-2 glasses wine weekly    Drug use: No    Sexual activity: Yes     Partners: Male     Birth control/protection: None     Review of Systems   Constitutional:  Negative for activity change and appetite change.   Eyes:  Negative for discharge and itching.   Respiratory:  Negative for apnea, chest tightness and shortness of breath.    Cardiovascular:  Positive for chest pain. Negative for leg swelling.   Gastrointestinal:  Positive for abdominal pain. Negative for abdominal distention, blood in stool, nausea and vomiting.   Endocrine: Negative for cold intolerance and heat intolerance.   Genitourinary:  Negative for difficulty urinating and dyspareunia.   Musculoskeletal:  Negative for arthralgias and back pain.   Skin:  Negative for color change and pallor.   Allergic/Immunologic: Negative for environmental allergies and food allergies.   Neurological:  Negative for dizziness and facial asymmetry.   Hematological:  Negative for adenopathy. Does not bruise/bleed easily.   Psychiatric/Behavioral:  Negative for agitation and behavioral problems.      Objective:     Vital Signs (Most Recent):  Temp: 98.1 °F (36.7 °C) (05/06/24 0948)  Pulse: (!) 51 (05/06/24 1215)  Resp: (!) 24 (05/06/24 1215)  BP: (!) 150/72 (05/06/24 1215)  SpO2: 97 % (05/06/24 1215) Vital Signs (24h Range):  Temp:  [98.1 °F (36.7 °C)] 98.1 °F (36.7 °C)  Pulse:  [51-88] 51  Resp:  [14-28] 24  SpO2:  [97 %-100 %] 97 %  BP:  (136-203)/() 150/72     Weight: 55.3 kg (122 lb)  Body mass index is 22.31 kg/m².     Physical Exam  Constitutional:       General: She is not in acute distress.     Appearance: Normal appearance. She is not toxic-appearing.   HENT:      Head: Normocephalic and atraumatic.      Nose: Nose normal.      Mouth/Throat:      Mouth: Mucous membranes are moist.   Eyes:      Pupils: Pupils are equal, round, and reactive to light.   Cardiovascular:      Rate and Rhythm: Regular rhythm. Bradycardia present.      Pulses: Normal pulses.      Heart sounds: No murmur heard.     No gallop.   Pulmonary:      Effort: Pulmonary effort is normal. No respiratory distress.      Breath sounds: Normal breath sounds. No wheezing or rales.   Abdominal:      General: Abdomen is flat. Bowel sounds are normal. There is no distension.      Palpations: Abdomen is soft.      Tenderness: There is no abdominal tenderness.   Musculoskeletal:         General: No swelling or tenderness. Normal range of motion.      Cervical back: Normal range of motion.      Right lower leg: No edema.      Left lower leg: No edema.   Skin:     General: Skin is warm and dry.      Capillary Refill: Capillary refill takes less than 2 seconds.   Neurological:      General: No focal deficit present.      Mental Status: She is alert.   Psychiatric:         Mood and Affect: Mood normal.         Behavior: Behavior normal.         Thought Content: Thought content normal.         Judgment: Judgment normal.              CRANIAL NERVES     CN III, IV, VI   Pupils are equal, round, and reactive to light.       Significant Labs: All pertinent labs within the past 24 hours have been reviewed.    Significant Imaging: I have reviewed all pertinent imaging results/findings within the past 24 hours.

## 2024-05-06 NOTE — ED PROVIDER NOTES
Encounter Date: 5/6/2024       History     Chief Complaint   Patient presents with    Abdominal Pain     Presents to the ED with complaints of epigastric pain since last night, states that she has a hx of acid reflux and believes this is an exacerbation of her gastric reflux, has seen both a cardiologist and GI doctor for this, was given pantoprazole by the GI doctor, denies any cardiac hx.      83-year-old female, history of hypertension, GERD, brought in by  given concern for epigastric pain.  Patient has had similar pain in her epigastric region almost every evening that she describes as a burning pain.  Last night around 9:00 p.m. the pain was so severe that she was crying out to her .  She took multiple antacids without relief.  Around 3 in the morning they decided to come to the ED as the pain was not subsiding.  Patient has been seen by her primary care doctor and GI for this pain and has been prescribed PPIs and Pepcid which she has taken without relief.  She had a normal dinner last night, soup and sandwich only.  No vomiting or diarrhea.  On arrival here patient reports the pain has largely subsided.  She denies any chest pain or shortness of breath.  About a month ago she had an upper endoscopy which was negative.    The history is provided by the patient and the spouse.     Review of patient's allergies indicates:   Allergen Reactions    Sulfa (sulfonamide antibiotics) Hives     Other reaction(s): Anaphylaxis  Itching   Shortness of breath      Past Medical History:   Diagnosis Date    Arthritis     Cataract     GERD (gastroesophageal reflux disease)     HEARING LOSS     Hypertension     Osteoporosis, postmenopausal     Squamous Cell Carcinoma 2007    right forearm    Urinary incontinence     rare mixed     Past Surgical History:   Procedure Laterality Date    COLPOPEXY N/A 08/06/2019    Procedure: COLPOPEXY SSL FIXATION;  Surgeon: Alma Dorsey MD;  Location: [facility] OR [address] [place] [address];  Service:  Urology;  Laterality: N/A;    CYSTOSCOPY N/A 08/06/2019    Procedure: CYSTOSCOPY;  Surgeon: Alma Dorsey MD;  Location: [address];  Service: Urology;  Laterality: N/A;    ESOPHAGOGASTRODUODENOSCOPY N/A 4/1/2024    Procedure: EGD (ESOPHAGOGASTRODUODENOSCOPY);  Surgeon: Zbigniew Dougherty MD;  Location: [facility];  Service: Endoscopy;  Laterality: N/A;    FRACTURE SURGERY Left 2008    open radius/ulna fracture    HYSTERECTOMY      TANIA/ovaries remain--fibroids- in her late 30's / early 40's     Family History   Problem Relation Name Age of Onset    Heart failure Mother copd     Macular degeneration Mother copd     Heart disease Mother copd     COPD Mother copd     Diabetes Sister      Arthritis Sister          rheumatoid arthritis    COPD Father      No Known Problems Brother      No Known Problems Son Saad     No Known Problems Daughter Jordyn     No Known Problems Sister      No Known Problems Sister      No Known Problems Sister      No Known Problems Brother      No Known Problems Brother      No Known Problems Brother      No Known Problems Brother      No Known Problems Brother      No Known Problems Daughter Reyes     No Known Problems Son Arie     Breast cancer Neg Hx      Ovarian cancer Neg Hx      Amblyopia Neg Hx      Blindness Neg Hx      Cancer Neg Hx      Cataracts Neg Hx      Glaucoma Neg Hx      Hypertension Neg Hx      Retinal detachment Neg Hx      Strabismus Neg Hx      Stroke Neg Hx      Thyroid disease Neg Hx      Cervical cancer Neg Hx      Endometrial cancer Neg Hx      Vaginal cancer Neg Hx      Colon cancer Neg Hx       Social History     Tobacco Use    Smoking status: Never    Smokeless tobacco: Never   Substance Use Topics    Alcohol use: Not Currently     Comment: 1-2 glasses wine weekly    Drug use: No     Review of Systems    Physical Exam     Initial Vitals [05/06/24 0416]   BP Pulse Resp Temp SpO2   (!) 163/88 60 18 98.1 °F (36.7 °C) 99 %      MAP       --          Physical Exam    Nursing note and vitals reviewed.  Constitutional: Vital signs are normal. She appears well-developed and well-nourished. She is not diaphoretic.  Non-toxic appearance. She does not appear ill. No distress.   HENT:   Head: Normocephalic and atraumatic.   Mouth/Throat: Oropharynx is clear and moist and mucous membranes are normal. Mucous membranes are not dry.   Eyes: Conjunctivae and lids are normal. No scleral icterus.   Neck: Neck supple.   Normal range of motion.  Cardiovascular:  Normal rate.           Pulmonary/Chest: No respiratory distress.   Abdominal: Abdomen is soft. She exhibits no distension. There is no abdominal tenderness. There is no rebound and no guarding.   Musculoskeletal:         General: No edema.      Cervical back: Normal range of motion and neck supple.     Neurological: She is alert and oriented to person, place, and time. She has normal strength.   Skin: Skin is dry and intact. No pallor.   Psychiatric: She has a normal mood and affect. Her speech is normal and behavior is normal.         ED Course   Procedures  Labs Reviewed   CBC W/ AUTO DIFFERENTIAL - Abnormal; Notable for the following components:       Result Value    MCHC 31.6 (*)     All other components within normal limits   COMPREHENSIVE METABOLIC PANEL - Abnormal; Notable for the following components:    Glucose 118 (*)     All other components within normal limits   TROPONIN I - Abnormal; Notable for the following components:    Troponin I 0.082 (*)     All other components within normal limits   URINALYSIS, REFLEX TO URINE CULTURE - Abnormal; Notable for the following components:    Appearance, UA Hazy (*)     All other components within normal limits    Narrative:     Specimen Source->Urine   TROPONIN I - Abnormal; Notable for the following components:    Troponin I 0.079 (*)     All other components within normal limits   LIPASE   LACTIC ACID, PLASMA   POCT GLUCOSE, HAND-HELD DEVICE     EKG Readings:  (Independently Interpreted)   Initial Reading: No STEMI. Rhythm: Normal Sinus Rhythm.   Diffuse T-wave inversions     ECG Results              EKG 12-lead (Final result)        Collection Time Result Time QRS Duration OHS QTC Calculation    05/06/24 04:10:33 05/06/24 10:39:39 90 424                     Final result by Interface, Lab In [facility] (05/06/24 10:39:47)                   Narrative:    Test Reason : R10.9,    Vent. Rate : 061 BPM     Atrial Rate : 061 BPM     P-R Int : 212 ms          QRS Dur : 090 ms      QT Int : 422 ms       P-R-T Axes : 062 -41 -84 degrees     QTc Int : 424 ms    Sinus rhythm with 1st degree A-V block  Left axis deviation  Abnormal R wave progression in the precordial leads  Minimal voltage criteria for LVH, may be normal variant ( R in aVL )  ST and T wave abnormality, consider inferior ischemia  ST and T wave abnormality, consider anterolateral ischemia  Abnormal ECG  When compared with ECG of 29-AUG-2023 08:17,  T wave inversion now evident in Inferior leads  T wave inversion now evident in Lateral leads  Confirmed by Fer WADDELL MD ([id number]) on 5/6/2024 10:39:36 AM    Referred By: [email or web address]   SELF           Confirmed By:Fer WADDELL MD                                  Imaging Results              X-Ray Chest 1 View (Final result)  Result time 05/06/24 11:47:19      Final result by Bravo Medina MD (05/06/24 11:47:19)                   Impression:      No significant intrathoracic abnormality.  No significant detrimental interval change in the appearance of the chest since 11/03/2008 is appreciated.      Electronically signed by: Bravo Medina MD  Date:    05/06/2024  Time:    11:47               Narrative:    EXAMINATION:  XR CHEST 1 VIEW    TECHNIQUE:  One view    COMPARISON:  Comparison is made to 11/03/2008.  Clinical information of epigastric pain is obtained from the electronic medical record.    FINDINGS:  Allowing for magnification of the cardiomediastinal silhouette related to  projection, the heart size is within normal limits, as is the appearance of the pulmonary vascularity.  Lung zones appear clear, and are free of significant airspace consolidation or volume loss.  No pleural fluid.  No hilar or mediastinal mass lesion, with mild tortuosity of the thoracic aorta with calcification in the wall of the aortic arch incidentally noted.  No pneumothorax.                                       CT Abdomen Pelvis With IV Contrast NO Oral Contrast (Final result)  Result time 05/06/24 07:59:39      Final result by Quirino Salgado MD (05/06/24 07:59:39)                   Impression:      No acute abdominopelvic abnormality.    Additional findings in the body of the report.    Electronically signed by resident: Dieudonne Izquierdo  Date:    05/06/2024  Time:    07:47    Electronically signed by: Quirino Salgado MD  Date:    05/06/2024  Time:    07:59               Narrative:    EXAMINATION:  CT ABDOMEN PELVIS WITH IV CONTRAST    CLINICAL HISTORY:  Abdominal pain, acute, nonlocalized;    TECHNIQUE:  Low dose axial images, sagittal and coronal reformations were obtained from the lung bases to the pubic symphysis.  75 mL of IV contrast was administered.    COMPARISON:  Abdominal radiograph 01/03/2020.    FINDINGS:  Lower chest: Heart size is normal. Mild bandlike atelectasis in the lung bases.    Abdomen:    Liver is normal in size.  No focal hepatic lesion.  Fatty infiltration about the falciform ligament.  Portal vasculature is patent.  Gallbladder is unremarkable. No calcified gallstones.  No intrahepatic biliary dilatation.  Common duct dilated up to 10 mm.  No obvious cause for obstruction.    Spleen, adrenals, and pancreas are unremarkable.    Kidneys are symmetric.  No renal or ureteral stones. No hydronephrosis.    No distended loops of small bowel.    Abdominal aorta is normal in course and caliber.  Mild calcific atherosclerosis.    No abdominal lymphadenopathy.    No abdominal free fluid or  pneumoperitoneum.    Pelvis: Urinary bladder is distended without wall thickening.  No free fluid in the pelvis. Uterus is surgically absent.  No adnexal lesion.    Bones and soft tissues: No aggressive osseous lesions. Degenerative changes of visualized osseous structures.  Grade 1 anterolisthesis of L4 on L5. tiny fat containing umbilical hernia.                                       Medications   pantoprazole EC tablet 40 mg (has no administration in time range)   atenoloL tablet 25 mg (25 mg Oral Given 5/6/24 1348)   aspirin chewable tablet 81 mg (81 mg Oral Given 5/6/24 1348)   calcium-vitamin D3 500 mg-5 mcg (200 unit) per tablet 1 tablet (has no administration in time range)   multivitamin tablet (1 tablet Oral Given 5/6/24 1348)   sodium chloride 0.9% flush 10 mL (has no administration in time range)   enoxaparin injection 40 mg (has no administration in time range)   albuterol-ipratropium 2.5 mg-0.5 mg/3 mL nebulizer solution 3 mL (has no administration in time range)   melatonin tablet 6 mg (has no administration in time range)   ondansetron disintegrating tablet 8 mg (has no administration in time range)   prochlorperazine injection Soln 5 mg (has no administration in time range)   polyethylene glycol packet 17 g (17 g Oral Given 5/6/24 1348)   acetaminophen tablet 650 mg (has no administration in time range)   naloxone 0.4 mg/mL injection 0.02 mg (has no administration in time range)   glucose chewable tablet 16 g (has no administration in time range)   glucose chewable tablet 24 g (has no administration in time range)   glucagon (human recombinant) injection 1 mg (has no administration in time range)   dextrose 10% bolus 125 mL 125 mL (has no administration in time range)   dextrose 10% bolus 250 mL 250 mL (has no administration in time range)   hydrALAZINE tablet 25 mg (has no administration in time range)   lactated ringers infusion ( Intravenous New Bag 5/6/24 1538)   simethicone chewable tablet 80  mg (has no administration in time range)   iohexoL (OMNIPAQUE 350) injection 75 mL (75 mLs Intravenous Given 5/6/24 0709)   atenoloL tablet 25 mg (25 mg Oral Given 5/6/24 0943)   aspirin tablet 325 mg (325 mg Oral Given 5/6/24 1107)     Medical Decision Making  DDx for abdominal pain would include cholecystitis, appendicitis, diverticulitis, pancreatitis, cholangitis, hepatitis, bowel perforation or obstruction, volvulus, ACS, gastritis, renal colic, vascular catastrophe like AAA, aortic dissection, or mesenteric ischemia.  Other less dangerous problems such as gastroparesis, cyclic vomiting syndrome, and constipation are also possible.    -This patient does need emergent laboratory testing: CBC, CMP, lipase, troponin, lactate  -Medications to be administered: none at this time  -Emergent imaging is indicated at this time: CT abd/pelvis given duration of symptoms, patient has not had any abdominal imaging.  -Disposition: uncertain pending ED work-up      Amount and/or Complexity of Data Reviewed  External Data Reviewed: notes.  Labs: ordered. Decision-making details documented in ED Course.  Radiology: ordered and independent interpretation performed. Decision-making details documented in ED Course.  ECG/medicine tests: ordered and independent interpretation performed. Decision-making details documented in ED Course.    Risk  OTC drugs.  Prescription drug management.  Decision regarding hospitalization.                                      Clinical Impression:  Final diagnoses:  [R10.9] Abdominal pain          ED Disposition Condition    Observation                 Hannah Adams MD  05/06/24 [id number]

## 2024-05-06 NOTE — PROVIDER PROGRESS NOTES - EMERGENCY DEPT.
Encounter Date: 5/6/2024    ED Physician Progress Notes        Physician Note:   I received checkout at 7:00 a.m. from Dr. Adams.  Patient is pending labs and CT scan.    I interviewed the patient at 710 with her .  They describe that she has been having episodes of epigastric pain or pain just by the xiphoid process for over a month.  She tends to have it at nighttime.  She states that sometimes it is at 9:00 p.m. and sometimes it is at 3 in the morning.  She had a particularly intense episode at 3 in the morning that triggered this visit.  It lasted for about 20 minutes and resolved on its own.  Currently she has no pain.  She has seen her primary care Cardiology and Gastroenterology.  She has had an endoscopy.  The treatment has been focused on managing acid reduction.  She had a hysterectomy and has had operations for incisional hernias.  Denies chest discomfort shortness of breath.  No vomiting.    On exam she appears comfortable in no acute distress.  I will review labs and CT has they come.    Troponin was elevated 0.08.  This was unexpected.  She does have T-wave inversions in her inferior lateral leads on EKG.  She is asymptomatic now.  Will give an aspirin and ask Hospital Medicine to observe to trend troponins.  CT abdomen pelvis and rest of labs are reassuring.

## 2024-05-06 NOTE — ASSESSMENT & PLAN NOTE
Chronic, controlled. Latest blood pressure and vitals reviewed-     Temp:  [98.1 °F (36.7 °C)]   Pulse:  [51-88]   Resp:  [14-28]   BP: (136-203)/()   SpO2:  [97 %-100 %] .   Home meds for hypertension were reviewed and noted below.   Hypertension Medications               atenoloL (TENORMIN) 25 MG tablet TAKE 1 TABLET BY MOUTH DAILY            While in the hospital, will manage blood pressure as follows; Continue home antihypertensive regimen    Will utilize p.r.n. blood pressure medication only if patient's blood pressure greater than 180/110 and she develops symptoms such as worsening chest pain or shortness of breath.

## 2024-05-06 NOTE — ED NOTES
Assumed care at this time. Pt alert and oriented. Sitting up in a wheelchair and prefers not to lay on a stretcher. Updated that we are waiting on a room upstairs. No other needs at this time.

## 2024-05-06 NOTE — H&P
[facility] Dept  [facility] Medicine  History & Physical    Patient Name: Betzy Cooley  MRN: [id number]  Patient Class: OP- Observation  Admission Date: 5/6/2024  Attending Physician: Magan Lawson DO   Primary Care Provider: Praful Paul MD         Patient information was obtained from patient, spouse/SO, and ER records.     Subjective:     Principal Problem:Gastritis    Chief Complaint:   Chief Complaint   Patient presents with    Abdominal Pain     Presents to the ED with complaints of epigastric pain since last night, states that she has a hx of acid reflux and believes this is an exacerbation of her gastric reflux, has seen both a cardiologist and GI doctor for this, was given pantoprazole by the GI doctor, denies any cardiac hx.         HPI: This is a 83-year-old patient with a history of gastritis, HTN who presents with upper abdominal pain.  Has been experiencing heartburn chronically for over 20 years.  As of late symptoms have progressed prompting her to obtain EGD earlier this month.  Studies were negative for H pylori, did note mild gastritis.  Was transitioned from Nexium to pantoprazole b.i.d..  Denies any dysphagia, recent weight loss, melena, use of NSAIDs.  Last night states that she experienced significant nonradiating substernal burning type pain.  She ate a ham sandwich around 6:00 p.m., did not go to sleep right away.  She went to bed at 9:15 p.m. where she started experiencing these symptoms.  Symptoms lasted for at least a couple hours and subsided on their own, not actively experiencing any pain.  Compliant on PPI.  Denies any cardiac history    ED course:  Upon arrival, troponin was 0.08 which downtrended without intervention.  EKG showing diffuse T-wave inversions, similar from previous.  CTA/P negative for acute abnormality.  Lipase WNL    Past Medical History:   Diagnosis Date    Arthritis     Cataract     GERD (gastroesophageal reflux disease)     HEARING LOSS      Hypertension     Osteoporosis, postmenopausal     Squamous Cell Carcinoma 2007    right forearm    Urinary incontinence     rare mixed       Past Surgical History:   Procedure Laterality Date    COLPOPEXY N/A 08/06/2019    Procedure: COLPOPEXY SSL FIXATION;  Surgeon: Alma Dorsey MD;  Location: [address];  Service: Urology;  Laterality: N/A;    CYSTOSCOPY N/A 08/06/2019    Procedure: CYSTOSCOPY;  Surgeon: Alma Dorsey MD;  Location: [facility] OR [address] [place] [address];  Service: Urology;  Laterality: N/A;    ESOPHAGOGASTRODUODENOSCOPY N/A 4/1/2024    Procedure: EGD (ESOPHAGOGASTRODUODENOSCOPY);  Surgeon: Zbigniew Dougherty MD;  Location: [facility];  Service: Endoscopy;  Laterality: N/A;    FRACTURE SURGERY Left 2008    open radius/ulna fracture    HYSTERECTOMY      TANIA/ovaries remain--fibroids- in her late 30's / early 40's       Review of patient's allergies indicates:   Allergen Reactions    Sulfa (sulfonamide antibiotics) Hives     Other reaction(s): Anaphylaxis  Itching   Shortness of breath        Current Facility-Administered Medications   Medication Dose Route Frequency Provider Last Rate Last Admin    acetaminophen tablet 650 mg  650 mg Oral Q6H PRN Magan Lawson, DO        albuterol-ipratropium 2.5 mg-0.5 mg/3 mL nebulizer solution 3 mL  3 mL Nebulization Q6H PRN Magan Lawson, DO        aspirin chewable tablet 81 mg  81 mg Oral Daily Magan Lawson, DO        atenoloL tablet 25 mg  25 mg Oral Daily Magan Lawson, DO        calcium-vitamin D3 500 mg-5 mcg (200 unit) per tablet 1 tablet  1 tablet Oral BID  Magan Lawson, DO        dextrose 10% bolus 125 mL 125 mL  12.5 g Intravenous PRN Magan Lawson, DO        dextrose 10% bolus 250 mL 250 mL  25 g Intravenous PRN Magan Lawson, DO        enoxaparin injection 40 mg  40 mg Subcutaneous Daily Magan Lawson, DO        glucagon (human recombinant) injection 1 mg  1 mg Intramuscular PRN Magan Lawson, DO        glucose chewable tablet 16 g   16 g Oral PRN Renny, Magan, DO        glucose chewable tablet 24 g  24 g Oral PRN Renny, Magan, DO        hydrALAZINE tablet 25 mg  25 mg Oral Q8H PRN Renny, Magan, DO        lactated ringers infusion   Intravenous Continuous Renny, Magan, DO        melatonin tablet 6 mg  6 mg Oral Nightly PRN Renny, Magan, DO        multivitamin tablet  1 tablet Oral Daily Renny, Magan, DO        naloxone 0.4 mg/mL injection 0.02 mg  0.02 mg Intravenous PRN Renny, Magan, DO        ondansetron disintegrating tablet 8 mg  8 mg Oral Q8H PRN Renny, Magan, DO        pantoprazole EC tablet 40 mg  40 mg Oral BID Renny, Magan, DO        polyethylene glycol packet 17 g  17 g Oral Daily Renny, Magan, DO        prochlorperazine injection Soln 5 mg  5 mg Intravenous Q6H PRN Renny, Magan, DO        simethicone chewable tablet 80 mg  1 tablet Oral TID PRN Renny, Magan, DO        sodium chloride 0.9% flush 10 mL  10 mL Intravenous Q8H PRN Renny, Magan, DO         Current Outpatient Medications   Medication Sig Dispense Refill    aspirin 81 MG Chew Take 81 mg by mouth once daily. Not chewable      atenoloL (TENORMIN) 25 MG tablet TAKE 1 TABLET BY MOUTH DAILY 90 tablet 2    calcium-vitamin D3 500 mg(1,250mg) -200 unit per tablet Take 1 tablet by mouth 2 (two) times daily with meals.      co-enzyme Q-10 30 mg capsule Take 30 mg by mouth once daily.      fexofenadine (ALLEGRA) 180 MG tablet Take 1 tablet (180 mg total) by mouth once daily. (Patient taking differently: Take 180 mg by mouth once daily. Takes as needed) 30 tablet 11    multivitamin (THERAGRAN) per tablet Take 1 tablet by mouth once daily.      pantoprazole (PROTONIX) 40 MG tablet Take 1 tablet (40 mg total) by mouth 2 (two) times daily. 180 tablet 3    vitamin A-vitamin C-vit E-min Tab Take 1 tablet by mouth once daily.       Facility-Administered Medications Ordered in Other Encounters   Medication Dose Route Frequency Provider Last Rate Last  Admin    lactated ringers infusion   Intravenous Continuous Zbigniew Dougherty MD         Family History       Problem Relation (Age of Onset)    Arthritis Sister    COPD Mother, Father    Diabetes Sister    Heart disease Mother    Heart failure Mother    Macular degeneration Mother    No Known Problems Brother, Son, Daughter, Sister, Sister, Sister, Brother, Brother, Brother, Brother, Brother, Daughter, Son          Tobacco Use    Smoking status: Never    Smokeless tobacco: Never   Substance and Sexual Activity    Alcohol use: Not Currently     Comment: 1-2 glasses wine weekly    Drug use: No    Sexual activity: Yes     Partners: Male     Birth control/protection: None     Review of Systems   Constitutional:  Negative for activity change and appetite change.   Eyes:  Negative for discharge and itching.   Respiratory:  Negative for apnea, chest tightness and shortness of breath.    Cardiovascular:  Positive for chest pain. Negative for leg swelling.   Gastrointestinal:  Positive for abdominal pain. Negative for abdominal distention, blood in stool, nausea and vomiting.   Endocrine: Negative for cold intolerance and heat intolerance.   Genitourinary:  Negative for difficulty urinating and dyspareunia.   Musculoskeletal:  Negative for arthralgias and back pain.   Skin:  Negative for color change and pallor.   Allergic/Immunologic: Negative for environmental allergies and food allergies.   Neurological:  Negative for dizziness and facial asymmetry.   Hematological:  Negative for adenopathy. Does not bruise/bleed easily.   Psychiatric/Behavioral:  Negative for agitation and behavioral problems.      Objective:     Vital Signs (Most Recent):  Temp: 98.1 °F (36.7 °C) (05/06/24 0948)  Pulse: (!) 51 (05/06/24 1215)  Resp: (!) 24 (05/06/24 1215)  BP: (!) 150/72 (05/06/24 1215)  SpO2: 97 % (05/06/24 1215) Vital Signs (24h Range):  Temp:  [98.1 °F (36.7 °C)] 98.1 °F (36.7 °C)  Pulse:  [51-88] 51  Resp:  [14-28]  24  SpO2:  [97 %-100 %] 97 %  BP: (136-203)/() 150/72     Weight: 55.3 kg (122 lb)  Body mass index is 22.31 kg/m².     Physical Exam  Constitutional:       General: She is not in acute distress.     Appearance: Normal appearance. She is not toxic-appearing.   HENT:      Head: Normocephalic and atraumatic.      Nose: Nose normal.      Mouth/Throat:      Mouth: Mucous membranes are moist.   Eyes:      Pupils: Pupils are equal, round, and reactive to light.   Cardiovascular:      Rate and Rhythm: Regular rhythm. Bradycardia present.      Pulses: Normal pulses.      Heart sounds: No murmur heard.     No gallop.   Pulmonary:      Effort: Pulmonary effort is normal. No respiratory distress.      Breath sounds: Normal breath sounds. No wheezing or rales.   Abdominal:      General: Abdomen is flat. Bowel sounds are normal. There is no distension.      Palpations: Abdomen is soft.      Tenderness: There is no abdominal tenderness.   Musculoskeletal:         General: No swelling or tenderness. Normal range of motion.      Cervical back: Normal range of motion.      Right lower leg: No edema.      Left lower leg: No edema.   Skin:     General: Skin is warm and dry.      Capillary Refill: Capillary refill takes less than 2 seconds.   Neurological:      General: No focal deficit present.      Mental Status: She is alert.   Psychiatric:         Mood and Affect: Mood normal.         Behavior: Behavior normal.         Thought Content: Thought content normal.         Judgment: Judgment normal.              CRANIAL NERVES     CN III, IV, VI   Pupils are equal, round, and reactive to light.       Significant Labs: All pertinent labs within the past 24 hours have been reviewed.    Significant Imaging: I have reviewed all pertinent imaging results/findings within the past 24 hours.  Assessment/Plan:     * Gastritis  #Substernal chest pain  -history of gastritis as per recent EGD. Presents with burning substernal pain similar in  quality to, but more severe than previous  -EGD 04/01/2024:  Normal esophagus, multiple small sessile polyps status post biopsy.  Biopsy with negative H. pylori/metaplasia.  Mild chronic inactive gastritis was noted  -compliant with PPI, denies alarm symptoms at this time including dysphagia, weight loss, melena/hematochezia  -mild troponin elevation on presentation with no new EKG changes.  Less concern for ACS.  No cardiac history  Plan:  -obtain TTE  -monitor on telemetry  -continue PPI b.i.d. as prescribed  -supportive care: gentle IVF today, antiemetics, gas-x      Hypertension  Chronic, controlled. Latest blood pressure and vitals reviewed-     Temp:  [98.1 °F (36.7 °C)]   Pulse:  [51-88]   Resp:  [14-28]   BP: (136-203)/()   SpO2:  [97 %-100 %] .   Home meds for hypertension were reviewed and noted below.   Hypertension Medications               atenoloL (TENORMIN) 25 MG tablet TAKE 1 TABLET BY MOUTH DAILY            While in the hospital, will manage blood pressure as follows; Continue home antihypertensive regimen    Will utilize p.r.n. blood pressure medication only if patient's blood pressure greater than 180/110 and she develops symptoms such as worsening chest pain or shortness of breath.    Abnormal EKG  -TWI diffusely with no new ischemic changes        VTE Risk Mitigation (From admission, onward)           Ordered     enoxaparin injection 40 mg  Daily         05/06/24 1234     IP VTE HIGH RISK PATIENT  Once         05/06/24 1234     Place sequential compression device  Until discontinued         05/06/24 1234                       On 05/06/2024, patient should be placed in hospital observation services under my care.             Magan Lawson DO  Department of Hospital Medicine  [facility] Emergency Dept

## 2024-05-06 NOTE — ED TRIAGE NOTES
Betzy Cooley, an 83 y.o. female presents to the ED with complaints of epigastric pain since last night. Hx of reflux. Denies SOB, chest pain, N/V/D.      Chief Complaint   Patient presents with    Abdominal Pain     Presents to the ED with complaints of epigastric pain since last night, states that she has a hx of acid reflux and believes this is an exacerbation of her gastric reflux, has seen both a cardiologist and GI doctor for this, was given pantoprazole by the GI doctor, denies any cardiac hx.      Review of patient's allergies indicates:   Allergen Reactions    Sulfa (sulfonamide antibiotics) Hives     Other reaction(s): Anaphylaxis  Itching   Shortness of breath      Past Medical History:   Diagnosis Date    Arthritis     Cataract     GERD (gastroesophageal reflux disease)     HEARING LOSS     Hypertension     Osteoporosis, postmenopausal     Squamous Cell Carcinoma 2007    right forearm    Urinary incontinence     rare mixed

## 2024-05-06 NOTE — NURSING
Nurses Note -- 4 Eyes      5/6/2024   5:14 PM      Skin assessed during: Admit      [x] No Altered Skin Integrity Present    []Prevention Measures Documented      [] Yes- Altered Skin Integrity Present or Discovered   [] LDA Added if Not in Epic (Describe Wound)   [] New Altered Skin Integrity was Present on Admit and Documented in LDA   [] Wound Image Taken    Wound Care Consulted? No    Attending Nurse:  Rita Johnson RN/Staff Member:  Mario Alberto

## 2024-05-06 NOTE — SUBJECTIVE & OBJECTIVE
Past Medical History:   Diagnosis Date    Arthritis     Cataract     GERD (gastroesophageal reflux disease)     HEARING LOSS     Hypertension     Osteoporosis, postmenopausal     Squamous Cell Carcinoma 2007    right forearm    Urinary incontinence     rare mixed       Past Surgical History:   Procedure Laterality Date    COLPOPEXY N/A 08/06/2019    Procedure: COLPOPEXY SSL FIXATION;  Surgeon: Alma Dorsey MD;  Location: [address];  Service: Urology;  Laterality: N/A;    CYSTOSCOPY N/A 08/06/2019    Procedure: CYSTOSCOPY;  Surgeon: Alma Dorsey MD;  Location: [address];  Service: Urology;  Laterality: N/A;    ESOPHAGOGASTRODUODENOSCOPY N/A 4/1/2024    Procedure: EGD (ESOPHAGOGASTRODUODENOSCOPY);  Surgeon: Zbigniew Dougherty MD;  Location: [facility];  Service: Endoscopy;  Laterality: N/A;    FRACTURE SURGERY Left 2008    open radius/ulna fracture    HYSTERECTOMY      TANIA/ovaries remain--fibroids- in her late 30's / early 40's       Review of patient's allergies indicates:   Allergen Reactions    Sulfa (sulfonamide antibiotics) Hives     Other reaction(s): Anaphylaxis  Itching   Shortness of breath        Current Facility-Administered Medications   Medication Dose Route Frequency Provider Last Rate Last Admin    acetaminophen tablet 650 mg  650 mg Oral Q6H PRN Magan Lawson, DO        albuterol-ipratropium 2.5 mg-0.5 mg/3 mL nebulizer solution 3 mL  3 mL Nebulization Q6H PRN Magan Lawson, DO        aspirin chewable tablet 81 mg  81 mg Oral Daily Magan Lawson, DO        atenoloL tablet 25 mg  25 mg Oral Daily Magan Lawson, DO        calcium-vitamin D3 500 mg-5 mcg (200 unit) per tablet 1 tablet  1 tablet Oral BID WM Magan Lawson, DO        dextrose 10% bolus 125 mL 125 mL  12.5 g Intravenous PRN Magan Lawson, DO        dextrose 10% bolus 250 mL 250 mL  25 g Intravenous PRN Magan Lawson, DO        enoxaparin injection 40 mg  40 mg Subcutaneous Daily Magan Lawson, DO         glucagon (human recombinant) injection 1 mg  1 mg Intramuscular PRN Renny, Magan, DO        glucose chewable tablet 16 g  16 g Oral PRN Renny, Magan, DO        glucose chewable tablet 24 g  24 g Oral PRN Renny, Magan, DO        hydrALAZINE tablet 25 mg  25 mg Oral Q8H PRN Renny, Magan, DO        lactated ringers infusion   Intravenous Continuous Renny, Magan, DO        melatonin tablet 6 mg  6 mg Oral Nightly PRN Renny, Magan, DO        multivitamin tablet  1 tablet Oral Daily Renny, Magan, DO        naloxone 0.4 mg/mL injection 0.02 mg  0.02 mg Intravenous PRN Renny, Magan, DO        ondansetron disintegrating tablet 8 mg  8 mg Oral Q8H PRN Renny, Magan, DO        pantoprazole EC tablet 40 mg  40 mg Oral BID Renny, Magan, DO        polyethylene glycol packet 17 g  17 g Oral Daily Renny, Magan, DO        prochlorperazine injection Soln 5 mg  5 mg Intravenous Q6H PRN Renny, Magan, DO        simethicone chewable tablet 80 mg  1 tablet Oral TID PRN Renny, Magan, DO        sodium chloride 0.9% flush 10 mL  10 mL Intravenous Q8H PRN Renny, Magan, DO         Current Outpatient Medications   Medication Sig Dispense Refill    aspirin 81 MG Chew Take 81 mg by mouth once daily. Not chewable      atenoloL (TENORMIN) 25 MG tablet TAKE 1 TABLET BY MOUTH DAILY 90 tablet 2    calcium-vitamin D3 500 mg(1,250mg) -200 unit per tablet Take 1 tablet by mouth 2 (two) times daily with meals.      co-enzyme Q-10 30 mg capsule Take 30 mg by mouth once daily.      fexofenadine (ALLEGRA) 180 MG tablet Take 1 tablet (180 mg total) by mouth once daily. (Patient taking differently: Take 180 mg by mouth once daily. Takes as needed) 30 tablet 11    multivitamin (THERAGRAN) per tablet Take 1 tablet by mouth once daily.      pantoprazole (PROTONIX) 40 MG tablet Take 1 tablet (40 mg total) by mouth 2 (two) times daily. 180 tablet 3    vitamin A-vitamin C-vit E-min Tab Take 1 tablet by mouth once daily.        Facility-Administered Medications Ordered in Other Encounters   Medication Dose Route Frequency Provider Last Rate Last Admin    lactated ringers infusion   Intravenous Continuous Zbigniew Dougherty MD         Family History       Problem Relation (Age of Onset)    Arthritis Sister    COPD Mother, Father    Diabetes Sister    Heart disease Mother    Heart failure Mother    Macular degeneration Mother    No Known Problems Brother, Son, Daughter, Sister, Sister, Sister, Brother, Brother, Brother, Brother, Brother, Daughter, Son          Tobacco Use    Smoking status: Never    Smokeless tobacco: Never   Substance and Sexual Activity    Alcohol use: Not Currently     Comment: 1-2 glasses wine weekly    Drug use: No    Sexual activity: Yes     Partners: Male     Birth control/protection: None     Review of Systems   Constitutional:  Negative for activity change and appetite change.   Eyes:  Negative for discharge and itching.   Respiratory:  Negative for apnea, chest tightness and shortness of breath.    Cardiovascular:  Positive for chest pain. Negative for leg swelling.   Gastrointestinal:  Positive for abdominal pain. Negative for abdominal distention, blood in stool, nausea and vomiting.   Endocrine: Negative for cold intolerance and heat intolerance.   Genitourinary:  Negative for difficulty urinating and dyspareunia.   Musculoskeletal:  Negative for arthralgias and back pain.   Skin:  Negative for color change and pallor.   Allergic/Immunologic: Negative for environmental allergies and food allergies.   Neurological:  Negative for dizziness and facial asymmetry.   Hematological:  Negative for adenopathy. Does not bruise/bleed easily.   Psychiatric/Behavioral:  Negative for agitation and behavioral problems.      Objective:     Vital Signs (Most Recent):  Temp: 98.1 °F (36.7 °C) (05/06/24 0948)  Pulse: (!) 51 (05/06/24 1215)  Resp: (!) 24 (05/06/24 1215)  BP: (!) 150/72 (05/06/24 1215)  SpO2: 97 %  (05/06/24 1215) Vital Signs (24h Range):  Temp:  [98.1 °F (36.7 °C)] 98.1 °F (36.7 °C)  Pulse:  [51-88] 51  Resp:  [14-28] 24  SpO2:  [97 %-100 %] 97 %  BP: (136-203)/() 150/72     Weight: 55.3 kg (122 lb)  Body mass index is 22.31 kg/m².     Physical Exam  Constitutional:       General: She is not in acute distress.     Appearance: Normal appearance. She is not toxic-appearing.   HENT:      Head: Normocephalic and atraumatic.      Nose: Nose normal.      Mouth/Throat:      Mouth: Mucous membranes are moist.   Eyes:      Pupils: Pupils are equal, round, and reactive to light.   Cardiovascular:      Rate and Rhythm: Regular rhythm. Bradycardia present.      Pulses: Normal pulses.      Heart sounds: No murmur heard.     No gallop.   Pulmonary:      Effort: Pulmonary effort is normal. No respiratory distress.      Breath sounds: Normal breath sounds. No wheezing or rales.   Abdominal:      General: Abdomen is flat. Bowel sounds are normal. There is no distension.      Palpations: Abdomen is soft.      Tenderness: There is no abdominal tenderness.   Musculoskeletal:         General: No swelling or tenderness. Normal range of motion.      Cervical back: Normal range of motion.      Right lower leg: No edema.      Left lower leg: No edema.   Skin:     General: Skin is warm and dry.      Capillary Refill: Capillary refill takes less than 2 seconds.   Neurological:      General: No focal deficit present.      Mental Status: She is alert.   Psychiatric:         Mood and Affect: Mood normal.         Behavior: Behavior normal.         Thought Content: Thought content normal.         Judgment: Judgment normal.              CRANIAL NERVES     CN III, IV, VI   Pupils are equal, round, and reactive to light.       Significant Labs: All pertinent labs within the past 24 hours have been reviewed.    Significant Imaging: I have reviewed all pertinent imaging results/findings within the past 24 hours.

## 2024-05-06 NOTE — ASSESSMENT & PLAN NOTE
#Substernal chest pain  -history of gastritis as per recent EGD. Presents with burning substernal pain similar in quality to, but more severe than previous  -EGD 04/01/2024:  Normal esophagus, multiple small sessile polyps status post biopsy.  Biopsy with negative H. pylori/metaplasia.  Mild chronic inactive gastritis was noted  -compliant with PPI, denies alarm symptoms at this time including dysphagia, weight loss, melena/hematochezia  -mild troponin elevation on presentation with no new EKG changes.  Less concern for ACS.  No cardiac history  Plan:  -obtain TTE  -monitor on telemetry  -continue PPI b.i.d. as prescribed  -supportive care: gentle IVF today, antiemetics, gas-x

## 2024-05-07 VITALS
HEART RATE: 60 BPM | HEIGHT: 62 IN | WEIGHT: 119.06 LBS | DIASTOLIC BLOOD PRESSURE: 65 MMHG | OXYGEN SATURATION: 97 % | SYSTOLIC BLOOD PRESSURE: 141 MMHG | TEMPERATURE: 97 F | BODY MASS INDEX: 21.91 KG/M2 | RESPIRATION RATE: 18 BRPM

## 2024-05-07 LAB
ANION GAP SERPL CALC-SCNC: 8 MMOL/L (ref 8–16)
ASCENDING AORTA: 3.6 CM
AV INDEX (PROSTH): 0.69
AV MEAN GRADIENT: 4 MMHG
AV PEAK GRADIENT: 7 MMHG
AV VALVE AREA BY VELOCITY RATIO: 3.12 CM²
AV VALVE AREA: 2.65 CM²
AV VELOCITY RATIO: 0.81
BASOPHILS # BLD AUTO: 0.1 K/UL (ref 0–0.2)
BASOPHILS NFR BLD: 1.5 % (ref 0–1.9)
BSA FOR ECHO PROCEDURE: 1.54 M2
BUN SERPL-MCNC: 15 MG/DL (ref 8–23)
CALCIUM SERPL-MCNC: 9.3 MG/DL (ref 8.7–10.5)
CHLORIDE SERPL-SCNC: 104 MMOL/L (ref 95–110)
CO2 SERPL-SCNC: 26 MMOL/L (ref 23–29)
CREAT SERPL-MCNC: 0.8 MG/DL (ref 0.5–1.4)
CV ECHO LV RWT: 0.36 CM
DIFFERENTIAL METHOD BLD: ABNORMAL
DOP CALC AO PEAK VEL: 1.29 M/S
DOP CALC AO VTI: 32.73 CM
DOP CALC LVOT AREA: 3.9 CM2
DOP CALC LVOT DIAMETER: 2.22 CM
DOP CALC LVOT PEAK VEL: 1.04 M/S
DOP CALC LVOT STROKE VOLUME: 86.82 CM3
DOP CALCLVOT PEAK VEL VTI: 22.44 CM
E WAVE DECELERATION TIME: 242.59 MSEC
E/A RATIO: 0.79
E/E' RATIO: 8.43 M/S
ECHO LV POSTERIOR WALL: 0.76 CM (ref 0.6–1.1)
EJECTION FRACTION: 60 %
EOSINOPHIL # BLD AUTO: 0.4 K/UL (ref 0–0.5)
EOSINOPHIL NFR BLD: 5.9 % (ref 0–8)
ERYTHROCYTE [DISTWIDTH] IN BLOOD BY AUTOMATED COUNT: 12.7 % (ref 11.5–14.5)
EST. GFR  (NO RACE VARIABLE): >60 ML/MIN/1.73 M^2
FRACTIONAL SHORTENING: 35 % (ref 28–44)
GLUCOSE SERPL-MCNC: 91 MG/DL (ref 70–110)
HCT VFR BLD AUTO: 33.6 % (ref 37–48.5)
HGB BLD-MCNC: 10.6 G/DL (ref 12–16)
IMM GRANULOCYTES # BLD AUTO: 0.02 K/UL (ref 0–0.04)
IMM GRANULOCYTES NFR BLD AUTO: 0.3 % (ref 0–0.5)
INTERVENTRICULAR SEPTUM: 0.87 CM (ref 0.6–1.1)
LA MAJOR: 5.35 CM
LA MINOR: 5.45 CM
LA WIDTH: 4.45 CM
LEFT ATRIUM SIZE: 4.07 CM
LEFT ATRIUM VOLUME INDEX MOD: 47.1 ML/M2
LEFT ATRIUM VOLUME INDEX: 54.3 ML/M2
LEFT ATRIUM VOLUME MOD: 72 CM3
LEFT ATRIUM VOLUME: 83.12 CM3
LEFT INTERNAL DIMENSION IN SYSTOLE: 2.77 CM (ref 2.1–4)
LEFT VENTRICLE DIASTOLIC VOLUME INDEX: 52.79 ML/M2
LEFT VENTRICLE DIASTOLIC VOLUME: 80.77 ML
LEFT VENTRICLE MASS INDEX: 69 G/M2
LEFT VENTRICLE SYSTOLIC VOLUME INDEX: 18.8 ML/M2
LEFT VENTRICLE SYSTOLIC VOLUME: 28.78 ML
LEFT VENTRICULAR INTERNAL DIMENSION IN DIASTOLE: 4.25 CM (ref 3.5–6)
LEFT VENTRICULAR MASS: 105.87 G
LV LATERAL E/E' RATIO: 6.56 M/S
LV SEPTAL E/E' RATIO: 11.8 M/S
LYMPHOCYTES # BLD AUTO: 1.7 K/UL (ref 1–4.8)
LYMPHOCYTES NFR BLD: 25.6 % (ref 18–48)
MAGNESIUM SERPL-MCNC: 1.9 MG/DL (ref 1.6–2.6)
MCH RBC QN AUTO: 29.7 PG (ref 27–31)
MCHC RBC AUTO-ENTMCNC: 31.5 G/DL (ref 32–36)
MCV RBC AUTO: 94 FL (ref 82–98)
MONOCYTES # BLD AUTO: 0.8 K/UL (ref 0.3–1)
MONOCYTES NFR BLD: 11.3 % (ref 4–15)
MV A" WAVE DURATION": 10.28 MSEC
MV PEAK A VEL: 0.75 M/S
MV PEAK E VEL: 0.59 M/S
NEUTROPHILS # BLD AUTO: 3.8 K/UL (ref 1.8–7.7)
NEUTROPHILS NFR BLD: 55.4 % (ref 38–73)
NRBC BLD-RTO: 0 /100 WBC
OHS CV RV/LV RATIO: 0.8 CM
OHS QRS DURATION: 98 MS
OHS QTC CALCULATION: 401 MS
PHOSPHATE SERPL-MCNC: 2.5 MG/DL (ref 2.7–4.5)
PISA TR MAX VEL: 2.5 M/S
PLATELET # BLD AUTO: 256 K/UL (ref 150–450)
PMV BLD AUTO: 10 FL (ref 9.2–12.9)
POCT GLUCOSE: 90 MG/DL (ref 70–110)
POTASSIUM SERPL-SCNC: 4 MMOL/L (ref 3.5–5.1)
PULM VEIN S/D RATIO: 1.06
PV PEAK D VEL: 0.52 M/S
PV PEAK S VEL: 0.55 M/S
RA MAJOR: 4.54 CM
RA PRESSURE ESTIMATED: 3 MMHG
RA WIDTH: 3.31 CM
RBC # BLD AUTO: 3.57 M/UL (ref 4–5.4)
RIGHT ATRIAL AREA: 16 CM2
RIGHT VENTRICULAR END-DIASTOLIC DIMENSION: 3.39 CM
RV TB RVSP: 6 MMHG
SINUS: 2.73 CM
SODIUM SERPL-SCNC: 138 MMOL/L (ref 136–145)
STJ: 2.69 CM
TDI LATERAL: 0.09 M/S
TDI SEPTAL: 0.05 M/S
TDI: 0.07 M/S
TR MAX PG: 25 MMHG
TRICUSPID ANNULAR PLANE SYSTOLIC EXCURSION: 2.52 CM
TV REST PULMONARY ARTERY PRESSURE: 28 MMHG
WBC # BLD AUTO: 6.81 K/UL (ref 3.9–12.7)
Z-SCORE OF LEFT VENTRICULAR DIMENSION IN END DIASTOLE: -0.49
Z-SCORE OF LEFT VENTRICULAR DIMENSION IN END SYSTOLE: 0

## 2024-05-07 PROCEDURE — G0378 HOSPITAL OBSERVATION PER HR: HCPCS | Mod: HCNC

## 2024-05-07 PROCEDURE — 63600175 PHARM REV CODE 636 W HCPCS: Mod: HCNC | Performed by: STUDENT IN AN ORGANIZED HEALTH CARE EDUCATION/TRAINING PROGRAM

## 2024-05-07 PROCEDURE — 96374 THER/PROPH/DIAG INJ IV PUSH: CPT | Mod: 59

## 2024-05-07 PROCEDURE — 25000003 PHARM REV CODE 250: Mod: HCNC | Performed by: STUDENT IN AN ORGANIZED HEALTH CARE EDUCATION/TRAINING PROGRAM

## 2024-05-07 PROCEDURE — 83735 ASSAY OF MAGNESIUM: CPT | Mod: HCNC | Performed by: STUDENT IN AN ORGANIZED HEALTH CARE EDUCATION/TRAINING PROGRAM

## 2024-05-07 PROCEDURE — 85025 COMPLETE CBC W/AUTO DIFF WBC: CPT | Mod: HCNC | Performed by: STUDENT IN AN ORGANIZED HEALTH CARE EDUCATION/TRAINING PROGRAM

## 2024-05-07 PROCEDURE — 84100 ASSAY OF PHOSPHORUS: CPT | Mod: HCNC | Performed by: STUDENT IN AN ORGANIZED HEALTH CARE EDUCATION/TRAINING PROGRAM

## 2024-05-07 PROCEDURE — 80048 BASIC METABOLIC PNL TOTAL CA: CPT | Mod: HCNC | Performed by: STUDENT IN AN ORGANIZED HEALTH CARE EDUCATION/TRAINING PROGRAM

## 2024-05-07 PROCEDURE — 36415 COLL VENOUS BLD VENIPUNCTURE: CPT | Mod: HCNC | Performed by: STUDENT IN AN ORGANIZED HEALTH CARE EDUCATION/TRAINING PROGRAM

## 2024-05-07 RX ORDER — AMLODIPINE BESYLATE 5 MG/1
5 TABLET ORAL DAILY
Status: DISCONTINUED | OUTPATIENT
Start: 2024-05-07 | End: 2024-05-07 | Stop reason: HOSPADM

## 2024-05-07 RX ADMIN — SIMETHICONE 80 MG: 80 TABLET, CHEWABLE ORAL at 03:05

## 2024-05-07 RX ADMIN — AMLODIPINE BESYLATE 5 MG: 5 TABLET ORAL at 09:05

## 2024-05-07 RX ADMIN — PANTOPRAZOLE SODIUM 40 MG: 40 TABLET, DELAYED RELEASE ORAL at 09:05

## 2024-05-07 RX ADMIN — Medication 1 TABLET: at 08:05

## 2024-05-07 RX ADMIN — THERA TABS 1 TABLET: TAB at 09:05

## 2024-05-07 RX ADMIN — ONDANSETRON 8 MG: 8 TABLET, ORALLY DISINTEGRATING ORAL at 09:05

## 2024-05-07 RX ADMIN — PROCHLORPERAZINE EDISYLATE 5 MG: 5 INJECTION INTRAMUSCULAR; INTRAVENOUS at 03:05

## 2024-05-07 RX ADMIN — ASPIRIN 81 MG CHEWABLE TABLET 81 MG: 81 TABLET CHEWABLE at 09:05

## 2024-05-07 RX ADMIN — POLYETHYLENE GLYCOL 3350 17 G: 17 POWDER, FOR SOLUTION ORAL at 09:05

## 2024-05-07 NOTE — NURSING
Patient arrived from ED without tele and without fluids.  Patient refusing to keep telemetry box on.+-/-/-/-/-/-/-/-/-/-/-/-/-/-/-/-/-/-/-/[;'/  .

## 2024-05-07 NOTE — DISCHARGE SUMMARY
[facility] [address] [place] [address] Medicine  Discharge Summary      Patient Name: Betzy Cooley  MRN: [id number]  HARPREET: [id number]  Patient Class: OP- Observation  Admission Date: 5/6/2024  Hospital Length of Stay: 0 days  Discharge Date and Time:  05/07/2024 11:15 AM  Attending Physician: Magan Lawson DO   Discharging Provider: Magan Lawson DO  Primary Care Provider: Praful Paul MD  [facility] Medicine Team: [facility] MED  Magan Lawson DO  Primary Care Team: [facility]    HPI:   This is a 83-year-old patient with a history of gastritis, HTN who presents with upper abdominal pain.  Has been experiencing heartburn chronically for over 20 years.  As of late symptoms have progressed prompting her to obtain EGD earlier this month.  Studies were negative for H pylori, did note mild gastritis.  Was transitioned from Nexium to pantoprazole b.i.d..  Denies any dysphagia, recent weight loss, melena, use of NSAIDs.  Last night states that she experienced significant nonradiating substernal burning type pain.  She ate a ham sandwich around 6:00 p.m., did not go to sleep right away.  She went to bed at 9:15 p.m. where she started experiencing these symptoms.  Symptoms lasted for at least a couple hours and subsided on their own, not actively experiencing any pain.  Compliant on PPI.  Denies any cardiac history    ED course:  Upon arrival, troponin was 0.08 which downtrended without intervention.  EKG showing diffuse T-wave inversions, similar from previous.  CTA/P negative for acute abnormality.  Lipase WNL    * No surgery found *      Hospital Course:   Admitted with substernal chest pain, consistent with known gastritis.  Had minor troponin elevation that downtrended on repeat with no apparent EKG changes.  TTE was obtained with no noted evidence of regional wall abnormalities and preserved EF.  No episodes of pain throughout admission.     Physical Exam  Gen: in NAD, appears stated age  Neuro: AAOx3,  motor, sensory, and strength grossly intact BL  HEENT: NTNC, EOMI, PERRL, MMM  CVS: RRR, no m/r/g; S1/S2 auscultated with no S3 or S4; capillary refill < 2 sec  Resp: lungs CTAB, no w/r/r; no belabored breathing or accessory muscle use appreciated   Abd: BS+ in all 4 quadrants; NTND, soft to palpation; no organomegaly appreciated   Extrem: pulses full, equal, and regular over all 4 extremities; no UE or LE edema BL    Goals of Care Treatment Preferences:  Code Status: Full Code      Consults:     No new Assessment & Plan notes have been filed under this hospital service since the last note was generated.  Service: Hospital Medicine    Final Active Diagnoses:    Diagnosis Date Noted POA    PRINCIPAL PROBLEM:  Gastritis [K29.70] 05/06/2024 Yes    Hypertension [I10] 04/29/2024 Yes    Abnormal EKG [R94.31] 08/01/2019 Yes    GERD (gastroesophageal reflux disease) [K21.9] 05/27/2014 Yes      Problems Resolved During this Admission:       Discharged Condition: fair    Disposition: Home or Self Care    Follow Up:    Patient Instructions:      Notify your health care provider if you experience any of the following:     Notify your health care provider if you experience any of the following:  increased confusion or weakness     Notify your health care provider if you experience any of the following:  persistent dizziness, light-headedness, or visual disturbances     Notify your health care provider if you experience any of the following:  worsening rash     Notify your health care provider if you experience any of the following:  severe persistent headache     Notify your health care provider if you experience any of the following:  difficulty breathing or increased cough     Notify your health care provider if you experience any of the following:  redness, tenderness, or signs of infection (pain, swelling, redness, odor or green/yellow discharge around incision site)     Notify your health care provider if you experience any  of the following:  severe uncontrolled pain     Notify your health care provider if you experience any of the following:  persistent nausea and vomiting or diarrhea     Notify your health care provider if you experience any of the following:  temperature >100.4       Significant Diagnostic Studies: N/A    Pending Diagnostic Studies:       None           Medications:  Reconciled Home Medications:      Medication List        CHANGE how you take these medications      fexofenadine 180 MG tablet  Commonly known as: ALLEGRA  Take 1 tablet (180 mg total) by mouth once daily.  What changed: additional instructions            CONTINUE taking these medications      aspirin 81 MG Chew  Take 81 mg by mouth once daily. Not chewable     atenoloL 25 MG tablet  Commonly known as: TENORMIN  TAKE 1 TABLET BY MOUTH DAILY     calcium-vitamin D3 500 mg-5 mcg (200 unit) per tablet  Commonly known as: OS-ESTIVEN 500 + D3  Take 1 tablet by mouth 2 (two) times daily with meals.     co-enzyme Q-10 30 mg capsule  Take 30 mg by mouth once daily.     multivitamin per tablet  Commonly known as: THERAGRAN  Take 1 tablet by mouth once daily.     pantoprazole 40 MG tablet  Commonly known as: PROTONIX  Take 1 tablet (40 mg total) by mouth 2 (two) times daily.     vitamin A-vitamin C-vit E-min Tab  Take 1 tablet by mouth once daily.              Indwelling Lines/Drains at time of discharge:   Lines/Drains/Airways       None                   Time spent on the discharge of patient: 35 minutes     Pt deemed appropriate for discharge. Plan discussed with pt, who was agreeable and amenable; medications were discussed and reviewed, outpatient follow-up scheduled, ER precautions were given, all questions were answered to the pt's satisfaction, and Ms Cooley was subsequently discharged.      Magan Lawson DO  Department of Hospital Medicine  [facility]

## 2024-05-07 NOTE — HOSPITAL COURSE
Admitted with substernal chest pain, consistent with known gastritis.  Had minor troponin elevation that downtrended on repeat with no apparent EKG changes.  TTE was obtained with no noted evidence of regional wall abnormalities and preserved EF.  No episodes of pain throughout admission.

## 2024-05-07 NOTE — PLAN OF CARE
[facility]  Discharge Final Note    Primary Care Provider: Praful Paul MD    Expected Discharge Date: 5/7/2024    Patient discharged to home via personal transportation.     Patient's bedside nurse and patient notified of the above.    Discharge Plan A and Plan B have been determined by review of patient's clinical status, future medical and therapeutic needs, and coverage/benefits for post-acute care in coordination with multidisciplinary team members.        Final Discharge Note (most recent)       Final Note - 05/07/24 1337          Final Note    Assessment Type Final Discharge Note (P)      Anticipated Discharge Disposition Home or Self Care (P)         Post-Acute Status    Post-Acute Authorization Other (P)      Other Status No Post-Acute Service Needs (P)                      Important Message from Medicare                 Future Appointments   Date Time Provider Department Center   5/16/2024  9:40 AM Praful Paul MD [facility]   6/10/2024  8:45 AM Shashank Camarena OD [facility]   7/29/2024  8:40 AM Praful Paul MD [facility] scheduled post-discharge follow-up appointment and information added to AVS.     Jennifer Tim LMSW [facility]  Ext. [phone]

## 2024-05-07 NOTE — NURSING
Patient pulled iv out. Blood cleaned, blankets changed. Put hospital gown on patient as her personal gown had blood on it. Patient was upset she didn't have another gown so spouse left to go home / get her another one. Piv replaced/ ivf restarted.

## 2024-05-13 ENCOUNTER — PATIENT OUTREACH (OUTPATIENT)
Dept: ADMINISTRATIVE | Facility: CLINIC | Age: 84
End: 2024-05-13
Payer: MEDICARE

## 2024-05-13 NOTE — PROGRESS NOTES
C3 nurse attempted to contact Betzy Cooley for a TCC post hospital discharge follow up call. No answer, left voicemail with callback information.     The patient has a scheduled HOSFU with her PCP, Praful Paul MD on 5/16/24 at 0940. No messages routed at this time.

## 2024-05-13 NOTE — PROGRESS NOTES
C3 nurse spoke with Betzy Cooley and her spouse for a TCC post hospital discharge follow up call. Pt states she is still having the midchest pain like she was having in the hospital, she states the symptoms are not as bad as they were the day she went and that the pain starts every morning around [id number]. She denies any new symptoms and confirms she has the OOC number to call for any new or worsening symptoms.     Pt verbalized frustration with her discharge process from the ED. Patient Relations number provided to the pt and she wrote it down to call.     The patient has a scheduled HOSFU with her PCP, Praful Paul MD on 5/16/24 at 0940. Message routed to Praful Paul MD.

## 2024-05-15 ENCOUNTER — HOSPITAL ENCOUNTER (INPATIENT)
Facility: HOSPITAL | Age: 84
LOS: 2 days | Discharge: HOME | DRG: 321 | End: 2024-05-18
Attending: EMERGENCY MEDICINE | Admitting: INTERNAL MEDICINE
Payer: MEDICARE

## 2024-05-15 DIAGNOSIS — I25.10 CAD S/P PERCUTANEOUS CORONARY ANGIOPLASTY: ICD-10-CM

## 2024-05-15 DIAGNOSIS — R07.9 CHEST PAIN: ICD-10-CM

## 2024-05-15 DIAGNOSIS — I21.4 NSTEMI (NON-ST ELEVATED MYOCARDIAL INFARCTION): Primary | ICD-10-CM

## 2024-05-15 DIAGNOSIS — I25.10 CAD (CORONARY ARTERY DISEASE): ICD-10-CM

## 2024-05-15 DIAGNOSIS — Z98.61 CAD S/P PERCUTANEOUS CORONARY ANGIOPLASTY: ICD-10-CM

## 2024-05-15 LAB
ALBUMIN SERPL BCP-MCNC: 3.8 G/DL (ref 3.5–5.2)
ALP SERPL-CCNC: 78 U/L (ref 55–135)
ALT SERPL W/O P-5'-P-CCNC: 32 U/L (ref 10–44)
ANION GAP SERPL CALC-SCNC: 13 MMOL/L (ref 8–16)
AST SERPL-CCNC: 27 U/L (ref 10–40)
BASOPHILS # BLD AUTO: 0.09 K/UL (ref 0–0.2)
BASOPHILS NFR BLD: 1.2 % (ref 0–1.9)
BILIRUB SERPL-MCNC: 0.4 MG/DL (ref 0.1–1)
BNP SERPL-MCNC: 148 PG/ML (ref 0–99)
BUN SERPL-MCNC: 14 MG/DL (ref 8–23)
CALCIUM SERPL-MCNC: 9.8 MG/DL (ref 8.7–10.5)
CHLORIDE SERPL-SCNC: 107 MMOL/L (ref 95–110)
CHOLEST SERPL-MCNC: 155 MG/DL (ref 120–199)
CHOLEST/HDLC SERPL: 2.7 {RATIO} (ref 2–5)
CO2 SERPL-SCNC: 24 MMOL/L (ref 23–29)
CREAT SERPL-MCNC: 0.8 MG/DL (ref 0.5–1.4)
DIFFERENTIAL METHOD BLD: ABNORMAL
EOSINOPHIL # BLD AUTO: 0.3 K/UL (ref 0–0.5)
EOSINOPHIL NFR BLD: 3.7 % (ref 0–8)
ERYTHROCYTE [DISTWIDTH] IN BLOOD BY AUTOMATED COUNT: 13 % (ref 11.5–14.5)
EST. GFR  (NO RACE VARIABLE): >60 ML/MIN/1.73 M^2
ESTIMATED AVG GLUCOSE: 111 MG/DL (ref 68–131)
GLUCOSE SERPL-MCNC: 105 MG/DL (ref 70–110)
HBA1C MFR BLD: 5.5 % (ref 4–5.6)
HCT VFR BLD AUTO: 35.4 % (ref 37–48.5)
HCV AB SERPL QL IA: NORMAL
HDLC SERPL-MCNC: 57 MG/DL (ref 40–75)
HDLC SERPL: 36.8 % (ref 20–50)
HGB BLD-MCNC: 11.7 G/DL (ref 12–16)
HIV 1+2 AB+HIV1 P24 AG SERPL QL IA: NORMAL
IMM GRANULOCYTES # BLD AUTO: 0.02 K/UL (ref 0–0.04)
IMM GRANULOCYTES NFR BLD AUTO: 0.3 % (ref 0–0.5)
LDLC SERPL CALC-MCNC: 86.8 MG/DL (ref 63–159)
LYMPHOCYTES # BLD AUTO: 1.7 K/UL (ref 1–4.8)
LYMPHOCYTES NFR BLD: 22.5 % (ref 18–48)
MCH RBC QN AUTO: 30.1 PG (ref 27–31)
MCHC RBC AUTO-ENTMCNC: 33.1 G/DL (ref 32–36)
MCV RBC AUTO: 91 FL (ref 82–98)
MONOCYTES # BLD AUTO: 0.8 K/UL (ref 0.3–1)
MONOCYTES NFR BLD: 9.9 % (ref 4–15)
NEUTROPHILS # BLD AUTO: 4.8 K/UL (ref 1.8–7.7)
NEUTROPHILS NFR BLD: 62.4 % (ref 38–73)
NONHDLC SERPL-MCNC: 98 MG/DL
NRBC BLD-RTO: 0 /100 WBC
OHS QRS DURATION: 86 MS
OHS QTC CALCULATION: 449 MS
PLATELET # BLD AUTO: 253 K/UL (ref 150–450)
PMV BLD AUTO: 9.8 FL (ref 9.2–12.9)
POC CARDIAC TROPONIN I: 0.02 NG/ML (ref 0–0.08)
POC CARDIAC TROPONIN I: 0.03 NG/ML (ref 0–0.08)
POTASSIUM SERPL-SCNC: 3.4 MMOL/L (ref 3.5–5.1)
PROT SERPL-MCNC: 7.4 G/DL (ref 6–8.4)
RBC # BLD AUTO: 3.89 M/UL (ref 4–5.4)
SAMPLE: NORMAL
SAMPLE: NORMAL
SODIUM SERPL-SCNC: 144 MMOL/L (ref 136–145)
TRIGL SERPL-MCNC: 56 MG/DL (ref 30–150)
TROPONIN I SERPL DL<=0.01 NG/ML-MCNC: 0.03 NG/ML (ref 0–0.03)
TROPONIN I SERPL DL<=0.01 NG/ML-MCNC: 0.04 NG/ML (ref 0–0.03)
TROPONIN I SERPL DL<=0.01 NG/ML-MCNC: 0.04 NG/ML (ref 0–0.03)
WBC # BLD AUTO: 7.75 K/UL (ref 3.9–12.7)

## 2024-05-15 PROCEDURE — 93005 ELECTROCARDIOGRAM TRACING: CPT | Mod: HCNC

## 2024-05-15 PROCEDURE — 25000242 PHARM REV CODE 250 ALT 637 W/ HCPCS: Mod: HCNC | Performed by: EMERGENCY MEDICINE

## 2024-05-15 PROCEDURE — 87389 HIV-1 AG W/HIV-1&-2 AB AG IA: CPT | Mod: HCNC | Performed by: PHYSICIAN ASSISTANT

## 2024-05-15 PROCEDURE — G0378 HOSPITAL OBSERVATION PER HR: HCPCS | Mod: HCNC

## 2024-05-15 PROCEDURE — 80061 LIPID PANEL: CPT | Mod: HCNC | Performed by: STUDENT IN AN ORGANIZED HEALTH CARE EDUCATION/TRAINING PROGRAM

## 2024-05-15 PROCEDURE — 84484 ASSAY OF TROPONIN QUANT: CPT | Mod: 91,HCNC | Performed by: STUDENT IN AN ORGANIZED HEALTH CARE EDUCATION/TRAINING PROGRAM

## 2024-05-15 PROCEDURE — 25000003 PHARM REV CODE 250: Mod: HCNC | Performed by: STUDENT IN AN ORGANIZED HEALTH CARE EDUCATION/TRAINING PROGRAM

## 2024-05-15 PROCEDURE — 36415 COLL VENOUS BLD VENIPUNCTURE: CPT | Mod: HCNC | Performed by: EMERGENCY MEDICINE

## 2024-05-15 PROCEDURE — 36415 COLL VENOUS BLD VENIPUNCTURE: CPT | Mod: HCNC | Performed by: STUDENT IN AN ORGANIZED HEALTH CARE EDUCATION/TRAINING PROGRAM

## 2024-05-15 PROCEDURE — 99214 OFFICE O/P EST MOD 30 MIN: CPT | Mod: 25,HCNC,GC, | Performed by: INTERNAL MEDICINE

## 2024-05-15 PROCEDURE — 80053 COMPREHEN METABOLIC PANEL: CPT | Mod: HCNC | Performed by: EMERGENCY MEDICINE

## 2024-05-15 PROCEDURE — C9113 INJ PANTOPRAZOLE SODIUM, VIA: HCPCS | Mod: HCNC | Performed by: STUDENT IN AN ORGANIZED HEALTH CARE EDUCATION/TRAINING PROGRAM

## 2024-05-15 PROCEDURE — 63600175 PHARM REV CODE 636 W HCPCS: Mod: HCNC | Performed by: STUDENT IN AN ORGANIZED HEALTH CARE EDUCATION/TRAINING PROGRAM

## 2024-05-15 PROCEDURE — 85025 COMPLETE CBC W/AUTO DIFF WBC: CPT | Mod: HCNC | Performed by: EMERGENCY MEDICINE

## 2024-05-15 PROCEDURE — 83880 ASSAY OF NATRIURETIC PEPTIDE: CPT | Mod: HCNC | Performed by: EMERGENCY MEDICINE

## 2024-05-15 PROCEDURE — 25000003 PHARM REV CODE 250: Mod: HCNC | Performed by: EMERGENCY MEDICINE

## 2024-05-15 PROCEDURE — 86803 HEPATITIS C AB TEST: CPT | Mod: HCNC | Performed by: PHYSICIAN ASSISTANT

## 2024-05-15 PROCEDURE — 96375 TX/PRO/DX INJ NEW DRUG ADDON: CPT | Mod: HCNC

## 2024-05-15 PROCEDURE — 84484 ASSAY OF TROPONIN QUANT: CPT | Mod: 91,HCNC | Performed by: EMERGENCY MEDICINE

## 2024-05-15 PROCEDURE — 83036 HEMOGLOBIN GLYCOSYLATED A1C: CPT | Mod: HCNC | Performed by: STUDENT IN AN ORGANIZED HEALTH CARE EDUCATION/TRAINING PROGRAM

## 2024-05-15 PROCEDURE — 99285 EMERGENCY DEPT VISIT HI MDM: CPT | Mod: 25,HCNC

## 2024-05-15 PROCEDURE — 93010 ELECTROCARDIOGRAM REPORT: CPT | Mod: HCNC,,, | Performed by: INTERNAL MEDICINE

## 2024-05-15 RX ORDER — ASPIRIN 325 MG
325 TABLET ORAL
Status: COMPLETED | OUTPATIENT
Start: 2024-05-15 | End: 2024-05-15

## 2024-05-15 RX ORDER — SODIUM CHLORIDE 0.9 % (FLUSH) 0.9 %
10 SYRINGE (ML) INJECTION EVERY 12 HOURS PRN
Status: DISCONTINUED | OUTPATIENT
Start: 2024-05-15 | End: 2024-05-18 | Stop reason: HOSPADM

## 2024-05-15 RX ORDER — IBUPROFEN 200 MG
24 TABLET ORAL
Status: DISCONTINUED | OUTPATIENT
Start: 2024-05-15 | End: 2024-05-18 | Stop reason: HOSPADM

## 2024-05-15 RX ORDER — NALOXONE HCL 0.4 MG/ML
0.02 VIAL (ML) INJECTION
Status: DISCONTINUED | OUTPATIENT
Start: 2024-05-15 | End: 2024-05-18 | Stop reason: HOSPADM

## 2024-05-15 RX ORDER — NITROGLYCERIN 0.4 MG/1
0.4 TABLET SUBLINGUAL EVERY 5 MIN PRN
Status: DISCONTINUED | OUTPATIENT
Start: 2024-05-15 | End: 2024-05-18 | Stop reason: HOSPADM

## 2024-05-15 RX ORDER — ALUMINUM HYDROXIDE, MAGNESIUM HYDROXIDE, AND SIMETHICONE 1200; 120; 1200 MG/30ML; MG/30ML; MG/30ML
15 SUSPENSION ORAL
Status: COMPLETED | OUTPATIENT
Start: 2024-05-15 | End: 2024-05-15

## 2024-05-15 RX ORDER — GLUCAGON 1 MG
1 KIT INJECTION
Status: DISCONTINUED | OUTPATIENT
Start: 2024-05-15 | End: 2024-05-18 | Stop reason: HOSPADM

## 2024-05-15 RX ORDER — DICLOFENAC SODIUM 10 MG/G
2 GEL TOPICAL DAILY PRN
COMMUNITY

## 2024-05-15 RX ORDER — ATORVASTATIN CALCIUM 20 MG/1
40 TABLET, FILM COATED ORAL NIGHTLY
Status: DISCONTINUED | OUTPATIENT
Start: 2024-05-15 | End: 2024-05-18 | Stop reason: HOSPADM

## 2024-05-15 RX ORDER — NAPROXEN SODIUM 220 MG/1
81 TABLET, FILM COATED ORAL DAILY
Status: DISCONTINUED | OUTPATIENT
Start: 2024-05-15 | End: 2024-05-18 | Stop reason: HOSPADM

## 2024-05-15 RX ORDER — PANTOPRAZOLE SODIUM 40 MG/10ML
40 INJECTION, POWDER, LYOPHILIZED, FOR SOLUTION INTRAVENOUS 2 TIMES DAILY
Status: DISCONTINUED | OUTPATIENT
Start: 2024-05-15 | End: 2024-05-18 | Stop reason: HOSPADM

## 2024-05-15 RX ORDER — IBUPROFEN 200 MG
16 TABLET ORAL
Status: DISCONTINUED | OUTPATIENT
Start: 2024-05-15 | End: 2024-05-18 | Stop reason: HOSPADM

## 2024-05-15 RX ORDER — ATENOLOL 25 MG/1
25 TABLET ORAL DAILY
Status: DISCONTINUED | OUTPATIENT
Start: 2024-05-15 | End: 2024-05-18 | Stop reason: HOSPADM

## 2024-05-15 RX ADMIN — POTASSIUM BICARBONATE 50 MEQ: 978 TABLET, EFFERVESCENT ORAL at 03:05

## 2024-05-15 RX ADMIN — ATENOLOL 25 MG: 25 TABLET ORAL at 02:05

## 2024-05-15 RX ADMIN — NITROGLYCERIN 0.4 MG: 0.4 TABLET, ORALLY DISINTEGRATING SUBLINGUAL at 01:05

## 2024-05-15 RX ADMIN — ASPIRIN 81 MG CHEWABLE TABLET 81 MG: 81 TABLET CHEWABLE at 02:05

## 2024-05-15 RX ADMIN — ASPIRIN 325 MG ORAL TABLET 325 MG: 325 PILL ORAL at 01:05

## 2024-05-15 RX ADMIN — PANTOPRAZOLE SODIUM 40 MG: 40 INJECTION, POWDER, FOR SOLUTION INTRAVENOUS at 11:05

## 2024-05-15 RX ADMIN — ALUMINUM HYDROXIDE, MAGNESIUM HYDROXIDE, AND SIMETHICONE 15 ML: 200; 200; 20 SUSPENSION ORAL at 01:05

## 2024-05-15 RX ADMIN — ATORVASTATIN CALCIUM 40 MG: 40 TABLET, FILM COATED ORAL at 10:05

## 2024-05-15 NOTE — ED TRIAGE NOTES
Betzy Cooley, a 83 y.o. female presents to the ED w/ complaint of midsternal CP that started this morning, patient seen 2 weeks ago for the same complaint. Denies SOB, dizziness/ weakness and HA.     Triage note:  Chief Complaint   Patient presents with    Chest Pain     Chest pain started this morning around 0900, says palpitations throughout the day.     Review of patient's allergies indicates:   Allergen Reactions    Sulfa (sulfonamide antibiotics) Hives     Other reaction(s): Anaphylaxis  Itching   Shortness of breath      Past Medical History:   Diagnosis Date    Arthritis     Cataract     GERD (gastroesophageal reflux disease)     HEARING LOSS     Hypertension     Osteoporosis, postmenopausal     Squamous Cell Carcinoma 2007    right forearm    Urinary incontinence     rare mixed

## 2024-05-15 NOTE — ED PROVIDER NOTES
Encounter Date: 5/15/2024       History     Chief Complaint   Patient presents with    Chest Pain     Chest pain started this morning around 0900, says palpitations throughout the day.     HPI    This is a pleasant 83-year-old female presents the ER for evaluation chest pain.  Patient does have a history of GERD reports she has some epigastric now midsternal chest pain.  Also endorses some palpitations.  She became concerned and came the ER for further evaluation.  Of note patient was recently admitted for similar complaints a few days ago her echo was grossly normal and she was subsequently discharged.    Review of patient's allergies indicates:   Allergen Reactions    Sulfa (sulfonamide antibiotics) Hives     Other reaction(s): Anaphylaxis  Itching   Shortness of breath      Past Medical History:   Diagnosis Date    Arthritis     Cataract     GERD (gastroesophageal reflux disease)     HEARING LOSS     Hypertension     Osteoporosis, postmenopausal     Squamous Cell Carcinoma 2007    right forearm    Urinary incontinence     rare mixed     Past Surgical History:   Procedure Laterality Date    COLPOPEXY N/A 08/06/2019    Procedure: COLPOPEXY SSL FIXATION;  Surgeon: Alma Dorsey MD;  Location: [address];  Service: Urology;  Laterality: N/A;    CYSTOSCOPY N/A 08/06/2019    Procedure: CYSTOSCOPY;  Surgeon: Alma Dorsey MD;  Location: [address];  Service: Urology;  Laterality: N/A;    ESOPHAGOGASTRODUODENOSCOPY N/A 4/1/2024    Procedure: EGD (ESOPHAGOGASTRODUODENOSCOPY);  Surgeon: Zbigniew Dougherty MD;  Location: [facility];  Service: Endoscopy;  Laterality: N/A;    FRACTURE SURGERY Left 2008    open radius/ulna fracture    HYSTERECTOMY      TANIA/ovaries remain--fibroids- in her late 30's / early 40's     Family History   Problem Relation Name Age of Onset    Heart failure Mother copd     Macular degeneration Mother copd     Heart disease Mother copd     COPD Mother copd     Diabetes Sister       Arthritis Sister          rheumatoid arthritis    COPD Father      No Known Problems Brother      No Known Problems Son Saad     No Known Problems Daughter Jordyn     No Known Problems Sister      No Known Problems Sister      No Known Problems Sister      No Known Problems Brother      No Known Problems Brother      No Known Problems Brother      No Known Problems Brother      No Known Problems Brother      No Known Problems Daughter Reyes     No Known Problems Son Arie     Breast cancer Neg Hx      Ovarian cancer Neg Hx      Amblyopia Neg Hx      Blindness Neg Hx      Cancer Neg Hx      Cataracts Neg Hx      Glaucoma Neg Hx      Hypertension Neg Hx      Retinal detachment Neg Hx      Strabismus Neg Hx      Stroke Neg Hx      Thyroid disease Neg Hx      Cervical cancer Neg Hx      Endometrial cancer Neg Hx      Vaginal cancer Neg Hx      Colon cancer Neg Hx       Social History     Tobacco Use    Smoking status: Never    Smokeless tobacco: Never   Substance Use Topics    Alcohol use: Not Currently     Comment: 1-2 glasses wine weekly    Drug use: No     Review of Systems   Constitutional:  Positive for fatigue.   Cardiovascular:  Positive for chest pain.   Gastrointestinal:  Positive for abdominal pain.   All other systems reviewed and are negative.      Physical Exam     Initial Vitals [05/15/24 0018]   BP Pulse Resp Temp SpO2   (!) 161/83 64 18 98.3 °F (36.8 °C) 98 %      MAP       --         Physical Exam    Nursing note and vitals reviewed.  Constitutional:   Elderly chronically ill-appearing no distress   HENT:   Head: Normocephalic and atraumatic.   Eyes: Pupils are equal, round, and reactive to light.   Neck:   Normal range of motion.  Cardiovascular:  Normal rate and regular rhythm.           Pulmonary/Chest: No respiratory distress.   Abdominal: Abdomen is soft. She exhibits no distension. There is no abdominal tenderness.   Musculoskeletal:         General: Normal range of motion.      Cervical back:  Normal range of motion.     Neurological: She is alert and oriented to person, place, and time. She has normal strength. GCS score is 15. GCS eye subscore is 4. GCS verbal subscore is 5. GCS motor subscore is 6.   Skin: Skin is warm and dry. Capillary refill takes less than 2 seconds.   Psychiatric: She has a normal mood and affect. Thought content normal.         ED Course   Procedures  Labs Reviewed   CBC W/ AUTO DIFFERENTIAL - Abnormal; Notable for the following components:       Result Value    RBC 3.89 (*)     Hemoglobin 11.7 (*)     Hematocrit 35.4 (*)     All other components within normal limits   COMPREHENSIVE METABOLIC PANEL - Abnormal; Notable for the following components:    Potassium 3.4 (*)     All other components within normal limits   B-TYPE NATRIURETIC PEPTIDE - Abnormal; Notable for the following components:     (*)     All other components within normal limits   TROPONIN I - Abnormal; Notable for the following components:    Troponin I 0.035 (*)     All other components within normal limits   HIV 1 / 2 ANTIBODY    Narrative:     Release to patient->Immediate   HEPATITIS C ANTIBODY    Narrative:     Release to patient->Immediate   TROPONIN I   TROPONIN ISTAT   TROPONIN ISTAT   TROPONIN I   POCT TROPONIN   POCT TROPONIN     EKG Readings: (Independently Interpreted)   Sinus bradycardia 56 beats per minute, new diffuse ST changes in inferior lateral leads no acute STEMI       Imaging Results              X-Ray Chest AP Portable (Final result)  Result time 05/15/24 02:11:17      Final result by Anibal Pineda MD (05/15/24 02:11:17)                   Impression:      No acute findings in the chest.      Electronically signed by: Anibal Pineda MD  Date:    05/15/2024  Time:    02:11               Narrative:    EXAMINATION:  XR CHEST AP PORTABLE    CLINICAL HISTORY:  Chest Pain;    TECHNIQUE:  Single frontal view of the chest was performed.    COMPARISON:  05/06/2024.    FINDINGS:  No  consolidation, pleural effusion or pneumothorax.    Cardiomediastinal silhouette is unremarkable.                                       Medications   nitroGLYCERIN SL tablet 0.4 mg (0.4 mg Sublingual Given 5/15/24 0108)   nitroGLYCERIN SL tablet 0.4 mg (has no administration in time range)   aspirin tablet 325 mg (325 mg Oral Given 5/15/24 0108)   aluminum-magnesium hydroxide-simethicone 200-200-20 mg/5 mL suspension 15 mL (15 mLs Oral Given 5/15/24 0140)     Medical Decision Making  This is an 83-year-old female history of GERD hypertension presents the ER for evaluation of chest pain.  Epigastric midsternal.  Onset 9:00 p.m..  She reports that she felt unwell and became concerned and came the ER for further evaluation.  Her EKG does show diffuse ST changes in inferior lateral leads.  She was had this previously, but her most recent EKG normalized.  Concern for dynamic EKG changes underlying coronary artery disease.  Will plan blood work cardiac workup anticipate admission.    Amount and/or Complexity of Data Reviewed  Independent Historian: spouse  External Data Reviewed: labs, radiology, ECG and notes.  Labs: ordered. Decision-making details documented in ED Course.  Radiology: ordered and independent interpretation performed. Decision-making details documented in ED Course.  ECG/medicine tests: ordered and independent interpretation performed. Decision-making details documented in ED Course.    Risk  OTC drugs.  Prescription drug management.      Additional MDM:   Heart Score:    History:          Moderately suspicious.  ECG:             Significant ST depression  Age:               >65 years  Risk factors: 1-2 risk factors  Troponin:       1-2x normal limit  Heart Score = 7                ED Course as of 05/15/24 0709   Wed May 15, 2024   0506 Comprehensive metabolic panel(!) [SE]   0506 B-Type natriuretic peptide (BNP)(!) [SE]   0506 CBC auto differential(!) [SE]   0506 Troponin I #2(!) [SE]   0506 X-Ray Chest  AP Portable [SE]   0513 Resting in bed no acute distress patient chest pain-free at this time.  Labs imaging obtained and reviewed.  Chest x-ray within normal limits.  CMP CBC overall stable.  Troponin up trending 2.0 3 5, BNP also elevated at 148, persistent ST changes.  Patient may be suffering vasospasm versus underlying CAD.  Will plan admission to ED OU, trend troponin, possible stress test versus cardiology evaluation [SE]      ED Course User Index  [SE] Tabitha Chilel MD                           Clinical Impression:  Final diagnoses:  [R07.9] Chest pain          ED Disposition Condition    Observation                 Tabitha Chilel MD  05/15/24 0715

## 2024-05-15 NOTE — Clinical Note
The catheter was reinserted into the distal   left anterior descending. IVUS was performed of the LAD to LM. Catheter removed.

## 2024-05-15 NOTE — ED NOTES
Assumed care of the patient. Report received from WENCESLAO Hinojosa. Pt on continuous cardiac monitoring. Pt in hospital gown, side rails up X2, bed low and locked, and call light is placed within reach. No family/visitors at bedside at this time. Pt denies any complaints or needs. Patient updated on plan of care.  White board updated.    Betzy MARTHA Cooley, a 83 y.o. female presents to the ED w/ complaint of CP.    Triage note:  Chief Complaint   Patient presents with    Chest Pain     Chest pain started this morning around 0900, says palpitations throughout the day.     Review of patient's allergies indicates:   Allergen Reactions    Sulfa (sulfonamide antibiotics) Hives     Other reaction(s): Anaphylaxis  Itching   Shortness of breath      Past Medical History:   Diagnosis Date    Arthritis     Cataract     GERD (gastroesophageal reflux disease)     HEARING LOSS     Hypertension     Osteoporosis, postmenopausal     Squamous Cell Carcinoma 2007    right forearm    Urinary incontinence     rare mixed

## 2024-05-15 NOTE — ED NOTES
Received report from WENCESLAO Underwood    LOC: The patient is awake, alert and aware of environment with an appropriate affect, the patient is oriented x 3 and speaking appropriately.   APPEARANCE: Patient appears comfortable and in no acute distress, patient is clean and well groomed.  SKIN: The skin is warm and dry, color consistent with ethnicity, patient has normal skin turgor and moist mucus membranes, skin intact, no breakdown or bruising noted.   MUSCULOSKELETAL: Patient moving all extremities spontaneously, no swelling noted.  RESPIRATORY: Airway is open and patent, respirations are spontaneous, patient has a normal effort and rate, no accessory muscle.  CARDIAC: Pt placed on cardiac monitor. Patient has a normal rate and regular rhythm, no edema noted, capillary refill < 3 seconds. No reports of CP at this time.  GASTRO: Soft and non tender to palpation, no distention noted.  : Pt denies any pain or frequency with urination.  NEURO: Pt opens eyes spontaneously, behavior appropriate to situation, follows commands, facial expression symmetrical, bilateral hand grasp equal and even, purposeful motor response noted, normal sensation in all extremities when touched with a finger.

## 2024-05-15 NOTE — ASSESSMENT & PLAN NOTE
Patient presented with chest pain for past 1 week.    On and off, pain lasts from few minutes to an hours sometimes.  Troponin elevated to 0.035, trend   EKG:  Bradycardia with diffuse T-wave inversions.    Aspirin 325 mg given in the ED.    Cardiology consulted.  appreciate recommendations  Plan for stress test tomorrow.

## 2024-05-15 NOTE — Clinical Note
The catheter was inserted into the mid   right coronary artery. IVUS was performed of the RCA. Catheter removed.

## 2024-05-15 NOTE — PLAN OF CARE
This is an 83-year-old female who was planning to be placed in the EDOU for chest pain rule out. She presented to the ER again last for epigastric and now midsternal pain. Second visit within the past 2 weeks. She has ST changes in the inferior lateral leads concerning for ischemia. She has a mildly elevated troponin that is trending upward ever so slightly.  She has been given nitro and aspirin with improvement in symptoms. She is overall stable. Upon the start of my 7:00 am shift, I do not think that she is appropriate for the EDOU and will discuss this case with Hospital Medicine and place with them for observation.

## 2024-05-15 NOTE — Clinical Note
Diagnosis: Chest pain [243261]   Future Attending Provider: DAI ARZATE [[id number]]   Is the patient being sent to ED Observation?: Yes   Bed request comments: tele   Special Needs:: No Special Needs [1]

## 2024-05-15 NOTE — Clinical Note
150 ml of contrast were injected throughout the case. 150 mL of contrast was the total wasted during the case. 300 mL was the total amount used during the case.

## 2024-05-15 NOTE — HPI
Ms. Cooley is a 84yo F with the PMHx of GERD and HTN. Pt presents to the ED with the CC of non-exertional substernal CP that was burning, non-radiating in nature, and she rated an 8/10. CP was fleeting in nature and lasted for 1-2 hours. States that she ate dinner at 5PM and around 9PM while she was sitting on her couch she noticed the CP. Associated with palpitations and abdominal bloating. Denies N/V, diaphoresis, and SOB. Endorses that she can't lie flat at night because of her reflux symptoms. She states this feels like her usual reflux CP, however was more intense in nature. She received NTG in the ED, however pt is unsure if it helped improve her CP as she believes her CP was gone prior to the administration of NTG. Pt is currently CP free.     Of note, pt was admitted from 05/06 - 05/07 for similar presentation. Echo was performed at that time showing an EF of 60% with no WMA.     In the ED: EKG was significant for diffuse T wave inversions. BNP elevated at 148. Troponins .025-->.035--> .036-->.026.     No family history of MIs    Primary team ordered Echo and Stress Echo.     Cardiology consulted for chest pain eval.

## 2024-05-15 NOTE — CONSULTS
[facility]  Cardiology  Consult Note    Patient Name: Betzy Cooley  MRN: [id number]  Admission Date: 5/15/2024  Hospital Length of Stay: 0 days  Code Status: Full Code   Attending Provider: Daniela Combs MD   Consulting Provider: Anibal Rubi MD  Primary Care Physician: Praful Paul MD  Principal Problem:Chest pain    Patient information was obtained from patient, past medical records, and ER records.     Inpatient consult to Cardiology  Consult performed by: Anibal Rubi MD  Consult ordered by: Daniela Combs MD        Subjective:     Chief Complaint:  Chest Pain      HPI:   Ms. Cooley is a 82yo F with the PMHx of GERD and HTN. Pt presents to the ED with the CC of non-exertional substernal CP that was burning, non-radiating in nature, and she rated an 8/10. CP was fleeting in nature and lasted for 1-2 hours. States that she ate dinner at 5PM and around 9PM while she was sitting on her couch she noticed the CP. Associated with palpitations and abdominal bloating. Denies N/V, diaphoresis, and SOB. Endorses that she can't lie flat at night because of her reflux symptoms. She states this feels like her usual reflux CP, however was more intense in nature. She received NTG in the ED, however pt is unsure if it helped improve her CP as she believes her CP was gone prior to the administration of NTG. Pt is currently CP free.     Of note, pt was admitted from 05/06 - 05/07 for similar presentation. Echo was performed at that time showing an EF of 60% with no WMA.     In the ED: EKG was significant for diffuse T wave inversions. BNP elevated at 148. Troponins .025-->.035--> .036-->.026.     No family history of MIs    Primary team ordered Echo and Stress Echo.     Cardiology consulted for chest pain eval.     Past Medical History:   Diagnosis Date    Arthritis     Cataract     GERD (gastroesophageal reflux disease)     HEARING LOSS     Hypertension     Osteoporosis,  postmenopausal     Squamous Cell Carcinoma 2007    right forearm    Urinary incontinence     rare mixed       Past Surgical History:   Procedure Laterality Date    COLPOPEXY N/A 08/06/2019    Procedure: COLPOPEXY SSL FIXATION;  Surgeon: Alma Dorsey MD;  Location: [facility] OR [address] [place] [address];  Service: Urology;  Laterality: N/A;    CYSTOSCOPY N/A 08/06/2019    Procedure: CYSTOSCOPY;  Surgeon: Alma Dorsey MD;  Location: [facility] OR [address] [place] [address];  Service: Urology;  Laterality: N/A;    ESOPHAGOGASTRODUODENOSCOPY N/A 4/1/2024    Procedure: EGD (ESOPHAGOGASTRODUODENOSCOPY);  Surgeon: Zbigniew Dougherty MD;  Location: [facility];  Service: Endoscopy;  Laterality: N/A;    FRACTURE SURGERY Left 2008    open radius/ulna fracture    HYSTERECTOMY      TANIA/ovaries remain--fibroids- in her late 30's / early 40's       Review of patient's allergies indicates:   Allergen Reactions    Sulfa (sulfonamide antibiotics) Hives     Other reaction(s): Anaphylaxis  Itching   Shortness of breath        Current Facility-Administered Medications on File Prior to Encounter   Medication    lactated ringers infusion     Current Outpatient Medications on File Prior to Encounter   Medication Sig    aspirin 81 MG Chew Take 81 mg by mouth once daily. Not chewable    atenoloL (TENORMIN) 25 MG tablet TAKE 1 TABLET BY MOUTH DAILY    calcium-vitamin D3 500 mg(1,250mg) -200 unit per tablet Take 1 tablet by mouth 2 (two) times daily with meals.    co-enzyme Q-10 30 mg capsule Take 30 mg by mouth once daily.    diclofenac sodium (VOLTAREN ARTHRITIS PAIN) 1 % Gel Apply 2 g topically daily as needed (Pain).    famotidine (PEPCID AC ORAL) Take 1 tablet by mouth nightly as needed (Acid reflux).    multivitamin (THERAGRAN) per tablet Take 1 tablet by mouth once daily.    pantoprazole (PROTONIX) 40 MG tablet Take 1 tablet (40 mg total) by mouth 2 (two) times daily.    sodium chloride (SALINE MIST NASL) 1 spray by Each Nostril route daily as needed (Congestion).     vitamin A-vitamin C-vit E-min Tab Take 1 tablet by mouth once daily.    [DISCONTINUED] fexofenadine (ALLEGRA) 180 MG tablet Take 1 tablet (180 mg total) by mouth once daily. (Patient taking differently: Take 180 mg by mouth once daily. Takes as needed)     Family History       Problem Relation (Age of Onset)    Arthritis Sister    COPD Mother, Father    Diabetes Sister    Heart disease Mother    Heart failure Mother    Macular degeneration Mother    No Known Problems Brother, Son, Daughter, Sister, Sister, Sister, Brother, Brother, Brother, Brother, Brother, Daughter, Son          Tobacco Use    Smoking status: Never    Smokeless tobacco: Never   Substance and Sexual Activity    Alcohol use: Not Currently     Comment: 1-2 glasses wine weekly    Drug use: No    Sexual activity: Yes     Partners: Male     Birth control/protection: None     Review of Systems   Constitutional: Negative for chills, diaphoresis and night sweats.   Cardiovascular:  Positive for chest pain and palpitations. Negative for dyspnea on exertion, irregular heartbeat, leg swelling and near-syncope.   Respiratory:  Negative for cough, shortness of breath and wheezing.    Skin:  Negative for color change and dry skin.   Musculoskeletal:  Negative for joint pain and joint swelling.   Gastrointestinal:  Positive for bloating. Negative for abdominal pain, diarrhea, nausea and vomiting.   Genitourinary:  Negative for dysuria.   Neurological:  Negative for dizziness, headaches, light-headedness and weakness.   All other systems reviewed and are negative.    Objective:     Vital Signs (Most Recent):  Temp: 98.5 °F (36.9 °C) (05/15/24 1210)  Pulse: (!) 57 (05/15/24 1445)  Resp: 18 (05/15/24 1210)  BP: (!) 154/73 (05/15/24 1210)  SpO2: 99 % (05/15/24 1210) Vital Signs (24h Range):  Temp:  [97.7 °F (36.5 °C)-98.5 °F (36.9 °C)] 98.5 °F (36.9 °C)  Pulse:  [51-67] 57  Resp:  [15-20] 18  SpO2:  [95 %-99 %] 99 %  BP: (139-161)/(71-92) 154/73     Weight: 55.3 kg  (122 lb)  Body mass index is 22.31 kg/m².    SpO2: 99 %       No intake or output data in the 24 hours ending 05/15/24 1512    Lines/Drains/Airways       Peripheral Intravenous Line  Duration                  Peripheral IV - Single Lumen 05/15/24 0732 22 G Distal;Left;Posterior Forearm <1 day                     Physical Exam  Constitutional:       General: She is not in acute distress.     Appearance: Normal appearance. She is not ill-appearing.   HENT:      Head: Normocephalic and atraumatic.   Eyes:      General: No scleral icterus.     Extraocular Movements: Extraocular movements intact.      Conjunctiva/sclera: Conjunctivae normal.   Cardiovascular:      Pulses: Normal pulses.      Heart sounds: Normal heart sounds. No murmur heard.     No friction rub. No gallop.   Pulmonary:      Effort: Pulmonary effort is normal. No respiratory distress.      Breath sounds: No stridor. No wheezing or rales.   Abdominal:      General: Abdomen is flat. There is no distension.      Palpations: Abdomen is soft. There is no mass.      Tenderness: There is no abdominal tenderness. There is no guarding or rebound.   Musculoskeletal:         General: No swelling or tenderness.      Right lower leg: No edema.      Left lower leg: No edema.   Skin:     General: Skin is warm and dry.      Capillary Refill: Capillary refill takes less than 2 seconds.      Coloration: Skin is not jaundiced or pale.      Findings: No erythema or rash.   Neurological:      Mental Status: She is alert.   Psychiatric:         Mood and Affect: Mood normal.         Behavior: Behavior normal.          Significant Labs:     Significant Imaging:     Recent Labs   Lab 05/15/24  0110   WBC 7.75   HGB 11.7*   HCT 35.4*   MCV 91   RBC 3.89*   MCH 30.1   MCHC 33.1   RDW 13.0      MPV 9.8   GRAN 62.4  4.8   LYMPH 22.5  1.7   MONO 9.9  0.8   EOSINOPHIL 3.7   BASOPHIL 1.2       Recent Labs   Lab 05/15/24  0110      K 3.4*      CO2 24   BUN 14  "  CREATININE 0.8      CALCIUM 9.8   PROT 7.4   ALBUMIN 3.8   ALKPHOS 78   BILITOT 0.4   ALT 32   AST 27   ANIONGAP 13       No results for input(s): "COLORU", "APPEARANCEUA", "PHUR", "SPECGRAV", "PROTEINUA", "GLUCUA", "KETONESU", "BILIRUBINUA", "OCCULTUA", "UROBILINOGEN", "NITRITE", "LEUKOCYTESUR", "RBCUA", "WBCUA", "BACTERIA", "SQUAMEPITHEL", "HYALINECASTS", "GRANULARCAST", "MICROCMT" in the last 168 hours.    Invalid input(s): "SPECIMENU", "AMORPHOUSU"    No results for input(s): "PH", "PCO2", "PO2", "HCO3", "POCSATURATED", "BE" in the last 168 hours.    Recent Labs   Lab 05/15/24  0110 05/15/24  0341 05/15/24  0742 05/15/24  1114 05/15/24  1212   TROPONINI 0.025 0.035* 0.036* 0.026 0.028*       No results for input(s): "PT", "INR", "APTT" in the last 168 hours.    Lab Results   Component Value Date    HGBA1C 5.5 05/15/2024       No results for input(s): "TSH", "P7ZFIOJ", "W4JUJTA", "THYROIDAB", "FREET4" in the last 168 hours.    Microbiology Results (last 7 days)       ** No results found for the last 168 hours. **            X-Ray Chest AP Portable    Result Date: 5/15/2024  EXAMINATION: XR CHEST AP PORTABLE CLINICAL HISTORY: Chest Pain; TECHNIQUE: Single frontal view of the chest was performed. COMPARISON: 05/06/2024. FINDINGS: No consolidation, pleural effusion or pneumothorax. Cardiomediastinal silhouette is unremarkable.     No acute findings in the chest. Electronically signed by: Anibal Pineda MD Date:    05/15/2024 Time:    02:11    Echo    Result Date: 5/7/2024    Left Ventricle: The left ventricle is normal in size. Normal wall thickness. Normal wall motion. There is normal systolic function. Ejection fraction by visual approximation is 60%.   Right Ventricle: Normal right ventricular cavity size. Wall thickness is normal. Systolic function is normal.   Left Atrium: Left atrium is moderately dilated.   Mitral Valve: There is mild regurgitation.   Tricuspid Valve: There is mild regurgitation.   " Pulmonary Artery: The estimated pulmonary artery systolic pressure is 28 mmHg.   IVC/SVC: Normal venous pressure at 3 mmHg.     X-Ray Chest 1 View    Result Date: 5/6/2024  EXAMINATION: XR CHEST 1 VIEW TECHNIQUE: One view COMPARISON: Comparison is made to 11/03/2008.  Clinical information of epigastric pain is obtained from the electronic medical record. FINDINGS: Allowing for magnification of the cardiomediastinal silhouette related to projection, the heart size is within normal limits, as is the appearance of the pulmonary vascularity.  Lung zones appear clear, and are free of significant airspace consolidation or volume loss.  No pleural fluid.  No hilar or mediastinal mass lesion, with mild tortuosity of the thoracic aorta with calcification in the wall of the aortic arch incidentally noted.  No pneumothorax.     No significant intrathoracic abnormality.  No significant detrimental interval change in the appearance of the chest since 11/03/2008 is appreciated. Electronically signed by: Bravo Medina MD Date:    05/06/2024 Time:    11:47    CT Abdomen Pelvis With IV Contrast NO Oral Contrast    Result Date: 5/6/2024  EXAMINATION: CT ABDOMEN PELVIS WITH IV CONTRAST CLINICAL HISTORY: Abdominal pain, acute, nonlocalized; TECHNIQUE: Low dose axial images, sagittal and coronal reformations were obtained from the lung bases to the pubic symphysis.  75 mL of IV contrast was administered. COMPARISON: Abdominal radiograph 01/03/2020. FINDINGS: Lower chest: Heart size is normal. Mild bandlike atelectasis in the lung bases. Abdomen: Liver is normal in size.  No focal hepatic lesion.  Fatty infiltration about the falciform ligament.  Portal vasculature is patent.  Gallbladder is unremarkable. No calcified gallstones.  No intrahepatic biliary dilatation.  Common duct dilated up to 10 mm.  No obvious cause for obstruction. Spleen, adrenals, and pancreas are unremarkable. Kidneys are symmetric.  No renal or ureteral stones. No  hydronephrosis. No distended loops of small bowel. Abdominal aorta is normal in course and caliber.  Mild calcific atherosclerosis. No abdominal lymphadenopathy. No abdominal free fluid or pneumoperitoneum. Pelvis: Urinary bladder is distended without wall thickening.  No free fluid in the pelvis. Uterus is surgically absent.  No adnexal lesion. Bones and soft tissues: No aggressive osseous lesions. Degenerative changes of visualized osseous structures.  Grade 1 anterolisthesis of L4 on L5. tiny fat containing umbilical hernia.     No acute abdominopelvic abnormality. Additional findings in the body of the report. Electronically signed by resident: Dieudonne Izquierdo Date:    05/06/2024 Time:    07:47 Electronically signed by: Quirino Salgado MD Date:    05/06/2024 Time:    07:59    Assessment and Plan:     * Chest pain  Pt presents to the ED with the CC of non-exertional substernal CP that was burning, non-radiating in nature, and she rated an 8/10. CP was fleeting in nature and lasted for 1-2 hours. States that she ate dinner at 5PM and around 9PM while she was sitting on her couch she noticed the CP. Associated with palpitations and abdominal bloating. Denies N/V, diaphoresis, and SOB.    In the ED: EKG was significant for diffuse T wave inversions. BNP elevated at 148. Troponins .025-->.035--> .036-->.026.     -Pt is currently CP free  -Low suspicion of ACS  -Repeat Echo not necessary as pt had prior echo on 05/07    Echo    Result Date: 5/7/2024    Left Ventricle: The left ventricle is normal in size. Normal wall   thickness. Normal wall motion. There is normal systolic function. Ejection   fraction by visual approximation is 60%.    Right Ventricle: Normal right ventricular cavity size. Wall thickness   is normal. Systolic function is normal.    Left Atrium: Left atrium is moderately dilated.    Mitral Valve: There is mild regurgitation.    Tricuspid Valve: There is mild regurgitation.    Pulmonary Artery: The  estimated pulmonary artery systolic pressure is   28 mmHg.    IVC/SVC: Normal venous pressure at 3 mmHg.       Risk Factor: HTN (on Atenolol)    No family history of MIs    JOSE 3, Vaishali: 139      -Continue home ASA 81mg   -Initiate HIS   -Dobutamine Stress Echo for further risk stratification on 05/16    Hypertension  Chronic, uncontrolled. Latest blood pressure and vitals reviewed-     Temp:  [97.7 °F (36.5 °C)-98.5 °F (36.9 °C)]   Pulse:  [51-67]   Resp:  [15-20]   BP: (139-161)/(71-92)   SpO2:  [95 %-99 %] .   Home meds for hypertension were reviewed and noted below.   Hypertension Medications               atenoloL (TENORMIN) 25 MG tablet TAKE 1 TABLET BY MOUTH DAILY            While in the hospital, will manage blood pressure as follows; Adjust home antihypertensive regimen as follows- Initiate an ARB for better BP control           VTE Risk Mitigation (From admission, onward)           Ordered     IP VTE HIGH RISK PATIENT  Once         05/15/24 1045     Place sequential compression device  Until discontinued         05/15/24 1045                    Thank you for your consult. I will follow-up with patient. Please contact us if you have any additional questions.    Anibal Rubi MD, PGY-II  Cardiology   [facility]

## 2024-05-15 NOTE — PLAN OF CARE
Problem: Adult Inpatient Plan of Care  Goal: Plan of Care Review  Outcome: Progressing  Goal: Patient-Specific Goal (Individualized)  Outcome: Progressing  Goal: Absence of Hospital-Acquired Illness or Injury  Outcome: Progressing  Goal: Optimal Comfort and Wellbeing  Outcome: Progressing  Goal: Readiness for Transition of Care  Outcome: Progressing     Problem: Fall Injury Risk  Goal: Absence of Fall and Fall-Related Injury  Outcome: Progressing     Pt understand NPO after midnight for stress test in AM, No distress notice. No falls or injuries during shift. Bed in lowest position, call light within reach, belonging at bedside. Safety precaution maintain.Plan of Care reviewed. Pt verbalized understanding.

## 2024-05-15 NOTE — ED NOTES
Tele box 3253 applied to pt. war room states able to see pt on monitor, rhythm sinus екатерина with HR 55.

## 2024-05-15 NOTE — ASSESSMENT & PLAN NOTE
Chronic, uncontrolled. Latest blood pressure and vitals reviewed-     Temp:  [97.7 °F (36.5 °C)-98.5 °F (36.9 °C)]   Pulse:  [51-67]   Resp:  [15-20]   BP: (139-161)/(71-92)   SpO2:  [95 %-99 %] .   Home meds for hypertension were reviewed and noted below.   Hypertension Medications               atenoloL (TENORMIN) 25 MG tablet TAKE 1 TABLET BY MOUTH DAILY            While in the hospital, will manage blood pressure as follows; Adjust home antihypertensive regimen as follows- Initiate an ARB for better BP control

## 2024-05-15 NOTE — HPI
This is a 83-year-old patient with a history of gastritis, HTN who presents with chest pain. Has been experiencing heartburn chronically for over 20 years. Patient presented with on and off substernal chest pain, ongoing for past 1 week.  Patient denies any radiation to neck or left upper extremity.  No aggravating or relieving factors.  Patient presented to ER last week.  Echo at that time did not show any wall motion abnormalities and patient was discharged home.  Denies any shortness of breath, palpitations, dysphagia, recent weight loss, melena, use of NSAIDs.  On presentation patient is afebrile, bradycardic with heart rate of 64, hypertensive with blood pressure of 161/83 mm of Hg saturating 98% on room air.  Blood work revealed normocytic anemia with hemoglobin of 11.7.  Hypokalemia with potassium of 3.4 BNP elevated to 148.  Troponin of 0.028.  Chest x-ray was unremarkable.  EKG revealed sinus bradycardia, diffuse T-wave depressions in lateral and inferior leads.  Patient received Maalox and aspirin 325 mg admitted to the hospital.

## 2024-05-15 NOTE — SUBJECTIVE & OBJECTIVE
Past Medical History:   Diagnosis Date    Arthritis     Cataract     GERD (gastroesophageal reflux disease)     HEARING LOSS     Hypertension     Osteoporosis, postmenopausal     Squamous Cell Carcinoma 2007    right forearm    Urinary incontinence     rare mixed       Past Surgical History:   Procedure Laterality Date    COLPOPEXY N/A 08/06/2019    Procedure: COLPOPEXY SSL FIXATION;  Surgeon: Alma Dorsey MD;  Location: [address];  Service: Urology;  Laterality: N/A;    CYSTOSCOPY N/A 08/06/2019    Procedure: CYSTOSCOPY;  Surgeon: Alma Dorsey MD;  Location: [address];  Service: Urology;  Laterality: N/A;    ESOPHAGOGASTRODUODENOSCOPY N/A 4/1/2024    Procedure: EGD (ESOPHAGOGASTRODUODENOSCOPY);  Surgeon: Zbigniew Dougherty MD;  Location: [facility];  Service: Endoscopy;  Laterality: N/A;    FRACTURE SURGERY Left 2008    open radius/ulna fracture    HYSTERECTOMY      TANIA/ovaries remain--fibroids- in her late 30's / early 40's       Review of patient's allergies indicates:   Allergen Reactions    Sulfa (sulfonamide antibiotics) Hives     Other reaction(s): Anaphylaxis  Itching   Shortness of breath        Current Facility-Administered Medications on File Prior to Encounter   Medication    lactated ringers infusion     Current Outpatient Medications on File Prior to Encounter   Medication Sig    aspirin 81 MG Chew Take 81 mg by mouth once daily. Not chewable    atenoloL (TENORMIN) 25 MG tablet TAKE 1 TABLET BY MOUTH DAILY    calcium-vitamin D3 500 mg(1,250mg) -200 unit per tablet Take 1 tablet by mouth 2 (two) times daily with meals.    co-enzyme Q-10 30 mg capsule Take 30 mg by mouth once daily.    fexofenadine (ALLEGRA) 180 MG tablet Take 1 tablet (180 mg total) by mouth once daily. (Patient taking differently: Take 180 mg by mouth once daily. Takes as needed)    multivitamin (THERAGRAN) per tablet Take 1 tablet by mouth once daily.    pantoprazole (PROTONIX) 40 MG tablet Take 1 tablet  (40 mg total) by mouth 2 (two) times daily.    vitamin A-vitamin C-vit E-min Tab Take 1 tablet by mouth once daily.     Family History       Problem Relation (Age of Onset)    Arthritis Sister    COPD Mother, Father    Diabetes Sister    Heart disease Mother    Heart failure Mother    Macular degeneration Mother    No Known Problems Brother, Son, Daughter, Sister, Sister, Sister, Brother, Brother, Brother, Brother, Brother, Daughter, Son          Tobacco Use    Smoking status: Never    Smokeless tobacco: Never   Substance and Sexual Activity    Alcohol use: Not Currently     Comment: 1-2 glasses wine weekly    Drug use: No    Sexual activity: Yes     Partners: Male     Birth control/protection: None     Review of Systems   Constitutional:  Negative for chills and fever.   HENT:  Negative for rhinorrhea and sore throat.    Respiratory:  Negative for cough, shortness of breath and wheezing.    Cardiovascular:  Positive for chest pain. Negative for palpitations and leg swelling.   Gastrointestinal:  Negative for abdominal pain, nausea and vomiting.   Endocrine: Negative for cold intolerance, polydipsia and polyphagia.   Genitourinary:  Negative for dysuria and hematuria.   Musculoskeletal: Negative.    Neurological: Negative.    Psychiatric/Behavioral: Negative.       Objective:     Vital Signs (Most Recent):  Temp: 98.5 °F (36.9 °C) (05/15/24 1210)  Pulse: (!) 52 (05/15/24 1210)  Resp: 18 (05/15/24 1210)  BP: (!) 154/73 (05/15/24 1210)  SpO2: 99 % (05/15/24 1210) Vital Signs (24h Range):  Temp:  [97.7 °F (36.5 °C)-98.5 °F (36.9 °C)] 98.5 °F (36.9 °C)  Pulse:  [51-67] 52  Resp:  [15-20] 18  SpO2:  [95 %-99 %] 99 %  BP: (139-161)/(71-92) 154/73     Weight: 55.3 kg (122 lb)  Body mass index is 22.31 kg/m².     Physical Exam  Constitutional:       Appearance: Normal appearance.   HENT:      Mouth/Throat:      Mouth: Mucous membranes are moist.      Pharynx: Oropharynx is clear.   Eyes:      Conjunctiva/sclera:  Conjunctivae normal.      Pupils: Pupils are equal, round, and reactive to light.   Cardiovascular:      Rate and Rhythm: Normal rate and regular rhythm.   Pulmonary:      Effort: Pulmonary effort is normal.      Breath sounds: Normal breath sounds.   Abdominal:      General: Bowel sounds are normal.      Palpations: Abdomen is soft.   Musculoskeletal:         General: No swelling or tenderness. Normal range of motion.      Cervical back: Normal range of motion and neck supple.   Neurological:      General: No focal deficit present.      Mental Status: She is alert and oriented to person, place, and time.   Psychiatric:         Mood and Affect: Mood normal.         Behavior: Behavior normal.              CRANIAL NERVES     CN III, IV, VI   Pupils are equal, round, and reactive to light.       Significant Labs: All pertinent labs within the past 24 hours have been reviewed.  Recent Lab Results  (Last 5 results in the past 24 hours)        05/15/24  1212   05/15/24  1114   05/15/24  0742   05/15/24  0344   05/15/24  0341        ISTAT Cardiac Troponin I       0.03         Sample       VENOUS  Comment: A single negative troponin is insufficient to rule out myocardial infarction.  The use of a serial sampling protocol is recommended practice. Correlate results with reference intervals established for methodology used. Point of care and core laboratory   troponin results are not interchangeable.           Troponin I 0.028  Comment: The reference interval for Troponin I represents the 99th percentile   cutoff   for our facility and is consistent with 3rd generation assay   performance.     0.026  Comment: The reference interval for Troponin I represents the 99th percentile   cutoff   for our facility and is consistent with 3rd generation assay   performance.     0.036  Comment: The reference interval for Troponin I represents the 99th percentile   cutoff   for our facility and is consistent with 3rd generation assay    performance.       0.035  Comment: The reference interval for Troponin I represents the 99th percentile   cutoff   for our facility and is consistent with 3rd generation assay   performance.                                Significant Imaging: I have reviewed all pertinent imaging results/findings within the past 24 hours.

## 2024-05-15 NOTE — ASSESSMENT & PLAN NOTE
Pt presents to the ED with the CC of non-exertional substernal CP that was burning, non-radiating in nature, and she rated an 8/10. CP was fleeting in nature and lasted for 1-2 hours. States that she ate dinner at 5PM and around 9PM while she was sitting on her couch she noticed the CP. Associated with palpitations and abdominal bloating. Denies N/V, diaphoresis, and SOB.    In the ED: EKG was significant for diffuse T wave inversions. BNP elevated at 148. Troponins .025-->.035--> .036-->.026.     -Pt is currently CP free  -Low suspicion of ACS  -Repeat Echo not necessary as pt had prior echo on 05/07    Echo    Result Date: 5/7/2024    Left Ventricle: The left ventricle is normal in size. Normal wall   thickness. Normal wall motion. There is normal systolic function. Ejection   fraction by visual approximation is 60%.    Right Ventricle: Normal right ventricular cavity size. Wall thickness   is normal. Systolic function is normal.    Left Atrium: Left atrium is moderately dilated.    Mitral Valve: There is mild regurgitation.    Tricuspid Valve: There is mild regurgitation.    Pulmonary Artery: The estimated pulmonary artery systolic pressure is   28 mmHg.    IVC/SVC: Normal venous pressure at 3 mmHg.       Risk Factor: HTN (on Atenolol)    No family history of MIs    JOSE 3, Vaishali: 139      -Continue home ASA 81mg   -Initiate HIS   -Dobutamine Stress Echo for further risk stratification on 05/16

## 2024-05-15 NOTE — PHARMACY MED REC
"Admission Medication History     The home medication history was taken by Teo Marshall.    You may go to "Admission" then "Reconcile Home Medications" tabs to review and/or act upon these items.     The home medication list has been updated by the Pharmacy department.   Please read ALL comments highlighted in yellow.   Please address this information as you see fit.    Feel free to contact us if you have any questions or require assistance.      The medications listed below were removed from the home medication list. Please reorder if appropriate:  Patient reports no longer taking the following medication(s):  FEXOFENADINE 100 MG    Medications listed below were obtained from: Patient/family and Analytic software- [employer] Medications   Medication Sig    aspirin 81 MG Chew   Take 81 mg by mouth once daily. Not chewable    atenoloL (TENORMIN) 25 MG tablet   TAKE 1 TABLET BY MOUTH DAILY    calcium-vitamin D3 500 mg(1,250mg) -200 unit per tablet   Take 1 tablet by mouth 2 (two) times daily with meals.    co-enzyme Q-10 30 mg capsule   Take 30 mg by mouth once daily.    diclofenac sodium (VOLTAREN ARTHRITIS PAIN) 1 % Gel   Apply 2 g topically daily as needed (Pain).    famotidine (PEPCID AC ORAL) Take 1 tablet by mouth nightly as needed (Acid reflux).      multivitamin (THERAGRAN) per tablet   Take 1 tablet by mouth once daily.    pantoprazole (PROTONIX) 40 MG tablet   Take 1 tablet (40 mg total) by mouth 2 (two) times daily.    sodium chloride (SALINE MIST NASL)   1 spray by Each Nostril route daily as needed (Congestion).    vitamin A-vitamin C-vit E-min Tab Take 1 tablet by mouth once daily.             Teo Marshall  EXT [id number]                  .          "

## 2024-05-15 NOTE — ASSESSMENT & PLAN NOTE
Chronic, controlled. Latest blood pressure and vitals reviewed-     Temp:  [97.7 °F (36.5 °C)-98.5 °F (36.9 °C)]   Pulse:  [51-67]   Resp:  [15-20]   BP: (139-161)/(71-92)   SpO2:  [95 %-99 %] .   Home meds for hypertension were reviewed and noted below.   Hypertension Medications               atenoloL (TENORMIN) 25 MG tablet TAKE 1 TABLET BY MOUTH DAILY            While in the hospital, will manage blood pressure as follows; Continue home antihypertensive regimen    Will utilize p.r.n. blood pressure medication only if patient's blood pressure greater than 180/110 and she develops symptoms such as worsening chest pain or shortness of breath.

## 2024-05-15 NOTE — ED NOTES
MEDICARE WELLNESS VISIT + NOTE    CHIEF COMPLAINT:  Rianna Reyes presents for her First Annual Medicare Wellness Visit.   Her additional complaints or concerns are addressed below.      Patient Care Team:  Mandakini Pokharna, MD as PCP - General        Patient Active Problem List   Diagnosis   • History of left breast cancer   • Hypercholesterolemia   • Osteopenia   • Pain of both hip joints   • Ganglion cyst of flexor tendon sheath   • Subungual hematoma of fingernail   • Dense breasts   • Former smoker   • BAKER (dyspnea on exertion)         Past Medical History:   Diagnosis Date   • Malignant neoplasm (CMS/HCC)          Past Surgical History:   Procedure Laterality Date   • Mastectomy Left 2012   • Tonsillectomy           Social History     Tobacco Use   • Smoking status: Former Smoker     Packs/day: 0.00     Types: Cigarettes   • Smokeless tobacco: Never Used   • Tobacco comment: stopped 40 years ago    Vaping Use   • Vaping Use: never used   Substance Use Topics   • Alcohol use: Yes     Comment: soc    • Drug use: Never     Family History   Problem Relation Age of Onset   • Cancer, Colon Mother    • Cancer Father    • Bilateral breast cancer Sister          Current Outpatient Medications   Medication Sig Dispense Refill   • pravastatin (PRAVACHOL) 10 MG tablet Take 1 tablet by mouth daily. 90 tablet 1   • Calcium + Vitamin D3 600-10 MG-MCG per tablet TAKE 1 TABLET BY MOUTH EVERY DAY 90 tablet 11   • aspirin 81 MG chewable tablet Chew 81 mg by mouth daily.     • albuterol 108 (90 Base) MCG/ACT inhaler Inhale 2 puffs into the lungs every 4 hours as needed for Shortness of Breath or Wheezing. 1 each 1     No current facility-administered medications for this visit.        The following items on the Medicare Health Risk Assessment were found to be positive  7c.) Do you worry about falling?: Yes     14.) During the past 4 weeks, was someone available to help if you needed and wanted help?: No, not at all    Pt transported to the floor via wheelchair, pt belongings with pt's .         Vision and Hearing screens: wears eyeglasses and due for visit; hearing fine     Advance Directive:   The patient has the following documents:  No Advance Directives on file. Patient offered documents.    Cognitive/Functional Status: Mini-Cog performed with score of 5    Opioid Review: Rianna is not taking opioid medications.    Recent PHQ 2/9 Score:    PHQ 2:  Date Adult PHQ 2 Score Adult PHQ 2 Interpretation   4/11/2022 0 No further screening needed       PHQ 9:       DEPRESSION ASSESSMENT/PLAN:  Depression screening is negative no further plan needed.     Body mass index is 25.79 kg/m².    BMI ASSESSMENT/PLAN:  Patient BMI is within normal range.     See Patient Instructions section.   Return in about 1 year (around 4/11/2023) for Medicare Wellness Visit.      OUTPATIENT PROGRESS NOTE    Subjective   Chief Complaint [facility] Scheduling Outreach and Office Visit    HPI     Lipids rising   Wants to work on lifestyle changes     No more pain in leg     Hand not working     Noted oncology input     Wants to wait on booster dose     BAKER going upstairs   Quit smoking in 1979    Medications  Medications were reviewed and updated today.    Histories  I have personally reviewed and updated the patient's past medical, past surgical, family and social histories during today's visit.    Review of Systems  Constitutional: Negative for activity change, appetite change, fatigue and fever.   HENT: Negative for congestion, ear pain, hearing loss, nosebleeds, sore throat and trouble swallowing.    Eyes: HPI  Respiratory: HPI  Cardiovascular: Negative.  Negative for chest pain and palpitations.   Gastrointestinal: Negative for abdominal distention, abdominal pain, blood in stool, constipation, diarrhea, nausea and vomiting.   Endocrine: HPI  Genitourinary: Negative for dysuria, flank pain, frequency and hematuria.   Musculoskeletal: Negative for back pain, gait problem, joint swelling and neck pain.   Skin: Negative for rash and  wound.   Neurological: Negative for dizziness, seizures, syncope, facial asymmetry, speech difficulty and weakness.   Hematological: Negative for adenopathy. Does not bruise/bleed easily.   Psychiatric/Behavioral: Negative for sleep disturbance. Happy with life   Not stressed      Objective   Visit Vitals  /76 (BP Location: LUE - Left upper extremity, Patient Position: Sitting, Cuff Size: Regular)   Pulse 74   Temp 97.6 °F (36.4 °C) (Oral)   Resp 18   Ht 5' 3\" (1.6 m)   Wt 66 kg (145 lb 9.8 oz)   SpO2 98%   BMI 25.79 kg/m²     Physical Exam  Constitutional: Oriented to person, place, and time. Appears well-developed and well-nourished. No distress.   Head: Normocephalic and atraumatic.   Right Ear: External ear normal.   Left Ear: External ear normal.   Eyes: ROM intact; finger count intact   Neck: No bruit Neck supple.   Breast Right:   surgery scar   Cardiovascular: Normal rate, regular rhythm, normal heart sounds   Pulmonary/Chest: Effort normal and breath sounds normal. No respiratory distress.No wheezes.    Abdominal: Soft.No distension. There is no tenderness. There is no rebound and no guarding.   Musculoskeletal: Normal range of motion.No edema right big toe valgus deformity; bunions R>L  No cervical adenopathy.   Neurological: Alert and oriented to person, place, and time.  Coordination normal.   Psychiatric: Behavior is normal. Judgment normal.   Nursing note and vitals reviewed.    Laboratory  I have reviewed the pertinent laboratory tests. These are the pertinent findings:  · Reviewed labs    Imaging  I have reviewed the pertinent imaging study reports. These are the pertinent findings:  · Mammogram due in August    Assessment & Plan   Diagnoses and associated orders for this visit:  1. Hypercholesterolemia  Assessment & Plan:  Lipid abnormalities are worsening.  Nutritional counseling was provided. and on statin, consider increase dose   Lipids will be reassessed in 3 months.  Orders:  -     Lipid  Panel With Reflex  -     Comprehensive Metabolic Panel  -     Pravastatin Sodium  2. Osteopenia, unspecified location  Assessment & Plan:  Recheck BMD   Orders:  -     BD DEXA SCAN AXIAL SKELETON  3. BAKER (dyspnea on exertion)  Assessment & Plan:  Trial with bronchodilator   CXR  Orders:  -     XR CHEST PA AND LATERAL 2 VIEWS  -     CT LUNG CANCER SCREENING LOW DOSE WO CONTRAST  -     Albuterol Sulfate HFA  4. Former smoker  Assessment & Plan:  Screening CT   Orders:  -     CT LUNG CANCER SCREENING LOW DOSE WO CONTRAST  5. Medicare annual wellness visit, initial  -     ANNUAL WELLNESS VISIT INITIAL VISIT W PPS

## 2024-05-15 NOTE — SUBJECTIVE & OBJECTIVE
Past Medical History:   Diagnosis Date    Arthritis     Cataract     GERD (gastroesophageal reflux disease)     HEARING LOSS     Hypertension     Osteoporosis, postmenopausal     Squamous Cell Carcinoma 2007    right forearm    Urinary incontinence     rare mixed       Past Surgical History:   Procedure Laterality Date    COLPOPEXY N/A 08/06/2019    Procedure: COLPOPEXY SSL FIXATION;  Surgeon: Alma Dorsey MD;  Location: [address];  Service: Urology;  Laterality: N/A;    CYSTOSCOPY N/A 08/06/2019    Procedure: CYSTOSCOPY;  Surgeon: Alma Dorsey MD;  Location: [address];  Service: Urology;  Laterality: N/A;    ESOPHAGOGASTRODUODENOSCOPY N/A 4/1/2024    Procedure: EGD (ESOPHAGOGASTRODUODENOSCOPY);  Surgeon: Zbigniew Dougherty MD;  Location: [facility];  Service: Endoscopy;  Laterality: N/A;    FRACTURE SURGERY Left 2008    open radius/ulna fracture    HYSTERECTOMY      TANIA/ovaries remain--fibroids- in her late 30's / early 40's       Review of patient's allergies indicates:   Allergen Reactions    Sulfa (sulfonamide antibiotics) Hives     Other reaction(s): Anaphylaxis  Itching   Shortness of breath        Current Facility-Administered Medications on File Prior to Encounter   Medication    lactated ringers infusion     Current Outpatient Medications on File Prior to Encounter   Medication Sig    aspirin 81 MG Chew Take 81 mg by mouth once daily. Not chewable    atenoloL (TENORMIN) 25 MG tablet TAKE 1 TABLET BY MOUTH DAILY    calcium-vitamin D3 500 mg(1,250mg) -200 unit per tablet Take 1 tablet by mouth 2 (two) times daily with meals.    co-enzyme Q-10 30 mg capsule Take 30 mg by mouth once daily.    diclofenac sodium (VOLTAREN ARTHRITIS PAIN) 1 % Gel Apply 2 g topically daily as needed (Pain).    famotidine (PEPCID AC ORAL) Take 1 tablet by mouth nightly as needed (Acid reflux).    multivitamin (THERAGRAN) per tablet Take 1 tablet by mouth once daily.    pantoprazole (PROTONIX) 40 MG tablet  Take 1 tablet (40 mg total) by mouth 2 (two) times daily.    sodium chloride (SALINE MIST NASL) 1 spray by Each Nostril route daily as needed (Congestion).    vitamin A-vitamin C-vit E-min Tab Take 1 tablet by mouth once daily.    [DISCONTINUED] fexofenadine (ALLEGRA) 180 MG tablet Take 1 tablet (180 mg total) by mouth once daily. (Patient taking differently: Take 180 mg by mouth once daily. Takes as needed)     Family History       Problem Relation (Age of Onset)    Arthritis Sister    COPD Mother, Father    Diabetes Sister    Heart disease Mother    Heart failure Mother    Macular degeneration Mother    No Known Problems Brother, Son, Daughter, Sister, Sister, Sister, Brother, Brother, Brother, Brother, Brother, Daughter, Son          Tobacco Use    Smoking status: Never    Smokeless tobacco: Never   Substance and Sexual Activity    Alcohol use: Not Currently     Comment: 1-2 glasses wine weekly    Drug use: No    Sexual activity: Yes     Partners: Male     Birth control/protection: None     Review of Systems   Constitutional: Negative for chills, diaphoresis and night sweats.   Cardiovascular:  Positive for chest pain and palpitations. Negative for dyspnea on exertion, irregular heartbeat, leg swelling and near-syncope.   Respiratory:  Negative for cough, shortness of breath and wheezing.    Skin:  Negative for color change and dry skin.   Musculoskeletal:  Negative for joint pain and joint swelling.   Gastrointestinal:  Positive for bloating. Negative for abdominal pain, diarrhea, nausea and vomiting.   Genitourinary:  Negative for dysuria.   Neurological:  Negative for dizziness, headaches, light-headedness and weakness.   All other systems reviewed and are negative.    Objective:     Vital Signs (Most Recent):  Temp: 98.5 °F (36.9 °C) (05/15/24 1210)  Pulse: (!) 57 (05/15/24 1445)  Resp: 18 (05/15/24 1210)  BP: (!) 154/73 (05/15/24 1210)  SpO2: 99 % (05/15/24 1210) Vital Signs (24h Range):  Temp:  [97.7 °F  (36.5 °C)-98.5 °F (36.9 °C)] 98.5 °F (36.9 °C)  Pulse:  [51-67] 57  Resp:  [15-20] 18  SpO2:  [95 %-99 %] 99 %  BP: (139-161)/(71-92) 154/73     Weight: 55.3 kg (122 lb)  Body mass index is 22.31 kg/m².    SpO2: 99 %       No intake or output data in the 24 hours ending 05/15/24 1512    Lines/Drains/Airways       Peripheral Intravenous Line  Duration                  Peripheral IV - Single Lumen 05/15/24 0732 22 G Distal;Left;Posterior Forearm <1 day                     Physical Exam  Constitutional:       General: She is not in acute distress.     Appearance: Normal appearance. She is not ill-appearing.   HENT:      Head: Normocephalic and atraumatic.   Eyes:      General: No scleral icterus.     Extraocular Movements: Extraocular movements intact.      Conjunctiva/sclera: Conjunctivae normal.   Cardiovascular:      Pulses: Normal pulses.      Heart sounds: Normal heart sounds. No murmur heard.     No friction rub. No gallop.   Pulmonary:      Effort: Pulmonary effort is normal. No respiratory distress.      Breath sounds: No stridor. No wheezing or rales.   Abdominal:      General: Abdomen is flat. There is no distension.      Palpations: Abdomen is soft. There is no mass.      Tenderness: There is no abdominal tenderness. There is no guarding or rebound.   Musculoskeletal:         General: No swelling or tenderness.      Right lower leg: No edema.      Left lower leg: No edema.   Skin:     General: Skin is warm and dry.      Capillary Refill: Capillary refill takes less than 2 seconds.      Coloration: Skin is not jaundiced or pale.      Findings: No erythema or rash.   Neurological:      Mental Status: She is alert.   Psychiatric:         Mood and Affect: Mood normal.         Behavior: Behavior normal.          Significant Labs:     Significant Imaging:     Recent Labs   Lab 05/15/24  0110   WBC 7.75   HGB 11.7*   HCT 35.4*   MCV 91   RBC 3.89*   MCH 30.1   MCHC 33.1   RDW 13.0      MPV 9.8   GRAN 62.4   "4.8   LYMPH 22.5  1.7   MONO 9.9  0.8   EOSINOPHIL 3.7   BASOPHIL 1.2       Recent Labs   Lab 05/15/24  0110      K 3.4*      CO2 24   BUN 14   CREATININE 0.8      CALCIUM 9.8   PROT 7.4   ALBUMIN 3.8   ALKPHOS 78   BILITOT 0.4   ALT 32   AST 27   ANIONGAP 13       No results for input(s): "COLORU", "APPEARANCEUA", "PHUR", "SPECGRAV", "PROTEINUA", "GLUCUA", "KETONESU", "BILIRUBINUA", "OCCULTUA", "UROBILINOGEN", "NITRITE", "LEUKOCYTESUR", "RBCUA", "WBCUA", "BACTERIA", "SQUAMEPITHEL", "HYALINECASTS", "GRANULARCAST", "MICROCMT" in the last 168 hours.    Invalid input(s): "SPECIMENU", "AMORPHOUSU"    No results for input(s): "PH", "PCO2", "PO2", "HCO3", "POCSATURATED", "BE" in the last 168 hours.    Recent Labs   Lab 05/15/24  0110 05/15/24  0341 05/15/24  0742 05/15/24  1114 05/15/24  1212   TROPONINI 0.025 0.035* 0.036* 0.026 0.028*       No results for input(s): "PT", "INR", "APTT" in the last 168 hours.    Lab Results   Component Value Date    HGBA1C 5.5 05/15/2024       No results for input(s): "TSH", "R1PZWWB", "I5EJEVV", "THYROIDAB", "FREET4" in the last 168 hours.    Microbiology Results (last 7 days)       ** No results found for the last 168 hours. **            X-Ray Chest AP Portable    Result Date: 5/15/2024  EXAMINATION: XR CHEST AP PORTABLE CLINICAL HISTORY: Chest Pain; TECHNIQUE: Single frontal view of the chest was performed. COMPARISON: 05/06/2024. FINDINGS: No consolidation, pleural effusion or pneumothorax. Cardiomediastinal silhouette is unremarkable.     No acute findings in the chest. Electronically signed by: Anibal Pineda MD Date:    05/15/2024 Time:    02:11    Echo    Result Date: 5/7/2024    Left Ventricle: The left ventricle is normal in size. Normal wall thickness. Normal wall motion. There is normal systolic function. Ejection fraction by visual approximation is 60%.   Right Ventricle: Normal right ventricular cavity size. Wall thickness is normal. Systolic function " is normal.   Left Atrium: Left atrium is moderately dilated.   Mitral Valve: There is mild regurgitation.   Tricuspid Valve: There is mild regurgitation.   Pulmonary Artery: The estimated pulmonary artery systolic pressure is 28 mmHg.   IVC/SVC: Normal venous pressure at 3 mmHg.     X-Ray Chest 1 View    Result Date: 5/6/2024  EXAMINATION: XR CHEST 1 VIEW TECHNIQUE: One view COMPARISON: Comparison is made to 11/03/2008.  Clinical information of epigastric pain is obtained from the electronic medical record. FINDINGS: Allowing for magnification of the cardiomediastinal silhouette related to projection, the heart size is within normal limits, as is the appearance of the pulmonary vascularity.  Lung zones appear clear, and are free of significant airspace consolidation or volume loss.  No pleural fluid.  No hilar or mediastinal mass lesion, with mild tortuosity of the thoracic aorta with calcification in the wall of the aortic arch incidentally noted.  No pneumothorax.     No significant intrathoracic abnormality.  No significant detrimental interval change in the appearance of the chest since 11/03/2008 is appreciated. Electronically signed by: Bravo Medina MD Date:    05/06/2024 Time:    11:47    CT Abdomen Pelvis With IV Contrast NO Oral Contrast    Result Date: 5/6/2024  EXAMINATION: CT ABDOMEN PELVIS WITH IV CONTRAST CLINICAL HISTORY: Abdominal pain, acute, nonlocalized; TECHNIQUE: Low dose axial images, sagittal and coronal reformations were obtained from the lung bases to the pubic symphysis.  75 mL of IV contrast was administered. COMPARISON: Abdominal radiograph 01/03/2020. FINDINGS: Lower chest: Heart size is normal. Mild bandlike atelectasis in the lung bases. Abdomen: Liver is normal in size.  No focal hepatic lesion.  Fatty infiltration about the falciform ligament.  Portal vasculature is patent.  Gallbladder is unremarkable. No calcified gallstones.  No intrahepatic biliary dilatation.  Common duct dilated  up to 10 mm.  No obvious cause for obstruction. Spleen, adrenals, and pancreas are unremarkable. Kidneys are symmetric.  No renal or ureteral stones. No hydronephrosis. No distended loops of small bowel. Abdominal aorta is normal in course and caliber.  Mild calcific atherosclerosis. No abdominal lymphadenopathy. No abdominal free fluid or pneumoperitoneum. Pelvis: Urinary bladder is distended without wall thickening.  No free fluid in the pelvis. Uterus is surgically absent.  No adnexal lesion. Bones and soft tissues: No aggressive osseous lesions. Degenerative changes of visualized osseous structures.  Grade 1 anterolisthesis of L4 on L5. tiny fat containing umbilical hernia.     No acute abdominopelvic abnormality. Additional findings in the body of the report. Electronically signed by resident: Dieudonne Izquierdo Date:    05/06/2024 Time:    07:47 Electronically signed by: Quirino Salgado MD Date:    05/06/2024 Time:    07:59

## 2024-05-15 NOTE — H&P
[facility] [address] [place] [address] Medicine  History & Physical    Patient Name: Betzy Cooley  MRN: [id number]  Patient Class: OP- Observation  Admission Date: 5/15/2024  Attending Physician: Daniela Combs MD   Primary Care Provider: Praful Paul MD         Patient information was obtained from patient, past medical records, and ER records.     Subjective:     Principal Problem:Chest pain    Chief Complaint:   Chief Complaint   Patient presents with    Chest Pain     Chest pain started this morning around 0900, says palpitations throughout the day.        HPI: This is a 83-year-old patient with a history of gastritis, HTN who presents with chest pain. Has been experiencing heartburn chronically for over 20 years. Patient presented with on and off substernal chest pain, ongoing for past 1 week.  Patient denies any radiation to neck or left upper extremity.  No aggravating or relieving factors.  Patient presented to ER last week.  Echo at that time did not show any wall motion abnormalities and patient was discharged home.  Denies any shortness of breath, palpitations, dysphagia, recent weight loss, melena, use of NSAIDs.  On presentation patient is afebrile, bradycardic with heart rate of 64, hypertensive with blood pressure of 161/83 mm of Hg saturating 98% on room air.  Blood work revealed normocytic anemia with hemoglobin of 11.7.  Hypokalemia with potassium of 3.4 BNP elevated to 148.  Troponin of 0.028.  Chest x-ray was unremarkable.  EKG revealed sinus bradycardia, diffuse T-wave depressions in lateral and inferior leads.  Patient received Maalox and aspirin 325 mg admitted to the hospital.    Past Medical History:   Diagnosis Date    Arthritis     Cataract     GERD (gastroesophageal reflux disease)     HEARING LOSS     Hypertension     Osteoporosis, postmenopausal     Squamous Cell Carcinoma 2007    right forearm    Urinary incontinence     rare mixed       Past Surgical History:    Procedure Laterality Date    COLPOPEXY N/A 08/06/2019    Procedure: COLPOPEXY SSL FIXATION;  Surgeon: Alma Dorsey MD;  Location: [facility] OR [address] [place] [address];  Service: Urology;  Laterality: N/A;    CYSTOSCOPY N/A 08/06/2019    Procedure: CYSTOSCOPY;  Surgeon: Alma Dorsey MD;  [facility];  Service: Urology;  Laterality: N/A;    ESOPHAGOGASTRODUODENOSCOPY N/A 4/1/2024    Procedure: EGD (ESOPHAGOGASTRODUODENOSCOPY);  Surgeon: Zbigniew Dougherty MD;  Location: [facility];  Service: Endoscopy;  Laterality: N/A;    FRACTURE SURGERY Left 2008    open radius/ulna fracture    HYSTERECTOMY      TANIA/ovaries remain--fibroids- in her late 30's / early 40's       Review of patient's allergies indicates:   Allergen Reactions    Sulfa (sulfonamide antibiotics) Hives     Other reaction(s): Anaphylaxis  Itching   Shortness of breath        Current Facility-Administered Medications on File Prior to Encounter   Medication    lactated ringers infusion     Current Outpatient Medications on File Prior to Encounter   Medication Sig    aspirin 81 MG Chew Take 81 mg by mouth once daily. Not chewable    atenoloL (TENORMIN) 25 MG tablet TAKE 1 TABLET BY MOUTH DAILY    calcium-vitamin D3 500 mg(1,250mg) -200 unit per tablet Take 1 tablet by mouth 2 (two) times daily with meals.    co-enzyme Q-10 30 mg capsule Take 30 mg by mouth once daily.    fexofenadine (ALLEGRA) 180 MG tablet Take 1 tablet (180 mg total) by mouth once daily. (Patient taking differently: Take 180 mg by mouth once daily. Takes as needed)    multivitamin (THERAGRAN) per tablet Take 1 tablet by mouth once daily.    pantoprazole (PROTONIX) 40 MG tablet Take 1 tablet (40 mg total) by mouth 2 (two) times daily.    vitamin A-vitamin C-vit E-min Tab Take 1 tablet by mouth once daily.     Family History       Problem Relation (Age of Onset)    Arthritis Sister    COPD Mother, Father    Diabetes Sister    Heart disease Mother    Heart failure Mother    Macular  degeneration Mother    No Known Problems Brother, Son, Daughter, Sister, Sister, Sister, Brother, Brother, Brother, Brother, Brother, Daughter, Son          Tobacco Use    Smoking status: Never    Smokeless tobacco: Never   Substance and Sexual Activity    Alcohol use: Not Currently     Comment: 1-2 glasses wine weekly    Drug use: No    Sexual activity: Yes     Partners: Male     Birth control/protection: None     Review of Systems   Constitutional:  Negative for chills and fever.   HENT:  Negative for rhinorrhea and sore throat.    Respiratory:  Negative for cough, shortness of breath and wheezing.    Cardiovascular:  Positive for chest pain. Negative for palpitations and leg swelling.   Gastrointestinal:  Negative for abdominal pain, nausea and vomiting.   Endocrine: Negative for cold intolerance, polydipsia and polyphagia.   Genitourinary:  Negative for dysuria and hematuria.   Musculoskeletal: Negative.    Neurological: Negative.    Psychiatric/Behavioral: Negative.       Objective:     Vital Signs (Most Recent):  Temp: 98.5 °F (36.9 °C) (05/15/24 1210)  Pulse: (!) 52 (05/15/24 1210)  Resp: 18 (05/15/24 1210)  BP: (!) 154/73 (05/15/24 1210)  SpO2: 99 % (05/15/24 1210) Vital Signs (24h Range):  Temp:  [97.7 °F (36.5 °C)-98.5 °F (36.9 °C)] 98.5 °F (36.9 °C)  Pulse:  [51-67] 52  Resp:  [15-20] 18  SpO2:  [95 %-99 %] 99 %  BP: (139-161)/(71-92) 154/73     Weight: 55.3 kg (122 lb)  Body mass index is 22.31 kg/m².     Physical Exam  Constitutional:       Appearance: Normal appearance.   HENT:      Mouth/Throat:      Mouth: Mucous membranes are moist.      Pharynx: Oropharynx is clear.   Eyes:      Conjunctiva/sclera: Conjunctivae normal.      Pupils: Pupils are equal, round, and reactive to light.   Cardiovascular:      Rate and Rhythm: Normal rate and regular rhythm.   Pulmonary:      Effort: Pulmonary effort is normal.      Breath sounds: Normal breath sounds.   Abdominal:      General: Bowel sounds are normal.       Palpations: Abdomen is soft.   Musculoskeletal:         General: No swelling or tenderness. Normal range of motion.      Cervical back: Normal range of motion and neck supple.   Neurological:      General: No focal deficit present.      Mental Status: She is alert and oriented to person, place, and time.   Psychiatric:         Mood and Affect: Mood normal.         Behavior: Behavior normal.              CRANIAL NERVES     CN III, IV, VI   Pupils are equal, round, and reactive to light.       Significant Labs: All pertinent labs within the past 24 hours have been reviewed.  Recent Lab Results  (Last 5 results in the past 24 hours)        05/15/24  1212   05/15/24  1114   05/15/24  0742   05/15/24  0344   05/15/24  0341        ISTAT Cardiac Troponin I       0.03         Sample       VENOUS  Comment: A single negative troponin is insufficient to rule out myocardial infarction.  The use of a serial sampling protocol is recommended practice. Correlate results with reference intervals established for methodology used. Point of care and core laboratory   troponin results are not interchangeable.           Troponin I 0.028  Comment: The reference interval for Troponin I represents the 99th percentile   cutoff   for our facility and is consistent with 3rd generation assay   performance.     0.026  Comment: The reference interval for Troponin I represents the 99th percentile   cutoff   for our facility and is consistent with 3rd generation assay   performance.     0.036  Comment: The reference interval for Troponin I represents the 99th percentile   cutoff   for our facility and is consistent with 3rd generation assay   performance.       0.035  Comment: The reference interval for Troponin I represents the 99th percentile   cutoff   for our facility and is consistent with 3rd generation assay   performance.                                Significant Imaging: I have reviewed all pertinent imaging results/findings within the  past 24 hours.  Assessment/Plan:     * Chest pain  Patient presented with chest pain for past 1 week.    On and off, pain lasts from few minutes to an hours sometimes.  Troponin elevated to 0.035, trend   EKG:  Bradycardia with diffuse T-wave inversions.    Aspirin 325 mg given in the ED.    Cardiology consulted.  appreciate recommendations  Plan for stress test tomorrow.            Hypertension  Chronic, controlled. Latest blood pressure and vitals reviewed-     Temp:  [97.7 °F (36.5 °C)-98.5 °F (36.9 °C)]   Pulse:  [51-67]   Resp:  [15-20]   BP: (139-161)/(71-92)   SpO2:  [95 %-99 %] .   Home meds for hypertension were reviewed and noted below.   Hypertension Medications               atenoloL (TENORMIN) 25 MG tablet TAKE 1 TABLET BY MOUTH DAILY            While in the hospital, will manage blood pressure as follows; Continue home antihypertensive regimen    Will utilize p.r.n. blood pressure medication only if patient's blood pressure greater than 180/110 and she develops symptoms such as worsening chest pain or shortness of breath.    GERD (gastroesophageal reflux disease)  Protonix ordered        VTE Risk Mitigation (From admission, onward)           Ordered     IP VTE HIGH RISK PATIENT  Once         05/15/24 1045     Place sequential compression device  Until discontinued         05/15/24 1045                       On 05/15/2024, patient should be placed in hospital observation services under my care.             Daniela Combs MD  Department of Hospital Medicine  [facility] 11[facility]

## 2024-05-15 NOTE — Clinical Note
The catheter was inserted into the left ventricle. Hemodynamics were performed.  and Pullback was recorded.  EDP= 7

## 2024-05-16 PROBLEM — I21.4 NSTEMI (NON-ST ELEVATED MYOCARDIAL INFARCTION): Status: ACTIVE | Noted: 2024-05-16

## 2024-05-16 PROBLEM — I25.118 CORONARY ARTERY DISEASE OF NATIVE ARTERY OF NATIVE HEART WITH STABLE ANGINA PECTORIS: Status: ACTIVE | Noted: 2024-05-15

## 2024-05-16 LAB
ABO + RH BLD: NORMAL
ALBUMIN SERPL BCP-MCNC: 3.4 G/DL (ref 3.5–5.2)
ALP SERPL-CCNC: 75 U/L (ref 55–135)
ALT SERPL W/O P-5'-P-CCNC: 23 U/L (ref 10–44)
ANION GAP SERPL CALC-SCNC: 9 MMOL/L (ref 8–16)
ASCENDING AORTA: 2.98 CM
AST SERPL-CCNC: 22 U/L (ref 10–40)
BASOPHILS # BLD AUTO: 0.1 K/UL (ref 0–0.2)
BASOPHILS NFR BLD: 1.4 % (ref 0–1.9)
BILIRUB SERPL-MCNC: 0.9 MG/DL (ref 0.1–1)
BLD GP AB SCN CELLS X3 SERPL QL: NORMAL
BSA FOR ECHO PROCEDURE: 1.56 M2
BUN SERPL-MCNC: 15 MG/DL (ref 8–23)
CALCIUM SERPL-MCNC: 9.2 MG/DL (ref 8.7–10.5)
CHLORIDE SERPL-SCNC: 106 MMOL/L (ref 95–110)
CO2 SERPL-SCNC: 27 MMOL/L (ref 23–29)
CREAT SERPL-MCNC: 0.8 MG/DL (ref 0.5–1.4)
CV ECHO LV RWT: 0.22 CM
CV PHARM DOSE: 40 MG
CV STRESS BASE HR: 51 BPM
DIASTOLIC BLOOD PRESSURE: 84 MMHG
DIFFERENTIAL METHOD BLD: ABNORMAL
DOP CALC LVOT AREA: 3.1 CM2
DOP CALC LVOT DIAMETER: 2 CM
DOP CALC LVOT PEAK VEL: 0.99 M/S
DOP CALC LVOT STROKE VOLUME: 76.24 CM3
DOP CALCLVOT PEAK VEL VTI: 24.28 CM
E WAVE DECELERATION TIME: 300.56 MSEC
E/A RATIO: 0.96
E/E' RATIO: 8.13 M/S
ECHO LV POSTERIOR WALL: 0.51 CM (ref 0.6–1.1)
EOSINOPHIL # BLD AUTO: 0.6 K/UL (ref 0–0.5)
EOSINOPHIL NFR BLD: 7.5 % (ref 0–8)
ERYTHROCYTE [DISTWIDTH] IN BLOOD BY AUTOMATED COUNT: 12.9 % (ref 11.5–14.5)
EST. GFR  (NO RACE VARIABLE): >60 ML/MIN/1.73 M^2
FRACTIONAL SHORTENING: 26 % (ref 28–44)
GLUCOSE SERPL-MCNC: 88 MG/DL (ref 70–110)
HCT VFR BLD AUTO: 34.3 % (ref 37–48.5)
HGB BLD-MCNC: 11.3 G/DL (ref 12–16)
IMM GRANULOCYTES # BLD AUTO: 0.02 K/UL (ref 0–0.04)
IMM GRANULOCYTES NFR BLD AUTO: 0.3 % (ref 0–0.5)
INTERVENTRICULAR SEPTUM: 0.56 CM (ref 0.6–1.1)
IVRT: 133.21 MSEC
LA MAJOR: 5.34 CM
LA MINOR: 5.15 CM
LA WIDTH: 4.3 CM
LEFT ATRIUM SIZE: 4.22 CM
LEFT ATRIUM VOLUME INDEX MOD: 36.5 ML/M2
LEFT ATRIUM VOLUME INDEX: 52.2 ML/M2
LEFT ATRIUM VOLUME MOD: 56.55 CM3
LEFT ATRIUM VOLUME: 80.87 CM3
LEFT INTERNAL DIMENSION IN SYSTOLE: 3.5 CM (ref 2.1–4)
LEFT VENTRICLE DIASTOLIC VOLUME INDEX: 65.94 ML/M2
LEFT VENTRICLE DIASTOLIC VOLUME: 102.2 ML
LEFT VENTRICLE MASS INDEX: 48 G/M2
LEFT VENTRICLE SYSTOLIC VOLUME INDEX: 32.7 ML/M2
LEFT VENTRICLE SYSTOLIC VOLUME: 50.71 ML
LEFT VENTRICULAR INTERNAL DIMENSION IN DIASTOLE: 4.7 CM (ref 3.5–6)
LEFT VENTRICULAR MASS: 74.05 G
LV LATERAL E/E' RATIO: 6.5 M/S
LV SEPTAL E/E' RATIO: 10.83 M/S
LYMPHOCYTES # BLD AUTO: 2.1 K/UL (ref 1–4.8)
LYMPHOCYTES NFR BLD: 27.9 % (ref 18–48)
MCH RBC QN AUTO: 30.6 PG (ref 27–31)
MCHC RBC AUTO-ENTMCNC: 32.9 G/DL (ref 32–36)
MCV RBC AUTO: 93 FL (ref 82–98)
MONOCYTES # BLD AUTO: 0.8 K/UL (ref 0.3–1)
MONOCYTES NFR BLD: 10.8 % (ref 4–15)
MV A" WAVE DURATION": 11.42 MSEC
MV PEAK A VEL: 0.68 M/S
MV PEAK E VEL: 0.65 M/S
MV STENOSIS PRESSURE HALF TIME: 87.16 MS
MV VALVE AREA P 1/2 METHOD: 2.52 CM2
NEUTROPHILS # BLD AUTO: 3.9 K/UL (ref 1.8–7.7)
NEUTROPHILS NFR BLD: 52.1 % (ref 38–73)
NRBC BLD-RTO: 0 /100 WBC
OHS CV CPX 1 MINUTE RECOVERY HEART RATE: 121 BPM
OHS CV CPX 85 PERCENT MAX PREDICTED HEART RATE MALE: 116
OHS CV CPX MAX PREDICTED HEART RATE: 136
OHS CV CPX PATIENT IS FEMALE: 1
OHS CV CPX PATIENT IS MALE: 0
OHS CV CPX PEAK DIASTOLIC BLOOD PRESSURE: 81 MMHG
OHS CV CPX PEAK HEAR RATE: 121 BPM
OHS CV CPX PEAK RATE PRESSURE PRODUCT: ABNORMAL
OHS CV CPX PEAK SYSTOLIC BLOOD PRESSURE: 159 MMHG
OHS CV CPX PERCENT MAX PREDICTED HEART RATE ACHIEVED: 92
OHS CV CPX RATE PRESSURE PRODUCT PRESENTING: 9129
OHS CV INITIAL DOSE: 10 MCG/KG/MIN
OHS CV PEAK DOSE: 40 MCG/KG/MIN
OHS CV RV/LV RATIO: 0.61 CM
OHS QRS DURATION: 98 MS
OHS QTC CALCULATION: 463 MS
PISA TR MAX VEL: 2.79 M/S
PLATELET # BLD AUTO: 243 K/UL (ref 150–450)
PMV BLD AUTO: 10.3 FL (ref 9.2–12.9)
POCT GLUCOSE: 99 MG/DL (ref 70–110)
POTASSIUM SERPL-SCNC: 3.9 MMOL/L (ref 3.5–5.1)
PROT SERPL-MCNC: 6.7 G/DL (ref 6–8.4)
PULM VEIN S/D RATIO: 0.88
PV PEAK D VEL: 0.65 M/S
PV PEAK S VEL: 0.57 M/S
RA MAJOR: 4.38 CM
RA PRESSURE ESTIMATED: 3 MMHG
RA WIDTH: 3.28 CM
RBC # BLD AUTO: 3.69 M/UL (ref 4–5.4)
RIGHT VENTRICULAR END-DIASTOLIC DIMENSION: 2.88 CM
RV TB RVSP: 6 MMHG
SINUS: 2.45 CM
SODIUM SERPL-SCNC: 142 MMOL/L (ref 136–145)
SPECIMEN OUTDATE: NORMAL
STJ: 2.17 CM
SYSTOLIC BLOOD PRESSURE: 179 MMHG
TDI LATERAL: 0.1 M/S
TDI SEPTAL: 0.06 M/S
TDI: 0.08 M/S
TR MAX PG: 31 MMHG
TRICUSPID ANNULAR PLANE SYSTOLIC EXCURSION: 2.11 CM
TV REST PULMONARY ARTERY PRESSURE: 34 MMHG
WBC # BLD AUTO: 7.38 K/UL (ref 3.9–12.7)
Z-SCORE OF LEFT VENTRICULAR DIMENSION IN END DIASTOLE: 0.46
Z-SCORE OF LEFT VENTRICULAR DIMENSION IN END SYSTOLE: 1.81

## 2024-05-16 PROCEDURE — 99232 SBSQ HOSP IP/OBS MODERATE 35: CPT | Mod: HCNC,GC,, | Performed by: INTERNAL MEDICINE

## 2024-05-16 PROCEDURE — 85025 COMPLETE CBC W/AUTO DIFF WBC: CPT | Mod: HCNC | Performed by: STUDENT IN AN ORGANIZED HEALTH CARE EDUCATION/TRAINING PROGRAM

## 2024-05-16 PROCEDURE — 25000003 PHARM REV CODE 250: Mod: HCNC | Performed by: PHYSICIAN ASSISTANT

## 2024-05-16 PROCEDURE — 63600175 PHARM REV CODE 636 W HCPCS: Mod: HCNC | Performed by: INTERNAL MEDICINE

## 2024-05-16 PROCEDURE — 21400001 HC TELEMETRY ROOM: Mod: HCNC

## 2024-05-16 PROCEDURE — 96361 HYDRATE IV INFUSION ADD-ON: CPT

## 2024-05-16 PROCEDURE — 99152 MOD SED SAME PHYS/QHP 5/>YRS: CPT | Mod: HCNC,,, | Performed by: INTERNAL MEDICINE

## 2024-05-16 PROCEDURE — 93454 CORONARY ARTERY ANGIO S&I: CPT | Mod: 26,HCNC,, | Performed by: INTERNAL MEDICINE

## 2024-05-16 PROCEDURE — 25000003 PHARM REV CODE 250: Mod: HCNC | Performed by: INTERNAL MEDICINE

## 2024-05-16 PROCEDURE — 25000003 PHARM REV CODE 250: Mod: HCNC | Performed by: STUDENT IN AN ORGANIZED HEALTH CARE EDUCATION/TRAINING PROGRAM

## 2024-05-16 PROCEDURE — 36415 COLL VENOUS BLD VENIPUNCTURE: CPT | Mod: HCNC | Performed by: STUDENT IN AN ORGANIZED HEALTH CARE EDUCATION/TRAINING PROGRAM

## 2024-05-16 PROCEDURE — 93005 ELECTROCARDIOGRAM TRACING: CPT | Mod: HCNC

## 2024-05-16 PROCEDURE — 63600150 PHARM REV CODE 636: Mod: HCNC | Performed by: INTERNAL MEDICINE

## 2024-05-16 PROCEDURE — 86901 BLOOD TYPING SEROLOGIC RH(D): CPT | Mod: HCNC | Performed by: INTERNAL MEDICINE

## 2024-05-16 PROCEDURE — 63600175 PHARM REV CODE 636 W HCPCS: Mod: HCNC | Performed by: STUDENT IN AN ORGANIZED HEALTH CARE EDUCATION/TRAINING PROGRAM

## 2024-05-16 PROCEDURE — C1894 INTRO/SHEATH, NON-LASER: HCPCS | Mod: HCNC | Performed by: INTERNAL MEDICINE

## 2024-05-16 PROCEDURE — C1887 CATHETER, GUIDING: HCPCS | Mod: HCNC | Performed by: INTERNAL MEDICINE

## 2024-05-16 PROCEDURE — 99152 MOD SED SAME PHYS/QHP 5/>YRS: CPT | Mod: HCNC | Performed by: INTERNAL MEDICINE

## 2024-05-16 PROCEDURE — 99223 1ST HOSP IP/OBS HIGH 75: CPT | Mod: 25,HCNC,GC, | Performed by: INTERNAL MEDICINE

## 2024-05-16 PROCEDURE — C1769 GUIDE WIRE: HCPCS | Mod: HCNC | Performed by: INTERNAL MEDICINE

## 2024-05-16 PROCEDURE — B2111ZZ FLUOROSCOPY OF MULTIPLE CORONARY ARTERIES USING LOW OSMOLAR CONTRAST: ICD-10-PCS | Performed by: INTERNAL MEDICINE

## 2024-05-16 PROCEDURE — 93010 ELECTROCARDIOGRAM REPORT: CPT | Mod: HCNC,,, | Performed by: INTERNAL MEDICINE

## 2024-05-16 PROCEDURE — 25500020 PHARM REV CODE 255: Mod: HCNC | Performed by: INTERNAL MEDICINE

## 2024-05-16 PROCEDURE — 80053 COMPREHEN METABOLIC PANEL: CPT | Mod: HCNC | Performed by: STUDENT IN AN ORGANIZED HEALTH CARE EDUCATION/TRAINING PROGRAM

## 2024-05-16 PROCEDURE — 36415 COLL VENOUS BLD VENIPUNCTURE: CPT | Mod: HCNC | Performed by: INTERNAL MEDICINE

## 2024-05-16 PROCEDURE — 96376 TX/PRO/DX INJ SAME DRUG ADON: CPT

## 2024-05-16 PROCEDURE — 93454 CORONARY ARTERY ANGIO S&I: CPT | Mod: HCNC | Performed by: INTERNAL MEDICINE

## 2024-05-16 PROCEDURE — C9113 INJ PANTOPRAZOLE SODIUM, VIA: HCPCS | Mod: HCNC | Performed by: STUDENT IN AN ORGANIZED HEALTH CARE EDUCATION/TRAINING PROGRAM

## 2024-05-16 RX ORDER — TALC
6 POWDER (GRAM) TOPICAL NIGHTLY PRN
Status: DISCONTINUED | OUTPATIENT
Start: 2024-05-16 | End: 2024-05-18 | Stop reason: HOSPADM

## 2024-05-16 RX ORDER — ONDANSETRON 8 MG/1
8 TABLET, ORALLY DISINTEGRATING ORAL EVERY 8 HOURS PRN
Status: DISCONTINUED | OUTPATIENT
Start: 2024-05-16 | End: 2024-05-18 | Stop reason: HOSPADM

## 2024-05-16 RX ORDER — DIPHENHYDRAMINE HCL 50 MG
50 CAPSULE ORAL ONCE
Status: COMPLETED | OUTPATIENT
Start: 2024-05-16 | End: 2024-05-16

## 2024-05-16 RX ORDER — MIDAZOLAM HYDROCHLORIDE 1 MG/ML
INJECTION, SOLUTION INTRAMUSCULAR; INTRAVENOUS
Status: DISCONTINUED | OUTPATIENT
Start: 2024-05-16 | End: 2024-05-16 | Stop reason: HOSPADM

## 2024-05-16 RX ORDER — HEPARIN SOD,PORCINE/0.9 % NACL 1000/500ML
INTRAVENOUS SOLUTION INTRAVENOUS
Status: DISCONTINUED | OUTPATIENT
Start: 2024-05-16 | End: 2024-05-16 | Stop reason: HOSPADM

## 2024-05-16 RX ORDER — DOBUTAMINE HYDROCHLORIDE 100 MG/100ML
10 INJECTION INTRAVENOUS
Status: COMPLETED | OUTPATIENT
Start: 2024-05-16 | End: 2024-05-16

## 2024-05-16 RX ORDER — HYDRALAZINE HYDROCHLORIDE 25 MG/1
25 TABLET, FILM COATED ORAL EVERY 8 HOURS PRN
Status: DISCONTINUED | OUTPATIENT
Start: 2024-05-16 | End: 2024-05-18 | Stop reason: HOSPADM

## 2024-05-16 RX ORDER — FENTANYL CITRATE 50 UG/ML
INJECTION, SOLUTION INTRAMUSCULAR; INTRAVENOUS
Status: DISCONTINUED | OUTPATIENT
Start: 2024-05-16 | End: 2024-05-16 | Stop reason: HOSPADM

## 2024-05-16 RX ORDER — SODIUM CHLORIDE 9 MG/ML
INJECTION, SOLUTION INTRAVENOUS CONTINUOUS
Status: ACTIVE | OUTPATIENT
Start: 2024-05-16 | End: 2024-05-16

## 2024-05-16 RX ORDER — ASPIRIN 325 MG
325 TABLET ORAL DAILY
Status: DISCONTINUED | OUTPATIENT
Start: 2024-05-16 | End: 2024-05-16

## 2024-05-16 RX ORDER — ATROPINE SULFATE 0.1 MG/ML
1 INJECTION INTRAVENOUS
Status: COMPLETED | OUTPATIENT
Start: 2024-05-16 | End: 2024-05-16

## 2024-05-16 RX ORDER — SODIUM CHLORIDE 9 MG/ML
INJECTION, SOLUTION INTRAVENOUS CONTINUOUS
Status: DISCONTINUED | OUTPATIENT
Start: 2024-05-16 | End: 2024-05-16

## 2024-05-16 RX ORDER — ACETAMINOPHEN 325 MG/1
650 TABLET ORAL EVERY 4 HOURS PRN
Status: DISCONTINUED | OUTPATIENT
Start: 2024-05-16 | End: 2024-05-18 | Stop reason: HOSPADM

## 2024-05-16 RX ORDER — LIDOCAINE HYDROCHLORIDE 20 MG/ML
INJECTION, SOLUTION INFILTRATION; PERINEURAL
Status: DISCONTINUED | OUTPATIENT
Start: 2024-05-16 | End: 2024-05-16 | Stop reason: HOSPADM

## 2024-05-16 RX ORDER — SODIUM CHLORIDE 0.9 % (FLUSH) 0.9 %
10 SYRINGE (ML) INJECTION
Status: DISCONTINUED | OUTPATIENT
Start: 2024-05-16 | End: 2024-05-18 | Stop reason: HOSPADM

## 2024-05-16 RX ADMIN — ATROPINE SULFATE 1 MG: 0.1 INJECTION INTRAVENOUS at 10:05

## 2024-05-16 RX ADMIN — ASPIRIN 81 MG CHEWABLE TABLET 81 MG: 81 TABLET CHEWABLE at 08:05

## 2024-05-16 RX ADMIN — ATORVASTATIN CALCIUM 40 MG: 40 TABLET, FILM COATED ORAL at 09:05

## 2024-05-16 RX ADMIN — DIPHENHYDRAMINE HYDROCHLORIDE 50 MG: 50 CAPSULE ORAL at 01:05

## 2024-05-16 RX ADMIN — SODIUM CHLORIDE: 0.9 INJECTION, SOLUTION INTRAVENOUS at 04:05

## 2024-05-16 RX ADMIN — TICAGRELOR 180 MG: 90 TABLET ORAL at 02:05

## 2024-05-16 RX ADMIN — ASPIRIN 325 MG ORAL TABLET 325 MG: 325 PILL ORAL at 01:05

## 2024-05-16 RX ADMIN — TICAGRELOR 90 MG: 90 TABLET ORAL at 09:05

## 2024-05-16 RX ADMIN — PANTOPRAZOLE SODIUM 40 MG: 40 INJECTION, POWDER, FOR SOLUTION INTRAVENOUS at 09:05

## 2024-05-16 RX ADMIN — DOBUTAMINE HYDROCHLORIDE 10 MCG/KG/MIN: 100 INJECTION INTRAVENOUS at 10:05

## 2024-05-16 RX ADMIN — PANTOPRAZOLE SODIUM 40 MG: 40 INJECTION, POWDER, FOR SOLUTION INTRAVENOUS at 08:05

## 2024-05-16 RX ADMIN — ATENOLOL 25 MG: 25 TABLET ORAL at 08:05

## 2024-05-16 RX ADMIN — MELATONIN TAB 3 MG 6 MG: 3 TAB at 09:05

## 2024-05-16 NOTE — ASSESSMENT & PLAN NOTE
Chronic, controlled. Latest blood pressure and vitals reviewed-     Temp:  [97.5 °F (36.4 °C)-98.5 °F (36.9 °C)]   Pulse:  [40-61]   Resp:  [16-20]   BP: (131-193)/(68-85)   SpO2:  [94 %-99 %] .   Home meds for hypertension were reviewed and noted below.   Hypertension Medications               atenoloL (TENORMIN) 25 MG tablet TAKE 1 TABLET BY MOUTH DAILY            While in the hospital, will manage blood pressure as follows; Continue home antihypertensive regimen    Will utilize p.r.n. blood pressure medication only if patient's blood pressure greater than 180/110 and she develops symptoms such as worsening chest pain or shortness of breath.

## 2024-05-16 NOTE — ASSESSMENT & PLAN NOTE
Pt presented to the ED with the CC of non-exertional substernal CP that was burning, non-radiating in nature, and she rated an 8/10. CP was fleeting in nature and lasted for 1-2 hours. States that she ate dinner at 5PM and around 9PM while she was sitting on her couch she noticed the CP. Associated with palpitations and abdominal bloating. Denied N/V, diaphoresis, and SOB.    In the ED: EKG was significant for diffuse T wave inversions. BNP elevated at 148. Troponins .025-->.035--> .036-->.026.     -Dobutamine Stress Echo positive with stress-induced ischemia in the mid to distal LAD territory. IC was consulted.  Pt was taken for angiogram. Angiogram showed three vessel CAD. Prox LAD to Mid LAD lesion with 90% stenosis, 1st Diag lesion with 80% stenosis, Ramus lesion with 75% stenosis, and Dist RCA lesion with 80% stenosis.     Stress Echo Which stress agent will be used? Pharmacological; Color Flow Doppler? No    Result Date: 5/16/2024    Left Ventricle: The left ventricle is normal in size. Normal wall   thickness. Septal motion is abnormal. There is normal systolic function   with a visually estimated ejection fraction of 60 - 65%. There is normal   diastolic function. Inconclusive left ventricular filling pressure.    Right Ventricle: Normal right ventricular cavity size. Wall thickness   is normal. Systolic function is normal.    The left atrium is severely dilated.    Aortic Valve: There is mild aortic valve sclerosis. Mildly restricted   motion.    Pulmonary Artery: The estimated pulmonary artery systolic pressure is   34 mmHg.    IVC/SVC: Normal venous pressure at 3 mmHg.    Stress Protocol: The patient was infused intravenously with dobutamine.   The patient received a graduated infusion of the stress agent beginning at   10.0 mcg/kg/min to a peak dose of 40.0 mcg/kg/min. A total dobutamine dose   of 40.0 mg was injected. The peak heart rate was 121.0 bpm, which is 92%   of age predicted maximum heart rate.  The patient was also given atropine.   The patient reported 8/10 chest discomfort during the stress test.    Baseline ECG: The Baseline ECG reveals sinus bradycardia. There is left   axis deviation. The baseline ECG has ST and/or T wave abnormalities   suggestive of myocardial ischemia.    Stress ECG: There are no ST segment deviation identified during the   protocol. There are no arrhythmias during stress. There is normal blood   pressure response with stress.    ECG Conclusion: The ECG portion of the study is negative for ischemia,   though the deep T wave inversion seen at rest normalizes/pseudonormalizes   with stress.    Post-stress       Display both the narrative and impression [both]  At peak stress and into recovery, the mid to   distal LAD territory including the apex becomes hypokinetic, suggesting   stress-induced ischemia in that region of myocardium.    Overall, this study is consistent with stress-induced ischemia in the   mid to distal LAD territory.        Echo    Result Date: 5/7/2024    Left Ventricle: The left ventricle is normal in size. Normal wall   thickness. Normal wall motion. There is normal systolic function. Ejection   fraction by visual approximation is 60%.    Right Ventricle: Normal right ventricular cavity size. Wall thickness   is normal. Systolic function is normal.    Left Atrium: Left atrium is moderately dilated.    Mitral Valve: There is mild regurgitation.    Tricuspid Valve: There is mild regurgitation.    Pulmonary Artery: The estimated pulmonary artery systolic pressure is   28 mmHg.    IVC/SVC: Normal venous pressure at 3 mmHg.        -Loaded with ASA and Brilinta   -Continue ASA 81mg, Lipitor 40mg, Ticagrelor 90mg, and Atenolol 25mg QD   -CTS consulted for CABG Eval

## 2024-05-16 NOTE — SUBJECTIVE & OBJECTIVE
Past Medical History:   Diagnosis Date    Arthritis     Cataract     GERD (gastroesophageal reflux disease)     HEARING LOSS     Hypertension     Osteoporosis, postmenopausal     Squamous Cell Carcinoma 2007    right forearm    Urinary incontinence     rare mixed       Past Surgical History:   Procedure Laterality Date    COLPOPEXY N/A 08/06/2019    Procedure: COLPOPEXY SSL FIXATION;  Surgeon: Alma Dorsey MD;  Location: [address];  Service: Urology;  Laterality: N/A;    CYSTOSCOPY N/A 08/06/2019    Procedure: CYSTOSCOPY;  Surgeon: Alma Dorsey MD;  Location: [address];  Service: Urology;  Laterality: N/A;    ESOPHAGOGASTRODUODENOSCOPY N/A 4/1/2024    Procedure: EGD (ESOPHAGOGASTRODUODENOSCOPY);  Surgeon: Zbigniew Dougherty MD;  Location: [facility];  Service: Endoscopy;  Laterality: N/A;    FRACTURE SURGERY Left 2008    open radius/ulna fracture    HYSTERECTOMY      TANIA/ovaries remain--fibroids- in her late 30's / early 40's       Review of patient's allergies indicates:   Allergen Reactions    Sulfa (sulfonamide antibiotics) Hives     Other reaction(s): Anaphylaxis  Itching   Shortness of breath        PTA Medications   Medication Sig    aspirin 81 MG Chew Take 81 mg by mouth once daily. Not chewable    atenoloL (TENORMIN) 25 MG tablet TAKE 1 TABLET BY MOUTH DAILY    calcium-vitamin D3 500 mg(1,250mg) -200 unit per tablet Take 1 tablet by mouth 2 (two) times daily with meals.    co-enzyme Q-10 30 mg capsule Take 30 mg by mouth once daily.    diclofenac sodium (VOLTAREN ARTHRITIS PAIN) 1 % Gel Apply 2 g topically daily as needed (Pain).    famotidine (PEPCID AC ORAL) Take 1 tablet by mouth nightly as needed (Acid reflux).    multivitamin (THERAGRAN) per tablet Take 1 tablet by mouth once daily.    pantoprazole (PROTONIX) 40 MG tablet Take 1 tablet (40 mg total) by mouth 2 (two) times daily.    sodium chloride (SALINE MIST NASL) 1 spray by Each Nostril route daily as needed (Congestion).     vitamin A-vitamin C-vit E-min Tab Take 1 tablet by mouth once daily.     Family History       Problem Relation (Age of Onset)    Arthritis Sister    COPD Mother, Father    Diabetes Sister    Heart disease Mother    Heart failure Mother    Macular degeneration Mother    No Known Problems Brother, Son, Daughter, Sister, Sister, Sister, Brother, Brother, Brother, Brother, Brother, Daughter, Son          Tobacco Use    Smoking status: Never    Smokeless tobacco: Never   Substance and Sexual Activity    Alcohol use: Not Currently     Comment: 1-2 glasses wine weekly    Drug use: No    Sexual activity: Yes     Partners: Male     Birth control/protection: None     Review of Systems   Constitutional: Negative for diaphoresis and fever.   Cardiovascular:  Negative for chest pain, dyspnea on exertion, leg swelling, near-syncope, orthopnea, palpitations, paroxysmal nocturnal dyspnea and syncope.   Respiratory:  Negative for cough and shortness of breath.    Gastrointestinal:  Negative for abdominal pain, diarrhea, nausea and vomiting.   Neurological:  Negative for light-headedness.   Psychiatric/Behavioral:  Negative for altered mental status and substance abuse.      Objective:     Vital Signs (Most Recent):  Temp: 98.2 °F (36.8 °C) (05/16/24 1219)  Pulse: (!) 55 (05/16/24 1219)  Resp: 20 (05/16/24 1219)  BP: (!) 164/78 (05/16/24 1219)  SpO2: 99 % (05/16/24 1219) Vital Signs (24h Range):  Temp:  [97.5 °F (36.4 °C)-98.2 °F (36.8 °C)] 98.2 °F (36.8 °C)  Pulse:  [40-61] 55  Resp:  [16-20] 20  SpO2:  [94 %-99 %] 99 %  BP: (131-193)/(68-85) 164/78     Weight: 55.3 kg (122 lb)  Body mass index is 22.31 kg/m².    SpO2: 99 %       No intake or output data in the 24 hours ending 05/16/24 1338    Lines/Drains/Airways       Peripheral Intravenous Line  Duration                  Peripheral IV - Single Lumen 05/15/24 0732 22 G Distal;Left;Posterior Forearm 1 day                     Physical Exam  Constitutional:       Appearance:  Normal appearance.   Cardiovascular:      Heart sounds: Normal heart sounds. No murmur heard.     No gallop.      Comments:   2+ DP  2+ PT  2+ Radial  Pulmonary:      Effort: Pulmonary effort is normal.      Breath sounds: Normal breath sounds. No rales.   Musculoskeletal:      Right lower leg: No edema.      Left lower leg: No edema.   Skin:     General: Skin is warm and dry.   Neurological:      Mental Status: She is alert and oriented to person, place, and time. Mental status is at baseline.            Significant Labs: All pertinent lab results from the last 24 hours have been reviewed.    Significant Imaging:  Reviewed

## 2024-05-16 NOTE — NURSING TRANSFER
Nursing Transfer Note      5/16/2024   6:35 PM    Nurse giving handoff:Kimber  Nurse receiving handoff:Sia    Reason patient is being transferred: back to inpatient bed    Transfer To: 1143 obs    Transfer via stretcher    Transfer with cardiac monitoring    Transported by Transport tech    Transfer Vital Signs:  Blood Pressure:152/71  Heart Rate:53  O2:95  Temperature:98.2  Respirations:18      Order for Tele Monitor? Yes    Additional Lines: Normal saline infusing at 150 ml/hr    Any special needs or follow-up needed: monitor right radial site    Patient belongings transferred with patient: Yes    Chart send with patient: Yes  \

## 2024-05-16 NOTE — BRIEF OP NOTE
Brief Operative Note:    : Miguel Jacob MD     Referring Physician: Self,Aaareferral     All Operators: Surgeon(s):  Gillies, Connor M, Mayank Rowe MD Tafur Soto, Jose D., Lico Bhat MD     Preoperative Diagnosis: NSTEMI (non-ST elevated myocardial infarction) [I21.4]     Postop Diagnosis: NSTEMI (non-ST elevated myocardial infarction) [I21.4]    Treatments/Procedures: Procedure(s) (LRB):  ANGIOGRAM, CORONARY ARTERY (Left)    Findings:Severe multivessel coronary disease is present. See catheterization report for full details.    Estimated Blood loss: 20 cc    Specimens removed: No    Recommendations:   - Routine post-cath care as per orders  - IVF at 150 cc/hr for 3 hrs  - Bed rest for 2 hours  - CTS consultation    Connor M Gillies

## 2024-05-16 NOTE — ASSESSMENT & PLAN NOTE
Patient presented with chest pain for past 1 week.    On and off, pain lasts from few minutes to an hours sometimes.  Troponin elevated to 0.035, trend   EKG:  Bradycardia with diffuse T-wave inversions.    Aspirin 325 mg given in the ED.    Cardiology consulted.  Dobutamine stress test 5/16/24 - At peak stress and into recovery, the mid to distal LAD territory including the apex becomes hypokinetic, suggesting stress-induced ischemia in that region of myocardium  5/16/24 - Angiogram planned.

## 2024-05-16 NOTE — ASSESSMENT & PLAN NOTE
[facility] +/- PCI, patient is a JOSE M candidate  - Anti-Thrombotic therapy: ASA, Brilinta (loaded)  - Access: R Radial  - Creatinine: 0.8  - Pre-Op Med: Bendaryl 50mg pO   - All patient's questions were answered.  -The risks, benefits and alternatives of the procedure were explained to the patient.   -The risks of coronary angiography include but are not limited to: bleeding, infection, heart rhythm abnormalities, allergic reactions, kidney injury and potential need for dialysis, stroke and death.   - Should stenting be indicated, the patient has agreed to dual anti-platelet therapy with a drug-eluting stent   - Additionally, pt is aware that non-compliance is likely to result in stent clotting with heart attack, heart failure, and/or death  -The risks of moderate sedation include hypotension, respiratory depression, arrhythmias, bronchospasm, and death.   - Informed consent was obtained and the  patient is agreeable to proceed with the procedure.

## 2024-05-16 NOTE — SUBJECTIVE & OBJECTIVE
Interval History:     Pt was seen and examined at bedside. BIJU VALENCIA. Pt was CP free overnight. DSE was positive. IC was consulted. Pt taken for angiogram and found to have three vessel disease. CTS has been consulted for CABG Eval.     Review of Systems   Constitutional: Negative for diaphoresis and fever.   Cardiovascular:  Negative for chest pain, dyspnea on exertion, leg swelling, near-syncope, orthopnea, palpitations, paroxysmal nocturnal dyspnea and syncope.   Respiratory:  Negative for cough and shortness of breath.    Gastrointestinal:  Negative for abdominal pain, diarrhea, nausea and vomiting.   Neurological:  Negative for light-headedness.   Psychiatric/Behavioral:  Negative for altered mental status and substance abuse.      Objective:     Vital Signs (Most Recent):  Temp: 98.2 °F (36.8 °C) (05/16/24 1600)  Pulse: (!) 53 (05/16/24 1635)  Resp: 20 (05/16/24 1219)  BP: (!) 164/78 (05/16/24 1219)  SpO2: 99 % (05/16/24 1219) Vital Signs (24h Range):  Temp:  [97.6 °F (36.4 °C)-98.2 °F (36.8 °C)] 98.2 °F (36.8 °C)  Pulse:  [40-61] 53  Resp:  [16-20] 20  SpO2:  [94 %-99 %] 99 %  BP: (131-193)/(68-85) 164/78     Weight: 55.3 kg (122 lb)  Body mass index is 22.31 kg/m².     SpO2: 99 %       No intake or output data in the 24 hours ending 05/16/24 1718    Lines/Drains/Airways       Peripheral Intravenous Line  Duration                  Peripheral IV - Single Lumen 05/15/24 0732 22 G Distal;Left;Posterior Forearm 1 day         Peripheral IV - Single Lumen 05/16/24 1402 20 G Posterior;Right Hand <1 day                       Physical Exam  Constitutional:       Appearance: Normal appearance.   Cardiovascular:      Heart sounds: Normal heart sounds. No murmur heard.     No gallop.   Pulmonary:      Effort: Pulmonary effort is normal.      Breath sounds: Normal breath sounds. No rales.   Musculoskeletal:      Right lower leg: No edema.      Left lower leg: No edema.   Skin:     General: Skin is warm and dry.  "  Neurological:      Mental Status: She is alert and oriented to person, place, and time. Mental status is at baseline.            Significant Labs/Significant Imaging:     Recent Labs   Lab 05/15/24  0110 05/16/24  0536   WBC 7.75 7.38   HGB 11.7* 11.3*   HCT 35.4* 34.3*   MCV 91 93   RBC 3.89* 3.69*   MCH 30.1 30.6   MCHC 33.1 32.9   RDW 13.0 12.9    243   MPV 9.8 10.3   GRAN 62.4  4.8 52.1  3.9   LYMPH 22.5  1.7 27.9  2.1   MONO 9.9  0.8 10.8  0.8   EOSINOPHIL 3.7 7.5   BASOPHIL 1.2 1.4       Recent Labs   Lab 05/15/24  0110 05/16/24  0536    142   K 3.4* 3.9    106   CO2 24 27   BUN 14 15   CREATININE 0.8 0.8    88   CALCIUM 9.8 9.2   PROT 7.4 6.7   ALBUMIN 3.8 3.4*   ALKPHOS 78 75   BILITOT 0.4 0.9   ALT 32 23   AST 27 22   ANIONGAP 13 9       No results for input(s): "COLORU", "APPEARANCEUA", "PHUR", "SPECGRAV", "PROTEINUA", "GLUCUA", "KETONESU", "BILIRUBINUA", "OCCULTUA", "UROBILINOGEN", "NITRITE", "LEUKOCYTESUR", "RBCUA", "WBCUA", "BACTERIA", "SQUAMEPITHEL", "HYALINECASTS", "GRANULARCAST", "MICROCMT" in the last 168 hours.    Invalid input(s): "SPECIMENU", "AMORPHOUSU"    No results for input(s): "PH", "PCO2", "PO2", "HCO3", "POCSATURATED", "BE" in the last 168 hours.    Recent Labs   Lab 05/15/24  0110 05/15/24  0341 05/15/24  0742 05/15/24  1114 05/15/24  1212   TROPONINI 0.025 0.035* 0.036* 0.026 0.028*       No results for input(s): "PT", "INR", "APTT" in the last 168 hours.    Lab Results   Component Value Date    HGBA1C 5.5 05/15/2024       No results for input(s): "TSH", "W8FEFNP", "K2CHUYE", "THYROIDAB", "FREET4" in the last 168 hours.    Microbiology Results (last 7 days)       ** No results found for the last 168 hours. **            Cardiac catheterization    Result Date: 5/16/2024    There was three vessel coronary artery disease.   The Prox LAD to Mid LAD lesion was 90% stenosed.   The 1st Diag lesion was 80% stenosed.   The Ramus lesion was 75% stenosed.   The " Dist RCA lesion was 80% stenosed.   The estimated blood loss was <50 mL. The procedure log was documented by Documenter: Clinton Khoury RT and verified by Miguel Alvarez MD. Date: 5/16/2024  Time: 4:16 PM     Stress Echo Which stress agent will be used? Pharmacological; Color Flow Doppler? No    Result Date: 5/16/2024    Left Ventricle: The left ventricle is normal in size. Normal wall thickness. Septal motion is abnormal. There is normal systolic function with a visually estimated ejection fraction of 60 - 65%. There is normal diastolic function. Inconclusive left ventricular filling pressure.   Right Ventricle: Normal right ventricular cavity size. Wall thickness is normal. Systolic function is normal.   The left atrium is severely dilated.   Aortic Valve: There is mild aortic valve sclerosis. Mildly restricted motion.   Pulmonary Artery: The estimated pulmonary artery systolic pressure is 34 mmHg.   IVC/SVC: Normal venous pressure at 3 mmHg.   Stress Protocol: The patient was infused intravenously with dobutamine. The patient received a graduated infusion of the stress agent beginning at 10.0 mcg/kg/min to a peak dose of 40.0 mcg/kg/min. A total dobutamine dose of 40.0 mg was injected. The peak heart rate was 121.0 bpm, which is 92% of age predicted maximum heart rate. The patient was also given atropine. The patient reported 8/10 chest discomfort during the stress test.   Baseline ECG: The Baseline ECG reveals sinus bradycardia. There is left axis deviation. The baseline ECG has ST and/or T wave abnormalities suggestive of myocardial ischemia.   Stress ECG: There are no ST segment deviation identified during the protocol. There are no arrhythmias during stress. There is normal blood pressure response with stress.   ECG Conclusion: The ECG portion of the study is negative for ischemia, though the deep T wave inversion seen at rest normalizes/pseudonormalizes with stress.   Post-stress      At peak stress and  into recovery, the mid to distal LAD territory including the apex becomes hypokinetic, suggesting stress-induced ischemia in that region of myocardium.   Overall, this study is consistent with stress-induced ischemia in the mid to distal LAD territory.     X-Ray Chest AP Portable    Result Date: 5/15/2024  EXAMINATION: XR CHEST AP PORTABLE CLINICAL HISTORY: Chest Pain; TECHNIQUE: Single frontal view of the chest was performed. COMPARISON: 05/06/2024. FINDINGS: No consolidation, pleural effusion or pneumothorax. Cardiomediastinal silhouette is unremarkable.     No acute findings in the chest. Electronically signed by: Anibal Pineda MD Date:    05/15/2024 Time:    02:11    Echo    Result Date: 5/7/2024    Left Ventricle: The left ventricle is normal in size. Normal wall thickness. Normal wall motion. There is normal systolic function. Ejection fraction by visual approximation is 60%.   Right Ventricle: Normal right ventricular cavity size. Wall thickness is normal. Systolic function is normal.   Left Atrium: Left atrium is moderately dilated.   Mitral Valve: There is mild regurgitation.   Tricuspid Valve: There is mild regurgitation.   Pulmonary Artery: The estimated pulmonary artery systolic pressure is 28 mmHg.   IVC/SVC: Normal venous pressure at 3 mmHg.     X-Ray Chest 1 View    Result Date: 5/6/2024  EXAMINATION: XR CHEST 1 VIEW TECHNIQUE: One view COMPARISON: Comparison is made to 11/03/2008.  Clinical information of epigastric pain is obtained from the electronic medical record. FINDINGS: Allowing for magnification of the cardiomediastinal silhouette related to projection, the heart size is within normal limits, as is the appearance of the pulmonary vascularity.  Lung zones appear clear, and are free of significant airspace consolidation or volume loss.  No pleural fluid.  No hilar or mediastinal mass lesion, with mild tortuosity of the thoracic aorta with calcification in the wall of the aortic arch  incidentally noted.  No pneumothorax.     No significant intrathoracic abnormality.  No significant detrimental interval change in the appearance of the chest since 11/03/2008 is appreciated. Electronically signed by: Bravo Medina MD Date:    05/06/2024 Time:    11:47    CT Abdomen Pelvis With IV Contrast NO Oral Contrast    Result Date: 5/6/2024  EXAMINATION: CT ABDOMEN PELVIS WITH IV CONTRAST CLINICAL HISTORY: Abdominal pain, acute, nonlocalized; TECHNIQUE: Low dose axial images, sagittal and coronal reformations were obtained from the lung bases to the pubic symphysis.  75 mL of IV contrast was administered. COMPARISON: Abdominal radiograph 01/03/2020. FINDINGS: Lower chest: Heart size is normal. Mild bandlike atelectasis in the lung bases. Abdomen: Liver is normal in size.  No focal hepatic lesion.  Fatty infiltration about the falciform ligament.  Portal vasculature is patent.  Gallbladder is unremarkable. No calcified gallstones.  No intrahepatic biliary dilatation.  Common duct dilated up to 10 mm.  No obvious cause for obstruction. Spleen, adrenals, and pancreas are unremarkable. Kidneys are symmetric.  No renal or ureteral stones. No hydronephrosis. No distended loops of small bowel. Abdominal aorta is normal in course and caliber.  Mild calcific atherosclerosis. No abdominal lymphadenopathy. No abdominal free fluid or pneumoperitoneum. Pelvis: Urinary bladder is distended without wall thickening.  No free fluid in the pelvis. Uterus is surgically absent.  No adnexal lesion. Bones and soft tissues: No aggressive osseous lesions. Degenerative changes of visualized osseous structures.  Grade 1 anterolisthesis of L4 on L5. tiny fat containing umbilical hernia.     No acute abdominopelvic abnormality. Additional findings in the body of the report. Electronically signed by resident: Dieudonne Izquierdo Date:    05/06/2024 Time:    07:47 Electronically signed by: Quirino Salgado MD Date:    05/06/2024 Time:    07:59

## 2024-05-16 NOTE — PLAN OF CARE
Problem: Adult Inpatient Plan of Care  Goal: Plan of Care Review  Outcome: Progressing  Goal: Patient-Specific Goal (Individualized)  Outcome: Progressing  Goal: Optimal Comfort and Wellbeing  Outcome: Progressing

## 2024-05-16 NOTE — HPI
Betzy Cooley is an 83yoF with a hx of HTN, GERD who presented to the ED with chest pain for her second presentation in the past week. She states it is substernal and a burning type sensation that comes and goes intermittently and lasts minutes to short hours at a time. It is under her sternum without radiation. Her last episode was yesterday and has not recurred since arrival at the hospital. She has a long history of 10+ years of GERD and it usually resolves with a couple chewable antacids and she never has to take more than this for relief. This burning sensation feels different than her GERD does and has not resolved with >1 week of BID Protonix. Resting echo is normal. Cardiology was consulted on this admission and recommended stress echo.  stress echo showed mid to distal LAD territory hypokinesis that came on at peak stress and into recovery, consistent with ischemic myocardium. EKG on last admission without acute ischemic changes. EKG on this admission with deep T wave inversions everywhere but predominantly throughout the precordial leads. Troponin peaked at 0.03 and creatinine is at baseline at 0.8. Interventional cardiology is consulted for further ischemic evaluation.

## 2024-05-16 NOTE — SUBJECTIVE & OBJECTIVE
Interval History: returned from stress test. Appears to have CAD with stress induced ischemia in the LAD region.   Findings discussed with pt. And  in detail.     Awaiting recommendations by cardiology team. Suspect need for angiogram.     Review of Systems   Constitutional:  Negative for activity change.   HENT:  Negative for congestion.    Respiratory:  Negative for apnea.    Cardiovascular:  Positive for chest pain.   Gastrointestinal:  Negative for abdominal distention.     Objective:     Vital Signs (Most Recent):  Temp: 98 °F (36.7 °C) (05/16/24 0852)  Pulse: 60 (baseline dse) (05/16/24 1010)  Resp: 16 (05/16/24 1010)  BP: (!) 179/84 (05/16/24 1010)  SpO2: 98 % (05/16/24 0852) Vital Signs (24h Range):  Temp:  [97.5 °F (36.4 °C)-98.5 °F (36.9 °C)] 98 °F (36.7 °C)  Pulse:  [40-61] 60  Resp:  [16-20] 16  SpO2:  [94 %-99 %] 98 %  BP: (131-193)/(68-85) 179/84     Weight: 55.3 kg (122 lb)  Body mass index is 22.31 kg/m².  No intake or output data in the 24 hours ending 05/16/24 1205      Physical Exam  Constitutional:       General: She is not in acute distress.  HENT:      Mouth/Throat:      Mouth: Mucous membranes are moist.      Pharynx: No oropharyngeal exudate.   Cardiovascular:      Rate and Rhythm: Normal rate and regular rhythm.      Heart sounds: No murmur heard.  Pulmonary:      Effort: No respiratory distress.   Abdominal:      General: There is no distension.   Skin:     Coloration: Skin is not jaundiced.   Neurological:      Mental Status: She is alert.             Significant Labs: All pertinent labs within the past 24 hours have been reviewed.  CBC:   Recent Labs   Lab 05/15/24  0110 05/16/24  0536   WBC 7.75 7.38   HGB 11.7* 11.3*   HCT 35.4* 34.3*    243     CMP:   Recent Labs   Lab 05/15/24  0110 05/16/24  0536    142   K 3.4* 3.9    106   CO2 24 27    88   BUN 14 15   CREATININE 0.8 0.8   CALCIUM 9.8 9.2   PROT 7.4 6.7   ALBUMIN 3.8 3.4*   BILITOT 0.4 0.9   ALKPHOS  78 75   AST 27 22   ALT 32 23   ANIONGAP 13 9       Significant Imaging: I have reviewed all pertinent imaging results/findings within the past 24 hours.

## 2024-05-16 NOTE — PLAN OF CARE
Problem: Adult Inpatient Plan of Care  Goal: Plan of Care Review  Outcome: Progressing  Goal: Patient-Specific Goal (Individualized)  Outcome: Progressing  Goal: Optimal Comfort and Wellbeing  Outcome: Progressing     Problem: Adult Inpatient Plan of Care  Goal: Patient-Specific Goal (Individualized)  Outcome: Progressing     Problem: Adult Inpatient Plan of Care  Goal: Optimal Comfort and Wellbeing  Outcome: Progressing

## 2024-05-16 NOTE — PLAN OF CARE
Inpatient Upgrade Note    Betzy Cooley has warranted treatment spanning two or more midnights of hospital level care for the management of  NSTEMI and severe multivessel coronary disease . She continues to require IV fluids, daily labs, further testing/imaging, monitoring of vital signs, medication adjustments, and further evaluation by consultants. Her condition is also complicated by the following comorbidities: Hypertension, GERD, Osteoporosis, and Squamous cell carcinoma.

## 2024-05-16 NOTE — PROGRESS NOTES
[facility]  Cardiology  Progress Note    Patient Name: Betzy Cooley  MRN: [id number]  Admission Date: 5/15/2024  Hospital Length of Stay: 0 days  Code Status: Full Code   Attending Physician: Augustin Thacker MD   Primary Care Physician: Praful Paul MD  Expected Discharge Date:   Principal Problem:Coronary artery disease of native artery of native heart with stable angina pectoris    Subjective:       Interval History:     Pt was seen and examined at bedside. GATOEON. VSS. Pt was CP free overnight. DSE was positive. IC was consulted. Pt taken for angiogram and found to have three vessel disease. CTS has been consulted for CABG Eval.     Review of Systems   Constitutional: Negative for diaphoresis and fever.   Cardiovascular:  Negative for chest pain, dyspnea on exertion, leg swelling, near-syncope, orthopnea, palpitations, paroxysmal nocturnal dyspnea and syncope.   Respiratory:  Negative for cough and shortness of breath.    Gastrointestinal:  Negative for abdominal pain, diarrhea, nausea and vomiting.   Neurological:  Negative for light-headedness.   Psychiatric/Behavioral:  Negative for altered mental status and substance abuse.      Objective:     Vital Signs (Most Recent):  Temp: 98.2 °F (36.8 °C) (05/16/24 1600)  Pulse: (!) 53 (05/16/24 1635)  Resp: 20 (05/16/24 1219)  BP: (!) 164/78 (05/16/24 1219)  SpO2: 99 % (05/16/24 1219) Vital Signs (24h Range):  Temp:  [97.6 °F (36.4 °C)-98.2 °F (36.8 °C)] 98.2 °F (36.8 °C)  Pulse:  [40-61] 53  Resp:  [16-20] 20  SpO2:  [94 %-99 %] 99 %  BP: (131-193)/(68-85) 164/78     Weight: 55.3 kg (122 lb)  Body mass index is 22.31 kg/m².     SpO2: 99 %       No intake or output data in the 24 hours ending 05/16/24 1718    Lines/Drains/Airways       Peripheral Intravenous Line  Duration                  Peripheral IV - Single Lumen 05/15/24 0732 22 G Distal;Left;Posterior Forearm 1 day         Peripheral IV - Single Lumen 05/16/24 1402 20 G Posterior;Right Hand <1  "day                       Physical Exam  Constitutional:       Appearance: Normal appearance.   Cardiovascular:      Heart sounds: Normal heart sounds. No murmur heard.     No gallop.   Pulmonary:      Effort: Pulmonary effort is normal.      Breath sounds: Normal breath sounds. No rales.   Musculoskeletal:      Right lower leg: No edema.      Left lower leg: No edema.   Skin:     General: Skin is warm and dry.   Neurological:      Mental Status: She is alert and oriented to person, place, and time. Mental status is at baseline.            Significant Labs/Significant Imaging:     Recent Labs   Lab 05/15/24  0110 05/16/24  0536   WBC 7.75 7.38   HGB 11.7* 11.3*   HCT 35.4* 34.3*   MCV 91 93   RBC 3.89* 3.69*   MCH 30.1 30.6   MCHC 33.1 32.9   RDW 13.0 12.9    243   MPV 9.8 10.3   GRAN 62.4  4.8 52.1  3.9   LYMPH 22.5  1.7 27.9  2.1   MONO 9.9  0.8 10.8  0.8   EOSINOPHIL 3.7 7.5   BASOPHIL 1.2 1.4       Recent Labs   Lab 05/15/24  0110 05/16/24  0536    142   K 3.4* 3.9    106   CO2 24 27   BUN 14 15   CREATININE 0.8 0.8    88   CALCIUM 9.8 9.2   PROT 7.4 6.7   ALBUMIN 3.8 3.4*   ALKPHOS 78 75   BILITOT 0.4 0.9   ALT 32 23   AST 27 22   ANIONGAP 13 9       No results for input(s): "COLORU", "APPEARANCEUA", "PHUR", "SPECGRAV", "PROTEINUA", "GLUCUA", "KETONESU", "BILIRUBINUA", "OCCULTUA", "UROBILINOGEN", "NITRITE", "LEUKOCYTESUR", "RBCUA", "WBCUA", "BACTERIA", "SQUAMEPITHEL", "HYALINECASTS", "GRANULARCAST", "MICROCMT" in the last 168 hours.    Invalid input(s): "SPECIMENU", "AMORPHOUSU"    No results for input(s): "PH", "PCO2", "PO2", "HCO3", "POCSATURATED", "BE" in the last 168 hours.    Recent Labs   Lab 05/15/24  0110 05/15/24  0341 05/15/24  0742 05/15/24  1114 05/15/24  1212   TROPONINI 0.025 0.035* 0.036* 0.026 0.028*       No results for input(s): "PT", "INR", "APTT" in the last 168 hours.    Lab Results   Component Value Date    HGBA1C 5.5 05/15/2024       No results for " "input(s): "TSH", "R1HNKDW", "S7QPMAI", "THYROIDAB", "FREET4" in the last 168 hours.    Microbiology Results (last 7 days)       ** No results found for the last 168 hours. **            Cardiac catheterization    Result Date: 5/16/2024    There was three vessel coronary artery disease.   The Prox LAD to Mid LAD lesion was 90% stenosed.   The 1st Diag lesion was 80% stenosed.   The Ramus lesion was 75% stenosed.   The Dist RCA lesion was 80% stenosed.   The estimated blood loss was <50 mL. The procedure log was documented by Documenter: Clinton Khoury RT and verified by Miguel Alvarez MD. Date: 5/16/2024  Time: 4:16 PM     Stress Echo Which stress agent will be used? Pharmacological; Color Flow Doppler? No    Result Date: 5/16/2024    Left Ventricle: The left ventricle is normal in size. Normal wall thickness. Septal motion is abnormal. There is normal systolic function with a visually estimated ejection fraction of 60 - 65%. There is normal diastolic function. Inconclusive left ventricular filling pressure.   Right Ventricle: Normal right ventricular cavity size. Wall thickness is normal. Systolic function is normal.   The left atrium is severely dilated.   Aortic Valve: There is mild aortic valve sclerosis. Mildly restricted motion.   Pulmonary Artery: The estimated pulmonary artery systolic pressure is 34 mmHg.   IVC/SVC: Normal venous pressure at 3 mmHg.   Stress Protocol: The patient was infused intravenously with dobutamine. The patient received a graduated infusion of the stress agent beginning at 10.0 mcg/kg/min to a peak dose of 40.0 mcg/kg/min. A total dobutamine dose of 40.0 mg was injected. The peak heart rate was 121.0 bpm, which is 92% of age predicted maximum heart rate. The patient was also given atropine. The patient reported 8/10 chest discomfort during the stress test.   Baseline ECG: The Baseline ECG reveals sinus bradycardia. There is left axis deviation. The baseline ECG has ST and/or T wave " abnormalities suggestive of myocardial ischemia.   Stress ECG: There are no ST segment deviation identified during the protocol. There are no arrhythmias during stress. There is normal blood pressure response with stress.   ECG Conclusion: The ECG portion of the study is negative for ischemia, though the deep T wave inversion seen at rest normalizes/pseudonormalizes with stress.   Post-stress      At peak stress and into recovery, the mid to distal LAD territory including the apex becomes hypokinetic, suggesting stress-induced ischemia in that region of myocardium.   Overall, this study is consistent with stress-induced ischemia in the mid to distal LAD territory.     X-Ray Chest AP Portable    Result Date: 5/15/2024  EXAMINATION: XR CHEST AP PORTABLE CLINICAL HISTORY: Chest Pain; TECHNIQUE: Single frontal view of the chest was performed. COMPARISON: 05/06/2024. FINDINGS: No consolidation, pleural effusion or pneumothorax. Cardiomediastinal silhouette is unremarkable.     No acute findings in the chest. Electronically signed by: Anibal Pineda MD Date:    05/15/2024 Time:    02:11    Echo    Result Date: 5/7/2024    Left Ventricle: The left ventricle is normal in size. Normal wall thickness. Normal wall motion. There is normal systolic function. Ejection fraction by visual approximation is 60%.   Right Ventricle: Normal right ventricular cavity size. Wall thickness is normal. Systolic function is normal.   Left Atrium: Left atrium is moderately dilated.   Mitral Valve: There is mild regurgitation.   Tricuspid Valve: There is mild regurgitation.   Pulmonary Artery: The estimated pulmonary artery systolic pressure is 28 mmHg.   IVC/SVC: Normal venous pressure at 3 mmHg.     X-Ray Chest 1 View    Result Date: 5/6/2024  EXAMINATION: XR CHEST 1 VIEW TECHNIQUE: One view COMPARISON: Comparison is made to 11/03/2008.  Clinical information of epigastric pain is obtained from the electronic medical record. FINDINGS: Allowing  for magnification of the cardiomediastinal silhouette related to projection, the heart size is within normal limits, as is the appearance of the pulmonary vascularity.  Lung zones appear clear, and are free of significant airspace consolidation or volume loss.  No pleural fluid.  No hilar or mediastinal mass lesion, with mild tortuosity of the thoracic aorta with calcification in the wall of the aortic arch incidentally noted.  No pneumothorax.     No significant intrathoracic abnormality.  No significant detrimental interval change in the appearance of the chest since 11/03/2008 is appreciated. Electronically signed by: Bravo Medina MD Date:    05/06/2024 Time:    11:47    CT Abdomen Pelvis With IV Contrast NO Oral Contrast    Result Date: 5/6/2024  EXAMINATION: CT ABDOMEN PELVIS WITH IV CONTRAST CLINICAL HISTORY: Abdominal pain, acute, nonlocalized; TECHNIQUE: Low dose axial images, sagittal and coronal reformations were obtained from the lung bases to the pubic symphysis.  75 mL of IV contrast was administered. COMPARISON: Abdominal radiograph 01/03/2020. FINDINGS: Lower chest: Heart size is normal. Mild bandlike atelectasis in the lung bases. Abdomen: Liver is normal in size.  No focal hepatic lesion.  Fatty infiltration about the falciform ligament.  Portal vasculature is patent.  Gallbladder is unremarkable. No calcified gallstones.  No intrahepatic biliary dilatation.  Common duct dilated up to 10 mm.  No obvious cause for obstruction. Spleen, adrenals, and pancreas are unremarkable. Kidneys are symmetric.  No renal or ureteral stones. No hydronephrosis. No distended loops of small bowel. Abdominal aorta is normal in course and caliber.  Mild calcific atherosclerosis. No abdominal lymphadenopathy. No abdominal free fluid or pneumoperitoneum. Pelvis: Urinary bladder is distended without wall thickening.  No free fluid in the pelvis. Uterus is surgically absent.  No adnexal lesion. Bones and soft tissues: No  aggressive osseous lesions. Degenerative changes of visualized osseous structures.  Grade 1 anterolisthesis of L4 on L5. tiny fat containing umbilical hernia.     No acute abdominopelvic abnormality. Additional findings in the body of the report. Electronically signed by resident: Dieudonne Izquierdo Date:    05/06/2024 Time:    07:47 Electronically signed by: Quirino Salgado MD Date:    05/06/2024 Time:    07:59    Assessment and Plan:       * Coronary artery disease of native artery of native heart with stable angina pectoris  Pt presented to the ED with the CC of non-exertional substernal CP that was burning, non-radiating in nature, and she rated an 8/10. CP was fleeting in nature and lasted for 1-2 hours. States that she ate dinner at 5PM and around 9PM while she was sitting on her couch she noticed the CP. Associated with palpitations and abdominal bloating. Denied N/V, diaphoresis, and SOB.    In the ED: EKG was significant for diffuse T wave inversions. BNP elevated at 148. Troponins .025-->.035--> .036-->.026.     -Dobutamine Stress Echo positive with stress-induced ischemia in the mid to distal LAD territory. IC was consulted.  Pt was taken for angiogram. Angiogram showed three vessel CAD. Prox LAD to Mid LAD lesion with 90% stenosis, 1st Diag lesion with 80% stenosis, Ramus lesion with 75% stenosis, and Dist RCA lesion with 80% stenosis.     Stress Echo Which stress agent will be used? Pharmacological; Color Flow Doppler? No    Result Date: 5/16/2024    Left Ventricle: The left ventricle is normal in size. Normal wall   thickness. Septal motion is abnormal. There is normal systolic function   with a visually estimated ejection fraction of 60 - 65%. There is normal   diastolic function. Inconclusive left ventricular filling pressure.    Right Ventricle: Normal right ventricular cavity size. Wall thickness   is normal. Systolic function is normal.    The left atrium is severely dilated.    Aortic Valve: There is  mild aortic valve sclerosis. Mildly restricted   motion.    Pulmonary Artery: The estimated pulmonary artery systolic pressure is   34 mmHg.    IVC/SVC: Normal venous pressure at 3 mmHg.    Stress Protocol: The patient was infused intravenously with dobutamine.   The patient received a graduated infusion of the stress agent beginning at   10.0 mcg/kg/min to a peak dose of 40.0 mcg/kg/min. A total dobutamine dose   of 40.0 mg was injected. The peak heart rate was 121.0 bpm, which is 92%   of age predicted maximum heart rate. The patient was also given atropine.   The patient reported 8/10 chest discomfort during the stress test.    Baseline ECG: The Baseline ECG reveals sinus bradycardia. There is left   axis deviation. The baseline ECG has ST and/or T wave abnormalities   suggestive of myocardial ischemia.    Stress ECG: There are no ST segment deviation identified during the   protocol. There are no arrhythmias during stress. There is normal blood   pressure response with stress.    ECG Conclusion: The ECG portion of the study is negative for ischemia,   though the deep T wave inversion seen at rest normalizes/pseudonormalizes   with stress.    Post-stress       Display both the narrative and impression [both]  At peak stress and into recovery, the mid to   distal LAD territory including the apex becomes hypokinetic, suggesting   stress-induced ischemia in that region of myocardium.    Overall, this study is consistent with stress-induced ischemia in the   mid to distal LAD territory.        Echo    Result Date: 5/7/2024    Left Ventricle: The left ventricle is normal in size. Normal wall   thickness. Normal wall motion. There is normal systolic function. Ejection   fraction by visual approximation is 60%.    Right Ventricle: Normal right ventricular cavity size. Wall thickness   is normal. Systolic function is normal.    Left Atrium: Left atrium is moderately dilated.    Mitral Valve: There is mild  regurgitation.    Tricuspid Valve: There is mild regurgitation.    Pulmonary Artery: The estimated pulmonary artery systolic pressure is   28 mmHg.    IVC/SVC: Normal venous pressure at 3 mmHg.        -Loaded with ASA and Brilinta   -Continue ASA 81mg, Lipitor 40mg, Brilinta 90mg, and Atenolol 25mg QD   -CTS consulted for CABG Eval    Hypertension  Chronic, uncontrolled. Latest blood pressure and vitals reviewed-     Temp:  [97.7 °F (36.5 °C)-98.5 °F (36.9 °C)]   Pulse:  [51-67]   Resp:  [15-20]   BP: (139-161)/(71-92)   SpO2:  [95 %-99 %] .   Home meds for hypertension were reviewed and noted below.   Hypertension Medications               atenoloL (TENORMIN) 25 MG tablet TAKE 1 TABLET BY MOUTH DAILY            While in the hospital, will manage blood pressure as follows; Adjust home antihypertensive regimen as follows- Initiate an ARB for better BP control           VTE Risk Mitigation (From admission, onward)           Ordered     heparin infusion 1,000 units/500 ml in 0.9% NaCl (on sterile field)  As needed (PRN)         05/16/24 1520     IP VTE HIGH RISK PATIENT  Once         05/15/24 1045     Place sequential compression device  Until discontinued         05/15/24 1045                    Anibal Rubi MD, PGY-II  Cardiology  [facility] - Cath Lab

## 2024-05-16 NOTE — PROGRESS NOTES
[facility] [address] [place] [address] Medicine  Progress Note    Patient Name: Betzy Cooley  MRN: [id number]  Patient Class: OP- Observation   Admission Date: 5/15/2024  Length of Stay: 0 days  Attending Physician: Augustin Thacker MD  Primary Care Provider: Praful Paul MD        Subjective:     Principal Problem:Coronary artery disease of native artery of native heart with stable angina pectoris        HPI:  This is a 83-year-old patient with a history of gastritis, HTN who presents with chest pain. Has been experiencing heartburn chronically for over 20 years. Patient presented with on and off substernal chest pain, ongoing for past 1 week.  Patient denies any radiation to neck or left upper extremity.  No aggravating or relieving factors.  Patient presented to ER last week.  Echo at that time did not show any wall motion abnormalities and patient was discharged home.  Denies any shortness of breath, palpitations, dysphagia, recent weight loss, melena, use of NSAIDs.  On presentation patient is afebrile, bradycardic with heart rate of 64, hypertensive with blood pressure of 161/83 mm of Hg saturating 98% on room air.  Blood work revealed normocytic anemia with hemoglobin of 11.7.  Hypokalemia with potassium of 3.4 BNP elevated to 148.  Troponin of 0.028.  Chest x-ray was unremarkable.  EKG revealed sinus bradycardia, diffuse T-wave depressions in lateral and inferior leads.  Patient received Maalox and aspirin 325 mg admitted to the hospital.    Overview/Hospital Course:  This is a 83-year-old patient with a history of gastritis, HTN who presents with chest pain. Has been experiencing heartburn chronically for over 20 years. Patient presented with on and off substernal chest pain, ongoing for past 1 week.  Patient denies any radiation to neck or left upper extremity.  No aggravating or relieving factors.  Patient presented to ER last week.  Echo at that time did not show any wall motion abnormalities and  patient was discharged home.  Denies any shortness of breath, palpitations, dysphagia, recent weight loss, melena, use of NSAIDs.  On presentation patient is afebrile, bradycardic with heart rate of 64, hypertensive with blood pressure of 161/83 mm of Hg saturating 98% on room air.  Blood work revealed normocytic anemia with hemoglobin of 11.7.  Hypokalemia with potassium of 3.4 BNP elevated to 148.  Troponin of 0.028.  Chest x-ray was unremarkable.  EKG revealed sinus bradycardia, diffuse T-wave depressions in lateral and inferior leads.  Patient received Maalox and aspirin 325 mg admitted to the hospital.    She underwent cardiac stress testing which suspected exercise induced ischemia in the LAD region. Interventional cardiology was consulted and she underwent LHC 5/16/24.     Interval History: returned from stress test. Appears to have CAD with stress induced ischemia in the LAD region.   Findings discussed with pt. And  in detail.     Awaiting recommendations by cardiology team. Suspect need for angiogram.     Review of Systems   Constitutional:  Negative for activity change.   HENT:  Negative for congestion.    Respiratory:  Negative for apnea.    Cardiovascular:  Positive for chest pain.   Gastrointestinal:  Negative for abdominal distention.     Objective:     Vital Signs (Most Recent):  Temp: 98 °F (36.7 °C) (05/16/24 0852)  Pulse: 60 (baseline dse) (05/16/24 1010)  Resp: 16 (05/16/24 1010)  BP: (!) 179/84 (05/16/24 1010)  SpO2: 98 % (05/16/24 0852) Vital Signs (24h Range):  Temp:  [97.5 °F (36.4 °C)-98.5 °F (36.9 °C)] 98 °F (36.7 °C)  Pulse:  [40-61] 60  Resp:  [16-20] 16  SpO2:  [94 %-99 %] 98 %  BP: (131-193)/(68-85) 179/84     Weight: 55.3 kg (122 lb)  Body mass index is 22.31 kg/m².  No intake or output data in the 24 hours ending 05/16/24 1205      Physical Exam  Constitutional:       General: She is not in acute distress.  HENT:      Mouth/Throat:      Mouth: Mucous membranes are moist.       Pharynx: No oropharyngeal exudate.   Cardiovascular:      Rate and Rhythm: Normal rate and regular rhythm.      Heart sounds: No murmur heard.  Pulmonary:      Effort: No respiratory distress.   Abdominal:      General: There is no distension.   Skin:     Coloration: Skin is not jaundiced.   Neurological:      Mental Status: She is alert.             Significant Labs: All pertinent labs within the past 24 hours have been reviewed.  CBC:   Recent Labs   Lab 05/15/24  0110 05/16/24  0536   WBC 7.75 7.38   HGB 11.7* 11.3*   HCT 35.4* 34.3*    243     CMP:   Recent Labs   Lab 05/15/24  0110 05/16/24  0536    142   K 3.4* 3.9    106   CO2 24 27    88   BUN 14 15   CREATININE 0.8 0.8   CALCIUM 9.8 9.2   PROT 7.4 6.7   ALBUMIN 3.8 3.4*   BILITOT 0.4 0.9   ALKPHOS 78 75   AST 27 22   ALT 32 23   ANIONGAP 13 9       Significant Imaging: I have reviewed all pertinent imaging results/findings within the past 24 hours.    Assessment/Plan:      * Coronary artery disease of native artery of native heart with stable angina pectoris  Patient presented with chest pain for past 1 week.    On and off, pain lasts from few minutes to an hours sometimes.  Troponin elevated to 0.035, trend   EKG:  Bradycardia with diffuse T-wave inversions.    Aspirin 325 mg given in the ED.    Cardiology consulted.  Dobutamine stress test 5/16/24 - At peak stress and into recovery, the mid to distal LAD territory including the apex becomes hypokinetic, suggesting stress-induced ischemia in that region of myocardium  5/16/24 - Angiogram planned.         Hypertension  Chronic, controlled. Latest blood pressure and vitals reviewed-     Temp:  [97.5 °F (36.4 °C)-98.5 °F (36.9 °C)]   Pulse:  [40-61]   Resp:  [16-20]   BP: (131-193)/(68-85)   SpO2:  [94 %-99 %] .   Home meds for hypertension were reviewed and noted below.   Hypertension Medications               atenoloL (TENORMIN) 25 MG tablet TAKE 1 TABLET BY MOUTH DAILY             While in the hospital, will manage blood pressure as follows; Continue home antihypertensive regimen    Will utilize p.r.n. blood pressure medication only if patient's blood pressure greater than 180/110 and she develops symptoms such as worsening chest pain or shortness of breath.    GERD (gastroesophageal reflux disease)  Protonix ordered        VTE Risk Mitigation (From admission, onward)           Ordered     IP VTE HIGH RISK PATIENT  Once         05/15/24 1045     Place sequential compression device  Until discontinued         05/15/24 1045                    Discharge Planning   ONELIA:      Code Status: Full Code   Is the patient medically ready for discharge?:     Reason for patient still in hospital (select all that apply): Treatment and Consult recommendations                     Augustin Thacker MD  Department of Hospital Medicine   [facility]

## 2024-05-16 NOTE — CONSULTS
[facility]  Interventional Cardiology  Consult Note    Patient Name: Betzy Cooley  MRN: [id number]  Admission Date: 5/15/2024  Hospital Length of Stay: 0 days  Code Status: Full Code   Attending Provider: Augustin Thacker MD  Consulting Provider: Connor M Gillies, DO  Primary Care Physician: Praful Paul MD  Principal Problem:Coronary artery disease of native artery of native heart with stable angina pectoris    Patient information was obtained from patient, past medical records, and ER records.     Inpatient consult to Interventional Cardiology  Consult performed by: Gillies, Connor M, DO  Consult ordered by: Andrea Schafer MD        Subjective:     Chief Complaint:  Chest pain     HPI:  Betzy Cooley is an 83yoF with a hx of HTN, GERD who presented to the ED with chest pain for her second presentation in the past week. She states it is substernal and a burning type sensation that comes and goes intermittently and lasts minutes to short hours at a time. It is under her sternum without radiation. Her last episode was yesterday and has not recurred since arrival at the hospital. She has a long history of 10+ years of GERD and it usually resolves with a couple chewable antacids and she never has to take more than this for relief. This burning sensation feels different than her GERD does and has not resolved with >1 week of BID Protonix. Resting echo is normal. Cardiology was consulted on this admission and recommended stress echo.  stress echo showed mid to distal LAD territory hypokinesis that came on at peak stress and into recovery, consistent with ischemic myocardium. EKG on last admission without acute ischemic changes. EKG on this admission with deep T wave inversions everywhere but predominantly throughout the precordial leads. Troponin peaked at 0.03 and creatinine is at baseline at 0.8. Interventional cardiology is consulted for further ischemic evaluation.    Past Medical  History:   Diagnosis Date    Arthritis     Cataract     GERD (gastroesophageal reflux disease)     HEARING LOSS     Hypertension     Osteoporosis, postmenopausal     Squamous Cell Carcinoma 2007    right forearm    Urinary incontinence     rare mixed       Past Surgical History:   Procedure Laterality Date    COLPOPEXY N/A 08/06/2019    Procedure: COLPOPEXY SSL FIXATION;  Surgeon: Alma Dorsey MD;  Location: [address];  Service: Urology;  Laterality: N/A;    CYSTOSCOPY N/A 08/06/2019    Procedure: CYSTOSCOPY;  Surgeon: Alma Dorsey MD;  Location: [address];  Service: Urology;  Laterality: N/A;    ESOPHAGOGASTRODUODENOSCOPY N/A 4/1/2024    Procedure: EGD (ESOPHAGOGASTRODUODENOSCOPY);  Surgeon: Zbigniew Dougherty MD;  Location: [facility];  Service: Endoscopy;  Laterality: N/A;    FRACTURE SURGERY Left 2008    open radius/ulna fracture    HYSTERECTOMY      TANIA/ovaries remain--fibroids- in her late 30's / early 40's       Review of patient's allergies indicates:   Allergen Reactions    Sulfa (sulfonamide antibiotics) Hives     Other reaction(s): Anaphylaxis  Itching   Shortness of breath        PTA Medications   Medication Sig    aspirin 81 MG Chew Take 81 mg by mouth once daily. Not chewable    atenoloL (TENORMIN) 25 MG tablet TAKE 1 TABLET BY MOUTH DAILY    calcium-vitamin D3 500 mg(1,250mg) -200 unit per tablet Take 1 tablet by mouth 2 (two) times daily with meals.    co-enzyme Q-10 30 mg capsule Take 30 mg by mouth once daily.    diclofenac sodium (VOLTAREN ARTHRITIS PAIN) 1 % Gel Apply 2 g topically daily as needed (Pain).    famotidine (PEPCID AC ORAL) Take 1 tablet by mouth nightly as needed (Acid reflux).    multivitamin (THERAGRAN) per tablet Take 1 tablet by mouth once daily.    pantoprazole (PROTONIX) 40 MG tablet Take 1 tablet (40 mg total) by mouth 2 (two) times daily.    sodium chloride (SALINE MIST NASL) 1 spray by Each Nostril route daily as needed (Congestion).    vitamin  A-vitamin C-vit E-min Tab Take 1 tablet by mouth once daily.     Family History       Problem Relation (Age of Onset)    Arthritis Sister    COPD Mother, Father    Diabetes Sister    Heart disease Mother    Heart failure Mother    Macular degeneration Mother    No Known Problems Brother, Son, Daughter, Sister, Sister, Sister, Brother, Brother, Brother, Brother, Brother, Daughter, Son          Tobacco Use    Smoking status: Never    Smokeless tobacco: Never   Substance and Sexual Activity    Alcohol use: Not Currently     Comment: 1-2 glasses wine weekly    Drug use: No    Sexual activity: Yes     Partners: Male     Birth control/protection: None     Review of Systems   Constitutional: Negative for diaphoresis and fever.   Cardiovascular:  Negative for chest pain, dyspnea on exertion, leg swelling, near-syncope, orthopnea, palpitations, paroxysmal nocturnal dyspnea and syncope.   Respiratory:  Negative for cough and shortness of breath.    Gastrointestinal:  Negative for abdominal pain, diarrhea, nausea and vomiting.   Neurological:  Negative for light-headedness.   Psychiatric/Behavioral:  Negative for altered mental status and substance abuse.      Objective:     Vital Signs (Most Recent):  Temp: 98.2 °F (36.8 °C) (05/16/24 1219)  Pulse: (!) 55 (05/16/24 1219)  Resp: 20 (05/16/24 1219)  BP: (!) 164/78 (05/16/24 1219)  SpO2: 99 % (05/16/24 1219) Vital Signs (24h Range):  Temp:  [97.5 °F (36.4 °C)-98.2 °F (36.8 °C)] 98.2 °F (36.8 °C)  Pulse:  [40-61] 55  Resp:  [16-20] 20  SpO2:  [94 %-99 %] 99 %  BP: (131-193)/(68-85) 164/78     Weight: 55.3 kg (122 lb)  Body mass index is 22.31 kg/m².    SpO2: 99 %       No intake or output data in the 24 hours ending 05/16/24 1338    Lines/Drains/Airways       Peripheral Intravenous Line  Duration                  Peripheral IV - Single Lumen 05/15/24 0732 22 G Distal;Left;Posterior Forearm 1 day                     Physical Exam  Constitutional:       Appearance: Normal  appearance.   Cardiovascular:      Heart sounds: Normal heart sounds. No murmur heard.     No gallop.      Comments:   2+ DP  2+ PT  2+ Radial  Pulmonary:      Effort: Pulmonary effort is normal.      Breath sounds: Normal breath sounds. No rales.   Musculoskeletal:      Right lower leg: No edema.      Left lower leg: No edema.   Skin:     General: Skin is warm and dry.   Neurological:      Mental Status: She is alert and oriented to person, place, and time. Mental status is at baseline.            Significant Labs: All pertinent lab results from the last 24 hours have been reviewed.    Significant Imaging:  Reviewed  Assessment and Plan:     Cardiac/Vascular  NSTEMI (non-ST elevated myocardial infarction)  [facility] +/- PCI, patient is a JOSE M candidate  - Anti-Thrombotic therapy: ASA, Brilinta (loaded)  - Access: R Radial  - Creatinine: 0.8  - Pre-Op Med: Bendaryl 50mg pO   - All patient's questions were answered.  -The risks, benefits and alternatives of the procedure were explained to the patient.   -The risks of coronary angiography include but are not limited to: bleeding, infection, heart rhythm abnormalities, allergic reactions, kidney injury and potential need for dialysis, stroke and death.   - Should stenting be indicated, the patient has agreed to dual anti-platelet therapy with a drug-eluting stent   - Additionally, pt is aware that non-compliance is likely to result in stent clotting with heart attack, heart failure, and/or death  -The risks of moderate sedation include hypotension, respiratory depression, arrhythmias, bronchospasm, and death.   - Informed consent was obtained and the  patient is agreeable to proceed with the procedure.          VTE Risk Mitigation (From admission, onward)           Ordered     IP VTE HIGH RISK PATIENT  Once         05/15/24 1045     Place sequential compression device  Until discontinued         05/15/24 1045                    Thank you for your consult. I will follow-up  with patient. Please contact us if you have any additional questions.    Connor M Gillies, DO  Interventional Cardiology   [facility]

## 2024-05-16 NOTE — HOSPITAL COURSE
This is a 83-year-old patient with a history of gastritis, HTN who presents with chest pain. Has been experiencing heartburn chronically for over 20 years. Patient presented with on and off substernal chest pain, ongoing for past 1 week.  Patient denies any radiation to neck or left upper extremity.  No aggravating or relieving factors.  Patient presented to ER last week.  Echo at that time did not show any wall motion abnormalities and patient was discharged home.  Denies any shortness of breath, palpitations, dysphagia, recent weight loss, melena, use of NSAIDs.  On presentation patient is afebrile, bradycardic with heart rate of 64, hypertensive with blood pressure of 161/83 mm of Hg saturating 98% on room air.  Blood work revealed normocytic anemia with hemoglobin of 11.7.  Hypokalemia with potassium of 3.4 BNP elevated to 148.  Troponin of 0.028.  Chest x-ray was unremarkable.  EKG revealed sinus bradycardia, diffuse T-wave depressions in lateral and inferior leads.  Patient received Maalox and aspirin 325 mg admitted to the hospital.    She underwent cardiac stress testing which suspected exercise induced ischemia in the LAD region. Interventional cardiology was consulted and she underwent LHC 5/16/24. 3V disease. CVS consulted but felt poor candidate for CABG. Patient had successful PCI on 5/17.     Successful PCI of:  - Distal RCA with 2.5X32MM JOSE M  - Proximal-mid RCA with 3.5X32MM  JOSE M  - 1st Diagonal with 2.5X16MM JOSE M  - Mid LAD with 3.5X32MM JOSE M  - Kissing balloon of 1st D and mid LAD with 1:1 balloon  - LM dissection noted angiographically and demonstrated with IVUS   - LM treated with 5.0X12MM JOSE M  - IVUS used for stent sizing.    Patient seen and examined on day of discharge and deemed appropriate for discharge. Plan discussed with patient, who was agreeable and amenable. Medications were discussed and reviewed, outpatient follow-up scheduled, ER precautions were given, all questions were answered to  the patient's satisfaction, and patient was subsequently discharged.    Patient discharged on brilinta (for atleast 1 year), ASA 81mg daily (indefinitely), and high intensity statin. Referred to cardiac rehab. Instructed to follow-up with outpatient cardiologist within 1 month.

## 2024-05-16 NOTE — PLAN OF CARE
SW attempted to compete initial assessment 3x at the bedside, pt was not in the room. CM will continue to f/u.     MADAI Hill  [facility]  Ext. [phone]

## 2024-05-16 NOTE — PLAN OF CARE
Problem: Adult Inpatient Plan of Care  Goal: Plan of Care Review  Outcome: Progressing  Goal: Patient-Specific Goal (Individualized)  Outcome: Progressing  Goal: Absence of Hospital-Acquired Illness or Injury  Outcome: Progressing  Goal: Optimal Comfort and Wellbeing  Outcome: Progressing  Goal: Readiness for Transition of Care  Outcome: Progressing     Problem: Fall Injury Risk  Goal: Absence of Fall and Fall-Related Injury  Outcome: Progressing     Problem: Wound  Goal: Optimal Coping  Outcome: Progressing  Goal: Optimal Functional Ability  Outcome: Progressing  Goal: Absence of Infection Signs and Symptoms  Outcome: Progressing  Goal: Improved Oral Intake  Outcome: Progressing  Goal: Optimal Pain Control and Function  Outcome: Progressing  Goal: Skin Health and Integrity  Outcome: Progressing  Goal: Optimal Wound Healing  Outcome: Progressing     Pt progressing towards goals. No distress notice. No falls or injuries during shift. Bed in lowest position, call light within reach, belonging at bedside. Safety precaution maintain.Plan of Care reviewed. Pt verbalized understanding.         Spoke with patient he is schedule for 11/19/2020     Procedure - EGD with Biopsy and Upper EUS with Fine Needle Aspiration   Diagnosis - Abnormal endoscopy, Gastric submucosal nodule  Location - [facility]  Duration - 60 min  Preparation - NPO after midnight   Special equipment - None  Vendor for the case - None  Medications - Ciprofloxacin 400 mg IV one dose

## 2024-05-17 ENCOUNTER — TELEPHONE (OUTPATIENT)
Dept: CARDIAC REHAB | Facility: CLINIC | Age: 84
End: 2024-05-17
Payer: MEDICARE

## 2024-05-17 DIAGNOSIS — I25.10 CORONARY ARTERY DISEASE, UNSPECIFIED VESSEL OR LESION TYPE, UNSPECIFIED WHETHER ANGINA PRESENT, UNSPECIFIED WHETHER NATIVE OR TRANSPLANTED HEART: ICD-10-CM

## 2024-05-17 DIAGNOSIS — Z98.61 POSTSURGICAL PERCUTANEOUS TRANSLUMINAL CORONARY ANGIOPLASTY STATUS: Primary | ICD-10-CM

## 2024-05-17 LAB
ALBUMIN SERPL BCP-MCNC: 3.5 G/DL (ref 3.5–5.2)
ALP SERPL-CCNC: 76 U/L (ref 55–135)
ALT SERPL W/O P-5'-P-CCNC: 18 U/L (ref 10–44)
ANION GAP SERPL CALC-SCNC: 10 MMOL/L (ref 8–16)
AST SERPL-CCNC: 25 U/L (ref 10–40)
BASOPHILS # BLD AUTO: 0.1 K/UL (ref 0–0.2)
BASOPHILS NFR BLD: 1.2 % (ref 0–1.9)
BILIRUB SERPL-MCNC: 1.2 MG/DL (ref 0.1–1)
BUN SERPL-MCNC: 19 MG/DL (ref 8–23)
CALCIUM SERPL-MCNC: 9.5 MG/DL (ref 8.7–10.5)
CHLORIDE SERPL-SCNC: 107 MMOL/L (ref 95–110)
CO2 SERPL-SCNC: 22 MMOL/L (ref 23–29)
CREAT SERPL-MCNC: 0.8 MG/DL (ref 0.5–1.4)
DIFFERENTIAL METHOD BLD: ABNORMAL
EOSINOPHIL # BLD AUTO: 0.4 K/UL (ref 0–0.5)
EOSINOPHIL NFR BLD: 4.5 % (ref 0–8)
ERYTHROCYTE [DISTWIDTH] IN BLOOD BY AUTOMATED COUNT: 12.9 % (ref 11.5–14.5)
EST. GFR  (NO RACE VARIABLE): >60 ML/MIN/1.73 M^2
GLUCOSE SERPL-MCNC: 75 MG/DL (ref 70–110)
HCT VFR BLD AUTO: 34 % (ref 37–48.5)
HGB BLD-MCNC: 11.3 G/DL (ref 12–16)
IMM GRANULOCYTES # BLD AUTO: 0.03 K/UL (ref 0–0.04)
IMM GRANULOCYTES NFR BLD AUTO: 0.3 % (ref 0–0.5)
LYMPHOCYTES # BLD AUTO: 1.3 K/UL (ref 1–4.8)
LYMPHOCYTES NFR BLD: 14.9 % (ref 18–48)
MCH RBC QN AUTO: 30.4 PG (ref 27–31)
MCHC RBC AUTO-ENTMCNC: 33.2 G/DL (ref 32–36)
MCV RBC AUTO: 91 FL (ref 82–98)
MONOCYTES # BLD AUTO: 0.9 K/UL (ref 0.3–1)
MONOCYTES NFR BLD: 10.7 % (ref 4–15)
NEUTROPHILS # BLD AUTO: 5.9 K/UL (ref 1.8–7.7)
NEUTROPHILS NFR BLD: 68.4 % (ref 38–73)
NRBC BLD-RTO: 0 /100 WBC
PLATELET # BLD AUTO: 233 K/UL (ref 150–450)
PMV BLD AUTO: 10.2 FL (ref 9.2–12.9)
POCT GLUCOSE: 91 MG/DL (ref 70–110)
POTASSIUM SERPL-SCNC: 3.4 MMOL/L (ref 3.5–5.1)
PROT SERPL-MCNC: 6.8 G/DL (ref 6–8.4)
RBC # BLD AUTO: 3.72 M/UL (ref 4–5.4)
SODIUM SERPL-SCNC: 139 MMOL/L (ref 136–145)
WBC # BLD AUTO: 8.61 K/UL (ref 3.9–12.7)

## 2024-05-17 PROCEDURE — 25000003 PHARM REV CODE 250: Mod: HCNC | Performed by: STUDENT IN AN ORGANIZED HEALTH CARE EDUCATION/TRAINING PROGRAM

## 2024-05-17 PROCEDURE — 25000003 PHARM REV CODE 250: Mod: HCNC | Performed by: INTERNAL MEDICINE

## 2024-05-17 PROCEDURE — 93005 ELECTROCARDIOGRAM TRACING: CPT | Mod: HCNC

## 2024-05-17 PROCEDURE — C1753 CATH, INTRAVAS ULTRASOUND: HCPCS | Mod: HCNC | Performed by: INTERNAL MEDICINE

## 2024-05-17 PROCEDURE — 92978 ENDOLUMINL IVUS OCT C 1ST: CPT | Mod: 26,HCNC,, | Performed by: INTERNAL MEDICINE

## 2024-05-17 PROCEDURE — 92928 PRQ TCAT PLMT NTRAC ST 1 LES: CPT | Mod: 22,RC,RI,LD | Performed by: INTERNAL MEDICINE

## 2024-05-17 PROCEDURE — 85025 COMPLETE CBC W/AUTO DIFF WBC: CPT | Mod: HCNC | Performed by: STUDENT IN AN ORGANIZED HEALTH CARE EDUCATION/TRAINING PROGRAM

## 2024-05-17 PROCEDURE — C1725 CATH, TRANSLUMIN NON-LASER: HCPCS | Mod: HCNC | Performed by: INTERNAL MEDICINE

## 2024-05-17 PROCEDURE — C1894 INTRO/SHEATH, NON-LASER: HCPCS | Mod: HCNC | Performed by: INTERNAL MEDICINE

## 2024-05-17 PROCEDURE — 92978 ENDOLUMINL IVUS OCT C 1ST: CPT | Mod: HCNC | Performed by: INTERNAL MEDICINE

## 2024-05-17 PROCEDURE — 99152 MOD SED SAME PHYS/QHP 5/>YRS: CPT | Mod: HCNC | Performed by: INTERNAL MEDICINE

## 2024-05-17 PROCEDURE — C1874 STENT, COATED/COV W/DEL SYS: HCPCS | Mod: HCNC | Performed by: INTERNAL MEDICINE

## 2024-05-17 PROCEDURE — 4A023N7 MEASUREMENT OF CARDIAC SAMPLING AND PRESSURE, LEFT HEART, PERCUTANEOUS APPROACH: ICD-10-PCS | Performed by: INTERNAL MEDICINE

## 2024-05-17 PROCEDURE — 92929 PR STENT, ADD'L VESSEL: CPT | Mod: LD,HCNC,, | Performed by: INTERNAL MEDICINE

## 2024-05-17 PROCEDURE — 99233 SBSQ HOSP IP/OBS HIGH 50: CPT | Mod: HCNC,,, | Performed by: INTERNAL MEDICINE

## 2024-05-17 PROCEDURE — 20600001 HC STEP DOWN PRIVATE ROOM: Mod: HCNC

## 2024-05-17 PROCEDURE — C9601 PERC DRUG-EL COR STENT BRAN: HCPCS | Mod: LD,HCNC | Performed by: INTERNAL MEDICINE

## 2024-05-17 PROCEDURE — 92979 ENDOLUMINL IVUS OCT C EA: CPT | Mod: HCNC | Performed by: INTERNAL MEDICINE

## 2024-05-17 PROCEDURE — C1887 CATHETER, GUIDING: HCPCS | Mod: HCNC | Performed by: INTERNAL MEDICINE

## 2024-05-17 PROCEDURE — 93458 L HRT ARTERY/VENTRICLE ANGIO: CPT | Mod: XU,HCNC | Performed by: INTERNAL MEDICINE

## 2024-05-17 PROCEDURE — B2111ZZ FLUOROSCOPY OF MULTIPLE CORONARY ARTERIES USING LOW OSMOLAR CONTRAST: ICD-10-PCS | Performed by: INTERNAL MEDICINE

## 2024-05-17 PROCEDURE — C1769 GUIDE WIRE: HCPCS | Mod: HCNC | Performed by: INTERNAL MEDICINE

## 2024-05-17 PROCEDURE — B241ZZ3 ULTRASONOGRAPHY OF MULTIPLE CORONARY ARTERIES, INTRAVASCULAR: ICD-10-PCS | Performed by: INTERNAL MEDICINE

## 2024-05-17 PROCEDURE — 63600175 PHARM REV CODE 636 W HCPCS: Mod: HCNC | Performed by: STUDENT IN AN ORGANIZED HEALTH CARE EDUCATION/TRAINING PROGRAM

## 2024-05-17 PROCEDURE — 92979 ENDOLUMINL IVUS OCT C EA: CPT | Mod: 26,HCNC,, | Performed by: INTERNAL MEDICINE

## 2024-05-17 PROCEDURE — 63600175 PHARM REV CODE 636 W HCPCS: Mod: HCNC | Performed by: INTERNAL MEDICINE

## 2024-05-17 PROCEDURE — 93458 L HRT ARTERY/VENTRICLE ANGIO: CPT | Mod: 26,22,XU,HCNC | Performed by: INTERNAL MEDICINE

## 2024-05-17 PROCEDURE — 36415 COLL VENOUS BLD VENIPUNCTURE: CPT | Mod: HCNC | Performed by: STUDENT IN AN ORGANIZED HEALTH CARE EDUCATION/TRAINING PROGRAM

## 2024-05-17 PROCEDURE — 80053 COMPREHEN METABOLIC PANEL: CPT | Mod: HCNC | Performed by: STUDENT IN AN ORGANIZED HEALTH CARE EDUCATION/TRAINING PROGRAM

## 2024-05-17 PROCEDURE — 25500020 PHARM REV CODE 255: Mod: HCNC | Performed by: INTERNAL MEDICINE

## 2024-05-17 PROCEDURE — 27201423 OPTIME MED/SURG SUP & DEVICES STERILE SUPPLY: Mod: HCNC | Performed by: INTERNAL MEDICINE

## 2024-05-17 PROCEDURE — C9113 INJ PANTOPRAZOLE SODIUM, VIA: HCPCS | Mod: HCNC | Performed by: STUDENT IN AN ORGANIZED HEALTH CARE EDUCATION/TRAINING PROGRAM

## 2024-05-17 PROCEDURE — 93010 ELECTROCARDIOGRAM REPORT: CPT | Mod: HCNC,,, | Performed by: INTERNAL MEDICINE

## 2024-05-17 PROCEDURE — 99152 MOD SED SAME PHYS/QHP 5/>YRS: CPT | Mod: HCNC,,, | Performed by: INTERNAL MEDICINE

## 2024-05-17 PROCEDURE — 027337Z DILATION OF CORONARY ARTERY, FOUR OR MORE ARTERIES WITH FOUR OR MORE DRUG-ELUTING INTRALUMINAL DEVICES, PERCUTANEOUS APPROACH: ICD-10-PCS | Performed by: INTERNAL MEDICINE

## 2024-05-17 PROCEDURE — 99153 MOD SED SAME PHYS/QHP EA: CPT | Mod: HCNC | Performed by: INTERNAL MEDICINE

## 2024-05-17 PROCEDURE — C9600 PERC DRUG-EL COR STENT SING: HCPCS | Mod: RC,RI,LD,LM,HCNC | Performed by: INTERNAL MEDICINE

## 2024-05-17 PROCEDURE — 99233 SBSQ HOSP IP/OBS HIGH 50: CPT | Mod: HCNC,,, | Performed by: THORACIC SURGERY (CARDIOTHORACIC VASCULAR SURGERY)

## 2024-05-17 DEVICE — EVEROLIMUS-ELUTING PLATINUM CHROMIUM CORONARY STENT SYSTEM
Type: IMPLANTABLE DEVICE | Site: HEART | Status: FUNCTIONAL
Brand: SYNERGY™ XD

## 2024-05-17 RX ORDER — POTASSIUM CHLORIDE 20 MEQ/1
40 TABLET, EXTENDED RELEASE ORAL 2 TIMES DAILY
Status: DISCONTINUED | OUTPATIENT
Start: 2024-05-17 | End: 2024-05-17

## 2024-05-17 RX ORDER — SODIUM CHLORIDE 9 MG/ML
INJECTION, SOLUTION INTRAVENOUS CONTINUOUS
Status: ACTIVE | OUTPATIENT
Start: 2024-05-17 | End: 2024-05-17

## 2024-05-17 RX ORDER — ATROPINE SULFATE 0.1 MG/ML
INJECTION INTRAVENOUS
Status: DISCONTINUED | OUTPATIENT
Start: 2024-05-17 | End: 2024-05-18 | Stop reason: HOSPADM

## 2024-05-17 RX ORDER — HEPARIN SOD,PORCINE/0.9 % NACL 1000/500ML
INTRAVENOUS SOLUTION INTRAVENOUS
Status: DISCONTINUED | OUTPATIENT
Start: 2024-05-17 | End: 2024-05-18 | Stop reason: HOSPADM

## 2024-05-17 RX ORDER — DIPHENHYDRAMINE HCL 50 MG
50 CAPSULE ORAL ONCE
Status: DISCONTINUED | OUTPATIENT
Start: 2024-05-17 | End: 2024-05-18 | Stop reason: HOSPADM

## 2024-05-17 RX ORDER — FENTANYL CITRATE 50 UG/ML
INJECTION, SOLUTION INTRAMUSCULAR; INTRAVENOUS
Status: DISCONTINUED | OUTPATIENT
Start: 2024-05-17 | End: 2024-05-18 | Stop reason: HOSPADM

## 2024-05-17 RX ORDER — NITROGLYCERIN 400 UG/1
SPRAY ORAL
Status: DISCONTINUED | OUTPATIENT
Start: 2024-05-17 | End: 2024-05-18 | Stop reason: HOSPADM

## 2024-05-17 RX ORDER — DIPHENHYDRAMINE HYDROCHLORIDE 50 MG/ML
INJECTION INTRAMUSCULAR; INTRAVENOUS
Status: DISCONTINUED | OUTPATIENT
Start: 2024-05-17 | End: 2024-05-18 | Stop reason: HOSPADM

## 2024-05-17 RX ORDER — SODIUM CHLORIDE 0.9 % (FLUSH) 0.9 %
10 SYRINGE (ML) INJECTION
Status: DISCONTINUED | OUTPATIENT
Start: 2024-05-17 | End: 2024-05-18 | Stop reason: HOSPADM

## 2024-05-17 RX ORDER — MIDAZOLAM HYDROCHLORIDE 1 MG/ML
INJECTION, SOLUTION INTRAMUSCULAR; INTRAVENOUS
Status: DISCONTINUED | OUTPATIENT
Start: 2024-05-17 | End: 2024-05-18 | Stop reason: HOSPADM

## 2024-05-17 RX ORDER — HEPARIN SODIUM 1000 [USP'U]/ML
INJECTION, SOLUTION INTRAVENOUS; SUBCUTANEOUS
Status: DISCONTINUED | OUTPATIENT
Start: 2024-05-17 | End: 2024-05-18 | Stop reason: HOSPADM

## 2024-05-17 RX ORDER — POTASSIUM CHLORIDE 20 MEQ/1
40 TABLET, EXTENDED RELEASE ORAL 2 TIMES DAILY
Status: DISCONTINUED | OUTPATIENT
Start: 2024-05-17 | End: 2024-05-18 | Stop reason: HOSPADM

## 2024-05-17 RX ORDER — LIDOCAINE HYDROCHLORIDE 20 MG/ML
INJECTION, SOLUTION EPIDURAL; INFILTRATION; INTRACAUDAL; PERINEURAL
Status: DISCONTINUED | OUTPATIENT
Start: 2024-05-17 | End: 2024-05-18 | Stop reason: HOSPADM

## 2024-05-17 RX ADMIN — SODIUM CHLORIDE: 9 INJECTION, SOLUTION INTRAVENOUS at 03:05

## 2024-05-17 RX ADMIN — ACETAMINOPHEN 650 MG: 325 TABLET ORAL at 05:05

## 2024-05-17 RX ADMIN — PANTOPRAZOLE SODIUM 40 MG: 40 INJECTION, POWDER, FOR SOLUTION INTRAVENOUS at 09:05

## 2024-05-17 RX ADMIN — ATORVASTATIN CALCIUM 40 MG: 40 TABLET, FILM COATED ORAL at 08:05

## 2024-05-17 RX ADMIN — PANTOPRAZOLE SODIUM 40 MG: 40 INJECTION, POWDER, FOR SOLUTION INTRAVENOUS at 08:05

## 2024-05-17 RX ADMIN — ASPIRIN 81 MG CHEWABLE TABLET 81 MG: 81 TABLET CHEWABLE at 09:05

## 2024-05-17 RX ADMIN — TICAGRELOR 90 MG: 90 TABLET ORAL at 09:05

## 2024-05-17 RX ADMIN — TICAGRELOR 90 MG: 90 TABLET ORAL at 08:05

## 2024-05-17 RX ADMIN — POTASSIUM CHLORIDE 40 MEQ: 1500 TABLET, EXTENDED RELEASE ORAL at 09:05

## 2024-05-17 RX ADMIN — POTASSIUM CHLORIDE 40 MEQ: 1500 TABLET, EXTENDED RELEASE ORAL at 08:05

## 2024-05-17 NOTE — PLAN OF CARE
[facility]  Initial Discharge Assessment       Primary Care Provider: Praful Paul MD    Admission Diagnosis: Chest pain [R07.9]    Admission Date: 5/15/2024  Expected Discharge Date: 5/20/2024         Payor: [employer] MANAGED MEDICARE / Plan: [employer] MEDICARE HMO / Product Type: Capitation /     Extended Emergency Contact Information  Primary Emergency Contact: Zander Cooley  Address: [address]  Home Phone: [phone]  Mobile Phone: [phone]  Relation: Spouse   needed? No    Discharge Plan A: Home with family  Discharge Plan B: Home with family      [id number] [place], [place] - [address] [facility]  [address] [facility] [address]  Phone: [phone] Fax: [phone]    [facility] [address]  [address] [facility] [address]  Phone: [phone] Fax: [phone]      Initial Assessment (most recent)       Adult Discharge Assessment - 05/17/24 1229          Discharge Assessment    Assessment Type Discharge Planning Assessment     Confirmed/corrected address, phone number and insurance Yes     Confirmed Demographics Correct on Facesheet     Source of Information patient     Reason For Admission Coronary artery disease of native artery of native heart with stable angina pectoris     People in Home spouse     Do you expect to return to your current living situation? Yes     Prior to hospitilization cognitive status: Alert/Oriented     Current cognitive status: Alert/Oriented     Walking or Climbing Stairs Difficulty no     Equipment Currently Used at Home none     Readmission within 30 days? Yes     Patient currently being followed by outpatient case management? No     Do you take prescription medications? Yes     Do you have prescription coverage? Yes     Do you have any problems affording any of your prescribed medications? TBD     Are you  on dialysis? No     Do you take coumadin? No     Discharge Plan A Home with family     Discharge Plan B Home with family                     Readmission Assessment (most recent)       Readmission Assessment - 05/17/24 1230          Readmission    Was this a planned readmission? No (P)      Why were you hospitalized in the last 30 days? Chest pain (P)      Why were you readmitted? New medical problem (P)      When you left the hospital where did you go? Home with Family (P)                           SW completed Discharge Planning Assessment with patient via bedside. Discharge planning booklet given to patient/family and whiteboard updated with ONELIA and phone #. All questions answered.    Patient reported that her  will provide transportation upon discharge.     Patient reported that she live with her , and prior to hospitalization she was independent with her ADL's. Patient reported that she is not on dialysis and does not go to a Coumadin clinic.      Patient reported that she live in a two story home and does not have difficulty climbing the stairs.     Discharge Plan A and Plan B have been determined by review of patient's clinical status, future medical and therapeutic needs, and coverage/benefits for post-acute care in coordination with multidisciplinary team members.      Jennifer Tim LMSW  [facility]  Ext. [phone]

## 2024-05-17 NOTE — BRIEF OP NOTE
"    Post Cath Note  Referring Physician: Augustin Thacker MD  Procedure: Angiogram, Coronary, with Left Heart Cath (Bilateral), IVUS, Coronary, Stent, Drug Eluting, Multi Vessel, Coronary      : Miguel Jacob MD     Referring Physician: Self,Aaareferral     All Operators: Surgeon(s):  Lico Corey MD Tafur Soto, Jose D., MD     Preoperative Diagnosis: NSTEMI (non-ST elevated myocardial infarction) [I21.4]     Postop Diagnosis: NSTEMI (non-ST elevated myocardial infarction) [I21.4]    Treatments/Procedures: Procedure(s) (LRB):  Angiogram, Coronary, with Left Heart Cath (Bilateral)  IVUS, Coronary  Stent, Drug Eluting, Multi Vessel, Coronary    Estimated Blood loss: <50 cc         Access: Right radial    LVEDP 5 mmHg    See full report for further details    Intervention:   Successful PCI of:  - Distal RCA with 2.5X32MM JOSE M  - Proximal-mid RCA with 3.5X32MM  JOSE M  - 1st Diagonal with 2.5X16MM JOSE M  - Mid LAD with 3.5X32MM JOSE M  - Kissing balloon of 1st D and mid LAD with 1:1 balloon  - LM dissection noted angiographically and demonstrated with IVUS   - LM treated with 5.0X12MM JOSE M  - IVUS used for stent sizing.    No other complication. Patient tolerated procedure well.     Closure device: Radial band    Post Cath Exam:   BP (!) 140/63 (BP Location: Left arm, Patient Position: Sitting)   Pulse (!) 53   Temp 97.7 °F (36.5 °C) (Tympanic)   Resp 18   Ht 5' 2" (1.575 m)   Wt 55.3 kg (122 lb)   SpO2 97%   BMI 22.31 kg/m²   No unusual pain, hematoma, thrill or bruit at vascular access site.  Distal pulse present without signs of ischemia.    Recommendations:   - Routine post-cath care  - IVF at 200 cc/hr for 5 hrs  - Cardiac rehab referral, Continue medical management, Risk factor reduction, Brilinta for at least 1 year and ASA 81 mg indefinitely, Follow-up with outpatient cardiologist  - High intensity statin    Lico Corey    "

## 2024-05-17 NOTE — SUBJECTIVE & OBJECTIVE
Current Facility-Administered Medications on File Prior to Encounter   Medication    lactated ringers infusion     Current Outpatient Medications on File Prior to Encounter   Medication Sig    aspirin 81 MG Chew Take 81 mg by mouth once daily. Not chewable    atenoloL (TENORMIN) 25 MG tablet TAKE 1 TABLET BY MOUTH DAILY    calcium-vitamin D3 500 mg(1,250mg) -200 unit per tablet Take 1 tablet by mouth 2 (two) times daily with meals.    co-enzyme Q-10 30 mg capsule Take 30 mg by mouth once daily.    diclofenac sodium (VOLTAREN ARTHRITIS PAIN) 1 % Gel Apply 2 g topically daily as needed (Pain).    famotidine (PEPCID AC ORAL) Take 1 tablet by mouth nightly as needed (Acid reflux).    multivitamin (THERAGRAN) per tablet Take 1 tablet by mouth once daily.    pantoprazole (PROTONIX) 40 MG tablet Take 1 tablet (40 mg total) by mouth 2 (two) times daily.    sodium chloride (SALINE MIST NASL) 1 spray by Each Nostril route daily as needed (Congestion).    vitamin A-vitamin C-vit E-min Tab Take 1 tablet by mouth once daily.       Review of patient's allergies indicates:   Allergen Reactions    Sulfa (sulfonamide antibiotics) Hives     Other reaction(s): Anaphylaxis  Itching   Shortness of breath        Past Medical History:   Diagnosis Date    Arthritis     Cataract     GERD (gastroesophageal reflux disease)     HEARING LOSS     Hypertension     Osteoporosis, postmenopausal     Squamous Cell Carcinoma 2007    right forearm    Urinary incontinence     rare mixed     Past Surgical History:   Procedure Laterality Date    COLPOPEXY N/A 08/06/2019    Procedure: COLPOPEXY SSL FIXATION;  Surgeon: Alma Dorsey MD;  Location: [address];  Service: Urology;  Laterality: N/A;    CYSTOSCOPY N/A 08/06/2019    Procedure: CYSTOSCOPY;  Surgeon: Alma Dorsey MD;  Location: [address];  Service: Urology;  Laterality: N/A;    ESOPHAGOGASTRODUODENOSCOPY N/A 4/1/2024    Procedure: EGD (ESOPHAGOGASTRODUODENOSCOPY);  Surgeon:  Zbigniew Dougherty MD;  Location: [facility];  Service: Endoscopy;  Laterality: N/A;    FRACTURE SURGERY Left 2008    open radius/ulna fracture    HYSTERECTOMY      TANIA/ovaries remain--fibroids- in her late 30's / early 40's     Family History       Problem Relation (Age of Onset)    Arthritis Sister    COPD Mother, Father    Diabetes Sister    Heart disease Mother    Heart failure Mother    Macular degeneration Mother    No Known Problems Brother, Son, Daughter, Sister, Sister, Sister, Brother, Brother, Brother, Brother, Brother, Daughter, Son          Tobacco Use    Smoking status: Never    Smokeless tobacco: Never   Substance and Sexual Activity    Alcohol use: Not Currently     Comment: 1-2 glasses wine weekly    Drug use: No    Sexual activity: Yes     Partners: Male     Birth control/protection: None     Review of Systems   Constitutional:  Negative for activity change, appetite change, fatigue and fever.   HENT:  Negative for nosebleeds.    Respiratory:  Negative for cough and shortness of breath.    Cardiovascular:  Negative for chest pain, palpitations and leg swelling.   Gastrointestinal:  Negative for abdominal distention, abdominal pain and nausea.   Genitourinary:  Negative for frequency.   Musculoskeletal:  Negative for arthralgias and myalgias.   Skin:  Negative for rash.   Neurological:  Negative for dizziness and numbness.   Hematological:  Does not bruise/bleed easily.     Objective:     Vital Signs (Most Recent):  Temp: 97.7 °F (36.5 °C) (05/17/24 0744)  Pulse: (!) 50 (05/17/24 0744)  Resp: 18 (05/17/24 0744)  BP: 113/61 (05/17/24 0744)  SpO2: 97 % (05/17/24 0744) Vital Signs (24h Range):  Temp:  [97.7 °F (36.5 °C)-98.6 °F (37 °C)] 97.7 °F (36.5 °C)  Pulse:  [43-67] 50  Resp:  [16-20] 18  SpO2:  [95 %-100 %] 97 %  BP: (111-181)/(59-84) 113/61     Weight: 55.3 kg (122 lb)  Body mass index is 22.31 kg/m².    SpO2: 97 %        Intake/Output - Last 3 Shifts         05/15 0700  05/16 0659 05/16  "0700 05/17 0659 05/17 0700  05/18 0659    P.O.  120     Total Intake(mL/kg)  120 (2.2)     Net  +120            Urine Occurrence 2 x 3 x              Lines/Drains/Airways       Peripheral Intravenous Line  Duration                  Peripheral IV - Single Lumen 05/15/24 0732 22 G Distal;Left;Posterior Forearm 2 days         Peripheral IV - Single Lumen 05/16/24 1402 20 G Posterior;Right Hand <1 day                          Physical Exam  Constitutional:       Appearance: Normal appearance.   HENT:      Head: Normocephalic and atraumatic.   Eyes:      Extraocular Movements: Extraocular movements intact.   Cardiovascular:      Rate and Rhythm: Bradycardia present.   Pulmonary:      Effort: Pulmonary effort is normal.   Abdominal:      General: Abdomen is flat.      Palpations: Abdomen is soft.   Musculoskeletal:         General: Normal range of motion.      Cervical back: Normal range of motion.   Skin:     General: Skin is warm and dry.      Capillary Refill: Capillary refill takes less than 2 seconds.      Coloration: Skin is not pale.   Neurological:      General: No focal deficit present.      Mental Status: She is alert.            Significant Labs:  ABGs: No results for input(s): "PH", "PCO2", "PO2", "HCO3", "POCSATURATED", "BE" in the last 48 hours.  Amylase: No results for input(s): "AMYLASE" in the last 48 hours.  BMP:   Recent Labs   Lab 05/17/24  0402   GLU 75      K 3.4*      CO2 22*   BUN 19   CREATININE 0.8   CALCIUM 9.5     Cardiac markers:   Recent Labs   Lab 05/15/24  1212   TROPONINI 0.028*     CBC:   Recent Labs   Lab 05/17/24  0402   WBC 8.61   RBC 3.72*   HGB 11.3*   HCT 34.0*      MCV 91   MCH 30.4   MCHC 33.2     CMP:   Recent Labs   Lab 05/17/24  0402   GLU 75   CALCIUM 9.5   ALBUMIN 3.5   PROT 6.8      K 3.4*   CO2 22*      BUN 19   CREATININE 0.8   ALKPHOS 76   ALT 18   AST 25   BILITOT 1.2*     Coagulation: No results for input(s): "PT", "INR", "APTT" in the " "last 48 hours.  Lactic Acid: No results for input(s): "LACTATE" in the last 48 hours.  LFTs:   Recent Labs   Lab 05/17/24  0402   ALT 18   AST 25   ALKPHOS 76   BILITOT 1.2*   PROT 6.8   ALBUMIN 3.5     Lipase: No results for input(s): "LIPASE" in the last 48 hours.    Significant Diagnostics:  I have reviewed all pertinent imaging results/findings within the past 24 hours.  [facility] 5/17/24    There was three vessel coronary artery disease.    The Prox LAD to Mid LAD lesion was 90% stenosed.    The 1st Diag lesion was 80% stenosed.    The Ramus lesion was 75% stenosed.    The Dist RCA lesion was 80% stenosed.    The estimated blood loss was <50 mL.       stress echo 5/17/2024    Left Ventricle: The left ventricle is normal in size. Normal wall thickness. Septal motion is abnormal. There is normal systolic function with a visually estimated ejection fraction of 60 - 65%. There is normal diastolic function. Inconclusive left ventricular filling pressure.    Right Ventricle: Normal right ventricular cavity size. Wall thickness is normal. Systolic function is normal.    The left atrium is severely dilated.    Aortic Valve: There is mild aortic valve sclerosis. Mildly restricted motion.    Pulmonary Artery: The estimated pulmonary artery systolic pressure is 34 mmHg.    IVC/SVC: Normal venous pressure at 3 mmHg.    Stress Protocol: The patient was infused intravenously with dobutamine. The patient received a graduated infusion of the stress agent beginning at 10.0 mcg/kg/min to a peak dose of 40.0 mcg/kg/min. A total dobutamine dose of 40.0 mg was injected. The peak heart rate was 121.0 bpm, which is 92% of age predicted maximum heart rate. The patient was also given atropine. The patient reported 8/10 chest discomfort during the stress test.    Baseline ECG: The Baseline ECG reveals sinus bradycardia. There is left axis deviation. The baseline ECG has ST and/or T wave abnormalities suggestive of myocardial ischemia.    " Stress ECG: There are no ST segment deviation identified during the protocol. There are no arrhythmias during stress. There is normal blood pressure response with stress.    ECG Conclusion: The ECG portion of the study is negative for ischemia, though the deep T wave inversion seen at rest normalizes/pseudonormalizes with stress.    Post-stress Impression: At peak stress and into recovery, the mid to distal LAD territory including the apex becomes hypokinetic, suggesting stress-induced ischemia in that region of myocardium.    Overall, this study is consistent with stress-induced ischemia in the mid to distal LAD territory.    Echo 5/7/2024    Left Ventricle: The left ventricle is normal in size. Normal wall thickness. Normal wall motion. There is normal systolic function. Ejection fraction by visual approximation is 60%.    Right Ventricle: Normal right ventricular cavity size. Wall thickness is normal. Systolic function is normal.    Left Atrium: Left atrium is moderately dilated.    Mitral Valve: There is mild regurgitation.    Tricuspid Valve: There is mild regurgitation.    Pulmonary Artery: The estimated pulmonary artery systolic pressure is 28 mmHg.    IVC/SVC: Normal venous pressure at 3 mmHg.

## 2024-05-17 NOTE — ASSESSMENT & PLAN NOTE
82 y/o F with HTN and GERD who is admitted for NSTEMI. She presented with multiple episodes of nonexertional angina. Trop peaked to 0.036. She has abnormal  Stress echo and subsequent LHC significant for 3 vessel disease. Patient was loaded with Brilinta on 5/16 and placed on scheduled BID thereafter. CTS consulted for CABG surgical evaluation. Echo revealed LVEF 60% and no significant valvulopathy. Concerns for frailty and memory loss for which she is not a surgical candidate. Patient does not want surgery and would like to proceed with stents instead. Dr. Nassar to review and staff.

## 2024-05-17 NOTE — CONSULTS
[facility] - Observation 11H  Cardiothoracic Surgery  Consult Note    Patient Name: Betzy Cooley  MRN: [id number]  Admission Date: 5/15/2024  Attending Physician: Augustin Thacker MD  Referring Provider: Self, Aaareferral    Patient information was obtained from patient, spouse/SO, past medical records, ER records, and primary team.     Inpatient consult to Cardiothoracic Surgery  Consult performed by: Kelly Mccauley PA-C  Consult ordered by: Augustin Thacker MD        Subjective:     Principal Problem: Coronary artery disease of native artery of native heart with stable angina pectoris    History of Present Illness: Betzy Cooley is an 82 yo F with HTN, GERD who presented to the ED with chest pain for her second presentation in the past week. She reports  nonexertional angina that comes and goes intermittently and lasts minutes to short hours at a time.  Her last episode was 5/14 and has not recurred since arrival at the hospital. She has a long history of 10+ years of GERD and it usually resolves with a couple chewable antacids and she never has to take more than this for relief. This burning sensation feels different than her GERD does and has not resolved with >1 week of BID Protonix.   Cardiology was consulted on this admission and recommended stress echo.  stress echo showed mid to distal LAD territory hypokinesis that came on at peak stress and into recovery, consistent with ischemic myocardium.   EKG on last admission without acute ischemic changes. EKG on this admission with deep T wave inversions everywhere but predominantly throughout the precordial leads. T  troponin peaked at 0.036 and creatinine is at baseline at 0.8.   She underwent LHC with IC significant for mid-prox LAD 90, D1 80, IR 75, dist RCA 80. Received Brilinta load on 5/16 and placed on scheduled PO BID thereafter. CTS consulted for CABG surgical evaluation.    Today, she denies chest pain and sob. Her  is present in the  room for her and assisted with history as the patient is forgetful. She independently performs ADLS without issues. No AD for walking. No prior strokes, stents, sternotomy. No DM hx. Patient does not want surgery and wishes to proceed with stents.          Of note, pt was admitted from 05/06 - 05/07 for similar presentation. Echo was performed at that time showing an EF of 60% with no WMA or significant valvulopathy.     Current Facility-Administered Medications on File Prior to Encounter   Medication    lactated ringers infusion     Current Outpatient Medications on File Prior to Encounter   Medication Sig    aspirin 81 MG Chew Take 81 mg by mouth once daily. Not chewable    atenoloL (TENORMIN) 25 MG tablet TAKE 1 TABLET BY MOUTH DAILY    calcium-vitamin D3 500 mg(1,250mg) -200 unit per tablet Take 1 tablet by mouth 2 (two) times daily with meals.    co-enzyme Q-10 30 mg capsule Take 30 mg by mouth once daily.    diclofenac sodium (VOLTAREN ARTHRITIS PAIN) 1 % Gel Apply 2 g topically daily as needed (Pain).    famotidine (PEPCID AC ORAL) Take 1 tablet by mouth nightly as needed (Acid reflux).    multivitamin (THERAGRAN) per tablet Take 1 tablet by mouth once daily.    pantoprazole (PROTONIX) 40 MG tablet Take 1 tablet (40 mg total) by mouth 2 (two) times daily.    sodium chloride (SALINE MIST NASL) 1 spray by Each Nostril route daily as needed (Congestion).    vitamin A-vitamin C-vit E-min Tab Take 1 tablet by mouth once daily.       Review of patient's allergies indicates:   Allergen Reactions    Sulfa (sulfonamide antibiotics) Hives     Other reaction(s): Anaphylaxis  Itching   Shortness of breath        Past Medical History:   Diagnosis Date    Arthritis     Cataract     GERD (gastroesophageal reflux disease)     HEARING LOSS     Hypertension     Osteoporosis, postmenopausal     Squamous Cell Carcinoma 2007    right forearm    Urinary incontinence     rare mixed     Past Surgical History:   Procedure Laterality  Date    COLPOPEXY N/A 08/06/2019    Procedure: COLPOPEXY SSL FIXATION;  Surgeon: Alma Dorsey MD;  Location: [facility] OR [address] [place] [address];  Service: Urology;  Laterality: N/A;    CYSTOSCOPY N/A 08/06/2019    Procedure: CYSTOSCOPY;  Surgeon: Alma Dorsey MD;  [facility];  Service: Urology;  Laterality: N/A;    ESOPHAGOGASTRODUODENOSCOPY N/A 4/1/2024    Procedure: EGD (ESOPHAGOGASTRODUODENOSCOPY);  Surgeon: Zbigniew Dougherty MD;  Location: [facility];  Service: Endoscopy;  Laterality: N/A;    FRACTURE SURGERY Left 2008    open radius/ulna fracture    HYSTERECTOMY      TANIA/ovaries remain--fibroids- in her late 30's / early 40's     Family History       Problem Relation (Age of Onset)    Arthritis Sister    COPD Mother, Father    Diabetes Sister    Heart disease Mother    Heart failure Mother    Macular degeneration Mother    No Known Problems Brother, Son, Daughter, Sister, Sister, Sister, Brother, Brother, Brother, Brother, Brother, Daughter, Son          Tobacco Use    Smoking status: Never    Smokeless tobacco: Never   Substance and Sexual Activity    Alcohol use: Not Currently     Comment: 1-2 glasses wine weekly    Drug use: No    Sexual activity: Yes     Partners: Male     Birth control/protection: None     Review of Systems   Constitutional:  Negative for activity change, appetite change, fatigue and fever.   HENT:  Negative for nosebleeds.    Respiratory:  Negative for cough and shortness of breath.    Cardiovascular:  Negative for chest pain, palpitations and leg swelling.   Gastrointestinal:  Negative for abdominal distention, abdominal pain and nausea.   Genitourinary:  Negative for frequency.   Musculoskeletal:  Negative for arthralgias and myalgias.   Skin:  Negative for rash.   Neurological:  Negative for dizziness and numbness.   Hematological:  Does not bruise/bleed easily.     Objective:     Vital Signs (Most Recent):  Temp: 97.7 °F (36.5 °C) (05/17/24 0744)  Pulse: (!) 50 (05/17/24  "0744)  Resp: 18 (05/17/24 0744)  BP: 113/61 (05/17/24 0744)  SpO2: 97 % (05/17/24 0744) Vital Signs (24h Range):  Temp:  [97.7 °F (36.5 °C)-98.6 °F (37 °C)] 97.7 °F (36.5 °C)  Pulse:  [43-67] 50  Resp:  [16-20] 18  SpO2:  [95 %-100 %] 97 %  BP: (111-181)/(59-84) 113/61     Weight: 55.3 kg (122 lb)  Body mass index is 22.31 kg/m².    SpO2: 97 %        Intake/Output - Last 3 Shifts         05/15 0700 05/16 0659 05/16 0700 05/17 0659 05/17 0700 05/18 0659    P.O.  120     Total Intake(mL/kg)  120 (2.2)     Net  +120            Urine Occurrence 2 x 3 x              Lines/Drains/Airways       Peripheral Intravenous Line  Duration                  Peripheral IV - Single Lumen 05/15/24 0732 22 G Distal;Left;Posterior Forearm 2 days         Peripheral IV - Single Lumen 05/16/24 1402 20 G Posterior;Right Hand <1 day                          Physical Exam  Constitutional:       Appearance: Normal appearance.   HENT:      Head: Normocephalic and atraumatic.   Eyes:      Extraocular Movements: Extraocular movements intact.   Cardiovascular:      Rate and Rhythm: Bradycardia present.   Pulmonary:      Effort: Pulmonary effort is normal.   Abdominal:      General: Abdomen is flat.      Palpations: Abdomen is soft.   Musculoskeletal:         General: Normal range of motion.      Cervical back: Normal range of motion.   Skin:     General: Skin is warm and dry.      Capillary Refill: Capillary refill takes less than 2 seconds.      Coloration: Skin is not pale.   Neurological:      General: No focal deficit present.      Mental Status: She is alert.            Significant Labs:  ABGs: No results for input(s): "PH", "PCO2", "PO2", "HCO3", "POCSATURATED", "BE" in the last 48 hours.  Amylase: No results for input(s): "AMYLASE" in the last 48 hours.  BMP:   Recent Labs   Lab 05/17/24  0402   GLU 75      K 3.4*      CO2 22*   BUN 19   CREATININE 0.8   CALCIUM 9.5     Cardiac markers:   Recent Labs   Lab 05/15/24  1212 " "  TROPONINI 0.028*     CBC:   Recent Labs   Lab 05/17/24  0402   WBC 8.61   RBC 3.72*   HGB 11.3*   HCT 34.0*      MCV 91   MCH 30.4   MCHC 33.2     CMP:   Recent Labs   Lab 05/17/24  0402   GLU 75   CALCIUM 9.5   ALBUMIN 3.5   PROT 6.8      K 3.4*   CO2 22*      BUN 19   CREATININE 0.8   ALKPHOS 76   ALT 18   AST 25   BILITOT 1.2*     Coagulation: No results for input(s): "PT", "INR", "APTT" in the last 48 hours.  Lactic Acid: No results for input(s): "LACTATE" in the last 48 hours.  LFTs:   Recent Labs   Lab 05/17/24  0402   ALT 18   AST 25   ALKPHOS 76   BILITOT 1.2*   PROT 6.8   ALBUMIN 3.5     Lipase: No results for input(s): "LIPASE" in the last 48 hours.    Significant Diagnostics:  I have reviewed all pertinent imaging results/findings within the past 24 hours.  [facility] 5/17/24    There was three vessel coronary artery disease.    The Prox LAD to Mid LAD lesion was 90% stenosed.    The 1st Diag lesion was 80% stenosed.    The Ramus lesion was 75% stenosed.    The Dist RCA lesion was 80% stenosed.    The estimated blood loss was <50 mL.      [facility] stress echo 5/17/2024    Left Ventricle: The left ventricle is normal in size. Normal wall thickness. Septal motion is abnormal. There is normal systolic function with a visually estimated ejection fraction of 60 - 65%. There is normal diastolic function. Inconclusive left ventricular filling pressure.    Right Ventricle: Normal right ventricular cavity size. Wall thickness is normal. Systolic function is normal.    The left atrium is severely dilated.    Aortic Valve: There is mild aortic valve sclerosis. Mildly restricted motion.    Pulmonary Artery: The estimated pulmonary artery systolic pressure is 34 mmHg.    IVC/SVC: Normal venous pressure at 3 mmHg.    Stress Protocol: The patient was infused intravenously with dobutamine. The patient received a graduated infusion of the stress agent beginning at 10.0 mcg/kg/min to a peak dose of 40.0 " mcg/kg/min. A total dobutamine dose of 40.0 mg was injected. The peak heart rate was 121.0 bpm, which is 92% of age predicted maximum heart rate. The patient was also given atropine. The patient reported 8/10 chest discomfort during the stress test.    Baseline ECG: The Baseline ECG reveals sinus bradycardia. There is left axis deviation. The baseline ECG has ST and/or T wave abnormalities suggestive of myocardial ischemia.    Stress ECG: There are no ST segment deviation identified during the protocol. There are no arrhythmias during stress. There is normal blood pressure response with stress.    ECG Conclusion: The ECG portion of the study is negative for ischemia, though the deep T wave inversion seen at rest normalizes/pseudonormalizes with stress.    Post-stress Impression: At peak stress and into recovery, the mid to distal LAD territory including the apex becomes hypokinetic, suggesting stress-induced ischemia in that region of myocardium.    Overall, this study is consistent with stress-induced ischemia in the mid to distal LAD territory.    Echo 5/7/2024    Left Ventricle: The left ventricle is normal in size. Normal wall thickness. Normal wall motion. There is normal systolic function. Ejection fraction by visual approximation is 60%.    Right Ventricle: Normal right ventricular cavity size. Wall thickness is normal. Systolic function is normal.    Left Atrium: Left atrium is moderately dilated.    Mitral Valve: There is mild regurgitation.    Tricuspid Valve: There is mild regurgitation.    Pulmonary Artery: The estimated pulmonary artery systolic pressure is 28 mmHg.    IVC/SVC: Normal venous pressure at 3 mmHg.  Assessment/Plan:         NSTEMI (non-ST elevated myocardial infarction)  84 y/o F with HTN and GERD who is admitted for NSTEMI. She presented with multiple episodes of nonexertional angina. Trop peaked to 0.036. She had an abnormal  Stress echo and subsequent LHC significant for 3 vessel  disease. Patient was loaded with Brilinta on 5/16 and placed on scheduled BID thereafter. CTS consulted for CABG surgical evaluation. Echo revealed LVEF 60% and no significant valvulopathy. Concerns for frailty and memory loss for which she is not a surgical candidate. Patient also states that she does not want surgery and would like to proceed with stents instead. Dr. Nassar to review and staff.         Thank you for your consult. I will sign off. Please contact us if you have any additional questions.    Kelly Mccauley PA-C  Cardiothoracic Surgery  [facility] - Observation 11H    Case and pertinent studies were reviewed.  Due to multiple comorbid condition and frailty would recommend consideration for high-risk PCI/medical therapy.

## 2024-05-17 NOTE — PROGRESS NOTES
[facility]  Cardiology  Progress Note    Patient Name: Betzy Cooley  MRN: [id number]  Admission Date: 5/15/2024  Hospital Length of Stay: 1 days  Code Status: Full Code   Attending Physician: Augustin Thacker MD   Primary Care Physician: Praful Paul MD  Expected Discharge Date: 5/20/2024  Principal Problem:Coronary artery disease of native artery of native heart with stable angina pectoris    Subjective:       Interval History:     Pt seen and examined at bedside. NAEON. CP free overnight. On ASA, HIS, and Ticagrelor. Seen by CTS, CABG declined. IC will take pt for PCI today. NPO, Cr 0.8.     Review of Systems   Constitutional: Negative for diaphoresis and fever.   Cardiovascular:  Negative for chest pain, dyspnea on exertion, leg swelling, near-syncope, orthopnea, palpitations, paroxysmal nocturnal dyspnea and syncope.   Respiratory:  Negative for cough and shortness of breath.    Gastrointestinal:  Negative for abdominal pain, diarrhea, nausea and vomiting.   Neurological:  Negative for light-headedness.   Psychiatric/Behavioral:  Negative for altered mental status and substance abuse.      Objective:     Vital Signs (Most Recent):  Temp: 97.7 °F (36.5 °C) (05/17/24 0744)  Pulse: (!) 58 (05/17/24 1056)  Resp: 18 (05/17/24 0744)  BP: 113/61 (05/17/24 0744)  SpO2: 97 % (05/17/24 0744) Vital Signs (24h Range):  Temp:  [97.7 °F (36.5 °C)-98.6 °F (37 °C)] 97.7 °F (36.5 °C)  Pulse:  [43-67] 58  Resp:  [16-20] 18  SpO2:  [95 %-100 %] 97 %  BP: (111-181)/(59-83) 113/61     Weight: 55.3 kg (122 lb)  Body mass index is 22.31 kg/m².     SpO2: 97 %         Intake/Output Summary (Last 24 hours) at 5/17/2024 1116  Last data filed at 5/16/2024 1715  Gross per 24 hour   Intake 120 ml   Output --   Net 120 ml       Lines/Drains/Airways       Peripheral Intravenous Line  Duration                  Peripheral IV - Single Lumen 05/15/24 0732 22 G Distal;Left;Posterior Forearm 2 days         Peripheral IV -  "Single Lumen 05/16/24 1402 20 G Posterior;Right Hand <1 day                       Physical Exam  Constitutional:       Appearance: Normal appearance.   Cardiovascular:      Heart sounds: Normal heart sounds. No murmur heard.     No gallop.   Pulmonary:      Effort: Pulmonary effort is normal.      Breath sounds: Normal breath sounds. No rales.   Musculoskeletal:      Right lower leg: No edema.      Left lower leg: No edema.   Skin:     General: Skin is warm and dry.   Neurological:      Mental Status: She is alert and oriented to person, place, and time. Mental status is at baseline.            Significant Labs/Significant Imaging:     Recent Labs   Lab 05/15/24  0110 05/16/24  0536 05/17/24  0402   WBC 7.75 7.38 8.61   HGB 11.7* 11.3* 11.3*   HCT 35.4* 34.3* 34.0*   MCV 91 93 91   RBC 3.89* 3.69* 3.72*   MCH 30.1 30.6 30.4   MCHC 33.1 32.9 33.2   RDW 13.0 12.9 12.9    243 233   MPV 9.8 10.3 10.2   GRAN 62.4  4.8 52.1  3.9 68.4  5.9   LYMPH 22.5  1.7 27.9  2.1 14.9*  1.3   MONO 9.9  0.8 10.8  0.8 10.7  0.9   EOSINOPHIL 3.7 7.5 4.5   BASOPHIL 1.2 1.4 1.2       Recent Labs   Lab 05/15/24  0110 05/16/24  0536 05/17/24  0402    142 139   K 3.4* 3.9 3.4*    106 107   CO2 24 27 22*   BUN 14 15 19   CREATININE 0.8 0.8 0.8    88 75   CALCIUM 9.8 9.2 9.5   PROT 7.4 6.7 6.8   ALBUMIN 3.8 3.4* 3.5   ALKPHOS 78 75 76   BILITOT 0.4 0.9 1.2*   ALT 32 23 18   AST 27 22 25   ANIONGAP 13 9 10       No results for input(s): "COLORU", "APPEARANCEUA", "PHUR", "SPECGRAV", "PROTEINUA", "GLUCUA", "KETONESU", "BILIRUBINUA", "OCCULTUA", "UROBILINOGEN", "NITRITE", "LEUKOCYTESUR", "RBCUA", "WBCUA", "BACTERIA", "SQUAMEPITHEL", "HYALINECASTS", "GRANULARCAST", "MICROCMT" in the last 168 hours.    Invalid input(s): "SPECIMENU", "AMORPHOUSU"    No results for input(s): "PH", "PCO2", "PO2", "HCO3", "POCSATURATED", "BE" in the last 168 hours.    Recent Labs   Lab 05/15/24  0110 05/15/24  0341 05/15/24  0742 " "05/15/24  1114 05/15/24  1212   TROPONINI 0.025 0.035* 0.036* 0.026 0.028*       No results for input(s): "PT", "INR", "APTT" in the last 168 hours.    Lab Results   Component Value Date    HGBA1C 5.5 05/15/2024       No results for input(s): "TSH", "M0HDKNE", "D1VSNNW", "THYROIDAB", "FREET4" in the last 168 hours.    Microbiology Results (last 7 days)       ** No results found for the last 168 hours. **            Cardiac catheterization    Result Date: 5/16/2024    There was three vessel coronary artery disease.   The Prox LAD to Mid LAD lesion was 90% stenosed.   The 1st Diag lesion was 80% stenosed.   The Ramus lesion was 75% stenosed.   The Dist RCA lesion was 80% stenosed.   The estimated blood loss was <50 mL. The procedure log was documented by Documenter: Clinton Khoury RT and verified by Miguel Alvarez MD. Date: 5/16/2024  Time: 4:16 PM     Stress Echo Which stress agent will be used? Pharmacological; Color Flow Doppler? No    Result Date: 5/16/2024    Left Ventricle: The left ventricle is normal in size. Normal wall thickness. Septal motion is abnormal. There is normal systolic function with a visually estimated ejection fraction of 60 - 65%. There is normal diastolic function. Inconclusive left ventricular filling pressure.   Right Ventricle: Normal right ventricular cavity size. Wall thickness is normal. Systolic function is normal.   The left atrium is severely dilated.   Aortic Valve: There is mild aortic valve sclerosis. Mildly restricted motion.   Pulmonary Artery: The estimated pulmonary artery systolic pressure is 34 mmHg.   IVC/SVC: Normal venous pressure at 3 mmHg.   Stress Protocol: The patient was infused intravenously with dobutamine. The patient received a graduated infusion of the stress agent beginning at 10.0 mcg/kg/min to a peak dose of 40.0 mcg/kg/min. A total dobutamine dose of 40.0 mg was injected. The peak heart rate was 121.0 bpm, which is 92% of age predicted maximum heart rate. " The patient was also given atropine. The patient reported 8/10 chest discomfort during the stress test.   Baseline ECG: The Baseline ECG reveals sinus bradycardia. There is left axis deviation. The baseline ECG has ST and/or T wave abnormalities suggestive of myocardial ischemia.   Stress ECG: There are no ST segment deviation identified during the protocol. There are no arrhythmias during stress. There is normal blood pressure response with stress.   ECG Conclusion: The ECG portion of the study is negative for ischemia, though the deep T wave inversion seen at rest normalizes/pseudonormalizes with stress.   Post-stress      At peak stress and into recovery, the mid to distal LAD territory including the apex becomes hypokinetic, suggesting stress-induced ischemia in that region of myocardium.   Overall, this study is consistent with stress-induced ischemia in the mid to distal LAD territory.     X-Ray Chest AP Portable    Result Date: 5/15/2024  EXAMINATION: XR CHEST AP PORTABLE CLINICAL HISTORY: Chest Pain; TECHNIQUE: Single frontal view of the chest was performed. COMPARISON: 05/06/2024. FINDINGS: No consolidation, pleural effusion or pneumothorax. Cardiomediastinal silhouette is unremarkable.     No acute findings in the chest. Electronically signed by: Anibal Pineda MD Date:    05/15/2024 Time:    02:11    Echo    Result Date: 5/7/2024    Left Ventricle: The left ventricle is normal in size. Normal wall thickness. Normal wall motion. There is normal systolic function. Ejection fraction by visual approximation is 60%.   Right Ventricle: Normal right ventricular cavity size. Wall thickness is normal. Systolic function is normal.   Left Atrium: Left atrium is moderately dilated.   Mitral Valve: There is mild regurgitation.   Tricuspid Valve: There is mild regurgitation.   Pulmonary Artery: The estimated pulmonary artery systolic pressure is 28 mmHg.   IVC/SVC: Normal venous pressure at 3 mmHg.     X-Ray Chest 1  View    Result Date: 5/6/2024  EXAMINATION: XR CHEST 1 VIEW TECHNIQUE: One view COMPARISON: Comparison is made to 11/03/2008.  Clinical information of epigastric pain is obtained from the electronic medical record. FINDINGS: Allowing for magnification of the cardiomediastinal silhouette related to projection, the heart size is within normal limits, as is the appearance of the pulmonary vascularity.  Lung zones appear clear, and are free of significant airspace consolidation or volume loss.  No pleural fluid.  No hilar or mediastinal mass lesion, with mild tortuosity of the thoracic aorta with calcification in the wall of the aortic arch incidentally noted.  No pneumothorax.     No significant intrathoracic abnormality.  No significant detrimental interval change in the appearance of the chest since 11/03/2008 is appreciated. Electronically signed by: Bravo Medina MD Date:    05/06/2024 Time:    11:47    CT Abdomen Pelvis With IV Contrast NO Oral Contrast    Result Date: 5/6/2024  EXAMINATION: CT ABDOMEN PELVIS WITH IV CONTRAST CLINICAL HISTORY: Abdominal pain, acute, nonlocalized; TECHNIQUE: Low dose axial images, sagittal and coronal reformations were obtained from the lung bases to the pubic symphysis.  75 mL of IV contrast was administered. COMPARISON: Abdominal radiograph 01/03/2020. FINDINGS: Lower chest: Heart size is normal. Mild bandlike atelectasis in the lung bases. Abdomen: Liver is normal in size.  No focal hepatic lesion.  Fatty infiltration about the falciform ligament.  Portal vasculature is patent.  Gallbladder is unremarkable. No calcified gallstones.  No intrahepatic biliary dilatation.  Common duct dilated up to 10 mm.  No obvious cause for obstruction. Spleen, adrenals, and pancreas are unremarkable. Kidneys are symmetric.  No renal or ureteral stones. No hydronephrosis. No distended loops of small bowel. Abdominal aorta is normal in course and caliber.  Mild calcific atherosclerosis. No abdominal  lymphadenopathy. No abdominal free fluid or pneumoperitoneum. Pelvis: Urinary bladder is distended without wall thickening.  No free fluid in the pelvis. Uterus is surgically absent.  No adnexal lesion. Bones and soft tissues: No aggressive osseous lesions. Degenerative changes of visualized osseous structures.  Grade 1 anterolisthesis of L4 on L5. tiny fat containing umbilical hernia.     No acute abdominopelvic abnormality. Additional findings in the body of the report. Electronically signed by resident: Dieudonne Izquierdo Date:    05/06/2024 Time:    07:47 Electronically signed by: Quirino Salgado MD Date:    05/06/2024 Time:    07:59    Assessment and Plan:       * Coronary artery disease of native artery of native heart with stable angina pectoris  Pt presented to the ED with the CC of non-exertional substernal CP that was burning, non-radiating in nature, and she rated an 8/10. CP was fleeting in nature and lasted for 1-2 hours. States that she ate dinner at 5PM and around 9PM while she was sitting on her couch she noticed the CP. Associated with palpitations and abdominal bloating. Denied N/V, diaphoresis, and SOB.    In the ED: EKG was significant for diffuse T wave inversions. BNP elevated at 148. Troponins .025-->.035--> .036-->.026.     -Dobutamine Stress Echo positive with stress-induced ischemia in the mid to distal LAD territory. IC was consulted.  Pt was taken for angiogram. Angiogram showed three vessel CAD. Prox LAD to Mid LAD lesion with 90% stenosis, 1st Diag lesion with 80% stenosis, Ramus lesion with 75% stenosis, and Dist RCA lesion with 80% stenosis.     05/17: This AM pt remains CP free. Denies any SOB, N/V, palpitations. VSS. On ASA, HIS, and Ticagrelor. Seen by CTS this AM, surgery declined. Pt is NPO and IC will take pt for PCI today.     Stress Echo Which stress agent will be used? Pharmacological; Color Flow Doppler? No    Result Date: 5/16/2024    Left Ventricle: The left ventricle is  normal in size. Normal wall   thickness. Septal motion is abnormal. There is normal systolic function   with a visually estimated ejection fraction of 60 - 65%. There is normal   diastolic function. Inconclusive left ventricular filling pressure.    Right Ventricle: Normal right ventricular cavity size. Wall thickness   is normal. Systolic function is normal.    The left atrium is severely dilated.    Aortic Valve: There is mild aortic valve sclerosis. Mildly restricted   motion.    Pulmonary Artery: The estimated pulmonary artery systolic pressure is   34 mmHg.    IVC/SVC: Normal venous pressure at 3 mmHg.    Stress Protocol: The patient was infused intravenously with dobutamine.   The patient received a graduated infusion of the stress agent beginning at   10.0 mcg/kg/min to a peak dose of 40.0 mcg/kg/min. A total dobutamine dose   of 40.0 mg was injected. The peak heart rate was 121.0 bpm, which is 92%   of age predicted maximum heart rate. The patient was also given atropine.   The patient reported 8/10 chest discomfort during the stress test.    Baseline ECG: The Baseline ECG reveals sinus bradycardia. There is left   axis deviation. The baseline ECG has ST and/or T wave abnormalities   suggestive of myocardial ischemia.    Stress ECG: There are no ST segment deviation identified during the   protocol. There are no arrhythmias during stress. There is normal blood   pressure response with stress.    ECG Conclusion: The ECG portion of the study is negative for ischemia,   though the deep T wave inversion seen at rest normalizes/pseudonormalizes   with stress.    Post-stress       Display both the narrative and impression [both]  At peak stress and into recovery, the mid to   distal LAD territory including the apex becomes hypokinetic, suggesting   stress-induced ischemia in that region of myocardium.    Overall, this study is consistent with stress-induced ischemia in the   mid to distal LAD  territory.        Echo    Result Date: 5/7/2024    Left Ventricle: The left ventricle is normal in size. Normal wall   thickness. Normal wall motion. There is normal systolic function. Ejection   fraction by visual approximation is 60%.    Right Ventricle: Normal right ventricular cavity size. Wall thickness   is normal. Systolic function is normal.    Left Atrium: Left atrium is moderately dilated.    Mitral Valve: There is mild regurgitation.    Tricuspid Valve: There is mild regurgitation.    Pulmonary Artery: The estimated pulmonary artery systolic pressure is   28 mmHg.    IVC/SVC: Normal venous pressure at 3 mmHg.        -Continue ASA 81mg, Lipitor 40mg, Ticagrelor 90mg, and Atenolol 25mg QD   -IC will take pt for PCI today.       VTE Risk Mitigation (From admission, onward)           Ordered     IP VTE HIGH RISK PATIENT  Once         05/15/24 1045     Place sequential compression device  Until discontinued         05/15/24 1045                    Thanks for your consult. Will sign off at this time.     Anibal Rubi MD, PGY-II  Cardiology  [facility]  I have personally taken the history and examined the patient and agree with the resident's note as stated above.  Cath and Intervention as well as med treatment

## 2024-05-17 NOTE — LETTER
May 17, 2024    Betzy Cooley  [address] [facility] [address]             [facility]  [address]  [place] [address]  Phone: [phone]                                  [facility]   [address] [facility]   [place] [address]  [phone]         [facility]   [address] [place] [address] [phone]         [facility]    [address]  [place] [address]  [phone]   Re: Betzy Cooley  Clinic number: [id number]    Dear Ms. Cooley:    You were recently admitted to an [facility] facility for cardiac (heart) problem.  Your physician has referred you to [facility].  Cardiac Rehab Phase 2 is an educational and exercise program, conducted in a outpatient setting, proven to help reduce your risk for recurrent heart events.    Cardiac rehab has two major parts:    1. Exercise training to help you achieve cardiovascular fitness while learning how to exercise safely and improve muscle strength and endurance.  Your exercise prescription will be based on the results of the cardiopulmonary stress test (CPX) which will be done before entering the program and at completion.  2. Education, counseling and training to help you understand your heart condition and find ways to reduce your risk of future heart problems.  The cardiac rehab team will help you learn how to cope with the stress of adjusting to a new lifestyle and to deal with your fears about the future.    Phase 2 is a 36-session program, meeting 3 times a week for 12 weeks.  Each session consists of an hour of exercise and half-hour dedicated to the educational topic of the day.  Class days vary per location.  Please contact your nearest facility for details.    Through cardiac rehab you will learn:  About your heart condition, medical therapies, and medication  Risk factors in y our lifestyle contributing to heart  disease  New strategies to modify your risk factors  About a healthy diet that can lower your blood cholesterol, control weight, help prevent or control high blood pressure, and diabetes  How to stop smoking  How to manage stress    If you are interested in getting started, call the [facility] of your choosing.     Sincerely,     [facility]

## 2024-05-17 NOTE — SUBJECTIVE & OBJECTIVE
Interval History: found to have severe 3V disease. CABG being considered but felt poor candidate. Pt. And  agree.     Plans for LHC and PCI today.     Pt. Has no new sx. She denies chest pain or Dyspnea.     Review of Systems   Constitutional:  Negative for activity change.   Respiratory:  Negative for apnea, chest tightness and shortness of breath.    Cardiovascular:  Negative for chest pain.   Gastrointestinal:  Negative for abdominal distention.     Objective:     Vital Signs (Most Recent):  Temp: 97.7 °F (36.5 °C) (05/17/24 1131)  Pulse: (!) 53 (05/17/24 1131)  Resp: 18 (05/17/24 1131)  BP: (!) 140/63 (05/17/24 1131)  SpO2: 97 % (05/17/24 1131) Vital Signs (24h Range):  Temp:  [97.7 °F (36.5 °C)-98.6 °F (37 °C)] 97.7 °F (36.5 °C)  Pulse:  [43-67] 53  Resp:  [16-18] 18  SpO2:  [95 %-100 %] 97 %  BP: (111-181)/(59-83) 140/63     Weight: 55.3 kg (122 lb)  Body mass index is 22.31 kg/m².    Intake/Output Summary (Last 24 hours) at 5/17/2024 1250  Last data filed at 5/16/2024 1715  Gross per 24 hour   Intake 120 ml   Output --   Net 120 ml         Physical Exam  Constitutional:       General: She is not in acute distress.  Cardiovascular:      Rate and Rhythm: Normal rate and regular rhythm.      Heart sounds: No murmur heard.  Pulmonary:      Effort: Pulmonary effort is normal. No respiratory distress.   Abdominal:      General: Abdomen is flat. There is no distension.      Palpations: There is no mass.   Musculoskeletal:      Right lower leg: No edema.      Left lower leg: No edema.   Neurological:      Mental Status: She is alert.             Significant Labs: All pertinent labs within the past 24 hours have been reviewed.  CBC:   Recent Labs   Lab 05/16/24 0536 05/17/24  0402   WBC 7.38 8.61   HGB 11.3* 11.3*   HCT 34.3* 34.0*    233     CMP:   Recent Labs   Lab 05/16/24 0536 05/17/24  0402    139   K 3.9 3.4*    107   CO2 27 22*   GLU 88 75   BUN 15 19   CREATININE 0.8 0.8   CALCIUM  9.2 9.5   PROT 6.7 6.8   ALBUMIN 3.4* 3.5   BILITOT 0.9 1.2*   ALKPHOS 75 76   AST 22 25   ALT 23 18   ANIONGAP 9 10       Significant Imaging: I have reviewed all pertinent imaging results/findings within the past 24 hours.  [facility]:    There was three vessel coronary artery disease.    The Prox LAD to Mid LAD lesion was 90% stenosed.    The 1st Diag lesion was 80% stenosed.    The Ramus lesion was 75% stenosed.    The Dist RCA lesion was 80% stenosed.    The estimated blood loss was <50 mL.

## 2024-05-17 NOTE — ASSESSMENT & PLAN NOTE
Patient presented with chest pain for past 1 week.    On and off, pain lasts from few minutes to an hours sometimes.  Troponin elevated to 0.035, trend   EKG:  Bradycardia with diffuse T-wave inversions.    Aspirin 325 mg given in the ED.    Cardiology consulted.  Dobutamine stress test 5/16/24 - At peak stress and into recovery, the mid to distal LAD territory including the apex becomes hypokinetic, suggesting stress-induced ischemia in that region of myocardium  5/16/24 -   There was three vessel coronary artery disease.    The Prox LAD to Mid LAD lesion was 90% stenosed.    The 1st Diag lesion was 80% stenosed.    The Ramus lesion was 75% stenosed.    The Dist RCA lesion was 80% stenosed.    The estimated blood loss was <50 mL.    5/17: CTS evaluated, felt poor CABG candidate - plans for LHC with PCI today.

## 2024-05-17 NOTE — CARE UPDATE
Patient with NSTEMI. C showed MVD. Turned down for CABG according to primary team, however, no notes available in the Epic yet. She is on DAPT and will do PCI to LAD which is the area that was positive in the stress test.     Assessment and Plan:      Cardiac/Vascular  NSTEMI (non-ST elevated myocardial infarction)  [facility] +/- PCI, patient is a JOSE M candidate  - Anti-Thrombotic therapy: ASA, Brilinta (loaded)  - Access: R Radial  - Creatinine: 0.8  - Pre-Op Med: Bendaryl 50mg pO   - All patient's questions were answered.  -The risks, benefits and alternatives of the procedure were explained to the patient.   -The risks of coronary angiography include but are not limited to: bleeding, infection, heart rhythm abnormalities, allergic reactions, kidney injury and potential need for dialysis, stroke and death.   - Should stenting be indicated, the patient has agreed to dual anti-platelet therapy with a drug-eluting stent   - Additionally, pt is aware that non-compliance is likely to result in stent clotting with heart attack, heart failure, and/or death  -The risks of moderate sedation include hypotension, respiratory depression, arrhythmias, bronchospasm, and death.   - Informed consent was obtained and the  patient is agreeable to proceed with the procedure.

## 2024-05-17 NOTE — HPI
Betzy Cooley is an 84 yo F with HTN, GERD who presented to the ED with chest pain for her second presentation in the past week. She reports  nonexertional angina that comes and goes intermittently and lasts minutes to short hours at a time.  Her last episode was 5/14 and has not recurred since arrival at the hospital. She has a long history of 10+ years of GERD and it usually resolves with a couple chewable antacids and she never has to take more than this for relief. This burning sensation feels different than her GERD does and has not resolved with >1 week of BID Protonix.   Cardiology was consulted on this admission and recommended stress echo.  stress echo showed mid to distal LAD territory hypokinesis that came on at peak stress and into recovery, consistent with ischemic myocardium.   EKG on last admission without acute ischemic changes. EKG on this admission with deep T wave inversions everywhere but predominantly throughout the precordial leads. T  troponin peaked at 0.036 and creatinine is at baseline at 0.8.   She underwent LHC with IC significant for mid-prox LAD 90, D1 80, IR 75, dist RCA 80. Received Brilinta load on 5/16 and placed on scheduled PO BID thereafter. CTS consulted for CABG surgical evaluation.    Today, she denies chest pain and sob. Her  is present in the room for her and assisted with history as the patient is forgetful. She independently performs ADLS without issues. No AD for walking. No prior strokes, stents, sternotomy. No DM hx. Patient does not want surgery and wishes to proceed with stents.          Of note, pt was admitted from 05/06 - 05/07 for similar presentation. Echo was performed at that time showing an EF of 60% with no WMA or significant valvulopathy.

## 2024-05-17 NOTE — ASSESSMENT & PLAN NOTE
Pt presented to the ED with the CC of non-exertional substernal CP that was burning, non-radiating in nature, and she rated an 8/10. CP was fleeting in nature and lasted for 1-2 hours. States that she ate dinner at 5PM and around 9PM while she was sitting on her couch she noticed the CP. Associated with palpitations and abdominal bloating. Denied N/V, diaphoresis, and SOB.    In the ED: EKG was significant for diffuse T wave inversions. BNP elevated at 148. Troponins .025-->.035--> .036-->.026.     -Dobutamine Stress Echo positive with stress-induced ischemia in the mid to distal LAD territory. IC was consulted.  Pt was taken for angiogram. Angiogram showed three vessel CAD. Prox LAD to Mid LAD lesion with 90% stenosis, 1st Diag lesion with 80% stenosis, Ramus lesion with 75% stenosis, and Dist RCA lesion with 80% stenosis.     05/17: This AM pt remains CP free. Denies any SOB, N/V, palpitations. VSS. On ASA, HIS, and Ticagrelor. Seen by CTS this AM, surgery declined. Pt is NPO and IC will take pt for PCI today.     Stress Echo Which stress agent will be used? Pharmacological; Color Flow Doppler? No    Result Date: 5/16/2024    Left Ventricle: The left ventricle is normal in size. Normal wall   thickness. Septal motion is abnormal. There is normal systolic function   with a visually estimated ejection fraction of 60 - 65%. There is normal   diastolic function. Inconclusive left ventricular filling pressure.    Right Ventricle: Normal right ventricular cavity size. Wall thickness   is normal. Systolic function is normal.    The left atrium is severely dilated.    Aortic Valve: There is mild aortic valve sclerosis. Mildly restricted   motion.    Pulmonary Artery: The estimated pulmonary artery systolic pressure is   34 mmHg.    IVC/SVC: Normal venous pressure at 3 mmHg.    Stress Protocol: The patient was infused intravenously with dobutamine.   The patient received a graduated infusion of the stress agent beginning  at   10.0 mcg/kg/min to a peak dose of 40.0 mcg/kg/min. A total dobutamine dose   of 40.0 mg was injected. The peak heart rate was 121.0 bpm, which is 92%   of age predicted maximum heart rate. The patient was also given atropine.   The patient reported 8/10 chest discomfort during the stress test.    Baseline ECG: The Baseline ECG reveals sinus bradycardia. There is left   axis deviation. The baseline ECG has ST and/or T wave abnormalities   suggestive of myocardial ischemia.    Stress ECG: There are no ST segment deviation identified during the   protocol. There are no arrhythmias during stress. There is normal blood   pressure response with stress.    ECG Conclusion: The ECG portion of the study is negative for ischemia,   though the deep T wave inversion seen at rest normalizes/pseudonormalizes   with stress.    Post-stress       Display both the narrative and impression [both]  At peak stress and into recovery, the mid to   distal LAD territory including the apex becomes hypokinetic, suggesting   stress-induced ischemia in that region of myocardium.    Overall, this study is consistent with stress-induced ischemia in the   mid to distal LAD territory.        Echo    Result Date: 5/7/2024    Left Ventricle: The left ventricle is normal in size. Normal wall   thickness. Normal wall motion. There is normal systolic function. Ejection   fraction by visual approximation is 60%.    Right Ventricle: Normal right ventricular cavity size. Wall thickness   is normal. Systolic function is normal.    Left Atrium: Left atrium is moderately dilated.    Mitral Valve: There is mild regurgitation.    Tricuspid Valve: There is mild regurgitation.    Pulmonary Artery: The estimated pulmonary artery systolic pressure is   28 mmHg.    IVC/SVC: Normal venous pressure at 3 mmHg.        -Continue ASA 81mg, Lipitor 40mg, Ticagrelor 90mg, and Atenolol 25mg QD   -IC will take pt for PCI today.

## 2024-05-17 NOTE — SUBJECTIVE & OBJECTIVE
Interval History:     Pt seen and examined at bedside. DANN. CP free overnight. On ASA, HIS, and Ticagrelor. Seen by CTS, CABG declined. IC will take pt for PCI today. NPO, Cr 0.8.     Review of Systems   Constitutional: Negative for diaphoresis and fever.   Cardiovascular:  Negative for chest pain, dyspnea on exertion, leg swelling, near-syncope, orthopnea, palpitations, paroxysmal nocturnal dyspnea and syncope.   Respiratory:  Negative for cough and shortness of breath.    Gastrointestinal:  Negative for abdominal pain, diarrhea, nausea and vomiting.   Neurological:  Negative for light-headedness.   Psychiatric/Behavioral:  Negative for altered mental status and substance abuse.      Objective:     Vital Signs (Most Recent):  Temp: 97.7 °F (36.5 °C) (05/17/24 0744)  Pulse: (!) 58 (05/17/24 1056)  Resp: 18 (05/17/24 0744)  BP: 113/61 (05/17/24 0744)  SpO2: 97 % (05/17/24 0744) Vital Signs (24h Range):  Temp:  [97.7 °F (36.5 °C)-98.6 °F (37 °C)] 97.7 °F (36.5 °C)  Pulse:  [43-67] 58  Resp:  [16-20] 18  SpO2:  [95 %-100 %] 97 %  BP: (111-181)/(59-83) 113/61     Weight: 55.3 kg (122 lb)  Body mass index is 22.31 kg/m².     SpO2: 97 %         Intake/Output Summary (Last 24 hours) at 5/17/2024 1116  Last data filed at 5/16/2024 1715  Gross per 24 hour   Intake 120 ml   Output --   Net 120 ml       Lines/Drains/Airways       Peripheral Intravenous Line  Duration                  Peripheral IV - Single Lumen 05/15/24 0732 22 G Distal;Left;Posterior Forearm 2 days         Peripheral IV - Single Lumen 05/16/24 1402 20 G Posterior;Right Hand <1 day                       Physical Exam  Constitutional:       Appearance: Normal appearance.   Cardiovascular:      Heart sounds: Normal heart sounds. No murmur heard.     No gallop.   Pulmonary:      Effort: Pulmonary effort is normal.      Breath sounds: Normal breath sounds. No rales.   Musculoskeletal:      Right lower leg: No edema.      Left lower leg: No edema.   Skin:      "General: Skin is warm and dry.   Neurological:      Mental Status: She is alert and oriented to person, place, and time. Mental status is at baseline.            Significant Labs/Significant Imaging:     Recent Labs   Lab 05/15/24  0110 05/16/24  0536 05/17/24  0402   WBC 7.75 7.38 8.61   HGB 11.7* 11.3* 11.3*   HCT 35.4* 34.3* 34.0*   MCV 91 93 91   RBC 3.89* 3.69* 3.72*   MCH 30.1 30.6 30.4   MCHC 33.1 32.9 33.2   RDW 13.0 12.9 12.9    243 233   MPV 9.8 10.3 10.2   GRAN 62.4  4.8 52.1  3.9 68.4  5.9   LYMPH 22.5  1.7 27.9  2.1 14.9*  1.3   MONO 9.9  0.8 10.8  0.8 10.7  0.9   EOSINOPHIL 3.7 7.5 4.5   BASOPHIL 1.2 1.4 1.2       Recent Labs   Lab 05/15/24  0110 05/16/24  0536 05/17/24  0402    142 139   K 3.4* 3.9 3.4*    106 107   CO2 24 27 22*   BUN 14 15 19   CREATININE 0.8 0.8 0.8    88 75   CALCIUM 9.8 9.2 9.5   PROT 7.4 6.7 6.8   ALBUMIN 3.8 3.4* 3.5   ALKPHOS 78 75 76   BILITOT 0.4 0.9 1.2*   ALT 32 23 18   AST 27 22 25   ANIONGAP 13 9 10       No results for input(s): "COLORU", "APPEARANCEUA", "PHUR", "SPECGRAV", "PROTEINUA", "GLUCUA", "KETONESU", "BILIRUBINUA", "OCCULTUA", "UROBILINOGEN", "NITRITE", "LEUKOCYTESUR", "RBCUA", "WBCUA", "BACTERIA", "SQUAMEPITHEL", "HYALINECASTS", "GRANULARCAST", "MICROCMT" in the last 168 hours.    Invalid input(s): "SPECIMENU", "AMORPHOUSU"    No results for input(s): "PH", "PCO2", "PO2", "HCO3", "POCSATURATED", "BE" in the last 168 hours.    Recent Labs   Lab 05/15/24  0110 05/15/24  0341 05/15/24  0742 05/15/24  1114 05/15/24  1212   TROPONINI 0.025 0.035* 0.036* 0.026 0.028*       No results for input(s): "PT", "INR", "APTT" in the last 168 hours.    Lab Results   Component Value Date    HGBA1C 5.5 05/15/2024       No results for input(s): "TSH", "Z3DNKXK", "I0VWHBS", "THYROIDAB", "FREET4" in the last 168 hours.    Microbiology Results (last 7 days)       ** No results found for the last 168 hours. **            Cardiac " catheterization    Result Date: 5/16/2024    There was three vessel coronary artery disease.   The Prox LAD to Mid LAD lesion was 90% stenosed.   The 1st Diag lesion was 80% stenosed.   The Ramus lesion was 75% stenosed.   The Dist RCA lesion was 80% stenosed.   The estimated blood loss was <50 mL. The procedure log was documented by Documenter: Clinton Khoury RT and verified by Miguel Alvarez MD. Date: 5/16/2024  Time: 4:16 PM     Stress Echo Which stress agent will be used? Pharmacological; Color Flow Doppler? No    Result Date: 5/16/2024    Left Ventricle: The left ventricle is normal in size. Normal wall thickness. Septal motion is abnormal. There is normal systolic function with a visually estimated ejection fraction of 60 - 65%. There is normal diastolic function. Inconclusive left ventricular filling pressure.   Right Ventricle: Normal right ventricular cavity size. Wall thickness is normal. Systolic function is normal.   The left atrium is severely dilated.   Aortic Valve: There is mild aortic valve sclerosis. Mildly restricted motion.   Pulmonary Artery: The estimated pulmonary artery systolic pressure is 34 mmHg.   IVC/SVC: Normal venous pressure at 3 mmHg.   Stress Protocol: The patient was infused intravenously with dobutamine. The patient received a graduated infusion of the stress agent beginning at 10.0 mcg/kg/min to a peak dose of 40.0 mcg/kg/min. A total dobutamine dose of 40.0 mg was injected. The peak heart rate was 121.0 bpm, which is 92% of age predicted maximum heart rate. The patient was also given atropine. The patient reported 8/10 chest discomfort during the stress test.   Baseline ECG: The Baseline ECG reveals sinus bradycardia. There is left axis deviation. The baseline ECG has ST and/or T wave abnormalities suggestive of myocardial ischemia.   Stress ECG: There are no ST segment deviation identified during the protocol. There are no arrhythmias during stress. There is normal blood  pressure response with stress.   ECG Conclusion: The ECG portion of the study is negative for ischemia, though the deep T wave inversion seen at rest normalizes/pseudonormalizes with stress.   Post-stress      At peak stress and into recovery, the mid to distal LAD territory including the apex becomes hypokinetic, suggesting stress-induced ischemia in that region of myocardium.   Overall, this study is consistent with stress-induced ischemia in the mid to distal LAD territory.     X-Ray Chest AP Portable    Result Date: 5/15/2024  EXAMINATION: XR CHEST AP PORTABLE CLINICAL HISTORY: Chest Pain; TECHNIQUE: Single frontal view of the chest was performed. COMPARISON: 05/06/2024. FINDINGS: No consolidation, pleural effusion or pneumothorax. Cardiomediastinal silhouette is unremarkable.     No acute findings in the chest. Electronically signed by: Anibal Pineda MD Date:    05/15/2024 Time:    02:11    Echo    Result Date: 5/7/2024    Left Ventricle: The left ventricle is normal in size. Normal wall thickness. Normal wall motion. There is normal systolic function. Ejection fraction by visual approximation is 60%.   Right Ventricle: Normal right ventricular cavity size. Wall thickness is normal. Systolic function is normal.   Left Atrium: Left atrium is moderately dilated.   Mitral Valve: There is mild regurgitation.   Tricuspid Valve: There is mild regurgitation.   Pulmonary Artery: The estimated pulmonary artery systolic pressure is 28 mmHg.   IVC/SVC: Normal venous pressure at 3 mmHg.     X-Ray Chest 1 View    Result Date: 5/6/2024  EXAMINATION: XR CHEST 1 VIEW TECHNIQUE: One view COMPARISON: Comparison is made to 11/03/2008.  Clinical information of epigastric pain is obtained from the electronic medical record. FINDINGS: Allowing for magnification of the cardiomediastinal silhouette related to projection, the heart size is within normal limits, as is the appearance of the pulmonary vascularity.  Lung zones appear  clear, and are free of significant airspace consolidation or volume loss.  No pleural fluid.  No hilar or mediastinal mass lesion, with mild tortuosity of the thoracic aorta with calcification in the wall of the aortic arch incidentally noted.  No pneumothorax.     No significant intrathoracic abnormality.  No significant detrimental interval change in the appearance of the chest since 11/03/2008 is appreciated. Electronically signed by: Bravo Medina MD Date:    05/06/2024 Time:    11:47    CT Abdomen Pelvis With IV Contrast NO Oral Contrast    Result Date: 5/6/2024  EXAMINATION: CT ABDOMEN PELVIS WITH IV CONTRAST CLINICAL HISTORY: Abdominal pain, acute, nonlocalized; TECHNIQUE: Low dose axial images, sagittal and coronal reformations were obtained from the lung bases to the pubic symphysis.  75 mL of IV contrast was administered. COMPARISON: Abdominal radiograph 01/03/2020. FINDINGS: Lower chest: Heart size is normal. Mild bandlike atelectasis in the lung bases. Abdomen: Liver is normal in size.  No focal hepatic lesion.  Fatty infiltration about the falciform ligament.  Portal vasculature is patent.  Gallbladder is unremarkable. No calcified gallstones.  No intrahepatic biliary dilatation.  Common duct dilated up to 10 mm.  No obvious cause for obstruction. Spleen, adrenals, and pancreas are unremarkable. Kidneys are symmetric.  No renal or ureteral stones. No hydronephrosis. No distended loops of small bowel. Abdominal aorta is normal in course and caliber.  Mild calcific atherosclerosis. No abdominal lymphadenopathy. No abdominal free fluid or pneumoperitoneum. Pelvis: Urinary bladder is distended without wall thickening.  No free fluid in the pelvis. Uterus is surgically absent.  No adnexal lesion. Bones and soft tissues: No aggressive osseous lesions. Degenerative changes of visualized osseous structures.  Grade 1 anterolisthesis of L4 on L5. tiny fat containing umbilical hernia.     No acute abdominopelvic  abnormality. Additional findings in the body of the report. Electronically signed by resident: Dieudonne Izquierdo Date:    05/06/2024 Time:    07:47 Electronically signed by: Quirino Salgado MD Date:    05/06/2024 Time:    07:59

## 2024-05-17 NOTE — PROGRESS NOTES
[facility] [address] [place] [address] Medicine  Progress Note    Patient Name: Betzy Cooley  MRN: [id number]  Patient Class: IP- Inpatient   Admission Date: 5/15/2024  Length of Stay: 1 days  Attending Physician: Augustin Thacker MD  Primary Care Provider: Praful Paul MD        Subjective:     Principal Problem:Coronary artery disease of native artery of native heart with stable angina pectoris        HPI:  This is a 83-year-old patient with a history of gastritis, HTN who presents with chest pain. Has been experiencing heartburn chronically for over 20 years. Patient presented with on and off substernal chest pain, ongoing for past 1 week.  Patient denies any radiation to neck or left upper extremity.  No aggravating or relieving factors.  Patient presented to ER last week.  Echo at that time did not show any wall motion abnormalities and patient was discharged home.  Denies any shortness of breath, palpitations, dysphagia, recent weight loss, melena, use of NSAIDs.  On presentation patient is afebrile, bradycardic with heart rate of 64, hypertensive with blood pressure of 161/83 mm of Hg saturating 98% on room air.  Blood work revealed normocytic anemia with hemoglobin of 11.7.  Hypokalemia with potassium of 3.4 BNP elevated to 148.  Troponin of 0.028.  Chest x-ray was unremarkable.  EKG revealed sinus bradycardia, diffuse T-wave depressions in lateral and inferior leads.  Patient received Maalox and aspirin 325 mg admitted to the hospital.    Overview/Hospital Course:  This is a 83-year-old patient with a history of gastritis, HTN who presents with chest pain. Has been experiencing heartburn chronically for over 20 years. Patient presented with on and off substernal chest pain, ongoing for past 1 week.  Patient denies any radiation to neck or left upper extremity.  No aggravating or relieving factors.  Patient presented to ER last week.  Echo at that time did not show any wall motion abnormalities and  patient was discharged home.  Denies any shortness of breath, palpitations, dysphagia, recent weight loss, melena, use of NSAIDs.  On presentation patient is afebrile, bradycardic with heart rate of 64, hypertensive with blood pressure of 161/83 mm of Hg saturating 98% on room air.  Blood work revealed normocytic anemia with hemoglobin of 11.7.  Hypokalemia with potassium of 3.4 BNP elevated to 148.  Troponin of 0.028.  Chest x-ray was unremarkable.  EKG revealed sinus bradycardia, diffuse T-wave depressions in lateral and inferior leads.  Patient received Maalox and aspirin 325 mg admitted to the hospital.    She underwent cardiac stress testing which suspected exercise induced ischemia in the LAD region. Interventional cardiology was consulted and she underwent LHC 5/16/24. 3V disease. [facility] consulted but felt poor candidate for CABG.   5/17 - [facility] saw, plans for PCI and stent today.     Interval History: found to have severe 3V disease. CABG being considered but felt poor candidate. Pt. And  agree.     Plans for LHC and PCI today.     Pt. Has no new sx. She denies chest pain or Dyspnea.     Review of Systems   Constitutional:  Negative for activity change.   Respiratory:  Negative for apnea, chest tightness and shortness of breath.    Cardiovascular:  Negative for chest pain.   Gastrointestinal:  Negative for abdominal distention.     Objective:     Vital Signs (Most Recent):  Temp: 97.7 °F (36.5 °C) (05/17/24 1131)  Pulse: (!) 53 (05/17/24 1131)  Resp: 18 (05/17/24 1131)  BP: (!) 140/63 (05/17/24 1131)  SpO2: 97 % (05/17/24 1131) Vital Signs (24h Range):  Temp:  [97.7 °F (36.5 °C)-98.6 °F (37 °C)] 97.7 °F (36.5 °C)  Pulse:  [43-67] 53  Resp:  [16-18] 18  SpO2:  [95 %-100 %] 97 %  BP: (111-181)/(59-83) 140/63     Weight: 55.3 kg (122 lb)  Body mass index is 22.31 kg/m².    Intake/Output Summary (Last 24 hours) at 5/17/2024 1250  Last data filed at 5/16/2024 1715  Gross per 24 hour   Intake 120 ml   Output --    Net 120 ml         Physical Exam  Constitutional:       General: She is not in acute distress.  Cardiovascular:      Rate and Rhythm: Normal rate and regular rhythm.      Heart sounds: No murmur heard.  Pulmonary:      Effort: Pulmonary effort is normal. No respiratory distress.   Abdominal:      General: Abdomen is flat. There is no distension.      Palpations: There is no mass.   Musculoskeletal:      Right lower leg: No edema.      Left lower leg: No edema.   Neurological:      Mental Status: She is alert.             Significant Labs: All pertinent labs within the past 24 hours have been reviewed.  CBC:   Recent Labs   Lab 05/16/24  0536 05/17/24  0402   WBC 7.38 8.61   HGB 11.3* 11.3*   HCT 34.3* 34.0*    233     CMP:   Recent Labs   Lab 05/16/24  0536 05/17/24  0402    139   K 3.9 3.4*    107   CO2 27 22*   GLU 88 75   BUN 15 19   CREATININE 0.8 0.8   CALCIUM 9.2 9.5   PROT 6.7 6.8   ALBUMIN 3.4* 3.5   BILITOT 0.9 1.2*   ALKPHOS 75 76   AST 22 25   ALT 23 18   ANIONGAP 9 10       Significant Imaging: I have reviewed all pertinent imaging results/findings within the past 24 hours.  LCH:    There was three vessel coronary artery disease.    The Prox LAD to Mid LAD lesion was 90% stenosed.    The 1st Diag lesion was 80% stenosed.    The Ramus lesion was 75% stenosed.    The Dist RCA lesion was 80% stenosed.    The estimated blood loss was <50 mL.    Assessment/Plan:      * Coronary artery disease of native artery of native heart with stable angina pectoris  Patient presented with chest pain for past 1 week.    On and off, pain lasts from few minutes to an hours sometimes.  Troponin elevated to 0.035, trend   EKG:  Bradycardia with diffuse T-wave inversions.    Aspirin 325 mg given in the ED.    Cardiology consulted.  Dobutamine stress test 5/16/24 - At peak stress and into recovery, the mid to distal LAD territory including the apex becomes hypokinetic, suggesting stress-induced ischemia in  that region of myocardium  5/16/24 -   There was three vessel coronary artery disease.    The Prox LAD to Mid LAD lesion was 90% stenosed.    The 1st Diag lesion was 80% stenosed.    The Ramus lesion was 75% stenosed.    The Dist RCA lesion was 80% stenosed.    The estimated blood loss was <50 mL.    5/17: CTS evaluated, felt poor CABG candidate - plans for [facility] with PCI today.         NSTEMI (non-ST elevated myocardial infarction)  See CAD      Hypertension  Chronic, controlled. Latest blood pressure and vitals reviewed-     Temp:  [97.5 °F (36.4 °C)-98.5 °F (36.9 °C)]   Pulse:  [40-61]   Resp:  [16-20]   BP: (131-193)/(68-85)   SpO2:  [94 %-99 %] .   Home meds for hypertension were reviewed and noted below.   Hypertension Medications               atenoloL (TENORMIN) 25 MG tablet TAKE 1 TABLET BY MOUTH DAILY            While in the hospital, will manage blood pressure as follows; Continue home antihypertensive regimen    Will utilize p.r.n. blood pressure medication only if patient's blood pressure greater than 180/110 and she develops symptoms such as worsening chest pain or shortness of breath.    GERD (gastroesophageal reflux disease)  Protonix ordered        VTE Risk Mitigation (From admission, onward)           Ordered     IP VTE HIGH RISK PATIENT  Once         05/15/24 1045     Place sequential compression device  Until discontinued         05/15/24 1045                    Discharge Planning   ONELIA: 5/20/2024     Code Status: Full Code   Is the patient medically ready for discharge?:     Reason for patient still in hospital (select all that apply): Treatment  Discharge Plan A: Home with family                  Augustin Thacker MD  Department of Hospital Medicine   [facility]

## 2024-05-18 VITALS
HEIGHT: 62 IN | HEART RATE: 64 BPM | TEMPERATURE: 98 F | WEIGHT: 122 LBS | OXYGEN SATURATION: 99 % | RESPIRATION RATE: 18 BRPM | BODY MASS INDEX: 22.45 KG/M2 | SYSTOLIC BLOOD PRESSURE: 137 MMHG | DIASTOLIC BLOOD PRESSURE: 66 MMHG

## 2024-05-18 LAB
ALBUMIN SERPL BCP-MCNC: 3.5 G/DL (ref 3.5–5.2)
ALP SERPL-CCNC: 80 U/L (ref 55–135)
ALT SERPL W/O P-5'-P-CCNC: 21 U/L (ref 10–44)
ANION GAP SERPL CALC-SCNC: 10 MMOL/L (ref 8–16)
AST SERPL-CCNC: 53 U/L (ref 10–40)
BASOPHILS # BLD AUTO: 0.09 K/UL (ref 0–0.2)
BASOPHILS NFR BLD: 0.9 % (ref 0–1.9)
BILIRUB SERPL-MCNC: 1 MG/DL (ref 0.1–1)
BUN SERPL-MCNC: 15 MG/DL (ref 8–23)
CALCIUM SERPL-MCNC: 9.2 MG/DL (ref 8.7–10.5)
CHLORIDE SERPL-SCNC: 109 MMOL/L (ref 95–110)
CO2 SERPL-SCNC: 17 MMOL/L (ref 23–29)
CREAT SERPL-MCNC: 0.7 MG/DL (ref 0.5–1.4)
DIFFERENTIAL METHOD BLD: ABNORMAL
EOSINOPHIL # BLD AUTO: 0.1 K/UL (ref 0–0.5)
EOSINOPHIL NFR BLD: 1.2 % (ref 0–8)
ERYTHROCYTE [DISTWIDTH] IN BLOOD BY AUTOMATED COUNT: 12.9 % (ref 11.5–14.5)
EST. GFR  (NO RACE VARIABLE): >60 ML/MIN/1.73 M^2
GLUCOSE SERPL-MCNC: 70 MG/DL (ref 70–110)
HCT VFR BLD AUTO: 34.3 % (ref 37–48.5)
HGB BLD-MCNC: 11 G/DL (ref 12–16)
IMM GRANULOCYTES # BLD AUTO: 0.04 K/UL (ref 0–0.04)
IMM GRANULOCYTES NFR BLD AUTO: 0.4 % (ref 0–0.5)
LYMPHOCYTES # BLD AUTO: 1.2 K/UL (ref 1–4.8)
LYMPHOCYTES NFR BLD: 12 % (ref 18–48)
MCH RBC QN AUTO: 30.1 PG (ref 27–31)
MCHC RBC AUTO-ENTMCNC: 32.1 G/DL (ref 32–36)
MCV RBC AUTO: 94 FL (ref 82–98)
MONOCYTES # BLD AUTO: 1.3 K/UL (ref 0.3–1)
MONOCYTES NFR BLD: 12.7 % (ref 4–15)
NEUTROPHILS # BLD AUTO: 7.5 K/UL (ref 1.8–7.7)
NEUTROPHILS NFR BLD: 72.8 % (ref 38–73)
NRBC BLD-RTO: 0 /100 WBC
OHS QRS DURATION: 84 MS
OHS QRS DURATION: 90 MS
OHS QTC CALCULATION: 451 MS
OHS QTC CALCULATION: 508 MS
PLATELET # BLD AUTO: 255 K/UL (ref 150–450)
PMV BLD AUTO: 10.4 FL (ref 9.2–12.9)
POCT GLUCOSE: 126 MG/DL (ref 70–110)
POCT GLUCOSE: 134 MG/DL (ref 70–110)
POTASSIUM SERPL-SCNC: 4.4 MMOL/L (ref 3.5–5.1)
PROT SERPL-MCNC: 6.8 G/DL (ref 6–8.4)
RBC # BLD AUTO: 3.66 M/UL (ref 4–5.4)
SODIUM SERPL-SCNC: 136 MMOL/L (ref 136–145)
WBC # BLD AUTO: 10.33 K/UL (ref 3.9–12.7)

## 2024-05-18 PROCEDURE — C9113 INJ PANTOPRAZOLE SODIUM, VIA: HCPCS | Mod: HCNC | Performed by: STUDENT IN AN ORGANIZED HEALTH CARE EDUCATION/TRAINING PROGRAM

## 2024-05-18 PROCEDURE — 80053 COMPREHEN METABOLIC PANEL: CPT | Mod: HCNC | Performed by: STUDENT IN AN ORGANIZED HEALTH CARE EDUCATION/TRAINING PROGRAM

## 2024-05-18 PROCEDURE — 36415 COLL VENOUS BLD VENIPUNCTURE: CPT | Mod: HCNC | Performed by: STUDENT IN AN ORGANIZED HEALTH CARE EDUCATION/TRAINING PROGRAM

## 2024-05-18 PROCEDURE — 93010 ELECTROCARDIOGRAM REPORT: CPT | Mod: HCNC,,, | Performed by: INTERNAL MEDICINE

## 2024-05-18 PROCEDURE — 85025 COMPLETE CBC W/AUTO DIFF WBC: CPT | Mod: HCNC | Performed by: STUDENT IN AN ORGANIZED HEALTH CARE EDUCATION/TRAINING PROGRAM

## 2024-05-18 PROCEDURE — 25000003 PHARM REV CODE 250: Mod: HCNC | Performed by: INTERNAL MEDICINE

## 2024-05-18 PROCEDURE — 93005 ELECTROCARDIOGRAM TRACING: CPT | Mod: HCNC

## 2024-05-18 PROCEDURE — 63600175 PHARM REV CODE 636 W HCPCS: Mod: HCNC | Performed by: STUDENT IN AN ORGANIZED HEALTH CARE EDUCATION/TRAINING PROGRAM

## 2024-05-18 PROCEDURE — 25000003 PHARM REV CODE 250: Mod: HCNC | Performed by: STUDENT IN AN ORGANIZED HEALTH CARE EDUCATION/TRAINING PROGRAM

## 2024-05-18 RX ORDER — ATORVASTATIN CALCIUM 40 MG/1
40 TABLET, FILM COATED ORAL NIGHTLY
Qty: 360 TABLET | Refills: 0 | Status: SHIPPED | OUTPATIENT
Start: 2024-05-18 | End: 2025-05-18

## 2024-05-18 RX ADMIN — PANTOPRAZOLE SODIUM 40 MG: 40 INJECTION, POWDER, FOR SOLUTION INTRAVENOUS at 09:05

## 2024-05-18 RX ADMIN — TICAGRELOR 90 MG: 90 TABLET ORAL at 09:05

## 2024-05-18 RX ADMIN — ASPIRIN 81 MG CHEWABLE TABLET 81 MG: 81 TABLET CHEWABLE at 09:05

## 2024-05-18 RX ADMIN — POTASSIUM CHLORIDE 40 MEQ: 1500 TABLET, EXTENDED RELEASE ORAL at 09:05

## 2024-05-18 NOTE — ASSESSMENT & PLAN NOTE
Patient presented with chest pain for past 1 week.    On and off, pain lasts from few minutes to an hours sometimes.  Troponin elevated to 0.035, trend   EKG:  Bradycardia with diffuse T-wave inversions.    Aspirin 325 mg given in the ED.    Cardiology consulted.  Dobutamine stress test 5/16/24 - At peak stress and into recovery, the mid to distal LAD territory including the apex becomes hypokinetic, suggesting stress-induced ischemia in that region of myocardium  5/16/24 -   There was three vessel coronary artery disease.    The Prox LAD to Mid LAD lesion was 90% stenosed.    The 1st Diag lesion was 80% stenosed.    The Ramus lesion was 75% stenosed.    The Dist RCA lesion was 80% stenosed.    The estimated blood loss was <50 mL.    5/17: CTS evaluated, felt poor CABG candidate - plans for LHC with PCI     Successful PCI of:  - Distal RCA with 2.5X32MM JOSE M  - Proximal-mid RCA with 3.5X32MM  JOSE M  - 1st Diagonal with 2.5X16MM JOSE M  - Mid LAD with 3.5X32MM JOSEM   - Kissing balloon of 1st D and mid LAD with 1:1 balloon  - LM dissection noted angiographically and demonstrated with IVUS   - LM treated with 5.0X12MM JOSE M  - IVUS used for stent sizing.

## 2024-05-18 NOTE — PLAN OF CARE
Problem: Adult Inpatient Plan of Care  Goal: Plan of Care Review  Outcome: Progressing  Goal: Patient-Specific Goal (Individualized)  Outcome: Progressing  Goal: Absence of Hospital-Acquired Illness or Injury  Outcome: Progressing  Goal: Optimal Comfort and Wellbeing  Outcome: Progressing  Goal: Readiness for Transition of Care  Outcome: Progressing     Problem: Fall Injury Risk  Goal: Absence of Fall and Fall-Related Injury  Outcome: Progressing     Problem: Wound  Goal: Optimal Coping  Outcome: Progressing  Goal: Optimal Functional Ability  Outcome: Progressing  Goal: Absence of Infection Signs and Symptoms  Outcome: Progressing  Goal: Improved Oral Intake  Outcome: Progressing  Goal: Optimal Pain Control and Function  Outcome: Progressing  Goal: Skin Health and Integrity  Outcome: Progressing  Goal: Optimal Wound Healing  Outcome: Progressing

## 2024-05-18 NOTE — PLAN OF CARE
[facility] - Cardiology Stepdown  Discharge Final Note    Primary Care Provider: Praful Paul MD    Expected Discharge Date: 5/18/2024    Final Discharge Note (most recent)       Final Note - 05/18/24 1319          Final Note    Assessment Type Final Discharge Note (P)      Anticipated Discharge Disposition Home or Self Care (P)      Hospital Resources/Appts/Education Provided Provided patient/caregiver with written discharge plan information;Provided education on problems/symptoms using teachback;Appointments scheduled and added to AVS (P)         Post-Acute Status    Post-Acute Authorization Other (P)      Other Status No Post-Acute Service Needs (P)      Discharge Delays None known at this time (P)                      Important Message from Medicare             Contact Info       Praful Paul MD   Specialty: Internal Medicine   Relationship: PCP - General    [address]  [facility] [address]   Phone: [phone]       Next Steps: Follow up in 2 week(s)          SW noting pt's discharge orders. After review of pt's medical record, no discharge needs identified.     Janina Avina LMSW

## 2024-05-18 NOTE — PLAN OF CARE
Patient alert and oriented, forgetful, denies pain, no s/s of distress,  at side, VS WNL, comfort measures given, plan of care discussed with patient and , will  continue to monitor.    Problem: Adult Inpatient Plan of Care  Goal: Plan of Care Review  Outcome: Progressing  Goal: Absence of Hospital-Acquired Illness or Injury  Outcome: Progressing  Goal: Optimal Comfort and Wellbeing  Outcome: Progressing  Goal: Readiness for Transition of Care  Outcome: Progressing     Problem: Fall Injury Risk  Goal: Absence of Fall and Fall-Related Injury  Outcome: Progressing     Problem: Wound  Goal: Optimal Coping  Outcome: Progressing  Goal: Absence of Infection Signs and Symptoms  Outcome: Progressing

## 2024-05-18 NOTE — DISCHARGE SUMMARY
[facility] - Cardiology [facility] Medicine  Discharge Summary      Patient Name: Betzy Cooley  MRN: [id number]  HARPREET: [id number]  Patient Class: IP- Inpatient  Admission Date: 5/15/2024  Hospital Length of Stay: 2 days  Discharge Date and Time: 5/18/2024  1:50 PM  Attending Physician: Allegra Thomson MD   Discharging Provider: Allegra Thomson MD  Primary Care Provider: Praful Paul MD  [facility] Medicine Team: [facility] MED  Allegra Thomson MD  Primary Care Team: [facility] MED     HPI:   This is a 83-year-old patient with a history of gastritis, HTN who presents with chest pain. Has been experiencing heartburn chronically for over 20 years. Patient presented with on and off substernal chest pain, ongoing for past 1 week.  Patient denies any radiation to neck or left upper extremity.  No aggravating or relieving factors.  Patient presented to ER last week.  Echo at that time did not show any wall motion abnormalities and patient was discharged home.  Denies any shortness of breath, palpitations, dysphagia, recent weight loss, melena, use of NSAIDs.  On presentation patient is afebrile, bradycardic with heart rate of 64, hypertensive with blood pressure of 161/83 mm of Hg saturating 98% on room air.  Blood work revealed normocytic anemia with hemoglobin of 11.7.  Hypokalemia with potassium of 3.4 BNP elevated to 148.  Troponin of 0.028.  Chest x-ray was unremarkable.  EKG revealed sinus bradycardia, diffuse T-wave depressions in lateral and inferior leads.  Patient received Maalox and aspirin 325 mg admitted to the hospital.    Procedure(s) (LRB):  Angiogram, Coronary, with Left Heart Cath (Bilateral)  IVUS, Coronary  Stent, Drug Eluting, Multi Vessel, Coronary      Hospital Course:   This is a 83-year-old patient with a history of gastritis, HTN who presents with chest pain. Has been experiencing heartburn chronically for over 20 years. Patient presented with on and off substernal chest pain, ongoing  for past 1 week.  Patient denies any radiation to neck or left upper extremity.  No aggravating or relieving factors.  Patient presented to ER last week.  Echo at that time did not show any wall motion abnormalities and patient was discharged home.  Denies any shortness of breath, palpitations, dysphagia, recent weight loss, melena, use of NSAIDs.  On presentation patient is afebrile, bradycardic with heart rate of 64, hypertensive with blood pressure of 161/83 mm of Hg saturating 98% on room air.  Blood work revealed normocytic anemia with hemoglobin of 11.7.  Hypokalemia with potassium of 3.4 BNP elevated to 148.  Troponin of 0.028.  Chest x-ray was unremarkable.  EKG revealed sinus bradycardia, diffuse T-wave depressions in lateral and inferior leads.  Patient received Maalox and aspirin 325 mg admitted to the hospital.    She underwent cardiac stress testing which suspected exercise induced ischemia in the LAD region. Interventional cardiology was consulted and she underwent LHC 5/16/24. 3V disease. CVS consulted but felt poor candidate for CABG. Patient had successful PCI on 5/17.     Successful PCI of:  - Distal RCA with 2.5X32MM JOSE M  - Proximal-mid RCA with 3.5X32MM  JOSE M  - 1st Diagonal with 2.5X16MM JOSE M  - Mid LAD with 3.5X32MM JOSE M  - Kissing balloon of 1st D and mid LAD with 1:1 balloon  - LM dissection noted angiographically and demonstrated with IVUS   - LM treated with 5.0X12MM JOSE M  - IVUS used for stent sizing.    Patient seen and examined on day of discharge and deemed appropriate for discharge. Plan discussed with patient, who was agreeable and amenable. Medications were discussed and reviewed, outpatient follow-up scheduled, ER precautions were given, all questions were answered to the patient's satisfaction, and patient was subsequently discharged.    Patient discharged on brilinta (for atleast 1 year), ASA 81mg daily (indefinitely), and high intensity statin. Referred to cardiac rehab. Instructed  to follow-up with outpatient cardiologist within 1 month.        Goals of Care Treatment Preferences:  Code Status: Full Code      Consults:   Consults (From admission, onward)          Status Ordering Provider     Inpatient consult to Cardiothoracic Surgery  Once        Provider:  (Not yet assigned)    Completed HARPREET PAGE     Inpatient consult to Interventional Cardiology  Once        Provider:  (Not yet assigned)    Completed KINZA HENDRICKS     Inpatient consult to Cardiology  Once        Provider:  (Not yet assigned)    Completed ZAC SANCHEZ            Cardiac/Vascular  * Coronary artery disease of native artery of native heart with stable angina pectoris  Patient presented with chest pain for past 1 week.    On and off, pain lasts from few minutes to an hours sometimes.  Troponin elevated to 0.035, trend   EKG:  Bradycardia with diffuse T-wave inversions.    Aspirin 325 mg given in the ED.    Cardiology consulted.  Dobutamine stress test 5/16/24 - At peak stress and into recovery, the mid to distal LAD territory including the apex becomes hypokinetic, suggesting stress-induced ischemia in that region of myocardium  5/16/24 -   There was three vessel coronary artery disease.    The Prox LAD to Mid LAD lesion was 90% stenosed.    The 1st Diag lesion was 80% stenosed.    The Ramus lesion was 75% stenosed.    The Dist RCA lesion was 80% stenosed.    The estimated blood loss was <50 mL.    5/17: CTS evaluated, felt poor CABG candidate - plans for LHC with PCI     Successful PCI of:  - Distal RCA with 2.5X32MM JOSE M  - Proximal-mid RCA with 3.5X32MM  JOSE M  - 1st Diagonal with 2.5X16MM JOSE M  - Mid LAD with 3.5X32MM JOSE M  - Kissing balloon of 1st D and mid LAD with 1:1 balloon  - LM dissection noted angiographically and demonstrated with IVUS   - LM treated with 5.0X12MM JOSE M  - IVUS used for stent sizing.      NSTEMI (non-ST elevated myocardial infarction)  See CAD      Hypertension  Chronic, controlled.  Latest blood pressure and vitals reviewed-     Temp:  [97.6 °F (36.4 °C)-98.1 °F (36.7 °C)]   Pulse:  [50-87]   Resp:  [16-24]   BP: (117-174)/(63-84)   SpO2:  [94 %-99 %] .   Home meds for hypertension were reviewed and noted below.   Hypertension Medications               atenoloL (TENORMIN) 25 MG tablet TAKE 1 TABLET BY MOUTH DAILY            While in the hospital, will manage blood pressure as follows; Continue home antihypertensive regimen    Will utilize p.r.n. blood pressure medication only if patient's blood pressure greater than 180/110 and she develops symptoms such as worsening chest pain or shortness of breath.    GI  GERD (gastroesophageal reflux disease)  Protonix ordered        Final Active Diagnoses:    Diagnosis Date Noted POA    PRINCIPAL PROBLEM:  Coronary artery disease of native artery of native heart with stable angina pectoris [I25.118] 05/15/2024 Yes    NSTEMI (non-ST elevated myocardial infarction) [I21.4] 05/16/2024 Yes    Hypertension [I10] 04/29/2024 Yes    GERD (gastroesophageal reflux disease) [K21.9] 05/27/2014 Yes      Problems Resolved During this Admission:       Discharged Condition: stable    Disposition: Home or Self Care    Follow Up:   Follow-up Information       Praful Paul MD Follow up in 2 week(s).    Specialty: Internal Medicine  Contact information:  [facility] [place] [facility] [address] [phone]                           Patient Instructions:   No discharge procedures on file.    Significant Diagnostic Studies: Labs: BMP:   Recent Labs   Lab 05/17/24  0402 05/18/24  0333   GLU 75 70    136   K 3.4* 4.4    109   CO2 22* 17*   BUN 19 15   CREATININE 0.8 0.7   CALCIUM 9.5 9.2    and CBC   Recent Labs   Lab 05/17/24  0402 05/18/24  0333   WBC 8.61 10.33   HGB 11.3* 11.0*   HCT 34.0* 34.3*    255       Pending Diagnostic Studies:       None           Medications:  Reconciled Home Medications:      Medication List        START taking these  medications      atorvastatin 40 MG tablet  Commonly known as: LIPITOR  Take 1 tablet (40 mg total) by mouth every evening.     ticagrelor 90 mg tablet  Commonly known as: BRILINTA  Take 1 tablet (90 mg total) by mouth 2 (two) times daily.            CONTINUE taking these medications      aspirin 81 MG Chew  Take 81 mg by mouth once daily. Not chewable     calcium-vitamin D3 500 mg-5 mcg (200 unit) per tablet  Commonly known as: OS-ESTIVEN 500 + D3  Take 1 tablet by mouth 2 (two) times daily with meals.     co-enzyme Q-10 30 mg capsule  Take 30 mg by mouth once daily.     multivitamin per tablet  Commonly known as: THERAGRAN  Take 1 tablet by mouth once daily.     pantoprazole 40 MG tablet  Commonly known as: PROTONIX  Take 1 tablet (40 mg total) by mouth 2 (two) times daily.     SALINE MIST NASL  1 spray by Each Nostril route daily as needed (Congestion).     vitamin A-vitamin C-vit E-min Tab  Take 1 tablet by mouth once daily.     VOLTAREN ARTHRITIS PAIN 1 % Gel  Generic drug: diclofenac sodium  Apply 2 g topically daily as needed (Pain).            STOP taking these medications      atenoloL 25 MG tablet  Commonly known as: TENORMIN     PEPCID AC ORAL              Indwelling Lines/Drains at time of discharge:   Lines/Drains/Airways       None                   Time spent on the discharge of patient: 35 minutes         Allegra Thomson MD  Department of Hospital Medicine  [facility] - Cardiology Stepdown

## 2024-05-18 NOTE — ASSESSMENT & PLAN NOTE
Chronic, controlled. Latest blood pressure and vitals reviewed-     Temp:  [97.6 °F (36.4 °C)-98.1 °F (36.7 °C)]   Pulse:  [50-87]   Resp:  [16-24]   BP: (117-174)/(63-84)   SpO2:  [94 %-99 %] .   Home meds for hypertension were reviewed and noted below.   Hypertension Medications               atenoloL (TENORMIN) 25 MG tablet TAKE 1 TABLET BY MOUTH DAILY            While in the hospital, will manage blood pressure as follows; Continue home antihypertensive regimen    Will utilize p.r.n. blood pressure medication only if patient's blood pressure greater than 180/110 and she develops symptoms such as worsening chest pain or shortness of breath.

## 2024-05-18 NOTE — NURSING
1350-Pt discharge per md orders. Pt/ verbalize complete understanding and follow up appointment. Pt was given a copy of home care d/c instructions w/ a list of home medications.  Address all issues prior d/c . Pt left w/ all of____ valuables. Pt voice no concerns. Transport notified of discharge. Tele box place in dirty bin by .   
Blood pressure elevated, medication given will recheck Blood pressure, pt sitting up in chair no complaints.  
Down for stress test  
Dr. Thacker notified about patients BP of 181/80. No new orders in place. MD will place orders when patient gets back to floor (Room 1143). Patient is asymptomatic. No c/o of pain or discomfort. Patient tolerating oral solids and fluids.  
Nurses Note -- 4 Eyes      5/15/2024   12:16 PM      Skin assessed during: Admit      [x] No Altered Skin Integrity Present    []Prevention Measures Documented      [] Yes- Altered Skin Integrity Present or Discovered   [] LDA Added if Not in Epic (Describe Wound)   [] New Altered Skin Integrity was Present on Admit and Documented in LDA   [] Wound Image Taken    Wound Care Consulted? No    Attending Nurse:  Belén Johnson RN/Staff Member:   Mario Alberto         
Patient in bathroom , changing her gown Patient then comes to the bed , has no complaints at this time call bell in reach. SRx2   
Pt  arrived to floor stable  AAOx4,  at bedside. No complaint of any at this time, pt on room air 98%, Skin intact, No distress noted. Call light in reach. Bed in low locked position.Side rails x2. Belongings at bedside.   Pt free of fall and injuries Questions and concerns voiced and answered.    Plan of care continues.  
Pt ambulated around unit and to restroom. Pt voided. Tolerated walk well. Vital signs remain stable. No c/o pain or discomfort at this time, resp even and unlabored. Vascband to right radial site is CDI. No active bleeding. No hematoma noted.   
Pt arrived back to floor stable condition no complaints. Had vashband to right wrist clean dry intact, Pt and grandson was giving instruction on procedure and what she shouldn't do. Verbally agreed   
Pt is AAOx4.  Pt stable no complaints has question on  her plans for today,  which I will contact MD / Az BOLANOS to come explain to pt and . Pt up in chair, No distress noted. Pt has vash band intact with  no drainage , dressing clean and dry.Call light in reach. Bed in low locked position.   Side rails x2. Belongings at bedside.   Pt free of fall and injuries Questions and concerns voiced and answered.    Plan of care continues.  
Pt is AAOx4.  Pt walking around room stable no complaints awaiting for test, pt was inquiring about the time for procedure.No distress noted. Pt skin intact , room air, no chest pain nor discomfort at this time.   Call light in reach. Bed in low locked position.   Side rails x2. Belongings at bedside.   Pt free of fall and injuries Questions and concerns voiced and answered.    Plan of care continues.  
Pt stable up walking around in room no problems, tolerated medication well.  
Received report from WENCESLAO Gamb[place]. Patient s/p [facility], AAOx4. VSS, no c/o pain or discomfort at this time, resp even and unlabored. Vascband to right radial is CDI. No active bleeding. No hematoma noted. Post procedure protocol reviewed with patient and patient's . Understanding verbalized.  at bedside. Nurse call bell within reach.   
Report called into [facility]  pt going to room 300.  
Report given to oncoming   
Vascband removed per protocol. Pt tolerated well. R wrist dressed with gauze and tegaderm CDI. Site care instructions given to patient and grandson. Verbalized understanding. Print out of instructions given to grandson.  
show

## 2024-05-22 ENCOUNTER — HOSPITAL ENCOUNTER (OUTPATIENT)
Facility: HOSPITAL | Age: 84
Discharge: HOME | End: 2024-05-22
Attending: EMERGENCY MEDICINE | Admitting: HOSPITALIST
Payer: MEDICARE

## 2024-05-22 VITALS
HEART RATE: 68 BPM | TEMPERATURE: 98 F | OXYGEN SATURATION: 99 % | RESPIRATION RATE: 20 BRPM | DIASTOLIC BLOOD PRESSURE: 57 MMHG | BODY MASS INDEX: 22.44 KG/M2 | HEIGHT: 62 IN | SYSTOLIC BLOOD PRESSURE: 119 MMHG | WEIGHT: 121.94 LBS

## 2024-05-22 DIAGNOSIS — R07.9 CHEST PAIN: ICD-10-CM

## 2024-05-22 PROBLEM — R79.89 ELEVATED TROPONIN: Status: ACTIVE | Noted: 2024-05-22

## 2024-05-22 PROBLEM — I25.112: Status: ACTIVE | Noted: 2024-05-15

## 2024-05-22 PROBLEM — F41.9 ANXIETY: Status: ACTIVE | Noted: 2024-05-22

## 2024-05-22 LAB
ALBUMIN SERPL BCP-MCNC: 3.6 G/DL (ref 3.5–5.2)
ALP SERPL-CCNC: 76 U/L (ref 55–135)
ALT SERPL W/O P-5'-P-CCNC: 26 U/L (ref 10–44)
ANION GAP SERPL CALC-SCNC: 12 MMOL/L (ref 8–16)
AST SERPL-CCNC: 30 U/L (ref 10–40)
BASOPHILS # BLD AUTO: 0.07 K/UL (ref 0–0.2)
BASOPHILS NFR BLD: 0.8 % (ref 0–1.9)
BILIRUB SERPL-MCNC: 0.4 MG/DL (ref 0.1–1)
BNP SERPL-MCNC: 64 PG/ML (ref 0–99)
BUN SERPL-MCNC: 12 MG/DL (ref 8–23)
CALCIUM SERPL-MCNC: 9.8 MG/DL (ref 8.7–10.5)
CHLORIDE SERPL-SCNC: 105 MMOL/L (ref 95–110)
CO2 SERPL-SCNC: 24 MMOL/L (ref 23–29)
CREAT SERPL-MCNC: 0.8 MG/DL (ref 0.5–1.4)
DIFFERENTIAL METHOD BLD: ABNORMAL
EOSINOPHIL # BLD AUTO: 0.5 K/UL (ref 0–0.5)
EOSINOPHIL NFR BLD: 5.5 % (ref 0–8)
ERYTHROCYTE [DISTWIDTH] IN BLOOD BY AUTOMATED COUNT: 13.2 % (ref 11.5–14.5)
EST. GFR  (NO RACE VARIABLE): >60 ML/MIN/1.73 M^2
GLUCOSE SERPL-MCNC: 113 MG/DL (ref 70–110)
HCT VFR BLD AUTO: 33.4 % (ref 37–48.5)
HGB BLD-MCNC: 11.1 G/DL (ref 12–16)
IMM GRANULOCYTES # BLD AUTO: 0.03 K/UL (ref 0–0.04)
IMM GRANULOCYTES NFR BLD AUTO: 0.3 % (ref 0–0.5)
LYMPHOCYTES # BLD AUTO: 1.3 K/UL (ref 1–4.8)
LYMPHOCYTES NFR BLD: 15.1 % (ref 18–48)
MCH RBC QN AUTO: 30.3 PG (ref 27–31)
MCHC RBC AUTO-ENTMCNC: 33.2 G/DL (ref 32–36)
MCV RBC AUTO: 91 FL (ref 82–98)
MONOCYTES # BLD AUTO: 1 K/UL (ref 0.3–1)
MONOCYTES NFR BLD: 10.8 % (ref 4–15)
NEUTROPHILS # BLD AUTO: 6 K/UL (ref 1.8–7.7)
NEUTROPHILS NFR BLD: 67.5 % (ref 38–73)
NRBC BLD-RTO: 0 /100 WBC
OHS QRS DURATION: 82 MS
OHS QRS DURATION: 86 MS
OHS QTC CALCULATION: 411 MS
OHS QTC CALCULATION: 427 MS
PLATELET # BLD AUTO: 288 K/UL (ref 150–450)
PMV BLD AUTO: 10.2 FL (ref 9.2–12.9)
POC CARDIAC TROPONIN I: 0.85 NG/ML (ref 0–0.08)
POC CARDIAC TROPONIN I: 0.93 NG/ML (ref 0–0.08)
POTASSIUM SERPL-SCNC: 3.7 MMOL/L (ref 3.5–5.1)
PROT SERPL-MCNC: 7.2 G/DL (ref 6–8.4)
RBC # BLD AUTO: 3.66 M/UL (ref 4–5.4)
SAMPLE: ABNORMAL
SAMPLE: ABNORMAL
SODIUM SERPL-SCNC: 141 MMOL/L (ref 136–145)
TROPONIN I SERPL DL<=0.01 NG/ML-MCNC: 0.97 NG/ML (ref 0–0.03)
TROPONIN I SERPL DL<=0.01 NG/ML-MCNC: 1.31 NG/ML (ref 0–0.03)
TROPONIN I SERPL DL<=0.01 NG/ML-MCNC: 1.45 NG/ML (ref 0–0.03)
WBC # BLD AUTO: 8.85 K/UL (ref 3.9–12.7)

## 2024-05-22 PROCEDURE — 84484 ASSAY OF TROPONIN QUANT: CPT | Mod: HCNC | Performed by: EMERGENCY MEDICINE

## 2024-05-22 PROCEDURE — G0378 HOSPITAL OBSERVATION PER HR: HCPCS | Mod: HCNC

## 2024-05-22 PROCEDURE — 93005 ELECTROCARDIOGRAM TRACING: CPT | Mod: HCNC

## 2024-05-22 PROCEDURE — 36415 COLL VENOUS BLD VENIPUNCTURE: CPT | Mod: HCNC | Performed by: INTERNAL MEDICINE

## 2024-05-22 PROCEDURE — 83880 ASSAY OF NATRIURETIC PEPTIDE: CPT | Mod: HCNC | Performed by: EMERGENCY MEDICINE

## 2024-05-22 PROCEDURE — 84484 ASSAY OF TROPONIN QUANT: CPT | Mod: 91,HCNC | Performed by: INTERNAL MEDICINE

## 2024-05-22 PROCEDURE — 93010 ELECTROCARDIOGRAM REPORT: CPT | Mod: HCNC,,, | Performed by: INTERNAL MEDICINE

## 2024-05-22 PROCEDURE — 99214 OFFICE O/P EST MOD 30 MIN: CPT | Mod: 25,HCNC,, | Performed by: INTERNAL MEDICINE

## 2024-05-22 PROCEDURE — 85025 COMPLETE CBC W/AUTO DIFF WBC: CPT | Mod: HCNC | Performed by: EMERGENCY MEDICINE

## 2024-05-22 PROCEDURE — 80053 COMPREHEN METABOLIC PANEL: CPT | Mod: HCNC | Performed by: EMERGENCY MEDICINE

## 2024-05-22 PROCEDURE — 63600175 PHARM REV CODE 636 W HCPCS: Mod: HCNC | Performed by: HOSPITALIST

## 2024-05-22 PROCEDURE — 96372 THER/PROPH/DIAG INJ SC/IM: CPT | Performed by: HOSPITALIST

## 2024-05-22 PROCEDURE — 94761 N-INVAS EAR/PLS OXIMETRY MLT: CPT | Mod: HCNC

## 2024-05-22 PROCEDURE — 99233 SBSQ HOSP IP/OBS HIGH 50: CPT | Mod: HCNC,,, | Performed by: INTERNAL MEDICINE

## 2024-05-22 PROCEDURE — 25000003 PHARM REV CODE 250: Mod: HCNC | Performed by: HOSPITALIST

## 2024-05-22 PROCEDURE — 93010 ELECTROCARDIOGRAM REPORT: CPT | Mod: HCNC,76,, | Performed by: INTERNAL MEDICINE

## 2024-05-22 PROCEDURE — 84484 ASSAY OF TROPONIN QUANT: CPT | Mod: HCNC,91

## 2024-05-22 PROCEDURE — 99285 EMERGENCY DEPT VISIT HI MDM: CPT | Mod: 25,HCNC

## 2024-05-22 RX ORDER — SODIUM CHLORIDE 0.9 % (FLUSH) 0.9 %
10 SYRINGE (ML) INJECTION
Status: DISCONTINUED | OUTPATIENT
Start: 2024-05-22 | End: 2024-05-22 | Stop reason: HOSPADM

## 2024-05-22 RX ORDER — METOPROLOL SUCCINATE 25 MG/1
12.5 TABLET, EXTENDED RELEASE ORAL DAILY
Qty: 45 TABLET | Refills: 0 | Status: SHIPPED | OUTPATIENT
Start: 2024-05-23 | End: 2024-05-22

## 2024-05-22 RX ORDER — HYDROXYZINE HYDROCHLORIDE 25 MG/1
25 TABLET, FILM COATED ORAL 3 TIMES DAILY PRN
Qty: 30 TABLET | Refills: 0 | Status: SHIPPED | OUTPATIENT
Start: 2024-05-22 | End: 2024-05-22

## 2024-05-22 RX ORDER — TALC
6 POWDER (GRAM) TOPICAL NIGHTLY PRN
Status: DISCONTINUED | OUTPATIENT
Start: 2024-05-22 | End: 2024-05-22 | Stop reason: HOSPADM

## 2024-05-22 RX ORDER — LISINOPRIL 5 MG/1
5 TABLET ORAL DAILY
Status: DISCONTINUED | OUTPATIENT
Start: 2024-05-22 | End: 2024-05-22

## 2024-05-22 RX ORDER — IBUPROFEN 200 MG
16 TABLET ORAL
Status: DISCONTINUED | OUTPATIENT
Start: 2024-05-22 | End: 2024-05-22 | Stop reason: HOSPADM

## 2024-05-22 RX ORDER — FAMOTIDINE 20 MG/1
20 TABLET, FILM COATED ORAL NIGHTLY
Status: DISCONTINUED | OUTPATIENT
Start: 2024-05-22 | End: 2024-05-22 | Stop reason: HOSPADM

## 2024-05-22 RX ORDER — METOPROLOL SUCCINATE 25 MG/1
12.5 TABLET, EXTENDED RELEASE ORAL DAILY
Qty: 45 TABLET | Refills: 0 | Status: SHIPPED | OUTPATIENT
Start: 2024-05-23 | End: 2024-08-21

## 2024-05-22 RX ORDER — NITROGLYCERIN 0.4 MG/1
0.4 TABLET SUBLINGUAL EVERY 5 MIN PRN
Qty: 30 TABLET | Refills: 2 | Status: SHIPPED | OUTPATIENT
Start: 2024-05-22 | End: 2024-05-27 | Stop reason: SDUPTHER

## 2024-05-22 RX ORDER — NALOXONE HCL 0.4 MG/ML
0.02 VIAL (ML) INJECTION
Status: DISCONTINUED | OUTPATIENT
Start: 2024-05-22 | End: 2024-05-22 | Stop reason: HOSPADM

## 2024-05-22 RX ORDER — PANTOPRAZOLE SODIUM 40 MG/1
40 TABLET, DELAYED RELEASE ORAL 2 TIMES DAILY
Status: DISCONTINUED | OUTPATIENT
Start: 2024-05-22 | End: 2024-05-22 | Stop reason: HOSPADM

## 2024-05-22 RX ORDER — GLUCAGON 1 MG
1 KIT INJECTION
Status: DISCONTINUED | OUTPATIENT
Start: 2024-05-22 | End: 2024-05-22 | Stop reason: HOSPADM

## 2024-05-22 RX ORDER — PROCHLORPERAZINE EDISYLATE 5 MG/ML
5 INJECTION INTRAMUSCULAR; INTRAVENOUS EVERY 6 HOURS PRN
Status: DISCONTINUED | OUTPATIENT
Start: 2024-05-22 | End: 2024-05-22 | Stop reason: HOSPADM

## 2024-05-22 RX ORDER — NAPROXEN SODIUM 220 MG/1
81 TABLET, FILM COATED ORAL DAILY
Status: DISCONTINUED | OUTPATIENT
Start: 2024-05-22 | End: 2024-05-22 | Stop reason: HOSPADM

## 2024-05-22 RX ORDER — ATORVASTATIN CALCIUM 40 MG/1
40 TABLET, FILM COATED ORAL NIGHTLY
Status: DISCONTINUED | OUTPATIENT
Start: 2024-05-22 | End: 2024-05-22 | Stop reason: HOSPADM

## 2024-05-22 RX ORDER — ONDANSETRON HYDROCHLORIDE 2 MG/ML
4 INJECTION, SOLUTION INTRAVENOUS EVERY 8 HOURS PRN
Status: DISCONTINUED | OUTPATIENT
Start: 2024-05-22 | End: 2024-05-22 | Stop reason: HOSPADM

## 2024-05-22 RX ORDER — HYDROXYZINE HYDROCHLORIDE 25 MG/1
25 TABLET, FILM COATED ORAL 3 TIMES DAILY PRN
Qty: 30 TABLET | Refills: 0 | Status: SHIPPED | OUTPATIENT
Start: 2024-05-22

## 2024-05-22 RX ORDER — IBUPROFEN 200 MG
24 TABLET ORAL
Status: DISCONTINUED | OUTPATIENT
Start: 2024-05-22 | End: 2024-05-22 | Stop reason: HOSPADM

## 2024-05-22 RX ORDER — HYDROXYZINE HYDROCHLORIDE 25 MG/1
25 TABLET, FILM COATED ORAL 3 TIMES DAILY PRN
Status: DISCONTINUED | OUTPATIENT
Start: 2024-05-22 | End: 2024-05-22 | Stop reason: HOSPADM

## 2024-05-22 RX ORDER — ACETAMINOPHEN 325 MG/1
650 TABLET ORAL EVERY 4 HOURS PRN
Status: DISCONTINUED | OUTPATIENT
Start: 2024-05-22 | End: 2024-05-22 | Stop reason: HOSPADM

## 2024-05-22 RX ORDER — HEPARIN SODIUM 5000 [USP'U]/ML
5000 INJECTION, SOLUTION INTRAVENOUS; SUBCUTANEOUS EVERY 8 HOURS
Status: DISCONTINUED | OUTPATIENT
Start: 2024-05-22 | End: 2024-05-22 | Stop reason: HOSPADM

## 2024-05-22 RX ADMIN — ASPIRIN 81 MG CHEWABLE TABLET 81 MG: 81 TABLET CHEWABLE at 09:05

## 2024-05-22 RX ADMIN — HYDROXYZINE HYDROCHLORIDE 25 MG: 25 TABLET, FILM COATED ORAL at 09:05

## 2024-05-22 RX ADMIN — PANTOPRAZOLE SODIUM 40 MG: 40 TABLET, DELAYED RELEASE ORAL at 09:05

## 2024-05-22 RX ADMIN — HEPARIN SODIUM 5000 UNITS: 5000 INJECTION INTRAVENOUS; SUBCUTANEOUS at 01:05

## 2024-05-22 RX ADMIN — TICAGRELOR 90 MG: 90 TABLET ORAL at 09:05

## 2024-05-22 RX ADMIN — METOPROLOL SUCCINATE 12.5 MG: 25 TABLET, EXTENDED RELEASE ORAL at 01:05

## 2024-05-22 NOTE — H&P
"[facility] - Emergency Dept  [facility] Medicine  History & Physical    Patient Name: Betzy Cooley  MRN: [id number]  Patient Class: OP- Observation  Admission Date: 5/22/2024  Attending Physician: Deb Aguilera MD   Primary Care Provider: Praful Paul MD         Patient information was obtained from patient, past medical records, and ER records.     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 7 stents placed on Saturday    Shortness of Breath        HPI: 82 yo F with PMHx CAD s/p multi-vessel PCI 5/17/24 who presented to the ED today complaining of midsternal chest pain x 1 day. Pt. Reports that she had been feeling well after her recent admission 5/15-5/18 for NSTEMI 2/2 multi-vessel CAD requiring multple stent placements. Tonight however, she was awakened suddenly from her sleep due to mid-sternal "burning" chest pain. Pt. Reports pain was burning for about 30 minutes. No reported associated SOB, lightheadedness, palpitations or diaphoresis. The pain is no longer present by time of my interview. She does report that the pain feels "different" than the pain she had which led to her recent admission as that was more of a pressure-like pain.    Of note, pt. Was instructed to stop taking pepcid at discharge last admit. She still takes protonix, but she reports that in the past she had needed both pepcid and protonix to control her GERD symptoms. She was prescribed both by her GI doctor, and she has a f/u appointment with them on Friday.    Past Medical History:   Diagnosis Date    Arthritis     Cataract     GERD (gastroesophageal reflux disease)     HEARING LOSS     Hypertension     Osteoporosis, postmenopausal     Squamous Cell Carcinoma 2007    right forearm    Urinary incontinence     rare mixed       Past " Surgical History:   Procedure Laterality Date    ANGIOGRAM, CORONARY, WITH LEFT HEART CATHETERIZATION Bilateral 5/17/2024    Procedure: Angiogram, Coronary, with Left Heart Cath;  Surgeon: Miguel Jacob MD;  Location: [facility] CATH LAB;  Service: Cardiology;  Laterality: Bilateral;    COLPOPEXY N/A 08/06/2019    Procedure: COLPOPEXY SSL FIXATION;  Surgeon: Alma Dorsey MD;  [address];  Service: Urology;  Laterality: N/A;    CORONARY ANGIOGRAPHY Left 5/16/2024    Procedure: ANGIOGRAM, CORONARY ARTERY;  Surgeon: Miguel Jacob MD;  Location: [facility] CATH LAB;  Service: Cardiology;  Laterality: Left;    CYSTOSCOPY N/A 08/06/2019    Procedure: CYSTOSCOPY;  Surgeon: Alma Dorsey MD;  [facility];  Service: Urology;  Laterality: N/A;    ESOPHAGOGASTRODUODENOSCOPY N/A 4/1/2024    Procedure: EGD (ESOPHAGOGASTRODUODENOSCOPY);  Surgeon: Zbigniew Dougherty MD;  Location: [facility] ENDO;  Service: Endoscopy;  Laterality: N/A;    FRACTURE SURGERY Left 2008    open radius/ulna fracture    HYSTERECTOMY      TANIA/ovaries remain--fibroids- in her late 30's / early 40's    IVUS, CORONARY  5/17/2024    Procedure: IVUS, Coronary;  Surgeon: Miguel Jacob MD;  Location: [facility] CATH LAB;  Service: Cardiology;;    STENT, DRUG ELUTING, MULTI VESSEL, CORONARY  5/17/2024    Procedure: Stent, Drug Eluting, Multi Vessel, Coronary;  Surgeon: Miguel Jacob MD;  Location: [facility] CATH LAB;  Service: Cardiology;;       Review of patient's allergies indicates:   Allergen Reactions    Sulfa (sulfonamide antibiotics) Hives     Other reaction(s): Anaphylaxis  Itching   Shortness of breath        Current Facility-Administered Medications on File Prior to Encounter   Medication    lactated ringers infusion     Current Outpatient Medications on File Prior to Encounter   Medication Sig    aspirin 81 MG Chew Take 81 mg by mouth once daily. Not chewable    atorvastatin (LIPITOR) 40 MG tablet Take 1  tablet (40 mg total) by mouth every evening.    calcium-vitamin D3 500 mg(1,250mg) -200 unit per tablet Take 1 tablet by mouth 2 (two) times daily with meals.    co-enzyme Q-10 30 mg capsule Take 30 mg by mouth once daily.    diclofenac sodium (VOLTAREN ARTHRITIS PAIN) 1 % Gel Apply 2 g topically daily as needed (Pain).    multivitamin (THERAGRAN) per tablet Take 1 tablet by mouth once daily.    pantoprazole (PROTONIX) 40 MG tablet Take 1 tablet (40 mg total) by mouth 2 (two) times daily.    sodium chloride (SALINE MIST NASL) 1 spray by Each Nostril route daily as needed (Congestion).    ticagrelor (BRILINTA) 90 mg tablet Take 1 tablet (90 mg total) by mouth 2 (two) times daily.    vitamin A-vitamin C-vit E-min Tab Take 1 tablet by mouth once daily.     Family History       Problem Relation (Age of Onset)    Arthritis Sister    COPD Mother, Father    Diabetes Sister    Heart disease Mother    Heart failure Mother    Macular degeneration Mother    No Known Problems Brother, Son, Daughter, Sister, Sister, Sister, Brother, Brother, Brother, Brother, Brother, Daughter, Son          Tobacco Use    Smoking status: Never    Smokeless tobacco: Never   Substance and Sexual Activity    Alcohol use: Not Currently     Comment: 1-2 glasses wine weekly    Drug use: No    Sexual activity: Yes     Partners: Male     Birth control/protection: None     Review of Systems   Constitutional:  Negative for activity change, appetite change, chills, fever and unexpected weight change.   HENT:  Negative for congestion and sore throat.    Respiratory:  Negative for cough and shortness of breath.    Cardiovascular:  Positive for chest pain. Negative for palpitations and leg swelling.   Gastrointestinal:  Negative for abdominal distention, abdominal pain, blood in stool, constipation, diarrhea, nausea and vomiting.   Genitourinary:  Negative for difficulty urinating, dysuria and hematuria.   Musculoskeletal:  Positive for arthralgias. Negative  for myalgias.   Skin:  Negative for color change and rash.   Neurological:  Negative for dizziness, tremors and seizures.     Objective:     Vital Signs (Most Recent):  Temp: 98 °F (36.7 °C) (05/22/24 0559)  Pulse: 60 (05/22/24 0559)  Resp: 19 (05/22/24 0559)  BP: 124/63 (05/22/24 0559)  SpO2: 97 % (05/22/24 0559) Vital Signs (24h Range):  Temp:  [98 °F (36.7 °C)-98.1 °F (36.7 °C)] 98 °F (36.7 °C)  Pulse:  [59-86] 60  Resp:  [16-25] 19  SpO2:  [96 %-99 %] 97 %  BP: (102-164)/(57-89) 124/63        There is no height or weight on file to calculate BMI.     Physical Exam  Vitals reviewed.   Constitutional:       General: She is not in acute distress.     Appearance: She is well-developed.   HENT:      Head: Normocephalic and atraumatic.   Eyes:      Extraocular Movements: Extraocular movements intact.      Pupils: Pupils are equal, round, and reactive to light.   Neck:      Vascular: No JVD.      Trachea: No tracheal deviation.   Cardiovascular:      Rate and Rhythm: Normal rate and regular rhythm.      Heart sounds: No murmur heard.     No friction rub. No gallop.   Pulmonary:      Effort: No respiratory distress.      Breath sounds: Normal breath sounds. No wheezing or rales.   Abdominal:      General: Bowel sounds are normal. There is no distension.      Palpations: Abdomen is soft. There is no mass.      Tenderness: There is no abdominal tenderness.   Musculoskeletal:         General: No deformity.      Cervical back: Neck supple.   Lymphadenopathy:      Cervical: No cervical adenopathy.   Skin:     General: Skin is warm and dry.      Findings: No rash.   Neurological:      Mental Status: She is alert and oriented to person, place, and time.              CRANIAL NERVES     CN III, IV, VI   Pupils are equal, round, and reactive to light.       Significant Labs: All pertinent labs within the past 24 hours have been reviewed.    Significant Imaging: I have reviewed all pertinent imaging results/findings within the  past 24 hours.  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 With known multivessel CAD s/p recent PCI presenting with chest pain and troponin elevation 1.451  -Case discussed with cardiology, they express concern that trop is falsely elevated after recent multivessel PCI procedure.  -Plan to continue home statin, DAPT, hold ajith on heparin gtt and trend troponin. Cards consulted with further recommendations based on trop trend    GERD (gastroesophageal reflux disease)  -Pt. On protonix BID. Recent discahrge summary stated stop pepcid, unclear reasoning. Plan to resume dual thearpy with protonix and pepcid as recommended by pt. GI doctor. F/u with GI 5/24 (will need to reschedule if hospitalized)        VTE Risk Mitigation (From admission, onward)           Ordered     heparin (porcine) injection 5,000 Units  Every 8 hours         05/22/24 0600     IP VTE HIGH RISK PATIENT  Once         05/22/24 0600     Place sequential compression device  Until discontinued         05/22/24 0600                       On 05/22/2024, patient should be placed in hospital observation services under my care.             Jonh Babcock MD  Department of Hospital Medicine  [facility] - Emergency Dept

## 2024-05-22 NOTE — ASSESSMENT & PLAN NOTE
83yoF with a hx of 3v CAD s/p PCI on 5/17/24, HTN, uncontrolled GERD/gastritis who presented to the hospital with an episode of chest discomfort and a troponin of 1.45 and downtrending. No STEMI on EKG, EKG similar to last week (TWI in precordium with improvements on this admission). Her chest discomfort was described as burning and not the same as her pressure type pain she presented with before. It improved significantly with Tums and Maalox and was not pleuritic or positional.     She does not have acute stent thrombus or she would have presented with a STEMI. She has uncontrolled GERD and has had recent changes to her reflux medications. Downtrending troponin likely secondary to recent extensive stenting.    Recommendations:  - Agree with medical optimization per cardiology recommendations  - Optimize from a GI standpoint as well. We favor GI symptoms over cardiac symptoms, although cannot rule out microvascular ischemic symptoms  - Continue DAPT  - Okay to discharge from our standpoint

## 2024-05-22 NOTE — CONSULTS
"[facility] - Cardiology Stepdown  Cardiology  Consult Note    Patient Name: Betzy Cooley  MRN: [id number]  Admission Date: 5/22/2024  Hospital Length of Stay: 0 days  Code Status: Full Code   Attending Provider: Deb Aguilera MD   Consulting Provider: Rayray Victor MD  Primary Care Physician: Praful Paul MD  Principal Problem:Coronary artery disease with refractory angina pectoris    Patient information was obtained from patient, spouse/SO, and ER records.     Inpatient consult to Cardiology  Consult performed by: Rayray Victor MD  Consult ordered by: Jonh Babcock MD        Subjective:     Chief Complaint:  Chest discomfort and elevated troponin     HPI:   Betzy Cooley is an 83 year old woman with history of CAD s/p multi-vessel PCI 5/17/24, HTN, and GERD, who presented with episode of midsternal chest pain. Patient reports that she had been feeling well after her recent admission from 5/15 to 5/18 for NSTEMI 2/2 multi-vessel CAD which resulted in C with placement of 5 stents. She initially presented at that time with intermittent substernal chest discomfort over a week. On the night of 5/21, however, she was awakened suddenly from her sleep due to mid-sternal "burning" chest pain. Patient reports burning pain lasted about 30 minutes. No reported associated SOB, lightheadedness, palpitations or diaphoresis. The pain is no longer present by time of arrival to the hospital. She does report that the pain feels "different" than the pain she had which led to her recent admission as that was more of a pressure-like pain. She reports adherence to her medications including DAPT since discharge.    In the ED, afebrile and hemodynamically stable. Initial labs remarkable for troponin of 1.451, downtrending to 1.31. Other labs largely unremarkable. EKGs without significant acute change from prior admission EKG. Cardiology consulted regarding chest pain with elevated troponin.    Past Medical History: "   Diagnosis Date    Arthritis     Cataract     GERD (gastroesophageal reflux disease)     HEARING LOSS     Hypertension     Osteoporosis, postmenopausal     Squamous Cell Carcinoma 2007    right forearm    Urinary incontinence     rare mixed       Past Surgical History:   Procedure Laterality Date    ANGIOGRAM, CORONARY, WITH LEFT HEART CATHETERIZATION Bilateral 5/17/2024    Procedure: Angiogram, Coronary, with Left Heart Cath;  Surgeon: Mgiuel Jacob MD;  Location: [facility] CATH LAB;  Service: Cardiology;  Laterality: Bilateral;    COLPOPEXY N/A 08/06/2019    Procedure: COLPOPEXY SSL FIXATION;  Surgeon: Alma Dorsey MD;  [address];  Service: Urology;  Laterality: N/A;    CORONARY ANGIOGRAPHY Left 5/16/2024    Procedure: ANGIOGRAM, CORONARY ARTERY;  Surgeon: Miguel Jacob MD;  Location: [facility] CATH LAB;  Service: Cardiology;  Laterality: Left;    CYSTOSCOPY N/A 08/06/2019    Procedure: CYSTOSCOPY;  Surgeon: Alma Dorsey MD;  [facility];  Service: Urology;  Laterality: N/A;    ESOPHAGOGASTRODUODENOSCOPY N/A 4/1/2024    Procedure: EGD (ESOPHAGOGASTRODUODENOSCOPY);  Surgeon: Zbigniew Dougherty MD;  Location: [facility];  Service: Endoscopy;  Laterality: N/A;    FRACTURE SURGERY Left 2008    open radius/ulna fracture    HYSTERECTOMY      TANIA/ovaries remain--fibroids- in her late 30's / early 40's    IVUS, CORONARY  5/17/2024    Procedure: IVUS, Coronary;  Surgeon: Miguel Jacob MD;  Location: [facility] CATH LAB;  Service: Cardiology;;    STENT, DRUG ELUTING, MULTI VESSEL, CORONARY  5/17/2024    Procedure: Stent, Drug Eluting, Multi Vessel, Coronary;  Surgeon: Miguel Jacob MD;  Location: [facility] CATH LAB;  Service: Cardiology;;       Review of patient's allergies indicates:   Allergen Reactions    Sulfa (sulfonamide antibiotics) Hives     Other reaction(s): Anaphylaxis  Itching   Shortness of breath        Current Facility-Administered Medications on File  Prior to Encounter   Medication    lactated ringers infusion     Current Outpatient Medications on File Prior to Encounter   Medication Sig    aspirin 81 MG Chew Take 81 mg by mouth once daily. Not chewable    atorvastatin (LIPITOR) 40 MG tablet Take 1 tablet (40 mg total) by mouth every evening.    calcium-vitamin D3 500 mg(1,250mg) -200 unit per tablet Take 1 tablet by mouth 2 (two) times daily with meals.    co-enzyme Q-10 30 mg capsule Take 30 mg by mouth once daily.    diclofenac sodium (VOLTAREN ARTHRITIS PAIN) 1 % Gel Apply 2 g topically daily as needed (Pain).    multivitamin (THERAGRAN) per tablet Take 1 tablet by mouth once daily.    pantoprazole (PROTONIX) 40 MG tablet Take 1 tablet (40 mg total) by mouth 2 (two) times daily.    sodium chloride (SALINE MIST NASL) 1 spray by Each Nostril route daily as needed (Congestion).    ticagrelor (BRILINTA) 90 mg tablet Take 1 tablet (90 mg total) by mouth 2 (two) times daily.    vitamin A-vitamin C-vit E-min Tab Take 1 tablet by mouth once daily.     Family History       Problem Relation (Age of Onset)    Arthritis Sister    COPD Mother, Father    Diabetes Sister    Heart disease Mother    Heart failure Mother    Macular degeneration Mother    No Known Problems Brother, Son, Daughter, Sister, Sister, Sister, Brother, Brother, Brother, Brother, Brother, Daughter, Son          Tobacco Use    Smoking status: Never    Smokeless tobacco: Never   Substance and Sexual Activity    Alcohol use: Not Currently     Comment: 1-2 glasses wine weekly    Drug use: No    Sexual activity: Yes     Partners: Male     Birth control/protection: None     Review of Systems   Constitutional: Negative for chills and fever.   Cardiovascular:  Negative for chest pain and dyspnea on exertion.   Respiratory:  Negative for cough and shortness of breath.    Musculoskeletal:  Negative for back pain and joint swelling.   Gastrointestinal:  Negative for abdominal pain and constipation.    Genitourinary:  Negative for dysuria and hematuria.   Neurological:  Negative for focal weakness and headaches.   Psychiatric/Behavioral:  Negative for altered mental status. The patient is not nervous/anxious.      Objective:     Vital Signs (Most Recent):  Temp: 97.7 °F (36.5 °C) (05/22/24 0825)  Pulse: 69 (05/22/24 1100)  Resp: 16 (05/22/24 0825)  BP: (!) 142/71 (05/22/24 1100)  SpO2: 98 % (05/22/24 0825) Vital Signs (24h Range):  Temp:  [97.7 °F (36.5 °C)-98.1 °F (36.7 °C)] 97.7 °F (36.5 °C)  Pulse:  [59-86] 69  Resp:  [16-25] 16  SpO2:  [96 %-99 %] 98 %  BP: (102-171)/(57-89) 142/71     Weight: 55.3 kg (121 lb 14.6 oz)  Body mass index is 22.29 kg/m².    SpO2: 98 %         Intake/Output Summary (Last 24 hours) at 5/22/2024 1129  Last data filed at 5/22/2024 0800  Gross per 24 hour   Intake 480 ml   Output --   Net 480 ml       Lines/Drains/Airways       Peripheral Intravenous Line  Duration                  Peripheral IV - Single Lumen 05/22/24 0252 20 G;1 1/4 in Anterior;Left Forearm <1 day                     Physical Exam  Vitals reviewed.   Constitutional:       Appearance: Normal appearance. She is not ill-appearing.   HENT:      Head: Normocephalic and atraumatic.   Eyes:      Extraocular Movements: Extraocular movements intact.      Pupils: Pupils are equal, round, and reactive to light.   Cardiovascular:      Rate and Rhythm: Normal rate and regular rhythm.      Pulses: Normal pulses.      Heart sounds: Normal heart sounds.   Pulmonary:      Effort: Pulmonary effort is normal.      Breath sounds: Normal breath sounds. No rales.   Abdominal:      General: Bowel sounds are normal.      Palpations: Abdomen is soft.      Tenderness: There is no abdominal tenderness.   Musculoskeletal:         General: No tenderness. Normal range of motion.   Skin:     Findings: No lesion or rash.   Neurological:      General: No focal deficit present.      Mental Status: She is alert and oriented to person, place, and  time.      Motor: No weakness.          Significant Labs: All pertinent lab results from the last 24 hours have been reviewed.    Significant Imaging: All relevant imaging was reviewed    Assessment and Plan:     Chest pain  Coronary artery disease with refractory angina pectoris     83F with known multivessel CAD s/p recent PCI with DESx5, presenting with one episode of burning chest pain and troponin elevation 1.451. Given recent multivessel PCI, suspect troponin levels are elevated as a result of the procedure. If true acute event, would expect her troponin to peak at this time rather than found to be downtrending. Her quality of pain is more burning and she likens it to indigestion as well as being unlike her previous anginal episodes. Suspect pain may be more related to her ongoing GERD especially since she had stopped taking pepcid. Lower concern for in-stent thrombosis given that patient reports adherence with her DAPT.    Recommendations:  - Consult Interventional Cardiology for further evaluation and recommendations  - Continue high intensity statin and DAPT with ASA and Brilinta.  - Would start beta blocker in setting of post-MI with possible anti-anginal and antihypertensive benefit as well. Recommend Toprol XL 25mg qd.  - Recommend discharging with PRN sublingual nitroglycerin to use for potential future episode of chest discomfort.  - Continue to trend troponin to ensure downtrend        VTE Risk Mitigation (From admission, onward)           Ordered     heparin (porcine) injection 5,000 Units  Every 8 hours         05/22/24 0600     IP VTE HIGH RISK PATIENT  Once         05/22/24 0600     Place sequential compression device  Until discontinued         05/22/24 0600                    Thank you for your consult. I will sign off. Please contact us if you have any additional questions.    Rayray Victor MD  Cardiology   [facility] - Cardiology Stepdown  I have personally taken the history and examined the  patient and agree with the resident's note as stated above.Int Cards consult and should use low dose BB like Metoprolol XL 25 mg and should have SL NTG as discharge.

## 2024-05-22 NOTE — CARE UPDATE
I have reviewed the chart of Betzy Cooley who is hospitalized for the following:    Active Hospital Problems    Diagnosis    *Coronary artery disease with refractory angina pectoris    Chest pain    Elevated troponin    GERD (gastroesophageal reflux disease)        Juhi Mancia NP  [facility]

## 2024-05-22 NOTE — PLAN OF CARE
Pt discharged home with no post-acute needs. F/U appts already scheduled prior to readmit.    [facility]  [place] Management Department  [email or web address]    [facility] - Card[facility] Stepdown  Discharge Final Note    Primary Care Provider: Praful Paul MD    Expected Discharge Date: 5/22/2024    Final Discharge Note (most recent)       Final Note - 05/22/24 1653          Final Note    Assessment Type Final Discharge Note     Anticipated Discharge Disposition Home or Self Care     Hospital Resources/Appts/Education Provided Provided patient/caregiver with written discharge plan information;Appointments scheduled and added to AVS        Post-Acute Status    Post-Acute Authorization Other     Other Status No Post-Acute Service Needs     Discharge Delays None known at this time                     Important Message from Medicare             Contact Info       Praful Paul MD   Specialty: Internal Medicine   Relationship: PCP - General    [facility] [address]  [facility] [id number]   Phone: [phone]       Next Steps: Follow up          Future Appointments   Date Time Provider Department Center   5/24/2024 11:00 AM Zbigniew Dougherty MD [facility] GASTRO [facility]   5/27/2024  3:40 PM Praful Paul MD [facility]   6/10/2024  8:45 AM Shashank Camarena OD [facility] OPTOMTY [facility]   6/17/2024  1:45 PM Quentin Schultz MD [facility] CARDIO [facility]   7/29/2024  8:40 AM [facility]

## 2024-05-22 NOTE — ASSESSMENT & PLAN NOTE
Coronary artery disease with refractory angina pectoris     83F with known multivessel CAD s/p recent PCI with DESx5, presenting with one episode of burning chest pain and troponin elevation 1.451. Given recent multivessel PCI, suspect troponin levels are elevated as a result of the procedure. If true acute event, would expect her troponin to peak at this time rather than found to be downtrending. Her quality of pain is more burning and she likens it to indigestion as well as being unlike her previous anginal episodes. Suspect pain may be more related to her ongoing GERD especially since she had stopped taking pepcid. Lower concern for in-stent thrombosis given that patient reports adherence with her DAPT.    Recommendations:  - Consult Interventional Cardiology for further evaluation and recommendations  - Continue high intensity statin and DAPT with ASA and Brilinta.  - Would start beta blocker in setting of post-MI with possible anti-anginal and antihypertensive benefit as well. Recommend Toprol XL 25mg qd.  - Recommend discharging with PRN sublingual nitroglycerin to use for potential future episode of chest discomfort.  - Continue to trend troponin to ensure downtrend

## 2024-05-22 NOTE — CARE UPDATE
Seen on rounds this morning.  Reports no further chest pain.  Feels like it was acid reflux as she reports a burning sensation.  Says she suffers from anxiety - could be causing chest pain and HTN.  Trial of Atarax.  If no improvement in BP and symptoms, can add Lisinopril 5mg.  Cards consulted pending.  Trop flat.  Potential DC this afternoon pending Cards recs and BP.    11:20 AM  BP improved from 170s to 140s after getting Atarax.  Will hold on starting Lisinopril at this time.    Deb Aguilera MD, [facility]  Senior Physician  [facility] Medicine

## 2024-05-22 NOTE — DISCHARGE SUMMARY
"[facility] - Cardiology [facility] Medicine  Discharge Summary      Patient Name: Betzy Cooley  MRN: [id number]  HARPREET: [id number]  Patient Class: OP- Observation  Admission Date: 5/22/2024  Hospital Length of Stay: 0 days  Discharge Date and Time: No discharge date for patient encounter.  Attending Physician: Deb Aguilera MD   Discharging Provider: Deb Aguilera MD  Primary Care Provider: Praful Paul MD  Hospital Medicine Team: [facility] [place] [place] HOSP MED  Deb Aguilera MD  Primary Care Team: [facility] [place] [place] HOSP MED     HPI:   84 yo F with PMHx CAD s/p multi-vessel PCI 5/17/24 who presented to the ED today complaining of midsternal chest pain x 1 day. Pt. Reports that she had been feeling well after her recent admission 5/15-5/18 for NSTEMI 2/2 multi-vessel CAD requiring multple stent placements. Tonight however, she was awakened suddenly from her sleep due to mid-sternal "burning" chest pain. Pt. Reports pain was burning for about 30 minutes. No reported associated SOB, lightheadedness, palpitations or diaphoresis. The pain is no longer present by time of my interview. She does report that the pain feels "different" than the pain she had which led to her recent admission as that was more of a pressure-like pain.    Of note, pt. Was instructed to stop taking pepcid at discharge last admit. She still takes protonix, but she reports that in the past she had needed both pepcid and protonix to control her GERD symptoms. She was prescribed both by her GI doctor, and she has a f/u appointment with them on Friday.    * No surgery found *      Hospital Course:   Ms. Cooley was admitted to Hospital Medicine for management of chest pain.  Trop trended down.  She was evaluated by Gen Cards who suggested Metoprolol for NSTEMI medical management and Interventional Cards consult.  Int Cards did not feel like this was cardiac chest pain.  She was given Atarax with improvement in her BP and anxiety.  She was discharged home with " PCP followup.    Ms. Betzy Cooley was deemed appropriate for discharge.  At the time of discharge, the plan of care was discussed with patient/family, who were agreeable and amenable.  All medications were verbally reviewed and discussed with the patient/family.  They endorsed understanding and compliance.  Informed them that these changes will be available on their discharge paperwork, as well.  Outpatient follow-ups were scheduled, or if unable to be scheduled ambulatory referrals were placed, and ER return precautions were given.  All questions were answered to the patient/family's satisfaction.  Ms. Betzy Cooley was subsequently discharged in stable condition.       Goals of Care Treatment Preferences:  Code Status: Full Code      Consults:   Consults (From admission, onward)          Status Ordering Provider     Inpatient consult to Interventional Cardiology  Once        Provider:  (Not yet assigned)    Acknowledged KINZA HENDRICKS     Inpatient consult to Cardiology  Once        Provider:  (Not yet assigned)    Completed MEDINA PANTOJA            No new Assessment & Plan notes have been filed under this hospital service since the last note was generated.  Service: Hospital Medicine    Final Active Diagnoses:    Diagnosis Date Noted POA    PRINCIPAL PROBLEM:  Coronary artery disease with refractory angina pectoris [I25.112] 05/15/2024 Yes    Chest pain [R07.9] 05/22/2024 Yes    Elevated troponin [R79.89] 05/22/2024 Yes    Anxiety [F41.9] 05/22/2024 Yes    GERD (gastroesophageal reflux disease) [K21.9] 05/27/2014 Yes      Problems Resolved During this Admission:       Discharged Condition: fair    Disposition: Home or Self Care    Follow Up:   Follow-up Information       Praful Paul MD Follow up.    Specialty: Internal Medicine  Contact information:  [facility] [address]  [facility] [id number] [phone]                           Patient Instructions:      Notify your health care provider if you  experience any of the following:  persistent nausea and vomiting or diarrhea     Notify your health care provider if you experience any of the following:  severe uncontrolled pain     Notify your health care provider if you experience any of the following:  difficulty breathing or increased cough     Notify your health care provider if you experience any of the following:  persistent dizziness, light-headedness, or visual disturbances     Activity as tolerated       Significant Diagnostic Studies: Labs: Troponin   Recent Labs   Lab 05/22/24  0252 05/22/24  0552 05/22/24  1141   TROPONINI 1.451* 1.310* 0.973*       Pending Diagnostic Studies:       None           Medications:  Reconciled Home Medications:      Medication List        START taking these medications      hydrOXYzine HCL 25 MG tablet  Commonly known as: ATARAX  Take 1 tablet (25 mg total) by mouth 3 (three) times daily as needed for Anxiety.     metoprolol succinate 25 MG 24 hr tablet  Commonly known as: TOPROL-XL  Take 0.5 tablets (12.5 mg total) by mouth once daily.  Start taking on: May 23, 2024            CONTINUE taking these medications      aspirin 81 MG Chew  Take 81 mg by mouth once daily. Not chewable     atorvastatin 40 MG tablet  Commonly known as: LIPITOR  Take 1 tablet (40 mg total) by mouth every evening.     calcium-vitamin D3 500 mg-5 mcg (200 unit) per tablet  Commonly known as: OS-ESTIVEN 500 + D3  Take 1 tablet by mouth 2 (two) times daily with meals.     co-enzyme Q-10 30 mg capsule  Take 30 mg by mouth once daily.     multivitamin per tablet  Commonly known as: THERAGRAN  Take 1 tablet by mouth once daily.     pantoprazole 40 MG tablet  Commonly known as: PROTONIX  Take 1 tablet (40 mg total) by mouth 2 (two) times daily.     SALINE MIST NASL  1 spray by Each Nostril route daily as needed (Congestion).     ticagrelor 90 mg tablet  Commonly known as: BRILINTA  Take 1 tablet (90 mg total) by mouth 2 (two) times daily.     vitamin  A-vitamin C-vit E-min Tab  Take 1 tablet by mouth once daily.     VOLTAREN ARTHRITIS PAIN 1 % Gel  Generic drug: diclofenac sodium  Apply 2 g topically daily as needed (Pain).              Indwelling Lines/Drains at time of discharge:   Lines/Drains/Airways       None                   Time spent on the discharge of patient: 35 minutes         Deb Aguilera MD  Department of Hospital Medicine  [facility] - Cardiology Stepdown

## 2024-05-22 NOTE — HPI
"84 yo F with PMHx CAD s/p multi-vessel PCI 5/17/24 who presented to the ED today complaining of midsternal chest pain x 1 day. Pt. Reports that she had been feeling well after her recent admission 5/15-5/18 for NSTEMI 2/2 multi-vessel CAD requiring multple stent placements. Tonight however, she was awakened suddenly from her sleep due to mid-sternal "burning" chest pain. Pt. Reports pain was burning for about 30 minutes. No reported associated SOB, lightheadedness, palpitations or diaphoresis. The pain is no longer present by time of my interview. She does report that the pain feels "different" than the pain she had which led to her recent admission as that was more of a pressure-like pain.    Of note, pt. Was instructed to stop taking pepcid at discharge last admit. She still takes protonix, but she reports that in the past she had needed both pepcid and protonix to control her GERD symptoms. She was prescribed both by her GI doctor, and she has a f/u appointment with them on Friday.  "

## 2024-05-22 NOTE — HPI
"Betzy Cooley is an 83 year old woman with history of CAD s/p multi-vessel PCI 5/17/24, HTN, and GERD, who presented with episode of midsternal chest pain. Patient reports that she had been feeling well after her recent admission from 5/15 to 5/18 for NSTEMI 2/2 multi-vessel CAD which resulted in [facility] with placement of 5 stents. She initially presented at that time with intermittent substernal chest discomfort over a week. On the night of 5/21, however, she was awakened suddenly from her sleep due to mid-sternal "burning" chest pain. Patient reports burning pain lasted about 30 minutes. No reported associated SOB, lightheadedness, palpitations or diaphoresis. The pain is no longer present by time of arrival to the hospital. She does report that the pain feels "different" than the pain she had which led to her recent admission as that was more of a pressure-like pain. She reports adherence to her medications including DAPT since discharge.    In the ED, afebrile and hemodynamically stable. Initial labs remarkable for troponin of 1.451, downtrending to 1.31. Other labs largely unremarkable. EKGs without significant acute change from prior admission EKG. Cardiology consulted regarding chest pain with elevated troponin.  "

## 2024-05-22 NOTE — SUBJECTIVE & OBJECTIVE
Past Medical History:   Diagnosis Date    Arthritis     Cataract     GERD (gastroesophageal reflux disease)     HEARING LOSS     Hypertension     Osteoporosis, postmenopausal     Squamous Cell Carcinoma 2007    right forearm    Urinary incontinence     rare mixed       Past Surgical History:   Procedure Laterality Date    ANGIOGRAM, CORONARY, WITH LEFT HEART CATHETERIZATION Bilateral 5/17/2024    Procedure: Angiogram, Coronary, with Left Heart Cath;  Surgeon: Miguel Jacob MD;  Location: [facility] CATH LAB;  Service: Cardiology;  Laterality: Bilateral;    COLPOPEXY N/A 08/06/2019    Procedure: COLPOPEXY SSL FIXATION;  Surgeon: Alma Dorsey MD;  [address];  Service: Urology;  Laterality: N/A;    CORONARY ANGIOGRAPHY Left 5/16/2024    Procedure: ANGIOGRAM, CORONARY ARTERY;  Surgeon: Miguel Jacob MD;  Location: [facility] CATH LAB;  Service: Cardiology;  Laterality: Left;    CYSTOSCOPY N/A 08/06/2019    Procedure: CYSTOSCOPY;  Surgeon: Alma Dorsey MD;  [facility];  Service: Urology;  Laterality: N/A;    ESOPHAGOGASTRODUODENOSCOPY N/A 4/1/2024    Procedure: EGD (ESOPHAGOGASTRODUODENOSCOPY);  Surgeon: Zbigniew Dougherty MD;  Location: [facility];  Service: Endoscopy;  Laterality: N/A;    FRACTURE SURGERY Left 2008    open radius/ulna fracture    HYSTERECTOMY      TANIA/ovaries remain--fibroids- in her late 30's / early 40's    IVUS, CORONARY  5/17/2024    Procedure: IVUS, Coronary;  Surgeon: Miguel Jacob MD;  Location: [facility] CATH LAB;  Service: Cardiology;;    STENT, DRUG ELUTING, MULTI VESSEL, CORONARY  5/17/2024    Procedure: Stent, Drug Eluting, Multi Vessel, Coronary;  Surgeon: Miguel Jacob MD;  Location: [facility] CATH LAB;  Service: Cardiology;;       Review of patient's allergies indicates:   Allergen Reactions    Sulfa (sulfonamide antibiotics) Hives     Other reaction(s): Anaphylaxis  Itching   Shortness of breath        PTA Medications   Medication Sig     aspirin 81 MG Chew Take 81 mg by mouth once daily. Not chewable    atorvastatin (LIPITOR) 40 MG tablet Take 1 tablet (40 mg total) by mouth every evening.    calcium-vitamin D3 500 mg(1,250mg) -200 unit per tablet Take 1 tablet by mouth 2 (two) times daily with meals.    co-enzyme Q-10 30 mg capsule Take 30 mg by mouth once daily.    diclofenac sodium (VOLTAREN ARTHRITIS PAIN) 1 % Gel Apply 2 g topically daily as needed (Pain).    multivitamin (THERAGRAN) per tablet Take 1 tablet by mouth once daily.    pantoprazole (PROTONIX) 40 MG tablet Take 1 tablet (40 mg total) by mouth 2 (two) times daily.    sodium chloride (SALINE MIST NASL) 1 spray by Each Nostril route daily as needed (Congestion).    ticagrelor (BRILINTA) 90 mg tablet Take 1 tablet (90 mg total) by mouth 2 (two) times daily.    vitamin A-vitamin C-vit E-min Tab Take 1 tablet by mouth once daily.     Family History       Problem Relation (Age of Onset)    Arthritis Sister    COPD Mother, Father    Diabetes Sister    Heart disease Mother    Heart failure Mother    Macular degeneration Mother    No Known Problems Brother, Son, Daughter, Sister, Sister, Sister, Brother, Brother, Brother, Brother, Brother, Daughter, Son          Tobacco Use    Smoking status: Never    Smokeless tobacco: Never   Substance and Sexual Activity    Alcohol use: Not Currently     Comment: 1-2 glasses wine weekly    Drug use: No    Sexual activity: Yes     Partners: Male     Birth control/protection: None     Review of Systems   Constitutional: Negative for diaphoresis and fever.   Cardiovascular:  Negative for chest pain, dyspnea on exertion, leg swelling, near-syncope, orthopnea, palpitations, paroxysmal nocturnal dyspnea and syncope.   Respiratory:  Negative for cough and shortness of breath.    Gastrointestinal:  Negative for abdominal pain, diarrhea, nausea and vomiting.   Neurological:  Negative for light-headedness.   Psychiatric/Behavioral:  Negative for altered mental  status and substance abuse.      Objective:     Vital Signs (Most Recent):  Temp: 97.2 °F (36.2 °C) (05/22/24 1143)  Pulse: 81 (05/22/24 1143)  Resp: 18 (05/22/24 1143)  BP: 133/78 (05/22/24 1143)  SpO2: 98 % (05/22/24 1143) Vital Signs (24h Range):  Temp:  [97.2 °F (36.2 °C)-98.1 °F (36.7 °C)] 97.2 °F (36.2 °C)  Pulse:  [59-86] 81  Resp:  [16-25] 18  SpO2:  [96 %-99 %] 98 %  BP: (102-171)/(57-89) 133/78     Weight: 55.3 kg (121 lb 14.6 oz)  Body mass index is 22.29 kg/m².    SpO2: 98 %         Intake/Output Summary (Last 24 hours) at 5/22/2024 1251  Last data filed at 5/22/2024 0800  Gross per 24 hour   Intake 480 ml   Output --   Net 480 ml       Lines/Drains/Airways       Peripheral Intravenous Line  Duration                  Peripheral IV - Single Lumen 05/22/24 0252 20 G;1 1/4 in Anterior;Left Forearm <1 day                     Physical Exam  Constitutional:       Appearance: Normal appearance.   Cardiovascular:      Rate and Rhythm: Normal rate and regular rhythm.      Pulses: Normal pulses.      Heart sounds: Normal heart sounds.   Pulmonary:      Effort: Pulmonary effort is normal.      Breath sounds: Normal breath sounds. No wheezing or rales.   Abdominal:      General: Abdomen is flat. There is no distension.      Palpations: Abdomen is soft.      Tenderness: There is no abdominal tenderness.   Musculoskeletal:      Right lower leg: No edema.      Left lower leg: No edema.   Skin:     General: Skin is warm and dry.   Neurological:      Mental Status: She is alert and oriented to person, place, and time. Mental status is at baseline.   Psychiatric:         Mood and Affect: Mood normal.         Behavior: Behavior normal.            Significant Labs: All pertinent lab results from the last 24 hours have been reviewed.    Significant Imaging:  Reviewed

## 2024-05-22 NOTE — ASSESSMENT & PLAN NOTE
-Pt. With known multivessel CAD s/p recent PCI presenting with chest pain and troponin elevation 1.451  -Case discussed with cardiology, they express concern that trop is falsely elevated after recent multivessel PCI procedure.  -Plan to continue home statin, DAPT, hold ajith on heparin gtt and trend troponin. Cards consulted with further recommendations based on trop trend

## 2024-05-22 NOTE — SUBJECTIVE & OBJECTIVE
Past Medical History:   Diagnosis Date    Arthritis     Cataract     GERD (gastroesophageal reflux disease)     HEARING LOSS     Hypertension     Osteoporosis, postmenopausal     Squamous Cell Carcinoma 2007    right forearm    Urinary incontinence     rare mixed       Past Surgical History:   Procedure Laterality Date    ANGIOGRAM, CORONARY, WITH LEFT HEART CATHETERIZATION Bilateral 5/17/2024    Procedure: Angiogram, Coronary, with Left Heart Cath;  Surgeon: Miguel Jacob MD;  Location: [facility] CATH LAB;  Service: Cardiology;  Laterality: Bilateral;    COLPOPEXY N/A 08/06/2019    Procedure: COLPOPEXY SSL FIXATION;  Surgeon: Alma Dorsey MD;  [address];  Service: Urology;  Laterality: N/A;    CORONARY ANGIOGRAPHY Left 5/16/2024    Procedure: ANGIOGRAM, CORONARY ARTERY;  Surgeon: Miguel Jacob MD;  Location: [facility] CATH LAB;  Service: Cardiology;  Laterality: Left;    CYSTOSCOPY N/A 08/06/2019    Procedure: CYSTOSCOPY;  Surgeon: Alma Dorsey MD;  [facility];  Service: Urology;  Laterality: N/A;    ESOPHAGOGASTRODUODENOSCOPY N/A 4/1/2024    Procedure: EGD (ESOPHAGOGASTRODUODENOSCOPY);  Surgeon: Zbigniew Dougherty MD;  Location: [facility];  Service: Endoscopy;  Laterality: N/A;    FRACTURE SURGERY Left 2008    open radius/ulna fracture    HYSTERECTOMY      TANIA/ovaries remain--fibroids- in her late 30's / early 40's    IVUS, CORONARY  5/17/2024    Procedure: IVUS, Coronary;  Surgeon: Miguel Jacob MD;  Location: [facility] CATH LAB;  Service: Cardiology;;    STENT, DRUG ELUTING, MULTI VESSEL, CORONARY  5/17/2024    Procedure: Stent, Drug Eluting, Multi Vessel, Coronary;  Surgeon: Miguel Jacob MD;  Location: [facility] CATH LAB;  Service: Cardiology;;       Review of patient's allergies indicates:   Allergen Reactions    Sulfa (sulfonamide antibiotics) Hives     Other reaction(s): Anaphylaxis  Itching   Shortness of breath        Current Facility-Administered  Medications on File Prior to Encounter   Medication    lactated ringers infusion     Current Outpatient Medications on File Prior to Encounter   Medication Sig    aspirin 81 MG Chew Take 81 mg by mouth once daily. Not chewable    atorvastatin (LIPITOR) 40 MG tablet Take 1 tablet (40 mg total) by mouth every evening.    calcium-vitamin D3 500 mg(1,250mg) -200 unit per tablet Take 1 tablet by mouth 2 (two) times daily with meals.    co-enzyme Q-10 30 mg capsule Take 30 mg by mouth once daily.    diclofenac sodium (VOLTAREN ARTHRITIS PAIN) 1 % Gel Apply 2 g topically daily as needed (Pain).    multivitamin (THERAGRAN) per tablet Take 1 tablet by mouth once daily.    pantoprazole (PROTONIX) 40 MG tablet Take 1 tablet (40 mg total) by mouth 2 (two) times daily.    sodium chloride (SALINE MIST NASL) 1 spray by Each Nostril route daily as needed (Congestion).    ticagrelor (BRILINTA) 90 mg tablet Take 1 tablet (90 mg total) by mouth 2 (two) times daily.    vitamin A-vitamin C-vit E-min Tab Take 1 tablet by mouth once daily.     Family History       Problem Relation (Age of Onset)    Arthritis Sister    COPD Mother, Father    Diabetes Sister    Heart disease Mother    Heart failure Mother    Macular degeneration Mother    No Known Problems Brother, Son, Daughter, Sister, Sister, Sister, Brother, Brother, Brother, Brother, Brother, Daughter, Son          Tobacco Use    Smoking status: Never    Smokeless tobacco: Never   Substance and Sexual Activity    Alcohol use: Not Currently     Comment: 1-2 glasses wine weekly    Drug use: No    Sexual activity: Yes     Partners: Male     Birth control/protection: None     Review of Systems   Constitutional:  Negative for activity change, appetite change, chills, fever and unexpected weight change.   HENT:  Negative for congestion and sore throat.    Respiratory:  Negative for cough and shortness of breath.    Cardiovascular:  Positive for chest pain. Negative for palpitations and leg  swelling.   Gastrointestinal:  Negative for abdominal distention, abdominal pain, blood in stool, constipation, diarrhea, nausea and vomiting.   Genitourinary:  Negative for difficulty urinating, dysuria and hematuria.   Musculoskeletal:  Positive for arthralgias. Negative for myalgias.   Skin:  Negative for color change and rash.   Neurological:  Negative for dizziness, tremors and seizures.     Objective:     Vital Signs (Most Recent):  Temp: 98 °F (36.7 °C) (05/22/24 0559)  Pulse: 60 (05/22/24 0559)  Resp: 19 (05/22/24 0559)  BP: 124/63 (05/22/24 0559)  SpO2: 97 % (05/22/24 0559) Vital Signs (24h Range):  Temp:  [98 °F (36.7 °C)-98.1 °F (36.7 °C)] 98 °F (36.7 °C)  Pulse:  [59-86] 60  Resp:  [16-25] 19  SpO2:  [96 %-99 %] 97 %  BP: (102-164)/(57-89) 124/63        There is no height or weight on file to calculate BMI.     Physical Exam  Vitals reviewed.   Constitutional:       General: She is not in acute distress.     Appearance: She is well-developed.   HENT:      Head: Normocephalic and atraumatic.   Eyes:      Extraocular Movements: Extraocular movements intact.      Pupils: Pupils are equal, round, and reactive to light.   Neck:      Vascular: No JVD.      Trachea: No tracheal deviation.   Cardiovascular:      Rate and Rhythm: Normal rate and regular rhythm.      Heart sounds: No murmur heard.     No friction rub. No gallop.   Pulmonary:      Effort: No respiratory distress.      Breath sounds: Normal breath sounds. No wheezing or rales.   Abdominal:      General: Bowel sounds are normal. There is no distension.      Palpations: Abdomen is soft. There is no mass.      Tenderness: There is no abdominal tenderness.   Musculoskeletal:         General: No deformity.      Cervical back: Neck supple.   Lymphadenopathy:      Cervical: No cervical adenopathy.   Skin:     General: Skin is warm and dry.      Findings: No rash.   Neurological:      Mental Status: She is alert and oriented to person, place, and time.               CRANIAL NERVES     CN III, IV, VI   Pupils are equal, round, and reactive to light.       Significant Labs: All pertinent labs within the past 24 hours have been reviewed.    Significant Imaging: I have reviewed all pertinent imaging results/findings within the past 24 hours.

## 2024-05-22 NOTE — HPI
Betzy Cooley is an 83yoF with a hx of 3v CAD s/p PCI on 5/17/24, HTN, uncontrolled GERD/gastritis who presented to the hospital with an episode of chest discomfort. She was admitted from 5/15-5/18 with NSTEMI and had multiple stents placed. She was discharged home in stable condition on ASA, Brilinta and states she had no further chest pain. She states the night she presented, she awoke from sleep, sat up in bed, and started having a burning discomfort substernally. She states this feels different than her pressure-like pain she presented with for her NSTEMI. She states it lasted about 30 minutes. She took Tums and Maalox which she feels helped the pain some and it eventually resolved. She does not report any other symptoms or any recurrence. No pleuritic pain and no change with positioning. She does state she had a different type of dinner than usual. She had a spinach dish that had lots of seasoning she isn't used to and isn't sure if this is reflux related to GERD issues she has been having recently. She is following closely with GI and had modification of her GERD regimen on her recent discharge as well. Her regimen was decreased from a PPI and H2 blocker to just a PPI. On this presentation her troponin was 1.4 and downtrending. EKG similar to last admission (T wave inversions in precordium improved slightly), no ST segment elevation, and no MN segment elevation or depression. Interventional cardiology was consulted for evaluation of her chest discomfort.

## 2024-05-22 NOTE — ASSESSMENT & PLAN NOTE
-Pt. On protonix BID. Recent discahrge summary stated stop pepcid, unclear reasoning. Plan to resume dual thearpy with protonix and pepcid as recommended by pt. GI doctor. F/u with GI 5/24 (will need to reschedule if hospitalized)

## 2024-05-22 NOTE — HOSPITAL COURSE
Ms. Cooley was admitted to Hospital Medicine for management of chest pain.  Trop trended down.  She was evaluated by Gen Cards who suggested Metoprolol for NSTEMI medical management and Interventional Cards consult.  Int Cards did not feel like this was cardiac chest pain.  She was given Atarax with improvement in her BP and anxiety.  She was discharged home with PCP followup.    Ms. Betzy Cooley was deemed appropriate for discharge.  At the time of discharge, the plan of care was discussed with patient/family, who were agreeable and amenable.  All medications were verbally reviewed and discussed with the patient/family.  They endorsed understanding and compliance.  Informed them that these changes will be available on their discharge paperwork, as well.  Outpatient follow-ups were scheduled, or if unable to be scheduled ambulatory referrals were placed, and ER return precautions were given.  All questions were answered to the patient/family's satisfaction.  Ms. Betzy Cooley was subsequently discharged in stable condition.

## 2024-05-22 NOTE — PLAN OF CARE
05/22/24 0807   Readmission   Was this a planned readmission? No   Why were you hospitalized in the last 30 days? chest pain   Why were you readmitted? Alarmed about signs/symptoms   When you left the hospital how did you feel? fine   When you left the hospital where did you go? Home with Family   Did patient/caregiver refused recommended DC plan? Yes   When did you start not feeling well? in the middle of the night had gas pain in sternum area   Did you try to manage your symptoms your self? Yes   What did you do? tried to pass gas   Did you have  a follow-up appointment on discharge? Yes     Pt recently had stent placement surgery and was d/c home with family.  As per patient and spouse, pt had started experiencing gas pain in her sternum area and was concerned given her recent surgery.  Pt and spouse thought it would be best to come and get checked out in the ED      Cari Ballard CD, MSW, LMSW, RSW   Case Management  [facility]  Email: [email or web address]

## 2024-05-22 NOTE — ED PROVIDER NOTES
History:  Betzy Cooley is a 83 y.o. female who presents to the ED with Chest Pain (Midsternal CP x1 hour that woke her out of sleep, pain described as burning. 7 stents placed on Saturday) and Shortness of Breath    Described as 83-year-old female presenting to the emergency department with chest pain.  She had 5 stents placed on 05/17/2024 with three-vessel disease, poor candidate for CABG.  Tonight, she awoke from sleep with a burning sensation in her anterior chest, nonradiating, which resolved after a couple hours.  She has been compliant with all of her medications since her stent placement.  She denies any current chest pain, shortness of breath, fevers, chills.    Review of Systems: All systems reviewed and are negative except as per history of present illness.    Medications:   Previous Medications    ASPIRIN 81 MG CHEW    Take 81 mg by mouth once daily. Not chewable    ATORVASTATIN (LIPITOR) 40 MG TABLET    Take 1 tablet (40 mg total) by mouth every evening.    CALCIUM-VITAMIN D3 500 MG(1,250MG) -200 UNIT PER TABLET    Take 1 tablet by mouth 2 (two) times daily with meals.    CO-ENZYME Q-10 30 MG CAPSULE    Take 30 mg by mouth once daily.    DICLOFENAC SODIUM (VOLTAREN ARTHRITIS PAIN) 1 % GEL    Apply 2 g topically daily as needed (Pain).    MULTIVITAMIN (THERAGRAN) PER TABLET    Take 1 tablet by mouth once daily.    PANTOPRAZOLE (PROTONIX) 40 MG TABLET    Take 1 tablet (40 mg total) by mouth 2 (two) times daily.    SODIUM CHLORIDE (SALINE MIST NASL)    1 spray by Each Nostril route daily as needed (Congestion).    TICAGRELOR (BRILINTA) 90 MG TABLET    Take 1 tablet (90 mg total) by mouth 2 (two) times daily.    VITAMIN A-VITAMIN C-VIT E-MIN TAB    Take 1 tablet by mouth once daily.       PMH:   Past Medical History:   Diagnosis Date    Arthritis     Cataract     GERD (gastroesophageal reflux disease)     HEARING LOSS     Hypertension     Osteoporosis, postmenopausal     Squamous Cell Carcinoma 2007     right forearm    Urinary incontinence     rare mixed     PSH:   Past Surgical History:   Procedure Laterality Date    ANGIOGRAM, CORONARY, WITH LEFT HEART CATHETERIZATION Bilateral 5/17/2024    Procedure: Angiogram, Coronary, with Left Heart Cath;  Surgeon: Miguel Jacob MD;  Location: [facility] CATH LAB;  Service: Cardiology;  Laterality: Bilateral;    COLPOPEXY N/A 08/06/2019    Procedure: COLPOPEXY SSL FIXATION;  Surgeon: Alma Dorsey MD;  [facility];  Service: Urology;  Laterality: N/A;    CORONARY ANGIOGRAPHY Left 5/16/2024    Procedure: ANGIOGRAM, CORONARY ARTERY;  Surgeon: Miguel Jacob MD;  Location: [facility] CATH LAB;  Service: Cardiology;  Laterality: Left;    CYSTOSCOPY N/A 08/06/2019    Procedure: CYSTOSCOPY;  Surgeon: Alma Dorsey MD;  [facility];  Service: Urology;  Laterality: N/A;    ESOPHAGOGASTRODUODENOSCOPY N/A 4/1/2024    Procedure: EGD (ESOPHAGOGASTRODUODENOSCOPY);  Surgeon: Zbigniew Dougherty MD;  Location: [facility];  Service: Endoscopy;  Laterality: N/A;    FRACTURE SURGERY Left 2008    open radius/ulna fracture    HYSTERECTOMY      TANIA/ovaries remain--fibroids- in her late 30's / early 40's    IVUS, CORONARY  5/17/2024    Procedure: IVUS, Coronary;  Surgeon: Miguel Jacob MD;  Location: [facility] CATH LAB;  Service: Cardiology;;    STENT, DRUG ELUTING, MULTI VESSEL, CORONARY  5/17/2024    Procedure: Stent, Drug Eluting, Multi Vessel, Coronary;  Surgeon: Miguel Jacob MD;  Location: [facility] CATH LAB;  Service: Cardiology;;     Allergies: She is allergic to sulfa (sulfonamide antibiotics).  Social History: Marital Status: . She  reports that she has never smoked. She has never used smokeless tobacco.. She  reports that she does not currently use alcohol..       Exam:  VITAL SIGNS:   Vitals:    05/22/24 0459 05/22/24 0514 05/22/24 0529 05/22/24 0559   BP: 112/62 122/73 (!) 147/75 124/63   BP Location:    Right arm   Patient  Position:    Sitting   Pulse: 70 72 73 60   Resp:  (!) 25 19 19   Temp:    98 °F (36.7 °C)   TempSrc:    Oral   SpO2: 97% 97% 98% 97%     Const: Awake and alert, NAD   Head: Atraumatic  Eyes: Normal Conjunctiva  ENT: Normal External Ears, Nose and Mouth.  Neck: Full range of motion. No meningismus.  Resp: Normal respiratory effort, No distress, CTAB  Cardio: Equal and intact distal pulses, RRR  Abd: Soft, non tender, non distended.   Skin: No petechiae or rashes, healing scattered bruising including R wrist from cath access site.   Ext: No cyanosis, or edema  Neur: Awake and alert  Psych: Normal Mood and Affect    Data:  Results for orders placed or performed during the hospital encounter of 05/22/24   CBC auto differential   Result Value Ref Range    WBC 8.85 3.90 - 12.70 K/uL    RBC 3.66 (L) 4.00 - 5.40 M/uL    Hemoglobin 11.1 (L) 12.0 - 16.0 g/dL    Hematocrit 33.4 (L) 37.0 - 48.5 %    MCV 91 82 - 98 fL    MCH 30.3 27.0 - 31.0 pg    MCHC 33.2 32.0 - 36.0 g/dL    RDW 13.2 11.5 - 14.5 %    Platelets 288 150 - 450 K/uL    MPV 10.2 9.2 - 12.9 fL    Immature Granulocytes 0.3 0.0 - 0.5 %    Gran # (ANC) 6.0 1.8 - 7.7 K/uL    Immature Grans (Abs) 0.03 0.00 - 0.04 K/uL    Lymph # 1.3 1.0 - 4.8 K/uL    Mono # 1.0 0.3 - 1.0 K/uL    Eos # 0.5 0.0 - 0.5 K/uL    Baso # 0.07 0.00 - 0.20 K/uL    nRBC 0 0 /100 WBC    Gran % 67.5 38.0 - 73.0 %    Lymph % 15.1 (L) 18.0 - 48.0 %    Mono % 10.8 4.0 - 15.0 %    Eosinophil % 5.5 0.0 - 8.0 %    Basophil % 0.8 0.0 - 1.9 %    Differential Method Automated    Comprehensive metabolic panel   Result Value Ref Range    Sodium 141 136 - 145 mmol/L    Potassium 3.7 3.5 - 5.1 mmol/L    Chloride 105 95 - 110 mmol/L    CO2 24 23 - 29 mmol/L    Glucose 113 (H) 70 - 110 mg/dL    BUN 12 8 - 23 mg/dL    Creatinine 0.8 0.5 - 1.4 mg/dL    Calcium 9.8 8.7 - 10.5 mg/dL    Total Protein 7.2 6.0 - 8.4 g/dL    Albumin 3.6 3.5 - 5.2 g/dL    Total Bilirubin 0.4 0.1 - 1.0 mg/dL    Alkaline Phosphatase 76 55 -  135 U/L    AST 30 10 - 40 U/L    ALT 26 10 - 44 U/L    eGFR >60.0 >60 mL/min/1.73 m^2    Anion Gap 12 8 - 16 mmol/L   Troponin I #1   Result Value Ref Range    Troponin I 1.451 (H) 0.000 - 0.026 ng/mL   B-Type natriuretic peptide (BNP)   Result Value Ref Range    BNP 64 0 - 99 pg/mL     Imaging Results              X-Ray Chest AP Portable (Final result)  Result time 24 03:17:01      Final result by Goran James MD (24 03:17:01)                   Impression:      There is no radiographic evidence for acute intrathoracic process.      Electronically signed by: Goran James  Date:    2024  Time:    03:17               Narrative:    EXAMINATION:  XR CHEST AP PORTABLE    CLINICAL HISTORY:  Chest Pain;    TECHNIQUE:  Single frontal view of the chest was performed.    COMPARISON:  Chest radiograph May 15, 2024    FINDINGS:  Single portable chest view is submitted.  The cardiomediastinal silhouette appears stable.  Aortic atherosclerotic change noted.    There is no evidence for confluent infiltrate or consolidation, significant pleural effusion or pneumothorax.    The osseous structures appear intact, chronic change noted.                                    12-LEAD EKG INTERPRETATION BY ME:  Rate/Rhythm: Normal Sinus Rhythm with rate of 72 beats per minute  QRS, ST, T-waves: TWI inferior lateral leads, similar to prior  Impression: NSR, chronic TWI    Labs & Imaging studies were reviewed independently by me.     Medical Decision Makin-year-old female presenting to the emergency department with the acute onset of burning chest pain.  Given recent stent placement, concern for possible atypical ACS.  Troponin initially elevated at 1.45, likely secondary to her recent catheterization, considered ACS.  Cardiology called, recommend admission for observation with Hospital Medicine, trend of troponin.  Patient admitted to Hospital Medicine.  Patient remained asymptomatic throughout her stay in the  emergency room.    Clinical Impression:  1. Chest pain                 Ana Laura Clarke MD  05/22/24 0661

## 2024-05-22 NOTE — SUBJECTIVE & OBJECTIVE
Past Medical History:   Diagnosis Date    Arthritis     Cataract     GERD (gastroesophageal reflux disease)     HEARING LOSS     Hypertension     Osteoporosis, postmenopausal     Squamous Cell Carcinoma 2007    right forearm    Urinary incontinence     rare mixed       Past Surgical History:   Procedure Laterality Date    ANGIOGRAM, CORONARY, WITH LEFT HEART CATHETERIZATION Bilateral 5/17/2024    Procedure: Angiogram, Coronary, with Left Heart Cath;  Surgeon: Miguel Jacob MD;  Location: [facility] CATH LAB;  Service: Cardiology;  Laterality: Bilateral;    COLPOPEXY N/A 08/06/2019    Procedure: COLPOPEXY SSL FIXATION;  Surgeon: Alma Dorsey MD;  [address];  Service: Urology;  Laterality: N/A;    CORONARY ANGIOGRAPHY Left 5/16/2024    Procedure: ANGIOGRAM, CORONARY ARTERY;  Surgeon: Miguel Jacob MD;  Location: [facility] CATH LAB;  Service: Cardiology;  Laterality: Left;    CYSTOSCOPY N/A 08/06/2019    Procedure: CYSTOSCOPY;  Surgeon: Alma Dorsey MD;  [facility];  Service: Urology;  Laterality: N/A;    ESOPHAGOGASTRODUODENOSCOPY N/A 4/1/2024    Procedure: EGD (ESOPHAGOGASTRODUODENOSCOPY);  Surgeon: Zbigniew Dougherty MD;  Location: [facility];  Service: Endoscopy;  Laterality: N/A;    FRACTURE SURGERY Left 2008    open radius/ulna fracture    HYSTERECTOMY      TANIA/ovaries remain--fibroids- in her late 30's / early 40's    IVUS, CORONARY  5/17/2024    Procedure: IVUS, Coronary;  Surgeon: Miguel Jacob MD;  Location: [facility] CATH LAB;  Service: Cardiology;;    STENT, DRUG ELUTING, MULTI VESSEL, CORONARY  5/17/2024    Procedure: Stent, Drug Eluting, Multi Vessel, Coronary;  Surgeon: Miguel Jacob MD;  Location: [facility] CATH LAB;  Service: Cardiology;;       Review of patient's allergies indicates:   Allergen Reactions    Sulfa (sulfonamide antibiotics) Hives     Other reaction(s): Anaphylaxis  Itching   Shortness of breath        Current Facility-Administered  Medications on File Prior to Encounter   Medication    lactated ringers infusion     Current Outpatient Medications on File Prior to Encounter   Medication Sig    aspirin 81 MG Chew Take 81 mg by mouth once daily. Not chewable    atorvastatin (LIPITOR) 40 MG tablet Take 1 tablet (40 mg total) by mouth every evening.    calcium-vitamin D3 500 mg(1,250mg) -200 unit per tablet Take 1 tablet by mouth 2 (two) times daily with meals.    co-enzyme Q-10 30 mg capsule Take 30 mg by mouth once daily.    diclofenac sodium (VOLTAREN ARTHRITIS PAIN) 1 % Gel Apply 2 g topically daily as needed (Pain).    multivitamin (THERAGRAN) per tablet Take 1 tablet by mouth once daily.    pantoprazole (PROTONIX) 40 MG tablet Take 1 tablet (40 mg total) by mouth 2 (two) times daily.    sodium chloride (SALINE MIST NASL) 1 spray by Each Nostril route daily as needed (Congestion).    ticagrelor (BRILINTA) 90 mg tablet Take 1 tablet (90 mg total) by mouth 2 (two) times daily.    vitamin A-vitamin C-vit E-min Tab Take 1 tablet by mouth once daily.     Family History       Problem Relation (Age of Onset)    Arthritis Sister    COPD Mother, Father    Diabetes Sister    Heart disease Mother    Heart failure Mother    Macular degeneration Mother    No Known Problems Brother, Son, Daughter, Sister, Sister, Sister, Brother, Brother, Brother, Brother, Brother, Daughter, Son          Tobacco Use    Smoking status: Never    Smokeless tobacco: Never   Substance and Sexual Activity    Alcohol use: Not Currently     Comment: 1-2 glasses wine weekly    Drug use: No    Sexual activity: Yes     Partners: Male     Birth control/protection: None     Review of Systems   Constitutional: Negative for chills and fever.   Cardiovascular:  Negative for chest pain and dyspnea on exertion.   Respiratory:  Negative for cough and shortness of breath.    Musculoskeletal:  Negative for back pain and joint swelling.   Gastrointestinal:  Negative for abdominal pain and  constipation.   Genitourinary:  Negative for dysuria and hematuria.   Neurological:  Negative for focal weakness and headaches.   Psychiatric/Behavioral:  Negative for altered mental status. The patient is not nervous/anxious.      Objective:     Vital Signs (Most Recent):  Temp: 97.7 °F (36.5 °C) (05/22/24 0825)  Pulse: 69 (05/22/24 1100)  Resp: 16 (05/22/24 0825)  BP: (!) 142/71 (05/22/24 1100)  SpO2: 98 % (05/22/24 0825) Vital Signs (24h Range):  Temp:  [97.7 °F (36.5 °C)-98.1 °F (36.7 °C)] 97.7 °F (36.5 °C)  Pulse:  [59-86] 69  Resp:  [16-25] 16  SpO2:  [96 %-99 %] 98 %  BP: (102-171)/(57-89) 142/71     Weight: 55.3 kg (121 lb 14.6 oz)  Body mass index is 22.29 kg/m².    SpO2: 98 %         Intake/Output Summary (Last 24 hours) at 5/22/2024 1129  Last data filed at 5/22/2024 0800  Gross per 24 hour   Intake 480 ml   Output --   Net 480 ml       Lines/Drains/Airways       Peripheral Intravenous Line  Duration                  Peripheral IV - Single Lumen 05/22/24 0252 20 G;1 1/4 in Anterior;Left Forearm <1 day                     Physical Exam  Vitals reviewed.   Constitutional:       Appearance: Normal appearance. She is not ill-appearing.   HENT:      Head: Normocephalic and atraumatic.   Eyes:      Extraocular Movements: Extraocular movements intact.      Pupils: Pupils are equal, round, and reactive to light.   Cardiovascular:      Rate and Rhythm: Normal rate and regular rhythm.      Pulses: Normal pulses.      Heart sounds: Normal heart sounds.   Pulmonary:      Effort: Pulmonary effort is normal.      Breath sounds: Normal breath sounds. No rales.   Abdominal:      General: Bowel sounds are normal.      Palpations: Abdomen is soft.      Tenderness: There is no abdominal tenderness.   Musculoskeletal:         General: No tenderness. Normal range of motion.   Skin:     Findings: No lesion or rash.   Neurological:      General: No focal deficit present.      Mental Status: She is alert and oriented to person,  place, and time.      Motor: No weakness.          Significant Labs: All pertinent lab results from the last 24 hours have been reviewed.    Significant Imaging: All relevant imaging was reviewed

## 2024-05-22 NOTE — PLAN OF CARE
Pt discharged per MD orders.  Tele discontinued and returned to station.  IV discontinued; catheter tip intact.  Medication list and prescriptions reviewed; prescriptions sent to pt preferred pharmacy.  Pt verbalizes understanding of all written and verbal discharge instructions.  Pt awaiting family/escort arrival.  Will continue to monitor.     Problem: Adult Inpatient Plan of Care  Goal: Plan of Care Review  Outcome: Met  Goal: Patient-Specific Goal (Individualized)  Outcome: Met  Goal: Absence of Hospital-Acquired Illness or Injury  Outcome: Met  Goal: Optimal Comfort and Wellbeing  Outcome: Met  Goal: Readiness for Transition of Care  Outcome: Met

## 2024-05-22 NOTE — PLAN OF CARE
[facility] - Emergency Dept  Discharge Assessment    Primary Care Provider: Praful Paul MD     Please see complete initial assessment dated 05/17/2024.  Demographics have not changed; plan of care has not changed.    Pt to d/c home with no needs when ready    Discharge Assessment (most recent)       BRIEF DISCHARGE ASSESSMENT - 05/22/24 0806          Discharge Planning    Assessment Type Discharge Planning Brief Assessment (P)      Resource/Environmental Concerns none (P)      Support Systems Spouse/significant other;Family members (P)      Equipment Currently Used at Home none (P)      Current Living Arrangements home (P)      Patient/Family Anticipated Services at Transition none (P)      DME Needed Upon Discharge  none (P)      Discharge Plan A Home (P)      Discharge Plan B Home (P)                    Discharge Plan A and Plan B have been determined by review of patient's clinical status, future medical and therapeutic needs, and coverage/benefits for post-acute care in coordination with multidisciplinary team members.    Cari Ballard, HAILY, MSW, LMSW, RSW   Case Management  [facility]  Email: [email or web address]

## 2024-05-22 NOTE — CONSULTS
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     Inpatient consult to Interventional Cardiology  Consult performed by: Gillies, Connor M, DO  Consult ordered by: Andrea Schafer MD        Subjective:     Chief Complaint:  Chest discomfort     HPI:  Betzy Cooley is an 83yoF with a hx of 3v CAD s/p PCI on 5/17/24, HTN, uncontrolled GERD/gastritis who presented to the hospital with an episode of chest discomfort. She was admitted from 5/15-5/18 with NSTEMI and had multiple stents placed. She was discharged home in stable condition on ASA, Brilinta and states she had no further chest pain. She states the night she presented, she awoke from sleep, sat up in bed, and started having a burning discomfort substernally. She states this feels different than her pressure-like pain she presented with for her NSTEMI. She states it lasted about 30 minutes. She took Tums and Maalox which she feels helped the pain some and it eventually resolved. She does not report any other symptoms or any recurrence. No pleuritic pain and no change with positioning. She does state she had a different type of dinner than usual. She had a spinach dish that had lots of seasoning she isn't used to and isn't sure if this is reflux related to GERD issues she has been having recently. She is following closely with GI and had modification of her GERD regimen on her recent discharge as well. Her regimen was decreased from a PPI and H2 blocker to just a PPI. On this presentation her troponin was 1.4 and  downtrending. EKG similar to last admission (T wave inversions in precordium improved slightly), no ST segment elevation, and no IL segment elevation or depression. Interventional cardiology was consulted for evaluation of her chest discomfort.    Past Medical History:   Diagnosis Date    Arthritis     Cataract     GERD (gastroesophageal reflux disease)     HEARING LOSS     Hypertension     Osteoporosis, postmenopausal     Squamous Cell Carcinoma 2007    right forearm    Urinary incontinence     rare mixed       Past Surgical History:   Procedure Laterality Date    ANGIOGRAM, CORONARY, WITH LEFT HEART CATHETERIZATION Bilateral 5/17/2024    Procedure: Angiogram, Coronary, with Left Heart Cath;  Surgeon: Miguel Jacob MD;  Location: [facility] CATH LAB;  Service: Cardiology;  Laterality: Bilateral;    COLPOPEXY N/A 08/06/2019    Procedure: COLPOPEXY SSL FIXATION;  Surgeon: Alma Dorsey MD;  Location: [address];  Service: Urology;  Laterality: N/A;    CORONARY ANGIOGRAPHY Left 5/16/2024    Procedure: ANGIOGRAM, CORONARY ARTERY;  Surgeon: Miguel Jacob MD;  Location: [facility] CATH LAB;  Service: Cardiology;  Laterality: Left;    CYSTOSCOPY N/A 08/06/2019    Procedure: CYSTOSCOPY;  Surgeon: Alma Dorsey MD;  [facility];  Service: Urology;  Laterality: N/A;    ESOPHAGOGASTRODUODENOSCOPY N/A 4/1/2024    Procedure: EGD (ESOPHAGOGASTRODUODENOSCOPY);  Surgeon: Zbigniew Dougherty MD;  Location: [facility];  Service: Endoscopy;  Laterality: N/A;    FRACTURE SURGERY Left 2008    open radius/ulna fracture    HYSTERECTOMY      TANIA/ovaries remain--fibroids- in her late 30's / early 40's    IVUS, CORONARY  5/17/2024    Procedure: IVUS, Coronary;  Surgeon: Miguel Jacob MD;  Location: [facility] CATH LAB;  Service: Cardiology;;    STENT, DRUG ELUTING, MULTI VESSEL, CORONARY  5/17/2024    Procedure: Stent, Drug Eluting, Multi Vessel, Coronary;  Surgeon: Miguel Jacob MD;  Location: [facility]  CATH LAB;  Service: Cardiology;;       Review of patient's allergies indicates:   Allergen Reactions    Sulfa (sulfonamide antibiotics) Hives     Other reaction(s): Anaphylaxis  Itching   Shortness of breath        PTA Medications   Medication Sig    aspirin 81 MG Chew Take 81 mg by mouth once daily. Not chewable    atorvastatin (LIPITOR) 40 MG tablet Take 1 tablet (40 mg total) by mouth every evening.    calcium-vitamin D3 500 mg(1,250mg) -200 unit per tablet Take 1 tablet by mouth 2 (two) times daily with meals.    co-enzyme Q-10 30 mg capsule Take 30 mg by mouth once daily.    diclofenac sodium (VOLTAREN ARTHRITIS PAIN) 1 % Gel Apply 2 g topically daily as needed (Pain).    multivitamin (THERAGRAN) per tablet Take 1 tablet by mouth once daily.    pantoprazole (PROTONIX) 40 MG tablet Take 1 tablet (40 mg total) by mouth 2 (two) times daily.    sodium chloride (SALINE MIST NASL) 1 spray by Each Nostril route daily as needed (Congestion).    ticagrelor (BRILINTA) 90 mg tablet Take 1 tablet (90 mg total) by mouth 2 (two) times daily.    vitamin A-vitamin C-vit E-min Tab Take 1 tablet by mouth once daily.     Family History       Problem Relation (Age of Onset)    Arthritis Sister    COPD Mother, Father    Diabetes Sister    Heart disease Mother    Heart failure Mother    Macular degeneration Mother    No Known Problems Brother, Son, Daughter, Sister, Sister, Sister, Brother, Brother, Brother, Brother, Brother, Daughter, Son          Tobacco Use    Smoking status: Never    Smokeless tobacco: Never   Substance and Sexual Activity    Alcohol use: Not Currently     Comment: 1-2 glasses wine weekly    Drug use: No    Sexual activity: Yes     Partners: Male     Birth control/protection: None     Review of Systems   Constitutional: Negative for diaphoresis and fever.   Cardiovascular:  Negative for chest pain, dyspnea on exertion, leg swelling, near-syncope, orthopnea, palpitations, paroxysmal nocturnal dyspnea and syncope.    Respiratory:  Negative for cough and shortness of breath.    Gastrointestinal:  Negative for abdominal pain, diarrhea, nausea and vomiting.   Neurological:  Negative for light-headedness.   Psychiatric/Behavioral:  Negative for altered mental status and substance abuse.      Objective:     Vital Signs (Most Recent):  Temp: 97.2 °F (36.2 °C) (05/22/24 1143)  Pulse: 81 (05/22/24 1143)  Resp: 18 (05/22/24 1143)  BP: 133/78 (05/22/24 1143)  SpO2: 98 % (05/22/24 1143) Vital Signs (24h Range):  Temp:  [97.2 °F (36.2 °C)-98.1 °F (36.7 °C)] 97.2 °F (36.2 °C)  Pulse:  [59-86] 81  Resp:  [16-25] 18  SpO2:  [96 %-99 %] 98 %  BP: (102-171)/(57-89) 133/78     Weight: 55.3 kg (121 lb 14.6 oz)  Body mass index is 22.29 kg/m².    SpO2: 98 %         Intake/Output Summary (Last 24 hours) at 5/22/2024 1251  Last data filed at 5/22/2024 0800  Gross per 24 hour   Intake 480 ml   Output --   Net 480 ml       Lines/Drains/Airways       Peripheral Intravenous Line  Duration                  Peripheral IV - Single Lumen 05/22/24 0252 20 G;1 1/4 in Anterior;Left Forearm <1 day                     Physical Exam  Constitutional:       Appearance: Normal appearance.   Cardiovascular:      Rate and Rhythm: Normal rate and regular rhythm.      Pulses: Normal pulses.      Heart sounds: Normal heart sounds.   Pulmonary:      Effort: Pulmonary effort is normal.      Breath sounds: Normal breath sounds. No wheezing or rales.   Abdominal:      General: Abdomen is flat. There is no distension.      Palpations: Abdomen is soft.      Tenderness: There is no abdominal tenderness.   Musculoskeletal:      Right lower leg: No edema.      Left lower leg: No edema.   Skin:     General: Skin is warm and dry.   Neurological:      Mental Status: She is alert and oriented to person, place, and time. Mental status is at baseline.   Psychiatric:         Mood and Affect: Mood normal.         Behavior: Behavior normal.            Significant Labs: All pertinent lab  results from the last 24 hours have been reviewed.    Significant Imaging:  Reviewed  Assessment and Plan:     Cardiac/Vascular  Chest pain  83yoF with a hx of 3v CAD s/p PCI on 5/17/24, HTN, uncontrolled GERD/gastritis who presented to the hospital with an episode of chest discomfort and a troponin of 1.45 and downtrending. No STEMI on EKG, EKG similar to last week (TWI in precordium with improvements on this admission). Her chest discomfort was described as burning and not the same as her pressure type pain she presented with before. It improved significantly with Tums and Maalox and was not pleuritic or positional.     She does not have acute stent thrombus or she would have presented with a STEMI. She has uncontrolled GERD and has had recent changes to her reflux medications. Downtrending troponin likely secondary to recent extensive stenting.    Recommendations:  - Agree with medical optimization per cardiology recommendations  - Optimize from a GI standpoint as well. We favor GI symptoms over cardiac symptoms, although cannot rule out microvascular ischemic symptoms  - Continue DAPT  - Okay to discharge from our standpoint        VTE Risk Mitigation (From admission, onward)           Ordered     heparin (porcine) injection 5,000 Units  Every 8 hours         05/22/24 0600     IP VTE HIGH RISK PATIENT  Once         05/22/24 0600     Place sequential compression device  Until discontinued         05/22/24 0600                    Thank you for your consult. I will sign off. Please contact us if you have any additional questions.    Connor M Gillies, DO  Interventional Cardiology   [facility] - Cardiology Stepdown

## 2024-05-24 ENCOUNTER — OFFICE VISIT (OUTPATIENT)
Dept: GASTROENTEROLOGY | Facility: CLINIC | Age: 84
End: 2024-05-24
Payer: MEDICARE

## 2024-05-24 VITALS — BODY MASS INDEX: 21.37 KG/M2 | WEIGHT: 116.88 LBS

## 2024-05-24 DIAGNOSIS — K21.9 GASTROESOPHAGEAL REFLUX DISEASE, UNSPECIFIED WHETHER ESOPHAGITIS PRESENT: Primary | ICD-10-CM

## 2024-05-24 PROCEDURE — 1126F AMNT PAIN NOTED NONE PRSNT: CPT | Mod: HCNC,CPTII,S$GLB, | Performed by: INTERNAL MEDICINE

## 2024-05-24 PROCEDURE — 1111F DSCHRG MED/CURRENT MED MERGE: CPT | Mod: HCNC,CPTII,S$GLB, | Performed by: INTERNAL MEDICINE

## 2024-05-24 PROCEDURE — 99214 OFFICE O/P EST MOD 30 MIN: CPT | Mod: HCNC,S$GLB,, | Performed by: INTERNAL MEDICINE

## 2024-05-24 PROCEDURE — 1101F PT FALLS ASSESS-DOCD LE1/YR: CPT | Mod: HCNC,CPTII,S$GLB, | Performed by: INTERNAL MEDICINE

## 2024-05-24 PROCEDURE — 99999 PR PBB SHADOW E&M-EST. PATIENT-LVL III: CPT | Mod: PBBFAC,HCNC,, | Performed by: INTERNAL MEDICINE

## 2024-05-24 PROCEDURE — 1159F MED LIST DOCD IN RCRD: CPT | Mod: HCNC,CPTII,S$GLB, | Performed by: INTERNAL MEDICINE

## 2024-05-24 PROCEDURE — 3288F FALL RISK ASSESSMENT DOCD: CPT | Mod: HCNC,CPTII,S$GLB, | Performed by: INTERNAL MEDICINE

## 2024-05-24 NOTE — PROGRESS NOTES
She is Subjective:       Patient ID: Betzy Cooley is a 83 y.o. female.    Chief Complaint: Gastroesophageal Reflux    Patient here today to follow-up with aforementioned complaints.  She was previously seen by me in March with complaints of GERD.  At time the plan was to continue PPI b.i.d. as well as Pepcid for breakthrough.  She was also sent for EGD performed in April.  Exam was unremarkable.  Patient was been hospitalized twice this month for chest pain.  She initially attributed to reflux however it was worse than what she experienced in the past.  She presents the emergency department and was diagnosed with NSTEMI.  She subsequently underwent left heart catheterization with PCI.  She went back to the emergency department not long after discharge.  ACS was excluded she was ultimately discharged to follow-up with me.  Today patient reports doing fairly well.  She continues to take PPI b.i.d..  She rarely has to take Pepcid however she was most concerned about how to differentiate cardiogenic versus noncardiogenic chest pain.        Review of Systems   Cardiovascular:  Positive for chest pain.   Gastrointestinal:  Positive for abdominal distention and abdominal pain.         Patient is accompanied by her  who corroborates above history.    The following portions of the patient's history were reviewed and updated as appropriate: allergies, current medications, past family history, past medical history, past social history, past surgical history and problem list.    Objective:      Physical Exam  Constitutional:       Appearance: She is well-developed.   HENT:      Head: Normocephalic and atraumatic.   Eyes:      Conjunctiva/sclera: Conjunctivae normal.   Pulmonary:      Effort: Pulmonary effort is normal. No respiratory distress.   Musculoskeletal:      Cervical back: Normal range of motion.   Neurological:      Mental Status: She is alert and oriented to person, place, and time.   Psychiatric:          Behavior: Behavior normal.         Thought Content: Thought content normal.         Judgment: Judgment normal.           Pertinent labs and imaging studies reviewed    Assessment:       1. Gastroesophageal reflux disease, unspecified whether esophagitis present        Plan:       Continue current acid regimen  Discussed differences in angina presentation vs GERD. Still would recommend erring on the side of caution, particularly in the setting of recent PCI    (Portions of this note were dictated using voice recognition software and may contain dictation related errors in spelling/grammar/syntax not found on text review)

## 2024-05-27 ENCOUNTER — OFFICE VISIT (OUTPATIENT)
Dept: INTERNAL MEDICINE | Facility: CLINIC | Age: 84
End: 2024-05-27
Payer: MEDICARE

## 2024-05-27 VITALS
WEIGHT: 117.75 LBS | BODY MASS INDEX: 21.67 KG/M2 | SYSTOLIC BLOOD PRESSURE: 112 MMHG | HEART RATE: 76 BPM | OXYGEN SATURATION: 98 % | HEIGHT: 62 IN | DIASTOLIC BLOOD PRESSURE: 64 MMHG

## 2024-05-27 DIAGNOSIS — I25.112 CORONARY ARTERY DISEASE INVOLVING NATIVE HEART WITH REFRACTORY ANGINA PECTORIS, UNSPECIFIED VESSEL OR LESION TYPE: Primary | ICD-10-CM

## 2024-05-27 DIAGNOSIS — I10 HYPERTENSION, UNSPECIFIED TYPE: ICD-10-CM

## 2024-05-27 DIAGNOSIS — I21.4 NSTEMI (NON-ST ELEVATED MYOCARDIAL INFARCTION): ICD-10-CM

## 2024-05-27 PROCEDURE — 99214 OFFICE O/P EST MOD 30 MIN: CPT | Mod: HCNC,S$GLB,, | Performed by: INTERNAL MEDICINE

## 2024-05-27 PROCEDURE — 1111F DSCHRG MED/CURRENT MED MERGE: CPT | Mod: HCNC,CPTII,S$GLB, | Performed by: INTERNAL MEDICINE

## 2024-05-27 PROCEDURE — 1101F PT FALLS ASSESS-DOCD LE1/YR: CPT | Mod: HCNC,CPTII,S$GLB, | Performed by: INTERNAL MEDICINE

## 2024-05-27 PROCEDURE — 3074F SYST BP LT 130 MM HG: CPT | Mod: HCNC,CPTII,S$GLB, | Performed by: INTERNAL MEDICINE

## 2024-05-27 PROCEDURE — 1160F RVW MEDS BY RX/DR IN RCRD: CPT | Mod: HCNC,CPTII,S$GLB, | Performed by: INTERNAL MEDICINE

## 2024-05-27 PROCEDURE — 3288F FALL RISK ASSESSMENT DOCD: CPT | Mod: HCNC,CPTII,S$GLB, | Performed by: INTERNAL MEDICINE

## 2024-05-27 PROCEDURE — 1159F MED LIST DOCD IN RCRD: CPT | Mod: HCNC,CPTII,S$GLB, | Performed by: INTERNAL MEDICINE

## 2024-05-27 PROCEDURE — 99999 PR PBB SHADOW E&M-EST. PATIENT-LVL IV: CPT | Mod: PBBFAC,HCNC,, | Performed by: INTERNAL MEDICINE

## 2024-05-27 PROCEDURE — 3078F DIAST BP <80 MM HG: CPT | Mod: HCNC,CPTII,S$GLB, | Performed by: INTERNAL MEDICINE

## 2024-05-27 PROCEDURE — 1126F AMNT PAIN NOTED NONE PRSNT: CPT | Mod: HCNC,CPTII,S$GLB, | Performed by: INTERNAL MEDICINE

## 2024-05-27 RX ORDER — NITROGLYCERIN 0.4 MG/1
0.4 TABLET SUBLINGUAL EVERY 5 MIN PRN
Qty: 30 TABLET | Refills: 2 | Status: SHIPPED | OUTPATIENT
Start: 2024-05-27 | End: 2025-05-27

## 2024-05-27 NOTE — PROGRESS NOTES
Subjective:       Patient ID: Betzy Cooley is a 83 y.o. female.    Chief Complaint: Follow-up    Hospital follow-up for CAD and stent placement after non ST elevated MI. .  Patient was thinking she was having acid indigestion and reflux but went to the ER when the usual meds were not working.  She had positive troponins and was kept in evaluated and ended up receiving 7 stents.  She is on adjusted new medication is doing well.  No further pain.  She saw gastro who suggested continuing her PPI.    She denies any chest pains or shortness a breath.  Appetite is stable.    Follow-up  Pertinent negatives include no abdominal pain, arthralgias, chest pain, chills, coughing, fever, headaches, neck pain, rash or sore throat.     Review of Systems   Constitutional:  Negative for appetite change, chills and fever.   HENT:  Negative for nosebleeds and sore throat.    Eyes:  Negative for pain and visual disturbance.   Respiratory:  Negative for cough, shortness of breath and wheezing.    Cardiovascular:  Negative for chest pain and leg swelling.   Gastrointestinal:  Negative for abdominal pain, constipation and diarrhea.   Endocrine: Negative for polyuria.   Genitourinary:  Negative for difficulty urinating, hematuria and vaginal bleeding.   Musculoskeletal:  Negative for arthralgias, back pain, gait problem and neck pain.   Integumentary:  Negative for pallor and rash.   Neurological:  Negative for tremors, seizures and headaches.   Hematological:  Does not bruise/bleed easily.   Psychiatric/Behavioral:  Negative for dysphoric mood. The patient is not nervous/anxious.            Past Medical History:   Diagnosis Date    Arthritis     Cataract     GERD (gastroesophageal reflux disease)     HEARING LOSS     Hypertension     Osteoporosis, postmenopausal     Squamous Cell Carcinoma 2007    right forearm    Urinary incontinence     rare mixed     Past Surgical History:   Procedure Laterality Date    ANGIOGRAM, CORONARY, WITH  LEFT HEART CATHETERIZATION Bilateral 5/17/2024    Procedure: Angiogram, Coronary, with Left Heart Cath;  Surgeon: Miguel Jacob MD;  Location: [facility] CATH LAB;  Service: Cardiology;  Laterality: Bilateral;    COLPOPEXY N/A 08/06/2019    Procedure: COLPOPEXY SSL FIXATION;  Surgeon: Alma Dorsey MD;  [address];  Service: Urology;  Laterality: N/A;    CORONARY ANGIOGRAPHY Left 5/16/2024    Procedure: ANGIOGRAM, CORONARY ARTERY;  Surgeon: Miguel Jacob MD;  Location: [facility] CATH LAB;  Service: Cardiology;  Laterality: Left;    CYSTOSCOPY N/A 08/06/2019    Procedure: CYSTOSCOPY;  Surgeon: Alma Dorsey MD;  [address];  Service: Urology;  Laterality: N/A;    ESOPHAGOGASTRODUODENOSCOPY N/A 4/1/2024    Procedure: EGD (ESOPHAGOGASTRODUODENOSCOPY);  Surgeon: Zbigniew Dougherty MD;  Location: [facility];  Service: Endoscopy;  Laterality: N/A;    FRACTURE SURGERY Left 2008    open radius/ulna fracture    HYSTERECTOMY      TANIA/ovaries remain--fibroids- in her late 30's / early 40's    IVUS, CORONARY  5/17/2024    Procedure: IVUS, Coronary;  Surgeon: Miguel Jacob MD;  Location: [facility] CATH LAB;  Service: Cardiology;;    STENT, DRUG ELUTING, MULTI VESSEL, CORONARY  5/17/2024    Procedure: Stent, Drug Eluting, Multi Vessel, Coronary;  Surgeon: Miguel Jacob MD;  Location: [facility] CATH LAB;  Service: Cardiology;;      Patient Active Problem List   Diagnosis    Hearing loss, sensorineural    Nuclear sclerosis - Both Eyes    Cortical senile cataract - Both Eyes    Choroidal nevus - Both Eyes    Palpitations    Osteopenia    Encounter for screening colonoscopy    GERD (gastroesophageal reflux disease)    History of skin cancer    Tinnitus    Elevated blood pressure reading    Abnormal EKG    Posture imbalance    Shoulder weakness    Systolic murmur    Hypertension    Gastritis    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     Appearance: She is well-developed.   HENT:      Head: Normocephalic and atraumatic.      Right Ear: Tympanic membrane and external ear normal.      Left Ear: Tympanic membrane and external ear normal.      Mouth/Throat:      Pharynx: No oropharyngeal exudate or posterior oropharyngeal erythema.   Eyes:      General: No scleral icterus.     Conjunctiva/sclera: Conjunctivae normal.      Pupils: Pupils are equal, round, and reactive to light.   Neck:      Thyroid: No thyromegaly.   Cardiovascular:      Rate and Rhythm: Normal rate and regular rhythm.      Pulses: Normal pulses.      Heart sounds: No murmur heard.  Pulmonary:      Effort: Pulmonary effort is normal.      Breath sounds: Normal breath sounds. No wheezing.   Abdominal:      General: Bowel sounds are normal. There is no distension.      Palpations: Abdomen is soft.      Tenderness: There is no abdominal tenderness.   Musculoskeletal:         General: No tenderness.      Cervical back: Normal range of motion and neck supple.      Right lower leg: No edema.      Left lower leg: No edema.   Lymphadenopathy:      Cervical: No cervical adenopathy.   Skin:     Coloration: Skin is not jaundiced.      Findings: No rash.   Neurological:      General: No focal deficit present.      Mental Status: She is alert and oriented to person, place, and time.   Psychiatric:         Mood and Affect: Mood normal.         Behavior: Behavior normal.         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    Diagnoses and all orders for this visit:    Coronary artery disease involving  "native heart with refractory angina pectoris, unspecified vessel or lesion type    Hypertension, unspecified type    NSTEMI (non-ST elevated myocardial infarction)    Other orders  -     nitroGLYCERIN (NITROSTAT) 0.4 MG SL tablet; Place 1 tablet (0.4 mg total) under the tongue every 5 (five) minutes as needed for Chest pain.       Nitro p.r.n..    Continue meds              Portions of this note may have been created with voice recognition software. Occasional "wrong-word" or "sound-a-like" substitutions may have occurred due to the inherent limitations of voice recognition software. Please, read the note carefully and recognize, using context, where substitutions have occurred.  "

## 2024-05-28 ENCOUNTER — PATIENT MESSAGE (OUTPATIENT)
Dept: INTERNAL MEDICINE | Facility: CLINIC | Age: 84
End: 2024-05-28
Payer: MEDICARE

## 2024-05-29 ENCOUNTER — HOSPITAL ENCOUNTER (EMERGENCY)
Facility: HOSPITAL | Age: 84
Discharge: HOME | End: 2024-05-29
Attending: EMERGENCY MEDICINE
Payer: MEDICARE

## 2024-05-29 VITALS
DIASTOLIC BLOOD PRESSURE: 84 MMHG | WEIGHT: 116 LBS | HEIGHT: 62 IN | SYSTOLIC BLOOD PRESSURE: 165 MMHG | BODY MASS INDEX: 21.35 KG/M2 | OXYGEN SATURATION: 98 % | TEMPERATURE: 98 F | HEART RATE: 61 BPM | RESPIRATION RATE: 16 BRPM
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
## 2024-05-29 DIAGNOSIS — S70.01XA CONTUSION OF RIGHT HIP, INITIAL ENCOUNTER: Primary | ICD-10-CM

## 2024-05-29 DIAGNOSIS — M25.551 RIGHT HIP PAIN: ICD-10-CM

## 2024-05-29 DIAGNOSIS — S30.0XXA CONTUSION OF LOWER BACK, INITIAL ENCOUNTER: ICD-10-CM

## 2024-05-29 PROCEDURE — 99284 EMERGENCY DEPT VISIT MOD MDM: CPT | Mod: 25,HCNC

## 2024-05-29 PROCEDURE — 25000003 PHARM REV CODE 250: Mod: HCNC | Performed by: EMERGENCY MEDICINE

## 2024-05-29 RX ORDER — LIDOCAINE 50 MG/G
1 PATCH TOPICAL DAILY
Qty: 10 PATCH | Refills: 0 | Status: SHIPPED | OUTPATIENT
Start: 2024-05-29 | End: 2024-06-08

## 2024-05-29 RX ORDER — LIDOCAINE 50 MG/G
2 PATCH TOPICAL
Status: DISCONTINUED | OUTPATIENT
Start: 2024-05-29 | End: 2024-05-29 | Stop reason: HOSPADM

## 2024-05-29 RX ADMIN — LIDOCAINE 2 PATCH: 700 PATCH TOPICAL at 11:05

## 2024-05-29 NOTE — ED TRIAGE NOTES
Patient identifiers for Betzy Cooley 83 y.o. female checked and correct.  Chief Complaint   Patient presents with    Fall     Tripped and fell 2 d ago, r hip and back pain , no loc,      Past Medical History:   Diagnosis Date    Arthritis     Cataract     GERD (gastroesophageal reflux disease)     HEARING LOSS     Hypertension     Osteoporosis, postmenopausal     Squamous Cell Carcinoma 2007    right forearm    Urinary incontinence     rare mixed     Allergies reported:   Review of patient's allergies indicates:   Allergen Reactions    Sulfa (sulfonamide antibiotics) Hives     Other reaction(s): Anaphylaxis  Itching   Shortness of breath          LOC: Patient is awake, alert, and aware of environment with an appropriate affect. Patient is oriented x 4 and speaking appropriately.  APPEARANCE: Patient resting comfortably and in no acute distress. Patient is clean and well groomed, patient's clothing is properly fastened.  SKIN: The skin is warm and dry. Patient has normal skin turgor and moist mucus membranes.   MUSKULOSKELETAL: Patient is moving all extremities well, no obvious deformities noted. Pulses intact. Right hip pain and back pain  RESPIRATORY: Airway is open and patent. Respirations are spontaneous and non-labored with normal effort and rate.  CARDIAC: Patient has a normal rate and rhythm.. No peripheral edema noted.   ABDOMEN: No distention noted. Soft and non-tender upon palpation.  NEUROLOGICAL:  PERRL. Facial expression is symmetrical. Hand grasps are equal bilaterally. Normal sensation in all extremities when touched with finger.

## 2024-05-29 NOTE — ED PROVIDER NOTES
Encounter Date: 5/29/2024       History     Chief Complaint   Patient presents with    Fall     Tripped and fell 2 d ago, r hip and back pain , no loc,      HPI  Betzybrandy Cooley is an 83-year-old female with a history of arthritis, GERD, hypertension, osteoporosis, history of squamous cell carcinoma and urinary incontinence presenting with trip and fall 2 days ago with associated right hip and back pain.  There was no LOC or head injury.  Patient states that she slipped and caught her house slippers when she attempted to turn sideways and fell onto her right hip.  She is complaining of moderate to severe pain of her right hip and lower back.  She has tender along the right hip and has a contusion and bruise.  She has not tender along the midline spine.  Much of her pain is located to the right paravertebral region and right hip.  She is able to bear weight and ambulate.  She denies any neck pain, upper back pain, extremity pain or bony deformities.  There is no laceration or skin injury.  She is present with her .  Of note she is on Brilinta and aspirin.  She denies any bowel or bladder dysfunction, incontinence, weakness, paresthesias or paralysis.  Denies any recent fevers or chills.    Review of patient's allergies indicates:   Allergen Reactions    Sulfa (sulfonamide antibiotics) Hives     Other reaction(s): Anaphylaxis  Itching   Shortness of breath      Past Medical History:   Diagnosis Date    Arthritis     Cataract     GERD (gastroesophageal reflux disease)     HEARING LOSS     Hypertension     Osteoporosis, postmenopausal     Squamous Cell Carcinoma 2007    right forearm    Urinary incontinence     rare mixed     Past Surgical History:   Procedure Laterality Date    ANGIOGRAM, CORONARY, WITH LEFT HEART CATHETERIZATION Bilateral 5/17/2024    Procedure: Angiogram, Coronary, with Left Heart Cath;  Surgeon: Miguel Jacob MD;  Location: [facility] CATH LAB;  Service: Cardiology;  Laterality: Bilateral;     COLPOPEXY N/A 08/06/2019    Procedure: COLPOPEXY SSL FIXATION;  Surgeon: Alma Dorsey MD;  [address];  Service: Urology;  Laterality: N/A;    CORONARY ANGIOGRAPHY Left 5/16/2024    Procedure: ANGIOGRAM, CORONARY ARTERY;  Surgeon: Miguel Jacob MD;  Location: [facility] CATH LAB;  Service: Cardiology;  Laterality: Left;    CYSTOSCOPY N/A 08/06/2019    Procedure: CYSTOSCOPY;  Surgeon: Alma Dorsey MD;  [facility];  Service: Urology;  Laterality: N/A;    ESOPHAGOGASTRODUODENOSCOPY N/A 4/1/2024    Procedure: EGD (ESOPHAGOGASTRODUODENOSCOPY);  Surgeon: Zbigniew Dougherty MD;  Location: [facility];  Service: Endoscopy;  Laterality: N/A;    FRACTURE SURGERY Left 2008    open radius/ulna fracture    HYSTERECTOMY      TANIA/ovaries remain--fibroids- in her late 30's / early 40's    IVUS, CORONARY  5/17/2024    Procedure: IVUS, Coronary;  Surgeon: Miguel Jacob MD;  Location: [facility] CATH LAB;  Service: Cardiology;;    STENT, DRUG ELUTING, MULTI VESSEL, CORONARY  5/17/2024    Procedure: Stent, Drug Eluting, Multi Vessel, Coronary;  Surgeon: Miguel Jacob MD;  Location: [facility] CATH LAB;  Service: Cardiology;;     Family History   Problem Relation Name Age of Onset    Heart failure Mother copd     Macular degeneration Mother copd     Heart disease Mother copd     COPD Mother copd     Diabetes Sister      Arthritis Sister          rheumatoid arthritis    COPD Father      No Known Problems Brother      No Known Problems Son Saad     No Known Problems Daughter Jordyn     No Known Problems Sister      No Known Problems Sister      No Known Problems Sister      No Known Problems Brother      No Known Problems Brother      No Known Problems Brother      No Known Problems Brother      No Known Problems Brother      No Known Problems Daughter Reyes     No Known Problems Son Arie     Breast cancer Neg Hx      Ovarian cancer Neg Hx      Amblyopia Neg Hx      Blindness Neg Hx       Cancer Neg Hx      Cataracts Neg Hx      Glaucoma Neg Hx      Hypertension Neg Hx      Retinal detachment Neg Hx      Strabismus Neg Hx      Stroke Neg Hx      Thyroid disease Neg Hx      Cervical cancer Neg Hx      Endometrial cancer Neg Hx      Vaginal cancer Neg Hx      Colon cancer Neg Hx       Social History     Tobacco Use    Smoking status: Never    Smokeless tobacco: Never   Substance Use Topics    Alcohol use: Not Currently     Comment: 1-2 glasses wine weekly    Drug use: No     Review of Systems  All other systems reviewed and were negative; see HPI also for additional ROS.    Physical Exam     Initial Vitals [05/29/24 0926]   BP Pulse Resp Temp SpO2   139/69 70 20 98 °F (36.7 °C) 98 %      MAP       --         Physical Exam    Nursing note and vitals reviewed.      Gen/Constitutional: Interactive. No acute distress  Head: Normocephalic, Atraumatic  Neck: supple, no masses or LAD, no JVD  Eyes: PERRLA, conjunctiva clear  Ears, Nose and Throat: No rhinorrhea or stridor.  Cardiac:  Regular rate, Reg Rhythm, No murmur  Pulmonary: CTA Bilat, no wheezes, rhonchi, rales.  No increased work of breathing.  GI: Abdomen soft, non-tender, non-distended; no rebound or guarding  : No CVA tenderness.  Musculoskeletal: Extremities warm, well perfused, no erythema, no edema  Right hip:  Tender to palpation overlying the right hip, with bruising and contusion, no open fracture.  2+ distal pulses, sensory intact to light touch, able to bear weight.  Spine:  There is no midline spinal tenderness along the lumbar, thoracic or cervical spine, there is tenderness along the paravertebral musculature on the right side in the lumbar region.  5/5 strength upper and lower extremity; DTRs 2+  Skin: No rashes, cyanosis or jaundice.  Contusion and bruising to right hip and right lower back  Neuro: Alert and Oriented x 3; No focal motor or sensory deficits.    Psych: Normal affect      ED Course   Procedures  Labs Reviewed - No data  to display       Imaging Results              X-Ray Lumbar Spine Ap And Lateral (Final result)  Result time 05/29/24 13:26:27      Final result by Kenneth Dillon MD (05/29/24 13:26:27)                   Impression:      Question new mild height loss of the L1 vertebra when compared to recent CT of the abdomen and pelvis performed 05/06/2024. This may relate to recent inter current fracture. Clinical correlation recommended, with consideration of MRI for more precise characterization.      Electronically signed by: Kenneth Dillon  Date:    05/29/2024  Time:    13:26               Narrative:    EXAMINATION:  XR LUMBAR SPINE AP AND LATERAL    CLINICAL HISTORY:  low back pain after fall;    TECHNIQUE:  AP, lateral and spot images were performed of the lumbar spine.    COMPARISON:  None    FINDINGS:  Query osseous demineralization.    Five non-rib-bearing lumbar type vertebra.    Question new mild height loss of the L1 vertebra when compared to recent CT of the abdomen and pelvis performed 05/06/2024.  This may relate to recent inter current fracture.  Clinical correlation recommended, with consideration of MRI for more precise characterization.    No definite additional potential recent fractures noted elsewhere.  Multilevel degenerative endplate and facet sclerosis.  [place] convex dextrocurvature of the mid to lower lumbar spine. Grade 1 anterolisthesis of L4 on L5, as before.    No definite radiopaque foreign body.  Large volume colonic stool.  Phleboliths are seen within the pelvis.                                       X-Ray Hip 2 or 3 views Right with Pelvis when performed (Final result)  Result time 05/29/24 13:23:39      Final result by Kenneth Dillon MD (05/29/24 13:23:39)                   Impression:      Noting limitations above, no convincing evidence of acute displaced fracture or dislocation.      Electronically signed by: Kenneth Dillon  Date:    05/29/2024  Time:    13:23               Narrative:     EXAMINATION:  XR HIP WITH PELVIS WHEN PERFORMED 2 OR 3 VIEWS RIGHT    CLINICAL HISTORY:  Pain in right hip    TECHNIQUE:  AP view of the pelvis and frog leg lateral view of the right hip were performed.    COMPARISON:  None    FINDINGS:  Examination limited by patient body habitus and patient positioning.    Noting this, no definite evidence of acute displaced fracture or dislocation.    Degenerative findings are noted involving the spine, sacroiliac joints, whose is common hip joints.    Phleboliths appear to project within the pelvis.  Large volume colonic stool.    No definite radiopaque foreign body.                                    X-Rays:   Independently Interpreted Readings:   Other Readings:  X-ray right hip: no acute fracture or dislocation    Medications - No data to display    Medical Decision Making  Betzy Cooley is an 83-year-old female with a history of arthritis, GERD, hypertension, osteoporosis, history of squamous cell carcinoma and urinary incontinence presenting with trip and fall 2 days ago with associated right hip and back pain.     Differential diagnosis includes but not limited to:  Contusion, sprain, strain, fracture, dislocation    Amount and/or Complexity of Data Reviewed  External Data Reviewed: notes.  Labs: ordered.  Radiology: ordered and independent interpretation performed.    Risk  OTC drugs.  Prescription drug management.    Patient is currently afebrile vital signs are stable.  Physical exam findings shows bruising along the right hip extending to the right paravertebral region on the right lower back.  There is no midline spinal tenderness along the lumbar, thoracic or cervical spine.  There was no LOC or head injury.  Patient is on Brilinta and aspirin and will obtain x-ray of the right hip and lower spine to rule out acute process given history of osteoporosis.  Right hip x-ray negative for acute fracture or dislocation.  Patient was able to bear weight and ambulate  without difficulty.  She does have a bruise and contusion likely secondary to antiplatelet therapy.  X-ray of the spine shows some loss of height of L1 vertebrae however she has no point tenderness along the lumbar spine.  There is no weakness, deficits or paresthesias.  I recommend close outpatient follow-up with PCP for repeat imaging if her symptoms continue.  Treated with lidocaine and Tylenol with some improvement in symptoms and able to ambulate.  Strict ED precautions return instructions were discussed including signs and symptoms of cauda equina, diskitis, epidural abscess or any deficits. Patient agreeable to discharge plan. Strict ED precautions and return instructions discussed at length and patient verbalized understanding. All questions were answered and ample time was given for questions.                                      Clinical Impression:  Final diagnoses:  [M25.551] Right hip pain  [S70.01XA] Contusion of right hip, initial encounter (Primary)  [W19.XXXA] Fall, initial encounter  [S30.0XXA] Contusion of lower back, initial encounter          ED Disposition Condition    Discharge Stable          ED Prescriptions       Medication Sig Dispense Start Date End Date Auth. Provider    LIDOcaine (LIDODERM) 5 % Place 1 patch onto the skin once daily. Remove & Discard patch within 12 hours or as directed by MD for 10 days 10 patch 5/29/2024 6/8/2024 Sincere Crum DO          Follow-up Information       Follow up With Specialties Details Why Contact Info    Praful Paul MD Internal Medicine Schedule an appointment as soon as possible for a visit in 1 week As needed, If symptoms worsen [address] [facility] [address]  [phone]              Sincere Crum DO, [facility]  Emergency Staff Physician   Dept of Emergency Medicine   [facility]  Spectralink: [id number]        Disclaimer: This note has been generated using voice-recognition software. There may be typographical errors  that have been missed during proof-reading.       Sincere Crum, DO  05/30/24 0660

## 2024-05-29 NOTE — DISCHARGE INSTRUCTIONS
Today, your evaluation for your fall did not show any fractures of your hip or pelvis.  You have bruising on your hip and back.  Your x-ray of your back showed slight loss of height but no acute fracture.  We recommend following up with your PCP in 1 week for re-evaluation.    Our goal in the emergency department is to always give you outstanding care and exceptional service. You may receive a survey by mail or e-mail in the next week regarding your experience in our ED. We would greatly appreciate your completing and returning the survey. Your feedback provides us with a way to recognize our staff who give very good care and it helps us learn how to improve when your experience was below our aspiration of excellence.

## 2024-05-30 ENCOUNTER — TELEPHONE (OUTPATIENT)
Dept: INTERNAL MEDICINE | Facility: CLINIC | Age: 84
End: 2024-05-30
Payer: MEDICARE

## 2024-05-30 ENCOUNTER — PATIENT OUTREACH (OUTPATIENT)
Dept: EMERGENCY MEDICINE | Facility: HOSPITAL | Age: 84
End: 2024-05-30
Payer: MEDICARE

## 2024-05-30 NOTE — TELEPHONE ENCOUNTER
----- Message from Sabine Campa LPN sent at 5/30/2024 10:13 AM CDT -----  Please reach out to Pt regarding her recent ED visit and Rx for lidocaine patch. Pt was unable to pick them up. Thank you.

## 2024-05-30 NOTE — PROGRESS NOTES
ED Navigator f/u from her recent ED encounter. Pt states that he she is doing ok. She was unable to get her Lidocaine patches from the pharmacy. Message sent to PCP to assist. Pt verbalized understanding.  Pt encouraged to reach out as the need arises via [phone]. ED Navigator will assist as needed.

## 2024-05-31 ENCOUNTER — TELEPHONE (OUTPATIENT)
Dept: CARDIAC REHAB | Facility: CLINIC | Age: 84
End: 2024-05-31
Payer: MEDICARE

## 2024-06-05 ENCOUNTER — OFFICE VISIT (OUTPATIENT)
Dept: INTERNAL MEDICINE | Facility: CLINIC | Age: 84
End: 2024-06-05
Payer: MEDICARE

## 2024-06-05 VITALS
SYSTOLIC BLOOD PRESSURE: 135 MMHG | HEART RATE: 66 BPM | BODY MASS INDEX: 21.38 KG/M2 | WEIGHT: 116.19 LBS | HEIGHT: 62 IN | DIASTOLIC BLOOD PRESSURE: 80 MMHG | OXYGEN SATURATION: 98 %

## 2024-06-05 DIAGNOSIS — S70.01XA CONTUSION OF RIGHT HIP, INITIAL ENCOUNTER: ICD-10-CM

## 2024-06-05 DIAGNOSIS — M25.551 RIGHT HIP PAIN: Primary | ICD-10-CM

## 2024-06-05 DIAGNOSIS — K59.00 CONSTIPATION, UNSPECIFIED CONSTIPATION TYPE: ICD-10-CM

## 2024-06-05 PROCEDURE — 1126F AMNT PAIN NOTED NONE PRSNT: CPT | Mod: HCNC,CPTII,S$GLB, | Performed by: INTERNAL MEDICINE

## 2024-06-05 PROCEDURE — 1160F RVW MEDS BY RX/DR IN RCRD: CPT | Mod: HCNC,CPTII,S$GLB, | Performed by: INTERNAL MEDICINE

## 2024-06-05 PROCEDURE — 1159F MED LIST DOCD IN RCRD: CPT | Mod: HCNC,CPTII,S$GLB, | Performed by: INTERNAL MEDICINE

## 2024-06-05 PROCEDURE — 99214 OFFICE O/P EST MOD 30 MIN: CPT | Mod: HCNC,S$GLB,, | Performed by: INTERNAL MEDICINE

## 2024-06-05 PROCEDURE — 3079F DIAST BP 80-89 MM HG: CPT | Mod: HCNC,CPTII,S$GLB, | Performed by: INTERNAL MEDICINE

## 2024-06-05 PROCEDURE — 3288F FALL RISK ASSESSMENT DOCD: CPT | Mod: HCNC,CPTII,S$GLB, | Performed by: INTERNAL MEDICINE

## 2024-06-05 PROCEDURE — 1111F DSCHRG MED/CURRENT MED MERGE: CPT | Mod: HCNC,CPTII,S$GLB, | Performed by: INTERNAL MEDICINE

## 2024-06-05 PROCEDURE — 99999 PR PBB SHADOW E&M-EST. PATIENT-LVL V: CPT | Mod: PBBFAC,HCNC,, | Performed by: INTERNAL MEDICINE

## 2024-06-05 PROCEDURE — 1101F PT FALLS ASSESS-DOCD LE1/YR: CPT | Mod: HCNC,CPTII,S$GLB, | Performed by: INTERNAL MEDICINE

## 2024-06-05 PROCEDURE — 3075F SYST BP GE 130 - 139MM HG: CPT | Mod: HCNC,CPTII,S$GLB, | Performed by: INTERNAL MEDICINE

## 2024-06-05 NOTE — PROGRESS NOTES
Subjective:       Patient ID: Betzy Cooley is a 83 y.o. female.    Chief Complaint: Follow-up and Fall    Patient here with , in for CAD follow-up hand recent fall and constipation.  Patient had some stents placed and was recuperating but recently tripped on some old sandals fell on her buttock and right hip and had pain and contusion to the right hip area.  Possible small compression deformity at L1 the patient does not have pain there and would not be interested in a kyphoplasty procedure.  She would however like to see Physical therapy.  She is using heat.  She is occasionally using Voltaren gel   She is only taking acetaminophen but believes she is having some constipation from immobility and Tylenol.    She is using Metamucil and occasional milk of magnesia and is having a bowel movement every other day.  Doing well from a heart standpoint.  Taking her meds.  Not having any problems.    Follow-up  Associated symptoms include arthralgias (hip) and weakness. Pertinent negatives include no abdominal pain, chest pain, fever or numbness.   Fall  Pertinent negatives include no abdominal pain, bowel incontinence, fever, hematuria, numbness or tingling.   Back Pain  The current episode started in the past 7 days. The problem occurs constantly. The problem has been gradually improving since onset. The pain is present in the lumbar spine. The quality of the pain is described as aching. The pain does not radiate. The pain is at a severity of 7/10. The pain is severe. The pain is Worse during the day. The symptoms are aggravated by position. Stiffness is present All day. Associated symptoms include weakness and weight loss. Pertinent negatives include no abdominal pain, bladder incontinence, bowel incontinence, chest pain, dysuria, fever, leg pain, numbness, paresis, paresthesias, pelvic pain, perianal numbness or tingling. Risk factors include history of osteoporosis and recent trauma. The treatment provided  mild relief.     Review of Systems   Constitutional:  Positive for weight loss. Negative for fever.   Cardiovascular:  Negative for chest pain.   Gastrointestinal:  Negative for abdominal pain and bowel incontinence.   Genitourinary:  Negative for bladder incontinence, dysuria, hematuria and pelvic pain.   Musculoskeletal:  Positive for arthralgias (hip) and back pain. Negative for leg pain.   Neurological:  Positive for weakness. Negative for tingling, numbness and paresthesias.           Past Medical History:   Diagnosis Date    Arthritis     Cataract     GERD (gastroesophageal reflux disease)     HEARING LOSS     Hypertension     Osteoporosis, postmenopausal     Squamous Cell Carcinoma 2007    right forearm    Urinary incontinence     rare mixed     Past Surgical History:   Procedure Laterality Date    ANGIOGRAM, CORONARY, WITH LEFT HEART CATHETERIZATION Bilateral 5/17/2024    Procedure: Angiogram, Coronary, with Left Heart Cath;  Surgeon: Miguel Jacob MD;  Location: [facility] CATH LAB;  Service: Cardiology;  Laterality: Bilateral;    COLPOPEXY N/A 08/06/2019    Procedure: COLPOPEXY SSL FIXATION;  Surgeon: Alma Dorsey MD;  Location: [address];  Service: Urology;  Laterality: N/A;    CORONARY ANGIOGRAPHY Left 5/16/2024    Procedure: ANGIOGRAM, CORONARY ARTERY;  Surgeon: Miguel Jacob MD;  Location: [facility] CATH LAB;  Service: Cardiology;  Laterality: Left;    CYSTOSCOPY N/A 08/06/2019    Procedure: CYSTOSCOPY;  Surgeon: Alma Dorsey MD;  Location: [address];  Service: Urology;  Laterality: N/A;    ESOPHAGOGASTRODUODENOSCOPY N/A 4/1/2024    Procedure: EGD (ESOPHAGOGASTRODUODENOSCOPY);  Surgeon: Zbigniew Dougherty MD;  Location: [facility];  Service: Endoscopy;  Laterality: N/A;    FRACTURE SURGERY Left 2008    open radius/ulna fracture    HYSTERECTOMY      TANIA/ovaries remain--fibroids- in her late 30's / early 40's    IVUS, CORONARY  5/17/2024    Procedure: IVUS, Coronary;   Surgeon: Miguel Jacob MD;  Location: [facility] CATH LAB;  Service: Cardiology;;    STENT, DRUG ELUTING, MULTI VESSEL, CORONARY  5/17/2024    Procedure: Stent, Drug Eluting, Multi Vessel, Coronary;  Surgeon: Miguel Jacob MD;  Location: [facility] CATH LAB;  Service: Cardiology;;      Patient Active Problem List   Diagnosis    Hearing loss, sensorineural    Nuclear sclerosis - Both Eyes    Cortical senile cataract - Both Eyes    Choroidal nevus - Both Eyes    Palpitations    Osteopenia    Encounter for screening colonoscopy    GERD (gastroesophageal reflux disease)    History of skin cancer    Tinnitus    Elevated blood pressure reading    Abnormal EKG    Posture imbalance    Shoulder weakness    Systolic murmur    Hypertension    Gastritis    Coronary artery disease with refractory angina pectoris    NSTEMI (non-ST elevated myocardial infarction)    Chest pain    Elevated troponin    Anxiety        Objective:      Physical Exam  Constitutional:       Appearance: Normal appearance.   Cardiovascular:      Rate and Rhythm: Normal rate and regular rhythm.   Pulmonary:      Effort: Pulmonary effort is normal. No respiratory distress.      Breath sounds: No wheezing.   Abdominal:      General: Bowel sounds are normal. There is no distension.      Palpations: There is no mass.      Tenderness: There is no abdominal tenderness.   Musculoskeletal:         General: Tenderness (Left hip, probable hematoma, contusion.  Seems to be fading and tracking down the leg.) present.      Comments: Able to walk fairly steadily with a walker.   Neurological:      Mental Status: She is alert and oriented to person, place, and time.   Psychiatric:         Mood and Affect: Mood normal.         Thought Content: Thought content normal.         Assessment:       Problem List Items Addressed This Visit    None  Visit Diagnoses       Right hip pain    -  Primary    Relevant Orders    Ambulatory referral/consult to Physical/Occupational  "Therapy    Contusion of right hip, initial encounter        Relevant Orders    Ambulatory referral/consult to Physical/Occupational Therapy    Constipation, unspecified constipation type                Plan:         Betzy was seen today for follow-up and fall.    Diagnoses and all orders for this visit:    Right hip pain  -     Ambulatory referral/consult to Physical/Occupational Therapy; Future    Contusion of right hip, initial encounter  -     Ambulatory referral/consult to Physical/Occupational Therapy; Future    Constipation, unspecified constipation type                     Portions of this note may have been created with voice recognition software. Occasional "wrong-word" or "sound-a-like" substitutions may have occurred due to the inherent limitations of voice recognition software. Please, read the note carefully and recognize, using context, where substitutions have occurred.  Answers submitted by the patient for this visit:  Back Pain Questionnaire (Submitted on 5/30/2024)  Chief Complaint: Back pain  genital pain: No    "

## 2024-06-06 ENCOUNTER — CLINICAL SUPPORT (OUTPATIENT)
Dept: REHABILITATION | Facility: HOSPITAL | Age: 84
End: 2024-06-06
Attending: INTERNAL MEDICINE
Payer: MEDICARE

## 2024-06-06 DIAGNOSIS — R29.898 WEAKNESS OF RIGHT LOWER EXTREMITY: Primary | ICD-10-CM

## 2024-06-06 DIAGNOSIS — S70.01XA CONTUSION OF RIGHT HIP, INITIAL ENCOUNTER: ICD-10-CM

## 2024-06-06 DIAGNOSIS — M25.551 RIGHT HIP PAIN: ICD-10-CM

## 2024-06-06 DIAGNOSIS — M79.604 PAIN OF RIGHT LOWER EXTREMITY: ICD-10-CM

## 2024-06-06 PROCEDURE — 97110 THERAPEUTIC EXERCISES: CPT | Mod: HCNC

## 2024-06-06 PROCEDURE — 97161 PT EVAL LOW COMPLEX 20 MIN: CPT | Mod: HCNC

## 2024-06-06 NOTE — PLAN OF CARE
[facility]   Physical Therapy Initial Evaluation     Date: 6/6/2024   Name: Betzy Cooley  Clinic Number: [id number]    Therapy Diagnosis:   Encounter Diagnoses   Name Primary?    Right hip pain     Contusion of right hip, initial encounter     Weakness of right lower extremity Yes    Pain of right lower extremity      Physician: Praful Paul MD    Physician Orders: PT Eval and Treat   Medical Diagnosis from Referral:   M25.551 (ICD-10-CM) - Right hip pain   S70.01XA (ICD-10-CM) - Contusion of right hip, initial encounter   Evaluation Date: 6/6/2024  Authorization Period Expiration: TBD  Plan of Care Expiration: 8/6/2024  Progress Note Due: 7/6/2024  Visit # / Visits authorized: 1/1   FOTO: 1/5    FOTO Initial Evaluation: 33  FOTO 2nd F/U: NA  FOTO Discharge: NA    Precautions: Standard     Time In: 1000  Time Out: 1045  Total Appointment Time (timed & untimed codes): 45 minutes      SUBJECTIVE     Date of onset: 1 week ago    History of current condition - Betzy reports: she fell about a week ago and bruised her R side of her hip and lower leg. Patient reports her pain and bruising has improved since the fall. Patient reports when she gets up to walk her R leg will bother her. Patient reports she is now using a rollator for walking. Patient reports is relatively sedentary but is able to do house chores.     Falls: 1 week ago     Imaging, bone scan films:      Impression:     Noting limitations above, no convincing evidence of acute displaced fracture or dislocation.    Prior Therapy: None  Social History:  lives with their spouse  Occupation: Retired   Prior Level of Function: Sedentary - house chores   Current Level of Function: Sedentary - house chores     Pain:  Current 0/10, worst 6/10, best 0/10   Location: right hip, knee (lower leg)     Description: Aching, Dull, and stabbing   Aggravating Factors: walking and being on feet   Easing Factors: Resting, heating pad      Patients goals: to build up muscles to be able to do more.      Medical History:   Past Medical History:   Diagnosis Date    Arthritis     Cataract     GERD (gastroesophageal reflux disease)     HEARING LOSS     Hypertension     Osteoporosis, postmenopausal     Squamous Cell Carcinoma 2007    right forearm    Urinary incontinence     rare mixed       Surgical History:   Betzy Cooley  has a past surgical history that includes Fracture surgery (Left, 2008); Hysterectomy; Cystoscopy (N/A, 08/06/2019); Colpopexy (N/A, 08/06/2019); Esophagogastroduodenoscopy (N/A, 4/1/2024); Coronary angiography (Left, 5/16/2024); ANGIOGRAM, CORONARY, WITH LEFT HEART CATHETERIZATION (Bilateral, 5/17/2024); ivus, coronary (5/17/2024); and stent, drug eluting, multi vessel, coronary (5/17/2024).    Medications:   Betzy has a current medication list which includes the following prescription(s): aspirin, atorvastatin, calcium-vitamin d3, co-enzyme q-10, diclofenac sodium, hydroxyzine hcl, lidocaine, metoprolol succinate, multivitamin, nitroglycerin, pantoprazole, sodium chloride, ticagrelor, and vitamin a-vitamin c-vit e-min, and the following Facility-Administered Medications: lactated ringers.    Allergies:   Review of patient's allergies indicates:   Allergen Reactions    Sulfa (sulfonamide antibiotics) Hives     Other reaction(s): Anaphylaxis  Itching   Shortness of breath           OBJECTIVE     Hip Right Right Left Left Pain/Dysfunction with Movement    AROM MMT AROM MMT    Flexion WFL 4/5 WFL 4/5    Extension WFL 4/5 WFL 4/5    Abduction WFL 4/5 WFL 4/5    Adduction WFL 4/5 WFL 4/5       Knee Right Right Left Left Pain/Dysfunction with Movement    AROM MMT AROM MMT    Flexion WFL 4/5 WFL 4/5    Extension WFL 4/5 WFL 4+/5        Gait Analysis: ambulates with rollator and slow gait     30s sit to stand: 9 repetitions     TUG w/ rollator: 19 seconds         FOTO Hip Survey    Therapist reviewed FOTO scores for Betzy THOMAS  Yasir on 6/6/2024.   FOTO documents entered into [employer] - see Media section.    Intake Score: 33         TREATMENT     Total Treatment time (time-based codes) separate from Evaluation: 10 minutes      Betzy received the treatments listed below:      therapeutic exercises to develop strength, endurance, ROM, and flexibility for 10 minutes including:    Access Code: [email or web address]  URL: [email or web address]  Date: 06/06/2024  Prepared by: Dwain Shay    Exercises  - Seated March  - 1 x daily - 7 x weekly - 20 reps  - Seated Hip Abduction  - 1 x daily - 7 x weekly - 20 reps  - Seated Knee Flexion  - 1 x daily - 7 x weekly - 20 reps  - Seated Gluteal Sets  - 1 x daily - 7 x weekly - 20 reps  - Seated Hip Adduction Isometrics with Ball  - 1 x daily - 7 x weekly - 20 reps            PATIENT EDUCATION AND HOME EXERCISES     Education provided:   - Purpose of PT  - HEP    Written Home Exercises Provided: yes. Exercises were reviewed and Betzy was able to demonstrate them prior to the end of the session.  Betzy demonstrated good  understanding of the education provided. See EMR under Patient Instructions for exercises provided during therapy sessions.    ASSESSMENT     Betzy is a 83 y.o. female referred to outpatient Physical Therapy with a medical diagnosis of   M25.551 (ICD-10-CM) - Right hip pain   S70.01XA (ICD-10-CM) - Contusion of right hip, initial encounter     Patient presents with R lower leg pain from a fall a week ago. Patient has good range of motion of lower legs overall, but some weaknesses of lower legs are noted. Patient also tested as a fall risk today from objectives. Patient received home program to work on lower leg mobility and strength. Will progress as tolerated.     Patient prognosis is Fair.   Patient will benefit from skilled outpatient Physical Therapy to address the deficits stated above and in the chart below, provide patient /family education, and to maximize patientt's level of  independence.     Plan of care discussed with patient: Yes  Patient's spiritual, cultural and educational needs considered and patient is agreeable to the plan of care and goals as stated below:     Anticipated Barriers for therapy: Age     Medical Necessity is demonstrated by the following  History  Co-morbidities and personal factors that may impact the plan of care Co-morbidities:   HTN    Personal Factors:   no deficits     moderate   Examination  Body Structures and Functions, activity limitations and participation restrictions that may impact the plan of care Body Regions:   lower extremities    Body Systems:    ROM  strength    Participation Restrictions:   None    Activity limitations:   Learning and applying knowledge  no deficits    General Tasks and Commands  no deficits    Communication  no deficits    Mobility  no deficits    Self care  no deficits    Domestic Life  no deficits    Interactions/Relationships  no deficits    Life Areas  no deficits    Community and Social Life  no deficits         moderate   Clinical Presentation stable and uncomplicated low   Decision Making/ Complexity Score: low     Goals:  Short Term Goals (1 Weeks):   1. Pt will be compliant with HEP to supplement PT in restoring pain free function.    Long Term Goals (3 Weeks):  1. Pt will improve FOTO score to >/= 45 to decrease perceived limitation with mobility  2. Pt will improve TUG test to </= 13.5 sec to improve walking speed and LE strength for functional community ambulation  3. Pt will improve impaired LE MMTs by 1/2 grade to improve strength for functional tasks.  4. Pt will be able to walk without pain for better tolerance to daily tasks.       PLAN   Plan of care Certification: 6/6/2024 to 8/6/2024.    Outpatient Physical Therapy 1 times weekly for 4 weeks to include the following interventions: Gait Training, Manual Therapy, Moist Heat/ Ice, Neuromuscular Re-ed, Patient Education, Self Care, Therapeutic Activities, and  Therapeutic Exercise.     Dwain Shay, PT      I CERTIFY THE NEED FOR THESE SERVICES FURNISHED UNDER THIS PLAN OF TREATMENT AND WHILE UNDER MY CARE   Physician's comments:     Physician's Signature: ___________________________________________________

## 2024-06-10 ENCOUNTER — PATIENT MESSAGE (OUTPATIENT)
Dept: INTERNAL MEDICINE | Facility: CLINIC | Age: 84
End: 2024-06-10
Payer: MEDICARE

## 2024-06-10 ENCOUNTER — OFFICE VISIT (OUTPATIENT)
Dept: OPTOMETRY | Facility: CLINIC | Age: 84
End: 2024-06-10
Payer: COMMERCIAL

## 2024-06-10 DIAGNOSIS — H52.01 HYPEROPIA OF RIGHT EYE: ICD-10-CM

## 2024-06-10 DIAGNOSIS — H25.013 CORTICAL SENILE CATARACT, BILATERAL: ICD-10-CM

## 2024-06-10 DIAGNOSIS — H52.4 PRESBYOPIA OF BOTH EYES: ICD-10-CM

## 2024-06-10 DIAGNOSIS — H25.13 NUCLEAR SCLEROSIS, BILATERAL: ICD-10-CM

## 2024-06-10 DIAGNOSIS — D31.32 CHOROIDAL NEVUS OF LEFT EYE: ICD-10-CM

## 2024-06-10 DIAGNOSIS — H52.202 ASTIGMATISM OF LEFT EYE, UNSPECIFIED TYPE: ICD-10-CM

## 2024-06-10 DIAGNOSIS — Z13.5 SCREENING FOR EYE CONDITION: ICD-10-CM

## 2024-06-10 DIAGNOSIS — Z01.00 EXAMINATION OF EYES AND VISION: Primary | ICD-10-CM

## 2024-06-10 PROCEDURE — 92014 COMPRE OPH EXAM EST PT 1/>: CPT | Mod: HCNC,S$GLB,, | Performed by: OPTOMETRIST

## 2024-06-10 PROCEDURE — 92015 DETERMINE REFRACTIVE STATE: CPT | Mod: HCNC,S$GLB,, | Performed by: OPTOMETRIST

## 2024-06-10 PROCEDURE — 99999 PR PBB SHADOW E&M-EST. PATIENT-LVL III: CPT | Mod: PBBFAC,HCNC,, | Performed by: OPTOMETRIST

## 2024-06-10 NOTE — PROGRESS NOTES
"HPI    Patient's age: 82 y.o. WF  Occupation: Retired  Approximate date of last eye examination:  03/20/2023  Name of last eye doctor seen:    City/State: [facility]  Wears glasses? Yes     If yes, wears  Full-time or part-time?  Part-time  Present glasses are: Bifocal, SV Distance, SV Reading? Progressives  Approximate age of present glasses: 1 yrs old   Got new glasses following last exam, or subsequently?:  no    Any problem with VA with glasses?  No   Wears CLs?:  no  Headaches?  no  Eye pain/discomfort? Dryness OU                                                                    Flashes?  No  Floaters?  No  Diplopia/Double vision?  no  Patient's Ocular History:         Any eye surgeries? no         Any eye injury?  no         Any treatment for eye disease?  no  Family history of eye disease?  no  Significant patient medical history:         1. Diabetes?  no       If yes, IDDM or NIDDM? no   2. HBP?  no              3. Other (describe):  Acid reflux   ! OTC eyedrops currently using:  Systane prn    ! Prescription eye meds currently using:  no   ! Any history of allergy/adverse reaction to any eye meds used   previously?  no    ! Any history of allergy/adverse reaction to eyedrops used during prior   eye exam(s)? no    ! Any history of allergy/adverse reaction to Novacaine or similar meds?   no   ! Any history of allergy/adverse reaction to Epinephrine or similar meds?   no    ! Patient okay with use of anesthetic eyedrops to check eye pressure?    yes        ! Patient okay with use of eyedrops to dilate pupils today?  yes   !  Allergies/Medications/Medical History/Family History reviewed today?    yes      PD =   61/58  Desired reading distance =  14.5"                                                                Last edited by Madina Reyes on 6/10/2024  9:00 AM.            Assessment /Plan     For exam results, see Encounter Report.  1. Examination of eyes and vision        2. Nuclear sclerosis, " bilateral        3. Cortical senile cataract, bilateral        4. Choroidal nevus of left eye        5. Screening for eye condition        6. Hyperopia of right eye        7. Astigmatism of left eye, unspecified type        8. Presbyopia of both eyes                     Early nuclear sclerotic/peripheral cortical cataract in both eyes.   Still no compelling need for cataract surgery.  Continue to monitor.     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   Appears stable.  Monitor.     Vitreous floaters OD and OS.  No evidence of retinal etiology.      Otherwise, ocular health appears good in each eye.     Hyperopia in the right eye, and astigmatic refractive in the left eye.   Presbyopia consistent with age.  New spectacle lens Rx issued for use as desired.   Recheck in 12 months.   Mrs. Cooley is currently taking AREDS-2 antioxidant/vitamin supplement by mouth.  Continue to take as directed on package.

## 2024-06-10 NOTE — PATIENT INSTRUCTIONS
Early nuclear sclerotic/peripheral cortical cataract in both eyes.   Still no compelling need for cataract surgery.  Continue to monitor.     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   Appears stable.  Monitor.     Vitreous floaters OD and OS.  No evidence of retinal etiology.      Otherwise, ocular health appears good in each eye.     Hyperopia in the right eye, and astigmatic refractive in the left eye.   Presbyopia consistent with age.  New spectacle lens Rx issued for use as desired.   Recheck in 12 months.   Mrs. Cooley is currently taking AREDS-2 antioxidant/vitamin supplement by mouth.  Continue to take as directed on package.

## 2024-06-11 ENCOUNTER — PATIENT MESSAGE (OUTPATIENT)
Dept: INTERNAL MEDICINE | Facility: CLINIC | Age: 84
End: 2024-06-11
Payer: MEDICARE

## 2024-06-11 DIAGNOSIS — S70.01XA CONTUSION OF RIGHT HIP, INITIAL ENCOUNTER: Primary | ICD-10-CM

## 2024-06-11 RX ORDER — LIDOCAINE 50 MG/G
1 PATCH TOPICAL DAILY
Qty: 30 PATCH | Refills: 0 | Status: SHIPPED | OUTPATIENT
Start: 2024-06-11

## 2024-06-11 NOTE — TELEPHONE ENCOUNTER
No care due was identified.  [facility] Embedded Care Due Messages. Reference number: [id number].   6/11/2024 1:54:05 PM CDT

## 2024-06-12 ENCOUNTER — CLINICAL SUPPORT (OUTPATIENT)
Dept: REHABILITATION | Facility: HOSPITAL | Age: 84
End: 2024-06-12
Payer: MEDICARE

## 2024-06-12 DIAGNOSIS — R29.898 WEAKNESS OF RIGHT LOWER EXTREMITY: Primary | ICD-10-CM

## 2024-06-12 DIAGNOSIS — M79.604 PAIN OF RIGHT LOWER EXTREMITY: ICD-10-CM

## 2024-06-12 PROCEDURE — 97112 NEUROMUSCULAR REEDUCATION: CPT | Mod: HCNC

## 2024-06-12 PROCEDURE — 97110 THERAPEUTIC EXERCISES: CPT | Mod: HCNC

## 2024-06-12 NOTE — PROGRESS NOTES
[facility]   Physical Therapy Treatment Note      Name: Betzy Cooley  Clinic Number: [id number]    Therapy Diagnosis:   Encounter Diagnoses   Name Primary?    Weakness of right lower extremity Yes    Pain of right lower extremity      Physician: Praful Paul MD    Visit Date: 6/12/2024    Physician Orders: PT Eval and Treat   Medical Diagnosis from Referral:   M25.551 (ICD-10-CM) - Right hip pain   S70.01XA (ICD-10-CM) - Contusion of right hip, initial encounter   Evaluation Date: 6/6/2024  Authorization Period Expiration: TBD  Plan of Care Expiration: 8/6/2024  Progress Note Due: 7/6/2024  Visit # / Visits authorized: 1/1 , 1/10  FOTO: 2/5     FOTO Initial Evaluation: 33  FOTO 2nd F/U: NA  FOTO Discharge: NA     Precautions: Standard      Time In: 915  Time Out: 955  Total Appointment Time (timed & untimed codes): 40 minutes    PTA Visit #: 0/5       Subjective     Patient reports: she is sore today, but doing ok overall.  She was compliant with home exercise program.  Response to previous treatment: No adverse effects  Functional change: None    Pain: 3/10  Location: right hip and lower leg       Objective      Initial Eval    Hip Right Right Left Left Pain/Dysfunction with Movement     AROM MMT AROM MMT     Flexion WFL 4/5 WFL 4/5     Extension WFL 4/5 WFL 4/5     Abduction WFL 4/5 WFL 4/5     Adduction WFL 4/5 WFL 4/5        Knee Right Right Left Left Pain/Dysfunction with Movement     AROM MMT AROM MMT     Flexion WFL 4/5 WFL 4/5     Extension WFL 4/5 WFL 4+/5           Gait Analysis: ambulates with rollator and slow gait      30s sit to stand: 9 repetitions      TUG w/ rollator: 19 seconds     Treatment     Betzy received the treatments listed below:      therapeutic exercises to develop strength, endurance, and ROM for 20 minutes including:    Bike 7 min   Seated marches 2x10  Seated LAQ 3x10   - 2#       HEP  Access Code: [email or web address]  Exercises  - Seated March - 1 x daily  - 7 x weekly - 20 reps  - Seated Hip Abduction  - 1 x daily - 7 x weekly - 20 reps  - Seated Knee Flexion  - 1 x daily - 7 x weekly - 20 reps  - Seated Gluteal Sets  - 1 x daily - 7 x weekly - 20 reps  - Seated Hip Adduction Isometrics with Ball  - 1 x daily - 7 x weekly - 20 reps        neuromuscular re-education activities to improve: Coordination, Proprioception, and Motor Control for 20 minutes. The following activities were included:    Supine hip abd iso   - 20x for 5s   Supine hip add iso  - 20x for 5s   Gluteal iso  - 20x for 5s     therapeutic activities to improve functional performance for 0  minutes, including:        Patient Education and Home Exercises       Education provided:   - HEP    Written Home Exercises Provided: yes. Exercises were reviewed and Betzy was able to demonstrate them prior to the end of the session.  Betzy demonstrated good  understanding of the education provided. See Electronic Medical Record under Patient Instructions for exercises provided during therapy sessions    Assessment     Patient presents to PT with continued soreness on L hip and lower leg, but feeling better. Patient responded well from today without pain or adverse effects.     Betzy Is progressing well towards her goals.   Patient prognosis is Good.     Patient will continue to benefit from skilled outpatient physical therapy to address the deficits listed in the problem list box on initial evaluation, provide pt/family education and to maximize pt's level of independence in the home and community environment.     Patient's spiritual, cultural and educational needs considered and pt agreeable to plan of care and goals.     Anticipated barriers to physical therapy: Age    Goals:   Short Term Goals (1 Weeks):   1. Pt will be compliant with HEP to supplement PT in restoring pain free function.     Long Term Goals (3 Weeks):  1. Pt will improve FOTO score to >/= 45 to decrease perceived limitation with  mobility  2. Pt will improve TUG test to </= 13.5 sec to improve walking speed and LE strength for functional community ambulation  3. Pt will improve impaired LE MMTs by 1/2 grade to improve strength for functional tasks.  4. Pt will be able to walk without pain for better tolerance to daily tasks.         PLAN   Plan of care Certification: 6/6/2024 to 8/6/2024.     Outpatient Physical Therapy 1 times weekly for 4 weeks to include the following interventions: Gait Training, Manual Therapy, Moist Heat/ Ice, Neuromuscular Re-ed, Patient Education, Self Care, Therapeutic Activities, and Therapeutic Exercise.     Dwain Shay, PT

## 2024-06-17 ENCOUNTER — OFFICE VISIT (OUTPATIENT)
Dept: CARDIOLOGY | Facility: CLINIC | Age: 84
End: 2024-06-17
Payer: MEDICARE

## 2024-06-17 VITALS
HEIGHT: 62 IN | SYSTOLIC BLOOD PRESSURE: 152 MMHG | OXYGEN SATURATION: 99 % | DIASTOLIC BLOOD PRESSURE: 82 MMHG | BODY MASS INDEX: 20.85 KG/M2 | WEIGHT: 113.31 LBS | HEART RATE: 68 BPM

## 2024-06-17 DIAGNOSIS — I10 PRIMARY HYPERTENSION: ICD-10-CM

## 2024-06-17 DIAGNOSIS — Z98.61 CAD S/P PERCUTANEOUS CORONARY ANGIOPLASTY: Primary | ICD-10-CM

## 2024-06-17 DIAGNOSIS — E78.5 DYSLIPIDEMIA: ICD-10-CM

## 2024-06-17 DIAGNOSIS — I25.10 CAD S/P PERCUTANEOUS CORONARY ANGIOPLASTY: Primary | ICD-10-CM
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
## 2024-06-17 PROBLEM — R07.9 CHEST PAIN: Status: RESOLVED | Noted: 2024-05-22 | Resolved: 2024-06-17

## 2024-06-17 PROBLEM — I21.4 NSTEMI (NON-ST ELEVATED MYOCARDIAL INFARCTION): Status: RESOLVED | Noted: 2024-05-16 | Resolved: 2024-06-17

## 2024-06-17 PROCEDURE — 3079F DIAST BP 80-89 MM HG: CPT | Mod: HCNC,CPTII,S$GLB, | Performed by: INTERNAL MEDICINE

## 2024-06-17 PROCEDURE — 1160F RVW MEDS BY RX/DR IN RCRD: CPT | Mod: HCNC,CPTII,S$GLB, | Performed by: INTERNAL MEDICINE

## 2024-06-17 PROCEDURE — 99214 OFFICE O/P EST MOD 30 MIN: CPT | Mod: HCNC,S$GLB,, | Performed by: INTERNAL MEDICINE

## 2024-06-17 PROCEDURE — 3288F FALL RISK ASSESSMENT DOCD: CPT | Mod: HCNC,CPTII,S$GLB, | Performed by: INTERNAL MEDICINE

## 2024-06-17 PROCEDURE — 1111F DSCHRG MED/CURRENT MED MERGE: CPT | Mod: HCNC,CPTII,S$GLB, | Performed by: INTERNAL MEDICINE

## 2024-06-17 PROCEDURE — 3077F SYST BP >= 140 MM HG: CPT | Mod: HCNC,CPTII,S$GLB, | Performed by: INTERNAL MEDICINE

## 2024-06-17 PROCEDURE — 1126F AMNT PAIN NOTED NONE PRSNT: CPT | Mod: HCNC,CPTII,S$GLB, | Performed by: INTERNAL MEDICINE

## 2024-06-17 PROCEDURE — 99999 PR PBB SHADOW E&M-EST. PATIENT-LVL IV: CPT | Mod: PBBFAC,HCNC,, | Performed by: INTERNAL MEDICINE

## 2024-06-17 PROCEDURE — 1159F MED LIST DOCD IN RCRD: CPT | Mod: HCNC,CPTII,S$GLB, | Performed by: INTERNAL MEDICINE

## 2024-06-17 PROCEDURE — 1101F PT FALLS ASSESS-DOCD LE1/YR: CPT | Mod: HCNC,CPTII,S$GLB, | Performed by: INTERNAL MEDICINE

## 2024-06-17 RX ORDER — EZETIMIBE 10 MG/1
10 TABLET ORAL DAILY
Qty: 90 TABLET | Refills: 3 | Status: SHIPPED | OUTPATIENT
Start: 2024-06-17 | End: 2025-06-17

## 2024-06-17 NOTE — PROGRESS NOTES
PCP - Praful Paul MD    Subjective:   Betzy Cooley is an 83yoF with a hx of 3v CAD s/p PCI on 5/17/24 done after NSTEMI (was declined by CTS due to frailty), HTN, uncontrolled GERD/gastritis who presented to the clinic to establish care.    She has no more chest pain. No significant SOB. She had a mechanical fall two weeks ago when she slipped and hurt her R hip and back. Presented to the ED and xrays were unremarkable and she was discharged. Her BP is 152/82 but  says she has white coat syndrome. She also is in R hip pain since the recent fall. She is on ASA, ticagrelor and HI statin and LDL was 86 last.     Social Hx: lives with  in [place], never smoked, couple glasses of wine per month    Social History     Tobacco Use    Smoking status: Never    Smokeless tobacco: Never   Substance Use Topics    Alcohol use: Not Currently     Comment: 1-2 glasses wine weekly     Family History   Problem Relation Name Age of Onset    Heart failure Mother copd     Macular degeneration Mother copd     Heart disease Mother copd     COPD Mother copd     Diabetes Sister      Arthritis Sister          rheumatoid arthritis    COPD Father      No Known Problems Brother      No Known Problems Son Saad     No Known Problems Daughter Jordyn     No Known Problems Sister      No Known Problems Sister      No Known Problems Sister      No Known Problems Brother      No Known Problems Brother      No Known Problems Brother      No Known Problems Brother      No Known Problems Brother      No Known Problems Daughter Reyes     No Known Problems Son Arie     Breast cancer Neg Hx      Ovarian cancer Neg Hx      Amblyopia Neg Hx      Blindness Neg Hx      Cancer Neg Hx      Cataracts Neg Hx      Glaucoma Neg Hx      Hypertension Neg Hx      Retinal detachment Neg Hx      Strabismus Neg Hx      Stroke Neg Hx      Thyroid disease Neg Hx      Cervical cancer Neg Hx      Endometrial cancer Neg Hx      Vaginal cancer Neg  Hx      Colon cancer Neg Hx         Meds:     Review of patient's allergies indicates:   Allergen Reactions    Sulfa (sulfonamide antibiotics) Hives     Other reaction(s): Anaphylaxis  Itching   Shortness of breath        Current Outpatient Medications:     aspirin 81 MG Chew, Take 81 mg by mouth once daily. Not chewable, Disp: , Rfl:     atorvastatin (LIPITOR) 40 MG tablet, Take 1 tablet (40 mg total) by mouth every evening., Disp: 360 tablet, Rfl: 0    calcium-vitamin D3 500 mg(1,250mg) -200 unit per tablet, Take 1 tablet by mouth 2 (two) times daily with meals., Disp: , Rfl:     co-enzyme Q-10 30 mg capsule, Take 30 mg by mouth once daily., Disp: , Rfl:     diclofenac sodium (VOLTAREN ARTHRITIS PAIN) 1 % Gel, Apply 2 g topically daily as needed (Pain)., Disp: , Rfl:     hydrOXYzine HCL (ATARAX) 25 MG tablet, Take 1 tablet (25 mg total) by mouth 3 (three) times daily as needed for Anxiety., Disp: 30 tablet, Rfl: 0    LIDOcaine (LIDODERM) 5 %, Place 1 patch onto the skin once daily. Remove & Discard patch within 12 hours or as directed by MD, Disp: 30 patch, Rfl: 0    metoprolol succinate (TOPROL-XL) 25 MG 24 hr tablet, Take 0.5 tablets (12.5 mg total) by mouth once daily., Disp: 45 tablet, Rfl: 0    multivitamin (THERAGRAN) per tablet, Take 1 tablet by mouth once daily., Disp: , Rfl:     nitroGLYCERIN (NITROSTAT) 0.4 MG SL tablet, Place 1 tablet (0.4 mg total) under the tongue every 5 (five) minutes as needed for Chest pain., Disp: 30 tablet, Rfl: 2    pantoprazole (PROTONIX) 40 MG tablet, Take 1 tablet (40 mg total) by mouth 2 (two) times daily., Disp: 180 tablet, Rfl: 3    sodium chloride (SALINE MIST NASL), 1 spray by Each Nostril route daily as needed (Congestion)., Disp: , Rfl:     ticagrelor (BRILINTA) 90 mg tablet, Take 1 tablet (90 mg total) by mouth 2 (two) times daily., Disp: 60 tablet, Rfl: 11    vitamin A-vitamin C-vit E-min Tab, Take 1 tablet by mouth once daily., Disp: , Rfl:   No current  "facility-administered medications for this visit.    Facility-Administered Medications Ordered in Other Visits:     lactated ringers infusion, , Intravenous, Continuous, Zbigniew Dougherty MD    Review of Systems   Constitutional:  Negative for malaise/fatigue and weight loss.   Respiratory:  Negative for cough.    Cardiovascular:  Negative for chest pain, palpitations, orthopnea (2 pillows due to GERD), claudication and leg swelling.   Genitourinary:  Negative for dysuria.       Objective:   BP (!) 152/82   Pulse 68   Ht 5' 2" (1.575 m)   Wt 51.4 kg (113 lb 5.1 oz)   SpO2 99%   BMI 20.73 kg/m²   Physical Exam  Constitutional:       Appearance: She is ill-appearing.      Comments: With , with a walker   HENT:      Head: Normocephalic.   Eyes:      Pupils: Pupils are equal, round, and reactive to light.   Cardiovascular:      Rate and Rhythm: Normal rate and regular rhythm.      Comments: Pulse regular  Pulmonary:      Effort: Pulmonary effort is normal.   Musculoskeletal:      Right lower leg: No edema.      Left lower leg: No edema.   Neurological:      General: No focal deficit present.      Mental Status: She is alert.   Psychiatric:         Mood and Affect: Mood normal.       Labs:     Lab Results   Component Value Date     05/22/2024    K 3.7 05/22/2024     05/22/2024    CO2 24 05/22/2024    BUN 12 05/22/2024    CREATININE 0.8 05/22/2024    ANIONGAP 12 05/22/2024     Lab Results   Component Value Date    HGBA1C 5.5 05/15/2024     Lab Results   Component Value Date    BNP 64 05/22/2024     (H) 05/15/2024    BNP 21 10/27/2015       Lab Results   Component Value Date    WBC 8.85 05/22/2024    HGB 11.1 (L) 05/22/2024    HCT 33.4 (L) 05/22/2024     05/22/2024    GRAN 6.0 05/22/2024    GRAN 67.5 05/22/2024     Lab Results   Component Value Date    CHOL 155 05/15/2024    HDL 57 05/15/2024    LDLCALC 86.8 05/15/2024    TRIG 56 05/15/2024       Lab Results   Component Value " "Date     05/22/2024    K 3.7 05/22/2024     05/22/2024    CO2 24 05/22/2024    BUN 12 05/22/2024    CREATININE 0.8 05/22/2024    ANIONGAP 12 05/22/2024     Lab Results   Component Value Date    HGBA1C 5.5 05/15/2024     Lab Results   Component Value Date    BNP 64 05/22/2024     (H) 05/15/2024    BNP 21 10/27/2015       Vital Signs:   BP (!) 152/82   Pulse 68   Ht 5' 2" (1.575 m)   Wt 51.4 kg (113 lb 5.1 oz)   SpO2 99%   BMI 20.73 kg/m²   Lab Results   Component Value Date    WBC 8.85 05/22/2024    HGB 11.1 (L) 05/22/2024    HCT 33.4 (L) 05/22/2024     05/22/2024    GRAN 6.0 05/22/2024    GRAN 67.5 05/22/2024     Lab Results   Component Value Date    CHOL 155 05/15/2024    HDL 57 05/15/2024    LDLCALC 86.8 05/15/2024    TRIG 56 05/15/2024            Assessment & Plan:     1. CAD S/P percutaneous coronary angioplasty    2. Primary hypertension    3. Dyslipidemia    4. Fall, initial encounter      #NSTEMI s/p multivessel PCI  -PCI including for left main by Dr Alvarez on 5/17/24  -no more angina  -continue DAPT and HI statin along with toprol xl 12.5 daily    #HTN  -/82 today in the office however  says has white coat syndrome and also currently in pain from recent mechanical fall   -they go to [facility], will check BP there when not in pain and send log to me    #Mechanical fall  -had a slip and fall  -needs to slow down and use a walker or cane going forward, got a walker already    #Dyslipidemia  -LDL 86, goal < 55  -on lipitor 40 qd, adding zetia 10 qd    F/up in 6 months. will check BP there when not in pain and send log to me    Signed:  Quentin Schultz M.D.  Cardiovascular Fellow PGY-V  [facility]              "

## 2024-06-19 ENCOUNTER — CLINICAL SUPPORT (OUTPATIENT)
Dept: REHABILITATION | Facility: HOSPITAL | Age: 84
End: 2024-06-19
Payer: MEDICARE

## 2024-06-19 ENCOUNTER — PATIENT MESSAGE (OUTPATIENT)
Dept: CARDIOLOGY | Facility: CLINIC | Age: 84
End: 2024-06-19
Payer: MEDICARE

## 2024-06-19 DIAGNOSIS — M79.604 PAIN OF RIGHT LOWER EXTREMITY: ICD-10-CM

## 2024-06-19 DIAGNOSIS — R29.898 WEAKNESS OF RIGHT LOWER EXTREMITY: Primary | ICD-10-CM

## 2024-06-19 PROCEDURE — 97112 NEUROMUSCULAR REEDUCATION: CPT | Mod: HCNC

## 2024-06-19 PROCEDURE — 97110 THERAPEUTIC EXERCISES: CPT | Mod: HCNC

## 2024-06-19 NOTE — PROGRESS NOTES
[facility]   Physical Therapy Treatment Note      Name: Betzy Cooley  Clinic Number: [id number]    Therapy Diagnosis:   Encounter Diagnoses   Name Primary?    Weakness of right lower extremity Yes    Pain of right lower extremity        Physician: Praful Paul MD    Visit Date: 6/19/2024    Physician Orders: PT Eval and Treat   Medical Diagnosis from Referral:   M25.551 (ICD-10-CM) - Right hip pain   S70.01XA (ICD-10-CM) - Contusion of right hip, initial encounter   Evaluation Date: 6/6/2024  Authorization Period Expiration: 7/12/2024  Plan of Care Expiration: 8/6/2024  Progress Note Due: 7/6/2024  Visit # / Visits authorized: 1/1 , 2/10  FOTO: 3/5     FOTO Initial Evaluation: 33  FOTO 2nd F/U: NA  FOTO Discharge: NA     Precautions: Standard      Time In: 917  Time Out: 1000  Total Appointment Time (timed & untimed codes): 43 minutes    PTA Visit #: 0/5       Subjective     Patient reports: she had a rough day a few days ago with pain in her R hip and L leg. Patient feels better today.  She was compliant with home exercise program.  Response to previous treatment: No adverse effects  Functional change: None    Pain: 3/10  Location: right hip and lower leg       Objective      Initial Eval    Hip Right Right Left Left Pain/Dysfunction with Movement     AROM MMT AROM MMT     Flexion WFL 4/5 WFL 4/5     Extension WFL 4/5 WFL 4/5     Abduction WFL 4/5 WFL 4/5     Adduction WFL 4/5 WFL 4/5        Knee Right Right Left Left Pain/Dysfunction with Movement     AROM MMT AROM MMT     Flexion WFL 4/5 WFL 4/5     Extension WFL 4/5 WFL 4+/5           Gait Analysis: ambulates with rollator and slow gait      30s sit to stand: 9 repetitions      TUG w/ rollator: 19 seconds     Treatment     Betzy received the treatments listed below:      therapeutic exercises to develop strength, endurance, and ROM for 25 minutes including:    Bike 7 min   Seated marches 2x10  Seated LAQ 3x10   - 2#    Standing hip march 15x       HEP  Access Code: [email or web address]  Exercises  - Seated March  - 1 x daily - 7 x weekly - 20 reps  - Seated Hip Abduction  - 1 x daily - 7 x weekly - 20 reps  - Seated Knee Flexion  - 1 x daily - 7 x weekly - 20 reps  - Seated Gluteal Sets  - 1 x daily - 7 x weekly - 20 reps  - Seated Hip Adduction Isometrics with Ball  - 1 x daily - 7 x weekly - 20 reps        neuromuscular re-education activities to improve: Coordination, Proprioception, and Motor Control for 20 minutes. The following activities were included:    Supine hip abd iso   - 20x for 5s   Supine hip add iso  - 20x for 5s   Gluteal iso  - 20x for 5s     therapeutic activities to improve functional performance for 0  minutes, including:        Patient Education and Home Exercises       Education provided:   - HEP    Written Home Exercises Provided: yes. Exercises were reviewed and Betzy was able to demonstrate them prior to the end of the session.  Betzy demonstrated good  understanding of the education provided. See Electronic Medical Record under Patient Instructions for exercises provided during therapy sessions    Assessment     Patient presents to PT with continued soreness on L hip and lower leg, but feeling better. Patient responded well from today without pain or adverse effects. Patient takes increased time to perform majority of exercises.     Betzy Is progressing well towards her goals.   Patient prognosis is Good.     Patient will continue to benefit from skilled outpatient physical therapy to address the deficits listed in the problem list box on initial evaluation, provide pt/family education and to maximize pt's level of independence in the home and community environment.     Patient's spiritual, cultural and educational needs considered and pt agreeable to plan of care and goals.     Anticipated barriers to physical therapy: Age    Goals:   Short Term Goals (1 Weeks):   1. Pt will be compliant with HEP to  supplement PT in restoring pain free function.     Long Term Goals (3 Weeks):  1. Pt will improve FOTO score to >/= 45 to decrease perceived limitation with mobility  2. Pt will improve TUG test to </= 13.5 sec to improve walking speed and LE strength for functional community ambulation  3. Pt will improve impaired LE MMTs by 1/2 grade to improve strength for functional tasks.  4. Pt will be able to walk without pain for better tolerance to daily tasks.         PLAN   Plan of care Certification: 6/6/2024 to 8/6/2024.     Outpatient Physical Therapy 1 times weekly for 4 weeks to include the following interventions: Gait Training, Manual Therapy, Moist Heat/ Ice, Neuromuscular Re-ed, Patient Education, Self Care, Therapeutic Activities, and Therapeutic Exercise.     Dwain Shay, PT

## 2024-06-21 ENCOUNTER — PATIENT MESSAGE (OUTPATIENT)
Dept: CARDIOLOGY | Facility: CLINIC | Age: 84
End: 2024-06-21
Payer: MEDICARE

## 2024-06-22 ENCOUNTER — PATIENT MESSAGE (OUTPATIENT)
Dept: CARDIOLOGY | Facility: CLINIC | Age: 84
End: 2024-06-22
Payer: MEDICARE

## 2024-06-25 ENCOUNTER — OFFICE VISIT (OUTPATIENT)
Dept: URGENT CARE | Facility: CLINIC | Age: 84
End: 2024-06-25
Payer: MEDICARE

## 2024-06-25 ENCOUNTER — PATIENT MESSAGE (OUTPATIENT)
Dept: CARDIOLOGY | Facility: CLINIC | Age: 84
End: 2024-06-25
Payer: MEDICARE

## 2024-06-25 VITALS
RESPIRATION RATE: 17 BRPM | SYSTOLIC BLOOD PRESSURE: 154 MMHG | DIASTOLIC BLOOD PRESSURE: 90 MMHG | BODY MASS INDEX: 20.8 KG/M2 | WEIGHT: 113 LBS | HEART RATE: 68 BPM | OXYGEN SATURATION: 98 % | HEIGHT: 62 IN | TEMPERATURE: 98 F

## 2024-06-25 DIAGNOSIS — H61.22 LEFT EAR IMPACTED CERUMEN: Primary | ICD-10-CM

## 2024-06-25 RX ORDER — VALSARTAN 40 MG/1
40 TABLET ORAL DAILY
Qty: 90 TABLET | Refills: 3 | Status: SHIPPED | OUTPATIENT
Start: 2024-06-25 | End: 2025-06-25

## 2024-06-25 NOTE — TELEPHONE ENCOUNTER
Valsartan 40 mg daily started for multiple BP readings in 150s systolic  Will check and send me more BP readings once this medicine is started   Talked to  on the phone

## 2024-06-25 NOTE — PROGRESS NOTES
"Subjective:      Patient ID: Betzy Cooley is a 83 y.o. female.    Vitals:  height is 5' 2" (1.575 m) and weight is 51.3 kg (113 lb). Her oral temperature is 98.4 °F (36.9 °C). Her blood pressure is 154/90 (abnormal) and her pulse is 68. Her respiration is 17 and oxygen saturation is 98%.     Chief Complaint: Ear Fullness    This is a 83 y.o. female who presents today with a chief complaint of left ear fullness. Symptoms started this morning    Otalgia   There is pain in the left ear. This is a new problem. The current episode started today. The problem occurs constantly. The problem has been unchanged. The pain is at a severity of 6/10. Associated symptoms include hearing loss. Pertinent negatives include no abdominal pain, coughing, diarrhea, neck pain or rash.       Constitution: Negative for fever.   HENT:  Positive for hearing loss.    Neck: Negative for neck pain.   Respiratory:  Negative for cough.    Gastrointestinal:  Negative for abdominal pain and diarrhea.   Skin:  Negative for rash.      Objective:     Physical Exam   Constitutional: She is oriented to person, place, and time.  Non-toxic appearance. She does not appear ill. No distress.   HENT:   Head: Normocephalic.   Ears:   Right Ear: Tympanic membrane, external ear and ear canal normal.   Left Ear: impacted cerumen     Comments: Left TM noted to be clear after cerumen removal. Left ear canal WNL  Nose: Nose normal.   Mouth/Throat: Mucous membranes are moist.   Neck: Neck supple. No neck rigidity present.   Cardiovascular: Normal rate.   Pulmonary/Chest: Effort normal.   Abdominal: Normal appearance.   Musculoskeletal: Normal range of motion.         General: Normal range of motion.   Neurological: She is alert and oriented to person, place, and time.   Skin: Skin is warm, dry and not diaphoretic.   Psychiatric: Her behavior is normal. Mood normal.   Nursing note and vitals reviewed.      Assessment:     1. Left ear impacted cerumen        Plan: "       Left ear impacted cerumen  -     Ear wax removal  -     Ear Cerumen Removal

## 2024-06-26 ENCOUNTER — CLINICAL SUPPORT (OUTPATIENT)
Dept: REHABILITATION | Facility: HOSPITAL | Age: 84
End: 2024-06-26
Payer: MEDICARE

## 2024-06-26 DIAGNOSIS — M79.604 PAIN OF RIGHT LOWER EXTREMITY: ICD-10-CM

## 2024-06-26 DIAGNOSIS — R29.898 WEAKNESS OF RIGHT LOWER EXTREMITY: Primary | ICD-10-CM

## 2024-06-26 PROCEDURE — 97110 THERAPEUTIC EXERCISES: CPT | Mod: HCNC

## 2024-06-26 PROCEDURE — 97112 NEUROMUSCULAR REEDUCATION: CPT | Mod: HCNC

## 2024-06-26 PROCEDURE — 97530 THERAPEUTIC ACTIVITIES: CPT | Mod: HCNC

## 2024-06-26 NOTE — PROGRESS NOTES
[facility] [place] [facility]   Physical Therapy Treatment Note      Name: Betzy Cooley  Clinic Number: [id number]    Therapy Diagnosis:   Encounter Diagnoses   Name Primary?    Weakness of right lower extremity Yes    Pain of right lower extremity          Physician: Praful Paul MD    Visit Date: 6/26/2024    Physician Orders: PT Eval and Treat   Medical Diagnosis from Referral:   M25.551 (ICD-10-CM) - Right hip pain   S70.01XA (ICD-10-CM) - Contusion of right hip, initial encounter   Evaluation Date: 6/6/2024  Authorization Period Expiration: 7/12/2024  Plan of Care Expiration: 8/6/2024  Progress Note Due: 7/6/2024  Visit # / Visits authorized: 1/1 , 3/10  FOTO: 4/5     FOTO Initial Evaluation: 33  FOTO 2nd F/U: NA  FOTO Discharge: NA     Precautions: Standard      Time In: 915  Time Out: 1000  Total Appointment Time (timed & untimed codes): 45 minutes    PTA Visit #: 0/5       Subjective     Patient reports: Patient reports she wants to get rid of her walker        She was compliant with home exercise program.  Response to previous treatment: No adverse effects  Functional change: None    Pain: 3/10  Location: right hip and lower leg       Objective      Initial Eval    Hip Right Right Left Left Pain/Dysfunction with Movement     AROM MMT AROM MMT     Flexion WFL 4/5 WFL 4/5     Extension WFL 4/5 WFL 4/5     Abduction WFL 4/5 WFL 4/5     Adduction WFL 4/5 WFL 4/5        Knee Right Right Left Left Pain/Dysfunction with Movement     AROM MMT AROM MMT     Flexion WFL 4/5 WFL 4/5     Extension WFL 4/5 WFL 4+/5           Gait Analysis: ambulates with rollator and slow gait      30s sit to stand: 9 repetitions      TUG w/ rollator: 19 seconds     Treatment     Betzy received the treatments listed below:      therapeutic exercises to develop strength, endurance, and ROM for 15 minutes including:    Bike 7 min   Seated marches 2x10  Seated LAQ 3x10   - 2#   Standing hip march 15x       HEP  Access  Code: [email or web address]  Exercises  - Seated March  - 1 x daily - 7 x weekly - 20 reps  - Seated Hip Abduction  - 1 x daily - 7 x weekly - 20 reps  - Seated Knee Flexion  - 1 x daily - 7 x weekly - 20 reps  - Seated Gluteal Sets  - 1 x daily - 7 x weekly - 20 reps  - Seated Hip Adduction Isometrics with Ball  - 1 x daily - 7 x weekly - 20 reps        neuromuscular re-education activities to improve: Coordination, Proprioception, and Motor Control for 15 minutes. The following activities were included:    Supine hip abd iso   - 20x for 5s   Supine hip add iso  - 20x for 5s   Gluteal iso  - 20x for 5s     therapeutic activities to improve functional performance for 15 minutes, including:    Standing marching 3 min  Standing hip abd   - 2x10  Standing hip ext   - 2x10       Patient Education and Home Exercises       Education provided:   - HEP    Written Home Exercises Provided: yes. Exercises were reviewed and Betzy was able to demonstrate them prior to the end of the session.  Betzy demonstrated good  understanding of the education provided. See Electronic Medical Record under Patient Instructions for exercises provided during therapy sessions    Assessment     Patient presents to PT with continued soreness on L hip and lower leg, but feeling better. Patient responded well from today without pain or adverse effects. Patient takes increased time to perform majority of exercises. Patient was progressed with standing activities. Cont to progress to standing and walking without walker if safe    Betzy Is progressing well towards her goals.   Patient prognosis is Good.     Patient will continue to benefit from skilled outpatient physical therapy to address the deficits listed in the problem list box on initial evaluation, provide pt/family education and to maximize pt's level of independence in the home and community environment.     Patient's spiritual, cultural and educational needs considered and pt agreeable to plan  of care and goals.     Anticipated barriers to physical therapy: Age    Goals:   Short Term Goals (1 Weeks):   1. Pt will be compliant with HEP to supplement PT in restoring pain free function.     Long Term Goals (3 Weeks):  1. Pt will improve FOTO score to >/= 45 to decrease perceived limitation with mobility  2. Pt will improve TUG test to </= 13.5 sec to improve walking speed and LE strength for functional community ambulation  3. Pt will improve impaired LE MMTs by 1/2 grade to improve strength for functional tasks.  4. Pt will be able to walk without pain for better tolerance to daily tasks.         PLAN   Plan of care Certification: 6/6/2024 to 8/6/2024.     Outpatient Physical Therapy 1 times weekly for 4 weeks to include the following interventions: Gait Training, Manual Therapy, Moist Heat/ Ice, Neuromuscular Re-ed, Patient Education, Self Care, Therapeutic Activities, and Therapeutic Exercise.     Dwain Shay, PT

## 2024-06-27 ENCOUNTER — PATIENT MESSAGE (OUTPATIENT)
Dept: CARDIOLOGY | Facility: CLINIC | Age: 84
End: 2024-06-27
Payer: MEDICARE

## 2024-06-28 NOTE — PROCEDURES
"Ear Cerumen Removal    Date/Time: 6/25/2024 3:15 PM    Performed by: Marychuy Sotomayor FNP-BC  Authorized by: Marychuy Sotomayor FNP-BC    Time out: Immediately prior to procedure a "time out" was called to verify the correct patient, procedure, equipment, support staff and site/side marked as required.      Local anesthetic:  None  Medication Used:  Cerumenex  Location details:  Left ear  Procedure type: irrigation    Cerumen  Removal Results:  Cerumen completely removed  Patient tolerance:  Patient tolerated the procedure well with no immediate complications    "

## 2024-07-03 ENCOUNTER — CLINICAL SUPPORT (OUTPATIENT)
Dept: REHABILITATION | Facility: HOSPITAL | Age: 84
End: 2024-07-03
Payer: MEDICARE

## 2024-07-03 DIAGNOSIS — M79.604 PAIN OF RIGHT LOWER EXTREMITY: ICD-10-CM

## 2024-07-03 DIAGNOSIS — R29.898 WEAKNESS OF RIGHT LOWER EXTREMITY: Primary | ICD-10-CM

## 2024-07-03 PROCEDURE — 97112 NEUROMUSCULAR REEDUCATION: CPT | Mod: HCNC

## 2024-07-03 PROCEDURE — 97110 THERAPEUTIC EXERCISES: CPT | Mod: HCNC

## 2024-07-03 NOTE — PROGRESS NOTES
[facility]   Physical Therapy Treatment Note      Name: Betzy Cooley  Clinic Number: [id number]    Therapy Diagnosis:   Encounter Diagnoses   Name Primary?    Weakness of right lower extremity Yes    Pain of right lower extremity            Physician: Praful Paul MD    Visit Date: 7/3/2024    Physician Orders: PT Eval and Treat   Medical Diagnosis from Referral:   M25.551 (ICD-10-CM) - Right hip pain   S70.01XA (ICD-10-CM) - Contusion of right hip, initial encounter   Evaluation Date: 6/6/2024  Authorization Period Expiration: 7/12/2024  Plan of Care Expiration: 8/6/2024  Progress Note Due: 7/6/2024  Visit # / Visits authorized: 1/1 , 4/10  FOTO: 5/5     FOTO Initial Evaluation: 33  FOTO 2nd F/U: NA  FOTO Discharge: NA     Precautions: Standard      Time In: 1035  Time Out: 1120  Total Appointment Time (timed & untimed codes): 45 minutes    PTA Visit #: 0/5       Subjective     Patient reports: she is doing ok today. Patient has some back pain but is improving.       She was compliant with home exercise program.  Response to previous treatment: No adverse effects  Functional change: None    Pain: 3/10  Location: right hip and lower leg       Objective      Initial Eval    Hip Right Right Left Left Pain/Dysfunction with Movement     AROM MMT AROM MMT     Flexion WFL 4/5 WFL 4/5     Extension WFL 4/5 WFL 4/5     Abduction WFL 4/5 WFL 4/5     Adduction WFL 4/5 WFL 4/5        Knee Right Right Left Left Pain/Dysfunction with Movement     AROM MMT AROM MMT     Flexion WFL 4/5 WFL 4/5     Extension WFL 4/5 WFL 4+/5           Gait Analysis: ambulates with rollator and slow gait      30s sit to stand: 9 repetitions      TUG w/ rollator: 19 seconds     Treatment     Betzy received the treatments listed below:      therapeutic exercises to develop strength, endurance, and ROM for 30 minutes including:    Bike 7 min   Seated marches 2x10  Seated LAQ 3x10   - 2#   Standing hip march 15x    SLR 3x10  Bridging 4x5         HEP  Access Code: [email or web address]  Exercises  - Seated March  - 1 x daily - 7 x weekly - 20 reps  - Seated Hip Abduction  - 1 x daily - 7 x weekly - 20 reps  - Seated Knee Flexion  - 1 x daily - 7 x weekly - 20 reps  - Seated Gluteal Sets  - 1 x daily - 7 x weekly - 20 reps  - Seated Hip Adduction Isometrics with Ball  - 1 x daily - 7 x weekly - 20 reps        neuromuscular re-education activities to improve: Coordination, Proprioception, and Motor Control for 15 minutes. The following activities were included:    Supine hip abd iso   - 20x for 5s   Supine hip add iso  - 20x for 5s   Gluteal iso  - 20x for 5s     therapeutic activities to improve functional performance for 0 minutes, including:    Standing marching 3 min  Standing hip abd   - 2x10  Standing hip ext   - 2x10       Patient Education and Home Exercises       Education provided:   - HEP    Written Home Exercises Provided: yes. Exercises were reviewed and Betzy was able to demonstrate them prior to the end of the session.  Betzy demonstrated good  understanding of the education provided. See Electronic Medical Record under Patient Instructions for exercises provided during therapy sessions    Assessment     Patient presents to PT with continued soreness on L hip and lower leg, but feeling better. Patient responded well from today without pain or adverse effects. Patient takes increased time to perform majority of exercises. Patient was progressed with standing activities. Cont to progress to standing and walking without walker if safe    Betzy Is progressing well towards her goals.   Patient prognosis is Good.     Patient will continue to benefit from skilled outpatient physical therapy to address the deficits listed in the problem list box on initial evaluation, provide pt/family education and to maximize pt's level of independence in the home and community environment.     Patient's spiritual, cultural and educational  needs considered and pt agreeable to plan of care and goals.     Anticipated barriers to physical therapy: Age    Goals:   Short Term Goals (1 Weeks):   1. Pt will be compliant with HEP to supplement PT in restoring pain free function.     Long Term Goals (3 Weeks):  1. Pt will improve FOTO score to >/= 45 to decrease perceived limitation with mobility  2. Pt will improve TUG test to </= 13.5 sec to improve walking speed and LE strength for functional community ambulation  3. Pt will improve impaired LE MMTs by 1/2 grade to improve strength for functional tasks.  4. Pt will be able to walk without pain for better tolerance to daily tasks.         PLAN   Plan of care Certification: 6/6/2024 to 8/6/2024.     Outpatient Physical Therapy 1 times weekly for 4 weeks to include the following interventions: Gait Training, Manual Therapy, Moist Heat/ Ice, Neuromuscular Re-ed, Patient Education, Self Care, Therapeutic Activities, and Therapeutic Exercise.     Dwain Shay, PT

## 2024-07-10 ENCOUNTER — CLINICAL SUPPORT (OUTPATIENT)
Dept: REHABILITATION | Facility: HOSPITAL | Age: 84
End: 2024-07-10
Payer: MEDICARE

## 2024-07-10 ENCOUNTER — PATIENT MESSAGE (OUTPATIENT)
Dept: CARDIOLOGY | Facility: CLINIC | Age: 84
End: 2024-07-10
Payer: MEDICARE

## 2024-07-10 DIAGNOSIS — M79.604 PAIN OF RIGHT LOWER EXTREMITY: ICD-10-CM

## 2024-07-10 DIAGNOSIS — R29.898 WEAKNESS OF RIGHT LOWER EXTREMITY: Primary | ICD-10-CM

## 2024-07-10 PROCEDURE — 97112 NEUROMUSCULAR REEDUCATION: CPT | Mod: HCNC

## 2024-07-10 PROCEDURE — 97110 THERAPEUTIC EXERCISES: CPT | Mod: HCNC

## 2024-07-10 NOTE — PROGRESS NOTES
[facility]   Physical Therapy Treatment Note      Name: Betzy Cooley  Clinic Number: [id number]    Therapy Diagnosis:   Encounter Diagnoses   Name Primary?    Weakness of right lower extremity Yes    Pain of right lower extremity              Physician: Praful Paul MD    Visit Date: 7/10/2024    Physician Orders: PT Eval and Treat   Medical Diagnosis from Referral:   M25.551 (ICD-10-CM) - Right hip pain   S70.01XA (ICD-10-CM) - Contusion of right hip, initial encounter   Evaluation Date: 6/6/2024  Authorization Period Expiration: 7/12/2024  Plan of Care Expiration: 8/6/2024  Progress Note Due: 7/6/2024  Visit # / Visits authorized: 1/1 , 5/10  FOTO: 6     FOTO Initial Evaluation: 33  FOTO 2nd F/U: NA  FOTO Discharge: NA     Precautions: Standard      Time In: 1045  Time Out: 1130  Total Appointment Time (timed & untimed codes): 45 minutes    PTA Visit #: 0/5       Subjective     Patient reports: she was having sciatic like symptoms recently and doesn't know why. Patient reports she feels better today but her R leg was experiencing shooting pains the other day.       She was compliant with home exercise program.  Response to previous treatment: No adverse effects  Functional change: None    Pain: 3/10  Location: right hip and lower leg       Objective      Initial Eval    Hip Right Right Left Left Pain/Dysfunction with Movement     AROM MMT AROM MMT     Flexion WFL 4/5 WFL 4/5     Extension WFL 4/5 WFL 4/5     Abduction WFL 4/5 WFL 4/5     Adduction WFL 4/5 WFL 4/5        Knee Right Right Left Left Pain/Dysfunction with Movement     AROM MMT AROM MMT     Flexion WFL 4/5 WFL 4/5     Extension WFL 4/5 WFL 4+/5           Gait Analysis: ambulates with rollator and slow gait      30s sit to stand: 9 repetitions      TUG w/ rollator: 19 seconds     Treatment     Betzy received the treatments listed below:      therapeutic exercises to develop strength, endurance, and ROM for 20  minutes including:    Bike 7 min   LTR 15x  SAQ 3x10   Supine hip marching x15    Supine on heat      NOT TODAY   Seated marches 2x10  Seated LAQ 3x10   - 2#   Standing hip march 15x   SLR 3x10  Bridging 4x5         HEP  Access Code: [email or web address]  Exercises  - Seated March  - 1 x daily - 7 x weekly - 20 reps  - Seated Hip Abduction  - 1 x daily - 7 x weekly - 20 reps  - Seated Knee Flexion  - 1 x daily - 7 x weekly - 20 reps  - Seated Gluteal Sets  - 1 x daily - 7 x weekly - 20 reps  - Seated Hip Adduction Isometrics with Ball  - 1 x daily - 7 x weekly - 20 reps        neuromuscular re-education activities to improve: Coordination, Proprioception, and Motor Control for 20 minutes. The following activities were included:    Supine hip abd iso   - 20x for 5s   Supine hip add iso  - 20x for 5s   Gluteal iso  - 20x for 5s     therapeutic activities to improve functional performance for 0 minutes, including:    Standing marching 3 min  Standing hip abd   - 2x10  Standing hip ext   - 2x10       Patient Education and Home Exercises       Education provided:   - HEP    Written Home Exercises Provided: yes. Exercises were reviewed and Betzy was able to demonstrate them prior to the end of the session.  Betzy demonstrated good  understanding of the education provided. See Electronic Medical Record under Patient Instructions for exercises provided during therapy sessions    Assessment     Patient presents to PT with continued soreness on L hip and lower leg, but feeling better. Patient had some lower back tightness which was addressed today. Patient responded well from today without pain or adverse effects. Patient takes increased time to perform majority of exercises.  Cont to progress to standing and walking without walker if safe    Betzy Is progressing well towards her goals.   Patient prognosis is Good.     Patient will continue to benefit from skilled outpatient physical therapy to address the deficits listed in the  problem list box on initial evaluation, provide pt/family education and to maximize pt's level of independence in the home and community environment.     Patient's spiritual, cultural and educational needs considered and pt agreeable to plan of care and goals.     Anticipated barriers to physical therapy: Age    Goals:   Short Term Goals (1 Weeks):   1. Pt will be compliant with HEP to supplement PT in restoring pain free function.     Long Term Goals (3 Weeks):  1. Pt will improve FOTO score to >/= 45 to decrease perceived limitation with mobility  2. Pt will improve TUG test to </= 13.5 sec to improve walking speed and LE strength for functional community ambulation  3. Pt will improve impaired LE MMTs by 1/2 grade to improve strength for functional tasks.  4. Pt will be able to walk without pain for better tolerance to daily tasks.         PLAN   Plan of care Certification: 6/6/2024 to 8/6/2024.     Outpatient Physical Therapy 1 times weekly for 4 weeks to include the following interventions: Gait Training, Manual Therapy, Moist Heat/ Ice, Neuromuscular Re-ed, Patient Education, Self Care, Therapeutic Activities, and Therapeutic Exercise.     Dwain Shay, PT

## 2024-07-16 ENCOUNTER — PATIENT MESSAGE (OUTPATIENT)
Dept: CARDIOLOGY | Facility: CLINIC | Age: 84
End: 2024-07-16
Payer: MEDICARE

## 2024-07-17 ENCOUNTER — CLINICAL SUPPORT (OUTPATIENT)
Dept: REHABILITATION | Facility: HOSPITAL | Age: 84
End: 2024-07-17
Payer: MEDICARE

## 2024-07-17 DIAGNOSIS — R29.898 WEAKNESS OF RIGHT LOWER EXTREMITY: Primary | ICD-10-CM

## 2024-07-17 DIAGNOSIS — M79.604 PAIN OF RIGHT LOWER EXTREMITY: ICD-10-CM

## 2024-07-17 PROCEDURE — 97110 THERAPEUTIC EXERCISES: CPT | Mod: HCNC

## 2024-07-17 NOTE — PLAN OF CARE
[facility]   Physical Therapy Treatment Note / Discharge     Name: Betzy Cooley  Clinic Number: [id number]    Therapy Diagnosis:   Encounter Diagnoses   Name Primary?    Weakness of right lower extremity Yes    Pain of right lower extremity          Physician: Praful Paul MD    Visit Date: 7/17/2024    Physician Orders: PT Eval and Treat   Medical Diagnosis from Referral:   M25.551 (ICD-10-CM) - Right hip pain   S70.01XA (ICD-10-CM) - Contusion of right hip, initial encounter   Evaluation Date: 6/6/2024  Authorization Period Expiration: 7/12/2024  Plan of Care Expiration: 8/6/2024  Progress Note Due: 7/6/2024  Visit # / Visits authorized: 1/1 , 6/10  FOTO: 7     FOTO Initial Evaluation: 33  FOTO 2nd F/U: NA  FOTO Discharge: NA     Precautions: Standard      Time In: 1250  Time Out: 130  Total Appointment Time (timed & untimed codes): 40 minutes    PTA Visit #: 0/5       Subjective     Patient reports: she was hurting after last session and thinks the mat exercises bothered her.       She was compliant with home exercise program.  Response to previous treatment: No adverse effects  Functional change: None    Pain: 3/10  Location: right hip and lower leg       Objective      7/17/2024    Hip Right Right Left Left Pain/Dysfunction with Movement     AROM MMT AROM MMT     Flexion WFL 4/5 WFL 4/5     Extension WFL 4/5 WFL 4/5     Abduction WFL 4/5 WFL 4/5     Adduction WFL 4/5 WFL 4/5        Knee Right Right Left Left Pain/Dysfunction with Movement     AROM MMT AROM MMT     Flexion WFL 4/5 WFL 4/5     Extension WFL 4/5 WFL 4+/5           Gait Analysis: ambulates with rollator and slow gait      30s sit to stand: 8 repetitions      TUG w/ rollator: 20 seconds     Treatment     Betzy received the treatments listed below:      therapeutic exercises to develop strength, endurance, and ROM for 40 minutes including:    Bike 7 min   Seated Piriformis stretch 30s x 3   LTR 15x  SAQ 3x10    Supine hip marching x15  Seated marches 3 min   Seated LAQ 3 min    Supine hip flexor stretch 30s x 3         HEP  Access Code: [email or web address]  Exercises  - Seated March  - 1 x daily - 7 x weekly - 20 reps  - Seated Hip Abduction  - 1 x daily - 7 x weekly - 20 reps  - Seated Knee Flexion  - 1 x daily - 7 x weekly - 20 reps  - Seated Gluteal Sets  - 1 x daily - 7 x weekly - 20 reps  - Seated Hip Adduction Isometrics with Ball  - 1 x daily - 7 x weekly - 20 reps        neuromuscular re-education activities to improve: Coordination, Proprioception, and Motor Control for 0 minutes. The following activities were included:    Supine hip abd iso   - 20x for 5s   Supine hip add iso  - 20x for 5s   Gluteal iso  - 20x for 5s     therapeutic activities to improve functional performance for 0 minutes, including:    Standing marching 3 min  Standing hip abd   - 2x10  Standing hip ext   - 2x10       Patient Education and Home Exercises       Education provided:   - HEP    Written Home Exercises Provided: yes. Exercises were reviewed and Betzy was able to demonstrate them prior to the end of the session.  Betzy demonstrated good  understanding of the education provided. See Electronic Medical Record under Patient Instructions for exercises provided during therapy sessions    Assessment     Patient presents to PT with continued soreness on L hip and lower leg. Patient has appeared to plateau with improvements in symptoms as of recently. Patient is returning to her physician for follow for continued care. Patient will continue HEP at home and will call back to PT if needed. Patient will be discharged today.         Betzy Is progressing well towards her goals.   Patient prognosis is Good.     Patient will continue to benefit from skilled outpatient physical therapy to address the deficits listed in the problem list box on initial evaluation, provide pt/family education and to maximize pt's level of independence in the home and  community environment.     Patient's spiritual, cultural and educational needs considered and pt agreeable to plan of care and goals.     Anticipated barriers to physical therapy: Age    Goals:   Short Term Goals (1 Weeks):   1. Pt will be compliant with HEP to supplement PT in restoring pain free function. MET     Long Term Goals (3 Weeks):  1. Pt will improve FOTO score to >/= 45 to decrease perceived limitation with mobility NOT MET  2. Pt will improve TUG test to </= 13.5 sec to improve walking speed and LE strength for functional community ambulation NOT MET  3. Pt will improve impaired LE MMTs by 1/2 grade to improve strength for functional tasks. NOT MET  4. Pt will be able to walk without pain for better tolerance to daily tasks.  NOT MET         PLAN   Discharge from PT         Dwain Shay PT

## 2024-07-17 NOTE — PROGRESS NOTES
[facility]   Physical Therapy Treatment Note / Discharge     Name: Betzy Cooley  Clinic Number: [id number]    Therapy Diagnosis:   Encounter Diagnoses   Name Primary?    Weakness of right lower extremity Yes    Pain of right lower extremity          Physician: Praful Paul MD    Visit Date: 7/17/2024    Physician Orders: PT Eval and Treat   Medical Diagnosis from Referral:   M25.551 (ICD-10-CM) - Right hip pain   S70.01XA (ICD-10-CM) - Contusion of right hip, initial encounter   Evaluation Date: 6/6/2024  Authorization Period Expiration: 7/12/2024  Plan of Care Expiration: 8/6/2024  Progress Note Due: 7/6/2024  Visit # / Visits authorized: 1/1 , 6/10  FOTO: 7     FOTO Initial Evaluation: 33  FOTO 2nd F/U: NA  FOTO Discharge: NA     Precautions: Standard      Time In: 1250  Time Out: 130  Total Appointment Time (timed & untimed codes): 40 minutes    PTA Visit #: 0/5       Subjective     Patient reports: she was hurting after last session and thinks the mat exercises bothered her.       She was compliant with home exercise program.  Response to previous treatment: No adverse effects  Functional change: None    Pain: 3/10  Location: right hip and lower leg       Objective      7/17/2024    Hip Right Right Left Left Pain/Dysfunction with Movement     AROM MMT AROM MMT     Flexion WFL 4/5 WFL 4/5     Extension WFL 4/5 WFL 4/5     Abduction WFL 4/5 WFL 4/5     Adduction WFL 4/5 WFL 4/5        Knee Right Right Left Left Pain/Dysfunction with Movement     AROM MMT AROM MMT     Flexion WFL 4/5 WFL 4/5     Extension WFL 4/5 WFL 4+/5           Gait Analysis: ambulates with rollator and slow gait      30s sit to stand: 8 repetitions      TUG w/ rollator: 20 seconds     Treatment     Betzy received the treatments listed below:      therapeutic exercises to develop strength, endurance, and ROM for 40 minutes including:    Bike 7 min   Seated Piriformis stretch 30s x 3   LTR 15x  SAQ 3x10    Supine hip marching x15  Seated marches 3 min   Seated LAQ 3 min    Supine hip flexor stretch 30s x 3         HEP  Access Code: [email or web address]  Exercises  - Seated March  - 1 x daily - 7 x weekly - 20 reps  - Seated Hip Abduction  - 1 x daily - 7 x weekly - 20 reps  - Seated Knee Flexion  - 1 x daily - 7 x weekly - 20 reps  - Seated Gluteal Sets  - 1 x daily - 7 x weekly - 20 reps  - Seated Hip Adduction Isometrics with Ball  - 1 x daily - 7 x weekly - 20 reps        neuromuscular re-education activities to improve: Coordination, Proprioception, and Motor Control for 0 minutes. The following activities were included:    Supine hip abd iso   - 20x for 5s   Supine hip add iso  - 20x for 5s   Gluteal iso  - 20x for 5s     therapeutic activities to improve functional performance for 0 minutes, including:    Standing marching 3 min  Standing hip abd   - 2x10  Standing hip ext   - 2x10       Patient Education and Home Exercises       Education provided:   - HEP    Written Home Exercises Provided: yes. Exercises were reviewed and Betzy was able to demonstrate them prior to the end of the session.  Betzy demonstrated good  understanding of the education provided. See Electronic Medical Record under Patient Instructions for exercises provided during therapy sessions    Assessment     Patient presents to PT with continued soreness on L hip and lower leg. Patient has appeared to plateau with improvements in symptoms as of recently. Patient is returning to her physician for follow for continued care. Patient will continue HEP at home and will call back to PT if needed. Patient will be discharged today.         Betzy Is progressing well towards her goals.   Patient prognosis is Good.     Patient will continue to benefit from skilled outpatient physical therapy to address the deficits listed in the problem list box on initial evaluation, provide pt/family education and to maximize pt's level of independence in the home and  community environment.     Patient's spiritual, cultural and educational needs considered and pt agreeable to plan of care and goals.     Anticipated barriers to physical therapy: Age    Goals:   Short Term Goals (1 Weeks):   1. Pt will be compliant with HEP to supplement PT in restoring pain free function. MET     Long Term Goals (3 Weeks):  1. Pt will improve FOTO score to >/= 45 to decrease perceived limitation with mobility NOT MET  2. Pt will improve TUG test to </= 13.5 sec to improve walking speed and LE strength for functional community ambulation NOT MET  3. Pt will improve impaired LE MMTs by 1/2 grade to improve strength for functional tasks. NOT MET  4. Pt will be able to walk without pain for better tolerance to daily tasks.  NOT MET         PLAN   Discharge from PT         Dwain Shay PT

## 2024-07-29 ENCOUNTER — OFFICE VISIT (OUTPATIENT)
Dept: INTERNAL MEDICINE | Facility: CLINIC | Age: 84
End: 2024-07-29
Payer: MEDICARE

## 2024-07-29 VITALS
OXYGEN SATURATION: 97 % | WEIGHT: 110 LBS | DIASTOLIC BLOOD PRESSURE: 84 MMHG | HEART RATE: 77 BPM | SYSTOLIC BLOOD PRESSURE: 138 MMHG | HEIGHT: 62 IN | BODY MASS INDEX: 20.24 KG/M2

## 2024-07-29 DIAGNOSIS — E78.5 DYSLIPIDEMIA: ICD-10-CM

## 2024-07-29 DIAGNOSIS — I25.112 CORONARY ARTERY DISEASE INVOLVING NATIVE HEART WITH REFRACTORY ANGINA PECTORIS, UNSPECIFIED VESSEL OR LESION TYPE: ICD-10-CM

## 2024-07-29 DIAGNOSIS — K21.9 GASTROESOPHAGEAL REFLUX DISEASE, UNSPECIFIED WHETHER ESOPHAGITIS PRESENT: ICD-10-CM

## 2024-07-29 DIAGNOSIS — I25.10 CAD S/P PERCUTANEOUS CORONARY ANGIOPLASTY: ICD-10-CM

## 2024-07-29 DIAGNOSIS — I10 HYPERTENSION, UNSPECIFIED TYPE: ICD-10-CM

## 2024-07-29 DIAGNOSIS — Z98.61 CAD S/P PERCUTANEOUS CORONARY ANGIOPLASTY: ICD-10-CM

## 2024-07-29 DIAGNOSIS — Z00.00 ROUTINE PHYSICAL EXAMINATION: Primary | ICD-10-CM

## 2024-07-29 PROCEDURE — 3288F FALL RISK ASSESSMENT DOCD: CPT | Mod: HCNC,CPTII,S$GLB, | Performed by: INTERNAL MEDICINE

## 2024-07-29 PROCEDURE — 3075F SYST BP GE 130 - 139MM HG: CPT | Mod: HCNC,CPTII,S$GLB, | Performed by: INTERNAL MEDICINE

## 2024-07-29 PROCEDURE — 99397 PER PM REEVAL EST PAT 65+ YR: CPT | Mod: HCNC,S$GLB,, | Performed by: INTERNAL MEDICINE

## 2024-07-29 PROCEDURE — 99999 PR PBB SHADOW E&M-EST. PATIENT-LVL IV: CPT | Mod: PBBFAC,HCNC,, | Performed by: INTERNAL MEDICINE

## 2024-07-29 PROCEDURE — 1159F MED LIST DOCD IN RCRD: CPT | Mod: HCNC,CPTII,S$GLB, | Performed by: INTERNAL MEDICINE

## 2024-07-29 PROCEDURE — 1101F PT FALLS ASSESS-DOCD LE1/YR: CPT | Mod: HCNC,CPTII,S$GLB, | Performed by: INTERNAL MEDICINE

## 2024-07-29 PROCEDURE — 1160F RVW MEDS BY RX/DR IN RCRD: CPT | Mod: HCNC,CPTII,S$GLB, | Performed by: INTERNAL MEDICINE

## 2024-07-29 PROCEDURE — 3079F DIAST BP 80-89 MM HG: CPT | Mod: HCNC,CPTII,S$GLB, | Performed by: INTERNAL MEDICINE

## 2024-07-29 RX ORDER — HYDROXYZINE HYDROCHLORIDE 25 MG/1
25 TABLET, FILM COATED ORAL 3 TIMES DAILY PRN
Qty: 30 TABLET | Refills: 3 | Status: SHIPPED | OUTPATIENT
Start: 2024-07-29

## 2024-08-21 ENCOUNTER — HOSPITAL ENCOUNTER (OUTPATIENT)
Facility: HOSPITAL | Age: 84
Discharge: HOME | End: 2024-08-22
Attending: EMERGENCY MEDICINE | Admitting: HOSPITALIST
Payer: MEDICARE

## 2024-08-21 DIAGNOSIS — R07.9 CHEST PAIN: ICD-10-CM

## 2024-08-21 DIAGNOSIS — R13.10 DYSPHAGIA: ICD-10-CM

## 2024-08-21 DIAGNOSIS — S32.010A CLOSED COMPRESSION FRACTURE OF BODY OF L1 VERTEBRA: Primary | ICD-10-CM

## 2024-08-21 LAB
ALBUMIN SERPL BCP-MCNC: 3.7 G/DL (ref 3.5–5.2)
ALP SERPL-CCNC: 163 U/L (ref 55–135)
ALT SERPL W/O P-5'-P-CCNC: 22 U/L (ref 10–44)
ANION GAP SERPL CALC-SCNC: 10 MMOL/L (ref 8–16)
AST SERPL-CCNC: 30 U/L (ref 10–40)
BASOPHILS # BLD AUTO: 0.06 K/UL (ref 0–0.2)
BASOPHILS NFR BLD: 0.7 % (ref 0–1.9)
BILIRUB SERPL-MCNC: 0.4 MG/DL (ref 0.1–1)
BNP SERPL-MCNC: 41 PG/ML (ref 0–99)
BUN SERPL-MCNC: 12 MG/DL (ref 8–23)
CALCIUM SERPL-MCNC: 9.7 MG/DL (ref 8.7–10.5)
CHLORIDE SERPL-SCNC: 106 MMOL/L (ref 95–110)
CO2 SERPL-SCNC: 25 MMOL/L (ref 23–29)
CREAT SERPL-MCNC: 0.8 MG/DL (ref 0.5–1.4)
DIFFERENTIAL METHOD BLD: ABNORMAL
EOSINOPHIL # BLD AUTO: 0.2 K/UL (ref 0–0.5)
EOSINOPHIL NFR BLD: 2.1 % (ref 0–8)
ERYTHROCYTE [DISTWIDTH] IN BLOOD BY AUTOMATED COUNT: 13.8 % (ref 11.5–14.5)
EST. GFR  (NO RACE VARIABLE): >60 ML/MIN/1.73 M^2
GLUCOSE SERPL-MCNC: 133 MG/DL (ref 70–110)
HCT VFR BLD AUTO: 35 % (ref 37–48.5)
HGB BLD-MCNC: 10.8 G/DL (ref 12–16)
IMM GRANULOCYTES # BLD AUTO: 0.02 K/UL (ref 0–0.04)
IMM GRANULOCYTES NFR BLD AUTO: 0.2 % (ref 0–0.5)
LIPASE SERPL-CCNC: 14 U/L (ref 4–60)
LYMPHOCYTES # BLD AUTO: 2.1 K/UL (ref 1–4.8)
LYMPHOCYTES NFR BLD: 23.2 % (ref 18–48)
MCH RBC QN AUTO: 29.4 PG (ref 27–31)
MCHC RBC AUTO-ENTMCNC: 30.9 G/DL (ref 32–36)
MCV RBC AUTO: 95 FL (ref 82–98)
MONOCYTES # BLD AUTO: 0.9 K/UL (ref 0.3–1)
MONOCYTES NFR BLD: 9.7 % (ref 4–15)
NEUTROPHILS # BLD AUTO: 5.8 K/UL (ref 1.8–7.7)
NEUTROPHILS NFR BLD: 64.1 % (ref 38–73)
NRBC BLD-RTO: 0 /100 WBC
PLATELET # BLD AUTO: 341 K/UL (ref 150–450)
PMV BLD AUTO: 8.9 FL (ref 9.2–12.9)
POTASSIUM SERPL-SCNC: 3.7 MMOL/L (ref 3.5–5.1)
PROT SERPL-MCNC: 7.5 G/DL (ref 6–8.4)
RBC # BLD AUTO: 3.67 M/UL (ref 4–5.4)
SODIUM SERPL-SCNC: 141 MMOL/L (ref 136–145)
TROPONIN I SERPL DL<=0.01 NG/ML-MCNC: 0.01 NG/ML (ref 0–0.03)
WBC # BLD AUTO: 9.06 K/UL (ref 3.9–12.7)

## 2024-08-21 PROCEDURE — 93010 ELECTROCARDIOGRAM REPORT: CPT | Mod: HCNC,,, | Performed by: INTERNAL MEDICINE

## 2024-08-21 PROCEDURE — 25000003 PHARM REV CODE 250: Mod: HCNC | Performed by: PHYSICIAN ASSISTANT

## 2024-08-21 PROCEDURE — 84484 ASSAY OF TROPONIN QUANT: CPT | Mod: 91,HCNC | Performed by: PHYSICIAN ASSISTANT

## 2024-08-21 PROCEDURE — 83690 ASSAY OF LIPASE: CPT | Mod: HCNC | Performed by: PHYSICIAN ASSISTANT

## 2024-08-21 PROCEDURE — 83880 ASSAY OF NATRIURETIC PEPTIDE: CPT | Mod: HCNC | Performed by: PHYSICIAN ASSISTANT

## 2024-08-21 PROCEDURE — 80053 COMPREHEN METABOLIC PANEL: CPT | Mod: HCNC | Performed by: PHYSICIAN ASSISTANT

## 2024-08-21 PROCEDURE — 25500020 PHARM REV CODE 255: Mod: HCNC | Performed by: EMERGENCY MEDICINE

## 2024-08-21 PROCEDURE — 85025 COMPLETE CBC W/AUTO DIFF WBC: CPT | Mod: HCNC | Performed by: PHYSICIAN ASSISTANT

## 2024-08-21 PROCEDURE — 93005 ELECTROCARDIOGRAM TRACING: CPT | Mod: HCNC

## 2024-08-21 PROCEDURE — 99285 EMERGENCY DEPT VISIT HI MDM: CPT | Mod: 25,HCNC

## 2024-08-21 RX ORDER — LIDOCAINE HYDROCHLORIDE 20 MG/ML
15 SOLUTION OROPHARYNGEAL ONCE
Status: COMPLETED | OUTPATIENT
Start: 2024-08-21 | End: 2024-08-21

## 2024-08-21 RX ORDER — METRONIDAZOLE 500 MG/1
500 TABLET ORAL EVERY 12 HOURS
Qty: 28 TABLET | Refills: 0 | Status: SHIPPED | OUTPATIENT
Start: 2024-08-21 | End: 2024-08-21 | Stop reason: CLARIF

## 2024-08-21 RX ORDER — DOXYCYCLINE 100 MG/1
100 CAPSULE ORAL EVERY 12 HOURS
Qty: 28 CAPSULE | Refills: 0 | Status: SHIPPED | OUTPATIENT
Start: 2024-08-21 | End: 2024-08-21 | Stop reason: CLARIF

## 2024-08-21 RX ORDER — ALUMINUM HYDROXIDE, MAGNESIUM HYDROXIDE, AND SIMETHICONE 1200; 120; 1200 MG/30ML; MG/30ML; MG/30ML
30 SUSPENSION ORAL ONCE
Status: COMPLETED | OUTPATIENT
Start: 2024-08-21 | End: 2024-08-21

## 2024-08-21 RX ORDER — CEFPODOXIME PROXETIL 100 MG/1
200 TABLET, FILM COATED ORAL EVERY 12 HOURS
Qty: 28 TABLET | Refills: 0 | Status: SHIPPED | OUTPATIENT
Start: 2024-08-21 | End: 2024-08-21 | Stop reason: CLARIF

## 2024-08-21 RX ADMIN — LIDOCAINE HYDROCHLORIDE 15 ML: 20 SOLUTION ORAL at 09:08

## 2024-08-21 RX ADMIN — ALUMINUM HYDROXIDE, MAGNESIUM HYDROXIDE, AND SIMETHICONE 30 ML: 200; 200; 20 SUSPENSION ORAL at 09:08

## 2024-08-21 RX ADMIN — IOHEXOL 75 ML: 350 INJECTION, SOLUTION INTRAVENOUS at 11:08

## 2024-08-21 NOTE — FIRST PROVIDER EVALUATION
"Medical screening examination initiated.  I have conducted a focused provider triage encounter, findings are as follows:    Brief history of present illness:  has a constriction of the esophagus and food isn't going down and "it's choking me". States it has been going on since dinner. States every once in a while it will bother her then will go away, but not tonight. Had chicken strips and steamed rice. Able to swallow spit. No choking.     Vitals:    08/21/24 1847   BP: (!) 167/87   BP Location: Right arm   Pulse: 79   Resp: 19   Temp: 97.4 °F (36.3 °C)   TempSrc: Oral   SpO2: 97%   Weight: 49.4 kg (109 lb)   Height: 5' 2" (1.575 m)       Pertinent physical exam:  airway intact, tolerating secretions    Brief workup plan:  deferred    Preliminary workup initiated; this workup will be continued and followed by the physician or advanced practice provider that is assigned to the patient when roomed.  "

## 2024-08-21 NOTE — Clinical Note
Diagnosis: Closed compression fracture of body of L1 vertebra [[id number]]   Future Attending Provider: DAVID SANTANA [[id number]]   Is the patient being sent to ED Observation?: No

## 2024-08-22 VITALS
HEIGHT: 62 IN | TEMPERATURE: 98 F | DIASTOLIC BLOOD PRESSURE: 62 MMHG | WEIGHT: 109 LBS | BODY MASS INDEX: 20.06 KG/M2 | OXYGEN SATURATION: 97 % | SYSTOLIC BLOOD PRESSURE: 132 MMHG | HEART RATE: 72 BPM | RESPIRATION RATE: 16 BRPM

## 2024-08-22 PROBLEM — S32.010A CLOSED COMPRESSION FRACTURE OF BODY OF L1 VERTEBRA: Status: ACTIVE | Noted: 2024-08-22

## 2024-08-22 PROBLEM — E87.6 HYPOKALEMIA: Status: ACTIVE | Noted: 2024-08-22

## 2024-08-22 PROBLEM — R13.10 DYSPHAGIA: Status: ACTIVE | Noted: 2024-08-22

## 2024-08-22 LAB
ANION GAP SERPL CALC-SCNC: 7 MMOL/L (ref 8–16)
BASOPHILS # BLD AUTO: 0.06 K/UL (ref 0–0.2)
BASOPHILS NFR BLD: 0.7 % (ref 0–1.9)
BUN SERPL-MCNC: 11 MG/DL (ref 8–23)
CALCIUM SERPL-MCNC: 8.5 MG/DL (ref 8.7–10.5)
CHLORIDE SERPL-SCNC: 112 MMOL/L (ref 95–110)
CO2 SERPL-SCNC: 23 MMOL/L (ref 23–29)
CREAT SERPL-MCNC: 0.7 MG/DL (ref 0.5–1.4)
DIFFERENTIAL METHOD BLD: ABNORMAL
EOSINOPHIL # BLD AUTO: 0.1 K/UL (ref 0–0.5)
EOSINOPHIL NFR BLD: 1.5 % (ref 0–8)
ERYTHROCYTE [DISTWIDTH] IN BLOOD BY AUTOMATED COUNT: 13.7 % (ref 11.5–14.5)
EST. GFR  (NO RACE VARIABLE): >60 ML/MIN/1.73 M^2
GLUCOSE SERPL-MCNC: 85 MG/DL (ref 70–110)
HCT VFR BLD AUTO: 32.4 % (ref 37–48.5)
HGB BLD-MCNC: 10 G/DL (ref 12–16)
IMM GRANULOCYTES # BLD AUTO: 0.02 K/UL (ref 0–0.04)
IMM GRANULOCYTES NFR BLD AUTO: 0.2 % (ref 0–0.5)
LYMPHOCYTES # BLD AUTO: 1.7 K/UL (ref 1–4.8)
LYMPHOCYTES NFR BLD: 19.6 % (ref 18–48)
MAGNESIUM SERPL-MCNC: 1.9 MG/DL (ref 1.6–2.6)
MCH RBC QN AUTO: 29.6 PG (ref 27–31)
MCHC RBC AUTO-ENTMCNC: 30.9 G/DL (ref 32–36)
MCV RBC AUTO: 96 FL (ref 82–98)
MONOCYTES # BLD AUTO: 0.8 K/UL (ref 0.3–1)
MONOCYTES NFR BLD: 8.9 % (ref 4–15)
NEUTROPHILS # BLD AUTO: 5.9 K/UL (ref 1.8–7.7)
NEUTROPHILS NFR BLD: 69.1 % (ref 38–73)
NRBC BLD-RTO: 0 /100 WBC
OHS QRS DURATION: 88 MS
OHS QTC CALCULATION: 410 MS
PLATELET # BLD AUTO: 296 K/UL (ref 150–450)
PMV BLD AUTO: 8.9 FL (ref 9.2–12.9)
POTASSIUM SERPL-SCNC: 3.3 MMOL/L (ref 3.5–5.1)
RBC # BLD AUTO: 3.38 M/UL (ref 4–5.4)
SODIUM SERPL-SCNC: 142 MMOL/L (ref 136–145)
TROPONIN I SERPL DL<=0.01 NG/ML-MCNC: 0.01 NG/ML (ref 0–0.03)
TROPONIN I SERPL DL<=0.01 NG/ML-MCNC: <0.006 NG/ML (ref 0–0.03)
WBC # BLD AUTO: 8.54 K/UL (ref 3.9–12.7)

## 2024-08-22 PROCEDURE — 83735 ASSAY OF MAGNESIUM: CPT | Mod: HCNC | Performed by: PHYSICIAN ASSISTANT

## 2024-08-22 PROCEDURE — 25000003 PHARM REV CODE 250: Mod: HCNC

## 2024-08-22 PROCEDURE — 25000003 PHARM REV CODE 250: Mod: HCNC | Performed by: PHYSICIAN ASSISTANT

## 2024-08-22 PROCEDURE — 84484 ASSAY OF TROPONIN QUANT: CPT | Mod: HCNC | Performed by: PHYSICIAN ASSISTANT

## 2024-08-22 PROCEDURE — 99284 EMERGENCY DEPT VISIT MOD MDM: CPT | Mod: HCNC,GC,, | Performed by: NEUROLOGICAL SURGERY

## 2024-08-22 PROCEDURE — 80048 BASIC METABOLIC PNL TOTAL CA: CPT | Mod: HCNC | Performed by: PHYSICIAN ASSISTANT

## 2024-08-22 PROCEDURE — G0378 HOSPITAL OBSERVATION PER HR: HCPCS | Mod: HCNC

## 2024-08-22 PROCEDURE — 85025 COMPLETE CBC W/AUTO DIFF WBC: CPT | Mod: HCNC | Performed by: PHYSICIAN ASSISTANT

## 2024-08-22 PROCEDURE — 63600175 PHARM REV CODE 636 W HCPCS: Mod: HCNC | Performed by: EMERGENCY MEDICINE

## 2024-08-22 PROCEDURE — 92610 EVALUATE SWALLOWING FUNCTION: CPT | Mod: HCNC

## 2024-08-22 PROCEDURE — 25000003 PHARM REV CODE 250: Mod: HCNC | Performed by: STUDENT IN AN ORGANIZED HEALTH CARE EDUCATION/TRAINING PROGRAM

## 2024-08-22 RX ORDER — PROMETHAZINE HYDROCHLORIDE 25 MG/1
25 TABLET ORAL EVERY 6 HOURS PRN
Status: DISCONTINUED | OUTPATIENT
Start: 2024-08-22 | End: 2024-08-22 | Stop reason: HOSPADM

## 2024-08-22 RX ORDER — ONDANSETRON 8 MG/1
8 TABLET, ORALLY DISINTEGRATING ORAL EVERY 8 HOURS PRN
Status: DISCONTINUED | OUTPATIENT
Start: 2024-08-22 | End: 2024-08-22 | Stop reason: HOSPADM

## 2024-08-22 RX ORDER — PANTOPRAZOLE SODIUM 40 MG/1
40 TABLET, DELAYED RELEASE ORAL DAILY
Status: DISCONTINUED | OUTPATIENT
Start: 2024-08-22 | End: 2024-08-22 | Stop reason: HOSPADM

## 2024-08-22 RX ORDER — TALC
6 POWDER (GRAM) TOPICAL NIGHTLY PRN
Status: DISCONTINUED | OUTPATIENT
Start: 2024-08-22 | End: 2024-08-22 | Stop reason: HOSPADM

## 2024-08-22 RX ORDER — ENOXAPARIN SODIUM 100 MG/ML
30 INJECTION SUBCUTANEOUS EVERY 24 HOURS
Status: DISCONTINUED | OUTPATIENT
Start: 2024-08-22 | End: 2024-08-22 | Stop reason: HOSPADM

## 2024-08-22 RX ORDER — ATORVASTATIN CALCIUM 40 MG/1
40 TABLET, FILM COATED ORAL NIGHTLY
Status: DISCONTINUED | OUTPATIENT
Start: 2024-08-22 | End: 2024-08-22 | Stop reason: HOSPADM

## 2024-08-22 RX ORDER — GLUCAGON 1 MG
1 KIT INJECTION
Status: DISCONTINUED | OUTPATIENT
Start: 2024-08-22 | End: 2024-08-22 | Stop reason: HOSPADM

## 2024-08-22 RX ORDER — VALSARTAN 80 MG/1
80 TABLET ORAL DAILY
Status: DISCONTINUED | OUTPATIENT
Start: 2024-08-22 | End: 2024-08-22 | Stop reason: HOSPADM

## 2024-08-22 RX ORDER — IBUPROFEN 200 MG
16 TABLET ORAL
Status: DISCONTINUED | OUTPATIENT
Start: 2024-08-22 | End: 2024-08-22 | Stop reason: HOSPADM

## 2024-08-22 RX ORDER — IBUPROFEN 200 MG
24 TABLET ORAL
Status: DISCONTINUED | OUTPATIENT
Start: 2024-08-22 | End: 2024-08-22 | Stop reason: HOSPADM

## 2024-08-22 RX ORDER — IPRATROPIUM BROMIDE AND ALBUTEROL SULFATE 2.5; .5 MG/3ML; MG/3ML
3 SOLUTION RESPIRATORY (INHALATION) EVERY 4 HOURS PRN
Status: DISCONTINUED | OUTPATIENT
Start: 2024-08-22 | End: 2024-08-22 | Stop reason: HOSPADM

## 2024-08-22 RX ORDER — EZETIMIBE 10 MG/1
10 TABLET ORAL NIGHTLY
Status: DISCONTINUED | OUTPATIENT
Start: 2024-08-22 | End: 2024-08-22 | Stop reason: HOSPADM

## 2024-08-22 RX ORDER — MIDAZOLAM HYDROCHLORIDE 1 MG/ML
1 INJECTION, SOLUTION INTRAMUSCULAR; INTRAVENOUS
Status: COMPLETED | OUTPATIENT
Start: 2024-08-22 | End: 2024-08-22

## 2024-08-22 RX ORDER — NAPROXEN SODIUM 220 MG/1
81 TABLET, FILM COATED ORAL DAILY
Status: DISCONTINUED | OUTPATIENT
Start: 2024-08-22 | End: 2024-08-22

## 2024-08-22 RX ORDER — ACETAMINOPHEN 325 MG/1
650 TABLET ORAL EVERY 4 HOURS PRN
Status: DISCONTINUED | OUTPATIENT
Start: 2024-08-22 | End: 2024-08-22 | Stop reason: HOSPADM

## 2024-08-22 RX ORDER — NAPROXEN SODIUM 220 MG/1
81 TABLET, FILM COATED ORAL DAILY
Status: DISCONTINUED | OUTPATIENT
Start: 2024-08-22 | End: 2024-08-22 | Stop reason: HOSPADM

## 2024-08-22 RX ORDER — PANTOPRAZOLE SODIUM 40 MG/1
40 TABLET, DELAYED RELEASE ORAL DAILY
Status: DISCONTINUED | OUTPATIENT
Start: 2024-08-22 | End: 2024-08-22

## 2024-08-22 RX ORDER — POLYETHYLENE GLYCOL 3350 17 G/17G
17 POWDER, FOR SOLUTION ORAL DAILY
Status: DISCONTINUED | OUTPATIENT
Start: 2024-08-22 | End: 2024-08-22 | Stop reason: HOSPADM

## 2024-08-22 RX ORDER — BISACODYL 10 MG/1
10 SUPPOSITORY RECTAL DAILY PRN
Status: DISCONTINUED | OUTPATIENT
Start: 2024-08-22 | End: 2024-08-22 | Stop reason: HOSPADM

## 2024-08-22 RX ADMIN — PANTOPRAZOLE SODIUM 40 MG: 40 TABLET, DELAYED RELEASE ORAL at 05:08

## 2024-08-22 RX ADMIN — METOPROLOL SUCCINATE 12.5 MG: 25 TABLET, EXTENDED RELEASE ORAL at 10:08

## 2024-08-22 RX ADMIN — TICAGRELOR 90 MG: 90 TABLET ORAL at 10:08

## 2024-08-22 RX ADMIN — POLYETHYLENE GLYCOL 3350 17 G: 17 POWDER, FOR SOLUTION ORAL at 10:08

## 2024-08-22 RX ADMIN — VALSARTAN 80 MG: 80 TABLET, FILM COATED ORAL at 10:08

## 2024-08-22 RX ADMIN — ASPIRIN 81 MG CHEWABLE TABLET 81 MG: 81 TABLET CHEWABLE at 10:08

## 2024-08-22 RX ADMIN — POTASSIUM BICARBONATE 25 MEQ: 978 TABLET, EFFERVESCENT ORAL at 10:08

## 2024-08-22 RX ADMIN — MIDAZOLAM 1 MG: 1 INJECTION INTRAMUSCULAR; INTRAVENOUS at 05:08

## 2024-08-22 NOTE — ED NOTES
Pt is refusing to get into the bed, wants to sit in the chair and be off of the monitor, pt was asked to please stay in the chair or in the bed to prevent falls and keep her safe.  Pt stated that she will do as she pleases.  Team made aware.  Will continue to monitor patient to the best of my ability while she is still in the ER

## 2024-08-22 NOTE — ED NOTES
Assumed care of patient Betzy Cooley, a 84 y.o. female     Triage note:  Chief Complaint   Patient presents with    Dysphagia     Trouble swallowing that began today after eating dinner/reports eating chicken strips and steamed rice. No vomiting. No drooling     Review of patient's allergies indicates:   Allergen Reactions    Sulfa (sulfonamide antibiotics) Hives     Other reaction(s): Anaphylaxis  Itching   Shortness of breath      Past Medical History:   Diagnosis Date    Arthritis     Cataract     GERD (gastroesophageal reflux disease)     HEARING LOSS     Hypertension     Osteoporosis, postmenopausal     Squamous Cell Carcinoma 2007    right forearm    Urinary incontinence     rare mixed     LOC: Patient name and date of birth verified. The patient is awake, alert and aware of environment with an appropriate affect, the patient is oriented x 3 and speaking appropriately.   APPEARANCE: Patient resting comfortably, patient is clean and well groomed, patient's clothing is properly fastened.  SKIN: The skin is warm and dry, color consistent with ethnicity, patient has normal skin turgor and moist mucus membranes, skin intact, no breakdown or bruising noted.  MUSCULOSKELETAL: Patient moving all extremities well, no obvious swelling or deformities noted.   RESPIRATORY: Respirations are spontaneous, patient has a normal effort and rate, no accessory muscle use noted.  CARDIAC: Patient has a normal rate and rhythm, no periphreal edema noted, capillary refill < 3 seconds.  ABDOMEN: Soft and non tender to palpation, no distention noted. Bowel sounds present in all four quadrants.  NEUROLOGIC: Eyes open spontaneously, behavior appropriate to situation, follows commands, facial expression symmetrical, bilateral hand grasp equal and even, purposeful motor response noted, normal sensation in all extremities when touched with a finger.

## 2024-08-22 NOTE — H&P
[facility] - Emergency Dept  [facility]  History & Physical    Patient Name: Betzy Cooley  MRN: [id number]  Patient Class: OP- Observation  Admission Date: 8/21/2024  Attending Physician: Jennifer Zacarias MD   Primary Care Provider: Praful Paul MD         Patient information was obtained from patient, spouse/SO, past medical records, and ER records.     Subjective:     Principal Problem:Closed compression fracture of body of L1 vertebra    Chief Complaint:   Chief Complaint   Patient presents with    Dysphagia     Trouble swallowing that began today after eating dinner/reports eating chicken strips and steamed rice. No vomiting. No drooling        HPI: Betzy Cooley is a 84 y.o. female with a PMHx of CAD s/p stenting, HTN, GERD, HLD, hiatal hernia who presents to [facility] [place] [facility] for evaluation of dysphagia and dry mouth. Patient reports dry mouth for years. Last night she felt like food (chicken, rice) was stuck in her throat/chest from dryness. She tried drinking fluids and regurgitated water. Symptoms improved since treated with GI cocktail in the ED earlier tonight. Denies any difficulty tolerating secretions or SOB. She denies having chest pain or back pain currently.  Patient had a mechanical fall several weeks ago. Denies head trauma or LOC. Denies numbness/tingling, bowel or bladder incontinence, or saddle anesthesia.       ED: AFVSS. No leukocytosis or electrolyte abnormalities. CTA chest negative for aortic aneurysm or dissection, but shows L1 compression fracture with 6 mm of retropulsion. Neurosurgery consulted and recommend admission, NPO, MRI, and TLSO brace. Admitted to  for further eval and management.     Past Medical History:   Diagnosis Date    Arthritis     Cataract     GERD (gastroesophageal reflux disease)     HEARING LOSS     Hypertension     Osteoporosis, postmenopausal     Squamous Cell Carcinoma 2007    right forearm    Urinary incontinence     rare mixed       Past Surgical  History:   Procedure Laterality Date    ANGIOGRAM, CORONARY, WITH LEFT HEART CATHETERIZATION Bilateral 5/17/2024    Procedure: Angiogram, Coronary, with Left Heart Cath;  Surgeon: Miguel Jacob MD;  Location: [facility] CATH LAB;  Service: Cardiology;  Laterality: Bilateral;    COLPOPEXY N/A 08/06/2019    Procedure: COLPOPEXY SSL FIXATION;  Surgeon: Alma Dorsey MD;  [address];  Service: Urology;  Laterality: N/A;    CORONARY ANGIOGRAPHY Left 5/16/2024    Procedure: ANGIOGRAM, CORONARY ARTERY;  Surgeon: Miguel Jacob MD;  Location: [facility] CATH LAB;  Service: Cardiology;  Laterality: Left;    CYSTOSCOPY N/A 08/06/2019    Procedure: CYSTOSCOPY;  Surgeon: Alma Dorsey MD;  [facility];  Service: Urology;  Laterality: N/A;    ESOPHAGOGASTRODUODENOSCOPY N/A 4/1/2024    Procedure: EGD (ESOPHAGOGASTRODUODENOSCOPY);  Surgeon: Zbigniew Dougherty MD;  Location: [facility];  Service: Endoscopy;  Laterality: N/A;    FRACTURE SURGERY Left 2008    open radius/ulna fracture    HYSTERECTOMY      TANIA/ovaries remain--fibroids- in her late 30's / early 40's    IVUS, CORONARY  5/17/2024    Procedure: IVUS, Coronary;  Surgeon: Miguel Jacob MD;  Location: [facility] CATH LAB;  Service: Cardiology;;    STENT, DRUG ELUTING, MULTI VESSEL, CORONARY  5/17/2024    Procedure: Stent, Drug Eluting, Multi Vessel, Coronary;  Surgeon: Miguel Jacob MD;  Location: [facility] CATH LAB;  Service: Cardiology;;       Review of patient's allergies indicates:   Allergen Reactions    Sulfa (sulfonamide antibiotics) Hives     Other reaction(s): Anaphylaxis  Itching   Shortness of breath        Current Facility-Administered Medications on File Prior to Encounter   Medication    lactated ringers infusion     Current Outpatient Medications on File Prior to Encounter   Medication Sig    aspirin 81 MG Chew Take 81 mg by mouth once daily. Not chewable    atorvastatin (LIPITOR) 40 MG tablet Take 1 tablet (40 mg  total) by mouth every evening.    calcium-vitamin D3 500 mg(1,250mg) -200 unit per tablet Take 1 tablet by mouth 2 (two) times daily with meals.    co-enzyme Q-10 30 mg capsule Take 30 mg by mouth once daily.    diclofenac sodium (VOLTAREN ARTHRITIS PAIN) 1 % Gel Apply 2 g topically daily as needed (Pain).    ezetimibe (ZETIA) 10 mg tablet Take 1 tablet (10 mg total) by mouth once daily.    hydrOXYzine HCL (ATARAX) 25 MG tablet Take 1 tablet (25 mg total) by mouth 3 (three) times daily as needed for Anxiety.    LIDOcaine (LIDODERM) 5 % Place 1 patch onto the skin once daily. Remove & Discard patch within 12 hours or as directed by MD    metoprolol succinate (TOPROL-XL) 25 MG 24 hr tablet Take 0.5 tablets (12.5 mg total) by mouth once daily.    multivitamin (THERAGRAN) per tablet Take 1 tablet by mouth once daily.    nitroGLYCERIN (NITROSTAT) 0.4 MG SL tablet Place 1 tablet (0.4 mg total) under the tongue every 5 (five) minutes as needed for Chest pain.    pantoprazole (PROTONIX) 40 MG tablet Take 1 tablet (40 mg total) by mouth 2 (two) times daily.    sodium chloride (SALINE MIST NASL) 1 spray by Each Nostril route daily as needed (Congestion).    ticagrelor (BRILINTA) 90 mg tablet Take 1 tablet (90 mg total) by mouth 2 (two) times daily.    valsartan (DIOVAN) 80 MG tablet Take 1 tablet (80 mg total) by mouth once daily.    vitamin A-vitamin C-vit E-min Tab Take 1 tablet by mouth once daily.     Family History       Problem Relation (Age of Onset)    Arthritis Sister    COPD Mother, Father    Diabetes Sister    Heart disease Mother    Heart failure Mother    Macular degeneration Mother    No Known Problems Brother, Son, Daughter, Sister, Sister, Sister, Brother, Brother, Brother, Brother, Brother, Daughter, Son          Tobacco Use    Smoking status: Never    Smokeless tobacco: Never   Substance and Sexual Activity    Alcohol use: Not Currently     Comment: 1-2 glasses wine weekly    Drug use: No    Sexual activity:  Yes     Partners: Male     Birth control/protection: None     Review of Systems   Constitutional:  Negative for chills and fever.   HENT:  Positive for postnasal drip and trouble swallowing.    Eyes:  Negative for visual disturbance.   Respiratory:  Negative for cough and shortness of breath.    Cardiovascular:  Negative for chest pain and leg swelling.   Gastrointestinal:  Negative for nausea and vomiting.   Genitourinary:  Negative for dysuria.   Musculoskeletal:  Positive for gait problem. Negative for arthralgias and back pain.   Skin:  Negative for rash and wound.   Neurological:  Negative for weakness, light-headedness, numbness and headaches.   Psychiatric/Behavioral:  Negative for agitation and confusion.      Objective:     Vital Signs (Most Recent):  Temp: 98.2 °F (36.8 °C) (08/22/24 0230)  Pulse: 78 (08/22/24 0448)  Resp: 18 (08/22/24 0448)  BP: (!) 183/84 (08/22/24 0448)  SpO2: 100 % (08/22/24 0448) Vital Signs (24h Range):  Temp:  [97.4 °F (36.3 °C)-98.4 °F (36.9 °C)] 98.2 °F (36.8 °C)  Pulse:  [57-79] 78  Resp:  [17-19] 18  SpO2:  [97 %-100 %] 100 %  BP: (146-183)/(82-90) 183/84     Weight: 49.4 kg (109 lb)  Body mass index is 19.94 kg/m².     Physical Exam  Vitals and nursing note reviewed.   Constitutional:       General: She is not in acute distress.  HENT:      Head: Normocephalic and atraumatic.      Nose: Nose normal.      Mouth/Throat:      Pharynx: No oropharyngeal exudate.   Eyes:      Extraocular Movements: Extraocular movements intact.      Conjunctiva/sclera: Conjunctivae normal.   Cardiovascular:      Rate and Rhythm: Normal rate and regular rhythm.      Heart sounds: Normal heart sounds.   Pulmonary:      Effort: Pulmonary effort is normal. No respiratory distress.      Breath sounds: Normal breath sounds.   Abdominal:      Palpations: Abdomen is soft.      Tenderness: There is no abdominal tenderness.   Musculoskeletal:         General: No tenderness.      Cervical back: Normal range of  motion. No tenderness.      Thoracic back: No tenderness.      Lumbar back: No tenderness.      Right lower leg: No edema.      Left lower leg: No edema.   Skin:     General: Skin is warm and dry.      Findings: Bruising present.   Neurological:      General: No focal deficit present.      Mental Status: She is alert and oriented to person, place, and time.   Psychiatric:         Mood and Affect: Mood normal.         Behavior: Behavior normal.                Significant Labs: All pertinent labs within the past 24 hours have been reviewed.    CBC:   Recent Labs   Lab 08/21/24 2018   WBC 9.06   HGB 10.8*   HCT 35.0*        CMP:   Recent Labs   Lab 08/21/24 2018      K 3.7      CO2 25   *   BUN 12   CREATININE 0.8   CALCIUM 9.7   PROT 7.5   ALBUMIN 3.7   BILITOT 0.4   ALKPHOS 163*   AST 30   ALT 22   ANIONGAP 10     Cardiac Markers:   Recent Labs   Lab 08/21/24 2018   BNP 41     Lipase:   Recent Labs   Lab 08/21/24 2018   LIPASE 14     Troponin:   Recent Labs   Lab 08/21/24  2018 08/21/24  2329   TROPONINI 0.009 0.009       Significant Imaging: I have reviewed all pertinent imaging results/findings within the past 24 hours.  CTA Chest Abdomen Pelvis  Narrative: EXAMINATION:  CTA CHEST ABDOMEN PELVIS    CLINICAL HISTORY:  Aortic aneurysm, known or suspected;    TECHNIQUE:  CTA images were obtained from the thoracic inlet to the pubic symphysis.  Noncontrast, arterial, and delayed phase images were acquired. 75 mL of intravenous Omnipaque 350 was administered for the postcontrast images.  Oral contrast was not administered. Axial MIP, sagittal, and coronal reformats were obtained.    COMPARISON:  Lumbar spine radiographs 05/29/2024    CT abdomen and pelvis 05/06/2024    FINDINGS:  THORACIC SOFT TISSUES:  No axillary or subpectoral lymphadenopathy. The visualized thyroid gland is unremarkable.    HEART & MEDIASTINUM: Heart size at upper limit of normal.  No pericardial effusion.  Dense  4-vessel coronary artery calcifications, a CT marker for underlying coronary artery disease.  Left atrial enlargement, prominent to mildly enlarged right atrium.    PLEURA:  No pleural effusion or pneumothorax.    LUNGS & AIRWAYS:  Airways are patent. Bibasilar subsegmental atelectasis    HEPATOBILIARY:  No focal hepatic lesions. No biliary ductal dilatation. Normal gallbladder.    SPLEEN:  No splenomegaly.  Tiny accessory spleen.    PANCREAS:  No focal masses or ductal dilatation.    ADRENALS:  No adrenal nodules.    KIDNEYS/URETERS: Kidneys enhance symmetrically.  No hydronephrosis, stones or solid mass lesions. Ureters are unremarkable.    PELVIC ORGANS/BLADDER:  The uterus is absent.  The ovaries are inconspicuous but are thought to both remain.    PERITONEUM / RETROPERITONEUM:  No free air. No free fluid.    LYMPH NODES:  No lymphadenopathy.    VESSELS:  Thoracic aorta is normal in caliber with moderate calcific plaque at the aortic arch.  No dissection flap.  Left-sided 3 vessel arch.  Visualized portions of the arch vessels are patent.    The celiac, superior mesenteric and inferior mesenteric arteries are patent.  Single, patent renal arteries bilaterally.  Moderate aortoiliac calcific plaque.    Abdominal aorta is normal in caliber.  No aneurysm or dissection flap.    GI TRACT:  Stomach, small bowel, and colon demonstrate no luminal obstruction.  Stomach suboptimally distended, limiting CT assessment for mural mucosal thickening.  Duodenal diverticulum again noted.  The appendix is not identified with certainty; it is perhaps surgically absent.    BONES AND ABDOMINAL SOFT TISSUES: Abdominal soft tissues are unremarkable.  Interval progression of L1 vertebral body compression fracture with new, marked height loss and 6 mm of retropulsion.  Pre-existing grade 1 anterolisthesis L4 on L5.  Impression: CTA CHEST, CTA ABDOMEN, and CTA PELVIS:    No aortic aneurysm or dissection.    Interval progression of L1  vertebral body compression fracture with new, marked height loss (now essentially a vertebra plana) and 6 mm of osseous retropulsion.    This report was flagged in Epic as abnormal.    The critical finding was identified at approximately 00:15 on 08/22/2024, by Malcolm Vazquez MD, who reported it to Emma Vega PA-C, via telephone at 00:30 on 08/22/2024.  Patient name and medical record number were verified, read-back was performed.    Electronically signed by resident: Malcolm Vazquez  Date:    08/21/2024  Time:    23:48    Electronically signed by: Randy Huerta  Date:    08/22/2024  Time:    01:43     Assessment/Plan:     * Closed compression fracture of body of L1 vertebra  84 y.o. presents with back pain s/p fall several weeks ago and was found to have L1 compression fracture with 6 mm of retropulsion on CT scan.    - no s/sx of cauda equina at this time  - NSGY consulted; appreciate recs  - keep NPO for now  - MRI L spine ordered  - TLSO brace  - PT/OT eval once plan finalized from NSGY  - neuro checks  - fall precautions     Dysphagia  - possibly related to GERD/ hiatal hernia as symptoms improved s/p GI cocktail  - keep NPO for now  - SLP eval in AM  - aspiration precautions  - consider GI consult pending clinical course  - continue Protonix 40 mg BID    CAD S/P percutaneous coronary angioplasty  Patient with known CAD s/p stent placement, which is controlled Will continue  Brillinta, ASA, and Statin and monitor for S/Sx of angina/ACS. Continue to monitor on telemetry.     - continue brillinta given recent stents (7) placement in May-- if need to hold for possible procedure, will need to speak to cardiology prior    Dyslipidemia  - continue statin, ASA    Hypertension  Chronic, controlled. Latest blood pressure and vitals reviewed-     Temp:  [97.4 °F (36.3 °C)-98.4 °F (36.9 °C)]   Pulse:  [57-79]   Resp:  [17-19]   BP: (146-183)/(82-90)   SpO2:  [97 %-100 %] .   Home meds for hypertension were  reviewed and noted below.   Hypertension Medications               metoprolol succinate (TOPROL-XL) 25 MG 24 hr tablet Take 0.5 tablets (12.5 mg total) by mouth once daily.    nitroGLYCERIN (NITROSTAT) 0.4 MG SL tablet Place 1 tablet (0.4 mg total) under the tongue every 5 (five) minutes as needed for Chest pain.    valsartan (DIOVAN) 80 MG tablet Take 1 tablet (80 mg total) by mouth once daily.            While in the hospital, will manage blood pressure as follows; Continue home antihypertensive regimen    Will utilize p.r.n. blood pressure medication only if patient's blood pressure greater than 180/110 and she develops symptoms such as worsening chest pain or shortness of breath.    GERD (gastroesophageal reflux disease)  - continue PPI      VTE Risk Mitigation (From admission, onward)           Ordered     enoxaparin injection 30 mg  Daily         08/22/24 0449     IP VTE HIGH RISK PATIENT  Once         08/22/24 0449                         On 08/22/2024, patient should be placed in hospital observation services under my care in collaboration with Bindu Guzman DO.      Pharmacist Renal Dose Adjustment Note    Betzy Cooley is a 84 y.o. female being treated with the medication Lovenox    Patient Data:    Vital Signs (Most Recent):  Temp: 98.2 °F (36.8 °C) (08/22/24 0230)  Pulse: 78 (08/22/24 0448)  Resp: 18 (08/22/24 0448)  BP: (!) 183/84 (08/22/24 0448)  SpO2: 100 % (08/22/24 0448) Vital Signs (72h Range):  Temp:  [97.4 °F (36.3 °C)-98.4 °F (36.9 °C)]   Pulse:  [57-79]   Resp:  [17-19]   BP: (146-183)/(82-90)   SpO2:  [97 %-100 %]      Recent Labs   Lab 08/21/24 2018   CREATININE 0.8     Serum creatinine: 0.8 mg/dL 08/21/24 2018  Estimated creatinine clearance: 40.1 mL/min    Medication:Lovenox 40 mg SQ every 24 hrs will be changed to Lovenox 30 mg SQ every 24 hrs (weight < 50 kg)    Pharmacist's Name: Ry Wagner  Pharmacist's Extension: [phone]      Gaye Tarango PA-C  Department of [facility]  [address] - Emergency Dept

## 2024-08-22 NOTE — ASSESSMENT & PLAN NOTE
84 y.o. presents with back pain s/p fall several weeks ago and was found to have L1 compression fracture with 6 mm of retropulsion on CT scan.    - no s/sx of cauda equina at this time  - NSGY consulted; appreciate recs  - keep NPO for now  - MRI L spine ordered  - TLSO brace  - PT/OT eval once plan finalized from NSGY  - neuro checks  - fall precautions

## 2024-08-22 NOTE — ED NOTES
Pt is upset and wants to leave, wants brace off.  Patient informed of need for LSO brace but I don't think she is understanding the reasoning for it.  Medical team made aware and messaged.  They will come down and speak to her when they round. Will try to contact [facility] to let them know she is going to take the brace off herself.

## 2024-08-22 NOTE — HPI
Betzy Cooley is a 84 y.o. female with a PMHx of CAD s/p stenting, HTN, GERD, HLD, hiatal hernia who [place] [facility] dysphagia and dry mouth. Patient reports dry mouth for years. Last night she felt like food (chicken, rice) was stuck in her throat/chest from dryness. She tried drinking fluids and regurgitated water. Symptoms improved since treated with GI cocktail in the ED earlier tonight. Denies any difficulty tolerating secretions or SOB. She denies having chest pain or back pain currently.  Patient had a mechanical fall several weeks ago. Denies head trauma or LOC. Denies numbness/tingling, bowel or bladder incontinence, or saddle anesthesia.       ED: AFVSS. No leukocytosis or electrolyte abnormalities. CTA chest negative for aortic aneurysm or dissection, but shows L1 compression fracture with 6 mm of retropulsion. Neurosurgery consulted and recommend admission, NPO, MRI, and TLSO brace. Admitted to  for further eval and management.

## 2024-08-22 NOTE — ED PROVIDER NOTES
Encounter Date: 8/21/2024       History     Chief Complaint   Patient presents with    Dysphagia     Trouble swallowing that began today after eating dinner/reports eating chicken strips and steamed rice. No vomiting. No drooling     7:25 PM  Betzy Cooley is a 84-year-old female with a with a hx of 3v CAD s/p PCI on 5/17/24 done after NSTEMI (was declined by CTS due to frailty), HTN, uncontrolled GERD/gastritis who presents to [facility] ED with her  for emergent evaluation of transient dysphagia.    Patient states that she was eating chicken strips and rice.  She states after she ate the right she felt like her food got stuck in her chest.  Felt like it was not going down.  She states after she tried to drink water which she vomited up.  She endorsed shortness of breath at the time per .  Patient states that those symptoms have since resolved.  Currently she denies any chest pain, chest discomfort, chest tightness, shortness of breath, abdominal pain.  Her last bowel movement was today and very little without diarrhea, melena, or blood in stool.  To note, patient states since her stents were placed in May of this year she feels as if her abdomen is always distended.     Her  also notes that she bumped against a wall today and sustained a skin tear to her right elbow.  No head trauma.        Review of patient's allergies indicates:   Allergen Reactions    Sulfa (sulfonamide antibiotics) Hives     Other reaction(s): Anaphylaxis  Itching   Shortness of breath      Past Medical History:   Diagnosis Date    Arthritis     Cataract     GERD (gastroesophageal reflux disease)     HEARING LOSS     Hypertension     Osteoporosis, postmenopausal     Squamous Cell Carcinoma 2007    right forearm    Urinary incontinence     rare mixed     Past Surgical History:   Procedure Laterality Date    ANGIOGRAM, CORONARY, WITH LEFT HEART CATHETERIZATION Bilateral 5/17/2024    Procedure: Angiogram, Coronary, with Left Heart  Cath;  Surgeon: Miguel Jacob MD;  Location: [facility] CATH LAB;  Service: Cardiology;  Laterality: Bilateral;    COLPOPEXY N/A 08/06/2019    Procedure: COLPOPEXY SSL FIXATION;  Surgeon: Alma Dorsey MD;  [facility];  Service: Urology;  Laterality: N/A;    CORONARY ANGIOGRAPHY Left 5/16/2024    Procedure: ANGIOGRAM, CORONARY ARTERY;  Surgeon: Miguel Jacob MD;  Location: [facility] CATH LAB;  Service: Cardiology;  Laterality: Left;    CYSTOSCOPY N/A 08/06/2019    Procedure: CYSTOSCOPY;  Surgeon: Alma Dorsey MD;  Location: [facility] OR [facility];  Service: Urology;  Laterality: N/A;    ESOPHAGOGASTRODUODENOSCOPY N/A 4/1/2024    Procedure: EGD (ESOPHAGOGASTRODUODENOSCOPY);  Surgeon: Zbigniew Dougherty MD;  Location: [facility];  Service: Endoscopy;  Laterality: N/A;    FRACTURE SURGERY Left 2008    open radius/ulna fracture    HYSTERECTOMY      TANIA/ovaries remain--fibroids- in her late 30's / early 40's    IVUS, CORONARY  5/17/2024    Procedure: IVUS, Coronary;  Surgeon: Miguel Jacob MD;  Location: [facility] CATH LAB;  Service: Cardiology;;    STENT, DRUG ELUTING, MULTI VESSEL, CORONARY  5/17/2024    Procedure: Stent, Drug Eluting, Multi Vessel, Coronary;  Surgeon: Miguel Jacob MD;  Location: [facility] CATH LAB;  Service: Cardiology;;     Family History   Problem Relation Name Age of Onset    Heart failure Mother copd     Macular degeneration Mother copd     Heart disease Mother copd     COPD Mother copd     Diabetes Sister      Arthritis Sister          rheumatoid arthritis    COPD Father      No Known Problems Brother      No Known Problems Son Saad     No Known Problems Daughter Jordyn     No Known Problems Sister      No Known Problems Sister      No Known Problems Sister      No Known Problems Brother      No Known Problems Brother      No Known Problems Brother      No Known Problems Brother      No Known Problems Brother      No Known Problems Daughter Reyes     No Known Problems  Son Arie     Breast cancer Neg Hx      Ovarian cancer Neg Hx      Amblyopia Neg Hx      Blindness Neg Hx      Cancer Neg Hx      Cataracts Neg Hx      Glaucoma Neg Hx      Hypertension Neg Hx      Retinal detachment Neg Hx      Strabismus Neg Hx      Stroke Neg Hx      Thyroid disease Neg Hx      Cervical cancer Neg Hx      Endometrial cancer Neg Hx      Vaginal cancer Neg Hx      Colon cancer Neg Hx       Social History     Tobacco Use    Smoking status: Never    Smokeless tobacco: Never   Substance Use Topics    Alcohol use: Not Currently     Comment: 1-2 glasses wine weekly    Drug use: No     Review of Systems   Constitutional:  Positive for appetite change. Negative for chills, diaphoresis and fever.   HENT:  Negative for sore throat.    Respiratory:  Negative for cough and shortness of breath.    Cardiovascular:  Negative for chest pain.   Gastrointestinal:  Positive for nausea and vomiting. Negative for anal bleeding, constipation and diarrhea.   Genitourinary:  Negative for dysuria, flank pain and hematuria.   Musculoskeletal:  Negative for back pain.   Skin:  Negative for rash.   Neurological:  Negative for weakness.   Hematological:  Does not bruise/bleed easily.       Physical Exam     Initial Vitals [08/21/24 1847]   BP Pulse Resp Temp SpO2   (!) 167/87 79 19 97.4 °F (36.3 °C) 97 %      MAP       --         Physical Exam    Vitals reviewed.  Constitutional: She appears well-developed and well-nourished. She is not diaphoretic. She is cooperative.  Non-toxic appearance. She does not have a sickly appearance. She does not appear ill. No distress.   HENT:   Head: Normocephalic and atraumatic.   Nose: Nose normal.   Mouth/Throat: No trismus in the jaw.   Eyes: Conjunctivae and EOM are normal.   Neck:   Normal range of motion.  Cardiovascular:  Normal rate and regular rhythm.           Pulmonary/Chest: Breath sounds normal. No accessory muscle usage. No tachypnea. No respiratory distress. She has no  wheezes. She has no rhonchi. She has no rales.   Abdominal: Abdomen is soft. She exhibits no distension. There is no abdominal tenderness. There is no rebound and no guarding.   Musculoskeletal:         General: No edema. Normal range of motion.      Cervical back: Normal range of motion.      Comments: Kyphosis.    Skin tear to posterior right elbow.  No decreased range of motion or difficulty with supination and pronation.  No underlying bony tenderness.     Neurological: She is alert. She has normal strength.   Skin: Skin is warm and dry. No erythema. No pallor.         ED Course   Procedures  Labs Reviewed   CBC W/ AUTO DIFFERENTIAL - Abnormal       Result Value    WBC 9.06      RBC 3.67 (*)     Hemoglobin 10.8 (*)     Hematocrit 35.0 (*)     MCV 95      MCH 29.4      MCHC 30.9 (*)     RDW 13.8      Platelets 341      MPV 8.9 (*)     Immature Granulocytes 0.2      Gran # (ANC) 5.8      Immature Grans (Abs) 0.02      Lymph # 2.1      Mono # 0.9      Eos # 0.2      Baso # 0.06      nRBC 0      Gran % 64.1      Lymph % 23.2      Mono % 9.7      Eosinophil % 2.1      Basophil % 0.7      Differential Method Automated     COMPREHENSIVE METABOLIC PANEL - Abnormal    Sodium 141      Potassium 3.7      Chloride 106      CO2 25      Glucose 133 (*)     BUN 12      Creatinine 0.8      Calcium 9.7      Total Protein 7.5      Albumin 3.7      Total Bilirubin 0.4      Alkaline Phosphatase 163 (*)     AST 30      ALT 22      eGFR >60.0      Anion Gap 10     BASIC METABOLIC PANEL - Abnormal    Sodium 142      Potassium 3.3 (*)     Chloride 112 (*)     CO2 23      Glucose 85      BUN 11      Creatinine 0.7      Calcium 8.5 (*)     Anion Gap 7 (*)     eGFR >60.0     CBC W/ AUTO DIFFERENTIAL - Abnormal    WBC 8.54      RBC 3.38 (*)     Hemoglobin 10.0 (*)     Hematocrit 32.4 (*)     MCV 96      MCH 29.6      MCHC 30.9 (*)     RDW 13.7      Platelets 296      MPV 8.9 (*)     Immature Granulocytes 0.2      Gran # (ANC) 5.9       Immature Grans (Abs) 0.02      Lymph # 1.7      Mono # 0.8      Eos # 0.1      Baso # 0.06      nRBC 0      Gran % 69.1      Lymph % 19.6      Mono % 8.9      Eosinophil % 1.5      Basophil % 0.7      Differential Method Automated     B-TYPE NATRIURETIC PEPTIDE    BNP 41     LIPASE    Lipase 14     TROPONIN I    Troponin I 0.009     TROPONIN I    Troponin I 0.009     MAGNESIUM    Magnesium 1.9     TROPONIN I    Troponin I <0.006          ECG Results              EKG 12-lead (Final result)        Collection Time Result Time QRS Duration OHS QTC Calculation    08/21/24 20:09:11 08/22/24 11:21:45 88 410                     Final result by Interface, Lab In [facility] (08/22/24 11:22:24)                   Narrative:    Test Reason : R13.10,    Vent. Rate : 068 BPM     Atrial Rate : 068 BPM     P-R Int : 204 ms          QRS Dur : 088 ms      QT Int : 386 ms       P-R-T Axes : 059 -45 -09 degrees     QTc Int : 410 ms    Normal sinus rhythm  Left anterior fascicular block  Moderate voltage criteria for LVH, may be normal variant  Nonspecific T wave abnormality  Abnormal ECG  When compared with ECG of 22-MAY-2024 02:46,  No significant change was found  Confirmed by ROWAN GARNER MD ([id number]) on 8/22/2024 11:21:43 AM    Referred By: [email or web address]   SELF           Confirmed By:ROWAN GARNER MD                                  Imaging Results              X-Ray Lumbar Spine Lateral Supine and Lateral Upright (Final result)  Result time 08/22/24 09:32:17      Final result by Bravo Medina MD (08/22/24 09:32:17)                   Impression:      1. Compression deformity with severe loss of height of the L1 vertebral body with a modest degree of retropulsion of the posterior aspect of this compressed vertebral body.  The appearance is similar to the recent CT and MR examinations, although it does represent a detrimental change since of earlier conventional radiograph of 05/29/2024.  2. Anterolisthesis of L4 in relation to L5 and L5  in relation to S1, secondary to facet arthropathy at each level.  3. No alignment change between the supine and weight-bearing images is observed.      Electronically signed by: Bravo Medina MD  Date:    08/22/2024  Time:    09:32               Narrative:    EXAMINATION:  XR LUMBAR SPINE LATERAL SUPINE AND LATERAL UPRIGHT    CLINICAL HISTORY:  L1 burst fracture;    TECHNIQUE:  Two views of the lumbar spine were obtained, with lateral views performed weight-bearing and non weight-bearing.    COMPARISON:  Reference is made to the CT examination of 08/21/2024 and to an MR exam of 08/22/2024 and a prior conventional radiographic exam of the lumbar spine dated 05/09/2024.    FINDINGS:  Severe compression deformity with marked loss of height involving the L1 vertebral body is again observed, unchanged from the 08/21/2024 CT examination but representing a significant interval loss of height of L1 since the older conventional radiograph of 05/29/2024.  There is a modest degree of retropulsion of the posterior aspect of this markedly compressed L1 vertebral body noted, as was optimally demonstrated on the prior CT and MR examinations referenced above.  9 mm of anterolisthesis of L4 in relation to L5 is appreciated, as is a lesser degree of anterolisthesis of L5 in relation to S1, secondary to facet arthropathy at these levels.  Alignment elsewhere in the lumbar and visualized lower thoracic spine appears unremarkable.  Aside from L1, the visualized vertebral body heights are normally maintained without compression deformity.  Disc narrowing is seen at every level from L1-2 through L4-5.  Vertebral endplates appear well defined.  No osseous destructive process.                                       MRI Lumbar Spine Without Contrast (Final result)  Result time 08/22/24 08:22:29      Final result by Kenneth Dillon MD (08/22/24 08:22:29)                   Impression:      L1 vertebral body compression fracture resulting in  vertebra plana configuration with 6 mm retropulsion.  Finding results in no more than moderate spinal canal stenosis at T12-L1 and moderate bilateral neural foraminal narrowing at L1-L2.    Multilevel lumbar spondylosis, as characterized above, notable for moderate bilateral neural foraminal narrowing at L3-L4, severe bilateral subarticular recess stenosis at L4-L5 and moderate spinal canal stenosis at L4-L5.    Additional details, as provided in the body of report.    Electronically signed by resident: Malcolm Vazquez  Date:    08/22/2024  Time:    06:19    Electronically signed by: Kenneth Dillon  Date:    08/22/2024  Time:    08:22               Narrative:    EXAMINATION:  MRI LUMBAR SPINE WITHOUT CONTRAST    CLINICAL HISTORY:  Spine fracture, lumbar, pathological;    TECHNIQUE:  Multiplanar, multisequence MR images were acquired from the thoracolumbar junction to the sacrum without contrast.    COMPARISON:  CTA 08/21/2024    FINDINGS:  Alignment: Grade 1 anterolisthesis L4 on L5.  Retropulsion of L1 vertebral body.    Vertebrae: Compression fracture of the L1 vertebral body with marked height loss and 6 mm retropulsion.  Essentially complete height loss of the ventral and central aspect of the vertebral body.  Patchy edema-like signal within the L1 vertebra would in keeping with recent fracture.  No large paraspinous hematoma.  No definite intraspinal hemorrhage.  Elsewhere in the lumbar spine, centrally normal marrow signal for age; no endplate edema-like signal.    Discs: Disc desiccation and height loss at L2-L3 and L3-L4.    Cord: Conus terminates at L1-L2 and appears unremarkable.  Cauda equina appears unremarkable.    Degenerative findings:    *T12-L1: Compression fracture of the L1 vertebral body with retropulsion resulting in no more than moderate bilateral neural foraminal narrowing and moderate spinal canal stenosis.  *L1-L2: Compression fracture of the L1 vertebral body with retropulsion resulting  in moderate bilateral neural foraminal narrowing and mild spinal canal stenosis.  Minimal AP thecal sac diameter approximately 6.9 mm at level of maximum compression.  *L2-L3: Facet arthropathy and circumferential disc bulge resulting in mild bilateral neural foraminal narrowing and mild spinal canal stenosis.  *L3-L4: Facet arthropathy and ligamentum flavum buckling resulting in moderate bilateral neural foraminal narrowing and mild spinal canal stenosis.  *L4-L5: Grade 1 anterolisthesis, facet arthropathy and ligamentum flavum buckling with unroofing of the intervertebral disc resulting in severe bilateral subarticular recess stenosis (with encroachment and possible contact of traversing L5 nerve roots), mild left neural foraminal narrowing and moderate spinal canal stenosis.  *L5-S1: Facet arthropathy resulting in mild bilateral neural foraminal narrowing.  No spinal canal stenosis.  Paraspinal muscles & soft tissues: Unremarkable.                                        CTA Chest Abdomen Pelvis (Final result)  Result time 08/22/24 01:43:00      Final result by Randy Huerta MD (08/22/24 01:43:00)                   Impression:      CTA CHEST, CTA ABDOMEN, and CTA PELVIS:    No aortic aneurysm or dissection.    Interval progression of L1 vertebral body compression fracture with new, marked height loss (now essentially a vertebra plana) and 6 mm of osseous retropulsion.    This report was flagged in Epic as abnormal.    The critical finding was identified at approximately 00:15 on 08/22/2024, by Malcolm Vazquez MD, who reported it to Emma Vega PA-C, via telephone at 00:30 on 08/22/2024.  Patient name and medical record number were verified, read-back was performed.    Electronically signed by resident: Malcolm Vazquez  Date:    08/21/2024  Time:    23:48    Electronically signed by: Randy Huerta  Date:    08/22/2024  Time:    01:43               Narrative:    EXAMINATION:  CTA CHEST ABDOMEN  PELVIS    CLINICAL HISTORY:  Aortic aneurysm, known or suspected;    TECHNIQUE:  CTA images were obtained from the thoracic inlet to the pubic symphysis.  Noncontrast, arterial, and delayed phase images were acquired. 75 mL of intravenous Omnipaque 350 was administered for the postcontrast images.  Oral contrast was not administered. Axial MIP, sagittal, and coronal reformats were obtained.    COMPARISON:  Lumbar spine radiographs 05/29/2024    CT abdomen and pelvis 05/06/2024    FINDINGS:  THORACIC SOFT TISSUES:  No axillary or subpectoral lymphadenopathy. The visualized thyroid gland is unremarkable.    HEART & MEDIASTINUM: Heart size at upper limit of normal.  No pericardial effusion.  Dense 4-vessel coronary artery calcifications, a CT marker for underlying coronary artery disease.  Left atrial enlargement, prominent to mildly enlarged right atrium.    PLEURA:  No pleural effusion or pneumothorax.    LUNGS & AIRWAYS:  Airways are patent. Bibasilar subsegmental atelectasis    HEPATOBILIARY:  No focal hepatic lesions. No biliary ductal dilatation. Normal gallbladder.    SPLEEN:  No splenomegaly.  Tiny accessory spleen.    PANCREAS:  No focal masses or ductal dilatation.    ADRENALS:  No adrenal nodules.    KIDNEYS/URETERS: Kidneys enhance symmetrically.  No hydronephrosis, stones or solid mass lesions. Ureters are unremarkable.    PELVIC ORGANS/BLADDER:  The uterus is absent.  The ovaries are inconspicuous but are thought to both remain.    PERITONEUM / RETROPERITONEUM:  No free air. No free fluid.    LYMPH NODES:  No lymphadenopathy.    VESSELS:  Thoracic aorta is normal in caliber with moderate calcific plaque at the aortic arch.  No dissection flap.  Left-sided 3 vessel arch.  Visualized portions of the arch vessels are patent.    The celiac, superior mesenteric and inferior mesenteric arteries are patent.  Single, patent renal arteries bilaterally.  Moderate aortoiliac calcific plaque.    Abdominal aorta is  normal in caliber.  No aneurysm or dissection flap.    GI TRACT:  Stomach, small bowel, and colon demonstrate no luminal obstruction.  Stomach suboptimally distended, limiting CT assessment for mural mucosal thickening.  Duodenal diverticulum again noted.  The appendix is not identified with certainty; it is perhaps surgically absent.    BONES AND ABDOMINAL SOFT TISSUES: Abdominal soft tissues are unremarkable.  Interval progression of L1 vertebral body compression fracture with new, marked height loss and 6 mm of retropulsion.  Pre-existing grade 1 anterolisthesis L4 on L5.                                        X-Ray Chest PA And Lateral (Final result)  Result time 08/21/24 22:12:11      Final result by Juan Manuel Stern MD (08/21/24 22:12:11)                   Impression:      1. There is slight widening of the mediastinum with slight tortuosity and prominence of the descending thoracic aorta, increased from the prior study.  Aortic aneurysm is a consideration. CT chest follow-up is recommended for better characterization.  2. Probable moderate hiatal hernia.  3. This report was flagged in Epic as abnormal.      Electronically signed by: Juan Manuel Stern  Date:    08/21/2024  Time:    22:12               Narrative:    EXAMINATION:  XR CHEST PA AND LATERAL    CLINICAL HISTORY:  Dysphagia, unspecified    TECHNIQUE:  PA and lateral views of the chest were performed.    COMPARISON:  05/22/2024    FINDINGS:  The lungs are clear, with normal appearance of pulmonary vasculature and no pleural effusion or pneumothorax.    The cardiac silhouette is normal in size.    There is slight widening of the mediastinum with slight tortuosity and prominence of the descending thoracic aorta, increased from prior study.    Aortic aneurysm is a consideration.  CT chest follow-up recommended for better characterization.    Probable moderate hiatal hernia.    Bones are intact.                                       Medications    aluminum-magnesium hydroxide-simethicone 200-200-20 mg/5 mL suspension 30 mL (30 mLs Oral Given 8/21/24 2105)     And   LIDOcaine viscous HCl 2% oral solution 15 mL (15 mLs Oral Given 8/21/24 2105)   iohexoL (OMNIPAQUE 350) injection 75 mL (75 mLs Intravenous Given 8/21/24 2318)   midazolam injection 1 mg (1 mg Intravenous Given 8/22/24 0500)   potassium bicarbonate disintegrating tablet 25 mEq (25 mEq Oral Given 8/22/24 1025)     Medical Decision Making  Betzy Cooley is a 84-year-old female with a with a hx of 3v CAD s/p PCI on 5/17/24 done after NSTEMI (was declined by CTS due to frailty), HTN, uncontrolled GERD/gastritis who presents to [facility] ED with her  for emergent evaluation of transient dysphagia.    Differential diagnosis includes but isn't limited to GERD, gastritis, peptic ulcer disease, dysphagia, food bolus, ACS.    Amount and/or Complexity of Data Reviewed  Labs: ordered. Decision-making details documented in ED Course.  Radiology: ordered.    Risk  OTC drugs.  Prescription drug management.               ED Course as of 08/21/24 2148   Wed Aug 21, 2024   1924 BP(!): 167/87 [CL]   1924 Temp: 97.4 °F (36.3 °C) [CL]   1924 Pulse: 79 [CL]   1924 Resp: 19 [CL]   1924 SpO2: 97 % [CL]   2057 WBC: 9.06 [CL]   2057 Hemoglobin(!): 10.8 [CL]   2057 Platelet Count: 341 [CL]   2101 Troponin I: 0.009 [CL]   2101 Lipase: 14 [CL]   2101 BNP: 41 [CL]   2101 Sodium: 141 [CL]   2101 Potassium: 3.7 [CL]   2101 Glucose(!): 133 [CL]   2101 BUN: 12 [CL]   2101 Creatinine: 0.8 [CL]   2112 Patient tolerated GI cocktail without difficulty, making a food bolus less likely. [CL]      ED Course User Index  [CL] Bronwyn Raza PA-C          10:00 PM  Chest x-ray shows slight widening of the mediastinum with slight tortuosity of the thoracic aorta, increased from prior study.  Possible hiatal hernia.  Patient will be signed out to remaining ED team.  We will obtain a CT angiogram of the chest, abdomen, and pelvis.  Refer to  progress note.                 Clinical Impression:  Final diagnoses:  [R13.10] Dysphagia  [S32.010A] Closed compression fracture of body of L1 vertebra (Primary)  [R07.9] Chest pain          ED Disposition Condition    Discharge               Follow-up Information       Follow up With Specialties Details Why Contact Info Additional Information    [facility] Schedule an appointment as soon as possible for a visit   [address], [address]  [facility] [address] [phone] Turn at Entrance 1 on [facility] in [facility] and take elevators to Floor 2. Follow signs to [facility]. Take [facility] Elevators to Floor 4 for [address].    Praful Paul MD Internal Medicine Schedule an appointment as soon as possible for a visit   [address]  [place] [id number] [phone]       [facility] - Emergency Dept Emergency Medicine  If symptoms worsen [address] [facility] [id number] [phone]     Praful Paul MD Internal Medicine Schedule an appointment as soon as possible for a visit in 1 week(s)  [address]  [place] [address]  [phone]                Bronwyn Raza PA-C  08/22/24 1913

## 2024-08-22 NOTE — PHARMACY MED REC
"Admission Medication History     The home medication history was taken by Trey Marshall.    You may go to "Admission" then "Reconcile Home Medications" tabs to review and/or act upon these items.     The home medication list has been updated by the Pharmacy department.   Please read ALL comments highlighted in yellow.   Please address this information as you see fit.    Feel free to contact us if you have any questions or require assistance.      The medications listed below were removed from the home medication list. Please reorder if appropriate:  Patient reports no longer taking the following medication(s):  LIDOCAINE 5 % PATCH    Medications listed below were obtained from: Patient/family and Analytic software- [email or web address]  Current Outpatient Medications on File Prior to Encounter   Medication Sig    aspirin 81 MG Chew Take 81 mg by mouth once daily. Not chewable    atorvastatin (LIPITOR) 40 MG tablet Take 1 tablet (40 mg total) by mouth every evening.    calcium-vitamin D3 500 mg(1,250mg) -200 unit per tablet Take 1 tablet by mouth 2 (two) times daily with meals.    co-enzyme Q-10 30 mg capsule Take 30 mg by mouth once daily.    diclofenac sodium (VOLTAREN ARTHRITIS PAIN) 1 % Gel Apply 2 g topically daily as needed (Pain).    ezetimibe (ZETIA) 10 mg tablet Take 1 tablet (10 mg total) by mouth once daily.    hydrOXYzine HCL (ATARAX) 25 MG tablet Take 1 tablet (25 mg total) by mouth 3 (three) times daily as needed for Anxiety.    lanolin/mineral oil/petrolatum (ARTIFICIAL TEARS OPHT) Place 1 drop into both eyes once daily.    metoprolol succinate (TOPROL-XL) 25 MG 24 hr tablet Take 0.5 tablets (12.5 mg total) by mouth once daily.    multivitamin (THERAGRAN) per tablet Take 1 tablet by mouth once daily.    pantoprazole (PROTONIX) 40 MG tablet Take 1 tablet (40 mg total) by mouth 2 (two) times daily.    sodium chloride (SALINE MIST NASL) 1 spray by Each Nostril route daily as needed (Congestion).    ticagrelor " (BRILINTA) 90 mg tablet Take 1 tablet (90 mg total) by mouth 2 (two) times daily.    valsartan (DIOVAN) 80 MG tablet Take 1 tablet (80 mg total) by mouth once daily.    vitamin A-vitamin C-vit E-min Tab Take 1 tablet by mouth once daily.    nitroGLYCERIN (NITROSTAT) 0.4 MG SL tablet Place 1 tablet (0.4 mg total) under the tongue every 5 (five) minutes as needed for Chest pain.                 Trey Marshall  EXT [id number]                  .

## 2024-08-22 NOTE — PLAN OF CARE
Patient seen and examined by Dr. Kingsley, full consult to follow    Pending MRI L spine WO  Pending upright/supine XR in TLSO brace  ASA81/Brilinta would need to be reversed prior to any neurosurgical intervention. Would also need LVX held.  NPO until imaging completed  PVR pending    Final recs pending imaging    Cari Velarde MD  Neurosurgery  [facility]

## 2024-08-22 NOTE — ASSESSMENT & PLAN NOTE
Patient with known CAD s/p stent placement, which is controlled Will continue  Brillinta, ASA, and Statin and monitor for S/Sx of angina/ACS. Continue to monitor on telemetry.     - continue brillinta given recent stents (7) placement in May-- if need to hold for possible procedure, will need to speak to cardiology prior

## 2024-08-22 NOTE — PT/OT/SLP EVAL
Speech Language Pathology Evaluation and Discharge  Bedside Swallow    Patient Name:  Betzy Cooley   MRN:  [id number]  Admitting Diagnosis: Closed compression fracture of body of L1 vertebra    Recommendations:                 General Recommendations:  Follow-up not indicated  Diet recommendations:  Regular Diet - IDDSI Level 7, Thin liquids - IDDSI Level 0   Aspiration Precautions: Standard aspiration precautions   General Precautions: Standard,    Communication strategies:  none    Assessment:     Betzy Cooley is a 84 y.o. female who presents with a functional swallow for PO intake of an unmodified diet. SLP to continue to follow.      History:     Past Medical History:   Diagnosis Date    Arthritis     Cataract     GERD (gastroesophageal reflux disease)     HEARING LOSS     Hypertension     Osteoporosis, postmenopausal     Squamous Cell Carcinoma 2007    right forearm    Urinary incontinence     rare mixed       Past Surgical History:   Procedure Laterality Date    ANGIOGRAM, CORONARY, WITH LEFT HEART CATHETERIZATION Bilateral 5/17/2024    Procedure: Angiogram, Coronary, with Left Heart Cath;  Surgeon: Miguel Jacob MD;  Location: [facility] CATH LAB;  Service: Cardiology;  Laterality: Bilateral;    COLPOPEXY N/A 08/06/2019    Procedure: COLPOPEXY SSL FIXATION;  Surgeon: Alma Dorsey MD;  Location: [address];  Service: Urology;  Laterality: N/A;    CORONARY ANGIOGRAPHY Left 5/16/2024    Procedure: ANGIOGRAM, CORONARY ARTERY;  Surgeon: Miguel Jacob MD;  Location: [facility] CATH LAB;  Service: Cardiology;  Laterality: Left;    CYSTOSCOPY N/A 08/06/2019    Procedure: CYSTOSCOPY;  Surgeon: Alma Dorsey MD;  [address];  Service: Urology;  Laterality: N/A;    ESOPHAGOGASTRODUODENOSCOPY N/A 4/1/2024    Procedure: EGD (ESOPHAGOGASTRODUODENOSCOPY);  Surgeon: Zbigniew Dougherty MD;  Location: [facility];  Service: Endoscopy;  Laterality: N/A;    FRACTURE SURGERY Left 2008     open radius/ulna fracture    HYSTERECTOMY      TANIA/ovaries remain--fibroids- in her late 30's / early 40's    IVUS, CORONARY  5/17/2024    Procedure: IVUS, Coronary;  Surgeon: Miguel Jacob MD;  Location: [facility] CATH LAB;  Service: Cardiology;;    STENT, DRUG ELUTING, MULTI VESSEL, CORONARY  5/17/2024    Procedure: Stent, Drug Eluting, Multi Vessel, Coronary;  Surgeon: Miguel Jacob MD;  Location: [facility] CATH LAB;  Service: Cardiology;;       Social History: Patient lives with her spouse in their home    Prior Intubation HX:  none this admit    Modified Barium Swallow: none on file    Prior diet: reg/thin.    Occupation/hobbies/homemaking: none stated    Subjective     Spoke with nursing prior to session. Pt found pacing throughout room with spouse at bedside upon SLP entry into room. Pt agreeable to participate in session given verbal encouragement.      Patient goals: to go home     Pain/Comfort:  Pain Rating 1: 0/10    Respiratory Status: Room air    Objective:     Oral Musculature Evaluation  Oral Musculature: WFL  Dentition: scattered dentition  Secretion Management: adequate  Mucosal Quality: adequate  Oral Labial Strength and Mobility: functional seal, functional pursing, functional retraction  Lingual Strength and Mobility: functional anterior elevation, functional protrusion, functional lateral movement  Voice Prior to PO Intake: clear, strong    Bedside Swallow Eval:   Consistencies Assessed:  Thin liquids: self-fed by the single and consecutive open cup sips  Puree: self-fed by the tsp of pudding  Solid: self-fed hunter cracker      Oral Phase:   WFL    Pharyngeal Phase:   no overt clinical signs/symptoms of aspiration  no overt clinical signs/symptoms of pharyngeal dysphagia    Compensatory Strategies  None    Treatment: Pt with generalized restlessness as noted by persistent pacing throughout room. Back brace noted at bedside and when SLP encouraged pt to don braced 2/2 known L1 compression  "fracture, pt becoming agitated and stated "I don't know why everyone keeps telling me that, nothing is wrong with my back!" SLP provided education regarding overall impressions, regular diet with thin liquids, and ongoing SLP POC. Pt verbalized understanding but would continue to benefit from ongoing education. Pt verbalized understanding and had no additional questions or concerns upon SLP exit. Spoke with RN regarding impressions and recommendations and nursing verbalized understanding.     Goals:   Multidisciplinary Problems       SLP Goals       Not on file                    Plan:     Patient to be seen:      Plan of Care expires:     Plan of Care reviewed with:  patient, spouse   SLP Follow-Up:  No       Discharge recommendations:      Barriers to Discharge:  None    Time Tracking:     SLP Treatment Date:   08/22/24  Speech Start Time:  1011  Speech Stop Time:  1023     Speech Total Time (min):  12 min    Billable Minutes: Eval Swallow and Oral Function 12      08/22/2024         "

## 2024-08-22 NOTE — SUBJECTIVE & OBJECTIVE
(Not in a hospital admission)      Review of patient's allergies indicates:   Allergen Reactions    Sulfa (sulfonamide antibiotics) Hives     Other reaction(s): Anaphylaxis  Itching   Shortness of breath        Past Medical History:   Diagnosis Date    Arthritis     Cataract     GERD (gastroesophageal reflux disease)     HEARING LOSS     Hypertension     Osteoporosis, postmenopausal     Squamous Cell Carcinoma 2007    right forearm    Urinary incontinence     rare mixed     Past Surgical History:   Procedure Laterality Date    ANGIOGRAM, CORONARY, WITH LEFT HEART CATHETERIZATION Bilateral 5/17/2024    Procedure: Angiogram, Coronary, with Left Heart Cath;  Surgeon: Miguel Jacob MD;  Location: [facility] CATH LAB;  Service: Cardiology;  Laterality: Bilateral;    COLPOPEXY N/A 08/06/2019    Procedure: COLPOPEXY SSL FIXATION;  Surgeon: Alma Dorsey MD;  Location: [address];  Service: Urology;  Laterality: N/A;    CORONARY ANGIOGRAPHY Left 5/16/2024    Procedure: ANGIOGRAM, CORONARY ARTERY;  Surgeon: Miguel Jacob MD;  Location: [facility] CATH LAB;  Service: Cardiology;  Laterality: Left;    CYSTOSCOPY N/A 08/06/2019    Procedure: CYSTOSCOPY;  Surgeon: Alma Dorsey MD;  Location: [address];  Service: Urology;  Laterality: N/A;    ESOPHAGOGASTRODUODENOSCOPY N/A 4/1/2024    Procedure: EGD (ESOPHAGOGASTRODUODENOSCOPY);  Surgeon: Zbigniew Dougherty MD;  Location: [facility];  Service: Endoscopy;  Laterality: N/A;    FRACTURE SURGERY Left 2008    open radius/ulna fracture    HYSTERECTOMY      TANIA/ovaries remain--fibroids- in her late 30's / early 40's    IVUS, CORONARY  5/17/2024    Procedure: IVUS, Coronary;  Surgeon: Miguel Jacob MD;  Location: [facility] CATH LAB;  Service: Cardiology;;    STENT, DRUG ELUTING, MULTI VESSEL, CORONARY  5/17/2024    Procedure: Stent, Drug Eluting, Multi Vessel, Coronary;  Surgeon: Miguel Jacob MD;  Location: [facility] CATH LAB;  Service: Cardiology;;      Family History       Problem Relation (Age of Onset)    Arthritis Sister    COPD Mother, Father    Diabetes Sister    Heart disease Mother    Heart failure Mother    Macular degeneration Mother    No Known Problems Brother, Son, Daughter, Sister, Sister, Sister, Brother, Brother, Brother, Brother, Brother, Daughter, Son          Tobacco Use    Smoking status: Never    Smokeless tobacco: Never   Substance and Sexual Activity    Alcohol use: Not Currently     Comment: 1-2 glasses wine weekly    Drug use: No    Sexual activity: Yes     Partners: Male     Birth control/protection: None     Review of Systems   Constitutional:  Positive for activity change. Negative for fever.   HENT:  Positive for trouble swallowing.    Respiratory:  Negative for shortness of breath.    Gastrointestinal:  Negative for abdominal pain.   Genitourinary:  Negative for difficulty urinating and urgency.        Denies saddle anesthesia   Neurological:  Positive for weakness. Negative for numbness.        Occasional shooting pains down bilateral legs, right>left   All other systems reviewed and are negative.    Objective:     Weight: 49.4 kg (109 lb)  Body mass index is 19.94 kg/m².  Vital Signs (Most Recent):  Temp: 98.1 °F (36.7 °C) (08/22/24 0645)  Pulse: 74 (08/22/24 0645)  Resp: 18 (08/22/24 0645)  BP: 136/69 (08/22/24 0545)  SpO2: 99 % (08/22/24 0645) Vital Signs (24h Range):  Temp:  [97.4 °F (36.3 °C)-98.9 °F (37.2 °C)] 98.1 °F (36.7 °C)  Pulse:  [57-81] 74  Resp:  [17-20] 18  SpO2:  [97 %-100 %] 99 %  BP: (136-183)/(69-90) 136/69                                 Physical Exam  General: AOx3, GCS [id number]  CNII-XII: Intact on detailed exam, PERRL, EOMi, facial sensation preserved, no facial asymmetry, tongue/uvula/palate midline, shoulder shrug equal, No pronator drift  Extremities:  Motor:  Upper Extremity:                                  Deltoids        Triceps        Biceps        WE        WF                Interosseous           " R        5/5              5/5              5/5             5/5         5/5         5/5                5/5           L         5/5              5/5              5/5             5/5         5/5         5/5                5/5    Lower Extremity:                                      HF        KE        KF        DF        PF        EHL           R       5/5        5/5        5/5        5/5        5/5        5/5           L        5/5        5/5        5/5        5/5        5/5        5/5      Reflexes:     DTR: 2+ and symmetrically throughout     Monae's: Negative     Clonus: Negative    Sensory:      Sensorium intact throughout, no sensory level present    Gross Exam:  Posture: pt leans forward and to her right   No TTP             Significant Labs:  Recent Labs   Lab 08/21/24 2018 08/22/24  0604   * 85    142   K 3.7 3.3*    112*   CO2 25 23   BUN 12 11   CREATININE 0.8 0.7   CALCIUM 9.7 8.5*   MG  --  1.9     Recent Labs   Lab 08/21/24 2018 08/22/24  0604   WBC 9.06 8.54   HGB 10.8* 10.0*   HCT 35.0* 32.4*    296     No results for input(s): "LABPT", "INR", "APTT" in the last 48 hours.  Microbiology Results (last 7 days)       ** No results found for the last 168 hours. **          Recent Lab Results         08/22/24  0604   08/21/24 2329 08/21/24 2018        Albumin     3.7       ALP     163       ALT     22       Anion Gap 7     10       AST     30       Baso # 0.06     0.06       Basophil % 0.7     0.7       BILIRUBIN TOTAL     0.4  Comment: For infants and newborns, interpretation of results should be based  on gestational age, weight and in agreement with clinical  observations.    Premature Infant recommended reference ranges:  Up to 24 hours.............<8.0 mg/dL  Up to 48 hours............<12.0 mg/dL  3-5 days..................<15.0 mg/dL  6-29 days.................<15.0 mg/dL         BNP     41  Comment: Values of less than 100 pg/ml are consistent with non-CHF " populations.       BUN 11     12       Calcium 8.5     9.7       Chloride 112     106       CO2 23     25       Creatinine 0.7     0.8       Differential Method Automated     Automated       eGFR >60.0     >60.0       Eos # 0.1     0.2       Eos % 1.5     2.1       Glucose 85     133       Gran # (ANC) 5.9     5.8       Gran % 69.1     64.1       Hematocrit 32.4     35.0       Hemoglobin 10.0     10.8       Immature Grans (Abs) 0.02  Comment: Mild elevation in immature granulocytes is non specific and   can be seen in a variety of conditions including stress response,   acute inflammation, trauma and pregnancy. Correlation with other   laboratory and clinical findings is essential.       0.02  Comment: Mild elevation in immature granulocytes is non specific and   can be seen in a variety of conditions including stress response,   acute inflammation, trauma and pregnancy. Correlation with other   laboratory and clinical findings is essential.         Immature Granulocytes 0.2     0.2       Lipase     14       Lymph # 1.7     2.1       Lymph % 19.6     23.2       Magnesium  1.9           MCH 29.6     29.4       MCHC 30.9     30.9       MCV 96     95       Mono # 0.8     0.9       Mono % 8.9     9.7       MPV 8.9     8.9       nRBC 0     0       Platelet Count 296     341       Potassium 3.3     3.7       PROTEIN TOTAL     7.5       RBC 3.38     3.67       RDW 13.7     13.8       Sodium 142     141       Troponin I <0.006  Comment: The reference interval for Troponin I represents the 99th percentile   cutoff   for our facility and is consistent with 3rd generation assay   performance.     0.009  Comment: The reference interval for Troponin I represents the 99th percentile   cutoff   for our facility and is consistent with 3rd generation assay   performance.     0.009  Comment: The reference interval for Troponin I represents the 99th percentile   cutoff   for our facility and is consistent with 3rd generation assay    performance.         WBC 8.54     9.06             All pertinent labs from the last 24 hours have been reviewed.    Significant Diagnostics:  CT: No results found in the last 24 hours.  MRI: No results found in the last 24 hours.  I have reviewed all pertinent imaging results/findings within the past 24 hours.  I have reviewed and interpreted all pertinent imaging results/findings within the past 24 hours.

## 2024-08-22 NOTE — ED NOTES
Pt offered a hospital bed does not want to be in a bed or stretcher, refusing to be on monitor as well.  has been wheeling her around in a wheelchain since she is not wanting to stay still at this time.

## 2024-08-22 NOTE — ED NOTES
Pt still not happy about being in the hospital, not really wanting to take her meds, not wanting to stay here.

## 2024-08-22 NOTE — PLAN OF CARE
Clinical swallow evaluation completed. Recommend a regular diet (IDDSI 7) with thin liquids (IDDSI 0) and meds whole. No additional skilled speech services required at this time.

## 2024-08-22 NOTE — PLAN OF CARE
Imaging reviewed by and discussed with attending neurosurgeons Dr. Aguilera and Dr. Ybarra. MRI lumbar spine is without evidence of ligamentous injury. There is no dynamic instability seen on Upright and supine lumbar x-ray.     - Patient to remain in brace at all times when upright   - DME contacted for better fitting brace  - Patient to follow up in Neurosurgery clinic with repeat x-ray in 4 weeks  - Neurosurgery to sign off. We are available for any questions. We will arrange clinic follow up.

## 2024-08-22 NOTE — ASSESSMENT & PLAN NOTE
Chronic, controlled. Latest blood pressure and vitals reviewed-     Temp:  [97.4 °F (36.3 °C)-98.4 °F (36.9 °C)]   Pulse:  [57-79]   Resp:  [17-19]   BP: (146-183)/(82-90)   SpO2:  [97 %-100 %] .   Home meds for hypertension were reviewed and noted below.   Hypertension Medications               metoprolol succinate (TOPROL-XL) 25 MG 24 hr tablet Take 0.5 tablets (12.5 mg total) by mouth once daily.    nitroGLYCERIN (NITROSTAT) 0.4 MG SL tablet Place 1 tablet (0.4 mg total) under the tongue every 5 (five) minutes as needed for Chest pain.    valsartan (DIOVAN) 80 MG tablet Take 1 tablet (80 mg total) by mouth once daily.            While in the hospital, will manage blood pressure as follows; Continue home antihypertensive regimen    Will utilize p.r.n. blood pressure medication only if patient's blood pressure greater than 180/110 and she develops symptoms such as worsening chest pain or shortness of breath.

## 2024-08-22 NOTE — ED NOTES
Pt finally convinced to lie in stretcher and move from the chair, patient agreed to take her meds as well.

## 2024-08-22 NOTE — ED NOTES
Assumed care of the patient. Report received from Helen BILLY. Pt on continuous cardiac monitoring, continuous pulse oximetry, and automatic BP cuff cycling Q 30 min. Pt in hospital gown, side rails up X2, bed low and locked, and call light is placed within reach.  at bedside at this time. Pt denies any complaints or needs. Pt arrives to room 35 without brace in place. RN attempted to put pt in back brace, pt refuses.

## 2024-08-22 NOTE — DISCHARGE SUMMARY
[facility] - Emergency Dept  [facility]  Discharge Summary      Patient Name: Betzy Cooley  MRN: [id number]  HARPREET: [id number]  Patient Class: OP- Observation  Admission Date: 8/21/2024  Hospital Length of Stay: 0 days  Discharge Date and Time: 8/22/2024  3:32 PM  Attending Physician: No att. providers found   Discharging Provider: Maddie Jorge PA-C  Primary Care Provider: Praful Paul MD  [facility] Medicine Team: [facility]OS[facility] MED  Maddie Jorge PA-C  Primary Care Team: [facility]    HPI:   Betzy Cooley is a 84 y.o. female with a PMHx of CAD s/p stenting, HTN, GERD, HLD, hiatal hernia who presents to [facility] for evaluation of dysphagia and dry mouth. Patient reports dry mouth for years. Last night she felt like food (chicken, rice) was stuck in her throat/chest from dryness. She tried drinking fluids and regurgitated water. Symptoms improved since treated with GI cocktail in the ED earlier tonight. Denies any difficulty tolerating secretions or SOB. She denies having chest pain or back pain currently.  Patient had a mechanical fall several weeks ago. Denies head trauma or LOC. Denies numbness/tingling, bowel or bladder incontinence, or saddle anesthesia.       ED: AFVSS. No leukocytosis or electrolyte abnormalities. CTA chest negative for aortic aneurysm or dissection, but shows L1 compression fracture with 6 mm of retropulsion. Neurosurgery consulted and recommend admission, NPO, MRI, and TLSO brace. Admitted to  for further eval and management.     * No surgery found *      Hospital Course:   Betzy Cooley is a 84 y.o. F who was admitted to  for further evaluation of closed compression fracture of L1 vertebra. NSGY evaluated patient with recommendations of LSO brace with possible intervention. NSGY deferred intervention and recommended patient follow up with NSGY outpatient. Patient removing brace multiple times throughout admission despite being educated on importance of wearing. I  expressed the risks to the patient of not wearing the brace including, but not limited to, severe pain, nerve damage, paralysis. Patient verbalized understanding. SLP also evaluated patient due to symptoms of dysphagia upon admission. Recommended regular adult, thin liquids. Patient medically ready and very eager for discharge. Set up patient with HH and PT/OT. Plan to follow up with PCP, [facility]. Return precautions provided. Patient was seen and assessed on day of discharge. Plan of care discussed with patient, patient agreeable with plan, and all questions answered.    Physical Exam  Gen: in NAD, appears stated age  Neuro: mental status at baseline, motor, sensory, and strength grossly intact BL  HEENT: EOMI, PERRLA; no JVD appreciated  CVS: RRR, no m/r/g  Resp: lungs CTAB, no w/r/r; no belabored breathing or accessory muscle use appreciated   Abd: NTND, soft to palpation, LSO brace in place  Extrem: no UE or LE edema BL         Goals of Care Treatment Preferences:  Code Status: Full Code         Consults:   Consults (From admission, onward)          Status Ordering Provider     Inpatient consult to Neurosurgery  Once        Provider:  (Not yet assigned)    CHRISTIAN Luciano            No new Assessment & Plan notes have been filed under this hospital service since the last note was generated.  Service: Hospital Medicine    Final Active Diagnoses:    Diagnosis Date Noted POA    PRINCIPAL PROBLEM:  Closed compression fracture of body of L1 vertebra [S32.010A] 08/22/2024 Yes    Dysphagia [R13.10] 08/22/2024 Yes    Hypokalemia [E87.6] 08/22/2024 No    CAD S/P percutaneous coronary angioplasty [I25.10, Z98.61] 06/17/2024 Not Applicable    Dyslipidemia [E78.5] 06/17/2024 Yes    Coronary artery disease with refractory angina pectoris [I25.112] 05/15/2024 Yes    Hypertension [I10] 04/29/2024 Yes    GERD (gastroesophageal reflux disease) [K21.9] 05/27/2014 Yes      Problems Resolved During this Admission:  "      Discharged Condition: good    Disposition: Home or Self Care    Follow Up:   Follow-up Information       [facility]. Schedule an appointment as soon as possible for a visit .    Specialty: Spine Services  Contact information:  [address], [address]  [facility] [address] [phone]  Additional information:  Turn at Entrance [address] on [facility] in [facility] and take elevators to Floor 2. Follow signs to [facility]. Take [employer] Elevators to Floor 4 for [address].             Praful Paul MD. Schedule an appointment as soon as possible for a visit .    Specialty: Internal Medicine  Contact information:  [address]  [place] [id number] [phone]               [facility] - Emergency Dept .    Specialty: Emergency Medicine  Why: If symptoms worsen  Contact information:  [address] [facility] [address] [phone]             Praful Paul MD. Schedule an appointment as soon as possible for a visit in 1 week(s).    Specialty: Internal Medicine  Contact information:  [address]  [place] [id number] [phone]                           Patient Instructions:      ORTHOPEDIC BRACING FOR HOME USE - UPPER EXTREMITY     Order Specific Question Answer Comments   Height: 5' 2" (1.575 m)    Weight: 49.4 kg (109 lb)    Length of need (1-99 months): 3    Laterality: Bilateral    Product(s) ordered: Lumbar sacral support TLSO     Ambulatory referral/consult to [facility]   Standing Status: Future   Referral Priority: Routine Referral Type: Consultation   Referral Reason: Specialty Services Required   Number of Visits Requested: 1     Notify your health care provider if you experience any of the following:  severe uncontrolled pain     Notify your health care provider if you experience any of the following:  redness, tenderness, or signs of infection (pain, swelling, redness, odor or green/yellow discharge " around incision site)     Notify your health care provider if you experience any of the following:  persistent dizziness, light-headedness, or visual disturbances     Notify your health care provider if you experience any of the following:  increased confusion or weakness     Activity as tolerated       Significant Diagnostic Studies: N/A    Pending Diagnostic Studies:       None           Medications:  Reconciled Home Medications:      Medication List        CONTINUE taking these medications      aspirin 81 MG Chew  Take 81 mg by mouth once daily. Not chewable     atorvastatin 40 MG tablet  Commonly known as: LIPITOR  Take 1 tablet (40 mg total) by mouth every evening.     calcium-vitamin D3 500 mg-5 mcg (200 unit) per tablet  Commonly known as: OS-ESTIVEN 500 + D3  Take 1 tablet by mouth 2 (two) times daily with meals.     co-enzyme Q-10 30 mg capsule  Take 30 mg by mouth once daily.     ezetimibe 10 mg tablet  Commonly known as: ZETIA  Take 1 tablet (10 mg total) by mouth once daily.     hydrOXYzine HCL 25 MG tablet  Commonly known as: ATARAX  Take 1 tablet (25 mg total) by mouth 3 (three) times daily as needed for Anxiety.     LIDOcaine 5 %  Commonly known as: LIDODERM  Place 1 patch onto the skin once daily. Remove & Discard patch within 12 hours or as directed by MD     metoprolol succinate 25 MG 24 hr tablet  Commonly known as: TOPROL-XL  Take 0.5 tablets (12.5 mg total) by mouth once daily.     multivitamin per tablet  Commonly known as: THERAGRAN  Take 1 tablet by mouth once daily.     nitroGLYCERIN 0.4 MG SL tablet  Commonly known as: NITROSTAT  Place 1 tablet (0.4 mg total) under the tongue every 5 (five) minutes as needed for Chest pain.     pantoprazole 40 MG tablet  Commonly known as: PROTONIX  Take 1 tablet (40 mg total) by mouth 2 (two) times daily.     SALINE MIST NASL  1 spray by Each Nostril route daily as needed (Congestion).     ticagrelor 90 mg tablet  Commonly known as: BRILINTA  Take 1 tablet (90  mg total) by mouth 2 (two) times daily.     valsartan 80 MG tablet  Commonly known as: DIOVAN  Take 1 tablet (80 mg total) by mouth once daily.     vitamin A-vitamin C-vit E-min Tab  Take 1 tablet by mouth once daily.     VOLTAREN ARTHRITIS PAIN 1 % Gel  Generic drug: diclofenac sodium  Apply 2 g topically daily as needed (Pain).              Indwelling Lines/Drains at time of discharge:   Lines/Drains/Airways       None                   Time spent on the discharge of patient: 45 minutes         Maddie Jorge PA-C  Department of Hospital Medicine  [facility] - Emergency Dept

## 2024-08-22 NOTE — PROVIDER PROGRESS NOTES - EMERGENCY DEPT.
Encounter Date: 8/21/2024    ED Physician Progress Notes        Physician Note:   Case was signed out to me by Bronwyn Raza PA-C at shift change    Briefly 84-year-old female presenting with dysphagia after eating dinner.  Felt symptoms improved while in the ED.  Tolerated GI cocktail without difficulty.  Lab work unremarkable    X-ray chest:  1. There is slight widening of the mediastinum with slight tortuosity and prominence of the descending thoracic aorta, increased from the prior study.  Aortic aneurysm is a consideration. CT chest follow-up is recommended for better characterization.  2. Probable moderate hiatal hernia.      1:31 AM  Discussed case with Neurosurgery on-call who will evaluate patient bedside and provide recommendation.  Recommendations at this time.  Case was signed out to Dr. Gilliland at the end of my shift for final disposition pending recommendations  Obtaining CTA chest abdomen pelvis, pending results at this time.      Received call from radiology resident.  CTA does not reveal any acute pathology to the aorta including aortic dissection or rupture.  Patient however does have an L1 compression fracture with significant height loss with 6 mm retropulsion.  On My reassessment patient is resting comfortably.  No focal neurological deficit.  She is ambulatory with assistance as she does use a walker at baseline.  Given the CT findings, consultation placed to Neurosurgery, pending call back

## 2024-08-22 NOTE — ED NOTES
Anesthesia Evaluation     Patient summary reviewed   history of anesthetic complications (Glidescope IIb at [place] after DL attempt unsuccessful, recent attempt to place double lumen tube unsuccessful after multiple attempts): difficult airway  NPO Solid Status: > 8 hours  NPO Liquid Status: > 8 hours           Airway   Mallampati: III  TM distance: >3 FB  Neck ROM: full  Difficult intubation highly probable  Dental - normal exam     Pulmonary    (+) pleural effusion, a smoker (quit 1996) Former, lung cancer, sleep apnea,   (-) COPD, asthma  Cardiovascular   Exercise tolerance: good (4-7 METS)    ECG reviewed    (+) hypertension, dysrhythmias Paroxysmal Atrial Fib,   (-) past MI, CAD, angina, cardiac stents, hyperlipidemia      Neuro/Psych  (-) seizures, TIA, CVA  GI/Hepatic/Renal/Endo    (+) obesity,   thyroid problem   (-) liver disease, no renal disease, diabetes    Musculoskeletal     Abdominal    Substance History      OB/GYN          Other      history of cancer (prostate, lung invasive mucinous adenocarcinoma.) active        Phys Exam Other: Recessed chin                  Anesthesia Plan    ASA 3     general     intravenous induction     Anesthetic plan, all risks, benefits, and alternatives have been provided, discussed and informed consent has been obtained with: patient.       Team at bedside

## 2024-08-22 NOTE — ED TRIAGE NOTES
Betzy Cooley, a 84 y.o. female presents to the ED w/ complaint of dysphagia after eating today. Airway intact, secretions controlled at this time.     Triage note:  Chief Complaint   Patient presents with    Dysphagia     Trouble swallowing that began today after eating dinner/reports eating chicken strips and steamed rice. No vomiting. No drooling     Review of patient's allergies indicates:   Allergen Reactions    Sulfa (sulfonamide antibiotics) Hives     Other reaction(s): Anaphylaxis  Itching   Shortness of breath      Past Medical History:   Diagnosis Date    Arthritis     Cataract     GERD (gastroesophageal reflux disease)     HEARING LOSS     Hypertension     Osteoporosis, postmenopausal     Squamous Cell Carcinoma 2007    right forearm    Urinary incontinence     rare mixed   Patient identifiers for Betzy Cooley checked and correct.    LOC: The patient is awake, alert and aware of environment with an appropriate affect, the patient is oriented x 4 and speaking appropriately.    APPEARANCE: Patient resting comfortably and in no acute distress, patient is clean and well groomed, patient's clothing is properly fastened.    SKIN: The skin is warm and dry, color consistent with ethnicity, patient has normal skin turgor and moist mucus membranes, skin intact, no breakdown or bruising noted.    MUSCULOSKELETAL: Patient moving all extremities well, no obvious swelling or deformities noted.    RESPIRATORY: Airway is open and patent, respirations are spontaneous and even, patient has a normal effort and rate.    CARDIAC: Patient has a normal rate and rhythm, no periphreal edema noted, capillary refill < 3 seconds.    ABDOMEN: Soft and non tender to palpation, no distention noted. Patient denies any nausea, vomiting, diarrhea, or constipation.     NEUROLOGIC: Eyes open spontaneously, PERRL, behavior appropriate to situation, follows commands, facial expression symmetrical, bilateral hand grasp equal and even,  purposeful motor response noted, normal sensation in all extremities.     HEENT: No abnormalities noted. White sclera and pupils equal round and reactive to light. Denies headache, dizziness. Reports dysphagia     : Pt voids independently, denies dysuria, hematuria, frequency.

## 2024-08-22 NOTE — ED NOTES
Pt and spouse notified on awaiting NSY to come speak on POC on clearance before able to have diet ordered placed. Pt and spouse understanding.

## 2024-08-22 NOTE — ED NOTES
Pt and pt  expressing confusion as to plans of care on admission. Hospital Medicine team notified. PA to come speak with patient and spouse when available. RN explained POC.  understanding.

## 2024-08-22 NOTE — HOSPITAL COURSE
Betzy Cooley is a 84 y.o. F who was admitted to  for further evaluation of closed compression fracture of L1 vertebra. NSGY evaluated patient with recommendations of LSO brace with possible intervention. NSGY deferred intervention and recommended patient follow up with NSGY outpatient. Patient removing brace multiple times throughout admission despite being educated on importance of wearing. I expressed the risks to the patient of not wearing the brace including, but not limited to, severe pain, nerve damage, paralysis. Patient verbalized understanding. SLP also evaluated patient due to symptoms of dysphagia upon admission. Recommended regular adult, thin liquids. Patient medically ready and very eager for discharge. Plan to follow up with PCP, [facility]. Return precautions provided. Patient was seen and assessed on day of discharge. Plan of care discussed with patient, patient agreeable with plan, and all questions answered.    Physical Exam  Gen: in NAD, appears stated age  Neuro: mental status at baseline, motor, sensory, and strength grossly intact BL  HEENT: EOMI, PERRLA; no JVD appreciated  CVS: RRR, no m/r/g  Resp: lungs CTAB, no w/r/r; no belabored breathing or accessory muscle use appreciated   Abd: NTND, soft to palpation, LSO brace in place  Extrem: no UE or LE edema BL

## 2024-08-22 NOTE — PROVIDER PROGRESS NOTES - EMERGENCY DEPT.
Encounter Date: 8/21/2024    ED Physician Progress Notes        Physician Note:   1:30 AM  Patient received in sign-out pending neurosurgery evaluation.  Briefly, 84-year-old female with an L1 compression fracture with 6 mm retropulsion.     Patient updated with plan.  Pending neurosurgical evaluation.    2:30 AM  Neurosurgery evaluated the patient, recommending TLSO brace with films, MRI, admission to     4:00 AM  Orders placed. Patient and  updated with plan,    Patient states she gets nervous for MRI, Versed 1 mg ordered.    TEJ Gilliland MD  Staff ED Physician  08/22/2024 5:08 AM

## 2024-08-22 NOTE — CONSULTS
[facility] - Emergency Dept  Neurosurgery  Consult Note    Inpatient consult to Neurosurgery  Consult performed by: Grzegorz Kingsley MD  Consult ordered by: Emma Vega PA-C        Subjective:     Chief Complaint/Reason for Admission: consulted for L1 compression fracture with retropulsion    History of Present Illness: Betzy Cooley is a 84 y.o. female with PMHx of GERD, HTN, and coronary stent placement x 7 (5/2024) on ASA/Brilinta who presented to ED today with dysphagia while eating dinner. Pt had negative cardiac work up but L1 burst fracture was identified on CT Abd. Pt reports mechanical fall following her coronary stent placement approx 3 months ago, after which she had lower back pain and bilateral shooting pain towards both feet. She had imaging which was read negative at the time. Her PCP subsequently reviewed imaging and had concerns for an L1 compression fracture and recommended she begin PT. Pt participated in 6 weeks of PT without noticeable improvement. She has had worsening kyphosis since the fall and has not been able to participate in gardening and other household activities as she would like to do. She currently denies back pain and reports improvement of the shooting pains, however notices her posture continuing to worsen. She denies any urinary or bowel changes or incontinence or saddle anesthesia. She reports being generally weak overall and has begun working with a  to rebuild strength.    (Not in a hospital admission)      Review of patient's allergies indicates:   Allergen Reactions    Sulfa (sulfonamide antibiotics) Hives     Other reaction(s): Anaphylaxis  Itching   Shortness of breath        Past Medical History:   Diagnosis Date    Arthritis     Cataract     GERD (gastroesophageal reflux disease)     HEARING LOSS     Hypertension     Osteoporosis, postmenopausal     Squamous Cell Carcinoma 2007    right forearm    Urinary incontinence     rare mixed     Past  Surgical History:   Procedure Laterality Date    ANGIOGRAM, CORONARY, WITH LEFT HEART CATHETERIZATION Bilateral 5/17/2024    Procedure: Angiogram, Coronary, with Left Heart Cath;  Surgeon: Miguel Jacob MD;  Location: [facility] CATH LAB;  Service: Cardiology;  Laterality: Bilateral;    COLPOPEXY N/A 08/06/2019    Procedure: COLPOPEXY SSL FIXATION;  Surgeon: Alma Dorsey MD;  [facility];  Service: Urology;  Laterality: N/A;    CORONARY ANGIOGRAPHY Left 5/16/2024    Procedure: ANGIOGRAM, CORONARY ARTERY;  Surgeon: Miguel Jacob MD;  Location: [facility] CATH LAB;  Service: Cardiology;  Laterality: Left;    CYSTOSCOPY N/A 08/06/2019    Procedure: CYSTOSCOPY;  Surgeon: Alma Dorsey MD;  [facility];  Service: Urology;  Laterality: N/A;    ESOPHAGOGASTRODUODENOSCOPY N/A 4/1/2024    Procedure: EGD (ESOPHAGOGASTRODUODENOSCOPY);  Surgeon: Zbigniew Dougherty MD;  Location: [facility];  Service: Endoscopy;  Laterality: N/A;    FRACTURE SURGERY Left 2008    open radius/ulna fracture    HYSTERECTOMY      TANIA/ovaries remain--fibroids- in her late 30's / early 40's    IVUS, CORONARY  5/17/2024    Procedure: IVUS, Coronary;  Surgeon: Miguel Jacob MD;  Location: [facility] CATH LAB;  Service: Cardiology;;    STENT, DRUG ELUTING, MULTI VESSEL, CORONARY  5/17/2024    Procedure: Stent, Drug Eluting, Multi Vessel, Coronary;  Surgeon: Miguel Jacob MD;  Location: [facility] CATH LAB;  Service: Cardiology;;     Family History       Problem Relation (Age of Onset)    Arthritis Sister    COPD Mother, Father    Diabetes Sister    Heart disease Mother    Heart failure Mother    Macular degeneration Mother    No Known Problems Brother, Son, Daughter, Sister, Sister, Sister, Brother, Brother, Brother, Brother, Brother, Daughter, Son          Tobacco Use    Smoking status: Never    Smokeless tobacco: Never   Substance and Sexual Activity    Alcohol use: Not Currently     Comment: 1-2 glasses  wine weekly    Drug use: No    Sexual activity: Yes     Partners: Male     Birth control/protection: None     Review of Systems   Constitutional:  Positive for activity change. Negative for fever.   HENT:  Positive for trouble swallowing.    Respiratory:  Negative for shortness of breath.    Gastrointestinal:  Negative for abdominal pain.   Genitourinary:  Negative for difficulty urinating and urgency.        Denies saddle anesthesia   Neurological:  Positive for weakness. Negative for numbness.        Occasional shooting pains down bilateral legs, right>left   All other systems reviewed and are negative.    Objective:     Weight: 49.4 kg (109 lb)  Body mass index is 19.94 kg/m².  Vital Signs (Most Recent):  Temp: 98.1 °F (36.7 °C) (08/22/24 0645)  Pulse: 74 (08/22/24 0645)  Resp: 18 (08/22/24 0645)  BP: 136/69 (08/22/24 0545)  SpO2: 99 % (08/22/24 0645) Vital Signs (24h Range):  Temp:  [97.4 °F (36.3 °C)-98.9 °F (37.2 °C)] 98.1 °F (36.7 °C)  Pulse:  [57-81] 74  Resp:  [17-20] 18  SpO2:  [97 %-100 %] 99 %  BP: (136-183)/(69-90) 136/69                                 Physical Exam  General: AOx3, GCS [id number]  CNII-XII: Intact on detailed exam, PERRL, EOMi, facial sensation preserved, no facial asymmetry, tongue/uvula/palate midline, shoulder shrug equal, No pronator drift  Extremities:  Motor:  Upper Extremity:                                  Deltoids        Triceps        Biceps        WE        WF                Interosseous           R        5/5              5/5              5/5             5/5         5/5         5/5                5/5           L         5/5              5/5              5/5             5/5         5/5         5/5                5/5    Lower Extremity:                                      HF        KE        KF        DF        PF        EHL           R       5/5        5/5        5/5        5/5        5/5        5/5           L        5/5        5/5        5/5        5/5        5/5         "5/5      Reflexes:     DTR: 2+ and symmetrically throughout     Monae's: Negative     Clonus: Negative    Sensory:      Sensorium intact throughout, no sensory level present    Gross Exam:  Posture: pt leans forward and to her right   No TTP             Significant Labs:  Recent Labs   Lab 08/21/24 2018 08/22/24  0604   * 85    142   K 3.7 3.3*    112*   CO2 25 23   BUN 12 11   CREATININE 0.8 0.7   CALCIUM 9.7 8.5*   MG  --  1.9     Recent Labs   Lab 08/21/24 2018 08/22/24  0604   WBC 9.06 8.54   HGB 10.8* 10.0*   HCT 35.0* 32.4*    296     No results for input(s): "LABPT", "INR", "APTT" in the last 48 hours.  Microbiology Results (last 7 days)       ** No results found for the last 168 hours. **          Recent Lab Results         08/22/24  0604   08/21/24 2329 08/21/24 2018        Albumin     3.7       ALP     163       ALT     22       Anion Gap 7     10       AST     30       Baso # 0.06     0.06       Basophil % 0.7     0.7       BILIRUBIN TOTAL     0.4  Comment: For infants and newborns, interpretation of results should be based  on gestational age, weight and in agreement with clinical  observations.    Premature Infant recommended reference ranges:  Up to 24 hours.............<8.0 mg/dL  Up to 48 hours............<12.0 mg/dL  3-5 days..................<15.0 mg/dL  6-29 days.................<15.0 mg/dL         BNP     41  Comment: Values of less than 100 pg/ml are consistent with non-CHF populations.       BUN 11     12       Calcium 8.5     9.7       Chloride 112     106       CO2 23     25       Creatinine 0.7     0.8       Differential Method Automated     Automated       eGFR >60.0     >60.0       Eos # 0.1     0.2       Eos % 1.5     2.1       Glucose 85     133       Gran # (ANC) 5.9     5.8       Gran % 69.1     64.1       Hematocrit 32.4     35.0       Hemoglobin 10.0     10.8       Immature Grans (Abs) 0.02  Comment: Mild elevation in immature granulocytes is non " specific and   can be seen in a variety of conditions including stress response,   acute inflammation, trauma and pregnancy. Correlation with other   laboratory and clinical findings is essential.       0.02  Comment: Mild elevation in immature granulocytes is non specific and   can be seen in a variety of conditions including stress response,   acute inflammation, trauma and pregnancy. Correlation with other   laboratory and clinical findings is essential.         Immature Granulocytes 0.2     0.2       Lipase     14       Lymph # 1.7     2.1       Lymph % 19.6     23.2       Magnesium  1.9           MCH 29.6     29.4       MCHC 30.9     30.9       MCV 96     95       Mono # 0.8     0.9       Mono % 8.9     9.7       MPV 8.9     8.9       nRBC 0     0       Platelet Count 296     341       Potassium 3.3     3.7       PROTEIN TOTAL     7.5       RBC 3.38     3.67       RDW 13.7     13.8       Sodium 142     141       Troponin I <0.006  Comment: The reference interval for Troponin I represents the 99th percentile   cutoff   for our facility and is consistent with 3rd generation assay   performance.     0.009  Comment: The reference interval for Troponin I represents the 99th percentile   cutoff   for our facility and is consistent with 3rd generation assay   performance.     0.009  Comment: The reference interval for Troponin I represents the 99th percentile   cutoff   for our facility and is consistent with 3rd generation assay   performance.         WBC 8.54     9.06             All pertinent labs from the last 24 hours have been reviewed.    Significant Diagnostics:  CT: No results found in the last 24 hours.  MRI: No results found in the last 24 hours.  I have reviewed all pertinent imaging results/findings within the past 24 hours.  I have reviewed and interpreted all pertinent imaging results/findings within the past 24 hours.  Assessment/Plan:     * Closed compression fracture of body of L1 vertebra  Betzy  MARTHA Cooley is a 84 y.o. female with PMH of GERD, HTN, and coronary artery stenting in May 2024 on ASA and Brilinta who presents dysphagia and found to have L1 burst fracture and Grade 1 spondy at L4-5 on CT CAP:      Patient seen by NSGY at bedside  All labs and diagnostics reviewed  Follow up MRI lumbar spine w/wo for evaluation of discoligamentous instability and assessment of possible three column fracture  Follow up lumbar upright and supine x-rays in [facility] for evaluation of dynamic instability  Consider admit to medicine for medical optimization and if patient's pain is not adequately controlled  Wear LSO brace for mechanical pain and support   ASA81/Brilinta would need to be reversed prior to any neurosurgical intervention. Would also need LVX held.  Consider outpatient optimization of bone quality for future prevention, consider bisphosphonates, DEXA scan  Pain medications per primary team  Keep NPO until imaging workup has been completed  PVR pending  Final recs pending imaging     Plan discussed with Dr. Aguilera        Thank you for your consult.  Disposition pending imaging results.    Grzegorz Kingsley MD  Neurosurgery  [facility] - Emergency Dept

## 2024-08-22 NOTE — HPI
Betzy Cooley is a 84 y.o. female with PMHx of GERD, HTN, and coronary stent placement x 7 (5/2024) on ASA/Brilinta who presented to ED today with dysphagia while eating dinner. Pt had negative cardiac work up but L1 burst fracture was identified on CT Abd. Pt reports mechanical fall following her coronary stent placement approx 3 months ago, after which she had lower back pain and bilateral shooting pain towards both feet. She had imaging which was read negative at the time. Her PCP subsequently reviewed imaging and had concerns for an L1 compression fracture and recommended she begin PT. Pt participated in 6 weeks of PT without noticeable improvement. She has had worsening kyphosis since the fall and has not been able to participate in gardening and other household activities as she would like to do. She currently denies back pain and reports improvement of the shooting pains, however notices her posture continuing to worsen. She denies any urinary or bowel changes or incontinence or saddle anesthesia. She reports being generally weak overall and has begun working with a  to rebuild strength.

## 2024-08-22 NOTE — ASSESSMENT & PLAN NOTE
Betzy Cooley is a 84 y.o. female with PMH of GERD, HTN, and coronary artery stenting in May 2024 on ASA and Brilinta who presents dysphagia and found to have L1 burst fracture and Grade 1 spondy at L4-5 on CT CAP:      Patient seen by NSGY at bedside  All labs and diagnostics reviewed  Follow up MRI lumbar spine w/wo for evaluation of discoligamentous instability and assessment of possible three column fracture  Follow up lumbar upright and supine x-rays in [facility] for evaluation of dynamic instability  Consider admit to medicine for medical optimization and if patient's pain is not adequately controlled  Wear LSO brace for mechanical pain and support   ASA81/Brilinta would need to be reversed prior to any neurosurgical intervention. Would also need LVX held.  Consider outpatient optimization of bone quality for future prevention, consider bisphosphonates, DEXA scan  Pain medications per primary team  Keep NPO until imaging workup has been completed  PVR pending  Final recs pending imaging     Plan discussed with Dr. Aguilera

## 2024-08-22 NOTE — SUBJECTIVE & OBJECTIVE
Past Medical History:   Diagnosis Date    Arthritis     Cataract     GERD (gastroesophageal reflux disease)     HEARING LOSS     Hypertension     Osteoporosis, postmenopausal     Squamous Cell Carcinoma 2007    right forearm    Urinary incontinence     rare mixed       Past Surgical History:   Procedure Laterality Date    ANGIOGRAM, CORONARY, WITH LEFT HEART CATHETERIZATION Bilateral 5/17/2024    Procedure: Angiogram, Coronary, with Left Heart Cath;  Surgeon: Miguel Jacob MD;  Location: [facility] CATH LAB;  Service: Cardiology;  Laterality: Bilateral;    COLPOPEXY N/A 08/06/2019    Procedure: COLPOPEXY SSL FIXATION;  Surgeon: Alma Dorsey MD;  [address];  Service: Urology;  Laterality: N/A;    CORONARY ANGIOGRAPHY Left 5/16/2024    Procedure: ANGIOGRAM, CORONARY ARTERY;  Surgeon: Miguel Jacob MD;  Location: [facility] CATH LAB;  Service: Cardiology;  Laterality: Left;    CYSTOSCOPY N/A 08/06/2019    Procedure: CYSTOSCOPY;  Surgeon: Alma Dorsey MD;  [facility];  Service: Urology;  Laterality: N/A;    ESOPHAGOGASTRODUODENOSCOPY N/A 4/1/2024    Procedure: EGD (ESOPHAGOGASTRODUODENOSCOPY);  Surgeon: Zbigniew Dougherty MD;  Location: [facility];  Service: Endoscopy;  Laterality: N/A;    FRACTURE SURGERY Left 2008    open radius/ulna fracture    HYSTERECTOMY      TANIA/ovaries remain--fibroids- in her late 30's / early 40's    IVUS, CORONARY  5/17/2024    Procedure: IVUS, Coronary;  Surgeon: Miguel Jacob MD;  Location: [facility] CATH LAB;  Service: Cardiology;;    STENT, DRUG ELUTING, MULTI VESSEL, CORONARY  5/17/2024    Procedure: Stent, Drug Eluting, Multi Vessel, Coronary;  Surgeon: Miguel Jacob MD;  Location: [facility] CATH LAB;  Service: Cardiology;;       Review of patient's allergies indicates:   Allergen Reactions    Sulfa (sulfonamide antibiotics) Hives     Other reaction(s): Anaphylaxis  Itching   Shortness of breath        Current Facility-Administered  Medications on File Prior to Encounter   Medication    lactated ringers infusion     Current Outpatient Medications on File Prior to Encounter   Medication Sig    aspirin 81 MG Chew Take 81 mg by mouth once daily. Not chewable    atorvastatin (LIPITOR) 40 MG tablet Take 1 tablet (40 mg total) by mouth every evening.    calcium-vitamin D3 500 mg(1,250mg) -200 unit per tablet Take 1 tablet by mouth 2 (two) times daily with meals.    co-enzyme Q-10 30 mg capsule Take 30 mg by mouth once daily.    diclofenac sodium (VOLTAREN ARTHRITIS PAIN) 1 % Gel Apply 2 g topically daily as needed (Pain).    ezetimibe (ZETIA) 10 mg tablet Take 1 tablet (10 mg total) by mouth once daily.    hydrOXYzine HCL (ATARAX) 25 MG tablet Take 1 tablet (25 mg total) by mouth 3 (three) times daily as needed for Anxiety.    LIDOcaine (LIDODERM) 5 % Place 1 patch onto the skin once daily. Remove & Discard patch within 12 hours or as directed by MD    metoprolol succinate (TOPROL-XL) 25 MG 24 hr tablet Take 0.5 tablets (12.5 mg total) by mouth once daily.    multivitamin (THERAGRAN) per tablet Take 1 tablet by mouth once daily.    nitroGLYCERIN (NITROSTAT) 0.4 MG SL tablet Place 1 tablet (0.4 mg total) under the tongue every 5 (five) minutes as needed for Chest pain.    pantoprazole (PROTONIX) 40 MG tablet Take 1 tablet (40 mg total) by mouth 2 (two) times daily.    sodium chloride (SALINE MIST NASL) 1 spray by Each Nostril route daily as needed (Congestion).    ticagrelor (BRILINTA) 90 mg tablet Take 1 tablet (90 mg total) by mouth 2 (two) times daily.    valsartan (DIOVAN) 80 MG tablet Take 1 tablet (80 mg total) by mouth once daily.    vitamin A-vitamin C-vit E-min Tab Take 1 tablet by mouth once daily.     Family History       Problem Relation (Age of Onset)    Arthritis Sister    COPD Mother, Father    Diabetes Sister    Heart disease Mother    Heart failure Mother    Macular degeneration Mother    No Known Problems Brother, Son, Daughter,  Sister, Sister, Sister, Brother, Brother, Brother, Brother, Brother, Daughter, Son          Tobacco Use    Smoking status: Never    Smokeless tobacco: Never   Substance and Sexual Activity    Alcohol use: Not Currently     Comment: 1-2 glasses wine weekly    Drug use: No    Sexual activity: Yes     Partners: Male     Birth control/protection: None     Review of Systems   Constitutional:  Negative for chills and fever.   HENT:  Positive for postnasal drip and trouble swallowing.    Eyes:  Negative for visual disturbance.   Respiratory:  Negative for cough and shortness of breath.    Cardiovascular:  Negative for chest pain and leg swelling.   Gastrointestinal:  Negative for nausea and vomiting.   Genitourinary:  Negative for dysuria.   Musculoskeletal:  Positive for gait problem. Negative for arthralgias and back pain.   Skin:  Negative for rash and wound.   Neurological:  Negative for weakness, light-headedness, numbness and headaches.   Psychiatric/Behavioral:  Negative for agitation and confusion.      Objective:     Vital Signs (Most Recent):  Temp: 98.2 °F (36.8 °C) (08/22/24 0230)  Pulse: 78 (08/22/24 0448)  Resp: 18 (08/22/24 0448)  BP: (!) 183/84 (08/22/24 0448)  SpO2: 100 % (08/22/24 0448) Vital Signs (24h Range):  Temp:  [97.4 °F (36.3 °C)-98.4 °F (36.9 °C)] 98.2 °F (36.8 °C)  Pulse:  [57-79] 78  Resp:  [17-19] 18  SpO2:  [97 %-100 %] 100 %  BP: (146-183)/(82-90) 183/84     Weight: 49.4 kg (109 lb)  Body mass index is 19.94 kg/m².     Physical Exam  Vitals and nursing note reviewed.   Constitutional:       General: She is not in acute distress.  HENT:      Head: Normocephalic and atraumatic.      Nose: Nose normal.      Mouth/Throat:      Pharynx: No oropharyngeal exudate.   Eyes:      Extraocular Movements: Extraocular movements intact.      Conjunctiva/sclera: Conjunctivae normal.   Cardiovascular:      Rate and Rhythm: Normal rate and regular rhythm.      Heart sounds: Normal heart sounds.   Pulmonary:       Effort: Pulmonary effort is normal. No respiratory distress.      Breath sounds: Normal breath sounds.   Abdominal:      Palpations: Abdomen is soft.      Tenderness: There is no abdominal tenderness.   Musculoskeletal:         General: No tenderness.      Cervical back: Normal range of motion. No tenderness.      Thoracic back: No tenderness.      Lumbar back: No tenderness.      Right lower leg: No edema.      Left lower leg: No edema.   Skin:     General: Skin is warm and dry.      Findings: Bruising present.   Neurological:      General: No focal deficit present.      Mental Status: She is alert and oriented to person, place, and time.   Psychiatric:         Mood and Affect: Mood normal.         Behavior: Behavior normal.                Significant Labs: All pertinent labs within the past 24 hours have been reviewed.    CBC:   Recent Labs   Lab 08/21/24 2018   WBC 9.06   HGB 10.8*   HCT 35.0*        CMP:   Recent Labs   Lab 08/21/24 2018      K 3.7      CO2 25   *   BUN 12   CREATININE 0.8   CALCIUM 9.7   PROT 7.5   ALBUMIN 3.7   BILITOT 0.4   ALKPHOS 163*   AST 30   ALT 22   ANIONGAP 10     Cardiac Markers:   Recent Labs   Lab 08/21/24 2018   BNP 41     Lipase:   Recent Labs   Lab 08/21/24  2018   LIPASE 14     Troponin:   Recent Labs   Lab 08/21/24 2018 08/21/24  2329   TROPONINI 0.009 0.009       Significant Imaging: I have reviewed all pertinent imaging results/findings within the past 24 hours.  CTA Chest Abdomen Pelvis  Narrative: EXAMINATION:  CTA CHEST ABDOMEN PELVIS    CLINICAL HISTORY:  Aortic aneurysm, known or suspected;    TECHNIQUE:  CTA images were obtained from the thoracic inlet to the pubic symphysis.  Noncontrast, arterial, and delayed phase images were acquired. 75 mL of intravenous Omnipaque 350 was administered for the postcontrast images.  Oral contrast was not administered. Axial MIP, sagittal, and coronal reformats were obtained.    COMPARISON:  Lumbar  spine radiographs 05/29/2024    CT abdomen and pelvis 05/06/2024    FINDINGS:  THORACIC SOFT TISSUES:  No axillary or subpectoral lymphadenopathy. The visualized thyroid gland is unremarkable.    HEART & MEDIASTINUM: Heart size at upper limit of normal.  No pericardial effusion.  Dense 4-vessel coronary artery calcifications, a CT marker for underlying coronary artery disease.  Left atrial enlargement, prominent to mildly enlarged right atrium.    PLEURA:  No pleural effusion or pneumothorax.    LUNGS & AIRWAYS:  Airways are patent. Bibasilar subsegmental atelectasis    HEPATOBILIARY:  No focal hepatic lesions. No biliary ductal dilatation. Normal gallbladder.    SPLEEN:  No splenomegaly.  Tiny accessory spleen.    PANCREAS:  No focal masses or ductal dilatation.    ADRENALS:  No adrenal nodules.    KIDNEYS/URETERS: Kidneys enhance symmetrically.  No hydronephrosis, stones or solid mass lesions. Ureters are unremarkable.    PELVIC ORGANS/BLADDER:  The uterus is absent.  The ovaries are inconspicuous but are thought to both remain.    PERITONEUM / RETROPERITONEUM:  No free air. No free fluid.    LYMPH NODES:  No lymphadenopathy.    VESSELS:  Thoracic aorta is normal in caliber with moderate calcific plaque at the aortic arch.  No dissection flap.  Left-sided 3 vessel arch.  Visualized portions of the arch vessels are patent.    The celiac, superior mesenteric and inferior mesenteric arteries are patent.  Single, patent renal arteries bilaterally.  Moderate aortoiliac calcific plaque.    Abdominal aorta is normal in caliber.  No aneurysm or dissection flap.    GI TRACT:  Stomach, small bowel, and colon demonstrate no luminal obstruction.  Stomach suboptimally distended, limiting CT assessment for mural mucosal thickening.  Duodenal diverticulum again noted.  The appendix is not identified with certainty; it is perhaps surgically absent.    BONES AND ABDOMINAL SOFT TISSUES: Abdominal soft tissues are unremarkable.   Interval progression of L1 vertebral body compression fracture with new, marked height loss and 6 mm of retropulsion.  Pre-existing grade 1 anterolisthesis L4 on L5.  Impression: CTA CHEST, CTA ABDOMEN, and CTA PELVIS:    No aortic aneurysm or dissection.    Interval progression of L1 vertebral body compression fracture with new, marked height loss (now essentially a vertebra plana) and 6 mm of osseous retropulsion.    This report was flagged in Epic as abnormal.    The critical finding was identified at approximately 00:15 on 08/22/2024, by Malcolm Vazquez MD, who reported it to Emma Vega PA-C, via telephone at 00:30 on 08/22/2024.  Patient name and medical record number were verified, read-back was performed.    Electronically signed by resident: Malcolm Vazquez  Date:    08/21/2024  Time:    23:48    Electronically signed by: Randy Huerta  Date:    08/22/2024  Time:    01:43

## 2024-08-22 NOTE — ED NOTES
Pt removed LSO brace and said she wants to leave, pt  says it hurts her, both educated on the need for the brace.  Both disregarded my education.  Hospital team made aware

## 2024-08-22 NOTE — ASSESSMENT & PLAN NOTE
- possibly related to GERD/ hiatal hernia as symptoms improved s/p GI cocktail  - keep NPO for now  - SLP eval in AM  - aspiration precautions  - consider GI consult pending clinical course  - continue Protonix 40 mg BID

## 2024-08-22 NOTE — PLAN OF CARE
[facility] - Emergency Dept      HOME HEALTH ORDERS  FACE TO FACE ENCOUNTER    Patient Name: Betzy Cooley  YOB: 1940    PCP: Praful Paul MD   PCP Address: [address] / [place] [address]  PCP Phone Number: [phone]  PCP Fax: [phone]    Encounter Date: 8/21/24    Admit to Home Health    Diagnoses:  Active Hospital Problems    Diagnosis  POA    *Closed compression fracture of body of L1 vertebra [S32.010A]  Yes    Dysphagia [R13.10]  Yes    Hypokalemia [E87.6]  No    CAD S/P percutaneous coronary angioplasty [I25.10, Z98.61]  Not Applicable    Dyslipidemia [E78.5]  Yes    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       Follow Up Appointments:  No future appointments.    Allergies:  Review of patient's allergies indicates:   Allergen Reactions    Sulfa (sulfonamide antibiotics) Hives     Other reaction(s): Anaphylaxis  Itching   Shortness of breath        Medications: Review discharge medications with patient and family and provide education.    Current Facility-Administered Medications   Medication Dose Route Frequency Provider Last Rate Last Admin    acetaminophen tablet 650 mg  650 mg Oral Q4H PRN Sindy Huerta PA-C        albuterol-ipratropium 2.5 mg-0.5 mg/3 mL nebulizer solution 3 mL  3 mL Nebulization Q4H PRN Sindy Huerta PA-C        aspirin chewable tablet 81 mg  81 mg Oral Daily Cari Velarde MD   81 mg at 08/22/24 1024    atorvastatin tablet 40 mg  40 mg Oral QHS Gaye Tarango PA-C        bisacodyL suppository 10 mg  10 mg Rectal Daily PRN Sindy Huerta PA-C        dextrose 10% bolus 125 mL 125 mL  12.5 g Intravenous PRN Sindy Huerat PA-C        dextrose 10% bolus 250 mL 250 mL  25 g Intravenous PRN Sindy Huerta PA-C        enoxaparin  injection 30 mg  30 mg Subcutaneous Daily Sindy Huerta PA-C        ezetimibe tablet 10 mg  10 mg Oral QHS Gaye Tarango PA-C        glucagon (human recombinant) injection 1 mg  1 mg Intramuscular PRN Sindy Huerta PA-C        glucose chewable tablet 16 g  16 g Oral PRN Sindy Huerta PA-C        glucose chewable tablet 24 g  24 g Oral PRN Sindy Huerta PA-C        melatonin tablet 6 mg  6 mg Oral Nightly PRN Sindy Huerta PA-C        metoprolol succinate (TOPROL-XL) 24 hr split tablet 12.5 mg  12.5 mg Oral Daily Gaye Tarango PA-C   12.5 mg at 08/22/24 1024    ondansetron disintegrating tablet 8 mg  8 mg Oral Q8H PRN Sindy Huerta PA-C        pantoprazole EC tablet 40 mg  40 mg Oral Daily Gaye Tarango PA-C   40 mg at 08/22/24 0555    polyethylene glycol packet 17 g  17 g Oral Daily Sindy Huerta PA-C   17 g at 08/22/24 1025    promethazine tablet 25 mg  25 mg Oral Q6H PRN Sindy Huerta PA-C        ticagrelor tablet 90 mg  90 mg Oral BID Gaye Tarango PA-C   90 mg at 08/22/24 1024    valsartan tablet 80 mg  80 mg Oral Daily Gaye Tarango PA-C   80 mg at 08/22/24 1036     Current Outpatient Medications   Medication Sig Dispense Refill    aspirin 81 MG Chew Take 81 mg by mouth once daily. Not chewable      atorvastatin (LIPITOR) 40 MG tablet Take 1 tablet (40 mg total) by mouth every evening. 360 tablet 0    calcium-vitamin D3 500 mg(1,250mg) -200 unit per tablet Take 1 tablet by mouth 2 (two) times daily with meals.      co-enzyme Q-10 30 mg capsule Take 30 mg by mouth once daily.      diclofenac sodium (VOLTAREN ARTHRITIS PAIN) 1 % Gel Apply 2 g topically daily as needed (Pain).      ezetimibe (ZETIA) 10 mg tablet Take 1 tablet (10 mg total) by mouth once daily. 90 tablet 3    hydrOXYzine HCL (ATARAX) 25 MG tablet Take 1 tablet (25 mg total) by mouth 3 (three) times daily as needed for Anxiety. 30 tablet 3    LIDOcaine  (LIDODERM) 5 % Place 1 patch onto the skin once daily. Remove & Discard patch within 12 hours or as directed by MD 30 patch 0    metoprolol succinate (TOPROL-XL) 25 MG 24 hr tablet Take 0.5 tablets (12.5 mg total) by mouth once daily. 45 tablet 0    multivitamin (THERAGRAN) per tablet Take 1 tablet by mouth once daily.      nitroGLYCERIN (NITROSTAT) 0.4 MG SL tablet Place 1 tablet (0.4 mg total) under the tongue every 5 (five) minutes as needed for Chest pain. 30 tablet 2    pantoprazole (PROTONIX) 40 MG tablet Take 1 tablet (40 mg total) by mouth 2 (two) times daily. 180 tablet 3    sodium chloride (SALINE MIST NASL) 1 spray by Each Nostril route daily as needed (Congestion).      ticagrelor (BRILINTA) 90 mg tablet Take 1 tablet (90 mg total) by mouth 2 (two) times daily. 60 tablet 11    valsartan (DIOVAN) 80 MG tablet Take 1 tablet (80 mg total) by mouth once daily. 90 tablet 3    vitamin A-vitamin C-vit E-min Tab Take 1 tablet by mouth once daily.       Facility-Administered Medications Ordered in Other Encounters   Medication Dose Route Frequency Provider Last Rate Last Admin    lactated ringers infusion   Intravenous Continuous Zbigniew Dougherty MD            Medication List        CONTINUE taking these medications      aspirin 81 MG Chew  Take 81 mg by mouth once daily. Not chewable     atorvastatin 40 MG tablet  Commonly known as: LIPITOR  Take 1 tablet (40 mg total) by mouth every evening.     calcium-vitamin D3 500 mg-5 mcg (200 unit) per tablet  Commonly known as: OS-ESTIVEN 500 + D3  Take 1 tablet by mouth 2 (two) times daily with meals.     co-enzyme Q-10 30 mg capsule  Take 30 mg by mouth once daily.     ezetimibe 10 mg tablet  Commonly known as: ZETIA  Take 1 tablet (10 mg total) by mouth once daily.     hydrOXYzine HCL 25 MG tablet  Commonly known as: ATARAX  Take 1 tablet (25 mg total) by mouth 3 (three) times daily as needed for Anxiety.     LIDOcaine 5 %  Commonly known as: LIDODERM  Place 1  patch onto the skin once daily. Remove & Discard patch within 12 hours or as directed by MD     metoprolol succinate 25 MG 24 hr tablet  Commonly known as: TOPROL-XL  Take 0.5 tablets (12.5 mg total) by mouth once daily.     multivitamin per tablet  Commonly known as: THERAGRAN  Take 1 tablet by mouth once daily.     nitroGLYCERIN 0.4 MG SL tablet  Commonly known as: NITROSTAT  Place 1 tablet (0.4 mg total) under the tongue every 5 (five) minutes as needed for Chest pain.     pantoprazole 40 MG tablet  Commonly known as: PROTONIX  Take 1 tablet (40 mg total) by mouth 2 (two) times daily.     SALINE MIST NASL  1 spray by Each Nostril route daily as needed (Congestion).     ticagrelor 90 mg tablet  Commonly known as: BRILINTA  Take 1 tablet (90 mg total) by mouth 2 (two) times daily.     valsartan 80 MG tablet  Commonly known as: DIOVAN  Take 1 tablet (80 mg total) by mouth once daily.     vitamin A-vitamin C-vit E-min Tab  Take 1 tablet by mouth once daily.     VOLTAREN ARTHRITIS PAIN 1 % Gel  Generic drug: diclofenac sodium  Apply 2 g topically daily as needed (Pain).                I have seen and examined this patient within the last 30 days. My clinical findings that support the need for the home health skilled services and home bound status are the following:no   Weakness/numbness causing balance and gait disturbance due to Weakness/Debility making it taxing to leave home.     Diet:   regular diet and fluid consistency diet  thin    Labs:  N/a    Referrals/ Consults  Physical Therapy to evaluate and treat. Evaluate for home safety and equipment needs; Establish/upgrade home exercise program. Perform / instruct on therapeutic exercises, gait training, transfer training, and Range of Motion.  Occupational Therapy to evaluate and treat. Evaluate home environment for safety and equipment needs. Perform/Instruct on transfers, ADL training, ROM, and therapeutic exercises.  Aide to provide assistance with personal  care, ADLs, and vital signs.    Activities:   activity as tolerated, Patient to remain in brace at all times when upright    Nursing:   Agency to admit patient within 24 hours of hospital discharge unless specified on physician order or at patient request    SN to complete comprehensive assessment including routine vital signs. Instruct on disease process and s/s of complications to report to MD. Review/verify medication list sent home with the patient at time of discharge  and instruct patient/caregiver as needed. Frequency may be adjusted depending on start of care date.     Skilled nurse to perform up to 3 visits PRN for symptoms related to diagnosis    Notify MD if SBP > 160 or < 90; DBP > 90 or < 50; HR > 120 or < 50; Temp > 101; O2 < 88%    Ok to schedule additional visits based on staff availability and patient request on consecutive days within the home health episode.    When multiple disciplines ordered:    Start of Care occurs on Sunday - Wednesday schedule remaining discipline evaluations as ordered on separate consecutive days following the start of care.    Thursday SOC -schedule subsequent evaluations Friday and Monday the following week.     Friday - Saturday SOC - schedule subsequent discipline evaluations on consecutive days starting Monday of the following week.    For all post-discharge communication and subsequent orders please contact patient's primary care physician. If unable to reach primary care physician or do not receive response within 30 minutes, please contact [facility] for clinical staff order clarification    Miscellaneous   N/a    Home Health Aide:  Nursing Three times weekly, Physical Therapy Three times weekly, Occupational Therapy Three times weekly, and Home Health Aide Twice weekly    Wound Care Orders  no    I certify that this patient is confined to her home and needs intermittent skilled nursing care, physical therapy, and occupational therapy.        Maddie Jorge,  ELIAS  Department of [facility] Medicine   [facility] [address]

## 2024-08-22 NOTE — PROGRESS NOTES
Pharmacist Renal Dose Adjustment Note    Betzy Cooley is a 84 y.o. female being treated with the medication Lovenox    Patient Data:    Vital Signs (Most Recent):  Temp: 98.2 °F (36.8 °C) (08/22/24 0230)  Pulse: 78 (08/22/24 0448)  Resp: 18 (08/22/24 0448)  BP: (!) 183/84 (08/22/24 0448)  SpO2: 100 % (08/22/24 0448) Vital Signs (72h Range):  Temp:  [97.4 °F (36.3 °C)-98.4 °F (36.9 °C)]   Pulse:  [57-79]   Resp:  [17-19]   BP: (146-183)/(82-90)   SpO2:  [97 %-100 %]      Recent Labs   Lab 08/21/24 2018   CREATININE 0.8     Serum creatinine: 0.8 mg/dL 08/21/24 2018  Estimated creatinine clearance: 40.1 mL/min    Medication:Lovenox 40 mg SQ every 24 hrs will be changed to Lovenox 30 mg SQ every 24 hrs (weight < 50 kg)    Pharmacist's Name: Ry Wagner  Pharmacist's Extension: [phone]

## 2024-08-23 ENCOUNTER — PATIENT OUTREACH (OUTPATIENT)
Dept: ADMINISTRATIVE | Facility: CLINIC | Age: 84
End: 2024-08-23
Payer: MEDICARE

## 2024-08-23 ENCOUNTER — PATIENT OUTREACH (OUTPATIENT)
Dept: EMERGENCY MEDICINE | Facility: HOSPITAL | Age: 84
End: 2024-08-23
Payer: MEDICARE

## 2024-08-23 ENCOUNTER — PATIENT MESSAGE (OUTPATIENT)
Dept: INTERNAL MEDICINE | Facility: CLINIC | Age: 84
End: 2024-08-23
Payer: MEDICARE

## 2024-08-23 NOTE — PROGRESS NOTES
Call placed per ED Navigator to f/u from last encounter. No answer. V/m left. ED navigator will follow-up with patient and assist.

## 2024-08-26 ENCOUNTER — OFFICE VISIT (OUTPATIENT)
Dept: INTERNAL MEDICINE | Facility: CLINIC | Age: 84
End: 2024-08-26
Payer: MEDICARE

## 2024-08-26 VITALS
SYSTOLIC BLOOD PRESSURE: 110 MMHG | OXYGEN SATURATION: 98 % | HEART RATE: 73 BPM | WEIGHT: 107.81 LBS | BODY MASS INDEX: 19.72 KG/M2 | DIASTOLIC BLOOD PRESSURE: 64 MMHG

## 2024-08-26 DIAGNOSIS — M81.0 OSTEOPOROSIS, UNSPECIFIED OSTEOPOROSIS TYPE, UNSPECIFIED PATHOLOGICAL FRACTURE PRESENCE: ICD-10-CM

## 2024-08-26 DIAGNOSIS — I10 HYPERTENSION, UNSPECIFIED TYPE: ICD-10-CM

## 2024-08-26 DIAGNOSIS — K21.9 GASTROESOPHAGEAL REFLUX DISEASE, UNSPECIFIED WHETHER ESOPHAGITIS PRESENT: ICD-10-CM

## 2024-08-26 DIAGNOSIS — S32.010A CLOSED COMPRESSION FRACTURE OF BODY OF L1 VERTEBRA: Primary | ICD-10-CM

## 2024-08-26 DIAGNOSIS — F41.9 ANXIETY: ICD-10-CM

## 2024-08-26 PROCEDURE — 3074F SYST BP LT 130 MM HG: CPT | Mod: HCNC,CPTII,S$GLB, | Performed by: INTERNAL MEDICINE

## 2024-08-26 PROCEDURE — 3288F FALL RISK ASSESSMENT DOCD: CPT | Mod: HCNC,CPTII,S$GLB, | Performed by: INTERNAL MEDICINE

## 2024-08-26 PROCEDURE — 1101F PT FALLS ASSESS-DOCD LE1/YR: CPT | Mod: HCNC,CPTII,S$GLB, | Performed by: INTERNAL MEDICINE

## 2024-08-26 PROCEDURE — 3078F DIAST BP <80 MM HG: CPT | Mod: HCNC,CPTII,S$GLB, | Performed by: INTERNAL MEDICINE

## 2024-08-26 PROCEDURE — 1159F MED LIST DOCD IN RCRD: CPT | Mod: HCNC,CPTII,S$GLB, | Performed by: INTERNAL MEDICINE

## 2024-08-26 PROCEDURE — 1126F AMNT PAIN NOTED NONE PRSNT: CPT | Mod: HCNC,CPTII,S$GLB, | Performed by: INTERNAL MEDICINE

## 2024-08-26 PROCEDURE — G2211 COMPLEX E/M VISIT ADD ON: HCPCS | Mod: HCNC,S$GLB,, | Performed by: INTERNAL MEDICINE

## 2024-08-26 PROCEDURE — 99214 OFFICE O/P EST MOD 30 MIN: CPT | Mod: HCNC,S$GLB,, | Performed by: INTERNAL MEDICINE

## 2024-08-26 PROCEDURE — 1160F RVW MEDS BY RX/DR IN RCRD: CPT | Mod: HCNC,CPTII,S$GLB, | Performed by: INTERNAL MEDICINE

## 2024-08-26 PROCEDURE — 99999 PR PBB SHADOW E&M-EST. PATIENT-LVL IV: CPT | Mod: PBBFAC,HCNC,, | Performed by: INTERNAL MEDICINE

## 2024-08-26 RX ORDER — CEFPODOXIME PROXETIL 100 MG/1
200 TABLET, FILM COATED ORAL EVERY 12 HOURS
COMMUNITY
Start: 2024-08-22

## 2024-08-26 NOTE — PROGRESS NOTES
Subjective:       Patient ID: Betzy Cooley is a 84 y.o. female.    Chief Complaint: Follow-up    Hospital follow-up for compression fracture.  Patient now wearing a back brace.  She went to the ER for an episode of dysphagia which had improved by the time she arrived but workup was done and showed a compression fracture in the lumbar spine.  It was not particularly hurting her although she has some arthritis and difficulty with her joints.  We had discussed this in July as something noted on previous image.  She had been on Fosamax for osteoporosis over the years but quit taking it because of a concern for cancer risk she says.  That was several years ago.    Apparently the compression deformity worsened and there was a concern for stability so she was told to wear the brace and she seeing 1 of our PMR specialist tomorrow for follow-up.  The patient states the pain is manageable.  Vitals have been stable and no more GI problems like she experienced prompting the ER visit.    Follow-up  Associated symptoms include arthralgias and weakness. Pertinent negatives include no chest pain, headaches, joint swelling, neck pain or vomiting.     Review of Systems   Constitutional:  Positive for activity change. Negative for unexpected weight change.   HENT:  Negative for hearing loss, rhinorrhea and trouble swallowing.    Eyes:  Negative for discharge and visual disturbance.   Respiratory:  Negative for chest tightness and wheezing.    Cardiovascular:  Negative for chest pain.   Gastrointestinal:  Negative for blood in stool, constipation, diarrhea and vomiting.   Endocrine: Negative for polydipsia and polyuria.   Genitourinary:  Negative for difficulty urinating, dysuria, hematuria and menstrual problem.   Musculoskeletal:  Positive for arthralgias and back pain. Negative for joint swelling and neck pain.   Neurological:  Positive for weakness. Negative for headaches.   Psychiatric/Behavioral:  Negative for confusion and  dysphoric mood.            Past Medical History:   Diagnosis Date    Arthritis     Cataract     GERD (gastroesophageal reflux disease)     HEARING LOSS     Hypertension     Osteoporosis, postmenopausal     Squamous Cell Carcinoma 2007    right forearm    Urinary incontinence     rare mixed     Past Surgical History:   Procedure Laterality Date    ANGIOGRAM, CORONARY, WITH LEFT HEART CATHETERIZATION Bilateral 5/17/2024    Procedure: Angiogram, Coronary, with Left Heart Cath;  Surgeon: Miguel Jacob MD;  Location: [facility] CATH LAB;  Service: Cardiology;  Laterality: Bilateral;    COLPOPEXY N/A 08/06/2019    Procedure: COLPOPEXY SSL FIXATION;  Surgeon: Alma Dorsey MD;  Location: [address];  Service: Urology;  Laterality: N/A;    CORONARY ANGIOGRAPHY Left 5/16/2024    Procedure: ANGIOGRAM, CORONARY ARTERY;  Surgeon: Miguel Jacob MD;  Location: [facility] CATH LAB;  Service: Cardiology;  Laterality: Left;    CYSTOSCOPY N/A 08/06/2019    Procedure: CYSTOSCOPY;  Surgeon: Alma Dorsey MD;  Location: [facility] OR [address] [place] [address];  Service: Urology;  Laterality: N/A;    ESOPHAGOGASTRODUODENOSCOPY N/A 4/1/2024    Procedure: EGD (ESOPHAGOGASTRODUODENOSCOPY);  Surgeon: Zbigniew Dougherty MD;  Location: [facility];  Service: Endoscopy;  Laterality: N/A;    FRACTURE SURGERY Left 2008    open radius/ulna fracture    HYSTERECTOMY      TANIA/ovaries remain--fibroids- in her late 30's / early 40's    IVUS, CORONARY  5/17/2024    Procedure: IVUS, Coronary;  Surgeon: Miguel Jacob MD;  Location: [facility] CATH LAB;  Service: Cardiology;;    STENT, DRUG ELUTING, MULTI VESSEL, CORONARY  5/17/2024    Procedure: Stent, Drug Eluting, Multi Vessel, Coronary;  Surgeon: Miguel Jacob MD;  Location: [facility] CATH LAB;  Service: Cardiology;;      Patient Active Problem List   Diagnosis    Hearing loss, sensorineural    Nuclear sclerosis - Both Eyes    Cortical senile cataract - Both Eyes    Choroidal nevus - Both Eyes     Palpitations    Osteopenia    Encounter for screening colonoscopy    GERD (gastroesophageal reflux disease)    History of skin cancer    Tinnitus    Elevated blood pressure reading    Abnormal EKG    Posture imbalance    Shoulder weakness    Systolic murmur    Hypertension    Gastritis    Coronary artery disease with refractory angina pectoris    Elevated troponin    Anxiety    Weakness of right lower extremity    Pain of right lower extremity    Fall    Dyslipidemia    CAD S/P percutaneous coronary angioplasty    Closed compression fracture of body of L1 vertebra    Dysphagia    Hypokalemia        Objective:      Physical Exam  Constitutional:       Appearance: Normal appearance.   Cardiovascular:      Rate and Rhythm: Normal rate and regular rhythm.   Pulmonary:      Effort: Pulmonary effort is normal. No respiratory distress.   Abdominal:      Tenderness: There is no abdominal tenderness.   Musculoskeletal:         General: Tenderness (back brace in place) present.      Right lower leg: No edema.      Left lower leg: No edema.   Neurological:      Mental Status: She is alert.         Assessment:       Problem List Items Addressed This Visit          Neuro    Closed compression fracture of body of L1 vertebra - Primary       Psychiatric    Anxiety       Cardiac/Vascular    Hypertension       GI    GERD (gastroesophageal reflux disease)     Other Visit Diagnoses       Osteoporosis, unspecified osteoporosis type, unspecified pathological fracture presence                Plan:         Betzy was seen today for follow-up.    Diagnoses and all orders for this visit:    Closed compression fracture of body of L1 vertebra    Anxiety    Hypertension, unspecified type    Gastroesophageal reflux disease, unspecified whether esophagitis present    Osteoporosis, unspecified osteoporosis type, unspecified pathological fracture presence             As this patient's PCP, I am actively managing and/or treating their chronic  "medical conditions including HTN, GERD and have been for at least 1 year. This includes, but is not limited to, medication management, coordination of care, documentation review from their specialists and labs/imaging review where pertinent.          Portions of this note may have been created with voice recognition software. Occasional "wrong-word" or "sound-a-like" substitutions may have occurred due to the inherent limitations of voice recognition software. Please, read the note carefully and recognize, using context, where substitutions have occurred.  "

## 2024-08-27 ENCOUNTER — TELEPHONE (OUTPATIENT)
Dept: ORTHOPEDICS | Facility: CLINIC | Age: 84
End: 2024-08-27
Payer: MEDICARE

## 2024-08-27 ENCOUNTER — OFFICE VISIT (OUTPATIENT)
Dept: SPINE | Facility: CLINIC | Age: 84
End: 2024-08-27
Attending: PHYSICAL MEDICINE & REHABILITATION
Payer: MEDICARE

## 2024-08-27 VITALS
RESPIRATION RATE: 16 BRPM | SYSTOLIC BLOOD PRESSURE: 137 MMHG | DIASTOLIC BLOOD PRESSURE: 79 MMHG | WEIGHT: 109 LBS | HEART RATE: 68 BPM | BODY MASS INDEX: 19.94 KG/M2

## 2024-08-27 DIAGNOSIS — S32.010A COMPRESSION FRACTURE OF L1 VERTEBRA, INITIAL ENCOUNTER: Primary | ICD-10-CM

## 2024-08-27 PROCEDURE — 99999 PR PBB SHADOW E&M-EST. PATIENT-LVL V: CPT | Mod: PBBFAC,HCNC,, | Performed by: PHYSICAL MEDICINE & REHABILITATION

## 2024-08-27 PROCEDURE — 3288F FALL RISK ASSESSMENT DOCD: CPT | Mod: HCNC,CPTII,S$GLB, | Performed by: PHYSICAL MEDICINE & REHABILITATION

## 2024-08-27 PROCEDURE — 3078F DIAST BP <80 MM HG: CPT | Mod: HCNC,CPTII,S$GLB, | Performed by: PHYSICAL MEDICINE & REHABILITATION

## 2024-08-27 PROCEDURE — 3075F SYST BP GE 130 - 139MM HG: CPT | Mod: HCNC,CPTII,S$GLB, | Performed by: PHYSICAL MEDICINE & REHABILITATION

## 2024-08-27 PROCEDURE — 1160F RVW MEDS BY RX/DR IN RCRD: CPT | Mod: HCNC,CPTII,S$GLB, | Performed by: PHYSICAL MEDICINE & REHABILITATION

## 2024-08-27 PROCEDURE — 1159F MED LIST DOCD IN RCRD: CPT | Mod: HCNC,CPTII,S$GLB, | Performed by: PHYSICAL MEDICINE & REHABILITATION

## 2024-08-27 PROCEDURE — 1101F PT FALLS ASSESS-DOCD LE1/YR: CPT | Mod: HCNC,CPTII,S$GLB, | Performed by: PHYSICAL MEDICINE & REHABILITATION

## 2024-08-27 PROCEDURE — 99204 OFFICE O/P NEW MOD 45 MIN: CPT | Mod: HCNC,S$GLB,, | Performed by: PHYSICAL MEDICINE & REHABILITATION

## 2024-08-27 PROCEDURE — 1125F AMNT PAIN NOTED PAIN PRSNT: CPT | Mod: HCNC,CPTII,S$GLB, | Performed by: PHYSICAL MEDICINE & REHABILITATION

## 2024-08-29 ENCOUNTER — TELEPHONE (OUTPATIENT)
Dept: NEUROSURGERY | Facility: CLINIC | Age: 84
End: 2024-08-29
Payer: MEDICARE

## 2024-08-29 NOTE — TELEPHONE ENCOUNTER
----- Message from Antonella Snider sent at 8/27/2024  9:03 AM CDT -----  Regarding: New Patient Appointment request  Good morning,   Our provider put in a referral for NSG consult and the patient has selected this provider. Please reach out to schedule an appointment and let me know if there is anything I can assist with.   Kind Regards

## 2024-08-29 NOTE — TELEPHONE ENCOUNTER
----- Message from Carine Freedman MA sent at 8/29/2024 12:57 PM CDT -----  Regarding: FW: Missed call  Contact: [phone]    ----- Message -----  From: Po Ceja  Sent: 8/29/2024  12:13 PM CDT  To: #  Subject: Missed call                                      Caller is returning a missed called from Erica He MA office. Please call back as soon as possible.

## 2024-09-17 ENCOUNTER — OFFICE VISIT (OUTPATIENT)
Dept: PAIN MEDICINE | Facility: CLINIC | Age: 84
End: 2024-09-17
Payer: MEDICARE

## 2024-09-17 VITALS
HEART RATE: 70 BPM | WEIGHT: 108.94 LBS | HEIGHT: 62 IN | SYSTOLIC BLOOD PRESSURE: 153 MMHG | BODY MASS INDEX: 20.05 KG/M2 | DIASTOLIC BLOOD PRESSURE: 85 MMHG

## 2024-09-17 DIAGNOSIS — M54.16 LUMBAR RADICULOPATHY: ICD-10-CM

## 2024-09-17 DIAGNOSIS — S32.010A COMPRESSION FRACTURE OF L1 VERTEBRA, INITIAL ENCOUNTER: ICD-10-CM

## 2024-09-17 DIAGNOSIS — M51.36 DDD (DEGENERATIVE DISC DISEASE), LUMBAR: Primary | ICD-10-CM

## 2024-09-17 PROCEDURE — 1100F PTFALLS ASSESS-DOCD GE2>/YR: CPT | Mod: CPTII,S$GLB,, | Performed by: STUDENT IN AN ORGANIZED HEALTH CARE EDUCATION/TRAINING PROGRAM

## 2024-09-17 PROCEDURE — 99999 PR PBB SHADOW E&M-EST. PATIENT-LVL IV: CPT | Mod: PBBFAC,,, | Performed by: STUDENT IN AN ORGANIZED HEALTH CARE EDUCATION/TRAINING PROGRAM

## 2024-09-17 PROCEDURE — 3079F DIAST BP 80-89 MM HG: CPT | Mod: CPTII,S$GLB,, | Performed by: STUDENT IN AN ORGANIZED HEALTH CARE EDUCATION/TRAINING PROGRAM

## 2024-09-17 PROCEDURE — 3288F FALL RISK ASSESSMENT DOCD: CPT | Mod: CPTII,S$GLB,, | Performed by: STUDENT IN AN ORGANIZED HEALTH CARE EDUCATION/TRAINING PROGRAM

## 2024-09-17 PROCEDURE — 1159F MED LIST DOCD IN RCRD: CPT | Mod: CPTII,S$GLB,, | Performed by: STUDENT IN AN ORGANIZED HEALTH CARE EDUCATION/TRAINING PROGRAM

## 2024-09-17 PROCEDURE — 1160F RVW MEDS BY RX/DR IN RCRD: CPT | Mod: CPTII,S$GLB,, | Performed by: STUDENT IN AN ORGANIZED HEALTH CARE EDUCATION/TRAINING PROGRAM

## 2024-09-17 PROCEDURE — 1125F AMNT PAIN NOTED PAIN PRSNT: CPT | Mod: CPTII,S$GLB,, | Performed by: STUDENT IN AN ORGANIZED HEALTH CARE EDUCATION/TRAINING PROGRAM

## 2024-09-17 PROCEDURE — 3077F SYST BP >= 140 MM HG: CPT | Mod: CPTII,S$GLB,, | Performed by: STUDENT IN AN ORGANIZED HEALTH CARE EDUCATION/TRAINING PROGRAM

## 2024-09-17 PROCEDURE — 99204 OFFICE O/P NEW MOD 45 MIN: CPT | Mod: S$GLB,,, | Performed by: STUDENT IN AN ORGANIZED HEALTH CARE EDUCATION/TRAINING PROGRAM

## 2024-09-17 NOTE — PROGRESS NOTES
Chronic Pain - New Consult    Referring Physician: Nadira Parker, *    Chief Complaint:   Chief Complaint   Patient presents with    Back Pain          SUBJECTIVE:    Betzy Cooley presents to the clinic for the evaluation of lower back pain and left leg pain. The pain started about 4 months ago following a fall on the right side and symptoms have been improving.The pain is located in the lower back area and radiates to the left leg (toward the foot).  The pain is described as numbing and tight band and is rated as 0/10. The pain is rated with a score of  0/10 on the BEST day and a score of 4/10 on the WORST day.  Symptoms interfere with daily activity. The pain is exacerbated by Walking.  The pain is mitigated by rest.     Patient denies urinary incontinence and bowel incontinence. She and her  report the fall occurred after she had stents placed.  She does report her pain has progressively improved. She feels the LSO brace doesn't really help her pain, but she doesn't feel the pain severely limits her life.    Physical Therapy/Home Exercise: no      Pain Disability Index Review:      9/17/2024    10:12 AM 9/17/2024    10:10 AM   Last 3 PDI Scores   Pain Disability Index (PDI) 28 28       Pain Medications:   - tylenol (helps a great deal)     report:  Reviewed and consistent with medication use as prescribed.    Pain Procedures:   None    Imaging:   MRI LUMBAR SPINE WITHOUT CONTRAST     CLINICAL HISTORY:  Spine fracture, lumbar, pathological;     TECHNIQUE:  Multiplanar, multisequence MR images were acquired from the thoracolumbar junction to the sacrum without contrast.     COMPARISON:  CTA 08/21/2024     FINDINGS:  Alignment: Grade 1 anterolisthesis L4 on L5.  Retropulsion of L1 vertebral body.     Vertebrae: Compression fracture of the L1 vertebral body with marked height loss and 6 mm retropulsion.  Essentially complete height loss of the ventral and central aspect of the vertebral body.   Patchy edema-like signal within the L1 vertebra would in keeping with recent fracture.  No large paraspinous hematoma.  No definite intraspinal hemorrhage.  Elsewhere in the lumbar spine, centrally normal marrow signal for age; no endplate edema-like signal.     Discs: Disc desiccation and height loss at L2-L3 and L3-L4.     Cord: Conus terminates at L1-L2 and appears unremarkable.  Cauda equina appears unremarkable.     Degenerative findings:     *T12-L1: Compression fracture of the L1 vertebral body with retropulsion resulting in no more than moderate bilateral neural foraminal narrowing and moderate spinal canal stenosis.  *L1-L2: Compression fracture of the L1 vertebral body with retropulsion resulting in moderate bilateral neural foraminal narrowing and mild spinal canal stenosis.  Minimal AP thecal sac diameter approximately 6.9 mm at level of maximum compression.  *L2-L3: Facet arthropathy and circumferential disc bulge resulting in mild bilateral neural foraminal narrowing and mild spinal canal stenosis.  *L3-L4: Facet arthropathy and ligamentum flavum buckling resulting in moderate bilateral neural foraminal narrowing and mild spinal canal stenosis.  *L4-L5: Grade 1 anterolisthesis, facet arthropathy and ligamentum flavum buckling with unroofing of the intervertebral disc resulting in severe bilateral subarticular recess stenosis (with encroachment and possible contact of traversing L5 nerve roots), mild left neural foraminal narrowing and moderate spinal canal stenosis.  *L5-S1: Facet arthropathy resulting in mild bilateral neural foraminal narrowing.  No spinal canal stenosis.  Paraspinal muscles & soft tissues: Unremarkable.     Impression:     L1 vertebral body compression fracture resulting in vertebra plana configuration with 6 mm retropulsion.  Finding results in no more than moderate spinal canal stenosis at T12-L1 and moderate bilateral neural foraminal narrowing at L1-L2.     Multilevel lumbar  spondylosis, as characterized above, notable for moderate bilateral neural foraminal narrowing at L3-L4, severe bilateral subarticular recess stenosis at L4-L5 and moderate spinal canal stenosis at L4-L5.     Additional details, as provided in the body of report.     Electronically signed by resident: Malcolm Vazquez  Date:                                            08/22/2024  Time:                                           06:19     Electronically signed by:Kenneth Dillon  Date:                                            08/22/2024  Time:                                           08:22                XR LUMBAR SPINE LATERAL SUPINE AND LATERAL UPRIGHT     CLINICAL HISTORY:  L1 burst fracture;     TECHNIQUE:  Two views of the lumbar spine were obtained, with lateral views performed weight-bearing and non weight-bearing.     COMPARISON:  Reference is made to the CT examination of 08/21/2024 and to an MR exam of 08/22/2024 and a prior conventional radiographic exam of the lumbar spine dated 05/09/2024.     FINDINGS:  Severe compression deformity with marked loss of height involving the L1 vertebral body is again observed, unchanged from the 08/21/2024 CT examination but representing a significant interval loss of height of L1 since the older conventional radiograph of 05/29/2024.  There is a modest degree of retropulsion of the posterior aspect of this markedly compressed L1 vertebral body noted, as was optimally demonstrated on the prior CT and MR examinations referenced above.  9 mm of anterolisthesis of L4 in relation to L5 is appreciated, as is a lesser degree of anterolisthesis of L5 in relation to S1, secondary to facet arthropathy at these levels.  Alignment elsewhere in the lumbar and visualized lower thoracic spine appears unremarkable.  Aside from L1, the visualized vertebral body heights are normally maintained without compression deformity.  Disc narrowing is seen at every level from L1-2 through L4-5.   Vertebral endplates appear well defined.  No osseous destructive process.     Impression:     1. Compression deformity with severe loss of height of the L1 vertebral body with a modest degree of retropulsion of the posterior aspect of this compressed vertebral body.  The appearance is similar to the recent CT and MR examinations, although it does represent a detrimental change since of earlier conventional radiograph of 05/29/2024.  2. Anterolisthesis of L4 in relation to L5 and L5 in relation to S1, secondary to facet arthropathy at each level.  3. No alignment change between the supine and weight-bearing images is observed.        Electronically signed by:Bravo Medina MD  Date:                                            08/22/2024  Time:                                           09:32    Past Medical History:   Diagnosis Date    Arthritis     Cataract     GERD (gastroesophageal reflux disease)     HEARING LOSS     Hypertension     Osteoporosis, postmenopausal     Squamous Cell Carcinoma 2007    right forearm    Urinary incontinence     rare mixed     Past Surgical History:   Procedure Laterality Date    ANGIOGRAM, CORONARY, WITH LEFT HEART CATHETERIZATION Bilateral 5/17/2024    Procedure: Angiogram, Coronary, with Left Heart Cath;  Surgeon: Miguel Jacob MD;  Location: [facility] CATH LAB;  Service: Cardiology;  Laterality: Bilateral;    COLPOPEXY N/A 08/06/2019    Procedure: COLPOPEXY SSL FIXATION;  Surgeon: Alma Dorsey MD;  Location: [facility] OR [facility] FLR;  Service: Urology;  Laterality: N/A;    CORONARY ANGIOGRAPHY Left 5/16/2024    Procedure: ANGIOGRAM, CORONARY ARTERY;  Surgeon: Miguel Jacob MD;  Location: [facility] CATH LAB;  Service: Cardiology;  Laterality: Left;    CYSTOSCOPY N/A 08/06/2019    Procedure: CYSTOSCOPY;  Surgeon: Alma Dorsey MD;  Location: [facility] OR [facility] FLR;  Service: Urology;  Laterality: N/A;    ESOPHAGOGASTRODUODENOSCOPY N/A 4/1/2024    Procedure: EGD (ESOPHAGOGASTRODUODENOSCOPY);   Surgeon: Zbigniew Dougherty MD;  Location: [facility] ENDO;  Service: Endoscopy;  Laterality: N/A;    FRACTURE SURGERY Left 2008    open radius/ulna fracture    HYSTERECTOMY      TANIA/ovaries remain--fibroids- in her late 30's / early 40's    IVUS, CORONARY  5/17/2024    Procedure: IVUS, Coronary;  Surgeon: Miguel Jacob MD;  Location: [facility] CATH LAB;  Service: Cardiology;;    STENT, DRUG ELUTING, MULTI VESSEL, CORONARY  5/17/2024    Procedure: Stent, Drug Eluting, Multi Vessel, Coronary;  Surgeon: Miguel Jacob MD;  Location: [facility] CATH LAB;  Service: Cardiology;;     Social History     Socioeconomic History    Marital status:    Tobacco Use    Smoking status: Never    Smokeless tobacco: Never   Substance and Sexual Activity    Alcohol use: Not Currently     Comment: 1-2 glasses wine weekly    Drug use: No    Sexual activity: Yes     Partners: Male     Birth control/protection: None     Social Determinants of Health     Financial Resource Strain: Low Risk  (5/30/2024)    Overall Financial Resource Strain (CARDIA)     Difficulty of Paying Living Expenses: Not hard at all   Food Insecurity: No Food Insecurity (5/6/2024)    Hunger Vital Sign     Worried About Running Out of Food in the Last Year: Never true     Ran Out of Food in the Last Year: Never true   Transportation Needs: No Transportation Needs (5/30/2024)    PRAPARE - Transportation     Lack of Transportation (Medical): No     Lack of Transportation (Non-Medical): No   Physical Activity: Sufficiently Active (5/6/2024)    Exercise Vital Sign     Days of Exercise per Week: 3 days     Minutes of Exercise per Session: 90 min   Stress: Stress Concern Present (5/30/2024)    [employer] - Occupational Stress Questionnaire     Feeling of Stress : To some extent   Housing Stability: Low Risk  (5/6/2024)    Housing Stability Vital Sign     Unable to Pay for Housing in the Last Year: No     Homeless in the Last Year: No      Family History   Problem Relation Name Age of Onset    Heart failure Mother copd     Macular degeneration Mother copd     Heart disease Mother copd     COPD Mother copd     Diabetes Sister      Arthritis Sister          rheumatoid arthritis    COPD Father      No Known Problems Brother      No Known Problems Son Saad     No Known Problems Daughter Jordyn     No Known Problems Sister      No Known Problems Sister      No Known Problems Sister      No Known Problems Brother      No Known Problems Brother      No Known Problems Brother      No Known Problems Brother      No Known Problems Brother      No Known Problems Daughter Reyes     No Known Problems Son Arie     Breast cancer Neg Hx      Ovarian cancer Neg Hx      Amblyopia Neg Hx      Blindness Neg Hx      Cancer Neg Hx      Cataracts Neg Hx      Glaucoma Neg Hx      Hypertension Neg Hx      Retinal detachment Neg Hx      Strabismus Neg Hx      Stroke Neg Hx      Thyroid disease Neg Hx      Cervical cancer Neg Hx      Endometrial cancer Neg Hx      Vaginal cancer Neg Hx      Colon cancer Neg Hx         Review of patient's allergies indicates:   Allergen Reactions    Sulfa (sulfonamide antibiotics) Hives     Other reaction(s): Anaphylaxis  Itching   Shortness of breath        Current Outpatient Medications   Medication Sig    aspirin 81 MG Chew Take 81 mg by mouth once daily. Not chewable    atorvastatin (LIPITOR) 40 MG tablet Take 1 tablet (40 mg total) by mouth every evening.    calcium-vitamin D3 500 mg(1,250mg) -200 unit per tablet Take 1 tablet by mouth 2 (two) times daily with meals.    cefpodoxime (VANTIN) 100 MG tablet Take 200 mg by mouth every 12 (twelve) hours.    co-enzyme Q-10 30 mg capsule Take 30 mg by mouth once daily.    diclofenac sodium (VOLTAREN ARTHRITIS PAIN) 1 % Gel Apply 2 g topically daily as needed (Pain).    ezetimibe (ZETIA) 10 mg tablet Take 1 tablet (10 mg total) by mouth once daily.    hydrOXYzine HCL (ATARAX) 25 MG tablet Take  1 tablet (25 mg total) by mouth 3 (three) times daily as needed for Anxiety.    lanolin/mineral oil/petrolatum (ARTIFICIAL TEARS OPHT) Place 1 drop into both eyes once daily.    multivitamin (THERAGRAN) per tablet Take 1 tablet by mouth once daily.    nitroGLYCERIN (NITROSTAT) 0.4 MG SL tablet Place 1 tablet (0.4 mg total) under the tongue every 5 (five) minutes as needed for Chest pain.    pantoprazole (PROTONIX) 40 MG tablet Take 1 tablet (40 mg total) by mouth 2 (two) times daily.    sodium chloride (SALINE MIST NASL) 1 spray by Each Nostril route daily as needed (Congestion).    ticagrelor (BRILINTA) 90 mg tablet Take 1 tablet (90 mg total) by mouth 2 (two) times daily.    valsartan (DIOVAN) 80 MG tablet Take 1 tablet (80 mg total) by mouth once daily.    vitamin A-vitamin C-vit E-min Tab Take 1 tablet by mouth once daily.    metoprolol succinate (TOPROL-XL) 25 MG 24 hr tablet Take 0.5 tablets (12.5 mg total) by mouth once daily.     No current facility-administered medications for this visit.     Facility-Administered Medications Ordered in Other Visits   Medication    lactated ringers infusion       REVIEW OF SYSTEMS:    GENERAL:  current fevers or chills, recent use of antibiotics denies.  HEENT:  History of migraines/headaches: denies, History of major throat surgery: denies  RESPIRATORY:  History of home oxygen or pulmonary hypertension/severe breathing dysfunction: denies  CARDIOVASCULAR:  Hx of palpitations/rhythm problems: reports Hx of Heart Attacks/Surgery: reports Stents placed in May 2024  GI:  Recent abdominal discomfort or recent change in bowel habits denies  MUSCULOSKELETAL:  See HPI.  SKIN:  unhealed wounds or rashes: denies  PSYCH: major psychiatric history or recent psychosocial stressors denies  HEMATOLOGY/LYMPHOLOGY:  Hx of prolonged bleeding, Hx of Blood thinner usage: reports brilinta ; reason: stents in May 2024  NEURO:   history of seizures, strokes, chronic/old weakness (such as  "paralysis or paresis of any body part): denies  All other reviewed and negative other than HPI.    OBJECTIVE:    BP (!) 153/85 (BP Location: Left arm, Patient Position: Sitting)   Pulse 70   Ht 5' 2" (1.575 m)   Wt 49.4 kg (108 lb 14.5 oz)   BMI 19.92 kg/m²     PHYSICAL EXAMINATION:  General appearance: Well appearing, in no acute distress, alert and appropriately communicative.  Psych:  Mood and affect appropriate.  Skin: Skin color, texture, turgor normal, no rashes or lesions, in both upper and lower body for exposed skin.  Head/face:  Atraumatic, normocephalic.  Cor: regular rate  Pulm: non-labored breathing  GI: Abdomen non-distended and non-tender.  Back: Straight leg raising in the sitting and supine positions is negative to radicular pain. pain to palpation over the spine or paraspinal muscles. Limited flexion/extension  Extremities: No deformities, significant lymphedema, or skin discoloration. No significant open wounds. No major amputations.  Musculoskeletal: hip, sacroiliac and knee provocative maneuvers are negative. Bilateral upper and lower extremity strength is normal and symmetric.  No atrophy or tone abnormalities are noted.  Neuro: Bilateral upper and lower extremity coordination and muscle stretch reflexes abnormalities noted: none.  Monae and/or Clonus: negative; loss of sensation to light touch: none  Gait: walker    CMP  Sodium   Date Value Ref Range Status   08/22/2024 142 136 - 145 mmol/L Final     Potassium   Date Value Ref Range Status   08/22/2024 3.3 (L) 3.5 - 5.1 mmol/L Final     Chloride   Date Value Ref Range Status   08/22/2024 112 (H) 95 - 110 mmol/L Final     CO2   Date Value Ref Range Status   08/22/2024 23 23 - 29 mmol/L Final     Glucose   Date Value Ref Range Status   08/22/2024 85 70 - 110 mg/dL Final     BUN   Date Value Ref Range Status   08/22/2024 11 8 - 23 mg/dL Final     Creatinine   Date Value Ref Range Status   08/22/2024 0.7 0.5 - 1.4 mg/dL Final     Calcium "   Date Value Ref Range Status   08/22/2024 8.5 (L) 8.7 - 10.5 mg/dL Final     Total Protein   Date Value Ref Range Status   08/21/2024 7.5 6.0 - 8.4 g/dL Final     Albumin   Date Value Ref Range Status   08/21/2024 3.7 3.5 - 5.2 g/dL Final     Total Bilirubin   Date Value Ref Range Status   08/21/2024 0.4 0.1 - 1.0 mg/dL Final     Comment:     For infants and newborns, interpretation of results should be based  on gestational age, weight and in agreement with clinical  observations.    Premature Infant recommended reference ranges:  Up to 24 hours.............<8.0 mg/dL  Up to 48 hours............<12.0 mg/dL  3-5 days..................<15.0 mg/dL  6-29 days.................<15.0 mg/dL       Alkaline Phosphatase   Date Value Ref Range Status   08/21/2024 163 (H) 55 - 135 U/L Final     AST   Date Value Ref Range Status   08/21/2024 30 10 - 40 U/L Final     ALT   Date Value Ref Range Status   08/21/2024 22 10 - 44 U/L Final     Anion Gap   Date Value Ref Range Status   08/22/2024 7 (L) 8 - 16 mmol/L Final     eGFR   Date Value Ref Range Status   08/22/2024 >60.0 >60 mL/min/1.73 m^2 Final     ASSESSMENT: 84 y.o. year old female with lower back pain, consistent with:    1. DDD (degenerative disc disease), lumbar        2. Compression fracture of L1 vertebra, initial encounter  Ambulatory referral/consult to Pain Clinic      3. Lumbar radiculopathy          IMPRESSION: Betzy Cooley presents today for lower back pain with some radiation down the left leg. History and physical exam are consistent with axial lower back pain/vertebrogenic pain and lumbar radiculopathy.  My independent interpretation of the imaging is consistent with L1 compression fracture (vertebra plana) with retropulsion.  She feels her pain has been improving and her left lower extremity pain is only intermittent.  She has appointments to see neurosurgery, physical therapy, and endocrinology in the next few days.    PLAN:   - I have stressed the  importance of physical activity and a home exercise plan to help with pain and improve health.  - Patient can continue with medications for now since they are providing benefits, using them appropriately, and without side effects.  - we discusssed kyphoplasty vs. Epidural injection; however she would need to have clearance to hold Brilinta before either could be considered  - follow up with neurosurgery and endocrinology  - I suggested following up with her primary care doctor to consider starting osteoporosis management  - she will reach back out to our office if she is interested in any interventions.  - RTC as needed  - Counseled patient regarding the importance of activity modification and physical therapy.    The above plan and management options were discussed at length with patient. Patient is in agreement with the above and verbalized understanding. It will be communicated with the referring physician via electronic record, fax, or mail.    Denver Cordon  09/17/2024

## 2024-09-23 ENCOUNTER — OFFICE VISIT (OUTPATIENT)
Dept: NEUROSURGERY | Facility: CLINIC | Age: 84
End: 2024-09-23
Payer: MEDICARE

## 2024-09-23 DIAGNOSIS — M81.8 OTHER OSTEOPOROSIS, UNSPECIFIED PATHOLOGICAL FRACTURE PRESENCE: ICD-10-CM

## 2024-09-23 DIAGNOSIS — S32.010A COMPRESSION FRACTURE OF L1 VERTEBRA, INITIAL ENCOUNTER: Primary | ICD-10-CM

## 2024-09-23 PROCEDURE — 99999 PR PBB SHADOW E&M-EST. PATIENT-LVL IV: CPT | Mod: PBBFAC,HCNC,, | Performed by: STUDENT IN AN ORGANIZED HEALTH CARE EDUCATION/TRAINING PROGRAM

## 2024-09-23 PROCEDURE — 1159F MED LIST DOCD IN RCRD: CPT | Mod: CPTII,S$GLB,, | Performed by: STUDENT IN AN ORGANIZED HEALTH CARE EDUCATION/TRAINING PROGRAM

## 2024-09-23 PROCEDURE — 99214 OFFICE O/P EST MOD 30 MIN: CPT | Mod: S$GLB,,, | Performed by: STUDENT IN AN ORGANIZED HEALTH CARE EDUCATION/TRAINING PROGRAM

## 2024-09-23 PROCEDURE — 3288F FALL RISK ASSESSMENT DOCD: CPT | Mod: CPTII,S$GLB,, | Performed by: STUDENT IN AN ORGANIZED HEALTH CARE EDUCATION/TRAINING PROGRAM

## 2024-09-23 PROCEDURE — 1101F PT FALLS ASSESS-DOCD LE1/YR: CPT | Mod: CPTII,S$GLB,, | Performed by: STUDENT IN AN ORGANIZED HEALTH CARE EDUCATION/TRAINING PROGRAM

## 2024-09-23 PROCEDURE — 1125F AMNT PAIN NOTED PAIN PRSNT: CPT | Mod: CPTII,S$GLB,, | Performed by: STUDENT IN AN ORGANIZED HEALTH CARE EDUCATION/TRAINING PROGRAM

## 2024-09-23 NOTE — PROGRESS NOTES
Neurosurgery  Established Patient    SUBJECTIVE:     History of Present Illness:  84 F with hx of osteoporosis and known L1 burst fracture presents in fu.  She denies any back pain on today's visit.  She does feel discomfort along her left hip where the LSO brace rubs her skin.  She has intermittent LLE radicular pain into dorsum of her foot.  She has no new radicular leg pain, weakness, or numbness.  She has appt with endocrinology but this isn't until jan of 2025.  She is able to ambulate with a walker.    Review of patient's allergies indicates:   Allergen Reactions    Sulfa (sulfonamide antibiotics) Hives     Other reaction(s): Anaphylaxis  Itching   Shortness of breath        Current Outpatient Medications   Medication Sig Dispense Refill    aspirin 81 MG Chew Take 81 mg by mouth once daily. Not chewable      atorvastatin (LIPITOR) 40 MG tablet Take 1 tablet (40 mg total) by mouth every evening. 360 tablet 0    calcium-vitamin D3 500 mg(1,250mg) -200 unit per tablet Take 1 tablet by mouth 2 (two) times daily with meals.      cefpodoxime (VANTIN) 100 MG tablet Take 200 mg by mouth every 12 (twelve) hours.      co-enzyme Q-10 30 mg capsule Take 30 mg by mouth once daily.      diclofenac sodium (VOLTAREN ARTHRITIS PAIN) 1 % Gel Apply 2 g topically daily as needed (Pain).      ezetimibe (ZETIA) 10 mg tablet Take 1 tablet (10 mg total) by mouth once daily. 90 tablet 3    hydrOXYzine HCL (ATARAX) 25 MG tablet Take 1 tablet (25 mg total) by mouth 3 (three) times daily as needed for Anxiety. 30 tablet 3    lanolin/mineral oil/petrolatum (ARTIFICIAL TEARS OPHT) Place 1 drop into both eyes once daily.      multivitamin (THERAGRAN) per tablet Take 1 tablet by mouth once daily.      nitroGLYCERIN (NITROSTAT) 0.4 MG SL tablet Place 1 tablet (0.4 mg total) under the tongue every 5 (five) minutes as needed for Chest pain. 30 tablet 2    pantoprazole (PROTONIX) 40 MG tablet Take 1 tablet (40 mg total) by mouth 2 (two) times  daily. 180 tablet 3    sodium chloride (SALINE MIST NASL) 1 spray by Each Nostril route daily as needed (Congestion).      ticagrelor (BRILINTA) 90 mg tablet Take 1 tablet (90 mg total) by mouth 2 (two) times daily. 60 tablet 11    valsartan (DIOVAN) 80 MG tablet Take 1 tablet (80 mg total) by mouth once daily. 90 tablet 3    vitamin A-vitamin C-vit E-min Tab Take 1 tablet by mouth once daily.      metoprolol succinate (TOPROL-XL) 25 MG 24 hr tablet Take 0.5 tablets (12.5 mg total) by mouth once daily. 45 tablet 0     No current facility-administered medications for this visit.     Facility-Administered Medications Ordered in Other Visits   Medication Dose Route Frequency Provider Last Rate Last Admin    lactated ringers infusion   Intravenous Continuous Zbigniew Dougherty MD           Past Medical History:   Diagnosis Date    Arthritis     Cataract     GERD (gastroesophageal reflux disease)     HEARING LOSS     Hypertension     Osteoporosis, postmenopausal     Squamous Cell Carcinoma 2007    right forearm    Urinary incontinence     rare mixed     Past Surgical History:   Procedure Laterality Date    ANGIOGRAM, CORONARY, WITH LEFT HEART CATHETERIZATION Bilateral 5/17/2024    Procedure: Angiogram, Coronary, with Left Heart Cath;  Surgeon: Miguel Jacob MD;  Location: [facility] CATH LAB;  Service: Cardiology;  Laterality: Bilateral;    COLPOPEXY N/A 08/06/2019    Procedure: COLPOPEXY SSL FIXATION;  Surgeon: Alma Dorsey MD;  Location: [facility] OR [address] [place] [address];  Service: Urology;  Laterality: N/A;    CORONARY ANGIOGRAPHY Left 5/16/2024    Procedure: ANGIOGRAM, CORONARY ARTERY;  Surgeon: Miguel Jacob MD;  Location: [facility] CATH LAB;  Service: Cardiology;  Laterality: Left;    CYSTOSCOPY N/A 08/06/2019    Procedure: CYSTOSCOPY;  Surgeon: Alma Dorsey MD;  [facility];  Service: Urology;  Laterality: N/A;    ESOPHAGOGASTRODUODENOSCOPY N/A 4/1/2024    Procedure: EGD  (ESOPHAGOGASTRODUODENOSCOPY);  Surgeon: Zbigniew Dougherty MD;  Location: [facility] ENDO;  Service: Endoscopy;  Laterality: N/A;    FRACTURE SURGERY Left 2008    open radius/ulna fracture    HYSTERECTOMY      TANIA/ovaries remain--fibroids- in her late 30's / early 40's    IVUS, CORONARY  5/17/2024    Procedure: IVUS, Coronary;  Surgeon: Miguel Jacob MD;  Location: [facility] CATH LAB;  Service: Cardiology;;    STENT, DRUG ELUTING, MULTI VESSEL, CORONARY  5/17/2024    Procedure: Stent, Drug Eluting, Multi Vessel, Coronary;  Surgeon: Miguel Jacob MD;  Location: [facility] CATH LAB;  Service: Cardiology;;     Family History       Problem Relation (Age of Onset)    Arthritis Sister    COPD Mother, Father    Diabetes Sister    Heart disease Mother    Heart failure Mother    Macular degeneration Mother    No Known Problems Brother, Son, Daughter, Sister, Sister, Sister, Brother, Brother, Brother, Brother, Brother, Daughter, Son          Social History     Socioeconomic History    Marital status:    Tobacco Use    Smoking status: Never    Smokeless tobacco: Never   Substance and Sexual Activity    Alcohol use: Not Currently     Comment: 1-2 glasses wine weekly    Drug use: No    Sexual activity: Yes     Partners: Male     Birth control/protection: None     Social Determinants of Health     Financial Resource Strain: Low Risk  (5/30/2024)    Overall Financial Resource Strain (CARDIA)     Difficulty of Paying Living Expenses: Not hard at all   Food Insecurity: No Food Insecurity (5/6/2024)    Hunger Vital Sign     Worried About Running Out of Food in the Last Year: Never true     Ran Out of Food in the Last Year: Never true   Transportation Needs: No Transportation Needs (5/30/2024)    PRAPARE - Transportation     Lack of Transportation (Medical): No     Lack of Transportation (Non-Medical): No   Physical Activity: Sufficiently Active (5/6/2024)    Exercise Vital Sign     Days of Exercise per Week: 3 days      Minutes of Exercise per Session: 90 min   Stress: Stress Concern Present (5/30/2024)    [employer] - Occupational Stress Questionnaire     Feeling of Stress : To some extent   Housing Stability: Low Risk  (5/6/2024)    Housing Stability Vital Sign     Unable to Pay for Housing in the Last Year: No     Homeless in the Last Year: No       Review of Systems  14 point ROS was negative    OBJECTIVE:     Vital Signs  Pain Score:   3  There is no height or weight on file to calculate BMI.    Physical Exam:    Constitutional: She appears well-developed and well-nourished.     Eyes: Pupils are equal, round, and reactive to light.     Cardiovascular: Normal rate and regular rhythm.     Abdominal: Soft.     Psych/Behavior: She is alert. She is oriented to person, place, and time. She has a normal mood and affect.     Musculoskeletal: Gait is abnormal.        Neck: Range of motion is limited.        Back: Range of motion is limited.        Right Upper Extremities: Muscle strength is 5/5.        Left Upper Extremities: Muscle strength is 5/5.       Right Lower Extremities: Muscle strength is 5/5.        Left Lower Extremities: Muscle strength is 5/5.     Neurological:        Sensory: There is no sensory deficit in the trunk. There is no sensory deficit in the extremities.        DTRs: DTRs are DTRS NORMAL AND SYMMETRICnormal and symmetric.        Cranial nerves: Cranial nerve(s) II, III, IV, V, VI, VII, VIII, IX, X, XI and XII are intact.     No TTP along thoracolumbar spine in midline    Diagnostic Results:  No new imaging to review    ASSESSMENT/PLAN:   84 F with hx of osteoporosis who presents in fu for L1 burst fracture.  I discussed that due to the severe collapse I don't think she is a candidate for a vertebroplasty or kyphoplasty.  In addition, this wouldn't be particularly beneficial as she has no pain.  Unfortunately, there is no updated imaging to assess for any progression of the  fracture.  Will plan to get upright/supine xrays, CT L, and DEXA scan.  I will reorder PT as she feels that this is helping her.  I will also get her a TLSO brace.  Will plan to see her in 2 weeks with the updated imaging.

## 2024-09-24 ENCOUNTER — PATIENT MESSAGE (OUTPATIENT)
Dept: INTERNAL MEDICINE | Facility: CLINIC | Age: 84
End: 2024-09-24
Payer: MEDICARE

## 2024-09-25 ENCOUNTER — EXTERNAL HOME HEALTH (OUTPATIENT)
Dept: HOME HEALTH SERVICES | Facility: HOSPITAL | Age: 84
End: 2024-09-25
Payer: MEDICARE

## 2024-09-26 ENCOUNTER — HOSPITAL ENCOUNTER (OUTPATIENT)
Dept: RADIOLOGY | Facility: HOSPITAL | Age: 84
Discharge: HOME | End: 2024-09-26
Attending: STUDENT IN AN ORGANIZED HEALTH CARE EDUCATION/TRAINING PROGRAM
Payer: MEDICARE

## 2024-09-26 DIAGNOSIS — S32.010A COMPRESSION FRACTURE OF L1 VERTEBRA, INITIAL ENCOUNTER: ICD-10-CM

## 2024-09-26 PROCEDURE — 72100 X-RAY EXAM L-S SPINE 2/3 VWS: CPT | Mod: TC

## 2024-09-26 PROCEDURE — 72131 CT LUMBAR SPINE W/O DYE: CPT | Mod: 26,,, | Performed by: RADIOLOGY

## 2024-09-26 PROCEDURE — 72100 X-RAY EXAM L-S SPINE 2/3 VWS: CPT | Mod: 26,,, | Performed by: RADIOLOGY

## 2024-09-26 PROCEDURE — 72131 CT LUMBAR SPINE W/O DYE: CPT | Mod: TC

## 2024-09-27 ENCOUNTER — PATIENT MESSAGE (OUTPATIENT)
Dept: NEUROSURGERY | Facility: CLINIC | Age: 84
End: 2024-09-27
Payer: MEDICARE

## 2024-09-28 ENCOUNTER — OFFICE VISIT (OUTPATIENT)
Dept: URGENT CARE | Facility: CLINIC | Age: 84
End: 2024-09-28
Payer: MEDICARE

## 2024-09-28 VITALS
DIASTOLIC BLOOD PRESSURE: 90 MMHG | WEIGHT: 108 LBS | HEART RATE: 69 BPM | OXYGEN SATURATION: 98 % | RESPIRATION RATE: 16 BRPM | SYSTOLIC BLOOD PRESSURE: 159 MMHG | TEMPERATURE: 98 F | BODY MASS INDEX: 19.75 KG/M2

## 2024-09-28 DIAGNOSIS — M54.50 ACUTE LOW BACK PAIN WITHOUT SCIATICA, UNSPECIFIED BACK PAIN LATERALITY: Primary | ICD-10-CM

## 2024-09-28 PROCEDURE — 99213 OFFICE O/P EST LOW 20 MIN: CPT | Mod: 25,S$GLB,, | Performed by: FAMILY MEDICINE

## 2024-09-28 PROCEDURE — 96372 THER/PROPH/DIAG INJ SC/IM: CPT | Mod: S$GLB,,, | Performed by: FAMILY MEDICINE

## 2024-09-28 RX ORDER — TRAMADOL HYDROCHLORIDE 50 MG/1
50 TABLET ORAL EVERY 6 HOURS PRN
Qty: 6 TABLET | Refills: 0 | Status: SHIPPED | OUTPATIENT
Start: 2024-09-28 | End: 2024-10-01

## 2024-09-28 RX ORDER — KETOROLAC TROMETHAMINE 30 MG/ML
30 INJECTION, SOLUTION INTRAMUSCULAR; INTRAVENOUS
Status: COMPLETED | OUTPATIENT
Start: 2024-09-28 | End: 2024-09-28

## 2024-09-28 RX ADMIN — KETOROLAC TROMETHAMINE 30 MG: 30 INJECTION, SOLUTION INTRAMUSCULAR; INTRAVENOUS at 01:09

## 2024-09-28 NOTE — PROGRESS NOTES
Subjective:      Patient ID: Betzy Cooley is a 84 y.o. female.    Vitals:  weight is 49 kg (108 lb). Her oral temperature is 97.7 °F (36.5 °C). Her blood pressure is 159/90 (abnormal) and her pulse is 69. Her respiration is 16 and oxygen saturation is 98%.     Chief Complaint: Back Pain    This is a 84 y.o. female who presents today with a chief complaint of bilatera LBP x 2 days with worsening sx post imaging procedure. Pt has hx of back fx (compression fx at L1) 4 months ago. Pt says she has been doing well with the px until the imaging procedure when the technicians had to lift her and move her. Pt describes the px as sharp. No dysuria sx.    Home tx: tylenol    [facility]: back fx; heart issues; blood thinners    Back Pain  This is a new problem. The current episode started in the past 7 days. The problem occurs constantly. The problem has been gradually worsening since onset. The quality of the pain is described as stabbing. The pain does not radiate. Pertinent negatives include no abdominal pain, bladder incontinence, bowel incontinence, chest pain, dysuria, fever, headaches or leg pain.       Constitution: Negative for fever.   Cardiovascular:  Negative for chest pain.   Gastrointestinal:  Negative for abdominal pain and bowel incontinence.   Genitourinary:  Negative for dysuria and bladder incontinence.   Musculoskeletal:  Positive for back pain.   Neurological:  Negative for headaches.      Objective:     Physical Exam   Constitutional: She is oriented to person, place, and time. normal  Cardiovascular: Normal rate, regular rhythm, normal heart sounds and normal pulses.   Pulmonary/Chest: Effort normal and breath sounds normal.   Abdominal: Normal appearance.   Neurological: no focal deficit. She is alert and oriented to person, place, and time. Gait (ambulating with walker) abnormal.   Skin: No lesion   Nursing note and vitals reviewed.    Assessment:     1. Acute low back pain without sciatica, unspecified  back pain laterality    Appears to be acute exacerbation of ongoing chronic issue with previous consultation with neurosurgery and pain management. As per discussion with  patient does not appear a surgical candidate given co-morbid condition  Stressed need to follow up with pain management and her PCP for her ongoing pain medication  Plan:       Acute low back pain without sciatica, unspecified back pain laterality  -     ketorolac injection 30 mg    Other orders  -     traMADoL (ULTRAM) 50 mg tablet; Take 1 tablet (50 mg total) by mouth every 6 (six) hours as needed for Pain.  Dispense: 6 tablet; Refill: 0    Advised heat and ongoing PT. RTC prn worsening symptoms

## 2024-09-30 ENCOUNTER — TELEPHONE (OUTPATIENT)
Dept: NEUROSURGERY | Facility: CLINIC | Age: 84
End: 2024-09-30
Payer: MEDICARE

## 2024-09-30 NOTE — TELEPHONE ENCOUNTER
----- Message from Sangeetha sent at 9/27/2024  4:12 PM CDT -----  Regarding: call back  Contact: [phone]  pt's  called to request a call from the office to discuss the pt being in a lot of pain. Pt had her Xray done yesterday. Caller has called twice today and left message in portal regarding this matter please call to discuss further

## 2024-10-01 ENCOUNTER — TELEPHONE (OUTPATIENT)
Dept: PAIN MEDICINE | Facility: CLINIC | Age: 84
End: 2024-10-01

## 2024-10-01 ENCOUNTER — OFFICE VISIT (OUTPATIENT)
Dept: PAIN MEDICINE | Facility: CLINIC | Age: 84
End: 2024-10-01
Payer: MEDICARE

## 2024-10-01 VITALS
HEART RATE: 65 BPM | SYSTOLIC BLOOD PRESSURE: 178 MMHG | BODY MASS INDEX: 19.88 KG/M2 | DIASTOLIC BLOOD PRESSURE: 97 MMHG | HEIGHT: 62 IN | WEIGHT: 108 LBS

## 2024-10-01 DIAGNOSIS — M54.16 LUMBAR RADICULOPATHY: ICD-10-CM

## 2024-10-01 DIAGNOSIS — M54.16 LUMBAR RADICULOPATHY: Primary | ICD-10-CM

## 2024-10-01 DIAGNOSIS — S32.010A COMPRESSION FRACTURE OF L1 VERTEBRA, INITIAL ENCOUNTER: ICD-10-CM

## 2024-10-01 DIAGNOSIS — M51.362 DEGENERATION OF INTERVERTEBRAL DISC OF LUMBAR REGION WITH DISCOGENIC BACK PAIN AND LOWER EXTREMITY PAIN: ICD-10-CM

## 2024-10-01 DIAGNOSIS — M47.816 LUMBAR SPONDYLOSIS: Primary | ICD-10-CM

## 2024-10-01 PROCEDURE — 99999 PR PBB SHADOW E&M-EST. PATIENT-LVL III: CPT | Mod: PBBFAC,,, | Performed by: STUDENT IN AN ORGANIZED HEALTH CARE EDUCATION/TRAINING PROGRAM

## 2024-10-01 PROCEDURE — 3077F SYST BP >= 140 MM HG: CPT | Mod: CPTII,S$GLB,, | Performed by: STUDENT IN AN ORGANIZED HEALTH CARE EDUCATION/TRAINING PROGRAM

## 2024-10-01 PROCEDURE — 1125F AMNT PAIN NOTED PAIN PRSNT: CPT | Mod: CPTII,S$GLB,, | Performed by: STUDENT IN AN ORGANIZED HEALTH CARE EDUCATION/TRAINING PROGRAM

## 2024-10-01 PROCEDURE — 1100F PTFALLS ASSESS-DOCD GE2>/YR: CPT | Mod: CPTII,S$GLB,, | Performed by: STUDENT IN AN ORGANIZED HEALTH CARE EDUCATION/TRAINING PROGRAM

## 2024-10-01 PROCEDURE — 1160F RVW MEDS BY RX/DR IN RCRD: CPT | Mod: CPTII,S$GLB,, | Performed by: STUDENT IN AN ORGANIZED HEALTH CARE EDUCATION/TRAINING PROGRAM

## 2024-10-01 PROCEDURE — 3288F FALL RISK ASSESSMENT DOCD: CPT | Mod: CPTII,S$GLB,, | Performed by: STUDENT IN AN ORGANIZED HEALTH CARE EDUCATION/TRAINING PROGRAM

## 2024-10-01 PROCEDURE — 3080F DIAST BP >= 90 MM HG: CPT | Mod: CPTII,S$GLB,, | Performed by: STUDENT IN AN ORGANIZED HEALTH CARE EDUCATION/TRAINING PROGRAM

## 2024-10-01 PROCEDURE — 1159F MED LIST DOCD IN RCRD: CPT | Mod: CPTII,S$GLB,, | Performed by: STUDENT IN AN ORGANIZED HEALTH CARE EDUCATION/TRAINING PROGRAM

## 2024-10-01 PROCEDURE — 99214 OFFICE O/P EST MOD 30 MIN: CPT | Mod: S$GLB,,, | Performed by: STUDENT IN AN ORGANIZED HEALTH CARE EDUCATION/TRAINING PROGRAM

## 2024-10-01 RX ORDER — HYDROCODONE BITARTRATE AND ACETAMINOPHEN 5; 325 MG/1; MG/1
1 TABLET ORAL EVERY 6 HOURS PRN
Qty: 28 TABLET | Refills: 0 | Status: SHIPPED | OUTPATIENT
Start: 2024-10-01

## 2024-10-01 RX ORDER — METHOCARBAMOL 500 MG/1
500 TABLET, FILM COATED ORAL 3 TIMES DAILY PRN
Qty: 90 TABLET | Refills: 0 | Status: SHIPPED | OUTPATIENT
Start: 2024-10-01

## 2024-10-01 NOTE — PROGRESS NOTES
Chronic patient Established Note (Follow up visit)      SUBJECTIVE:    INTERVAL HISTORY 10/1/2024:  Betzy Cooley presents to the clinic for a follow-up appointment for chronic pain. Current pain intensity is 10/10.  Since the last visit, Betzy Cooley states:  she reports that she underwent a CT scan which significantly worsened in her pain.  She feels her pain is in her lower back and radiates down her left leg.  She reports that since her last visit, she stopped therapy and feels her pain has since worsened.     PRIOR HISTORY:   Betzy Cooley presents to the clinic for the evaluation of lower back pain and left leg pain. The pain started about 4 months ago following a fall on the right side and symptoms have been improving.The pain is located in the lower back area and radiates to the left leg (toward the foot).  The pain is described as numbing and tight band and is rated as 0/10. The pain is rated with a score of  0/10 on the BEST day and a score of 4/10 on the WORST day.  Symptoms interfere with daily activity. The pain is exacerbated by Walking.  The pain is mitigated by rest.     Patient denies urinary incontinence and bowel incontinence. She and her  report the fall occurred after she had stents placed.  She does report her pain has progressively improved. She feels the LSO brace doesn't really help her pain, but she doesn't feel the pain severely limits her life.    Physical Therapy/Home Exercise: no      Pain Disability Index Review:      10/1/2024     3:07 PM 9/17/2024    10:12 AM 9/17/2024    10:10 AM   Last 3 PDI Scores   Pain Disability Index (PDI) 63 28 28       Pain Medications:   - tylenol (helps a great deal)     report:  Reviewed and consistent with medication use as prescribed.    Pain Procedures:   None    Imaging:   MRI LUMBAR SPINE WITHOUT CONTRAST     CLINICAL HISTORY:  Spine fracture, lumbar, pathological;     TECHNIQUE:  Multiplanar, multisequence MR images were acquired  from the thoracolumbar junction to the sacrum without contrast.     COMPARISON:  CTA 08/21/2024     FINDINGS:  Alignment: Grade 1 anterolisthesis L4 on L5.  Retropulsion of L1 vertebral body.     Vertebrae: Compression fracture of the L1 vertebral body with marked height loss and 6 mm retropulsion.  Essentially complete height loss of the ventral and central aspect of the vertebral body.  Patchy edema-like signal within the L1 vertebra would in keeping with recent fracture.  No large paraspinous hematoma.  No definite intraspinal hemorrhage.  Elsewhere in the lumbar spine, centrally normal marrow signal for age; no endplate edema-like signal.     Discs: Disc desiccation and height loss at L2-L3 and L3-L4.     Cord: Conus terminates at L1-L2 and appears unremarkable.  Cauda equina appears unremarkable.     Degenerative findings:     *T12-L1: Compression fracture of the L1 vertebral body with retropulsion resulting in no more than moderate bilateral neural foraminal narrowing and moderate spinal canal stenosis.  *L1-L2: Compression fracture of the L1 vertebral body with retropulsion resulting in moderate bilateral neural foraminal narrowing and mild spinal canal stenosis.  Minimal AP thecal sac diameter approximately 6.9 mm at level of maximum compression.  *L2-L3: Facet arthropathy and circumferential disc bulge resulting in mild bilateral neural foraminal narrowing and mild spinal canal stenosis.  *L3-L4: Facet arthropathy and ligamentum flavum buckling resulting in moderate bilateral neural foraminal narrowing and mild spinal canal stenosis.  *L4-L5: Grade 1 anterolisthesis, facet arthropathy and ligamentum flavum buckling with unroofing of the intervertebral disc resulting in severe bilateral subarticular recess stenosis (with encroachment and possible contact of traversing L5 nerve roots), mild left neural foraminal narrowing and moderate spinal canal stenosis.  *L5-S1: Facet arthropathy resulting in mild  bilateral neural foraminal narrowing.  No spinal canal stenosis.  Paraspinal muscles & soft tissues: Unremarkable.     Impression:     L1 vertebral body compression fracture resulting in vertebra plana configuration with 6 mm retropulsion.  Finding results in no more than moderate spinal canal stenosis at T12-L1 and moderate bilateral neural foraminal narrowing at L1-L2.     Multilevel lumbar spondylosis, as characterized above, notable for moderate bilateral neural foraminal narrowing at L3-L4, severe bilateral subarticular recess stenosis at L4-L5 and moderate spinal canal stenosis at L4-L5.     Additional details, as provided in the body of report.     Electronically signed by resident: Malcolm Vazquez  Date:                                            08/22/2024  Time:                                           06:19     Electronically signed by:Kenneth Dillon  Date:                                            08/22/2024  Time:                                           08:22                XR LUMBAR SPINE LATERAL SUPINE AND LATERAL UPRIGHT     CLINICAL HISTORY:  L1 burst fracture;     TECHNIQUE:  Two views of the lumbar spine were obtained, with lateral views performed weight-bearing and non weight-bearing.     COMPARISON:  Reference is made to the CT examination of 08/21/2024 and to an MR exam of 08/22/2024 and a prior conventional radiographic exam of the lumbar spine dated 05/09/2024.     FINDINGS:  Severe compression deformity with marked loss of height involving the L1 vertebral body is again observed, unchanged from the 08/21/2024 CT examination but representing a significant interval loss of height of L1 since the older conventional radiograph of 05/29/2024.  There is a modest degree of retropulsion of the posterior aspect of this markedly compressed L1 vertebral body noted, as was optimally demonstrated on the prior CT and MR examinations referenced above.  9 mm of anterolisthesis of L4 in relation to L5  is appreciated, as is a lesser degree of anterolisthesis of L5 in relation to S1, secondary to facet arthropathy at these levels.  Alignment elsewhere in the lumbar and visualized lower thoracic spine appears unremarkable.  Aside from L1, the visualized vertebral body heights are normally maintained without compression deformity.  Disc narrowing is seen at every level from L1-2 through L4-5.  Vertebral endplates appear well defined.  No osseous destructive process.     Impression:     1. Compression deformity with severe loss of height of the L1 vertebral body with a modest degree of retropulsion of the posterior aspect of this compressed vertebral body.  The appearance is similar to the recent CT and MR examinations, although it does represent a detrimental change since of earlier conventional radiograph of 05/29/2024.  2. Anterolisthesis of L4 in relation to L5 and L5 in relation to S1, secondary to facet arthropathy at each level.  3. No alignment change between the supine and weight-bearing images is observed.        Electronically signed by:Bravo Medina MD  Date:                                            08/22/2024  Time:                                           09:32    Past Medical History:   Diagnosis Date    Arthritis     Cataract     GERD (gastroesophageal reflux disease)     HEARING LOSS     Hypertension     Osteoporosis, postmenopausal     Squamous Cell Carcinoma 2007    right forearm    Urinary incontinence     rare mixed     Past Surgical History:   Procedure Laterality Date    ANGIOGRAM, CORONARY, WITH LEFT HEART CATHETERIZATION Bilateral 5/17/2024    Procedure: Angiogram, Coronary, with Left Heart Cath;  Surgeon: Miguel Jacob MD;  Location: [facility] CATH LAB;  Service: Cardiology;  Laterality: Bilateral;    COLPOPEXY N/A 08/06/2019    Procedure: COLPOPEXY SSL FIXATION;  Surgeon: Alma Dorsey MD;  Location: [facility] OR [address] [place] [address];  Service: Urology;  Laterality: N/A;    CORONARY ANGIOGRAPHY Left  5/16/2024    Procedure: ANGIOGRAM, CORONARY ARTERY;  Surgeon: Miguel Jacob MD;  Location: [facility] CATH LAB;  Service: Cardiology;  Laterality: Left;    CYSTOSCOPY N/A 08/06/2019    Procedure: CYSTOSCOPY;  Surgeon: Alma Dorsey MD;  Location: [facility] OR [address] [place] [address];  Service: Urology;  Laterality: N/A;    ESOPHAGOGASTRODUODENOSCOPY N/A 4/1/2024    Procedure: EGD (ESOPHAGOGASTRODUODENOSCOPY);  Surgeon: Zbigniew Dougherty MD;  Location: [facility] ENDO;  Service: Endoscopy;  Laterality: N/A;    FRACTURE SURGERY Left 2008    open radius/ulna fracture    HYSTERECTOMY      TANIA/ovaries remain--fibroids- in her late 30's / early 40's    IVUS, CORONARY  5/17/2024    Procedure: IVUS, Coronary;  Surgeon: Miguel Jacob MD;  Location: [facility] CATH LAB;  Service: Cardiology;;    STENT, DRUG ELUTING, MULTI VESSEL, CORONARY  5/17/2024    Procedure: Stent, Drug Eluting, Multi Vessel, Coronary;  Surgeon: Miguel Jacob MD;  Location: [facility] CATH LAB;  Service: Cardiology;;     Social History     Socioeconomic History    Marital status:    Tobacco Use    Smoking status: Never     Passive exposure: Never    Smokeless tobacco: Never   Substance and Sexual Activity    Alcohol use: Not Currently     Comment: 1-2 glasses wine weekly    Drug use: No    Sexual activity: Yes     Partners: Male     Birth control/protection: None     Social Drivers of Health     Financial Resource Strain: Low Risk  (5/30/2024)    Overall Financial Resource Strain (CARDIA)     Difficulty of Paying Living Expenses: Not hard at all   Food Insecurity: No Food Insecurity (5/6/2024)    Hunger Vital Sign     Worried About Running Out of Food in the Last Year: Never true     Ran Out of Food in the Last Year: Never true   Transportation Needs: No Transportation Needs (5/30/2024)    PRAPARE - Transportation     Lack of Transportation (Medical): No     Lack of Transportation (Non-Medical): No   Physical Activity: Sufficiently Active (5/6/2024)     Exercise Vital Sign     Days of Exercise per Week: 3 days     Minutes of Exercise per Session: 90 min   Stress: Stress Concern Present (5/30/2024)    [employer] - Occupational Stress Questionnaire     Feeling of Stress : To some extent   Housing Stability: Low Risk  (5/6/2024)    Housing Stability Vital Sign     Unable to Pay for Housing in the Last Year: No     Homeless in the Last Year: No     Family History   Problem Relation Name Age of Onset    Heart failure Mother copd     Macular degeneration Mother copd     Heart disease Mother copd     COPD Mother copd     Diabetes Sister      Arthritis Sister          rheumatoid arthritis    COPD Father      No Known Problems Brother      No Known Problems Son Saad     No Known Problems Daughter Jordyn     No Known Problems Sister      No Known Problems Sister      No Known Problems Sister      No Known Problems Brother      No Known Problems Brother      No Known Problems Brother      No Known Problems Brother      No Known Problems Brother      No Known Problems Daughter Reyes     No Known Problems Son Arie     Breast cancer Neg Hx      Ovarian cancer Neg Hx      Amblyopia Neg Hx      Blindness Neg Hx      Cancer Neg Hx      Cataracts Neg Hx      Glaucoma Neg Hx      Hypertension Neg Hx      Retinal detachment Neg Hx      Strabismus Neg Hx      Stroke Neg Hx      Thyroid disease Neg Hx      Cervical cancer Neg Hx      Endometrial cancer Neg Hx      Vaginal cancer Neg Hx      Colon cancer Neg Hx         Review of patient's allergies indicates:   Allergen Reactions    Sulfa (sulfonamide antibiotics) Hives     Other reaction(s): Anaphylaxis  Itching   Shortness of breath        Current Outpatient Medications   Medication Sig    aspirin 81 MG Chew Take 81 mg by mouth once daily. Not chewable    atorvastatin (LIPITOR) 40 MG tablet Take 1 tablet (40 mg total) by mouth every evening.    calcium-vitamin D3 500 mg(1,250mg) -200 unit per tablet Take  1 tablet by mouth 2 (two) times daily with meals.    cefpodoxime (VANTIN) 100 MG tablet Take 200 mg by mouth every 12 (twelve) hours.    co-enzyme Q-10 30 mg capsule Take 30 mg by mouth once daily.    diclofenac sodium (VOLTAREN ARTHRITIS PAIN) 1 % Gel Apply 2 g topically daily as needed (Pain).    ezetimibe (ZETIA) 10 mg tablet Take 1 tablet (10 mg total) by mouth once daily.    hydrOXYzine HCL (ATARAX) 25 MG tablet Take 1 tablet (25 mg total) by mouth 3 (three) times daily as needed for Anxiety.    lanolin/mineral oil/petrolatum (ARTIFICIAL TEARS OPHT) Place 1 drop into both eyes once daily.    multivitamin (THERAGRAN) per tablet Take 1 tablet by mouth once daily.    nitroGLYCERIN (NITROSTAT) 0.4 MG SL tablet Place 1 tablet (0.4 mg total) under the tongue every 5 (five) minutes as needed for Chest pain.    pantoprazole (PROTONIX) 40 MG tablet Take 1 tablet (40 mg total) by mouth 2 (two) times daily.    sodium chloride (SALINE MIST NASL) 1 spray by Each Nostril route daily as needed (Congestion).    ticagrelor (BRILINTA) 90 mg tablet Take 1 tablet (90 mg total) by mouth 2 (two) times daily.    valsartan (DIOVAN) 80 MG tablet Take 1 tablet (80 mg total) by mouth once daily.    vitamin A-vitamin C-vit E-min Tab Take 1 tablet by mouth once daily.    HYDROcodone-acetaminophen (NORCO) 5-325 mg per tablet Take 1 tablet by mouth every 6 (six) hours as needed for Pain.    methocarbamoL (ROBAXIN) 500 MG Tab Take 1 tablet (500 mg total) by mouth 3 (three) times daily as needed (muscular pain).    metoprolol succinate (TOPROL-XL) 25 MG 24 hr tablet Take 0.5 tablets (12.5 mg total) by mouth once daily.     No current facility-administered medications for this visit.     Facility-Administered Medications Ordered in Other Visits   Medication    lactated ringers infusion       REVIEW OF SYSTEMS:    GENERAL:  current fevers or chills, recent use of antibiotics denies.  HEENT:  History of migraines/headaches: denies, History of  "major throat surgery: denies  RESPIRATORY:  History of home oxygen or pulmonary hypertension/severe breathing dysfunction: denies  CARDIOVASCULAR:  Hx of palpitations/rhythm problems: reports Hx of Heart Attacks/Surgery: reports Stents placed in May 2024  GI:  Recent abdominal discomfort or recent change in bowel habits denies  MUSCULOSKELETAL:  See HPI.  SKIN:  unhealed wounds or rashes: denies  PSYCH: major psychiatric history or recent psychosocial stressors denies  HEMATOLOGY/LYMPHOLOGY:  Hx of prolonged bleeding, Hx of Blood thinner usage: reports brilinta ; reason: stents in May 2024  NEURO:   history of seizures, strokes, chronic/old weakness (such as paralysis or paresis of any body part): denies  All other reviewed and negative other than HPI.    OBJECTIVE:    BP (!) 178/97 (BP Location: Right arm, Patient Position: Sitting)   Pulse 65   Ht 5' 2" (1.575 m)   Wt 49 kg (108 lb 0.4 oz)   BMI 19.76 kg/m²     PHYSICAL EXAMINATION:  General appearance: Well appearing, in no acute distress, alert and appropriately communicative.  Psych:  Mood and affect appropriate.  Skin: Skin color, texture, turgor normal, no rashes or lesions, in both upper and lower body for exposed skin.  Head/face:  Atraumatic, normocephalic.  Cor: regular rate  Pulm: non-labored breathing  GI: Abdomen non-distended and non-tender.  Back: Straight leg raising in the sitting and supine positions is positive to radicular pain on the left. pain to palpation over the spine or paraspinal muscles. Pain with extension and facet loading  Extremities: No deformities, significant lymphedema, or skin discoloration. No significant open wounds. No major amputations.  Musculoskeletal: hip, sacroiliac and knee provocative maneuvers are negative. Bilateral upper and lower extremity strength is normal and symmetric.  No atrophy or tone abnormalities are noted.  Neuro: Bilateral upper and lower extremity coordination and muscle stretch reflexes " abnormalities noted: none.  Monae and/or Clonus: negative; loss of sensation to light touch: none  Gait: walker    CMP  Sodium   Date Value Ref Range Status   08/22/2024 142 136 - 145 mmol/L Final     Potassium   Date Value Ref Range Status   08/22/2024 3.3 (L) 3.5 - 5.1 mmol/L Final     Chloride   Date Value Ref Range Status   08/22/2024 112 (H) 95 - 110 mmol/L Final     CO2   Date Value Ref Range Status   08/22/2024 23 23 - 29 mmol/L Final     Glucose   Date Value Ref Range Status   08/22/2024 85 70 - 110 mg/dL Final     BUN   Date Value Ref Range Status   08/22/2024 11 8 - 23 mg/dL Final     Creatinine   Date Value Ref Range Status   08/22/2024 0.7 0.5 - 1.4 mg/dL Final     Calcium   Date Value Ref Range Status   08/22/2024 8.5 (L) 8.7 - 10.5 mg/dL Final     Total Protein   Date Value Ref Range Status   08/21/2024 7.5 6.0 - 8.4 g/dL Final     Albumin   Date Value Ref Range Status   08/21/2024 3.7 3.5 - 5.2 g/dL Final     Total Bilirubin   Date Value Ref Range Status   08/21/2024 0.4 0.1 - 1.0 mg/dL Final     Comment:     For infants and newborns, interpretation of results should be based  on gestational age, weight and in agreement with clinical  observations.    Premature Infant recommended reference ranges:  Up to 24 hours.............<8.0 mg/dL  Up to 48 hours............<12.0 mg/dL  3-5 days..................<15.0 mg/dL  6-29 days.................<15.0 mg/dL       Alkaline Phosphatase   Date Value Ref Range Status   08/21/2024 163 (H) 55 - 135 U/L Final     AST   Date Value Ref Range Status   08/21/2024 30 10 - 40 U/L Final     ALT   Date Value Ref Range Status   08/21/2024 22 10 - 44 U/L Final     Anion Gap   Date Value Ref Range Status   08/22/2024 7 (L) 8 - 16 mmol/L Final     eGFR   Date Value Ref Range Status   08/22/2024 >60.0 >60 mL/min/1.73 m^2 Final     ASSESSMENT: 84 y.o. year old female with lower back pain, consistent with:    1. Lumbar spondylosis  methocarbamoL (ROBAXIN) 500 MG Tab     HYDROcodone-acetaminophen (NORCO) 5-325 mg per tablet      2. Lumbar radiculopathy  Procedure Order to Pain Management    methocarbamoL (ROBAXIN) 500 MG Tab      3. Compression fracture of L1 vertebra, initial encounter  HYDROcodone-acetaminophen (NORCO) 5-325 mg per tablet      4. Degeneration of intervertebral disc of lumbar region with discogenic back pain and lower extremity pain  Procedure Order to Pain Management    methocarbamoL (ROBAXIN) 500 MG Tab          IMPRESSION: Betzy Cooley presents today for lower back pain with some radiation down the left leg. History and physical exam are consistent with axial lower back pain/facetogenic pain and lumbar radiculopathy.  My independent interpretation of the imaging is consistent with L1 compression fracture (vertebra plana) with retropulsion and lumbar degenerative disc disease with multilevel foraminal stenosis, as well as lumbar facet spondylosis.  She feels her pain worsened after her CT scan, but it seems that most of her pain at this point is below the level of her compression fracture and associated with lumbar radiculopathy.  They have failed conservative management with physical therapy/home exercises and non-narcotic medications.  At this time, we will consider interventions to give them some additional relief for their pain.    PLAN:   - I have stressed the importance of physical activity and a home exercise plan to help with pain and improve health.  - Patient can continue with medications for now since they are providing benefits, using them appropriately, and without side effects.  - we discusssed kyphoplasty vs. Epidural injection; at this point, it seems that an epidural injection would be reasonable due to her lumbar radiculopathy, as well as the point that her pain seems to be lower than the site of her compression fracture  - schedule for left L3/4 and L4/5 transforaminal epidural steroid injection  - given short course of Norco 5-325mg #28  pills every 8 hours as needed for severe pain; I advised to use this only for emergencies.  This is a ONE TIME prescription, and no further prescriptions will be given  - start robaxin 500mg every 8 hours as needed for muscular pain  - RTC after epidural injection  - Counseled patient regarding the importance of activity modification and physical therapy.    The above plan and management options were discussed at length with patient. Patient is in agreement with the above and verbalized understanding. It will be communicated with the referring physician via electronic record, fax, or mail.    Denver Cordon  10/01/2024

## 2024-10-02 ENCOUNTER — TELEPHONE (OUTPATIENT)
Dept: PAIN MEDICINE | Facility: CLINIC | Age: 84
End: 2024-10-02
Payer: MEDICARE

## 2024-10-02 ENCOUNTER — E-CONSULT (OUTPATIENT)
Dept: CARDIOLOGY | Facility: CLINIC | Age: 84
End: 2024-10-02
Payer: MEDICARE

## 2024-10-02 DIAGNOSIS — Z98.61 CAD S/P PERCUTANEOUS CORONARY ANGIOPLASTY: Primary | ICD-10-CM

## 2024-10-02 DIAGNOSIS — I25.10 CAD S/P PERCUTANEOUS CORONARY ANGIOPLASTY: Primary | ICD-10-CM

## 2024-10-02 NOTE — CONSULTS
[facility] - Cardiology - [facility]  Response for E-Consult     Patient Name: Betzy Cooley  MRN: [id number]  Primary Care Provider: Praful Paul MD   Requesting Provider: Denver Cordon MD  E-Consult to Cardiology  Consult performed by: Robin Leavitt III, MD  Consult ordered by: Denver Cordon MD        Chart review:  Since our last visit, she had an NSTEMI that resulted in multivessel PCI on 5/17/24.  She has been on ASA, ticagrelor since  that time.     Recommendation:   -Given NSTEMI, she should ideally be on uninterrupted dual antiplatelet therapy for one year.  -If needed to perform a procedure during this time frame, recommend to delay all elective procedures until she has completed at least six months of dual antiplatelet therapy (11/17/24).   - On or following 11/17/24, ok to hold ticagrelor x5 days. Resume as soon as safely possible following the procedure. If able, continue aspirin without interruption. She is of increased risk for late stent thrombosis if all anti-platelet agents are held. Proceed based on risk/benefit discussion.    Will reschedule her in cardiology clinic, as she has not followed up in >1 year.      Total time of Consultation: 5 minute    I did not speak to the requesting provider verbally about this.     *This eConsult is based on the clinical data available to me and is furnished without benefit of a physical examination. The eConsult will need to be interpreted in light of any clinical issues or changes in patient status not available to me at the time of filing this eConsults. Significant changes in patient condition or level of acuity should result in immediate formal consultation and reevaluation. Please alert me if you have further questions.    Thank you for this eConsult referral.     MD [facility] - Cardiology [address]

## 2024-10-09 ENCOUNTER — OFFICE VISIT (OUTPATIENT)
Dept: INTERNAL MEDICINE | Facility: CLINIC | Age: 84
End: 2024-10-09
Payer: MEDICARE

## 2024-10-09 ENCOUNTER — IMMUNIZATION (OUTPATIENT)
Dept: INTERNAL MEDICINE | Facility: CLINIC | Age: 84
End: 2024-10-09
Payer: MEDICARE

## 2024-10-09 VITALS
BODY MASS INDEX: 18.63 KG/M2 | OXYGEN SATURATION: 98 % | WEIGHT: 101.88 LBS | SYSTOLIC BLOOD PRESSURE: 130 MMHG | HEART RATE: 87 BPM | DIASTOLIC BLOOD PRESSURE: 80 MMHG

## 2024-10-09 DIAGNOSIS — I25.10 CAD S/P PERCUTANEOUS CORONARY ANGIOPLASTY: ICD-10-CM

## 2024-10-09 DIAGNOSIS — Z23 NEED FOR VACCINATION: Primary | ICD-10-CM

## 2024-10-09 DIAGNOSIS — Z98.61 CAD S/P PERCUTANEOUS CORONARY ANGIOPLASTY: ICD-10-CM

## 2024-10-09 DIAGNOSIS — S32.010A CLOSED COMPRESSION FRACTURE OF BODY OF L1 VERTEBRA: Primary | ICD-10-CM

## 2024-10-09 DIAGNOSIS — M54.42 ACUTE MIDLINE LOW BACK PAIN WITH BILATERAL SCIATICA: ICD-10-CM

## 2024-10-09 DIAGNOSIS — M80.00XD OSTEOPOROSIS WITH CURRENT PATHOLOGICAL FRACTURE WITH ROUTINE HEALING, UNSPECIFIED OSTEOPOROSIS TYPE, SUBSEQUENT ENCOUNTER: ICD-10-CM

## 2024-10-09 DIAGNOSIS — M54.41 ACUTE MIDLINE LOW BACK PAIN WITH BILATERAL SCIATICA: ICD-10-CM

## 2024-10-09 PROCEDURE — G0008 ADMIN INFLUENZA VIRUS VAC: HCPCS | Mod: S$GLB,,, | Performed by: INTERNAL MEDICINE

## 2024-10-09 PROCEDURE — 1160F RVW MEDS BY RX/DR IN RCRD: CPT | Mod: CPTII,S$GLB,, | Performed by: INTERNAL MEDICINE

## 2024-10-09 PROCEDURE — 99999 PR PBB SHADOW E&M-EST. PATIENT-LVL IV: CPT | Mod: PBBFAC,,, | Performed by: INTERNAL MEDICINE

## 2024-10-09 PROCEDURE — 3288F FALL RISK ASSESSMENT DOCD: CPT | Mod: CPTII,S$GLB,, | Performed by: INTERNAL MEDICINE

## 2024-10-09 PROCEDURE — G2211 COMPLEX E/M VISIT ADD ON: HCPCS | Mod: S$GLB,,, | Performed by: INTERNAL MEDICINE

## 2024-10-09 PROCEDURE — 1159F MED LIST DOCD IN RCRD: CPT | Mod: CPTII,S$GLB,, | Performed by: INTERNAL MEDICINE

## 2024-10-09 PROCEDURE — 90653 IIV ADJUVANT VACCINE IM: CPT | Mod: S$GLB,,, | Performed by: INTERNAL MEDICINE

## 2024-10-09 PROCEDURE — 3079F DIAST BP 80-89 MM HG: CPT | Mod: CPTII,S$GLB,, | Performed by: INTERNAL MEDICINE

## 2024-10-09 PROCEDURE — 3075F SYST BP GE 130 - 139MM HG: CPT | Mod: CPTII,S$GLB,, | Performed by: INTERNAL MEDICINE

## 2024-10-09 PROCEDURE — 1125F AMNT PAIN NOTED PAIN PRSNT: CPT | Mod: CPTII,S$GLB,, | Performed by: INTERNAL MEDICINE

## 2024-10-09 PROCEDURE — 99214 OFFICE O/P EST MOD 30 MIN: CPT | Mod: S$GLB,,, | Performed by: INTERNAL MEDICINE

## 2024-10-09 PROCEDURE — 1101F PT FALLS ASSESS-DOCD LE1/YR: CPT | Mod: CPTII,S$GLB,, | Performed by: INTERNAL MEDICINE

## 2024-10-09 RX ORDER — ALENDRONATE SODIUM 70 MG/1
70 TABLET ORAL
Qty: 4 TABLET | Refills: 11 | Status: SHIPPED | OUTPATIENT
Start: 2024-10-09 | End: 2025-10-09

## 2024-10-09 NOTE — PROGRESS NOTES
Subjective:       Patient ID: Betzy Cooley is a 84 y.o. female.    Chief Complaint: Follow-up (Discuss med for osteoporosis)    Patient attended by her  here to follow-up recent back issues and consider restarting Fosamax.  Patient had stopped taking it after many years fearing it may cause an increased risk of cancer.  She is willing to restart it now that she is had a compression fracture.  She seeing interventional pain and neuro surgery.  She has a compression fracture that worsened at the L1 level from May through August.  Stable in September but still quite a bit of pain.  One or both doctors felt her pain maybe coming from a nerve pinch rather than the fracture itself.  She was given some hydrocodone and methocarbamol which she has not used very much fearing side effects.  She has a bone density tomorrow and follow-ups with each.  She was going to get an epidural and consider a kyphoplasty but Cardiology said she could not stop her blood thinner for 4 more weeks because she had a NSTEMI and a stent.  Patient does feel she is a little better now than she was a few weeks ago but still significant pain.    She is willing to restart Fosamax and we discussed that.    Follow-up  Associated symptoms include arthralgias.     Review of Systems   Genitourinary:  Negative for difficulty urinating.   Musculoskeletal:  Positive for arthralgias, back pain and gait problem.           Past Medical History:   Diagnosis Date    Arthritis     Cataract     GERD (gastroesophageal reflux disease)     HEARING LOSS     Hypertension     Osteoporosis, postmenopausal     Squamous Cell Carcinoma 2007    right forearm    Urinary incontinence     rare mixed     Past Surgical History:   Procedure Laterality Date    ANGIOGRAM, CORONARY, WITH LEFT HEART CATHETERIZATION Bilateral 5/17/2024    Procedure: Angiogram, Coronary, with Left Heart Cath;  Surgeon: Miguel Jacob MD;  Location: [facility] CATH LAB;  Service: Cardiology;   Laterality: Bilateral;    COLPOPEXY N/A 08/06/2019    Procedure: COLPOPEXY SSL FIXATION;  Surgeon: Alma Dosrey MD;  [address];  Service: Urology;  Laterality: N/A;    CORONARY ANGIOGRAPHY Left 5/16/2024    Procedure: ANGIOGRAM, CORONARY ARTERY;  Surgeon: Miguel Jacob MD;  Location: [facility] CATH LAB;  Service: Cardiology;  Laterality: Left;    CYSTOSCOPY N/A 08/06/2019    Procedure: CYSTOSCOPY;  Surgeon: Alma Dorsey MD;  [address];  Service: Urology;  Laterality: N/A;    ESOPHAGOGASTRODUODENOSCOPY N/A 4/1/2024    Procedure: EGD (ESOPHAGOGASTRODUODENOSCOPY);  Surgeon: Zbigniew Dougherty MD;  Location: [facility];  Service: Endoscopy;  Laterality: N/A;    FRACTURE SURGERY Left 2008    open radius/ulna fracture    HYSTERECTOMY      TANIA/ovaries remain--fibroids- in her late 30's / early 40's    IVUS, CORONARY  5/17/2024    Procedure: IVUS, Coronary;  Surgeon: Miguel Jacob MD;  Location: [facility] CATH LAB;  Service: Cardiology;;    STENT, DRUG ELUTING, MULTI VESSEL, CORONARY  5/17/2024    Procedure: Stent, Drug Eluting, Multi Vessel, Coronary;  Surgeon: Miguel Jacob MD;  Location: [facility] CATH LAB;  Service: Cardiology;;      Patient Active Problem List   Diagnosis    Hearing loss, sensorineural    Nuclear sclerosis - Both Eyes    Cortical senile cataract - Both Eyes    Choroidal nevus - Both Eyes    Palpitations    Osteopenia    Encounter for screening colonoscopy    GERD (gastroesophageal reflux disease)    History of skin cancer    Tinnitus    Elevated blood pressure reading    Abnormal EKG    Posture imbalance    Shoulder weakness    Systolic murmur    Hypertension    Gastritis    Coronary artery disease with refractory angina pectoris    Elevated troponin    Anxiety    Weakness of right lower extremity    Pain of right lower extremity    Fall    Dyslipidemia    CAD S/P percutaneous coronary angioplasty    Closed compression fracture of body of L1  "vertebra    Dysphagia    Hypokalemia        Objective:      Physical Exam  Constitutional:       Appearance: Normal appearance.   Cardiovascular:      Rate and Rhythm: Normal rate.   Pulmonary:      Effort: No respiratory distress.   Musculoskeletal:         General: Tenderness present.   Neurological:      Mental Status: She is alert.      Gait: Gait abnormal.         Assessment:       Problem List Items Addressed This Visit          Neuro    Closed compression fracture of body of L1 vertebra - Primary       Cardiac/Vascular    CAD S/P percutaneous coronary angioplasty     Other Visit Diagnoses       Acute midline low back pain with bilateral sciatica        Osteoporosis with current pathological fracture with routine healing, unspecified osteoporosis type, subsequent encounter                Plan:         Betzy was seen today for follow-up.    Diagnoses and all orders for this visit:    Closed compression fracture of body of L1 vertebra    Acute midline low back pain with bilateral sciatica    CAD S/P percutaneous coronary angioplasty    Osteoporosis with current pathological fracture with routine healing, unspecified osteoporosis type, subsequent encounter    Other orders  -     alendronate (FOSAMAX) 70 MG tablet; Take 1 tablet (70 mg total) by mouth every 7 days.             As this patient's PCP, I am actively managing and/or treating their chronic medical conditions including arthritis, osteoporosis and have been for at least 1 year. This includes, but is not limited to, medication management, coordination of care, documentation review from their specialists and labs/imaging review where pertinent.          Portions of this note may have been created with voice recognition software. Occasional "wrong-word" or "sound-a-like" substitutions may have occurred due to the inherent limitations of voice recognition software. Please, read the note carefully and recognize, using context, where substitutions have " occurred.

## 2024-10-10 ENCOUNTER — HOSPITAL ENCOUNTER (OUTPATIENT)
Dept: RADIOLOGY | Facility: CLINIC | Age: 84
Discharge: HOME | End: 2024-10-10
Attending: STUDENT IN AN ORGANIZED HEALTH CARE EDUCATION/TRAINING PROGRAM
Payer: MEDICARE

## 2024-10-10 DIAGNOSIS — M81.8 OTHER OSTEOPOROSIS, UNSPECIFIED PATHOLOGICAL FRACTURE PRESENCE: ICD-10-CM

## 2024-10-10 PROCEDURE — 77080 DXA BONE DENSITY AXIAL: CPT | Mod: TC

## 2024-10-11 ENCOUNTER — PATIENT MESSAGE (OUTPATIENT)
Dept: NEUROSURGERY | Facility: CLINIC | Age: 84
End: 2024-10-11
Payer: MEDICARE

## 2024-10-14 ENCOUNTER — CLINICAL SUPPORT (OUTPATIENT)
Dept: REHABILITATION | Facility: HOSPITAL | Age: 84
End: 2024-10-14
Attending: STUDENT IN AN ORGANIZED HEALTH CARE EDUCATION/TRAINING PROGRAM
Payer: MEDICARE

## 2024-10-14 DIAGNOSIS — R29.898 WEAKNESS OF BOTH LOWER EXTREMITIES: ICD-10-CM

## 2024-10-14 DIAGNOSIS — M53.86 DECREASED ROM OF LUMBAR SPINE: ICD-10-CM

## 2024-10-14 DIAGNOSIS — M54.50 CHRONIC BILATERAL LOW BACK PAIN WITHOUT SCIATICA: Primary | ICD-10-CM

## 2024-10-14 DIAGNOSIS — S32.010A COMPRESSION FRACTURE OF L1 VERTEBRA, INITIAL ENCOUNTER: ICD-10-CM

## 2024-10-14 DIAGNOSIS — G89.29 CHRONIC BILATERAL LOW BACK PAIN WITHOUT SCIATICA: Primary | ICD-10-CM

## 2024-10-14 PROCEDURE — 97161 PT EVAL LOW COMPLEX 20 MIN: CPT

## 2024-10-14 PROCEDURE — 97110 THERAPEUTIC EXERCISES: CPT

## 2024-10-14 NOTE — PLAN OF CARE
[facility]   Physical Therapy Initial Evaluation      Date: 10/14/2024   Name: Betzy Cooley  Clinic Number: [id number]    Therapy Diagnosis:   Encounter Diagnoses   Name Primary?    Compression fracture of L1 vertebra, initial encounter     Chronic bilateral low back pain without sciatica Yes    Decreased ROM of lumbar spine     Weakness of both lower extremities      Physician: Grzegorz Damon DO    Physician Orders: PT Eval and Treat   Medical Diagnosis from Referral: S32.010A (ICD-10-CM) - Compression fracture of L1 vertebra, initial encounter   Evaluation Date: 10/14/2024  Authorization Period Expiration: TBD  Plan of Care Expiration: 12/14/2024  Progress Note Due: 11/14/2024  Visit # / Visits authorized: 1/1   FOTO: 1/5    FOTO Initial Evaluation: see foto   FOTO 2nd F/U: NA  FOTO Discharge: NA    Precautions: Standard     Time In: 1125  Time Out: 1210  Total Appointment Time (timed & untimed codes): 45 minutes      SUBJECTIVE     Date of onset: Year    History of current condition - Betzy reports: she was diagnosed with a compression fracture of her L1 vertebra. Patient reports she did home health for 6 weeks. Patient was doing well with home health and was walking around much better after home health. However, patient went to get more imaging and the patient reports she was hurt while trying to get into the xray or CT scanner. Patient reports two nurses picked her up and moved her around to get her imaging which hurt her back. Patient reports her back hurts with reaching, twisting, getting out a chair, and walking around. Patient cannot sleep in the bed due to difficulties getting in and out the bed.     Falls: None    Imaging, see imaging:    Degenerative findings:     T12-L1: Retropulsion of L1 results in moderate spinal canal stenosis.     L1-L2: Retropulsion of L1 results in moderate right neural foraminal narrowing.     L2-L3: Circumferential disc bulge with  osteophyte production and mild facet arthropathy result in mild bilateral neural foraminal narrowing.     L3-L4: Circumferential disc bulge with osteophyte production and moderate facet arthropathy result in mild spinal canal stenosis and moderate left, mild right neural foraminal narrowing.     L4-L5: Circumferential disc bulge and severe facet arthropathy result in moderate to severe spinal canal stenosis and moderate left, mild right neural foraminal narrowing.     L5-S1: Moderate facet arthropathy results in mild left neural foraminal narrowing.     Paraspinal muscles & soft tissues: Aortic calcification.  Large proximal duodenal diverticulum.     Impression:     1. Similar appearance of L1 vertebral body burst compression fracture with severe height loss and stable degree of retropulsion resulting in moderate spinal canal stenosis.  2. Multilevel degenerative changes of the lumbar spine detailed above.    Prior Therapy: Yes  Social History:  lives with their family  Occupation: Not working   Prior Level of Function: Sedentary   Current Level of Function: Sedentary     Pain:  Current 5/10, worst 8/10, best 3/10   Location: bilateral back  and lower legs   Description: Aching, Dull, Tight, and Sharp  Aggravating Factors: sit to stand, twisting, bending, reaching, and walking   Easing Factors: Medication, and exercise     Patients goals: To reduce pain and improve function     Medical History:   Past Medical History:   Diagnosis Date    Arthritis     Cataract     GERD (gastroesophageal reflux disease)     HEARING LOSS     Hypertension     Osteoporosis, postmenopausal     Squamous Cell Carcinoma 2007    right forearm    Urinary incontinence     rare mixed       Surgical History:   Betzybrandy Cooley  has a past surgical history that includes Fracture surgery (Left, 2008); Hysterectomy; Cystoscopy (N/A, 08/06/2019); Colpopexy (N/A, 08/06/2019); Esophagogastroduodenoscopy (N/A, 4/1/2024); Coronary angiography (Left,  5/16/2024); ANGIOGRAM, CORONARY, WITH LEFT HEART CATHETERIZATION (Bilateral, 5/17/2024); ivus, coronary (5/17/2024); and stent, drug eluting, multi vessel, coronary (5/17/2024).    Medications:   Betzy has a current medication list which includes the following prescription(s): alendronate, aspirin, atorvastatin, calcium-vitamin d3, cefpodoxime, co-enzyme q-10, diclofenac sodium, ezetimibe, hydrocodone-acetaminophen, hydroxyzine hcl, lanolin/mineral oil/petrolatum, methocarbamol, metoprolol succinate, multivitamin, nitroglycerin, pantoprazole, sodium chloride, ticagrelor, valsartan, and vitamin a-vitamin c-vit e-min, and the following Facility-Administered Medications: lactated ringers.    Allergies:   Review of patient's allergies indicates:   Allergen Reactions    Sulfa (sulfonamide antibiotics) Hives     Other reaction(s): Anaphylaxis    Itching     Shortness of breath          OBJECTIVE     Lumbar AROM: Pain/Dysfunction with Movement:   Flexion: 30 degrees Low back pain    Extension: 5 degrees Low back pain    Right side bending: 10 degrees Low back pain   Left side bending: 10 degrees Low back pain    Right rotation: 50% limited Low back pain    Left rotation: 50% limited Low back pain              Myotome / MMT Right Left Pain/Dysfunction with Movement   L1,2- Hip Flexion (Femoral Nerve) 4-/5 4-/5    L3,4- Knee Extension (Femoral Nerve) 4-/5 4-/5    L4,5- Ankle Dorsiflexion (Deep Fibular Nerve) 4-/5 4-/5    L5, S1- Ankle plantarflexion (Tibial Nerve) 4-/5 4-/5    Hip Abduction 4-/5 4-/5    Knee Flexion 4-/5 4-/5         Posture: fwd leaning posture especially when walking with walker       30s sit to stand: 3 reps with hand assist       FOTO Back Survey    Therapist reviewed FOTO scores for Betzy Cooley on 10/14/2024.   FOTO documents entered into EP[employer] - see Media section.    Intake Score: see foto          TREATMENT     Total Treatment time (time-based codes) separate from Evaluation: 15 minutes       Betzy received the treatments listed below:      therapeutic exercises to develop strength, endurance, ROM, and flexibility for 15 minutes including:    Access Code: [email or web address]  URL: [email or web address]  Date: 10/14/2024  Prepared by: Dwain Shay    Exercises  - Seated Hamstring Stretch  - 2 x daily - 7 x weekly - 3 sets - 30 seconds hold  - Seated Piriformis Stretch  - 2 x daily - 7 x weekly - 3 sets - 30 seconds hold  - Seated March  - 2 x daily - 7 x weekly - 3 sets - 10 reps  - Supine Lower Trunk Rotation  - 2 x daily - 7 x weekly - 15 reps - 3 seconds hold  - Hooklying Single Knee to Chest  - 2 x daily - 7 x weekly - 15 reps - 3 seconds hold  - Seated Trunk Rotation - Arms Crossed  - 2 x daily - 7 x weekly - 15 reps - 3 seconds hold  - Seated Side Stretch  - 2 x daily - 7 x weekly - 15 reps - 3 seconds  hold            PATIENT EDUCATION AND HOME EXERCISES     Education provided:   - Purpose of PT  - HEP    Written Home Exercises Provided: yes. Exercises were reviewed and Betzy was able to demonstrate them prior to the end of the session.  Betzy demonstrated good  understanding of the education provided. See EMR under Patient Instructions for exercises provided during therapy sessions.    ASSESSMENT     Betzy is a 84 y.o. female referred to outpatient Physical Therapy with a medical diagnosis of S32.010A (ICD-10-CM) - Compression fracture of L1 vertebra, initial encounter. Patient presents with a chronic history of lower back pain that has recently flared up. Patient presents to PT with limited lumbar range of motion, decreased lower ex strength, poor posture, and limited functional mobility. Patient was progressed with TE today to address problem list. Patient had no adverse effects from today.     Patient prognosis is Fair.   Patient will benefit from skilled outpatient Physical Therapy to address the deficits stated above and in the chart below, provide patient /family education, and to  maximize patientt's level of independence.     Plan of care discussed with patient: Yes  Patient's spiritual, cultural and educational needs considered and patient is agreeable to the plan of care and goals as stated below:     Anticipated Barriers for therapy: Chronic symptoms     Medical Necessity is demonstrated by the following  History  Co-morbidities and personal factors that may impact the plan of care Co-morbidities:   See medical history     Personal Factors:   no deficits     high   Examination  Body Structures and Functions, activity limitations and participation restrictions that may impact the plan of care Body Regions:   back  lower extremities    Body Systems:    ROM  strength    Participation Restrictions:   None    Activity limitations:   Learning and applying knowledge  no deficits    General Tasks and Commands  no deficits    Communication  no deficits    Mobility  lifting and carrying objects  fine hand use (grasping/picking up)  driving (bike, car, motorcycle)    Self care  no deficits    Domestic Life  doing house work (cleaning house, washing dishes, laundry)    Interactions/Relationships  no deficits    Life Areas  no deficits    Community and Social Life  recreation and leisure         high   Clinical Presentation stable and uncomplicated low   Decision Making/ Complexity Score: low     Goals:  Short Term Goals (3 Weeks):  1. Pt will be compliant with HEP to supplement PT in decreasing pain with functional mobility.  2. Pt will perform pallof press with good control to demonstrate improved core strength.  3. Pt will improve lumbar ROM by 5-10 degrees in all planes to improve functional mobility.  4. Pt will improve impaired LE MMTs by 1/2 score to improve strength for functional tasks.  Long Term Goals (6 Weeks):   1. Pt will improve FOTO score to >/= 50 to decrease perceived limitation with maintaining/changing body position.   2. Pt will perform squat with good form to reduce risk of re  injury with lifting.  3. Pt will improve impaired LE MMTs by 1 score to improve strength for functional tasks.  4. Pt will report no pain during lumbar ROM to promote functional mobility.       PLAN   Plan of care Certification: 10/14/2024 to 12/14/2024.    Outpatient Physical Therapy 1-2 times weekly for 8 weeks to include the following interventions: Manual Therapy, Moist Heat/ Ice, Neuromuscular Re-ed, Patient Education, Self Care, Therapeutic Activities, and Therapeutic Exercise.     Dwain Shay, PT      I CERTIFY THE NEED FOR THESE SERVICES FURNISHED UNDER THIS PLAN OF TREATMENT AND WHILE UNDER MY CARE   Physician's comments:     Physician's Signature: ___________________________________________________

## 2024-10-17 ENCOUNTER — CLINICAL SUPPORT (OUTPATIENT)
Dept: REHABILITATION | Facility: HOSPITAL | Age: 84
End: 2024-10-17
Payer: MEDICARE

## 2024-10-17 DIAGNOSIS — M54.50 CHRONIC BILATERAL LOW BACK PAIN WITHOUT SCIATICA: Primary | ICD-10-CM

## 2024-10-17 DIAGNOSIS — M53.86 DECREASED ROM OF LUMBAR SPINE: ICD-10-CM

## 2024-10-17 DIAGNOSIS — G89.29 CHRONIC BILATERAL LOW BACK PAIN WITHOUT SCIATICA: Primary | ICD-10-CM

## 2024-10-17 DIAGNOSIS — R29.898 WEAKNESS OF BOTH LOWER EXTREMITIES: ICD-10-CM

## 2024-10-17 PROCEDURE — 97530 THERAPEUTIC ACTIVITIES: CPT | Mod: CQ

## 2024-10-17 PROCEDURE — 97112 NEUROMUSCULAR REEDUCATION: CPT | Mod: CQ

## 2024-10-17 NOTE — PROGRESS NOTES
[facility]   Physical Therapy Treatment Note      Name: Betzy Cooley  Clinic Number: [id number]    Therapy Diagnosis:   Encounter Diagnoses   Name Primary?    Chronic bilateral low back pain without sciatica Yes    Decreased ROM of lumbar spine     Weakness of both lower extremities      Physician: Grzegorz Damon DO    Visit Date: 10/17/2024       Physician Orders: PT Eval and Treat   Medical Diagnosis from Referral: S32.010A (ICD-10-CM) - Compression fracture of L1 vertebra, initial encounter   Evaluation Date: 10/14/2024  Authorization Period Expiration: 12/17/24  Plan of Care Expiration: 12/14/2024  Progress Note Due: 11/14/2024  Visit # / Visits authorized: 1/1 1/20  FOTO: 1/5     FOTO Initial Evaluation: see foto   FOTO 2nd F/U: NA  FOTO Discharge: NA     Precautions: Standard      PTA Visit #: 1/5     Time In: 10:45 am  Time Out: 1138 am  Total Billable Time: 43 minutes     Subjective     Pt reports: that the pain she does get is in B hips. She doesn't sit on the sofa at home because it's to hard to get up.  She was compliant with home exercise program.  Response to previous treatment: last session was initial evaluation  Functional change: progressing    Pain: 5/10  Location: bilateral hip      Objective      Objective Measures updated at progress report unless specified.     Treatment     Betzy received the treatments listed below:      therapeutic exercises to develop strength, endurance, ROM, flexibility, posture, and core stabilization for 8 minutes including:  Seated heel raises 2x10  Seated toe raises 2x10      neuromuscular re-education activities to improve: Balance, Coordination, Kinesthetic, Sense, Proprioception, and Posture for 12 minutes. The following activities were included:  Seated ball squeeze 2x10   Seated LAQ 2x10  SAQ    therapeutic activities to improve functional performance for 23  minutes, including:  LTR x 20  Seated march 2x10  Seated trunk rot  2x10  Supine LTR    Patient Education and Home Exercises       Education provided:   - heat or ice for hip pain    Written Home Exercises Provided: Patient instructed to cont prior HEP. Exercises were reviewed and Betzy was able to demonstrate them prior to the end of the session.  Betzy demonstrated good  understanding of the education provided. See EMR under Patient Instructions for exercises provided during therapy sessions    Assessment     Pt tolerated first full PT tx following evaluation. Patients  sat in on therapy session which was helpful as he was very knowledgeable. Plan to work on sit to stand next session.    Betzy Is progressing well towards her goals.   Pt prognosis is Good.     Pt will continue to benefit from skilled outpatient physical therapy to address the deficits listed in the problem list box on initial evaluation, provide pt/family education and to maximize pt's level of independence in the home and community environment.     Pt's spiritual, cultural and educational needs considered and pt agreeable to plan of care and goals.     Anticipated barriers to physical therapy: age co morbidities     Goals:   Short Term Goals (3 Weeks):  1. Pt will be compliant with HEP to supplement PT in decreasing pain with functional mobility.  2. Pt will perform pallof press with good control to demonstrate improved core strength.  3. Pt will improve lumbar ROM by 5-10 degrees in all planes to improve functional mobility.  4. Pt will improve impaired LE MMTs by 1/2 score to improve strength for functional tasks.  Long Term Goals (6 Weeks):   1. Pt will improve FOTO score to >/= 50 to decrease perceived limitation with maintaining/changing body position.   2. Pt will perform squat with good form to reduce risk of re injury with lifting.  3. Pt will improve impaired LE MMTs by 1 score to improve strength for functional tasks.  4. Pt will report no pain during lumbar ROM to promote functional  mobility.     Plan     Plan of care Certification: 10/14/2024 to 12/14/2024.     Outpatient Physical Therapy 1-2 times weekly for 8 weeks to include the following interventions: Manual Therapy, Moist Heat/ Ice, Neuromuscular Re-ed, Patient Education, Self Care, Therapeutic Activities, and Therapeutic Exercise.      Dwain Shay, PT    PT/PTA met face to face to discuss pt's treatment plan and progress towards established goals. Pt will be seen by a physical therapist minimally every 6th visit or every 30 days.      Dieudonne Richter, PTA

## 2024-10-21 ENCOUNTER — DOCUMENT SCAN (OUTPATIENT)
Dept: HOME HEALTH SERVICES | Facility: HOSPITAL | Age: 84
End: 2024-10-21
Payer: MEDICARE

## 2024-10-21 ENCOUNTER — CLINICAL SUPPORT (OUTPATIENT)
Dept: REHABILITATION | Facility: HOSPITAL | Age: 84
End: 2024-10-21
Payer: MEDICARE

## 2024-10-21 DIAGNOSIS — M53.86 DECREASED ROM OF LUMBAR SPINE: ICD-10-CM

## 2024-10-21 DIAGNOSIS — R29.898 WEAKNESS OF BOTH LOWER EXTREMITIES: ICD-10-CM

## 2024-10-21 DIAGNOSIS — G89.29 CHRONIC BILATERAL LOW BACK PAIN WITHOUT SCIATICA: Primary | ICD-10-CM

## 2024-10-21 DIAGNOSIS — M54.50 CHRONIC BILATERAL LOW BACK PAIN WITHOUT SCIATICA: Primary | ICD-10-CM

## 2024-10-21 PROCEDURE — 97110 THERAPEUTIC EXERCISES: CPT | Mod: HCNC

## 2024-10-21 PROCEDURE — 97530 THERAPEUTIC ACTIVITIES: CPT | Mod: HCNC

## 2024-10-21 PROCEDURE — 97112 NEUROMUSCULAR REEDUCATION: CPT | Mod: HCNC

## 2024-10-21 NOTE — PROGRESS NOTES
[facility]   Physical Therapy Treatment Note      Name: Betzy Cooley  Clinic Number: [id number]    Therapy Diagnosis:   Encounter Diagnoses   Name Primary?    Chronic bilateral low back pain without sciatica Yes    Decreased ROM of lumbar spine     Weakness of both lower extremities        Physician: Grzegorz Damon DO    Visit Date: 10/21/2024       Physician Orders: PT Eval and Treat   Medical Diagnosis from Referral: S32.010A (ICD-10-CM) - Compression fracture of L1 vertebra, initial encounter   Evaluation Date: 10/14/2024  Authorization Period Expiration: 12/17/24  Plan of Care Expiration: 12/14/2024  Progress Note Due: 11/14/2024  Visit # / Visits authorized: 1/1,  2/20  FOTO: 3/5     FOTO Initial Evaluation: see foto   FOTO 2nd F/U: NA  FOTO Discharge: NA     Precautions: Standard      PTA Visit #: 1/5     Time In: 1045  Time Out: 1130  Total Billable Time: 45 minutes     Subjective     Pt reports: she had pain last night trying to sleep and maybe got 2 hours of sleep. Patient describes her pain as achy.   She was compliant with home exercise program.  Response to previous treatment: last session was initial evaluation  Functional change: progressing    Pain: 5/10  Location: bilateral hip      Objective      Objective Measures updated at progress report unless specified.     Treatment     Betzy received the treatments listed below:        All Supine exercise on heat     therapeutic exercises to develop strength, endurance, ROM, flexibility, posture, and core stabilization for 15 minutes including:    Seated heel raises 2x10  Seated toe raises 2x10  Ball roll outs 3 min       neuromuscular re-education activities to improve: Balance, Coordination, Kinesthetic, Sense, Proprioception, and Posture for 15 minutes. The following activities were included:    Seated ball squeeze 3 min   Seated LAQ 2x10  Hip abd squeezes 3 min       therapeutic activities to improve functional  performance for 15 minutes, including:    LTR x 20  Seated march 2x10  Seated trunk rot 2x10        Patient Education and Home Exercises       Education provided:   - heat or ice for hip pain    Written Home Exercises Provided: Patient instructed to cont prior HEP. Exercises were reviewed and Betzy was able to demonstrate them prior to the end of the session.  Betzy demonstrated good  understanding of the education provided. See EMR under Patient Instructions for exercises provided during therapy sessions    Assessment     Pt tolerated first full PT tx following evaluation. Patient took increased time for all TE today. Plan to work on sit to stand next session.    Betzy Is progressing well towards her goals.   Pt prognosis is Good.     Pt will continue to benefit from skilled outpatient physical therapy to address the deficits listed in the problem list box on initial evaluation, provide pt/family education and to maximize pt's level of independence in the home and community environment.     Pt's spiritual, cultural and educational needs considered and pt agreeable to plan of care and goals.     Anticipated barriers to physical therapy: age co morbidities     Goals:   Short Term Goals (3 Weeks):  1. Pt will be compliant with HEP to supplement PT in decreasing pain with functional mobility.  2. Pt will perform pallof press with good control to demonstrate improved core strength.  3. Pt will improve lumbar ROM by 5-10 degrees in all planes to improve functional mobility.  4. Pt will improve impaired LE MMTs by 1/2 score to improve strength for functional tasks.  Long Term Goals (6 Weeks):   1. Pt will improve FOTO score to >/= 50 to decrease perceived limitation with maintaining/changing body position.   2. Pt will perform squat with good form to reduce risk of re injury with lifting.  3. Pt will improve impaired LE MMTs by 1 score to improve strength for functional tasks.  4. Pt will report no pain during  lumbar ROM to promote functional mobility.     Plan     Plan of care Certification: 10/14/2024 to 12/14/2024.     Outpatient Physical Therapy 1-2 times weekly for 8 weeks to include the following interventions: Manual Therapy, Moist Heat/ Ice, Neuromuscular Re-ed, Patient Education, Self Care, Therapeutic Activities, and Therapeutic Exercise.      Dwain Shay, PT    PT/PTA met face to face to discuss pt's treatment plan and progress towards established goals. Pt will be seen by a physical therapist minimally every 6th visit or every 30 days.      Dwain Shay, PT        actual

## 2024-10-22 ENCOUNTER — OFFICE VISIT (OUTPATIENT)
Dept: NEUROSURGERY | Facility: CLINIC | Age: 84
End: 2024-10-22
Payer: MEDICARE

## 2024-10-22 DIAGNOSIS — S32.010A COMPRESSION FRACTURE OF L1 VERTEBRA, INITIAL ENCOUNTER: Primary | ICD-10-CM

## 2024-10-22 PROCEDURE — 1101F PT FALLS ASSESS-DOCD LE1/YR: CPT | Mod: HCNC,CPTII,S$GLB, | Performed by: STUDENT IN AN ORGANIZED HEALTH CARE EDUCATION/TRAINING PROGRAM

## 2024-10-22 PROCEDURE — 1125F AMNT PAIN NOTED PAIN PRSNT: CPT | Mod: HCNC,CPTII,S$GLB, | Performed by: STUDENT IN AN ORGANIZED HEALTH CARE EDUCATION/TRAINING PROGRAM

## 2024-10-22 PROCEDURE — 99999 PR PBB SHADOW E&M-EST. PATIENT-LVL II: CPT | Mod: PBBFAC,HCNC,, | Performed by: STUDENT IN AN ORGANIZED HEALTH CARE EDUCATION/TRAINING PROGRAM

## 2024-10-22 PROCEDURE — 3288F FALL RISK ASSESSMENT DOCD: CPT | Mod: HCNC,CPTII,S$GLB, | Performed by: STUDENT IN AN ORGANIZED HEALTH CARE EDUCATION/TRAINING PROGRAM

## 2024-10-22 PROCEDURE — 1159F MED LIST DOCD IN RCRD: CPT | Mod: HCNC,CPTII,S$GLB, | Performed by: STUDENT IN AN ORGANIZED HEALTH CARE EDUCATION/TRAINING PROGRAM

## 2024-10-22 PROCEDURE — 99214 OFFICE O/P EST MOD 30 MIN: CPT | Mod: HCNC,S$GLB,, | Performed by: STUDENT IN AN ORGANIZED HEALTH CARE EDUCATION/TRAINING PROGRAM

## 2024-10-22 NOTE — PROGRESS NOTES
Neurosurgery  Established Patient    SUBJECTIVE:     History of Present Illness:  84 F with hx of osteoporosis and known L1 burst fracture presents in fu. She denies any back pain on today's visit. She does feel discomfort along her left hip where the LSO brace rubs her skin. She has intermittent LLE radicular pain into dorsum of her foot. She has no new radicular leg pain, weakness, or numbness. She has appt with endocrinology but this isn't until jan of 2025. She is able to ambulate with a walker.     Interval fu 10/22/24:  Pt presents in fu.  She had a setback regarding her pain while she was getting her xrays and CT scan.  She stated that the techs who were moving her were quite rough and following the imaging she developed worsened back pain.  This pain has somewhat improved.  She has no pain near her L1 fracture.  Her main complaint today is left greater than radicular leg pain down posterior aspect of her leg stopping at the knee.  This pain is worse at night.  She has just started outpt PT.    Review of patient's allergies indicates:   Allergen Reactions    Sulfa (sulfonamide antibiotics) Hives     Other reaction(s): Anaphylaxis    Itching     Shortness of breath       Current Outpatient Medications   Medication Sig Dispense Refill    alendronate (FOSAMAX) 70 MG tablet Take 1 tablet (70 mg total) by mouth every 7 days. 4 tablet 11    aspirin 81 MG Chew Take 81 mg by mouth once daily. Not chewable      atorvastatin (LIPITOR) 40 MG tablet Take 1 tablet (40 mg total) by mouth every evening. 360 tablet 0    calcium-vitamin D3 500 mg(1,250mg) -200 unit per tablet Take 1 tablet by mouth 2 (two) times daily with meals.      cefpodoxime (VANTIN) 100 MG tablet Take 200 mg by mouth every 12 (twelve) hours.      co-enzyme Q-10 30 mg capsule Take 30 mg by mouth once daily.      diclofenac sodium (VOLTAREN ARTHRITIS PAIN) 1 % Gel Apply 2 g topically daily as needed (Pain).      ezetimibe (ZETIA) 10 mg tablet Take 1  tablet (10 mg total) by mouth once daily. 90 tablet 3    HYDROcodone-acetaminophen (NORCO) 5-325 mg per tablet Take 1 tablet by mouth every 6 (six) hours as needed for Pain. 28 tablet 0    hydrOXYzine HCL (ATARAX) 25 MG tablet Take 1 tablet (25 mg total) by mouth 3 (three) times daily as needed for Anxiety. 30 tablet 3    lanolin/mineral oil/petrolatum (ARTIFICIAL TEARS OPHT) Place 1 drop into both eyes once daily.      methocarbamoL (ROBAXIN) 500 MG Tab Take 1 tablet (500 mg total) by mouth 3 (three) times daily as needed (muscular pain). 90 tablet 0    multivitamin (THERAGRAN) per tablet Take 1 tablet by mouth once daily.      nitroGLYCERIN (NITROSTAT) 0.4 MG SL tablet Place 1 tablet (0.4 mg total) under the tongue every 5 (five) minutes as needed for Chest pain. 30 tablet 2    pantoprazole (PROTONIX) 40 MG tablet Take 1 tablet (40 mg total) by mouth 2 (two) times daily. 180 tablet 3    sodium chloride (SALINE MIST NASL) 1 spray by Each Nostril route daily as needed (Congestion).      ticagrelor (BRILINTA) 90 mg tablet Take 1 tablet (90 mg total) by mouth 2 (two) times daily. 60 tablet 11    valsartan (DIOVAN) 80 MG tablet Take 1 tablet (80 mg total) by mouth once daily. 90 tablet 3    vitamin A-vitamin C-vit E-min Tab Take 1 tablet by mouth once daily.      metoprolol succinate (TOPROL-XL) 25 MG 24 hr tablet Take 0.5 tablets (12.5 mg total) by mouth once daily. 45 tablet 0     No current facility-administered medications for this visit.     Facility-Administered Medications Ordered in Other Visits   Medication Dose Route Frequency Provider Last Rate Last Admin    lactated ringers infusion   Intravenous Continuous Zbigniwe Dougherty MD           Past Medical History:   Diagnosis Date    Arthritis     Cataract     GERD (gastroesophageal reflux disease)     HEARING LOSS     Hypertension     Osteoporosis, postmenopausal     Squamous Cell Carcinoma 2007    right forearm    Urinary incontinence     rare mixed      Past Surgical History:   Procedure Laterality Date    ANGIOGRAM, CORONARY, WITH LEFT HEART CATHETERIZATION Bilateral 5/17/2024    Procedure: Angiogram, Coronary, with Left Heart Cath;  Surgeon: Miguel Jacob MD;  Location: [facility] CATH LAB;  Service: Cardiology;  Laterality: Bilateral;    COLPOPEXY N/A 08/06/2019    Procedure: COLPOPEXY SSL FIXATION;  Surgeon: Alma Dorsey MD;  Location: [address];  Service: Urology;  Laterality: N/A;    CORONARY ANGIOGRAPHY Left 5/16/2024    Procedure: ANGIOGRAM, CORONARY ARTERY;  Surgeon: Miguel Jacob MD;  Location: [facility] CATH LAB;  Service: Cardiology;  Laterality: Left;    CYSTOSCOPY N/A 08/06/2019    Procedure: CYSTOSCOPY;  Surgeon: Alma Dorsey MD;  [facility];  Service: Urology;  Laterality: N/A;    ESOPHAGOGASTRODUODENOSCOPY N/A 4/1/2024    Procedure: EGD (ESOPHAGOGASTRODUODENOSCOPY);  Surgeon: Zbigniew Dougherty MD;  Location: [facility];  Service: Endoscopy;  Laterality: N/A;    FRACTURE SURGERY Left 2008    open radius/ulna fracture    HYSTERECTOMY      TANIA/ovaries remain--fibroids- in her late 30's / early 40's    IVUS, CORONARY  5/17/2024    Procedure: IVUS, Coronary;  Surgeon: Miguel Jacob MD;  Location: [facility] CATH LAB;  Service: Cardiology;;    STENT, DRUG ELUTING, MULTI VESSEL, CORONARY  5/17/2024    Procedure: Stent, Drug Eluting, Multi Vessel, Coronary;  Surgeon: Miguel Jacob MD;  Location: [facility] CATH LAB;  Service: Cardiology;;     Family History       Problem Relation (Age of Onset)    Arthritis Sister    COPD Mother, Father    Diabetes Sister    Heart disease Mother    Heart failure Mother    Macular degeneration Mother    No Known Problems Brother, Son, Daughter, Sister, Sister, Sister, Brother, Brother, Brother, Brother, Brother, Daughter, Son          Social History     Socioeconomic History    Marital status:    Tobacco Use    Smoking status: Never     Passive exposure: Never     Smokeless tobacco: Never   Substance and Sexual Activity    Alcohol use: Not Currently     Comment: 1-2 glasses wine weekly    Drug use: No    Sexual activity: Yes     Partners: Male     Birth control/protection: None     Social Drivers of Health     Financial Resource Strain: Low Risk  (5/30/2024)    Overall Financial Resource Strain (CARDIA)     Difficulty of Paying Living Expenses: Not hard at all   Food Insecurity: No Food Insecurity (5/6/2024)    Hunger Vital Sign     Worried About Running Out of Food in the Last Year: Never true     Ran Out of Food in the Last Year: Never true   Transportation Needs: No Transportation Needs (5/30/2024)    PRAPARE - Transportation     Lack of Transportation (Medical): No     Lack of Transportation (Non-Medical): No   Physical Activity: Sufficiently Active (5/6/2024)    Exercise Vital Sign     Days of Exercise per Week: 3 days     Minutes of Exercise per Session: 90 min   Stress: Stress Concern Present (5/30/2024)    [employer] - Occupational Stress Questionnaire     Feeling of Stress : To some extent   Housing Stability: Low Risk  (5/6/2024)    Housing Stability Vital Sign     Unable to Pay for Housing in the Last Year: No     Homeless in the Last Year: No       Review of Systems  14 point ROS was negative    OBJECTIVE:     Vital Signs  Pain Score:   2  There is no height or weight on file to calculate BMI.    Neurosurgery Physical Exam  Constitutional: She appears well-developed and well-nourished.      Eyes: Pupils are equal, round, and reactive to light.      Cardiovascular: Normal rate and regular rhythm.      Abdominal: Soft.      Psych/Behavior: She is alert. She is oriented to person, place, and time. She has a normal mood and affect.      Musculoskeletal: Gait is abnormal.        Neck: Range of motion is limited.        Back: Range of motion is limited.        Right Upper Extremities: Muscle strength is 5/5.        Left Upper Extremities:  Muscle strength is 5/5.       Right Lower Extremities: Muscle strength is 5/5.        Left Lower Extremities: Muscle strength is 5/5.      Neurological:        Sensory: There is no sensory deficit in the trunk. There is no sensory deficit in the extremities.        DTRs: DTRs are DTRS NORMAL AND SYMMETRICnormal and symmetric.        Cranial nerves: Cranial nerve(s) II, III, IV, V, VI, VII, VIII, IX, X, XI and XII are intact.      No TTP along thoracolumbar spine in midline    Diagnostic Results:  Upright/supine xrays: no significant change in alignment regarding L1 burst fracture.  CT L: stable L1 burst fracture from prior.  DEXA: osteoporosis  Reviewed    ASSESSMENT/PLAN:     84 F with hx of osteoporosis who presents in fu for L1 burst fracture.  Updated imaging shows no progression of the fracture and she has no pain associated with it.  Her exam remains stable.  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  She is scheduled for injections in nov to assess for improvement with her leg pain.  Will plan to see her after the injections.

## 2024-10-24 ENCOUNTER — CLINICAL SUPPORT (OUTPATIENT)
Dept: REHABILITATION | Facility: HOSPITAL | Age: 84
End: 2024-10-24
Payer: MEDICARE

## 2024-10-24 DIAGNOSIS — M53.86 DECREASED ROM OF LUMBAR SPINE: ICD-10-CM

## 2024-10-24 DIAGNOSIS — M54.50 CHRONIC BILATERAL LOW BACK PAIN WITHOUT SCIATICA: Primary | ICD-10-CM

## 2024-10-24 DIAGNOSIS — G89.29 CHRONIC BILATERAL LOW BACK PAIN WITHOUT SCIATICA: Primary | ICD-10-CM

## 2024-10-24 DIAGNOSIS — R29.898 WEAKNESS OF BOTH LOWER EXTREMITIES: ICD-10-CM

## 2024-10-24 PROCEDURE — 97112 NEUROMUSCULAR REEDUCATION: CPT | Mod: HCNC

## 2024-10-24 PROCEDURE — 97530 THERAPEUTIC ACTIVITIES: CPT | Mod: HCNC

## 2024-10-24 PROCEDURE — 97110 THERAPEUTIC EXERCISES: CPT | Mod: HCNC

## 2024-10-24 NOTE — PROGRESS NOTES
[facility]   Physical Therapy Treatment Note      Name: Betzy Cooley  Clinic Number: [id number]    Therapy Diagnosis:   Encounter Diagnoses   Name Primary?    Chronic bilateral low back pain without sciatica Yes    Decreased ROM of lumbar spine     Weakness of both lower extremities          Physician: Grzegorz Damon DO    Visit Date: 10/24/2024       Physician Orders: PT Eval and Treat   Medical Diagnosis from Referral: S32.010A (ICD-10-CM) - Compression fracture of L1 vertebra, initial encounter   Evaluation Date: 10/14/2024  Authorization Period Expiration: 12/17/24  Plan of Care Expiration: 12/14/2024  Progress Note Due: 11/14/2024  Visit # / Visits authorized: 1/1,  3/20  FOTO: 4/5     FOTO Initial Evaluation: see foto   FOTO 2nd F/U: NA  FOTO Discharge: NA     Precautions: Standard      PTA Visit #: 0/5     Time In: 1115  Time Out: 1200  Total Billable Time: 45 minutes     Subjective     Pt reports: she continues to have pain at night and doesn't know why. Patient feels better when standing and moving throughout the day.       She was compliant with home exercise program.  Response to previous treatment: last session was initial evaluation  Functional change: progressing    Pain: 5/10  Location: bilateral hip      Objective      Objective Measures updated at progress report unless specified.     Treatment     Betzy received the treatments listed below:        All Supine exercise on heat     therapeutic exercises to develop strength, endurance, ROM, flexibility, posture, and core stabilization for 15 minutes including:    Seated heel raises 2x10  Seated toe raises 2x10  Ball roll outs 3 min       neuromuscular re-education activities to improve: Balance, Coordination, Kinesthetic, Sense, Proprioception, and Posture for 15 minutes. The following activities were included:    Seated ball squeeze 3 min   Seated LAQ 2x10  Hip abd squeezes 3 min   Rows RTB 3x10       therapeutic  activities to improve functional performance for 15 minutes, including:    LTR x 20  Seated march 2x10  Seated trunk rot 2x10  Seated rotations 3 min       Patient Education and Home Exercises       Education provided:   - heat or ice for hip pain    Written Home Exercises Provided: Patient instructed to cont prior HEP. Exercises were reviewed and Betzy was able to demonstrate them prior to the end of the session.  Betzy demonstrated good  understanding of the education provided. See EMR under Patient Instructions for exercises provided during therapy sessions    Assessment     Pt tolerated PT tx following today and doing well. Patient took increased time for all TE today. Progressed with seated activities today with no adverse effects    Betzy Is progressing well towards her goals.   Pt prognosis is Good.     Pt will continue to benefit from skilled outpatient physical therapy to address the deficits listed in the problem list box on initial evaluation, provide pt/family education and to maximize pt's level of independence in the home and community environment.     Pt's spiritual, cultural and educational needs considered and pt agreeable to plan of care and goals.     Anticipated barriers to physical therapy: age co morbidities     Goals:   Short Term Goals (3 Weeks):  1. Pt will be compliant with HEP to supplement PT in decreasing pain with functional mobility.  2. Pt will perform pallof press with good control to demonstrate improved core strength.  3. Pt will improve lumbar ROM by 5-10 degrees in all planes to improve functional mobility.  4. Pt will improve impaired LE MMTs by 1/2 score to improve strength for functional tasks.  Long Term Goals (6 Weeks):   1. Pt will improve FOTO score to >/= 50 to decrease perceived limitation with maintaining/changing body position.   2. Pt will perform squat with good form to reduce risk of re injury with lifting.  3. Pt will improve impaired LE MMTs by 1 score to  improve strength for functional tasks.  4. Pt will report no pain during lumbar ROM to promote functional mobility.     Plan     Plan of care Certification: 10/14/2024 to 12/14/2024.     Outpatient Physical Therapy 1-2 times weekly for 8 weeks to include the following interventions: Manual Therapy, Moist Heat/ Ice, Neuromuscular Re-ed, Patient Education, Self Care, Therapeutic Activities, and Therapeutic Exercise.      Dwain Shay, PT    PT/PTA met face to face to discuss pt's treatment plan and progress towards established goals. Pt will be seen by a physical therapist minimally every 6th visit or every 30 days.      Dwain Shay, PT

## 2024-10-28 ENCOUNTER — CLINICAL SUPPORT (OUTPATIENT)
Dept: REHABILITATION | Facility: HOSPITAL | Age: 84
End: 2024-10-28
Payer: MEDICARE

## 2024-10-28 DIAGNOSIS — G89.29 CHRONIC BILATERAL LOW BACK PAIN WITHOUT SCIATICA: Primary | ICD-10-CM

## 2024-10-28 DIAGNOSIS — R29.898 WEAKNESS OF BOTH LOWER EXTREMITIES: ICD-10-CM

## 2024-10-28 DIAGNOSIS — M53.86 DECREASED ROM OF LUMBAR SPINE: ICD-10-CM

## 2024-10-28 DIAGNOSIS — M54.50 CHRONIC BILATERAL LOW BACK PAIN WITHOUT SCIATICA: Primary | ICD-10-CM

## 2024-10-28 PROCEDURE — 97110 THERAPEUTIC EXERCISES: CPT | Mod: HCNC

## 2024-10-28 PROCEDURE — 97112 NEUROMUSCULAR REEDUCATION: CPT | Mod: HCNC

## 2024-10-28 PROCEDURE — 97530 THERAPEUTIC ACTIVITIES: CPT | Mod: HCNC

## 2024-10-29 ENCOUNTER — OFFICE VISIT (OUTPATIENT)
Dept: PAIN MEDICINE | Facility: CLINIC | Age: 84
End: 2024-10-29
Payer: MEDICARE

## 2024-10-29 VITALS
BODY MASS INDEX: 18.75 KG/M2 | HEIGHT: 62 IN | DIASTOLIC BLOOD PRESSURE: 81 MMHG | WEIGHT: 101.88 LBS | HEART RATE: 71 BPM | SYSTOLIC BLOOD PRESSURE: 127 MMHG

## 2024-10-29 DIAGNOSIS — S32.010A COMPRESSION FRACTURE OF L1 VERTEBRA, INITIAL ENCOUNTER: ICD-10-CM

## 2024-10-29 DIAGNOSIS — M54.16 LUMBAR RADICULOPATHY: ICD-10-CM

## 2024-10-29 DIAGNOSIS — M51.362 DEGENERATION OF INTERVERTEBRAL DISC OF LUMBAR REGION WITH DISCOGENIC BACK PAIN AND LOWER EXTREMITY PAIN: Primary | ICD-10-CM

## 2024-10-29 PROCEDURE — 1159F MED LIST DOCD IN RCRD: CPT | Mod: CPTII,S$GLB,, | Performed by: STUDENT IN AN ORGANIZED HEALTH CARE EDUCATION/TRAINING PROGRAM

## 2024-10-29 PROCEDURE — 3079F DIAST BP 80-89 MM HG: CPT | Mod: CPTII,S$GLB,, | Performed by: STUDENT IN AN ORGANIZED HEALTH CARE EDUCATION/TRAINING PROGRAM

## 2024-10-29 PROCEDURE — 1160F RVW MEDS BY RX/DR IN RCRD: CPT | Mod: CPTII,S$GLB,, | Performed by: STUDENT IN AN ORGANIZED HEALTH CARE EDUCATION/TRAINING PROGRAM

## 2024-10-29 PROCEDURE — 99214 OFFICE O/P EST MOD 30 MIN: CPT | Mod: S$GLB,,, | Performed by: STUDENT IN AN ORGANIZED HEALTH CARE EDUCATION/TRAINING PROGRAM

## 2024-10-29 PROCEDURE — 99999 PR PBB SHADOW E&M-EST. PATIENT-LVL V: CPT | Mod: PBBFAC,,, | Performed by: STUDENT IN AN ORGANIZED HEALTH CARE EDUCATION/TRAINING PROGRAM

## 2024-10-29 PROCEDURE — 1125F AMNT PAIN NOTED PAIN PRSNT: CPT | Mod: CPTII,S$GLB,, | Performed by: STUDENT IN AN ORGANIZED HEALTH CARE EDUCATION/TRAINING PROGRAM

## 2024-10-29 PROCEDURE — 1101F PT FALLS ASSESS-DOCD LE1/YR: CPT | Mod: CPTII,S$GLB,, | Performed by: STUDENT IN AN ORGANIZED HEALTH CARE EDUCATION/TRAINING PROGRAM

## 2024-10-29 PROCEDURE — 3288F FALL RISK ASSESSMENT DOCD: CPT | Mod: CPTII,S$GLB,, | Performed by: STUDENT IN AN ORGANIZED HEALTH CARE EDUCATION/TRAINING PROGRAM

## 2024-10-29 PROCEDURE — 3074F SYST BP LT 130 MM HG: CPT | Mod: CPTII,S$GLB,, | Performed by: STUDENT IN AN ORGANIZED HEALTH CARE EDUCATION/TRAINING PROGRAM

## 2024-10-31 ENCOUNTER — CLINICAL SUPPORT (OUTPATIENT)
Dept: REHABILITATION | Facility: HOSPITAL | Age: 84
End: 2024-10-31
Payer: MEDICARE

## 2024-10-31 DIAGNOSIS — R29.898 WEAKNESS OF BOTH LOWER EXTREMITIES: ICD-10-CM

## 2024-10-31 DIAGNOSIS — M54.50 CHRONIC BILATERAL LOW BACK PAIN WITHOUT SCIATICA: Primary | ICD-10-CM

## 2024-10-31 DIAGNOSIS — M53.86 DECREASED ROM OF LUMBAR SPINE: ICD-10-CM

## 2024-10-31 DIAGNOSIS — G89.29 CHRONIC BILATERAL LOW BACK PAIN WITHOUT SCIATICA: Primary | ICD-10-CM

## 2024-10-31 PROCEDURE — 97110 THERAPEUTIC EXERCISES: CPT | Mod: HCNC,CQ

## 2024-10-31 PROCEDURE — 97530 THERAPEUTIC ACTIVITIES: CPT | Mod: HCNC,CQ

## 2024-10-31 PROCEDURE — 97112 NEUROMUSCULAR REEDUCATION: CPT | Mod: HCNC,CQ

## 2024-11-04 ENCOUNTER — CLINICAL SUPPORT (OUTPATIENT)
Dept: REHABILITATION | Facility: HOSPITAL | Age: 84
End: 2024-11-04
Payer: MEDICARE

## 2024-11-04 DIAGNOSIS — G89.29 CHRONIC BILATERAL LOW BACK PAIN WITHOUT SCIATICA: Primary | ICD-10-CM

## 2024-11-04 DIAGNOSIS — R29.898 WEAKNESS OF BOTH LOWER EXTREMITIES: ICD-10-CM

## 2024-11-04 DIAGNOSIS — M53.86 DECREASED ROM OF LUMBAR SPINE: ICD-10-CM

## 2024-11-04 DIAGNOSIS — M54.50 CHRONIC BILATERAL LOW BACK PAIN WITHOUT SCIATICA: Primary | ICD-10-CM

## 2024-11-04 PROCEDURE — 97112 NEUROMUSCULAR REEDUCATION: CPT | Mod: HCNC

## 2024-11-04 PROCEDURE — 97530 THERAPEUTIC ACTIVITIES: CPT | Mod: HCNC

## 2024-11-04 PROCEDURE — 97110 THERAPEUTIC EXERCISES: CPT | Mod: HCNC

## 2024-11-04 NOTE — PROGRESS NOTES
[facility]   Physical Therapy Treatment Note      Name: Betzy Cooley  Clinic Number: [id number]    Therapy Diagnosis:   Encounter Diagnoses   Name Primary?    Chronic bilateral low back pain without sciatica Yes    Decreased ROM of lumbar spine     Weakness of both lower extremities          Physician: Grzegorz Daomn DO    Visit Date: 11/4/2024       Physician Orders: PT Eval and Treat   Medical Diagnosis from Referral: S32.010A (ICD-10-CM) - Compression fracture of L1 vertebra, initial encounter   Evaluation Date: 10/14/2024  Authorization Period Expiration: 12/17/24  Plan of Care Expiration: 12/14/2024  Progress Note Due: 11/14/2024  Visit # / Visits authorized: 1/1,  5/20  FOTO: 5/5     FOTO Initial Evaluation: see foto   FOTO 2nd F/U: NA  FOTO Discharge: NA     Precautions: Standard      PTA Visit #: 1/5     Time In: 1120  Time Out: 1205  Total Billable Time: 45 minutes     Subjective     Pt reports: she is doing so so today. Patient slept better than usual last session.     She was compliant with home exercise program.  Response to previous treatment:   Functional change: progressing    Pain: 5/10  Location: bilateral hip      Objective      Objective Measures updated at progress report unless specified.     Treatment     Betzy received the treatments listed below:        All Supine exercise on heat     therapeutic exercises to develop strength, endurance, ROM, flexibility, posture, and core stabilization for 15 minutes including:    Seated heel raises 2x10  Seated toe raises 2x10  Ball roll outs 3 min       neuromuscular re-education activities to improve: Balance, Coordination, Kinesthetic, Sense, Proprioception, and Posture for 15 minutes. The following activities were included:    Supine ball squeeze 3 min   Seated LAQ 2x10 NT  Supine Hip abd squeezes 3 min   Rows RTB 3x10       therapeutic activities to improve functional performance for 15 minutes, including:    LTR x  20  Supine march 2x10  Seated trunk rot 2x10  Seated rotations 3 min   Seated wand elevation 20x    Patient Education and Home Exercises       Education provided:   - heat or ice for hip pain    Written Home Exercises Provided: Patient instructed to cont prior HEP. Exercises were reviewed and Betzy was able to demonstrate them prior to the end of the session.  Betzy demonstrated good  understanding of the education provided. See EMR under Patient Instructions for exercises provided during therapy sessions    Assessment     Pt tolerated PT tx following today and doing well. Patient took increased time for all TE today. Progressed with supine activities today with no adverse effects    Betzy Is progressing well towards her goals.   Pt prognosis is Good.     Pt will continue to benefit from skilled outpatient physical therapy to address the deficits listed in the problem list box on initial evaluation, provide pt/family education and to maximize pt's level of independence in the home and community environment.     Pt's spiritual, cultural and educational needs considered and pt agreeable to plan of care and goals.     Anticipated barriers to physical therapy: age co morbidities     Goals:   Short Term Goals (3 Weeks):  1. Pt will be compliant with HEP to supplement PT in decreasing pain with functional mobility.  2. Pt will perform pallof press with good control to demonstrate improved core strength.  3. Pt will improve lumbar ROM by 5-10 degrees in all planes to improve functional mobility.  4. Pt will improve impaired LE MMTs by 1/2 score to improve strength for functional tasks.  Long Term Goals (6 Weeks):   1. Pt will improve FOTO score to >/= 50 to decrease perceived limitation with maintaining/changing body position.   2. Pt will perform squat with good form to reduce risk of re injury with lifting.  3. Pt will improve impaired LE MMTs by 1 score to improve strength for functional tasks.  4. Pt will  report no pain during lumbar ROM to promote functional mobility.     Plan     Plan of care Certification: 10/14/2024 to 12/14/2024.     Outpatient Physical Therapy 1-2 times weekly for 8 weeks to include the following interventions: Manual Therapy, Moist Heat/ Ice, Neuromuscular Re-ed, Patient Education, Self Care, Therapeutic Activities, and Therapeutic Exercise.      Dwain Shay, PT    PT/PTA met face to face to discuss pt's treatment plan and progress towards established goals. Pt will be seen by a physical therapist minimally every 6th visit or every 30 days.      Dwain Shay, PT

## 2024-11-07 ENCOUNTER — CLINICAL SUPPORT (OUTPATIENT)
Dept: REHABILITATION | Facility: HOSPITAL | Age: 84
End: 2024-11-07
Payer: MEDICARE

## 2024-11-07 ENCOUNTER — OFFICE VISIT (OUTPATIENT)
Dept: URGENT CARE | Facility: CLINIC | Age: 84
End: 2024-11-07
Payer: MEDICARE

## 2024-11-07 VITALS
RESPIRATION RATE: 18 BRPM | HEART RATE: 74 BPM | WEIGHT: 101 LBS | SYSTOLIC BLOOD PRESSURE: 133 MMHG | TEMPERATURE: 98 F | DIASTOLIC BLOOD PRESSURE: 78 MMHG | BODY MASS INDEX: 18.58 KG/M2 | HEIGHT: 62 IN | OXYGEN SATURATION: 96 %

## 2024-11-07 DIAGNOSIS — R29.898 WEAKNESS OF BOTH LOWER EXTREMITIES: ICD-10-CM

## 2024-11-07 DIAGNOSIS — R68.2 DRY MOUTH: Primary | ICD-10-CM

## 2024-11-07 DIAGNOSIS — M53.86 DECREASED ROM OF LUMBAR SPINE: ICD-10-CM

## 2024-11-07 DIAGNOSIS — G89.29 CHRONIC BILATERAL LOW BACK PAIN WITHOUT SCIATICA: Primary | ICD-10-CM

## 2024-11-07 DIAGNOSIS — M54.50 CHRONIC BILATERAL LOW BACK PAIN WITHOUT SCIATICA: Primary | ICD-10-CM

## 2024-11-07 PROCEDURE — 97112 NEUROMUSCULAR REEDUCATION: CPT | Mod: HCNC

## 2024-11-07 PROCEDURE — 97530 THERAPEUTIC ACTIVITIES: CPT | Mod: HCNC

## 2024-11-07 PROCEDURE — 97110 THERAPEUTIC EXERCISES: CPT | Mod: HCNC

## 2024-11-07 PROCEDURE — 99213 OFFICE O/P EST LOW 20 MIN: CPT | Mod: S$GLB,,, | Performed by: NURSE PRACTITIONER

## 2024-11-07 RX ORDER — PILOCARPINE HYDROCHLORIDE 5 MG/1
5 TABLET, FILM COATED ORAL DAILY
Qty: 21 TABLET | Refills: 0 | Status: SHIPPED | OUTPATIENT
Start: 2024-11-07

## 2024-11-07 NOTE — PROGRESS NOTES
"Subjective:      Patient ID: Betzy Cooley is a 84 y.o. female.    Vitals:  height is 5' 2" (1.575 m) and weight is 45.8 kg (101 lb). Her oral temperature is 98.1 °F (36.7 °C). Her blood pressure is 133/78 and her pulse is 74. Her respiration is 18 and oxygen saturation is 96%.     Chief Complaint: Sore Throat    This is a 84 y.o. female who presents today, with her , with a chief complaint of dry mouth and throat. She denies sore throat to me (although signed in with c/o sore throat), and denies feeling ill.  She states that she has a long, ongoing problem with dry mouth, nose, and eyes.  But her throat and mouth started bothering her badly, overnight.    She states that she has brought his up before with her provider and dentist, and has tried multiple natural methods to help, such as biotin spray, chewing gum, using throat lozenges, and drinking lots of water.  She and  would really like to try medication to help, if possible.     Sore Throat   This is a new problem. The current episode started today. The pain is at a severity of 3/10. Pertinent negatives include no abdominal pain, congestion, coughing, diarrhea, drooling, ear discharge, ear pain, headaches, hoarse voice, plugged ear sensation, neck pain, shortness of breath, stridor, swollen glands, trouble swallowing or vomiting. She has tried nothing for the symptoms.       Constitution: Negative for fever.   HENT:  Negative for ear pain, ear discharge, drooling, mouth sores, tongue pain, tongue lesion, facial swelling, congestion, sore throat and trouble swallowing.    Neck: Negative for neck pain.   Respiratory:  Negative for cough, shortness of breath and stridor.    Gastrointestinal:  Negative for abdominal pain, vomiting and diarrhea.   Neurological:  Negative for headaches.      Objective:     Physical Exam   Constitutional: She is oriented to person, place, and time.  Non-toxic appearance. She does not appear ill. No distress.   HENT: "   Head: Normocephalic.   Mouth/Throat: Uvula is midline and mucous membranes are normal. Mucous membranes are moist. No oral lesions. No trismus in the jaw. No uvula swelling. No oropharyngeal exudate, posterior oropharyngeal edema, posterior oropharyngeal erythema or cobblestoning. Oropharynx is clear.      Comments: No oral cavity dryness noted. MMM.   Neck: Neck supple. No neck rigidity present.   Cardiovascular: Normal rate.   Pulmonary/Chest: Effort normal.   Abdominal: Normal appearance.   Musculoskeletal: Normal range of motion.         General: Normal range of motion.      Cervical back: She exhibits no tenderness.   Lymphadenopathy:     She has no cervical adenopathy.   Neurological: She is alert and oriented to person, place, and time.   Skin: Skin is warm, dry and not diaphoretic.   Psychiatric: Her behavior is normal. Mood normal.   Nursing note and vitals reviewed.      Assessment:     1. Dry mouth        Plan:   Pt has follow up with primary care in the next couple of weeks. Pt encouraged to discuss care with her PCP, who may or may not want to refill this medication-as she has been made aware. Also side effects discussed, as well as when to stop taking medication. Medication is not on [facility] list, and I do not suspect will cause any significant complications.     Dry mouth  -     pilocarpine (SALAGEN) 5 MG Tab; Take 1 tablet (5 mg total) by mouth once daily.  Dispense: 21 tablet; Refill: 0      Patient Instructions   Stay well hydrated - drink electrolytes such as pedialyte  Follow up with primary care provider to discuss further use of medication for dry mouth   If you develop any side effects to this medication, such as sweating, hives, nausea, increased urine output, dizziness, fatigue, tremors, or significant headache--please stop medication - and follow up with your doctor.

## 2024-11-07 NOTE — PATIENT INSTRUCTIONS
Stay well hydrated - drink electrolytes such as pedialyte  Follow up with primary care provider to discuss further use of medication for dry mouth   If you develop any side effects to this medication, such as sweating, hives, nausea, increased urine output, dizziness, fatigue, tremors, or significant headache--please stop medication - and follow up with your doctor.

## 2024-11-07 NOTE — PROGRESS NOTES
[facility]   Physical Therapy Treatment Note      Name: Betzy Cooley  Clinic Number: [id number]    Therapy Diagnosis:   Encounter Diagnoses   Name Primary?    Chronic bilateral low back pain without sciatica Yes    Decreased ROM of lumbar spine     Weakness of both lower extremities          Physician: Grzegorz Damon DO    Visit Date: 11/7/2024       Physician Orders: PT Eval and Treat   Medical Diagnosis from Referral: S32.010A (ICD-10-CM) - Compression fracture of L1 vertebra, initial encounter   Evaluation Date: 10/14/2024  Authorization Period Expiration: 12/17/24  Plan of Care Expiration: 12/14/2024  Progress Note Due: 11/14/2024  Visit # / Visits authorized: 1/1,  6/20  FOTO: 7     FOTO Initial Evaluation: see foto   FOTO 2nd F/U: NA  FOTO Discharge: NA     Precautions: Standard      PTA Visit #: 1/5     Time In: 1045  Time Out: 1130  Total Billable Time: 45 minutes     Subjective     Pt reports: she is doing so so today. Patient slept better than usual last session.     She was compliant with home exercise program.  Response to previous treatment:   Functional change: progressing    Pain: 5/10  Location: bilateral hip      Objective      Objective Measures updated at progress report unless specified.     Treatment     Betzy received the treatments listed below:        All Supine exercise on heat     therapeutic exercises to develop strength, endurance, ROM, flexibility, posture, and core stabilization for 15 minutes including:    Seated heel raises 2x10  Seated toe raises 2x10  Ball roll outs 3 min       neuromuscular re-education activities to improve: Balance, Coordination, Kinesthetic, Sense, Proprioception, and Posture for 15 minutes. The following activities were included:    Supine ball squeeze 3 min   Seated LAQ 2x10 NT  Supine Hip abd squeezes 3 min   Rows RTB 3x10       therapeutic activities to improve functional performance for 15 minutes, including:    LTR x  20  Supine march 2x10  Seated trunk rot 2x10  Seated rotations 3 min   Seated wand elevation 20x    Patient Education and Home Exercises       Education provided:   - heat or ice for hip pain    Written Home Exercises Provided: Patient instructed to cont prior HEP. Exercises were reviewed and Betzy was able to demonstrate them prior to the end of the session.  Betzy demonstrated good  understanding of the education provided. See EMR under Patient Instructions for exercises provided during therapy sessions    Assessment     Pt tolerated PT tx following today and doing well. Patient took increased time for all TE today. Progressed with supine activities today with no adverse effects    Betzy Is progressing well towards her goals.   Pt prognosis is Good.     Pt will continue to benefit from skilled outpatient physical therapy to address the deficits listed in the problem list box on initial evaluation, provide pt/family education and to maximize pt's level of independence in the home and community environment.     Pt's spiritual, cultural and educational needs considered and pt agreeable to plan of care and goals.     Anticipated barriers to physical therapy: age co morbidities     Goals:   Short Term Goals (3 Weeks):  1. Pt will be compliant with HEP to supplement PT in decreasing pain with functional mobility.  2. Pt will perform pallof press with good control to demonstrate improved core strength.  3. Pt will improve lumbar ROM by 5-10 degrees in all planes to improve functional mobility.  4. Pt will improve impaired LE MMTs by 1/2 score to improve strength for functional tasks.  Long Term Goals (6 Weeks):   1. Pt will improve FOTO score to >/= 50 to decrease perceived limitation with maintaining/changing body position.   2. Pt will perform squat with good form to reduce risk of re injury with lifting.  3. Pt will improve impaired LE MMTs by 1 score to improve strength for functional tasks.  4. Pt will  report no pain during lumbar ROM to promote functional mobility.     Plan     Plan of care Certification: 10/14/2024 to 12/14/2024.     Outpatient Physical Therapy 1-2 times weekly for 8 weeks to include the following interventions: Manual Therapy, Moist Heat/ Ice, Neuromuscular Re-ed, Patient Education, Self Care, Therapeutic Activities, and Therapeutic Exercise.      Dwain Shay, PT    PT/PTA met face to face to discuss pt's treatment plan and progress towards established goals. Pt will be seen by a physical therapist minimally every 6th visit or every 30 days.      Dwain Shay, PT

## 2024-11-13 ENCOUNTER — OFFICE VISIT (OUTPATIENT)
Dept: INTERNAL MEDICINE | Facility: CLINIC | Age: 84
End: 2024-11-13
Payer: MEDICARE

## 2024-11-13 ENCOUNTER — LAB VISIT (OUTPATIENT)
Dept: LAB | Facility: HOSPITAL | Age: 84
End: 2024-11-13
Attending: INTERNAL MEDICINE
Payer: MEDICARE

## 2024-11-13 VITALS
DIASTOLIC BLOOD PRESSURE: 70 MMHG | OXYGEN SATURATION: 98 % | HEART RATE: 73 BPM | BODY MASS INDEX: 18.25 KG/M2 | SYSTOLIC BLOOD PRESSURE: 114 MMHG | HEIGHT: 62 IN | WEIGHT: 99.19 LBS

## 2024-11-13 DIAGNOSIS — H04.123 DRY EYES: ICD-10-CM

## 2024-11-13 DIAGNOSIS — R68.2 DRY MOUTH: Primary | ICD-10-CM

## 2024-11-13 DIAGNOSIS — R68.2 DRY MOUTH: ICD-10-CM

## 2024-11-13 DIAGNOSIS — I10 HYPERTENSION, UNSPECIFIED TYPE: ICD-10-CM

## 2024-11-13 LAB
CCP AB SER IA-ACNC: <0.5 U/ML
RHEUMATOID FACT SERPL-ACNC: <13 IU/ML (ref 0–15)

## 2024-11-13 PROCEDURE — 86200 CCP ANTIBODY: CPT | Mod: HCNC | Performed by: INTERNAL MEDICINE

## 2024-11-13 PROCEDURE — 36415 COLL VENOUS BLD VENIPUNCTURE: CPT | Mod: HCNC | Performed by: INTERNAL MEDICINE

## 2024-11-13 PROCEDURE — 1159F MED LIST DOCD IN RCRD: CPT | Mod: HCNC,CPTII,S$GLB, | Performed by: INTERNAL MEDICINE

## 2024-11-13 PROCEDURE — 1160F RVW MEDS BY RX/DR IN RCRD: CPT | Mod: HCNC,CPTII,S$GLB, | Performed by: INTERNAL MEDICINE

## 2024-11-13 PROCEDURE — 86235 NUCLEAR ANTIGEN ANTIBODY: CPT | Mod: 59,HCNC | Performed by: INTERNAL MEDICINE

## 2024-11-13 PROCEDURE — 3288F FALL RISK ASSESSMENT DOCD: CPT | Mod: HCNC,CPTII,S$GLB, | Performed by: INTERNAL MEDICINE

## 2024-11-13 PROCEDURE — 99999 PR PBB SHADOW E&M-EST. PATIENT-LVL V: CPT | Mod: PBBFAC,HCNC,, | Performed by: INTERNAL MEDICINE

## 2024-11-13 PROCEDURE — G2211 COMPLEX E/M VISIT ADD ON: HCPCS | Mod: HCNC,S$GLB,, | Performed by: INTERNAL MEDICINE

## 2024-11-13 PROCEDURE — 86038 ANTINUCLEAR ANTIBODIES: CPT | Mod: HCNC | Performed by: INTERNAL MEDICINE

## 2024-11-13 PROCEDURE — 1125F AMNT PAIN NOTED PAIN PRSNT: CPT | Mod: HCNC,CPTII,S$GLB, | Performed by: INTERNAL MEDICINE

## 2024-11-13 PROCEDURE — 86235 NUCLEAR ANTIGEN ANTIBODY: CPT | Mod: HCNC | Performed by: INTERNAL MEDICINE

## 2024-11-13 PROCEDURE — 3074F SYST BP LT 130 MM HG: CPT | Mod: HCNC,CPTII,S$GLB, | Performed by: INTERNAL MEDICINE

## 2024-11-13 PROCEDURE — 99214 OFFICE O/P EST MOD 30 MIN: CPT | Mod: HCNC,S$GLB,, | Performed by: INTERNAL MEDICINE

## 2024-11-13 PROCEDURE — 3078F DIAST BP <80 MM HG: CPT | Mod: HCNC,CPTII,S$GLB, | Performed by: INTERNAL MEDICINE

## 2024-11-13 PROCEDURE — 1101F PT FALLS ASSESS-DOCD LE1/YR: CPT | Mod: HCNC,CPTII,S$GLB, | Performed by: INTERNAL MEDICINE

## 2024-11-13 PROCEDURE — 86431 RHEUMATOID FACTOR QUANT: CPT | Mod: HCNC | Performed by: INTERNAL MEDICINE

## 2024-11-13 NOTE — PROGRESS NOTES
HPI  History of Present Illness    CHIEF COMPLAINT:  Betzy presents with symptoms suggestive of Sjögren's syndrome, including severe dry mouth and difficulty swallowing, seeking evaluation and labs. She is attended by her daughter    HPI:  Betzy has been having symptoms consistent with Sjögren's syndrome for an extended period. She recently visited the emergency room due to inability to swallow. An ER doctor noted symptoms were indicative of Sjögren's syndrome. She subsequently sought care at an urgent care facility for severe dry mouth interfering with swallowing. The nurse practitioner also suggested symptoms were consistent with Sjögren's syndrome and recommended labs.    Betzy reports extreme dry mouth, particularly at night. She frequently wakes up to drink water and sometimes feels unable to swallow due to dryness. She was prescribed pilocarpine (Salagen) for dry mouth about a week ago but has not noticed significant improvement. She continues to wake up throughout the night to drink water.    Betzy also mentions dry eyes.    Her daughter would like labs done and has a list. She has a history of rheumatoid arthritis and previously took Plaquenil, which she discontinued by taking turmeric supplements. She recently stopped the turmeric supplements due to stomach discomfort.    Betzy's mother also had Sjögren's syndrome, indicating a possible genetic predisposition.    Betzy reports an incident where she had difficulty swallowing rice while eating Chinese food, resulting in an emergency room visit. Diagnostic tests were performed, but no abnormalities were found.    Betzy has been unable to secure an appointment with a rheumatologist until 2026 due to long wait times, prompting her to seek evaluation and labs from her current healthcare provider.    MEDICATIONS:  Betzy is on Pilocarpine (Salagen) for dry mouth, which she has been taking for about a week. She is also on Fosamax for  osteoporosis and Citracal, 1 tablet daily, for calcium supplementation. She is taking a Vitamin D supplement. Betzy is on Hydroxyzine, which she takes occasionally and was originally prescribed for anxiety and sleep.    MEDICAL HISTORY:  Betzy has a history of Sjogren's syndrome, osteoporosis, and rheumatoid arthritis.    FAMILY HISTORY:  Family history is significant for mother with Sjogren's syndrome.      IMAGING:  Betzy previously underwent an MRI of the spine, which showed a spine fracture.      ROS:  Eyes: +dry eyes  ENT: +dry mouth, +difficulty swallowing             Review of Systems   Constitutional:  Positive for activity change. Negative for unexpected weight change.   HENT:  Positive for trouble swallowing. Negative for hearing loss and rhinorrhea.    Eyes:  Negative for discharge and visual disturbance.   Respiratory:  Negative for chest tightness and wheezing.    Cardiovascular:  Negative for chest pain and palpitations.   Gastrointestinal:  Positive for constipation. Negative for blood in stool, diarrhea and vomiting.   Endocrine: Negative for polydipsia and polyuria.   Genitourinary:  Negative for difficulty urinating, dysuria, hematuria and menstrual problem.   Musculoskeletal:  Positive for arthralgias. Negative for joint swelling and neck pain.   Neurological:  Positive for weakness. Negative for headaches.   Psychiatric/Behavioral:  Positive for confusion and dysphoric mood.        Past Medical History:   Diagnosis Date    Arthritis     Cataract     GERD (gastroesophageal reflux disease)     HEARING LOSS     Hypertension     Osteoporosis, postmenopausal     Squamous Cell Carcinoma 2007    right forearm    Urinary incontinence     rare mixed     Past Surgical History:   Procedure Laterality Date    ANGIOGRAM, CORONARY, WITH LEFT HEART CATHETERIZATION Bilateral 5/17/2024    Procedure: Angiogram, Coronary, with Left Heart Cath;  Surgeon: Miguel Jacob MD;  Location: [facility] CATH LAB;   Service: Cardiology;  Laterality: Bilateral;    COLPOPEXY N/A 08/06/2019    Procedure: COLPOPEXY SSL FIXATION;  Surgeon: Alma Dorsey MD;  [address];  Service: Urology;  Laterality: N/A;    CORONARY ANGIOGRAPHY Left 5/16/2024    Procedure: ANGIOGRAM, CORONARY ARTERY;  Surgeon: Miguel Jacob MD;  Location: [facility] CATH LAB;  Service: Cardiology;  Laterality: Left;    CYSTOSCOPY N/A 08/06/2019    Procedure: CYSTOSCOPY;  Surgeon: Alma Dorsey MD;  [facility];  Service: Urology;  Laterality: N/A;    ESOPHAGOGASTRODUODENOSCOPY N/A 4/1/2024    Procedure: EGD (ESOPHAGOGASTRODUODENOSCOPY);  Surgeon: Zbigniew Dougherty MD;  Location: [facility];  Service: Endoscopy;  Laterality: N/A;    FRACTURE SURGERY Left 2008    open radius/ulna fracture    HYSTERECTOMY      TANIA/ovaries remain--fibroids- in her late 30's / early 40's    IVUS, CORONARY  5/17/2024    Procedure: IVUS, Coronary;  Surgeon: Miguel Jacob MD;  Location: [facility] CATH LAB;  Service: Cardiology;;    STENT, DRUG ELUTING, MULTI VESSEL, CORONARY  5/17/2024    Procedure: Stent, Drug Eluting, Multi Vessel, Coronary;  Surgeon: Miguel Jacob MD;  Location: [facility] CATH LAB;  Service: Cardiology;;      Patient Active Problem List   Diagnosis    Hearing loss, sensorineural    Nuclear sclerosis - Both Eyes    Cortical senile cataract - Both Eyes    Choroidal nevus - Both Eyes    Palpitations    Osteopenia    Encounter for screening colonoscopy    GERD (gastroesophageal reflux disease)    History of skin cancer    Tinnitus    Elevated blood pressure reading    Abnormal EKG    Posture imbalance    Shoulder weakness    Systolic murmur    Hypertension    Gastritis    Coronary artery disease with refractory angina pectoris    Elevated troponin    Anxiety    Weakness of right lower extremity    Pain of right lower extremity    Fall    Dyslipidemia    CAD S/P percutaneous coronary angioplasty    Closed compression fracture  of body of L1 vertebra    Dysphagia    Hypokalemia    Chronic bilateral low back pain without sciatica    Decreased ROM of lumbar spine    Weakness of both lower extremities          Objective:      Physical Exam            Physical Exam  HENT:      Mouth/Throat:      Comments: Minimal saliva in the floor of the mouth, tongue.   Conjunctivae do not appear to be overly dry.   Cardiovascular:      Rate and Rhythm: Normal rate.   Pulmonary:      Effort: No respiratory distress.      Breath sounds: No wheezing.   Abdominal:      Tenderness: There is no abdominal tenderness.   Psychiatric:         Mood and Affect: Mood normal.         Thought Content: Thought content normal.           Assessment:       Problem List Items Addressed This Visit    None  Visit Diagnoses       Dry mouth    -  Primary    Relevant Orders    ANTI -SSA ANTIBODY    ANTI-SSB ANTIBODY    CYCLIC CITRUL PEPTIDE ANTIBODY, IGG    RHEUMATOID FACTOR    DARIEN    Dry eyes        Relevant Orders    ANTI -SSA ANTIBODY    ANTI-SSB ANTIBODY    CYCLIC CITRUL PEPTIDE ANTIBODY, IGG    RHEUMATOID FACTOR    DARIEN            Plan:       Betzy was seen today for sore throat.    Diagnoses and all orders for this visit:    Dry mouth  -     ANTI -SSA ANTIBODY; Future  -     ANTI-SSB ANTIBODY; Future  -     CYCLIC CITRUL PEPTIDE ANTIBODY, IGG; Future  -     RHEUMATOID FACTOR; Future  -     DARIEN; Future    Dry eyes  -     ANTI -SSA ANTIBODY; Future  -     ANTI-SSB ANTIBODY; Future  -     CYCLIC CITRUL PEPTIDE ANTIBODY, IGG; Future  -     RHEUMATOID FACTOR; Future  -     DARIEN; Future             As this patient's PCP, I am actively managing and/or treating their chronic medical conditions including HTN and have been for at least 1 year. This includes, but is not limited to, medication management, coordination of care, documentation review from their specialists and labs/imaging review where pertinent.      Portions of this note may have been created with DeepScribe AI voice  "recognition software. Occasional "wrong-word" or "sound-a-like" substitutions may have occurred due to the inherent limitations of voice recognition software. Please, read the note carefully and recognize, using context, where substitutions have occurred.  "

## 2024-11-14 ENCOUNTER — CLINICAL SUPPORT (OUTPATIENT)
Dept: REHABILITATION | Facility: HOSPITAL | Age: 84
End: 2024-11-14
Payer: MEDICARE

## 2024-11-14 ENCOUNTER — PATIENT MESSAGE (OUTPATIENT)
Dept: INTERNAL MEDICINE | Facility: CLINIC | Age: 84
End: 2024-11-14
Payer: MEDICARE

## 2024-11-14 DIAGNOSIS — M53.86 DECREASED ROM OF LUMBAR SPINE: ICD-10-CM

## 2024-11-14 DIAGNOSIS — R29.898 WEAKNESS OF BOTH LOWER EXTREMITIES: ICD-10-CM

## 2024-11-14 DIAGNOSIS — G89.29 CHRONIC BILATERAL LOW BACK PAIN WITHOUT SCIATICA: Primary | ICD-10-CM

## 2024-11-14 DIAGNOSIS — M54.50 CHRONIC BILATERAL LOW BACK PAIN WITHOUT SCIATICA: Primary | ICD-10-CM

## 2024-11-14 LAB — ANA SER QL IF: NORMAL

## 2024-11-14 PROCEDURE — 97112 NEUROMUSCULAR REEDUCATION: CPT | Mod: HCNC

## 2024-11-14 PROCEDURE — 97110 THERAPEUTIC EXERCISES: CPT | Mod: HCNC

## 2024-11-15 ENCOUNTER — PATIENT MESSAGE (OUTPATIENT)
Dept: PAIN MEDICINE | Facility: CLINIC | Age: 84
End: 2024-11-15
Payer: MEDICARE

## 2024-11-15 LAB
ANTI-SSA ANTIBODY: 0.06 RATIO (ref 0–0.99)
ANTI-SSA INTERPRETATION: NEGATIVE
ANTI-SSB ANTIBODY: 0.06 RATIO (ref 0–0.99)
ANTI-SSB INTERPRETATION: NEGATIVE

## 2024-11-17 ENCOUNTER — PATIENT MESSAGE (OUTPATIENT)
Dept: INTERNAL MEDICINE | Facility: CLINIC | Age: 84
End: 2024-11-17
Payer: MEDICARE

## 2024-11-18 ENCOUNTER — CLINICAL SUPPORT (OUTPATIENT)
Dept: REHABILITATION | Facility: HOSPITAL | Age: 84
End: 2024-11-18
Payer: MEDICARE

## 2024-11-18 DIAGNOSIS — R29.898 WEAKNESS OF BOTH LOWER EXTREMITIES: ICD-10-CM

## 2024-11-18 DIAGNOSIS — M53.86 DECREASED ROM OF LUMBAR SPINE: ICD-10-CM

## 2024-11-18 DIAGNOSIS — G89.29 CHRONIC BILATERAL LOW BACK PAIN WITHOUT SCIATICA: Primary | ICD-10-CM

## 2024-11-18 DIAGNOSIS — M54.50 CHRONIC BILATERAL LOW BACK PAIN WITHOUT SCIATICA: Primary | ICD-10-CM

## 2024-11-18 PROCEDURE — 97110 THERAPEUTIC EXERCISES: CPT | Mod: HCNC

## 2024-11-18 PROCEDURE — 97112 NEUROMUSCULAR REEDUCATION: CPT | Mod: HCNC

## 2024-11-18 NOTE — PROGRESS NOTES
"  Physical Therapy Daily Treatment Note / Discharge     Name: Betzy Cooley  Clinic Number: [id number]    Therapy Diagnosis:   Encounter Diagnoses   Name Primary?    Chronic bilateral low back pain without sciatica Yes    Decreased ROM of lumbar spine     Weakness of both lower extremities      Physician: Grzegorz Damon DO    Visit Date: 11/18/2024    Physician Orders: PT Eval and Treat   Medical Diagnosis from Referral: S32.010A (ICD-10-CM) - Compression fracture of L1 vertebra, initial encounter   Evaluation Date: 10/14/2024  Authorization Period Expiration: 12/17/24  Plan of Care Expiration: 12/14/2024  Progress Note Due: 11/14/2024  Visit # / Visits authorized: 1/1,  8/20  FOTO: 9    Time In: 145 am   Time Out: 230 am   Total Billable Time: 45 minutes    Precautions: Standard    Subjective     Pt reports: she is getting a procedure done on Friday to address her back pain. Patient reports today is going to be her last day.   She was compliant with home exercise program.  Response to previous treatment: no adverse effects  Functional change: progressing     Pain: 5/10 (L) lumbar       Objective       All Supine exercise on heat     Subjective assessment and therapeutic exercises to develop strength, endurance for 28 minutes including:  Recumbent bike x 7' for  endurance   Seated heel raises 2x10 not performed  Seated toe raises 2x10 not performed   Ball roll outs 3 min not performed  LAQ 2x10 B  Seated clams RTB 2x10   Seated hamstring curls RTB 2x10 B     neuromuscular re-education activities to improve: stabilization, muscle activation Posture for 17 minutes. The following activities were included:    Supine ball squeeze 3 min   3 way iso abs 1x10 3" ea    Rows RTB 3x10       therapeutic activities to improve functional performance for 0 minutes, including:    LTR x 20 not performed   Supine march 2x10 not performed   Seated trunk rot 2x10 not performed  Seated rotations 3 min not performed  Seated wand " elevation 20x not performed        Assessment     Patient arrives to therapy with continued pain in her lower back and L leg. Patient has not shown progress over the last several sessions. Patient it to get an injection this Friday. Patient will discharge today due to lack of progress.       Betzy Is progressing well towards her goals.   Pt prognosis is Good.     Pt will continue to benefit from skilled outpatient physical therapy to address the deficits listed in the problem list box on initial evaluation, provide pt/family education and to maximize pt's level of independence in the home and community environment.     Pt's spiritual, cultural and educational needs considered and pt agreeable to plan of care and goals.    Anticipated barriers to physical therapy: age co morbidities     Goals:   Short Term Goals (3 Weeks):  1. Pt will be compliant with HEP to supplement PT in decreasing pain with functional mobility. MET  2. Pt will perform pallof press with good control to demonstrate improved core strength. MET  3. Pt will improve lumbar ROM by 5-10 degrees in all planes to improve functional mobility. Not MET  4. Pt will improve impaired LE MMTs by 1/2 score to improve strength for functional tasks. NOT MET  Long Term Goals (6 Weeks):   1. Pt will improve FOTO score to >/= 50 to decrease perceived limitation with maintaining/changing body position.  NOT MET  2. Pt will perform squat with good form to reduce risk of re injury with lifting. NOT MET  3. Pt will improve impaired LE MMTs by 1 score to improve strength for functional tasks. NOT MET  4. Pt will report no pain during lumbar ROM to promote functional mobility.  NOT MET    Plan     Discharge     Dwain Shay, PT

## 2024-11-19 ENCOUNTER — HOSPITAL ENCOUNTER (OUTPATIENT)
Facility: HOSPITAL | Age: 84
Discharge: HOME | End: 2024-11-21
Attending: EMERGENCY MEDICINE | Admitting: STUDENT IN AN ORGANIZED HEALTH CARE EDUCATION/TRAINING PROGRAM
Payer: MEDICARE

## 2024-11-19 DIAGNOSIS — M54.9 BACK PAIN: ICD-10-CM

## 2024-11-19 DIAGNOSIS — R07.9 CHEST PAIN: ICD-10-CM

## 2024-11-19 DIAGNOSIS — M80.00XA AGE-RELATED OSTEOPOROSIS WITH CURRENT PATHOLOGICAL FRACTURE, INITIAL ENCOUNTER: Primary | ICD-10-CM

## 2024-11-19 DIAGNOSIS — S32.030G CLOSED COMPRESSION FRACTURE OF L3 LUMBAR VERTEBRA WITH DELAYED HEALING, SUBSEQUENT ENCOUNTER: ICD-10-CM

## 2024-11-19 PROBLEM — S32.030A COMPRESSION FRACTURE OF L3 VERTEBRA: Status: RESOLVED | Noted: 2024-11-19 | Resolved: 2024-11-19

## 2024-11-19 PROBLEM — S32.030A COMPRESSION FRACTURE OF L3 VERTEBRA: Status: ACTIVE | Noted: 2024-11-19

## 2024-11-19 PROBLEM — S32.030A CLOSED COMPRESSION FRACTURE OF L3 VERTEBRA: Status: ACTIVE | Noted: 2024-11-19

## 2024-11-19 LAB
ALBUMIN SERPL BCP-MCNC: 3.5 G/DL (ref 3.5–5.2)
ALP SERPL-CCNC: 104 U/L (ref 40–150)
ALT SERPL W/O P-5'-P-CCNC: 13 U/L (ref 10–44)
ANION GAP SERPL CALC-SCNC: 8 MMOL/L (ref 8–16)
AST SERPL-CCNC: 19 U/L (ref 10–40)
BASOPHILS # BLD AUTO: 0.08 K/UL (ref 0–0.2)
BASOPHILS NFR BLD: 0.9 % (ref 0–1.9)
BILIRUB SERPL-MCNC: 0.4 MG/DL (ref 0.1–1)
BILIRUB UR QL STRIP: NEGATIVE
BUN SERPL-MCNC: 10 MG/DL (ref 8–23)
CALCIUM SERPL-MCNC: 8.8 MG/DL (ref 8.7–10.5)
CHLORIDE SERPL-SCNC: 105 MMOL/L (ref 95–110)
CLARITY UR REFRACT.AUTO: ABNORMAL
CO2 SERPL-SCNC: 27 MMOL/L (ref 23–29)
COLOR UR AUTO: COLORLESS
CREAT SERPL-MCNC: 0.7 MG/DL (ref 0.5–1.4)
DIFFERENTIAL METHOD BLD: ABNORMAL
EOSINOPHIL # BLD AUTO: 0.1 K/UL (ref 0–0.5)
EOSINOPHIL NFR BLD: 1 % (ref 0–8)
ERYTHROCYTE [DISTWIDTH] IN BLOOD BY AUTOMATED COUNT: 14.2 % (ref 11.5–14.5)
EST. GFR  (NO RACE VARIABLE): >60 ML/MIN/1.73 M^2
GLUCOSE SERPL-MCNC: 91 MG/DL (ref 70–110)
GLUCOSE UR QL STRIP: NEGATIVE
HCT VFR BLD AUTO: 33.3 % (ref 37–48.5)
HGB BLD-MCNC: 11 G/DL (ref 12–16)
HGB UR QL STRIP: NEGATIVE
IMM GRANULOCYTES # BLD AUTO: 0.01 K/UL (ref 0–0.04)
IMM GRANULOCYTES NFR BLD AUTO: 0.1 % (ref 0–0.5)
KETONES UR QL STRIP: NEGATIVE
LEUKOCYTE ESTERASE UR QL STRIP: NEGATIVE
LYMPHOCYTES # BLD AUTO: 2.3 K/UL (ref 1–4.8)
LYMPHOCYTES NFR BLD: 25.6 % (ref 18–48)
MCH RBC QN AUTO: 31.1 PG (ref 27–31)
MCHC RBC AUTO-ENTMCNC: 33 G/DL (ref 32–36)
MCV RBC AUTO: 94 FL (ref 82–98)
MONOCYTES # BLD AUTO: 1 K/UL (ref 0.3–1)
MONOCYTES NFR BLD: 11.5 % (ref 4–15)
NEUTROPHILS # BLD AUTO: 5.4 K/UL (ref 1.8–7.7)
NEUTROPHILS NFR BLD: 60.9 % (ref 38–73)
NITRITE UR QL STRIP: NEGATIVE
NRBC BLD-RTO: 0 /100 WBC
PH UR STRIP: 8 [PH] (ref 5–8)
PLATELET # BLD AUTO: 277 K/UL (ref 150–450)
PMV BLD AUTO: 8.6 FL (ref 9.2–12.9)
POTASSIUM SERPL-SCNC: 3.3 MMOL/L (ref 3.5–5.1)
PROT SERPL-MCNC: 6.6 G/DL (ref 6–8.4)
PROT UR QL STRIP: NEGATIVE
RBC # BLD AUTO: 3.54 M/UL (ref 4–5.4)
SODIUM SERPL-SCNC: 140 MMOL/L (ref 136–145)
SP GR UR STRIP: 1.01 (ref 1–1.03)
URN SPEC COLLECT METH UR: ABNORMAL
WBC # BLD AUTO: 8.79 K/UL (ref 3.9–12.7)

## 2024-11-19 PROCEDURE — 25000003 PHARM REV CODE 250: Mod: HCNC

## 2024-11-19 PROCEDURE — 96372 THER/PROPH/DIAG INJ SC/IM: CPT | Performed by: STUDENT IN AN ORGANIZED HEALTH CARE EDUCATION/TRAINING PROGRAM

## 2024-11-19 PROCEDURE — 99204 OFFICE O/P NEW MOD 45 MIN: CPT | Mod: HCNC,GC,, | Performed by: INTERNAL MEDICINE

## 2024-11-19 PROCEDURE — 96374 THER/PROPH/DIAG INJ IV PUSH: CPT | Mod: HCNC

## 2024-11-19 PROCEDURE — G0378 HOSPITAL OBSERVATION PER HR: HCPCS | Mod: HCNC

## 2024-11-19 PROCEDURE — 99285 EMERGENCY DEPT VISIT HI MDM: CPT | Mod: 25,HCNC

## 2024-11-19 PROCEDURE — 63600175 PHARM REV CODE 636 W HCPCS: Mod: HCNC | Performed by: STUDENT IN AN ORGANIZED HEALTH CARE EDUCATION/TRAINING PROGRAM

## 2024-11-19 PROCEDURE — 80053 COMPREHEN METABOLIC PANEL: CPT | Mod: HCNC

## 2024-11-19 PROCEDURE — 81003 URINALYSIS AUTO W/O SCOPE: CPT | Mod: HCNC

## 2024-11-19 PROCEDURE — 99223 1ST HOSP IP/OBS HIGH 75: CPT | Mod: HCNC,,, | Performed by: STUDENT IN AN ORGANIZED HEALTH CARE EDUCATION/TRAINING PROGRAM

## 2024-11-19 PROCEDURE — 85025 COMPLETE CBC W/AUTO DIFF WBC: CPT | Mod: HCNC

## 2024-11-19 PROCEDURE — 63600175 PHARM REV CODE 636 W HCPCS: Mod: HCNC

## 2024-11-19 RX ORDER — SODIUM CHLORIDE 0.9 % (FLUSH) 0.9 %
5 SYRINGE (ML) INJECTION
Status: DISCONTINUED | OUTPATIENT
Start: 2024-11-19 | End: 2024-11-21 | Stop reason: HOSPADM

## 2024-11-19 RX ORDER — ENOXAPARIN SODIUM 100 MG/ML
40 INJECTION SUBCUTANEOUS EVERY 24 HOURS
Status: DISCONTINUED | OUTPATIENT
Start: 2024-11-19 | End: 2024-11-19

## 2024-11-19 RX ORDER — PANTOPRAZOLE SODIUM 40 MG/1
40 TABLET, DELAYED RELEASE ORAL 2 TIMES DAILY
Status: DISCONTINUED | OUTPATIENT
Start: 2024-11-19 | End: 2024-11-21 | Stop reason: HOSPADM

## 2024-11-19 RX ORDER — TALC
6 POWDER (GRAM) TOPICAL NIGHTLY PRN
Status: DISCONTINUED | OUTPATIENT
Start: 2024-11-19 | End: 2024-11-21 | Stop reason: HOSPADM

## 2024-11-19 RX ORDER — ACETAMINOPHEN 500 MG
1000 TABLET ORAL EVERY 8 HOURS PRN
Status: DISCONTINUED | OUTPATIENT
Start: 2024-11-19 | End: 2024-11-19

## 2024-11-19 RX ORDER — ACETAMINOPHEN 325 MG/1
650 TABLET ORAL EVERY 4 HOURS PRN
Status: DISCONTINUED | OUTPATIENT
Start: 2024-11-19 | End: 2024-11-19

## 2024-11-19 RX ORDER — POTASSIUM CHLORIDE 20 MEQ/1
40 TABLET, EXTENDED RELEASE ORAL ONCE
Status: COMPLETED | OUTPATIENT
Start: 2024-11-19 | End: 2024-11-19

## 2024-11-19 RX ORDER — ONDANSETRON 8 MG/1
8 TABLET, ORALLY DISINTEGRATING ORAL EVERY 8 HOURS PRN
Status: DISCONTINUED | OUTPATIENT
Start: 2024-11-19 | End: 2024-11-21 | Stop reason: HOSPADM

## 2024-11-19 RX ORDER — SENNOSIDES 8.6 MG/1
8.6 TABLET ORAL 2 TIMES DAILY
Status: DISCONTINUED | OUTPATIENT
Start: 2024-11-19 | End: 2024-11-21 | Stop reason: HOSPADM

## 2024-11-19 RX ORDER — VALSARTAN 40 MG/1
80 TABLET ORAL DAILY
Status: DISCONTINUED | OUTPATIENT
Start: 2024-11-19 | End: 2024-11-21 | Stop reason: HOSPADM

## 2024-11-19 RX ORDER — SODIUM CHLORIDE 0.9 % (FLUSH) 0.9 %
10 SYRINGE (ML) INJECTION EVERY 12 HOURS PRN
Status: DISCONTINUED | OUTPATIENT
Start: 2024-11-19 | End: 2024-11-21 | Stop reason: HOSPADM

## 2024-11-19 RX ORDER — IBANDRONATE SODIUM 3 MG/3 ML
3 SYRINGE (ML) INTRAVENOUS ONCE
Status: DISCONTINUED | OUTPATIENT
Start: 2024-11-20 | End: 2024-11-19

## 2024-11-19 RX ORDER — LIDOCAINE 50 MG/G
1 PATCH TOPICAL
Status: COMPLETED | OUTPATIENT
Start: 2024-11-19 | End: 2024-11-19

## 2024-11-19 RX ORDER — IPRATROPIUM BROMIDE AND ALBUTEROL SULFATE 2.5; .5 MG/3ML; MG/3ML
3 SOLUTION RESPIRATORY (INHALATION) EVERY 4 HOURS PRN
Status: DISCONTINUED | OUTPATIENT
Start: 2024-11-19 | End: 2024-11-21 | Stop reason: HOSPADM

## 2024-11-19 RX ORDER — NITROGLYCERIN 0.4 MG/1
0.4 TABLET SUBLINGUAL EVERY 5 MIN PRN
Status: DISCONTINUED | OUTPATIENT
Start: 2024-11-19 | End: 2024-11-21 | Stop reason: HOSPADM

## 2024-11-19 RX ORDER — NAPROXEN SODIUM 220 MG/1
81 TABLET, FILM COATED ORAL DAILY
Status: DISCONTINUED | OUTPATIENT
Start: 2024-11-19 | End: 2024-11-21 | Stop reason: HOSPADM

## 2024-11-19 RX ORDER — PROCHLORPERAZINE EDISYLATE 5 MG/ML
5 INJECTION INTRAMUSCULAR; INTRAVENOUS EVERY 6 HOURS PRN
Status: DISCONTINUED | OUTPATIENT
Start: 2024-11-19 | End: 2024-11-21 | Stop reason: HOSPADM

## 2024-11-19 RX ORDER — DICLOFENAC SODIUM 10 MG/G
2 GEL TOPICAL DAILY PRN
Status: DISCONTINUED | OUTPATIENT
Start: 2024-11-19 | End: 2024-11-21 | Stop reason: HOSPADM

## 2024-11-19 RX ORDER — NALOXONE HCL 0.4 MG/ML
0.02 VIAL (ML) INJECTION
Status: DISCONTINUED | OUTPATIENT
Start: 2024-11-19 | End: 2024-11-21 | Stop reason: HOSPADM

## 2024-11-19 RX ORDER — IBUPROFEN 200 MG
24 TABLET ORAL
Status: DISCONTINUED | OUTPATIENT
Start: 2024-11-19 | End: 2024-11-21 | Stop reason: HOSPADM

## 2024-11-19 RX ORDER — PILOCARPINE HYDROCHLORIDE 5 MG/1
5 TABLET, FILM COATED ORAL DAILY
Status: DISCONTINUED | OUTPATIENT
Start: 2024-11-19 | End: 2024-11-21 | Stop reason: HOSPADM

## 2024-11-19 RX ORDER — ATORVASTATIN CALCIUM 40 MG/1
40 TABLET, FILM COATED ORAL NIGHTLY
Status: DISCONTINUED | OUTPATIENT
Start: 2024-11-19 | End: 2024-11-21 | Stop reason: HOSPADM

## 2024-11-19 RX ORDER — TRAMADOL HYDROCHLORIDE 50 MG/1
50 TABLET ORAL EVERY 6 HOURS PRN
Status: DISCONTINUED | OUTPATIENT
Start: 2024-11-19 | End: 2024-11-21 | Stop reason: HOSPADM

## 2024-11-19 RX ORDER — MORPHINE SULFATE 2 MG/ML
2 INJECTION, SOLUTION INTRAMUSCULAR; INTRAVENOUS
Status: COMPLETED | OUTPATIENT
Start: 2024-11-19 | End: 2024-11-19

## 2024-11-19 RX ORDER — POLYETHYLENE GLYCOL 3350 17 G/17G
17 POWDER, FOR SOLUTION ORAL 2 TIMES DAILY
Status: DISCONTINUED | OUTPATIENT
Start: 2024-11-19 | End: 2024-11-21 | Stop reason: HOSPADM

## 2024-11-19 RX ORDER — ACETAMINOPHEN 325 MG/1
650 TABLET ORAL
Status: COMPLETED | OUTPATIENT
Start: 2024-11-19 | End: 2024-11-19

## 2024-11-19 RX ORDER — FERROUS SULFATE, DRIED 160(50) MG
1 TABLET, EXTENDED RELEASE ORAL 2 TIMES DAILY WITH MEALS
Status: DISCONTINUED | OUTPATIENT
Start: 2024-11-19 | End: 2024-11-21 | Stop reason: HOSPADM

## 2024-11-19 RX ORDER — SIMETHICONE 80 MG
1 TABLET,CHEWABLE ORAL 4 TIMES DAILY PRN
Status: DISCONTINUED | OUTPATIENT
Start: 2024-11-19 | End: 2024-11-21 | Stop reason: HOSPADM

## 2024-11-19 RX ORDER — ACETAMINOPHEN 500 MG
1000 TABLET ORAL EVERY 8 HOURS
Status: DISCONTINUED | OUTPATIENT
Start: 2024-11-19 | End: 2024-11-21 | Stop reason: HOSPADM

## 2024-11-19 RX ORDER — METHOCARBAMOL 500 MG/1
500 TABLET, FILM COATED ORAL 3 TIMES DAILY PRN
Status: DISCONTINUED | OUTPATIENT
Start: 2024-11-19 | End: 2024-11-21 | Stop reason: HOSPADM

## 2024-11-19 RX ORDER — ENOXAPARIN SODIUM 100 MG/ML
30 INJECTION SUBCUTANEOUS EVERY 24 HOURS
Status: DISCONTINUED | OUTPATIENT
Start: 2024-11-19 | End: 2024-11-21 | Stop reason: HOSPADM

## 2024-11-19 RX ORDER — POLYETHYLENE GLYCOL 3350 17 G/17G
17 POWDER, FOR SOLUTION ORAL 2 TIMES DAILY PRN
Status: DISCONTINUED | OUTPATIENT
Start: 2024-11-19 | End: 2024-11-21 | Stop reason: HOSPADM

## 2024-11-19 RX ORDER — LIDOCAINE 50 MG/G
1 PATCH TOPICAL EVERY MORNING
Status: DISCONTINUED | OUTPATIENT
Start: 2024-11-20 | End: 2024-11-21 | Stop reason: HOSPADM

## 2024-11-19 RX ORDER — EZETIMIBE 10 MG/1
10 TABLET ORAL DAILY
Status: DISCONTINUED | OUTPATIENT
Start: 2024-11-19 | End: 2024-11-21 | Stop reason: HOSPADM

## 2024-11-19 RX ORDER — GLUCAGON 1 MG
1 KIT INJECTION
Status: DISCONTINUED | OUTPATIENT
Start: 2024-11-19 | End: 2024-11-21 | Stop reason: HOSPADM

## 2024-11-19 RX ORDER — IBUPROFEN 200 MG
16 TABLET ORAL
Status: DISCONTINUED | OUTPATIENT
Start: 2024-11-19 | End: 2024-11-21 | Stop reason: HOSPADM

## 2024-11-19 RX ORDER — METHOCARBAMOL 500 MG/1
500 TABLET, FILM COATED ORAL 4 TIMES DAILY
Status: DISCONTINUED | OUTPATIENT
Start: 2024-11-19 | End: 2024-11-21 | Stop reason: HOSPADM

## 2024-11-19 RX ORDER — ALUMINUM HYDROXIDE, MAGNESIUM HYDROXIDE, AND SIMETHICONE 1200; 120; 1200 MG/30ML; MG/30ML; MG/30ML
30 SUSPENSION ORAL 4 TIMES DAILY PRN
Status: DISCONTINUED | OUTPATIENT
Start: 2024-11-19 | End: 2024-11-21 | Stop reason: HOSPADM

## 2024-11-19 RX ORDER — POLYVINYL ALCOHOL 14 MG/ML
1 SOLUTION/ DROPS OPHTHALMIC
Status: DISCONTINUED | OUTPATIENT
Start: 2024-11-19 | End: 2024-11-19

## 2024-11-19 RX ADMIN — ACETAMINOPHEN 650 MG: 325 TABLET ORAL at 04:11

## 2024-11-19 RX ADMIN — PANTOPRAZOLE SODIUM 40 MG: 40 TABLET, DELAYED RELEASE ORAL at 08:11

## 2024-11-19 RX ADMIN — ASPIRIN 81 MG CHEWABLE TABLET 81 MG: 81 TABLET CHEWABLE at 10:11

## 2024-11-19 RX ADMIN — Medication 1 TABLET: at 04:11

## 2024-11-19 RX ADMIN — METHOCARBAMOL 500 MG: 500 TABLET ORAL at 04:11

## 2024-11-19 RX ADMIN — POLYETHYLENE GLYCOL 3350 17 G: 17 POWDER, FOR SOLUTION ORAL at 08:11

## 2024-11-19 RX ADMIN — ACETAMINOPHEN 1000 MG: 500 TABLET ORAL at 01:11

## 2024-11-19 RX ADMIN — SENNOSIDES 8.6 MG: 8.6 TABLET, FILM COATED ORAL at 10:11

## 2024-11-19 RX ADMIN — POTASSIUM CHLORIDE 40 MEQ: 1500 TABLET, EXTENDED RELEASE ORAL at 08:11

## 2024-11-19 RX ADMIN — METHOCARBAMOL 500 MG: 500 TABLET ORAL at 08:11

## 2024-11-19 RX ADMIN — LIDOCAINE 1 PATCH: 50 PATCH CUTANEOUS at 04:11

## 2024-11-19 RX ADMIN — ACETAMINOPHEN 1000 MG: 500 TABLET ORAL at 08:11

## 2024-11-19 RX ADMIN — PANTOPRAZOLE SODIUM 40 MG: 40 TABLET, DELAYED RELEASE ORAL at 10:11

## 2024-11-19 RX ADMIN — PILOCARPINE HYDROCHLORIDE 5 MG: 5 TABLET, FILM COATED ORAL at 11:11

## 2024-11-19 RX ADMIN — METHOCARBAMOL 500 MG: 500 TABLET ORAL at 01:11

## 2024-11-19 RX ADMIN — POLYETHYLENE GLYCOL 3350 17 G: 17 POWDER, FOR SOLUTION ORAL at 10:11

## 2024-11-19 RX ADMIN — ENOXAPARIN SODIUM 30 MG: 30 INJECTION SUBCUTANEOUS at 04:11

## 2024-11-19 RX ADMIN — MORPHINE SULFATE 2 MG: 2 INJECTION, SOLUTION INTRAMUSCULAR; INTRAVENOUS at 04:11

## 2024-11-19 RX ADMIN — EZETIMIBE 10 MG: 10 TABLET ORAL at 11:11

## 2024-11-19 RX ADMIN — ATORVASTATIN CALCIUM 40 MG: 40 TABLET, FILM COATED ORAL at 08:11

## 2024-11-19 RX ADMIN — Medication 1 TABLET: at 10:11

## 2024-11-19 RX ADMIN — SENNOSIDES 8.6 MG: 8.6 TABLET, FILM COATED ORAL at 08:11

## 2024-11-19 NOTE — ED TRIAGE NOTES
Betzy Cooley, a 84 y.o. female presents to the ED w/ complaint of lower back pain. Patient has hx of L1 compression fracture. Patient scheduled to have epidural procedure soon but is unable to take the pain.    Triage note:  Chief Complaint   Patient presents with    Back Pain     Lower back pain, hx of L1 compression fx     Review of patient's allergies indicates:   Allergen Reactions    Sulfa (sulfonamide antibiotics) Hives     Other reaction(s): Anaphylaxis    Itching     Shortness of breath     Past Medical History:   Diagnosis Date    Arthritis     Cataract     GERD (gastroesophageal reflux disease)     HEARING LOSS     Hypertension     Osteoporosis, postmenopausal     Squamous Cell Carcinoma 2007    right forearm    Urinary incontinence     rare mixed

## 2024-11-19 NOTE — ASSESSMENT & PLAN NOTE
Betzy Cooley is an 85yo woman w/PMHx osteoporosis on Fosamax, CAD s/p multiple stent placement 05/2024 on Brilinta (held since Sunday for planned ALLISON), known L1 burst fracture managed non-surgically presenting with acute on chronic LBP with LLE radiculopathy. CT Lumbar spine demonstrates new acute L3 compression fracture with severe height loss and 0.5cm retropulsion.     - Admitted to Hospital Medicine with q4hr neuro checks  - TLSO brace at all times--pt reportedly refusing to wear or be fitted for brace per bedside nurse  - Please ensure strict spinal precautions. If patient is refusing to wear the brace, ensure the head of bed is at 0 and she remains on bed rest  - Endocrinology consulted for osteoporosis given the progression of her spinal instability in the absence of trauma  - Multimodal pain control per primary  - Hold off on repeat MRI at this time  - Please notify NSGY on call clinician with any decline on exam    Case and plan discussed with attending neurosurgeon Dr. Damon

## 2024-11-19 NOTE — SUBJECTIVE & OBJECTIVE
"Interval HPI:   Overnight events: consult placed  PMH, PSH, FH, SH updated and reviewed     ROS:    Review of Systems   Constitutional:  Positive for chills. Negative for fever.   Musculoskeletal:  Positive for back pain and gait problem.   Psychiatric/Behavioral:  Negative for agitation and behavioral problems.        Labs Reviewed and Include:    Recent Labs   Lab 11/19/24  0545   GLU 91   CALCIUM 8.8   ALBUMIN 3.5   PROT 6.6      K 3.3*   CO2 27      BUN 10   CREATININE 0.7   ALKPHOS 104   ALT 13   AST 19   BILITOT 0.4     Lab Results   Component Value Date    HGBA1C 5.5 05/15/2024       Nutritional status:   Body mass index is 18.31 kg/m².  Lab Results   Component Value Date    ALBUMIN 3.5 11/19/2024    ALBUMIN 3.7 08/21/2024    ALBUMIN 3.6 05/22/2024     No results found for: "PREALBUMIN"    Estimated Creatinine Clearance: 42.1 mL/min (based on SCr of 0.7 mg/dL).    POCT Glucose results:    Current Insulin Regimen:       PHYSICAL EXAMINATION:  Vitals:    11/19/24 1509   BP:    Pulse: (!) 58   Resp:    Temp:      Body mass index is 18.31 kg/m².     Physical Exam  Constitutional:       General: She is not in acute distress.  HENT:      Nose: Nose normal.      Mouth/Throat:      Mouth: Mucous membranes are dry.   Musculoskeletal:      Comments: Ill-appearing, frail elderly female   Neurological:      Mental Status: She is alert.   Psychiatric:         Mood and Affect: Mood normal.         Behavior: Behavior normal.          "

## 2024-11-19 NOTE — PT/OT/SLP PROGRESS
Physical Therapy      Patient Name:  Betzy Cooley   MRN:  [id number]    Patient not seen today secondary to Therapist assessment. Per chart review, imagining results demonstrate acute L3 burst fracture and new R sacral insufficiency fracture and pt is still pending NSGY consult. Will hold on PT evaluation until NSGY consult performed and MD clearance/updated precautions received. Will follow-up as appropriate.  Suni Acosta, PT

## 2024-11-19 NOTE — ASSESSMENT & PLAN NOTE
- elevated in ED, but likely 2/2 pain  - continue valsartan 80 mg daily, adjust as necessary    - toprol held given borderline bradycardia

## 2024-11-19 NOTE — ASSESSMENT & PLAN NOTE
83 yo F with PMHx of HTN, GERD, CAD s/p PCI, and recent L1 compression fracture who presented to ED for worsening back pain.     - Lumbar XR with continued demonstration of L1 compression deformity with severe loss of height, but now also showing new L3 compression deformity.   - CT lumbar spine: Acute L3 burst fracture with severe height loss and 0.5 cm retropulsion resulting in mild canal stenosis   - MM pain control: scheduled tylenol, lidocaine patches and robaxin. Prn tramadol   - PT/ OT Ordered   - NSGY Consulted, appreciate recs  - Patient fitted for back brace, wear at all times, if not lying flat on bed  - q4hr neuro checks

## 2024-11-19 NOTE — CONSULTS
[facility]  Endocrinology  Consult Note    Consult Requested by: Duncan Gillette MD   Reason for admit: Closed compression fracture of L3 vertebra    HISTORY OF PRESENT ILLNESS:  Betzy Cooley is a 84 y.o. female with PMH significant for GERD, CAD s/p 7 stents, Osteoporosis on drug holiday from fosamax and HTN who presented to the hospital with complaint of acute worsening of back pain. Patient was also unstable with standing. In the emergency department she was found to have a new compression fracture at L3. Endocrinology is consulted to assist with osteoporosis management.    Patient was hospitalized in August with an L1 compression fracture and recently completed rehab for that. She was treated with Fosamax in the past (greater than 10 years ago she reports) for ten years but has been on holiday since then. She takes daily calcium citrate with vitamin D and does not exercise.    COMPARISON:  Comparison study done on 10/07/2022. Lumbar spine BMD 0.803 g/cm2 and the T-score -2.2.  The Total Hip BMD 0.731 g/cm2 and the T-score -1.7.     FINDINGS:  LS: T-score is -2.9, and Z-score is 0.0., Compared with previous DXA, BMD at the lumbar spine has declined by -8.8%.  FN: T-score is -3.1, and Z-score is -0.6.  TH: T-score is -2.7, and Z-score is -0.4. Compared with previous DXA, BMD at the total hip has declined by -16.4%.  The trabecular bone score (TBS) indicates degraded bone quality.     Fracture Risk (FRAX adjusted for Trabecular Bone Score)  24% risk of a major osteoporotic fracture in the next 10 years.  9.7% risk of hip fracture in the next 10 years.          Medications and/or Treatments Impacting Glycemic Control:  Immunotherapy:    Immunosuppressants       None          Steroids:   Hormones (From admission, onward)      Start     Stop Route Frequency Ordered    11/19/24 3899  melatonin tablet 6 mg         -- Oral Nightly PRN 11/19/24 4291          Pressors:    Autonomic Drugs (From  admission, onward)      Start     Stop Route Frequency Ordered    11/20/24 0900  metoprolol succinate (TOPROL-XL) 24 hr split tablet 12.5 mg         -- Oral Daily 11/19/24 1419    11/19/24 1030  pilocarpine tablet 5 mg         -- Oral Daily 11/19/24 0933            Medications Prior to Admission   Medication Sig Dispense Refill Last Dose/Taking    alendronate (FOSAMAX) 70 MG tablet Take 1 tablet (70 mg total) by mouth every 7 days. 4 tablet 11     aspirin 81 MG Chew Take 81 mg by mouth once daily. Not chewable       atorvastatin (LIPITOR) 40 MG tablet Take 1 tablet (40 mg total) by mouth every evening. 360 tablet 0     calcium-vitamin D3 500 mg(1,250mg) -200 unit per tablet Take 1 tablet by mouth 2 (two) times daily with meals.       diclofenac sodium (VOLTAREN ARTHRITIS PAIN) 1 % Gel Apply 2 g topically daily as needed (Pain).       ezetimibe (ZETIA) 10 mg tablet Take 1 tablet (10 mg total) by mouth once daily. 90 tablet 3     HYDROcodone-acetaminophen (NORCO) 5-325 mg per tablet Take 1 tablet by mouth every 6 (six) hours as needed for Pain. 28 tablet 0     lanolin/mineral oil/petrolatum (ARTIFICIAL TEARS OPHT) Place 1 drop into both eyes once daily.       methocarbamoL (ROBAXIN) 500 MG Tab Take 1 tablet (500 mg total) by mouth 3 (three) times daily as needed (muscular pain). 90 tablet 0     metoprolol succinate (TOPROL-XL) 25 MG 24 hr tablet Take 0.5 tablets (12.5 mg total) by mouth once daily. 45 tablet 0     multivitamin (THERAGRAN) per tablet Take 1 tablet by mouth once daily.       nitroGLYCERIN (NITROSTAT) 0.4 MG SL tablet Place 1 tablet (0.4 mg total) under the tongue every 5 (five) minutes as needed for Chest pain. 30 tablet 2     pantoprazole (PROTONIX) 40 MG tablet Take 1 tablet (40 mg total) by mouth 2 (two) times daily. 180 tablet 3     pilocarpine (SALAGEN) 5 MG Tab Take 1 tablet (5 mg total) by mouth once daily. 21 tablet 0     sodium chloride (SALINE MIST NASL) 1 spray by Each Nostril route daily as  needed (Congestion).       ticagrelor (BRILINTA) 90 mg tablet Take 1 tablet (90 mg total) by mouth 2 (two) times daily. 60 tablet 11     valsartan (DIOVAN) 80 MG tablet Take 1 tablet (80 mg total) by mouth once daily. 90 tablet 3     vitamin A-vitamin C-vit E-min Tab Take 1 tablet by mouth once daily.          Current Facility-Administered Medications   Medication Dose Route Frequency Provider Last Rate Last Admin    acetaminophen tablet 1,000 mg  1,000 mg Oral Q8H Humera Baires PA-C   1,000 mg at 11/19/24 1305    albuterol-ipratropium 2.5 mg-0.5 mg/3 mL nebulizer solution 3 mL  3 mL Nebulization Q4H PRN Humera Baires PA-C        aluminum-magnesium hydroxide-simethicone 200-200-20 mg/5 mL suspension 30 mL  30 mL Oral QID PRN Humera Baires, PA-C        aspirin chewable tablet 81 mg  81 mg Oral Daily Ha, Eleazar P, PA-C   81 mg at 11/19/24 1007    atorvastatin tablet 40 mg  40 mg Oral QHS Ha, Eleazar P, PA-C        calcium-vitamin D3 500 mg-5 mcg (200 unit) per tablet 1 tablet  1 tablet Oral BID WM Ha, Eleazar P, PA-C   1 tablet at 11/19/24 1007    dextrose 10% bolus 125 mL 125 mL  12.5 g Intravenous PRN Ha, Eleazar P, PA-C        dextrose 10% bolus 250 mL 250 mL  25 g Intravenous PRN Ha, Eleazar P, PA-C        diclofenac sodium 1 % gel 2 g  2 g Topical (Top) Daily PRN Ha, Eleazar P, PA-C        enoxaparin injection 30 mg  30 mg Subcutaneous Daily Duncan Gillette MD        ezetimibe tablet 10 mg  10 mg Oral Daily Ha, Eleazar P, PA-C   10 mg at 11/19/24 1140    glucagon (human recombinant) injection 1 mg  1 mg Intramuscular PRN Ha, Eleazar P, PA-C        glucose chewable tablet 16 g  16 g Oral PRN Ha, Eleazar P, PA-C        glucose chewable tablet 24 g  24 g Oral PRN Ha, Eleazar P, PA-C        LIDOcaine 5 % patch 1 patch  1 patch Transdermal ED 1 Time Rosi Rich    1 patch at 11/19/24 0423    [START ON 11/20/2024] LIDOcaine 5 % patch 1 patch  1 patch Transdermal Humera Griffith PA-C         melatonin tablet 6 mg  6 mg Oral Nightly PRN Humera Baires PA-C        methocarbamoL tablet 500 mg  500 mg Oral QID Humera Baires PA-C   500 mg at 11/19/24 1305    methocarbamoL tablet 500 mg  500 mg Oral TID PRN Eleazar Bonner P, PA-C        [START ON 11/20/2024] metoprolol succinate (TOPROL-XL) 24 hr split tablet 12.5 mg  12.5 mg Oral Daily Eleazar Bonner P, PA-C        naloxone 0.4 mg/mL injection 0.02 mg  0.02 mg Intravenous PRN Humera Baires PA-C        naloxone 0.4 mg/mL injection 0.02 mg  0.02 mg Intravenous PRN Eleazar Bonner P, PA-C        nitroGLYCERIN SL tablet 0.4 mg  0.4 mg Sublingual Q5 Min PRN Eleazar Bonner P, PA-C        ondansetron disintegrating tablet 8 mg  8 mg Oral Q8H PRN Humera Baires PA-C        pantoprazole EC tablet 40 mg  40 mg Oral BID Eleazar Bonner P, PA-C   40 mg at 11/19/24 1007    pilocarpine tablet 5 mg  5 mg Oral Daily Eleazar Bonner P, PA-C   5 mg at 11/19/24 1140    polyethylene glycol packet 17 g  17 g Oral BID PRN Huemra Baires PA-C        polyethylene glycol packet 17 g  17 g Oral BID Faustina Prescott PA-C   17 g at 11/19/24 1006    prochlorperazine injection Soln 5 mg  5 mg Intravenous Q6H PRN Humera Baires PA-C        senna tablet 8.6 mg  8.6 mg Oral BID Faustina Prescott PA-C   8.6 mg at 11/19/24 1007    simethicone chewable tablet 80 mg  1 tablet Oral QID PRN Humera Baires PA-C        sodium chloride 0.65 % nasal spray 1 spray  1 spray Each Nostril PRN Eleazar Bonner P, PA-C        sodium chloride 0.9% flush 10 mL  10 mL Intravenous Q12H PRN Eleazar Bonner P, PA-C        sodium chloride 0.9% flush 5 mL  5 mL Intravenous PRN Humera Baires PA-C        traMADoL tablet 50 mg  50 mg Oral Q6H PRN Humera Baires PA-C        valsartan tablet 80 mg  80 mg Oral Daily Eleazar Bonner PA-C        white petrolatum-mineral oil (SYSTANE NIGHTTIME) ophthalmic ointment   Both Eyes PRN Eleazar Bonner PA-C         Facility-Administered Medications Ordered in Other  "Encounters   Medication Dose Route Frequency Provider Last Rate Last Admin    lactated ringers infusion   Intravenous Continuous Zbigniew Douhgerty MD           Interval HPI:   Overnight events: consult placed  PMH, PSH, FH, SH updated and reviewed     ROS:    Review of Systems   Constitutional:  Positive for chills. Negative for fever.   Musculoskeletal:  Positive for back pain and gait problem.   Psychiatric/Behavioral:  Negative for agitation and behavioral problems.        Labs Reviewed and Include:    Recent Labs   Lab 11/19/24  0545   GLU 91   CALCIUM 8.8   ALBUMIN 3.5   PROT 6.6      K 3.3*   CO2 27      BUN 10   CREATININE 0.7   ALKPHOS 104   ALT 13   AST 19   BILITOT 0.4     Lab Results   Component Value Date    HGBA1C 5.5 05/15/2024       Nutritional status:   Body mass index is 18.31 kg/m².  Lab Results   Component Value Date    ALBUMIN 3.5 11/19/2024    ALBUMIN 3.7 08/21/2024    ALBUMIN 3.6 05/22/2024     No results found for: "PREALBUMIN"    Estimated Creatinine Clearance: 42.1 mL/min (based on SCr of 0.7 mg/dL).    POCT Glucose results:    Current Insulin Regimen:       PHYSICAL EXAMINATION:  Vitals:    11/19/24 1509   BP:    Pulse: (!) 58   Resp:    Temp:      Body mass index is 18.31 kg/m².     Physical Exam  Constitutional:       General: She is not in acute distress.  HENT:      Nose: Nose normal.      Mouth/Throat:      Mouth: Mucous membranes are dry.   Musculoskeletal:      Comments: Ill-appearing, frail elderly female   Neurological:      Mental Status: She is alert.   Psychiatric:         Mood and Affect: Mood normal.         Behavior: Behavior normal.        .     ASSESSMENT and PLAN:    Orthopedic  Age-related osteoporosis with current pathological fracture  Lumbar spine compression fractures and BMD findings confirm osteoporosis  Surgical spine management per neurosurgery    Patient would benefit from anabolic agents for improvement in BMD, we would recommend Forteo, once " daily injections which can be started as an outpatient.  We can set her up for fellows discharge clinic when patient is ready to leave.    While patient is hospitalized we can perform secondary osteoporosis workup which includes: PTH and Vitamin D as well as 24 hour urine calcium. Patient does not have significant hypercalcemia to suspect hyperparathyroidism. This work up is usually completed as an outpatient.          DISCHARGE NEEDS: will assess daily    Real Wallis DO  Endocrinology  [facility]
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
## 2024-11-19 NOTE — HPI
Betzy Cooley is an 83yo woman w/PMHx osteoporosis on Fosamax, CAD s/p multiple stent placement 05/2024 on Brilinta (held since Sunday for planned ALLISON), known L1 burst fracture managed non-surgically presenting with acute on chronic LBP with LLE radiculopathy. She denies any recent falls or injuries. The pain has worsened over the past 2 weeks. Denies saddle anesthesia, bladder or bowel dysfunction. Exam remains stable in comparison to her last clinic visit on 10/22. CT Lumbar spine demonstrates new acute L3 compression fracture with severe height loss and 0.5cm retropulsion.

## 2024-11-19 NOTE — SUBJECTIVE & OBJECTIVE
Past Medical History:   Diagnosis Date    Arthritis     Cataract     GERD (gastroesophageal reflux disease)     HEARING LOSS     Hypertension     Osteoporosis, postmenopausal     Squamous Cell Carcinoma 2007    right forearm    Urinary incontinence     rare mixed       Past Surgical History:   Procedure Laterality Date    ANGIOGRAM, CORONARY, WITH LEFT HEART CATHETERIZATION Bilateral 5/17/2024    Procedure: Angiogram, Coronary, with Left Heart Cath;  Surgeon: Miguel Jacob MD;  Location: [facility] CATH LAB;  Service: Cardiology;  Laterality: Bilateral;    COLPOPEXY N/A 08/06/2019    Procedure: COLPOPEXY SSL FIXATION;  Surgeon: Alma Dorsey MD;  [address];  Service: Urology;  Laterality: N/A;    CORONARY ANGIOGRAPHY Left 5/16/2024    Procedure: ANGIOGRAM, CORONARY ARTERY;  Surgeon: Miguel Jacob MD;  Location: [facility] CATH LAB;  Service: Cardiology;  Laterality: Left;    CYSTOSCOPY N/A 08/06/2019    Procedure: CYSTOSCOPY;  Surgeon: Alma Dorsey MD;  [facility];  Service: Urology;  Laterality: N/A;    ESOPHAGOGASTRODUODENOSCOPY N/A 4/1/2024    Procedure: EGD (ESOPHAGOGASTRODUODENOSCOPY);  Surgeon: Zbigniew Dougherty MD;  Location: [facility];  Service: Endoscopy;  Laterality: N/A;    FRACTURE SURGERY Left 2008    open radius/ulna fracture    HYSTERECTOMY      TANIA/ovaries remain--fibroids- in her late 30's / early 40's    IVUS, CORONARY  5/17/2024    Procedure: IVUS, Coronary;  Surgeon: Miguel Jacob MD;  Location: [facility] CATH LAB;  Service: Cardiology;;    STENT, DRUG ELUTING, MULTI VESSEL, CORONARY  5/17/2024    Procedure: Stent, Drug Eluting, Multi Vessel, Coronary;  Surgeon: Miguel Jacob MD;  Location: [facility] CATH LAB;  Service: Cardiology;;       Review of patient's allergies indicates:   Allergen Reactions    Sulfa (sulfonamide antibiotics) Hives     Other reaction(s): Anaphylaxis    Itching     Shortness of breath       Current Facility-Administered  Medications on File Prior to Encounter   Medication    lactated ringers infusion     Current Outpatient Medications on File Prior to Encounter   Medication Sig    alendronate (FOSAMAX) 70 MG tablet Take 1 tablet (70 mg total) by mouth every 7 days.    aspirin 81 MG Chew Take 81 mg by mouth once daily. Not chewable    atorvastatin (LIPITOR) 40 MG tablet Take 1 tablet (40 mg total) by mouth every evening.    calcium-vitamin D3 500 mg(1,250mg) -200 unit per tablet Take 1 tablet by mouth 2 (two) times daily with meals.    cefpodoxime (VANTIN) 100 MG tablet Take 200 mg by mouth every 12 (twelve) hours. (Patient not taking: Reported on 11/13/2024)    co-enzyme Q-10 30 mg capsule Take 30 mg by mouth once daily. (Patient not taking: Reported on 11/13/2024)    diclofenac sodium (VOLTAREN ARTHRITIS PAIN) 1 % Gel Apply 2 g topically daily as needed (Pain).    ezetimibe (ZETIA) 10 mg tablet Take 1 tablet (10 mg total) by mouth once daily.    HYDROcodone-acetaminophen (NORCO) 5-325 mg per tablet Take 1 tablet by mouth every 6 (six) hours as needed for Pain.    lanolin/mineral oil/petrolatum (ARTIFICIAL TEARS OPHT) Place 1 drop into both eyes once daily.    methocarbamoL (ROBAXIN) 500 MG Tab Take 1 tablet (500 mg total) by mouth 3 (three) times daily as needed (muscular pain).    metoprolol succinate (TOPROL-XL) 25 MG 24 hr tablet Take 0.5 tablets (12.5 mg total) by mouth once daily.    multivitamin (THERAGRAN) per tablet Take 1 tablet by mouth once daily.    nitroGLYCERIN (NITROSTAT) 0.4 MG SL tablet Place 1 tablet (0.4 mg total) under the tongue every 5 (five) minutes as needed for Chest pain.    pantoprazole (PROTONIX) 40 MG tablet Take 1 tablet (40 mg total) by mouth 2 (two) times daily.    pilocarpine (SALAGEN) 5 MG Tab Take 1 tablet (5 mg total) by mouth once daily.    sodium chloride (SALINE MIST NASL) 1 spray by Each Nostril route daily as needed (Congestion).    ticagrelor (BRILINTA) 90 mg tablet Take 1 tablet (90 mg  total) by mouth 2 (two) times daily.    valsartan (DIOVAN) 80 MG tablet Take 1 tablet (80 mg total) by mouth once daily.    vitamin A-vitamin C-vit E-min Tab Take 1 tablet by mouth once daily.     Family History       Problem Relation (Age of Onset)    Arthritis Sister    COPD Mother, Father    Diabetes Sister    Heart disease Mother    Heart failure Mother    Macular degeneration Mother    No Known Problems Brother, Son, Daughter, Sister, Sister, Sister, Brother, Brother, Brother, Brother, Brother, Daughter, Son          Tobacco Use    Smoking status: Never     Passive exposure: Never    Smokeless tobacco: Never   Substance and Sexual Activity    Alcohol use: Not Currently     Comment: 1-2 glasses wine weekly    Drug use: No    Sexual activity: Yes     Partners: Male     Birth control/protection: None     Review of Systems   Constitutional:  Negative for activity change, chills and fever.   HENT:  Negative for trouble swallowing.    Eyes:  Negative for photophobia and visual disturbance.   Respiratory:  Negative for cough, chest tightness and shortness of breath.    Cardiovascular:  Negative for chest pain, palpitations and leg swelling.   Gastrointestinal:  Negative for abdominal pain, constipation, diarrhea, nausea and vomiting.   Genitourinary:  Negative for dysuria, frequency and hematuria.   Musculoskeletal:  Positive for arthralgias (L hip), back pain and gait problem. Negative for neck pain.   Skin:  Negative for rash and wound.   Neurological:  Negative for dizziness, syncope, speech difficulty and light-headedness.   Psychiatric/Behavioral:  Negative for agitation and confusion. The patient is not nervous/anxious.      Objective:     Vital Signs (Most Recent):  Temp: 97.7 °F (36.5 °C) (11/19/24 0500)  Pulse: 60 (11/19/24 0635)  Resp: 19 (11/19/24 0635)  BP: 135/82 (11/19/24 0635)  SpO2: 96 % (11/19/24 0635) Vital Signs (24h Range):  Temp:  [97.7 °F (36.5 °C)-98.7 °F (37.1 °C)] 97.7 °F (36.5 °C)  Pulse:   [57-69] 60  Resp:  [17-20] 19  SpO2:  [96 %-99 %] 96 %  BP: (135-172)/(75-86) 135/82     Weight: 45.4 kg (100 lb)  Body mass index is 18.29 kg/m².     Physical Exam  Vitals and nursing note reviewed.   Constitutional:       General: She is in acute distress (tearful 2/2 pain).      Appearance: Normal appearance. She is ill-appearing.   HENT:      Head: Normocephalic and atraumatic.      Mouth/Throat:      Mouth: Mucous membranes are dry.   Eyes:      Extraocular Movements: Extraocular movements intact.      Conjunctiva/sclera: Conjunctivae normal.   Cardiovascular:      Rate and Rhythm: Normal rate and regular rhythm.      Heart sounds: Normal heart sounds. No murmur heard.  Pulmonary:      Effort: Pulmonary effort is normal. No respiratory distress.      Breath sounds: Normal breath sounds.   Abdominal:      General: Abdomen is flat.      Tenderness: There is no abdominal tenderness.   Musculoskeletal:         General: Tenderness (L hip and lumbar spine) present. No deformity. Normal range of motion.      Cervical back: Normal range of motion and neck supple. No rigidity.      Lumbar back: Tenderness and bony tenderness present.      Right lower leg: No edema.      Left lower leg: No edema.   Skin:     General: Skin is warm and dry.   Neurological:      General: No focal deficit present.      Mental Status: She is alert and oriented to person, place, and time.      Cranial Nerves: No cranial nerve deficit.   Psychiatric:         Mood and Affect: Mood normal. Affect is tearful.         Speech: Speech is delayed.         Behavior: Behavior normal.                Significant Labs: All pertinent labs within the past 24 hours have been reviewed.  CBC:   Recent Labs   Lab 11/19/24  0545   WBC 8.79   HGB 11.0*   HCT 33.3*        CMP:   Recent Labs   Lab 11/19/24  0545      K 3.3*      CO2 27   GLU 91   BUN 10   CREATININE 0.7   CALCIUM 8.8   PROT 6.6   ALBUMIN 3.5   BILITOT 0.4   ALKPHOS 104   AST 19    ALT 13   ANIONGAP 8       Significant Imaging: I have reviewed all pertinent imaging results/findings within the past 24 hours.

## 2024-11-19 NOTE — ASSESSMENT & PLAN NOTE
Closed compression fracture of body of L1 vertebra  Back pain     - 83 yo F with PMHx of HTN, GERD, CAD s/p PCI, and recent L1 compression fracture who presented to ED for worsening back pain.   - Lumbar XR with continued demonstration of L1 compression deformity with severe loss of height, but now also showing new L3 compression deformity.   - CT lumbar spine: Acute L3 burst fracture with severe height loss and 0.5 cm retropulsion resulting in mild canal stenosis   - MM pain control with  scheduled tylenol, lidocaine patches and robaxin. Prn tramadol   - PT/ OT ordered   - NSGY Consulted, appreciate recs  - Patient fitted for back brace, wear at all times, if not lying flat on bed  - q4hr neuro checks

## 2024-11-19 NOTE — PLAN OF CARE
11/19/24 0734   Post-Acute Status   Post-Acute Authorization Home Health   Home Health Status Referrals Sent   Discharge Delays None known at this time   Discharge Plan   Discharge Plan A Home Health     Pt had [facility] previously and would like to re-admit on d/c.  Pt and spouse are amenable to wait to admit to [facility] if they have to wait; not willing to send referrals to any other home health company      SW sent referral via careport    Discharge Plan A and Plan B have been determined by review of patient's clinical status, future medical and therapeutic needs, and coverage/benefits for post-acute care in coordination with multidisciplinary team members.        Cari Ballard CD, MSW, LMSW, RSW   Case Management  [facility]  Email: [email or web address]

## 2024-11-19 NOTE — ASSESSMENT & PLAN NOTE
Patient with known CAD s/p stent placement, which is controlled Will continue  zetia, ASA, and Statin and monitor for S/Sx of angina/ACS. Continue to monitor on telemetry.   - brilinta held prior to outpatient epidural injection on friday

## 2024-11-19 NOTE — PT/OT/SLP PROGRESS
Occupational Therapy      Patient Name:  Betzy Cooley   MRN:  [id number]    Patient not seen today secondary to Therapist assessment. Chart review completed; however, per updated imaging, results indicate acute L3 burst fracture and new R sacral insufficiency fracture, and pt still pending NSGY consult. OT evaluation held at this time - will reassess once MD clearance and updated precautions received. Will follow up as appropriate.    11/19/2024

## 2024-11-19 NOTE — ED NOTES
at bedside talking with patient about the importance of the brace, pt agreeable to put on brace. DME made aware coming back down to place it on patient  
Assumed care of pt. Report received from WENCESLAO Eisenberg.  
Assumed care of the patient. Report received from WENCESLAO Blackman. Pt on continuous cardiac monitoring, continuous pulse oximetry, and automatic BP cuff cycling Q30min. Pt in hospital gown, side rails up X2, bed low and locked, and call light is placed within reach. One family/visitors at bedside at this time. Pt denies any complaints or needs.       
Bed: 29  Expected date: 11/19/24  Expected time:   Means of arrival:   Comments:  10  
DME came to put on the brace, patient is crying and refusing to put the brace on.  She's is saying it is too heavy and she wants her  here with her brace from home.  Patient informed we need the TLSO brace on her to get her MRI but she is still refusing. SEDRICK Hernandez made aware     
Tele box [id number] applied to pt. War room states able to see pt on monitor, rhythm Afib  with HR 81 . Transport requested.    
Tele requested.   
90.7

## 2024-11-19 NOTE — HPI
Betzy Cooley is a 84 y.o. female with PMH significant for GERD, CAD s/p 7 stents, Osteoporosis on drug holiday from fosamax and HTN who presented to the hospital with complaint of acute worsening of back pain. Patient was also unstable with standing. In the emergency department she was found to have a new compression fracture at L3. Endocrinology is consulted to assist with osteoporosis management.    Patient was hospitalized in August with an L1 compression fracture and recently completed rehab for that. She was treated with Fosamax in the past (greater than 10 years ago she reports) for ten years but has been on holiday since then. She takes daily calcium citrate with vitamin D and does not exercise.    COMPARISON:  Comparison study done on 10/07/2022. Lumbar spine BMD 0.803 g/cm2 and the T-score -2.2.  The Total Hip BMD 0.731 g/cm2 and the T-score -1.7.     FINDINGS:  LS: T-score is -2.9, and Z-score is 0.0., Compared with previous DXA, BMD at the lumbar spine has declined by -8.8%.  FN: T-score is -3.1, and Z-score is -0.6.  TH: T-score is -2.7, and Z-score is -0.4. Compared with previous DXA, BMD at the total hip has declined by -16.4%.  The trabecular bone score (TBS) indicates degraded bone quality.     Fracture Risk (FRAX adjusted for Trabecular Bone Score)  24% risk of a major osteoporotic fracture in the next 10 years.  9.7% risk of hip fracture in the next 10 years.

## 2024-11-19 NOTE — ED PROVIDER NOTES
Encounter Date: 11/19/2024       History     Chief Complaint   Patient presents with    Back Pain     Lower back pain, hx of L1 compression fx     HPI    Patient is an 84-year-old female with medical history of hypertension, GERD, CAD status post [facility] with 7 stents, and history of L1 compression fracture presenting to the ED with acute worsening of her back pain.  Patient describes the pain as being her low back and radiating down the left leg, stopping at approximately the knee.  Her  states that the patient has been declining over the last several days and woke up in the middle of the night and was unable to ambulate.     She has a known L1 compression fracture that was identified a few months ago.  She was followed with a neurosurgeon and with pain management.  She is scheduled for a transforaminal epidural steroid injection on 11/22.  Her has been reports that they were given a prescription for Norco that was stressed to only be used during emergencies.  They report that they have had to rely on it to allow her to sleep but it isn't helping her at this point anemia.    Review of patient's allergies indicates:   Allergen Reactions    Sulfa (sulfonamide antibiotics) Hives     Other reaction(s): Anaphylaxis    Itching     Shortness of breath     Past Medical History:   Diagnosis Date    Arthritis     Cataract     GERD (gastroesophageal reflux disease)     HEARING LOSS     Hypertension     Osteoporosis, postmenopausal     Squamous Cell Carcinoma 2007    right forearm    Urinary incontinence     rare mixed     Past Surgical History:   Procedure Laterality Date    ANGIOGRAM, CORONARY, WITH LEFT HEART CATHETERIZATION Bilateral 5/17/2024    Procedure: Angiogram, Coronary, with Left Heart Cath;  Surgeon: Miguel Jacob MD;  Location: [facility] CATH LAB;  Service: Cardiology;  Laterality: Bilateral;    COLPOPEXY N/A 08/06/2019    Procedure: COLPOPEXY SSL FIXATION;  Surgeon: Alma Dorsey MD;  Location: [facility] OR  2ND FLR;  Service: Urology;  Laterality: N/A;    CORONARY ANGIOGRAPHY Left 5/16/2024    Procedure: ANGIOGRAM, CORONARY ARTERY;  Surgeon: Miguel Jacob MD;  Location: [facility] CATH LAB;  Service: Cardiology;  Laterality: Left;    CYSTOSCOPY N/A 08/06/2019    Procedure: CYSTOSCOPY;  Surgeon: Alma Dorsey MD;  Location: [facility] OR 2ND FLR;  Service: Urology;  Laterality: N/A;    ESOPHAGOGASTRODUODENOSCOPY N/A 4/1/2024    Procedure: EGD (ESOPHAGOGASTRODUODENOSCOPY);  Surgeon: Zbigniew Dougherty MD;  Location: [facility];  Service: Endoscopy;  Laterality: N/A;    FRACTURE SURGERY Left 2008    open radius/ulna fracture    HYSTERECTOMY      TANIA/ovaries remain--fibroids- in her late 30's / early 40's    IVUS, CORONARY  5/17/2024    Procedure: IVUS, Coronary;  Surgeon: Miguel Jacob MD;  Location: [facility] CATH LAB;  Service: Cardiology;;    STENT, DRUG ELUTING, MULTI VESSEL, CORONARY  5/17/2024    Procedure: Stent, Drug Eluting, Multi Vessel, Coronary;  Surgeon: Miguel Jacob MD;  Location: [facility] CATH LAB;  Service: Cardiology;;     Family History   Problem Relation Name Age of Onset    Heart failure Mother copd     Macular degeneration Mother copd     Heart disease Mother copd     COPD Mother copd     Diabetes Sister      Arthritis Sister          rheumatoid arthritis    COPD Father      No Known Problems Brother      No Known Problems Son Saad     No Known Problems Daughter Jodryn     No Known Problems Sister      No Known Problems Sister      No Known Problems Sister      No Known Problems Brother      No Known Problems Brother      No Known Problems Brother      No Known Problems Brother      No Known Problems Brother      No Known Problems Daughter Reyes     No Known Problems Son Arie     Breast cancer Neg Hx      Ovarian cancer Neg Hx      Amblyopia Neg Hx      Blindness Neg Hx      Cancer Neg Hx      Cataracts Neg Hx      Glaucoma Neg Hx      Hypertension Neg Hx      Retinal detachment Neg Hx       Strabismus Neg Hx      Stroke Neg Hx      Thyroid disease Neg Hx      Cervical cancer Neg Hx      Endometrial cancer Neg Hx      Vaginal cancer Neg Hx      Colon cancer Neg Hx       Social History     Tobacco Use    Smoking status: Never     Passive exposure: Never    Smokeless tobacco: Never   Substance Use Topics    Alcohol use: Not Currently     Comment: 1-2 glasses wine weekly    Drug use: No     Physical Exam     Initial Vitals [11/19/24 0240]   BP Pulse Resp Temp SpO2   (!) 167/86 67 18 98.7 °F (37.1 °C) 97 %      MAP       --         Physical Exam    Constitutional: She appears well-developed and well-nourished. She is not diaphoretic. No distress.   HENT:   Head: Normocephalic and atraumatic.   Cardiovascular:  Normal rate and regular rhythm.           Pulmonary/Chest: Breath sounds normal. No respiratory distress.   Abdominal: She exhibits no distension.   Musculoskeletal:         General: No tenderness.      Comments: No midline tenderness, no paraspinal tenderness     Neurological: She is alert.   Slightly decreased strength on left compared to right  Slight back pain at height of straight leg raise on left   Skin: Skin is warm and dry.   Psychiatric: She has a normal mood and affect.         ED Course   Procedures  Labs Reviewed   CBC W/ AUTO DIFFERENTIAL - Abnormal       Result Value    WBC 8.79      RBC 3.54 (*)     Hemoglobin 11.0 (*)     Hematocrit 33.3 (*)     MCV 94      MCH 31.1 (*)     MCHC 33.0      RDW 14.2      Platelets 277      MPV 8.6 (*)     Immature Granulocytes 0.1      Gran # (ANC) 5.4      Immature Grans (Abs) 0.01      Lymph # 2.3      Mono # 1.0      Eos # 0.1      Baso # 0.08      nRBC 0      Gran % 60.9      Lymph % 25.6      Mono % 11.5      Eosinophil % 1.0      Basophil % 0.9      Differential Method Automated     COMPREHENSIVE METABOLIC PANEL - Abnormal    Sodium 140      Potassium 3.3 (*)     Chloride 105      CO2 27      Glucose 91      BUN 10      Creatinine 0.7       Calcium 8.8      Total Protein 6.6      Albumin 3.5      Total Bilirubin 0.4      Alkaline Phosphatase 104      AST 19      ALT 13      eGFR >60.0      Anion Gap 8     URINALYSIS, REFLEX TO URINE CULTURE          Imaging Results              CT Lumbar Spine Without Contrast (No Result on File)                      X-Ray Lumbar Spine Ap And Lateral (Final result)  Result time 11/19/24 06:38:43      Final result by Bravo Medina MD (11/19/24 06:38:43)                   Impression:      1. Compression deformity of the L3 vertebral body, an abnormality which has developed since the examinations of 09/26/2024.  2. Continued demonstration of a compression deformity with severe loss of height of the L1 vertebral body, stable since 09/26/2024.  3. Prominent L4-5 and L5-S1 facet arthropathy with associated anterolisthesis at each level.      Electronically signed by: Bravo Medina MD  Date:    11/19/2024  Time:    06:38               Narrative:    EXAMINATION:  XR LUMBAR SPINE AP AND LATERAL    CLINICAL HISTORY:  worsening back pain, hx of L1 compression fracture;    TECHNIQUE:  Two views of the lumbar spine were obtained, with AP and lateral projections submitted.    COMPARISON:  Comparison is made to 09/26/2024 and 05/29/2024, with reference also made to a CT examination of the lumbar spine dated 09/26/2024.    FINDINGS:  Once again identified is a compression deformity with severe loss of height of the L1 vertebral body, seen on the prior examinations referenced above.  The current examination also demonstrates a lesser degree of loss of height of the L3 vertebral body which was not convincingly demonstrated on the prior examinations and which would be consistent with a more recent compression fracture, having developed in the interval since 09/26/2024.  Remaining vertebral body heights are adequately maintained without compression deformity.  Scoliosis convex to the right in the lumbar region is observed, as is  anterolisthesis of L4 in relation to L5 and L5 in relation to S1, the latter alignment abnormalities secondary to L4-5 and L5-S1 facet arthropathy.  Vertebral endplates are adequately defined.  No osseous destructive process.  SI joints appear unremarkable.  Some aortoiliac calcification is seen without definable aneurysm.                                       Medications   LIDOcaine 5 % patch 1 patch (1 patch Transdermal Patch Applied 11/19/24 0423)   sodium chloride 0.9% flush 5 mL (has no administration in time range)   albuterol-ipratropium 2.5 mg-0.5 mg/3 mL nebulizer solution 3 mL (has no administration in time range)   melatonin tablet 6 mg (has no administration in time range)   ondansetron disintegrating tablet 8 mg (has no administration in time range)   prochlorperazine injection Soln 5 mg (has no administration in time range)   polyethylene glycol packet 17 g (has no administration in time range)   simethicone chewable tablet 80 mg (has no administration in time range)   aluminum-magnesium hydroxide-simethicone 200-200-20 mg/5 mL suspension 30 mL (has no administration in time range)   naloxone 0.4 mg/mL injection 0.02 mg (has no administration in time range)   enoxaparin injection 40 mg (has no administration in time range)   potassium chloride SA CR tablet 40 mEq (has no administration in time range)   acetaminophen tablet 1,000 mg (has no administration in time range)   LIDOcaine 5 % patch 1 patch (has no administration in time range)   traMADoL tablet 50 mg (has no administration in time range)   methocarbamoL tablet 500 mg (has no administration in time range)   morphine injection 2 mg (2 mg Intravenous Given 11/19/24 0420)   acetaminophen tablet 650 mg (650 mg Oral Given 11/19/24 0423)     Medical Decision Making    Patient is an 84-year-old female with medical history of hypertension, GERD, CAD status post C with 7 stents, and history of L1 compression fracture presenting to the ED with acute  worsening of her back pain.  Patient without any red flag signs.  Due to her acute pain in, we will give Tylenol, lidocaine patch, and 2 mg of morphine as patient has already had to begin taking her opioid for severe pain.    After administration of pain medication, patient attempted to be ambulated.  With standing, found to be very unstable.  At this point, concerned that discharged home would be unsafe in the patient prone to falling.  As she is elderly and is already prone to fractures as seen with her L1 fracture, it was safer for her to be admitted for further pain control and stabilization.    For this reason, we will obtain basic labs and lumbar x-ray.  Labs WNL.  Patient admitted to Hospital Medicine.    Following admission, x-ray revealing new from September L3 compression fracture.  CT lumbar spine was ordered in hospital medicine team updated.                                    Clinical Impression:  Final diagnoses:  [M54.9] Back pain          ED Disposition Condition    Observation                 Rosi Rich DO  Resident  11/19/24 7455

## 2024-11-19 NOTE — CONSULTS
[facility] - Emergency Dept  Neurosurgery  Consult Note    Inpatient consult to Neurosurgery  Consult performed by: Shilpi Hernandez PA-C  Consult ordered by: Eleazar Bonner PA-C        Subjective:     Chief Complaint/Reason for Admission: L3 pathologic compression fracture, known L1 burst fracture    History of Present Illness: Betzy Cooley is an 85yo woman w/PMHx osteoporosis on Fosamax, CAD s/p multiple stent placement 05/2024 on Brilinta (held since Sunday for planned ALLISON), known L1 burst fracture managed non-surgically presenting with acute on chronic LBP with LLE radiculopathy. She denies any recent falls or injuries. The pain has worsened over the past 2 weeks. Denies saddle anesthesia, bladder or bowel dysfunction. Exam remains stable in comparison to her last clinic visit on 10/22. CT Lumbar spine demonstrates new acute L3 compression fracture with severe height loss and 0.5cm retropulsion.     (Not in a hospital admission)      Review of patient's allergies indicates:   Allergen Reactions    Sulfa (sulfonamide antibiotics) Hives     Other reaction(s): Anaphylaxis    Itching     Shortness of breath       Past Medical History:   Diagnosis Date    Arthritis     Cataract     GERD (gastroesophageal reflux disease)     HEARING LOSS     Hypertension     Osteoporosis, postmenopausal     Squamous Cell Carcinoma 2007    right forearm    Urinary incontinence     rare mixed     Past Surgical History:   Procedure Laterality Date    ANGIOGRAM, CORONARY, WITH LEFT HEART CATHETERIZATION Bilateral 5/17/2024    Procedure: Angiogram, Coronary, with Left Heart Cath;  Surgeon: Miguel Jacob MD;  Location: [facility] CATH LAB;  Service: Cardiology;  Laterality: Bilateral;    COLPOPEXY N/A 08/06/2019    Procedure: COLPOPEXY SSL FIXATION;  Surgeon: Alma Dorsey MD;  Location: [facility] OR [address] [place] [address];  Service: Urology;  Laterality: N/A;    CORONARY ANGIOGRAPHY Left 5/16/2024    Procedure: ANGIOGRAM, CORONARY ARTERY;  Surgeon: Kim  Miguel Oneil MD;  Location: [facility] CATH LAB;  Service: Cardiology;  Laterality: Left;    CYSTOSCOPY N/A 08/06/2019    Procedure: CYSTOSCOPY;  Surgeon: Alma Dorsey MD;  Location: [facility] OR [address] [place] [address];  Service: Urology;  Laterality: N/A;    ESOPHAGOGASTRODUODENOSCOPY N/A 4/1/2024    Procedure: EGD (ESOPHAGOGASTRODUODENOSCOPY);  Surgeon: Zbigniew Dougherty MD;  Location: [facility];  Service: Endoscopy;  Laterality: N/A;    FRACTURE SURGERY Left 2008    open radius/ulna fracture    HYSTERECTOMY      TANIA/ovaries remain--fibroids- in her late 30's / early 40's    IVUS, CORONARY  5/17/2024    Procedure: IVUS, Coronary;  Surgeon: Miguel Jacob MD;  Location: [facility] CATH LAB;  Service: Cardiology;;    STENT, DRUG ELUTING, MULTI VESSEL, CORONARY  5/17/2024    Procedure: Stent, Drug Eluting, Multi Vessel, Coronary;  Surgeon: Miguel Jacob MD;  Location: [facility] CATH LAB;  Service: Cardiology;;     Family History       Problem Relation (Age of Onset)    Arthritis Sister    COPD Mother, Father    Diabetes Sister    Heart disease Mother    Heart failure Mother    Macular degeneration Mother    No Known Problems Brother, Son, Daughter, Sister, Sister, Sister, Brother, Brother, Brother, Brother, Brother, Daughter, Son          Tobacco Use    Smoking status: Never     Passive exposure: Never    Smokeless tobacco: Never   Substance and Sexual Activity    Alcohol use: Not Currently     Comment: 1-2 glasses wine weekly    Drug use: No    Sexual activity: Yes     Partners: Male     Birth control/protection: None     Objective:     Weight: 45.4 kg (100 lb)  Body mass index is 18.29 kg/m².  Vital Signs (Most Recent):  Temp: 97.7 °F (36.5 °C) (11/19/24 0500)  Pulse: 60 (11/19/24 0635)  Resp: 19 (11/19/24 0635)  BP: 135/82 (11/19/24 0635)  SpO2: 96 % (11/19/24 0635) Vital Signs (24h Range):  Temp:  [97.7 °F (36.5 °C)-98.7 °F (37.1 °C)] 97.7 °F (36.5 °C)  Pulse:  [57-69] 60  Resp:  [17-20] 19  SpO2:  [96 %-99 %] 96 %  BP:  "(135172)/(75-86) 135/82     Neurosurgery Physical Exam  General: well developed, well nourished, no distress.   Head: normocephalic, atraumatic  Mental Status: Awake, Alert, Oriented  Speech: Clear with content appropriate to conversation  Cranial nerves: face symmetric,  CN II-XII grossly intact.   Sensory: intact to light touch throughout    Motor Strength:    Strength  Deltoids Triceps Biceps Wrist Extension Wrist Flexion Hand    Upper: R 5/5 5/5 5/5 5/5 5/5 5/5    L 5/5 5/5 5/5 5/5 5/5 5/5     Iliopsoas Quadriceps Knee  Flexion Tibialis  anterior Gastro- cnemius EHL   Lower: R 5/5 5/5 5/5 5/5 5/5 5/5    L 5/5 5/5 5/5 5/5 5/5 5/5     Monae: absent  Clonus: absent    Significant Labs:  Recent Labs   Lab 11/19/24  0545   GLU 91      K 3.3*      CO2 27   BUN 10   CREATININE 0.7   CALCIUM 8.8     Recent Labs   Lab 11/19/24  0545   WBC 8.79   HGB 11.0*   HCT 33.3*        No results for input(s): "LABPT", "INR", "APTT" in the last 48 hours.  Microbiology Results (last 7 days)       ** No results found for the last 168 hours. **          All pertinent labs from the last 24 hours have been reviewed.    Significant Diagnostics:  CT Lumbar Spine Without Contrast 11/19/24:  -  Acute L3 burst fracture with severe height loss and 0.5cm retropulsion. Unchanged L1 burst fracture with severe height loss, 0.8 cm retropulsion, and posterior translation resulting in moderate canal stenosis.     I have personally reviewed the above referenced imaging.     Assessment/Plan:     * Closed compression fracture of L3 vertebra  Betzy Cooley is an 85yo woman w/PMHx osteoporosis on Fosamax, CAD s/p multiple stent placement 05/2024 on Brilinta (held since Sunday for planned ALLISON), known L1 burst fracture managed non-surgically presenting with acute on chronic LBP with LLE radiculopathy. CT Lumbar spine demonstrates new acute L3 compression fracture with severe height loss and 0.5cm retropulsion.     - Admitted to " [facility] with q4hr neuro checks  - TLSO brace at all times--pt reportedly refusing to wear or be fitted for brace per bedside nurse  - Please ensure strict spinal precautions. If patient is refusing to wear the brace, ensure the head of bed is at 0 and she remains on bed rest. Strict log roll precautions.  - Endocrinology consulted for osteoporosis given the progression of her spinal instability in the absence of trauma  - Multimodal pain control per primary  - Hold off on repeat MRI at this time  - Please notify NSGY on call clinician with any decline on exam    Case and plan discussed with attending neurosurgeon Dr. Damon        Thank you for your consult.     Shilpi Hernandez PA-C  Neurosurgery  [facility] - Emergency Dept

## 2024-11-19 NOTE — PLAN OF CARE
[facility] - Emergency Dept  Initial Discharge Assessment       Primary Care Provider: Praful Paul MD    Admission Diagnosis: Back pain [M54.9]    Admission Date: 11/19/2024  Expected Discharge Date:     Pt spouse Zander at bedside and assisted in completing the assessment.  As per pt and spouse she uses a walker or rollator to assist with ambulation and spouse assists with ADL's.     They previously had [facility] and would like to re-admit.  SW explained that [facility] may not be able to begin service in a timely manner; pt and spouse were amenable to wait as they would prefer only [facility]     Pt to d/c home with [facility]     Transition of Care Barriers: (P) None    Payor: [employer] MANAGED MEDICARE / Plan: [employer] MEDICARE HMO / Product Type: Capitation /     Extended Emergency Contact Information  Primary Emergency Contact: DleiaZander wall  Address: [address], [place] [address] [place] [address]  Home Phone: [phone]  Mobile Phone: [phone]  Relation: Spouse   needed? No    Discharge Plan A: (P) Home, Home Health  Discharge Plan B: (P) Home Health, Home      [facility] [address] [facility]  [address] [place] [id number]  Phone: [phone] Fax: [phone]      Initial Assessment (most recent)       Adult Discharge Assessment - 11/19/24 0727          Discharge Assessment    Assessment Type Discharge Planning Assessment (P)      Confirmed/corrected address, phone number and insurance Yes (P)      Confirmed Demographics Correct on Facesheet (P)      Source of Information patient;family (P)      Reason For Admission Back pain (P)      People in Home spouse (P)      Facility Arrived From: home (P)      Do you expect to return to your current living situation? Yes (P)      Do you have help at home or someone to help you manage your care at home? No (P)      Prior to hospitilization cognitive status: No Deficits  (P)      Current cognitive status: No Deficits (P)      Walking or Climbing Stairs Difficulty yes (P)      Walking or Climbing Stairs ambulation difficulty, requires equipment (P)      Mobility Management rollator, walker (P)      Dressing/Bathing Difficulty yes (P)      Dressing/Bathing bathing difficulty, assistance 1 person;dressing difficulty, assistance 1 person (P)      Dressing/Bathing Management spouse Zander (P)      Home Accessibility wheelchair accessible (P)      Home Layout Able to live on 1st floor (P)      Equipment Currently Used at Home walker, standard;rollator (P)      Patient currently being followed by outpatient case management? No (P)      Do you currently have service(s) that help you manage your care at home? No (P)      Do you have any problems affording any of your prescribed medications? No (P)      Is the patient taking medications as prescribed? yes (P)      Who is going to help you get home at discharge? family./friends (P)      How do you get to doctors appointments? family or friend will provide (P)      Are you on dialysis? No (P)      Do you take coumadin? No (P)      Discharge Plan A Home;Home Health (P)      Discharge Plan B Home Health;Home (P)      DME Needed Upon Discharge  none (P)      Discharge Plan discussed with: Patient (P)      Transition of Care Barriers None (P)         Physical Activity    On average, how many days per week do you engage in moderate to strenuous exercise (like a brisk walk)? 0 days (P)      On average, how many minutes do you engage in exercise at this level? 0 min (P)         Financial Resource Strain    How hard is it for you to pay for the very basics like food, housing, medical care, and heating? Not very hard (P)         Housing Stability    In the last 12 months, was there a time when you were not able to pay the mortgage or rent on time? No (P)      At any time in the past 12 months, were you homeless or living in a shelter (including now)? No  (P)         Transportation Needs    Has the lack of transportation kept you from medical appointments, meetings, work or from getting things needed for daily living? No (P)         Food Insecurity    Within the past 12 months, you worried that your food would run out before you got the money to buy more. Never true (P)      Within the past 12 months, the food you bought just didn't last and you didn't have money to get more. Never true (P)         Stress    Do you feel stress - tense, restless, nervous, or anxious, or unable to sleep at night because your mind is troubled all the time - these days? Not at all (P)         Social Isolation    How often do you feel lonely or isolated from those around you?  Never (P)         Alcohol Use    Q1: How often do you have a drink containing alcohol? Never (P)      Q2: How many drinks containing alcohol do you have on a typical day when you are drinking? Patient does not drink (P)      Q3: How often do you have six or more drinks on one occasion? Never (P)         Utilities    In the past 12 months has the electric, gas, oil, or water company threatened to shut off services in your home? No (P)         Health Literacy    How often do you need to have someone help you when you read instructions, pamphlets, or other written material from your doctor or pharmacy? Often (P)         OTHER    Name(s) of People in Home spouse Zander (P)                    Discharge Plan A and Plan B have been determined by review of patient's clinical status, future medical and therapeutic needs, and coverage/benefits for post-acute care in coordination with multidisciplinary team members.    Cari Ballard, HAILY, MSW, LMSW, RSW   Case Management  [facility]  Email: [email or web address]

## 2024-11-19 NOTE — ASSESSMENT & PLAN NOTE
Lumbar spine compression fractures and BMD findings confirm osteoporosis  Surgical spine management per neurosurgery    Patient would benefit from anabolic agents for improvement in BMD, we would recommend Forteo, once daily injections which can be started as an outpatient.  We can set her up for fellows discharge clinic when patient is ready to leave.    While patient is hospitalized we can perform secondary osteoporosis workup which includes: PTH and Vitamin D as well as 24 hour urine calcium. Patient does not have significant hypercalcemia to suspect hyperparathyroidism. This work up is usually completed as an outpatient.

## 2024-11-19 NOTE — SUBJECTIVE & OBJECTIVE
(Not in a hospital admission)      Review of patient's allergies indicates:   Allergen Reactions    Sulfa (sulfonamide antibiotics) Hives     Other reaction(s): Anaphylaxis    Itching     Shortness of breath       Past Medical History:   Diagnosis Date    Arthritis     Cataract     GERD (gastroesophageal reflux disease)     HEARING LOSS     Hypertension     Osteoporosis, postmenopausal     Squamous Cell Carcinoma 2007    right forearm    Urinary incontinence     rare mixed     Past Surgical History:   Procedure Laterality Date    ANGIOGRAM, CORONARY, WITH LEFT HEART CATHETERIZATION Bilateral 5/17/2024    Procedure: Angiogram, Coronary, with Left Heart Cath;  Surgeon: Miguel Jacob MD;  Location: [facility] CATH LAB;  Service: Cardiology;  Laterality: Bilateral;    COLPOPEXY N/A 08/06/2019    Procedure: COLPOPEXY SSL FIXATION;  Surgeon: Alma Dorsey MD;  Location: [address];  Service: Urology;  Laterality: N/A;    CORONARY ANGIOGRAPHY Left 5/16/2024    Procedure: ANGIOGRAM, CORONARY ARTERY;  Surgeon: Miguel Jacob MD;  Location: [facility] CATH LAB;  Service: Cardiology;  Laterality: Left;    CYSTOSCOPY N/A 08/06/2019    Procedure: CYSTOSCOPY;  Surgeon: Alma Dorsey MD;  Location: [address];  Service: Urology;  Laterality: N/A;    ESOPHAGOGASTRODUODENOSCOPY N/A 4/1/2024    Procedure: EGD (ESOPHAGOGASTRODUODENOSCOPY);  Surgeon: Zbigniew Dougherty MD;  Location: [facility];  Service: Endoscopy;  Laterality: N/A;    FRACTURE SURGERY Left 2008    open radius/ulna fracture    HYSTERECTOMY      TANIA/ovaries remain--fibroids- in her late 30's / early 40's    IVUS, CORONARY  5/17/2024    Procedure: IVUS, Coronary;  Surgeon: Miguel Jacob MD;  Location: [facility] CATH LAB;  Service: Cardiology;;    STENT, DRUG ELUTING, MULTI VESSEL, CORONARY  5/17/2024    Procedure: Stent, Drug Eluting, Multi Vessel, Coronary;  Surgeon: Miguel Jacob MD;  Location: [facility] CATH LAB;  Service: Cardiology;;      Family History       Problem Relation (Age of Onset)    Arthritis Sister    COPD Mother, Father    Diabetes Sister    Heart disease Mother    Heart failure Mother    Macular degeneration Mother    No Known Problems Brother, Son, Daughter, Sister, Sister, Sister, Brother, Brother, Brother, Brother, Brother, Daughter, Son          Tobacco Use    Smoking status: Never     Passive exposure: Never    Smokeless tobacco: Never   Substance and Sexual Activity    Alcohol use: Not Currently     Comment: 1-2 glasses wine weekly    Drug use: No    Sexual activity: Yes     Partners: Male     Birth control/protection: None     Objective:     Weight: 45.4 kg (100 lb)  Body mass index is 18.29 kg/m².  Vital Signs (Most Recent):  Temp: 97.7 °F (36.5 °C) (11/19/24 0500)  Pulse: 60 (11/19/24 0635)  Resp: 19 (11/19/24 0635)  BP: 135/82 (11/19/24 0635)  SpO2: 96 % (11/19/24 0635) Vital Signs (24h Range):  Temp:  [97.7 °F (36.5 °C)-98.7 °F (37.1 °C)] 97.7 °F (36.5 °C)  Pulse:  [57-69] 60  Resp:  [17-20] 19  SpO2:  [96 %-99 %] 96 %  BP: (135-172)/(75-86) 135/82     Neurosurgery Physical Exam  General: well developed, well nourished, no distress.   Head: normocephalic, atraumatic  Mental Status: Awake, Alert, Oriented  Speech: Clear with content appropriate to conversation  Cranial nerves: face symmetric,  CN II-XII grossly intact.   Sensory: intact to light touch throughout    Motor Strength:    Strength  Deltoids Triceps Biceps Wrist Extension Wrist Flexion Hand    Upper: R 5/5 5/5 5/5 5/5 5/5 5/5    L 5/5 5/5 5/5 5/5 5/5 5/5     Iliopsoas Quadriceps Knee  Flexion Tibialis  anterior Gastro- cnemius EHL   Lower: R 5/5 5/5 5/5 5/5 5/5 5/5    L 5/5 5/5 5/5 5/5 5/5 5/5     Monae: absent  Clonus: absent    Significant Labs:  Recent Labs   Lab 11/19/24  0545   GLU 91      K 3.3*      CO2 27   BUN 10   CREATININE 0.7   CALCIUM 8.8     Recent Labs   Lab 11/19/24  0545   WBC 8.79   HGB 11.0*   HCT 33.3*        No  "results for input(s): "LABPT", "INR", "APTT" in the last 48 hours.  Microbiology Results (last 7 days)       ** No results found for the last 168 hours. **          All pertinent labs from the last 24 hours have been reviewed.    Significant Diagnostics:  CT Lumbar Spine Without Contrast 11/19/24:  -  Acute L3 burst fracture with severe height loss and 0.5cm retropulsion. Unchanged L1 burst fracture with severe height loss, 0.8 cm retropulsion, and posterior translation resulting in moderate canal stenosis.     I have personally reviewed the above referenced imaging.     "

## 2024-11-19 NOTE — PLAN OF CARE
Problem: Adult Inpatient Plan of Care  Goal: Plan of Care Review  Outcome: Progressing  Goal: Patient-Specific Goal (Individualized)  Outcome: Progressing  Goal: Absence of Hospital-Acquired Illness or Injury  Outcome: Progressing  Goal: Optimal Comfort and Wellbeing  Outcome: Progressing  Goal: Readiness for Transition of Care  Outcome: Progressing     Problem: Infection  Goal: Absence of Infection Signs and Symptoms  Outcome: Progressing     Problem: Skin Injury Risk Increased  Goal: Skin Health and Integrity  Outcome: Progressing     Problem: Fall Injury Risk  Goal: Absence of Fall and Fall-Related Injury  Outcome: Progressing     Problem: Pain Acute  Goal: Optimal Pain Control and Function  Outcome: Progressing     Problem: Orthopaedic Fracture  Goal: Fracture Stability  Outcome: Progressing  Goal: Optimal Pain Control and Function  Outcome: Progressing

## 2024-11-19 NOTE — PROGRESS NOTES
Pharmacist  Dose Adjustment Note    Betzy Cooley is a 84 y.o. female being treated with the medication Enoxaparin    Patient Data:    Vital Signs (Most Recent):  Temp: 97.8 °F (36.6 °C) (11/19/24 1352)  Pulse: 69 (11/19/24 1351)  Resp: 18 (11/19/24 1351)  BP: (!) 174/80 (11/19/24 1351)  SpO2: 98 % (11/19/24 1351) Vital Signs (72h Range):  Temp:  [97.7 °F (36.5 °C)-98.9 °F (37.2 °C)]   Pulse:  [57-69]   Resp:  [16-20]   BP: (135-174)/(61-86)   SpO2:  [96 %-99 %]      Recent Labs   Lab 11/19/24  0545   CREATININE 0.7     Serum creatinine: 0.7 mg/dL 11/19/24 0545  Estimated creatinine clearance: 42.1 mL/min    Medication: Enoxaparin 40 mg SQ q24h will be changed to Enoxaparin 30 mg SQ q24h    Pharmacist's Name: Elo Washburn  Pharmacist's Extension: [phone]

## 2024-11-20 LAB
25(OH)D3+25(OH)D2 SERPL-MCNC: 48 NG/ML (ref 30–96)
ANION GAP SERPL CALC-SCNC: 8 MMOL/L (ref 8–16)
BUN SERPL-MCNC: 12 MG/DL (ref 8–23)
CALCIUM SERPL-MCNC: 9.4 MG/DL (ref 8.7–10.5)
CHLORIDE SERPL-SCNC: 104 MMOL/L (ref 95–110)
CO2 SERPL-SCNC: 25 MMOL/L (ref 23–29)
CREAT SERPL-MCNC: 0.7 MG/DL (ref 0.5–1.4)
ERYTHROCYTE [DISTWIDTH] IN BLOOD BY AUTOMATED COUNT: 14.3 % (ref 11.5–14.5)
EST. GFR  (NO RACE VARIABLE): >60 ML/MIN/1.73 M^2
GLUCOSE SERPL-MCNC: 88 MG/DL (ref 70–110)
HCT VFR BLD AUTO: 35.3 % (ref 37–48.5)
HGB BLD-MCNC: 11.4 G/DL (ref 12–16)
MAGNESIUM SERPL-MCNC: 2.2 MG/DL (ref 1.6–2.6)
MCH RBC QN AUTO: 30.8 PG (ref 27–31)
MCHC RBC AUTO-ENTMCNC: 32.3 G/DL (ref 32–36)
MCV RBC AUTO: 95 FL (ref 82–98)
PLATELET # BLD AUTO: 304 K/UL (ref 150–450)
PMV BLD AUTO: 9 FL (ref 9.2–12.9)
POTASSIUM SERPL-SCNC: 3.9 MMOL/L (ref 3.5–5.1)
PTH-INTACT SERPL-MCNC: 39.7 PG/ML (ref 9–77)
RBC # BLD AUTO: 3.7 M/UL (ref 4–5.4)
SODIUM SERPL-SCNC: 137 MMOL/L (ref 136–145)
WBC # BLD AUTO: 8.64 K/UL (ref 3.9–12.7)

## 2024-11-20 PROCEDURE — 97110 THERAPEUTIC EXERCISES: CPT | Mod: HCNC

## 2024-11-20 PROCEDURE — 80048 BASIC METABOLIC PNL TOTAL CA: CPT | Mod: HCNC

## 2024-11-20 PROCEDURE — 97530 THERAPEUTIC ACTIVITIES: CPT | Mod: HCNC

## 2024-11-20 PROCEDURE — 83970 ASSAY OF PARATHORMONE: CPT | Mod: HCNC | Performed by: STUDENT IN AN ORGANIZED HEALTH CARE EDUCATION/TRAINING PROGRAM

## 2024-11-20 PROCEDURE — G0378 HOSPITAL OBSERVATION PER HR: HCPCS | Mod: HCNC

## 2024-11-20 PROCEDURE — 25000003 PHARM REV CODE 250: Mod: HCNC

## 2024-11-20 PROCEDURE — 97163 PT EVAL HIGH COMPLEX 45 MIN: CPT | Mod: HCNC

## 2024-11-20 PROCEDURE — 36415 COLL VENOUS BLD VENIPUNCTURE: CPT | Mod: HCNC | Performed by: STUDENT IN AN ORGANIZED HEALTH CARE EDUCATION/TRAINING PROGRAM

## 2024-11-20 PROCEDURE — 83735 ASSAY OF MAGNESIUM: CPT | Mod: HCNC

## 2024-11-20 PROCEDURE — 97535 SELF CARE MNGMENT TRAINING: CPT | Mod: HCNC

## 2024-11-20 PROCEDURE — 85027 COMPLETE CBC AUTOMATED: CPT | Mod: HCNC

## 2024-11-20 PROCEDURE — 63600175 PHARM REV CODE 636 W HCPCS: Mod: HCNC | Performed by: STUDENT IN AN ORGANIZED HEALTH CARE EDUCATION/TRAINING PROGRAM

## 2024-11-20 PROCEDURE — 97116 GAIT TRAINING THERAPY: CPT | Mod: HCNC

## 2024-11-20 PROCEDURE — 97165 OT EVAL LOW COMPLEX 30 MIN: CPT | Mod: HCNC

## 2024-11-20 PROCEDURE — 82306 VITAMIN D 25 HYDROXY: CPT | Mod: HCNC | Performed by: STUDENT IN AN ORGANIZED HEALTH CARE EDUCATION/TRAINING PROGRAM

## 2024-11-20 PROCEDURE — 96372 THER/PROPH/DIAG INJ SC/IM: CPT | Performed by: STUDENT IN AN ORGANIZED HEALTH CARE EDUCATION/TRAINING PROGRAM

## 2024-11-20 PROCEDURE — 99214 OFFICE O/P EST MOD 30 MIN: CPT | Mod: HCNC,,, | Performed by: PHYSICIAN ASSISTANT

## 2024-11-20 RX ADMIN — SENNOSIDES 8.6 MG: 8.6 TABLET, FILM COATED ORAL at 08:11

## 2024-11-20 RX ADMIN — VALSARTAN 80 MG: 40 TABLET, FILM COATED ORAL at 08:11

## 2024-11-20 RX ADMIN — METHOCARBAMOL 500 MG: 500 TABLET ORAL at 09:11

## 2024-11-20 RX ADMIN — ACETAMINOPHEN 1000 MG: 500 TABLET ORAL at 09:11

## 2024-11-20 RX ADMIN — ACETAMINOPHEN 1000 MG: 500 TABLET ORAL at 05:11

## 2024-11-20 RX ADMIN — POLYETHYLENE GLYCOL 3350 17 G: 17 POWDER, FOR SOLUTION ORAL at 09:11

## 2024-11-20 RX ADMIN — Medication 1 TABLET: at 08:11

## 2024-11-20 RX ADMIN — ACETAMINOPHEN 1000 MG: 500 TABLET ORAL at 01:11

## 2024-11-20 RX ADMIN — METOPROLOL SUCCINATE 12.5 MG: 25 TABLET, EXTENDED RELEASE ORAL at 08:11

## 2024-11-20 RX ADMIN — PANTOPRAZOLE SODIUM 40 MG: 40 TABLET, DELAYED RELEASE ORAL at 08:11

## 2024-11-20 RX ADMIN — POLYETHYLENE GLYCOL 3350 17 G: 17 POWDER, FOR SOLUTION ORAL at 08:11

## 2024-11-20 RX ADMIN — ENOXAPARIN SODIUM 30 MG: 30 INJECTION SUBCUTANEOUS at 05:11

## 2024-11-20 RX ADMIN — TRAMADOL HYDROCHLORIDE 50 MG: 50 TABLET, COATED ORAL at 08:11

## 2024-11-20 RX ADMIN — METHOCARBAMOL 500 MG: 500 TABLET ORAL at 08:11

## 2024-11-20 RX ADMIN — METHOCARBAMOL 500 MG: 500 TABLET ORAL at 05:11

## 2024-11-20 RX ADMIN — EZETIMIBE 10 MG: 10 TABLET ORAL at 08:11

## 2024-11-20 RX ADMIN — LIDOCAINE 1 PATCH: 50 PATCH CUTANEOUS at 05:11

## 2024-11-20 RX ADMIN — ASPIRIN 81 MG CHEWABLE TABLET 81 MG: 81 TABLET CHEWABLE at 08:11

## 2024-11-20 RX ADMIN — METHOCARBAMOL 500 MG: 500 TABLET ORAL at 01:11

## 2024-11-20 RX ADMIN — Medication 1 TABLET: at 05:11

## 2024-11-20 RX ADMIN — ATORVASTATIN CALCIUM 40 MG: 40 TABLET, FILM COATED ORAL at 08:11

## 2024-11-20 NOTE — HOSPITAL COURSE
Betzy Cooley was placed in observation with [facility] for intractable back pain due to multiple spinal fractures. Patient was placed on multimodal pain regimen and is improving. NGSY consulted & the patient was deemed not a surgical candidate at this time given advanced osteoporosis. LSO ordered and now in place. Endocrine consulted by NSGY and plans to start forteo outpatient. Vit-d 48, and PTH 39.7. 24 hour urine calcium. PT/OT consulted, recommend moderate intensity. Patient and family were originally agreeable to SNF but opted for HH as they did not want to stay in the hospital for another night while pending placement given PM&R appt on 11/22 for spinal epidural. Patient stable for discharge home with HH. Discussed care plan with patient, verbalized understanding. All questions answered. Return precautions given.

## 2024-11-20 NOTE — PROGRESS NOTES
[facility]  Neurosurgery  Progress Note    Subjective:     History of Present Illness: Betzy Cooley is an 85yo woman w/PMHx osteoporosis on Fosamax, CAD s/p multiple stent placement 05/2024 on Brilinta (held since Sunday for planned ALLISON), known L1 burst fracture managed non-surgically presenting with acute on chronic LBP with LLE radiculopathy. She denies any recent falls or injuries. The pain has worsened over the past 2 weeks. Denies saddle anesthesia, bladder or bowel dysfunction. Exam remains stable in comparison to her last clinic visit on 10/22. CT Lumbar spine demonstrates new acute L3 compression fracture with severe height loss and 0.5cm retropulsion.     Post-Op Info:  * No surgery found *       Interval History: NAEON. Pt remains neurologically stable. Resting comfortably in bed, sitting up, pain seems well controlled. Pt has refused TLSO brace due to ill-fitting and very uncomfortable. High back LSO ordered and placed on pt with improved comfort, she was amenable to this. Discussed spine precautions with pt and  and recommendation to wear brace for any mobility,  v/u. Planning for likely SNF placement pending repeat PT/OT evals.     Medications:  Continuous Infusions:  Scheduled Meds:   acetaminophen  1,000 mg Oral Q8H    aspirin  81 mg Oral Daily    atorvastatin  40 mg Oral QHS    calcium-vitamin D3  1 tablet Oral BID WM    enoxparin  30 mg Subcutaneous Daily    ezetimibe  10 mg Oral Daily    LIDOcaine  1 patch Transdermal QAM    methocarbamoL  500 mg Oral QID    metoprolol succinate  12.5 mg Oral Daily    pantoprazole  40 mg Oral BID    pilocarpine  5 mg Oral Daily    polyethylene glycol  17 g Oral BID    senna  8.6 mg Oral BID    valsartan  80 mg Oral Daily     PRN Meds:  Current Facility-Administered Medications:     albuterol-ipratropium, 3 mL, Nebulization, Q4H PRN    aluminum-magnesium hydroxide-simethicone, 30 mL, Oral, QID PRN    dextrose 10%, 12.5 g, Intravenous,  PRN    dextrose 10%, 25 g, Intravenous, PRN    diclofenac sodium, 2 g, Topical (Top), Daily PRN    glucagon (human recombinant), 1 mg, Intramuscular, PRN    glucose, 16 g, Oral, PRN    glucose, 24 g, Oral, PRN    melatonin, 6 mg, Oral, Nightly PRN    methocarbamoL, 500 mg, Oral, TID PRN    naloxone, 0.02 mg, Intravenous, PRN    naloxone, 0.02 mg, Intravenous, PRN    nitroGLYCERIN, 0.4 mg, Sublingual, Q5 Min PRN    ondansetron, 8 mg, Oral, Q8H PRN    polyethylene glycol, 17 g, Oral, BID PRN    prochlorperazine, 5 mg, Intravenous, Q6H PRN    simethicone, 1 tablet, Oral, QID PRN    sodium chloride, 1 spray, Each Nostril, PRN    sodium chloride 0.9%, 10 mL, Intravenous, Q12H PRN    sodium chloride 0.9%, 5 mL, Intravenous, PRN    traMADoL, 50 mg, Oral, Q6H PRN    white petrolatum-mineral oiL, , Both Eyes, PRN     Review of Systems  Objective:     Weight: 45.4 kg (100 lb 1.4 oz)  Body mass index is 18.31 kg/m².  Vital Signs (Most Recent):  Temp: 97.5 °F (36.4 °C) (11/20/24 1158)  Pulse: 65 (11/20/24 1158)  Resp: 18 (11/20/24 1158)  BP: 131/77 (11/20/24 1158)  SpO2: 97 % (11/20/24 1158) Vital Signs (24h Range):  Temp:  [97.5 °F (36.4 °C)-98 °F (36.7 °C)] 97.5 °F (36.4 °C)  Pulse:  [56-85] 65  Resp:  [16-20] 18  SpO2:  [96 %-98 %] 97 %  BP: (124-194)/(68-93) 131/77                                 Physical Exam         Neurosurgery Physical Exam    General: well developed, well nourished, no distress. Thin, frail appearing.  Head: normocephalic, atraumatic  Mental Status: Awake, Alert, Oriented. Mild confusion.   Speech: Clear with content appropriate to conversation  Cranial nerves: face symmetric,  CN II-XII grossly intact.   Sensory: intact to light touch throughout     Motor Strength: Generalized deconditioning.     Strength   Deltoids Triceps Biceps Wrist Extension Wrist Flexion Hand    Upper: R 5/5 5/5 5/5 5/5 5/5 5/5     L 5/5 5/5 5/5 5/5 5/5 5/5       Iliopsoas Quadriceps Knee  Flexion Tibialis  anterior Gastro-  "cnemius EHL   Lower: R 5/5 5/5 5/5 5/5 5/5 5/5     L 5/5 5/5 5/5 5/5 5/5 5/5      Monae: absent  Clonus: absent    Significant Labs:  Recent Labs   Lab 11/19/24  0545 11/20/24  0347   GLU 91 88    137   K 3.3* 3.9    104   CO2 27 25   BUN 10 12   CREATININE 0.7 0.7   CALCIUM 8.8 9.4   MG  --  2.2     Recent Labs   Lab 11/19/24  0545 11/20/24  0347   WBC 8.79 8.64   HGB 11.0* 11.4*   HCT 33.3* 35.3*    304     No results for input(s): "LABPT", "INR", "APTT" in the last 48 hours.  Microbiology Results (last 7 days)       ** No results found for the last 168 hours. **          All pertinent labs from the last 24 hours have been reviewed.    Significant Diagnostics:  I have reviewed and interpreted all pertinent imaging results/findings within the past 24 hours.  Assessment/Plan:     * Closed compression fracture of L3 vertebra  Betzy Cooley is an 83yo woman w/PMHx osteoporosis on Fosamax, CAD s/p multiple stent placement 05/2024 on Brilinta (held since Sunday for planned ALLISON), known L1 burst fracture managed non-surgically presenting with acute on chronic LBP with LLE radiculopathy. CT Lumbar spine demonstrates new acute L3 compression fracture with severe height loss and 0.5cm retropulsion.     - Admitted to Hospital Medicine with q4hr neuro checks  - No plan for surgical intervention at this time, will manage conservatively with bracing/pain control  - Recommend spinal precautions with bracing for all mobility:   - Pt was unable to tolerate TLSO brace; high back LSO ordered with improved tolerance, recommend wearing at all times except when laying flat   - Okay for PT/OT and OOB mobility with LSO brace in place  - Endocrinology consulted for osteoporosis given the progression of her spinal instability in the absence of trauma   - Plan to start forteo outpatient, secondary workup initiated  - Multimodal pain control per primary  - No need for repeat MRI at this time  - Follow up upright/supine " XR's lumbar spine in brace (ordered - will review once completed)  - NSGY will sign off. Outpatient f/u scheduled for 1/7/25.  - Please notify NSGY with any questions/concerns or acute decline in neurologic exam    Case and plan discussed with attending neurosurgeon Dr. Marisol Forde, PAHarrisonC  Neurosurgery  [facility] - Observation 11H

## 2024-11-20 NOTE — ASSESSMENT & PLAN NOTE
- Endocrine consulted to improve osteoporosis for surgical viability, appreciate recs  - Secondary osteoporosis work up   - PTH, Vit-D unremarkable   - 24 Urine calcium pending  - plan to start forteo injections outpatient  - will need endocrine follow-up at discharge    If you are a smoker, it is important for your health to stop smoking. Please be aware that second hand smoke is also harmful.

## 2024-11-20 NOTE — ASSESSMENT & PLAN NOTE
- Endocrine consulted to improve osteoporosis for surgical viability, appreciate recs  - Secondary osteoporosis work up   - PTH, Vit-D unremarkable   - 24 Urine calcium pending  - plan to start forteo injections outpatient

## 2024-11-20 NOTE — PT/OT/SLP EVAL
Occupational Therapy   Co-Evaluation  Co-treatment performed due to patient's multiple deficits requiring two skilled therapists to appropriately and safely assess patient's strength and endurance while facilitating functional tasks in addition to accommodating for patient's activity tolerance.        Name: Betzy Cooley  MRN: [id number]  Admitting Diagnosis: Closed compression fracture of L3 vertebra  Recent Surgery: * No surgery found *      Recommendations:     Discharge Recommendations: Moderate Intensity Therapy  Discharge Equipment Recommendations:  bedside commode  Barriers to discharge:  Other (Comment) (Increased assistance required)    Assessment:     Betzy Coolye is a 84 y.o. female with a medical diagnosis of Closed compression fracture of L3 vertebra.  She presents with the following performance deficits affecting function: weakness, impaired endurance, impaired self care skills, impaired functional mobility, decreased coordination, decreased lower extremity function, decreased safety awareness, pain.      Upon 1st visit, OT evaluation was limited due to TLSO brace not fitting properly. Pt was overwhelmed and repeatedly mentioned that she wanted to continue without the brace. Pt & spouse was educated on the importance of wearing the brace and they were also informed about her spinal precautions. RN was notified of the brace not fitting properly and the issue was later resolved when another brace was delivered and donned onto patient.     Upon 2nd visit, pt demonstrated good participation and followed verbal commands. However,  pt remains limited in ADLs, functional mobility, and functional transfers and is currently not performing tasks at [facility]. Pt would continue to benefit from skilled OT services to maximize functional independence with ADLs and functional mobility, reduce caregiver burden, and facilitate safe discharge in the least restrictive environment.      Rehab Prognosis: Good; patient would  benefit from acute skilled OT services to address these deficits and reach maximum level of function.       Plan:     Patient to be seen 4 x/week to address the above listed problems via self-care/home management, therapeutic activities, therapeutic exercises  Plan of Care Expires: 12/20/24  Plan of Care Reviewed with: patient    Subjective     Chief Complaint: TLSO brace discomfort upon 1st visit  Patient/Family Comments/goals: regain PLOF    Occupational Profile:  Living Environment: pt lives in a 2SH with 0 ИВАН. Bathroom:tub/shower combo with grab bars  Previous level of function: Independent  Roles and Routines: pt enjoys listening to music, spending time in her sun room reading, and also likes to garden  Equipment Used at Home: rollator, cane, straight, walker, rolling, shower chair, grab bar  Assistance upon Discharge: limited physical assistance from spouse    Pain/Comfort:  Pain Rating 1: 2/10  Location - Side 1: Left  Location - Orientation 1: generalized  Location 1: hip  Pain Addressed 1: Reposition, Distraction  Pain Rating Post-Intervention 1: 0/10    Patients cultural, spiritual, Bahai conflicts given the current situation: no    Objective:     Communicated with: RN prior to session.  Patient found HOB elevated with peripheral IV, PureWick upon OT entry to room.    General Precautions: Standard, fall  Orthopedic Precautions: spinal precautions  Braces: LSO  Respiratory Status: Room air    Occupational Performance:    Bed Mobility:    Patient completed Scooting to EOB with stand by assistance    Patient completed Supine to Sit with stand by assistance    Functional Mobility/Transfers:  Patient completed 2 Sit <> Stand Transfer (from EOB) with minimum assistance with rolling walker     Patient completed Chair Transfer Step Transfer technique with minimum assistance with rolling walker    Functional Mobility:  pt ambulated in room to simulate household environment to improve overall strength,  coordination, activity tolerance & safety awareness required for participation/independence wit MRADLs/IADLs  Pt ambulated a total of 10 ft with min A using RW  Pt required additional time 2/2 to weakness and decreased coordination    Activities of Daily Living:  Grooming: set-up assistance to complete facial & oral hygiene with bed in chair position     Upper Body Dressing: moderate assistance to don gown over back while seated EOB    Lower Body Dressing: maximal assistance to don socks with bed in chair position    Cognitive/Visual Perceptual:  Cognitive/Psychosocial Skills:     -       Oriented to: Person, Place, Time, and Situation   -       Follows Commands/attention:Follows multistep  commands  -       Mood/Affect/Coping skills/emotional control: Anxious and Guarded    Physical Exam:  Upper Extremity Range of Motion:     -       Right Upper Extremity: WFL  -       Left Upper Extremity: WFL  Upper Extremity Strength:    -       Right Upper Extremity: WFL  -       Left Upper Extremity: WFL   Strength:    -       Right Upper Extremity: WFL  -       Left Upper Extremity: WFL  Fine Motor Coordination:    -       Intact  Left hand thumb/finger opposition skills and Right hand thumb/finger opposition skills  Gross motor coordination:   WFL    AMPAC 6 Click ADL:  AMPAC Total Score: 16    Treatment & Education:  -Education on task modification to maximize safety and (I) during ADLs and mobility  -Education on importance of OOB activity to improve overall  strength, activity tolerance and promote recovery  -Pt educated on hand placement & RW management during transfers/mobility  -Pt educated on spinal precautions with pt verbalizing understanding  -Pt educated to call for assistance and to transfer with hospital staff only  -Provided education regarding role of OT & POC with pt verbalizing understanding. Pt had no further questions & when asked whether there were any concerns pt reported none.     Patient left up in  chair with all lines intact, call button in reach, and RN notified    GOALS:   Multidisciplinary Problems       Occupational Therapy Goals          Problem: Occupational Therapy    Goal Priority Disciplines Outcome Interventions   Occupational Therapy Goal     OT, PT/OT Progressing    Description: Goals to be met by: 12/20/2024     Patient will increase functional independence with ADLs by performing:    UE Dressing with Set-up Assistance.  LE Dressing with Modified [place].  Grooming while standing at sink with Set-up Assistance.  Toileting from toilet with Modified [place] for hygiene and clothing management.   Supine to sit with Modified [place].  Step transfer with Modified [place]  Toilet transfer to toilet with Modified [place].                         History:     Past Medical History:   Diagnosis Date    Arthritis     Cataract     GERD (gastroesophageal reflux disease)     HEARING LOSS     Hypertension     Osteoporosis, postmenopausal     Squamous Cell Carcinoma 2007    right forearm    Urinary incontinence     rare mixed         Past Surgical History:   Procedure Laterality Date    ANGIOGRAM, CORONARY, WITH LEFT HEART CATHETERIZATION Bilateral 5/17/2024    Procedure: Angiogram, Coronary, with Left Heart Cath;  Surgeon: Miguel Jacob MD;  Location: [facility] CATH LAB;  Service: Cardiology;  Laterality: Bilateral;    COLPOPEXY N/A 08/06/2019    Procedure: COLPOPEXY SSL FIXATION;  Surgeon: Alma Dorsey MD;  Location: [address];  Service: Urology;  Laterality: N/A;    CORONARY ANGIOGRAPHY Left 5/16/2024    Procedure: ANGIOGRAM, CORONARY ARTERY;  Surgeon: Miguel Jacob MD;  Location: [facility] CATH LAB;  Service: Cardiology;  Laterality: Left;    CYSTOSCOPY N/A 08/06/2019    Procedure: CYSTOSCOPY;  Surgeon: Alma Dorsey MD;  Location: [facility] OR [address] [place] [address];  Service: Urology;  Laterality: N/A;    ESOPHAGOGASTRODUODENOSCOPY N/A 4/1/2024    Procedure: EGD  (ESOPHAGOGASTRODUODENOSCOPY);  Surgeon: Zbigniew Dougherty MD;  Location: [facility] ENDO;  Service: Endoscopy;  Laterality: N/A;    FRACTURE SURGERY Left 2008    open radius/ulna fracture    HYSTERECTOMY      TANIA/ovaries remain--fibroids- in her late 30's / early 40's    IVUS, CORONARY  5/17/2024    Procedure: IVUS, Coronary;  Surgeon: Miguel Jacob MD;  Location: [facility] CATH LAB;  Service: Cardiology;;    STENT, DRUG ELUTING, MULTI VESSEL, CORONARY  5/17/2024    Procedure: Stent, Drug Eluting, Multi Vessel, Coronary;  Surgeon: Miguel Jacob MD;  Location: [facility] CATH LAB;  Service: Cardiology;;       Time Tracking:     OT Date of Treatment: 11/20/24  OT Start Time: 1st start time: 0917   2nd start time: 1425  OT Stop Time: 1st stop time: 0951   2nd stop time:1449  OT Total Time (min): 34 min + 24 min = 58 total minutes    Billable Minutes:Evaluation 15  Self Care/Home Management 19  Therapeutic Exercise 24    11/20/2024

## 2024-11-20 NOTE — PLAN OF CARE
Problem: Occupational Therapy  Goal: Occupational Therapy Goal  Description: Goals to be met by: 12/20/2024     Patient will increase functional independence with ADLs by performing:    UE Dressing with Set-up Assistance.  LE Dressing with Modified [place].  Grooming while standing at sink with Set-up Assistance.  Toileting from toilet with Modified [place] for hygiene and clothing management.   Supine to sit with Modified [place].  Step transfer with Modified [place]  Toilet transfer to toilet with Modified [place].    Outcome: Progressing     OT eval complete & goals established.

## 2024-11-20 NOTE — PT/OT/SLP EVAL
Physical Therapy Co-Evaluation    Patient Name:  Betzy Cooley   MRN:  [id number]    Co-eval performed to appropriately and safely assess patient's strength and endurance while facilitating functional tasks in addition to accommodating for patient's activity/pain tolerance.  Recommendations:     Discharge Recommendations: Moderate Intensity Therapy   Discharge Equipment Recommendations: bedside commode   Barriers to discharge: Decreased caregiver support and pt requires 24/7 assist     Assessment:     Betzy Cooley is a 84 y.o. female admitted with a medical diagnosis of Closed compression fracture of L3 vertebra.  She presents with the following impairments/functional limitations: weakness, gait instability, impaired endurance, impaired balance, decreased lower extremity function, decreased safety awareness, impaired self care skills, pain, impaired functional mobility. Initial eval attempt in the morning was limited to questions and attempts to place TLSO; however, patient is unable to tolerate the  TLSO. Following first attempt therapist reached out to medical team in hopes of getting her a more comfortable and tolerable brace. On second attempt pt able to mobilize in and out of bed with the LSO and was happy to have mobilized. Pt could continue to benefit from acute  skilled PT to address current deficits. Pt would benefit from a moderate intensity/frequency therapy for: Dynamic/static standing/sitting balance through skilled balance training, strengthening with the use of skilled therapeutic exercises interventions, and mobility through adaptive equipment training. Pt continues to benefit from a collaborative PT/OT program to improve quality of life and focus on recovery of impairments.     Rehab Prognosis: Good; patient would benefit from acute skilled PT services to address these deficits and reach maximum level of function.    Recent Surgery: * No surgery found *      Plan:     During this  hospitalization, patient to be seen 4 x/week to address the identified rehab impairments via gait training, therapeutic activities, therapeutic exercises, neuromuscular re-education and progress toward the following goals:    Plan of Care Expires:  12/20/24    Subjective     Chief Complaint: brace pushing into her throat   Patient/Family Comments/goals: return home   Pain/Comfort:  Pain Rating 1: 2/10  Location - Side 1: Left  Location - Orientation 1: generalized  Location 1: hip  Pain Addressed 1: Reposition, Distraction  Pain Rating Post-Intervention 1:  (no pain)    Patients cultural, spiritual, Zoroastrian conflicts given the current situation: no    Living Environment:  Pt lives  with her  in a 2SH with no ИВАН. She and her  reside on the first floor. Pt has a t/s combo with a shower chair and grab bars.   Prior to admission, patients level of function was independent with ADLs and modified impendent with functional mobility utilizing a rollator to get around.  Equipment used at home: rollator, cane, straight, walker, rolling, shower chair, grab bar.  DME owned (not currently used): rolling walker and single point cane.  Upon discharge, patient will have assistance from .    Objective:     Communicated with nurse prior to session. On first attempt Patient found  with HOB elevated to 90 degrees brushing her teeth with no brace on  with peripheral IV, PureWick  upon PT entry to room.    General Precautions: Standard, fall  Orthopedic Precautions:spinal precautions (Please ensure strict spinal precautions. If patient is refusing to wear the brace, ensure the head of bed is at 0 and she remains on bed rest. Strict log roll precautions.)   Braces: LSO  Respiratory Status: Room air    Exams:  Cognitive Exam:  Patient is oriented to Person, Place, Time, and Situation  Postural Exam:  Patient presented with the following abnormalities:    -       Rounded shoulders  -       Forward head  -        Kyphosis  Sensation:    -       Intact  light/touch B LE  RLE ROM: WFL  RLE Strength: 4/5 ankle DF, 4/5 knee flexion, 4/5 knee extension, 4/5 hip flexion.   LLE ROM: WFL  LLE Strength: 4/5 ankle DF, 4/5 knee flexion, 4/5 knee extension, 4/5 hip flexion.     Functional Mobility:  Bed Mobility:     Scooting: stand by assistance  Supine to Sit: stand by assistance  Transfers:     Sit to Stand x2 trials:  minimum assistance with rolling walker requiring verbal cueing for hand placement and posterior lean and [place] and tactile cueing to correct posterior lean. B LE braced on bed.   Gait: 10' with [place] with RW. Pt exhibited R LE IR and diminished L heel strike.pt exhibited knee buckling in B LE with each step. Pt requires increased time to initiate and complete steps and requires therapist to manually navigate the walker intermittently.   Balance:  Static sitting: SBA  Dynamic sitting: SBA  Static standing: CGA-Grayson to correct posterior lean.   Dynamic standing: see gait.       AM-PAC 6 CLICK MOBILITY  Total Score:17       Treatment & Education:  Pt educated on spinal precautions and need to wear a brace anytime the bed is raised or anytime she is out of bed.   All questions answered within PT scope of practice.   PT role in acute care and POC explained to the patient.   Pt educated on how diagnosis impacts functional mobility.       Patient left up in chair with all lines intact, call button in reach, nurse notified, and  present.    GOALS:   Multidisciplinary Problems       Physical Therapy Goals          Problem: Physical Therapy    Goal Priority Disciplines Outcome Interventions   Physical Therapy Goal     PT, PT/OT Progressing    Description: Goals to be met by: 2024     Patient will increase functional independence with mobility by performin. Supine to sit with Stand-by Assistance  2. Sit to supine with Stand-by Assistance  3. Sit to stand transfer with Stand-by Assistance  4. Bed to chair  transfer with Stand-by Assistance using Rolling Walker  5. Gait  x 50 feet with Stand-by Assistance using Rolling Walker.   6. Lower extremity exercise program x10 reps per handout, with supervision                         History:     Past Medical History:   Diagnosis Date    Arthritis     Cataract     GERD (gastroesophageal reflux disease)     HEARING LOSS     Hypertension     Osteoporosis, postmenopausal     Squamous Cell Carcinoma 2007    right forearm    Urinary incontinence     rare mixed       Past Surgical History:   Procedure Laterality Date    ANGIOGRAM, CORONARY, WITH LEFT HEART CATHETERIZATION Bilateral 5/17/2024    Procedure: Angiogram, Coronary, with Left Heart Cath;  Surgeon: Miguel Jacob MD;  Location: [facility] CATH LAB;  Service: Cardiology;  Laterality: Bilateral;    COLPOPEXY N/A 08/06/2019    Procedure: COLPOPEXY SSL FIXATION;  Surgeon: Alma Dorsey MD;  Location: [address];  Service: Urology;  Laterality: N/A;    CORONARY ANGIOGRAPHY Left 5/16/2024    Procedure: ANGIOGRAM, CORONARY ARTERY;  Surgeon: Miguel Jacob MD;  Location: [facility] CATH LAB;  Service: Cardiology;  Laterality: Left;    CYSTOSCOPY N/A 08/06/2019    Procedure: CYSTOSCOPY;  Surgeon: Alma Dorsey MD;  Location: [address];  Service: Urology;  Laterality: N/A;    ESOPHAGOGASTRODUODENOSCOPY N/A 4/1/2024    Procedure: EGD (ESOPHAGOGASTRODUODENOSCOPY);  Surgeon: Zbigniew Dougherty MD;  Location: [facility];  Service: Endoscopy;  Laterality: N/A;    FRACTURE SURGERY Left 2008    open radius/ulna fracture    HYSTERECTOMY      TANIA/ovaries remain--fibroids- in her late 30's / early 40's    IVUS, CORONARY  5/17/2024    Procedure: IVUS, Coronary;  Surgeon: Miguel Jacob MD;  Location: [facility] CATH LAB;  Service: Cardiology;;    STENT, DRUG ELUTING, MULTI VESSEL, CORONARY  5/17/2024    Procedure: Stent, Drug Eluting, Multi Vessel, Coronary;  Surgeon: Miguel Jacob MD;  Location: [facility] CATH LAB;   Service: Cardiology;;       Time Tracking:     PT Start Time: 0917  PT Stop Time: 0951    PT Received On: 11/20/24  PT Start Time: 1427     PT Stop Time: 1449  PT Total Time (min): 56      Billable Minutes: Evaluation 15, Gait Training 23, and Therapeutic Activity 18      11/20/2024

## 2024-11-20 NOTE — ASSESSMENT & PLAN NOTE
Closed compression fracture of body of L1 vertebra  Back pain     - 85 yo F with PMHx of HTN, GERD, CAD s/p PCI, and recent L1 compression fracture who presented to ED for worsening back pain.   - Lumbar XR with continued demonstration of L1 compression deformity with severe loss of height, but now also showing new L3 compression deformity.   - CT lumbar spine: Acute L3 burst fracture with severe height loss and 0.5 cm retropulsion resulting in mild canal stenosis   - MM pain control with scheduled tylenol, lidocaine patches and robaxin, and prn tramadol   - PT/ OT ordered   - NSGY consulted, appreciate recs  - Patient fitted for back brace, wear at all times, if not lying flat on bed  - q4hr neuro checks

## 2024-11-20 NOTE — ASSESSMENT & PLAN NOTE
Betzy Cooley is an 85yo woman w/PMHx osteoporosis on Fosamax, CAD s/p multiple stent placement 05/2024 on Brilinta (held since Sunday for planned ALLISON), known L1 burst fracture managed non-surgically presenting with acute on chronic LBP with LLE radiculopathy. CT Lumbar spine demonstrates new acute L3 compression fracture with severe height loss and 0.5cm retropulsion.     - Admitted to Hospital Medicine with q4hr neuro checks  - No plan for surgical intervention at this time, will manage conservatively with bracing/pain control  - Recommend spinal precautions with bracing for all mobility:   - Pt was unable to tolerate TLSO brace; high back LSO ordered with improved tolerance, recommend wearing at all times except when laying flat   - Okay for PT/OT and OOB mobility with LSO brace in place  - Endocrinology consulted for osteoporosis given the progression of her spinal instability in the absence of trauma   - Plan to start forteo outpatient, secondary workup initiated  - Multimodal pain control per primary  - No need for repeat MRI at this time  - Follow up upright/supine XR's lumbar spine in brace (ordered - will review once completed)  - NSGY will sign off. Outpatient f/u scheduled for 1/7/25.  - Please notify NSGY with any questions/concerns or acute decline in neurologic exam    Case and plan discussed with attending neurosurgeon Dr. Damon

## 2024-11-20 NOTE — ASSESSMENT & PLAN NOTE
Patient with known CAD s/p stent placement, which is controlled Will continue  zetia, ASA, and Statin and monitor for S/Sx of angina/ACS. Continue to monitor on telemetry.   - brilinta held prior to outpatient epidural injection on Friday, continue to hold for now
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
## 2024-11-20 NOTE — PLAN OF CARE
"ENDOCRINOLOGY PLAN OF CARE    Admit Date: 11/19/2024      Betzy Cooley is a 84 y.o. female with PMH significant for GERD, CAD s/p 7 stents, Osteoporosis on drug holiday from fosamax and HTN who presented to the hospital with complaint of acute worsening of back pain. Patient was also unstable with standing. In the emergency department she was found to have a new compression fracture at L3. Endocrinology is consulted to assist with osteoporosis management.    Patient was hospitalized in August with an L1 compression fracture and recently completed rehab for that. She was treated with Fosamax in the past (greater than 10 years ago she reports) for ten years but has been on holiday since then. She takes daily calcium citrate with vitamin D and does not exercise.    COMPARISON:  Comparison study done on 10/07/2022. Lumbar spine BMD 0.803 g/cm2 and the T-score -2.2.  The Total Hip BMD 0.731 g/cm2 and the T-score -1.7.     FINDINGS:  LS: T-score is -2.9, and Z-score is 0.0., Compared with previous DXA, BMD at the lumbar spine has declined by -8.8%.  FN: T-score is -3.1, and Z-score is -0.6.  TH: T-score is -2.7, and Z-score is -0.4. Compared with previous DXA, BMD at the total hip has declined by -16.4%.  The trabecular bone score (TBS) indicates degraded bone quality.     Fracture Risk (FRAX adjusted for Trabecular Bone Score)  24% risk of a major osteoporotic fracture in the next 10 years.  9.7% risk of hip fracture in the next 10 years.      Interval HPI: No acute events documented overnight. Vital signs reviewed are stable although patient is significantly hypertensive.    BP (!) 184/93 (BP Location: Right arm, Patient Position: Sitting)   Pulse 69   Temp 97.7 °F (36.5 °C) (Tympanic)   Resp 18   Ht 5' 2" (1.575 m)   Wt 45.4 kg (100 lb 1.4 oz)   SpO2 98%   BMI 18.31 kg/m²     Lab Results   Component Value Date/Time     11/20/2024 03:47 AM    K 3.9 11/20/2024 03:47 AM    CALCIUM 9.4 11/20/2024 03:47 AM    " ALBUMIN 3.5 11/19/2024 05:45 AM    CO2 25 11/20/2024 03:47 AM    ANIONGAP 8 11/20/2024 03:47 AM    TSH 2.569 07/30/2021 11:54 AM       Orthopedic  Age-related osteoporosis with current pathological fracture  Lumbar spine compression fractures and BMD findings confirm osteoporosis  Surgical spine management per neurosurgery     Patient would benefit from anabolic agents for improvement in BMD, we would recommend Forteo, once daily injections which can be started as an outpatient.  We can set her up for fellows discharge clinic when patient is ready for discharge.     While patient is hospitalized we can perform secondary osteoporosis workup which includes: PTH and Vitamin D as well as 24 hour urine calcium. Patient does not have significant hypercalcemia to suspect hyperparathyroidism. This work up is usually completed as an outpatient.     - PTH unremarkable and vitamin D is sufficient  - 24 hour urinary collection in process today      Real Wallis DO  [facility] Department of Endocrinology    Office: [phone]  Fax: [phone]    The above history labs imaging impression and plan were discussed with attending physician who is in agreement and also took part in this patient's care.  I personally reviewed all of the patients available medications, labs, imaging, vitals, allergies, medical history.

## 2024-11-20 NOTE — SUBJECTIVE & OBJECTIVE
Interval History: NAEON. Pt remains neurologically stable. Resting comfortably in bed, sitting up, pain seems well controlled. Pt has refused TLSO brace due to ill-fitting and very uncomfortable. High back LSO ordered and placed on pt with improved comfort, she was amenable to this. Discussed spine precautions with pt and  and recommendation to wear brace for any mobility,  v/u. Planning for likely SNF placement pending repeat PT/OT evals.     Medications:  Continuous Infusions:  Scheduled Meds:   acetaminophen  1,000 mg Oral Q8H    aspirin  81 mg Oral Daily    atorvastatin  40 mg Oral QHS    calcium-vitamin D3  1 tablet Oral BID WM    enoxparin  30 mg Subcutaneous Daily    ezetimibe  10 mg Oral Daily    LIDOcaine  1 patch Transdermal QAM    methocarbamoL  500 mg Oral QID    metoprolol succinate  12.5 mg Oral Daily    pantoprazole  40 mg Oral BID    pilocarpine  5 mg Oral Daily    polyethylene glycol  17 g Oral BID    senna  8.6 mg Oral BID    valsartan  80 mg Oral Daily     PRN Meds:  Current Facility-Administered Medications:     albuterol-ipratropium, 3 mL, Nebulization, Q4H PRN    aluminum-magnesium hydroxide-simethicone, 30 mL, Oral, QID PRN    dextrose 10%, 12.5 g, Intravenous, PRN    dextrose 10%, 25 g, Intravenous, PRN    diclofenac sodium, 2 g, Topical (Top), Daily PRN    glucagon (human recombinant), 1 mg, Intramuscular, PRN    glucose, 16 g, Oral, PRN    glucose, 24 g, Oral, PRN    melatonin, 6 mg, Oral, Nightly PRN    methocarbamoL, 500 mg, Oral, TID PRN    naloxone, 0.02 mg, Intravenous, PRN    naloxone, 0.02 mg, Intravenous, PRN    nitroGLYCERIN, 0.4 mg, Sublingual, Q5 Min PRN    ondansetron, 8 mg, Oral, Q8H PRN    polyethylene glycol, 17 g, Oral, BID PRN    prochlorperazine, 5 mg, Intravenous, Q6H PRN    simethicone, 1 tablet, Oral, QID PRN    sodium chloride, 1 spray, Each Nostril, PRN    sodium chloride 0.9%, 10 mL, Intravenous, Q12H PRN    sodium chloride 0.9%, 5 mL, Intravenous,  "PRN    traMADoL, 50 mg, Oral, Q6H PRN    white petrolatum-mineral oiL, , Both Eyes, PRN     Review of Systems  Objective:     Weight: 45.4 kg (100 lb 1.4 oz)  Body mass index is 18.31 kg/m².  Vital Signs (Most Recent):  Temp: 97.5 °F (36.4 °C) (11/20/24 1158)  Pulse: 65 (11/20/24 1158)  Resp: 18 (11/20/24 1158)  BP: 131/77 (11/20/24 1158)  SpO2: 97 % (11/20/24 1158) Vital Signs (24h Range):  Temp:  [97.5 °F (36.4 °C)-98 °F (36.7 °C)] 97.5 °F (36.4 °C)  Pulse:  [56-85] 65  Resp:  [16-20] 18  SpO2:  [96 %-98 %] 97 %  BP: (124-194)/(68-93) 131/77                                 Physical Exam         Neurosurgery Physical Exam    General: well developed, well nourished, no distress. Thin, frail appearing.  Head: normocephalic, atraumatic  Mental Status: Awake, Alert, Oriented. Mild confusion.   Speech: Clear with content appropriate to conversation  Cranial nerves: face symmetric,  CN II-XII grossly intact.   Sensory: intact to light touch throughout     Motor Strength: Generalized deconditioning.     Strength   Deltoids Triceps Biceps Wrist Extension Wrist Flexion Hand    Upper: R 5/5 5/5 5/5 5/5 5/5 5/5     L 5/5 5/5 5/5 5/5 5/5 5/5       Iliopsoas Quadriceps Knee  Flexion Tibialis  anterior Gastro- cnemius EHL   Lower: R 5/5 5/5 5/5 5/5 5/5 5/5     L 5/5 5/5 5/5 5/5 5/5 5/5      Monae: absent  Clonus: absent    Significant Labs:  Recent Labs   Lab 11/19/24  0545 11/20/24  0347   GLU 91 88    137   K 3.3* 3.9    104   CO2 27 25   BUN 10 12   CREATININE 0.7 0.7   CALCIUM 8.8 9.4   MG  --  2.2     Recent Labs   Lab 11/19/24  0545 11/20/24  0347   WBC 8.79 8.64   HGB 11.0* 11.4*   HCT 33.3* 35.3*    304     No results for input(s): "LABPT", "INR", "APTT" in the last 48 hours.  Microbiology Results (last 7 days)       ** No results found for the last 168 hours. **          All pertinent labs from the last 24 hours have been reviewed.    Significant Diagnostics:  I have reviewed and interpreted all " pertinent imaging results/findings within the past 24 hours.

## 2024-11-20 NOTE — PRE-PROCEDURE INSTRUCTIONS
Patient reviewed on 11/20/2024.  Okay to proceed at [facility]. The following pre-procedure instructions and arrival time have been reviewed with patient via phone and sent to patient portal for review.  Patient verbalized an understanding.  Pt to be accompanied by -Zander day of procedure as responsible .    Dear Betzy,     Please read over the following pre-procedure instructions in it's entirety as there is helpful information here to get you well prepared for your upcoming procedure.     You are scheduled for a procedure with Dr. KING Cordon on 11/22/2024.     [facility] at the corner of [facility] and [facility]. It is in the [place] next to [employer]. The address is: [address] [place] [address]. Take the elevator to the 2nd floor.       Registration check in time: 8:50 am  Procedure scheduled for time: 9:50 am     If you are receiving sedation, you CANNOT drive yourself and must have a responsible friend or family member (no rideshare) to drive you home.        You should take any medications that you routinely take for blood pressure, heart medications, thyroid, cholesterol, etc.      The fasting restrictions are dependent on whether or not you are receiving sedation. Sedation is not available for all procedures.      Your fasting instructions/Sedation type are as follow:  IV sedation.   Nothing to eat after midnight the night prior to procedure.   Patients are encouraged to consume clear liquids up to 2 hours prior to scheduled arrival time.  -Clear liquids include Gatorade, water, soda, black coffee or tea (no milk or creamer), and clear juices. - Clear liquids do NOT include anything with pulp or food particles (chicken broth, ice cream, yogurt, Jello, etc.) You CANNOT drive yourself and must have a .           If you are on blood thinners, you need to follow the anticoagulation instructions that had been discussed previously. You should only stop the blood  thinners if it was approved by your primary care physician or your cardiologist. In the event that you are not able to stop your blood thinners, a blood thinner was not listed on your medication list, or we were not able to get clearance from your cardiologist, then the procedure may have to be postponed/canceled.      IF you were told to stop your blood thinners, this is how long you should generally hold some of the more common ones. Remember that stopping blood thinners is only necessary for certain procedures. If you are unsure of your instructions, please call us.   Aspirin - ok to take  Brilinta - 5 days  Plavix/Clopidogrel - 7 days  Warfarin / Coumadin - 5 days  Eliquis - 3 days  Pradaxa/Dabigatran - 4 days  Xarelto/Rivaroxaban - 3 days     If you are a diabetic, do not take your medication if you will be fasting, but bring it with you. Please plan on being here for roughly 2-3 hours.      HOLD all non-insulin injections (shots) until after surgery (Ozempic, Mounjaro, Trulicity, Victoza, Byetta, Wegovy and Adlyxin) (Total of 7 days prior)       Please call us if you have been sick (running fever, having any flu-like symptoms) or have been taking ANTIBIOTICS in the past 2 weeks or had any outpatient procedures other than with us (colonoscopy, endoscopy, OBGYN, dental, etc.).      If you have been previously COVID positive, you will need to hold off on your procedure until you are symptom free for 10 days. If you did not have any symptoms, you can have your procedure 10 days from your positive test result.         On the morning of your procedure:  *HOLD ALL VITAMINS, MINERALS, HERBS (INCLUDING HERBAL TEAS) AND SUPPLEMENTS  *SHOWER WITH ANTIBACTERIAL SOAP (EX. DIAL) NIGHT BEFORE AND MORNING OF PROCEDURE  *DO NOT APPLY ANY LOTIONS, OILS, POWDERS, PERFUME/COLOGNE, OINTMENTS, GELS, CREAMS, MAKEUP OR DEODORANT TO YOUR SKIN MORNING OF PROCEDURE  *LEAVE JEWELRY AND ANY VALUABLES AT HOME  *WEAR LOOSE COMFORTABLE  CLOTHING         Please reply to this portal message as receipt of delivery.     Thank you,  [facility]  [place], LPN [facility]  Pre-Admit

## 2024-11-20 NOTE — PRE-PROCEDURE INSTRUCTIONS
Unable to reach pt via phone.  Left voicemail with arrival time also informing pt of need for responsible  accompaniment and instructing pt to follow pre-procedure instructions provided via [facility] portal.  The following message was sent to pt's portal.        Dear Betzy,     Please read over the following pre-procedure instructions in it's entirety as there is helpful information here to get you well prepared for your upcoming procedure.     You are scheduled for a procedure with Dr. KING Cordon on 11/22/2024.     [facility] at the corner of [facility] and [facility]. It is in the [facility] [place] next to [employer]. The address is: [address] [place] [address]. Take the elevator to the 2nd floor.       Registration check in time: 8:50 am  Procedure scheduled for time: 9:50 am     If you are receiving sedation, you CANNOT drive yourself and must have a responsible friend or family member (no rideshare) to drive you home.        You should take any medications that you routinely take for blood pressure, heart medications, thyroid, cholesterol, etc.      The fasting restrictions are dependent on whether or not you are receiving sedation. Sedation is not available for all procedures.      Your fasting instructions/Sedation type are as follow:  IV sedation.   Nothing to eat after midnight the night prior to procedure.   Patients are encouraged to consume clear liquids up to 2 hours prior to scheduled arrival time.  -Clear liquids include Gatorade, water, soda, black coffee or tea (no milk or creamer), and clear juices. - Clear liquids do NOT include anything with pulp or food particles (chicken broth, ice cream, yogurt, Jello, etc.) You CANNOT drive yourself and must have a .           If you are on blood thinners, you need to follow the anticoagulation instructions that had been discussed previously. You should only stop the blood thinners if it was approved by your primary care  physician or your cardiologist. In the event that you are not able to stop your blood thinners, a blood thinner was not listed on your medication list, or we were not able to get clearance from your cardiologist, then the procedure may have to be postponed/canceled.      IF you were told to stop your blood thinners, this is how long you should generally hold some of the more common ones. Remember that stopping blood thinners is only necessary for certain procedures. If you are unsure of your instructions, please call us.   Aspirin - ok to take  Brilinta - 5 days  Plavix/Clopidogrel - 7 days  Warfarin / Coumadin - 5 days  Eliquis - 3 days  Pradaxa/Dabigatran - 4 days  Xarelto/Rivaroxaban - 3 days     If you are a diabetic, do not take your medication if you will be fasting, but bring it with you. Please plan on being here for roughly 2-3 hours.      HOLD all non-insulin injections (shots) until after surgery (Ozempic, Mounjaro, Trulicity, Victoza, Byetta, Wegovy and Adlyxin) (Total of 7 days prior)       Please call us if you have been sick (running fever, having any flu-like symptoms) or have been taking ANTIBIOTICS in the past 2 weeks or had any outpatient procedures other than with us (colonoscopy, endoscopy, OBGYN, dental, etc.).      If you have been previously COVID positive, you will need to hold off on your procedure until you are symptom free for 10 days. If you did not have any symptoms, you can have your procedure 10 days from your positive test result.         On the morning of your procedure:  *HOLD ALL VITAMINS, MINERALS, HERBS (INCLUDING HERBAL TEAS) AND SUPPLEMENTS  *SHOWER WITH ANTIBACTERIAL SOAP (EX. DIAL) NIGHT BEFORE AND MORNING OF PROCEDURE  *DO NOT APPLY ANY LOTIONS, OILS, POWDERS, PERFUME/COLOGNE, OINTMENTS, GELS, CREAMS, MAKEUP OR DEODORANT TO YOUR SKIN MORNING OF PROCEDURE  *LEAVE JEWELRY AND ANY VALUABLES AT HOME  *WEAR LOOSE COMFORTABLE CLOTHING         Please reply to this portal message  as receipt of delivery.     Thank you,  [facility]  [place], LPN [facility]  Pre-Admit

## 2024-11-20 NOTE — ASSESSMENT & PLAN NOTE
- elevated in ED, but likely 2/2 pain; now controlled   - continue valsartan 80 mg daily, adjust as necessary

## 2024-11-20 NOTE — PLAN OF CARE
Problem: Physical Therapy  Goal: Physical Therapy Goal  Description: Goals to be met by: 2024     Patient will increase functional independence with mobility by performin. Supine to sit with Stand-by Assistance  2. Sit to supine with Stand-by Assistance  3. Sit to stand transfer with Stand-by Assistance  4. Bed to chair transfer with Stand-by Assistance using Rolling Walker  5. Gait  x 50 feet with Stand-by Assistance using Rolling Walker.   6. Lower extremity exercise program x10 reps per handout, with supervision    Outcome: Progressing

## 2024-11-20 NOTE — SUBJECTIVE & OBJECTIVE
Interval History: Patient examined at bedside who is resting comfortably. VSS, no acute distress. No overnight events reported. Patient reports improved pain control with current medical management. She initially refused her TLSO stating it limits her ability to breathe and is painful to have placed. PT unable to see patient without brace. [facility] and [facility] notified and placed a smaller brace, which the patient is now agreeable to. PT notified of brace in place and need to come re-evaluate. Discussed with patient and spouse regarding possible SNF placement. They are agreeable to possible placement.      Review of Systems   Constitutional:  Negative for chills and fever.   Respiratory:  Negative for chest tightness and shortness of breath.    Cardiovascular:  Negative for chest pain and leg swelling.   Gastrointestinal:  Negative for abdominal pain and nausea.   Musculoskeletal:  Positive for arthralgias (L Hip), back pain and gait problem.   Neurological:  Negative for dizziness and weakness.     Objective:     Vital Signs (Most Recent):  Temp: 97.5 °F (36.4 °C) (11/20/24 1158)  Pulse: 65 (11/20/24 1158)  Resp: 18 (11/20/24 1158)  BP: 131/77 (11/20/24 1158)  SpO2: 97 % (11/20/24 1158) Vital Signs (24h Range):  Temp:  [97.5 °F (36.4 °C)-98 °F (36.7 °C)] 97.5 °F (36.4 °C)  Pulse:  [56-85] 65  Resp:  [16-20] 18  SpO2:  [96 %-98 %] 97 %  BP: (124-194)/(68-93) 131/77     Weight: 45.4 kg (100 lb 1.4 oz)  Body mass index is 18.31 kg/m².    Intake/Output Summary (Last 24 hours) at 11/20/2024 1307  Last data filed at 11/20/2024 0528  Gross per 24 hour   Intake --   Output 900 ml   Net -900 ml         Physical Exam  Vitals and nursing note reviewed.   Constitutional:       General: She is not in acute distress.     Appearance: Normal appearance.   HENT:      Head: Normocephalic and atraumatic.   Cardiovascular:      Rate and Rhythm: Normal rate and regular rhythm.      Heart sounds: Normal heart sounds. No murmur  heard.  Pulmonary:      Effort: Pulmonary effort is normal. No respiratory distress.      Breath sounds: Normal breath sounds.   Abdominal:      General: Abdomen is flat.      Tenderness: There is no abdominal tenderness.   Musculoskeletal:         General: Tenderness present. Normal range of motion.      Lumbar back: Tenderness and bony tenderness present.      Right lower leg: No edema.      Left lower leg: No edema.   Skin:     General: Skin is warm and dry.   Neurological:      General: No focal deficit present.      Mental Status: She is alert. Mental status is at baseline.   Psychiatric:         Mood and Affect: Mood normal.         Speech: Speech is delayed.         Behavior: Behavior normal.             Significant Labs: All pertinent labs within the past 24 hours have been reviewed.  CBC:   Recent Labs   Lab 11/19/24  0545 11/20/24  0347   WBC 8.79 8.64   HGB 11.0* 11.4*   HCT 33.3* 35.3*    304     CMP:   Recent Labs   Lab 11/19/24 0545 11/20/24  0347    137   K 3.3* 3.9    104   CO2 27 25   GLU 91 88   BUN 10 12   CREATININE 0.7 0.7   CALCIUM 8.8 9.4   PROT 6.6  --    ALBUMIN 3.5  --    BILITOT 0.4  --    ALKPHOS 104  --    AST 19  --    ALT 13  --    ANIONGAP 8 8       Significant Imaging: I have reviewed all pertinent imaging results/findings within the past 24 hours.

## 2024-11-20 NOTE — ASSESSMENT & PLAN NOTE
Betzy Cooley is an 85yo woman w/PMHx osteoporosis on Fosamax, CAD s/p multiple stent placement 05/2024 on Brilinta (held since Sunday for planned ALLISON), known L1 burst fracture managed non-surgically presenting with acute on chronic LBP with LLE radiculopathy. CT Lumbar spine demonstrates new acute L3 compression fracture with severe height loss and 0.5cm retropulsion.     - Admitted to Hospital Medicine with q4hr neuro checks  - No plan for surgical intervention at this time, will manage conservatively with bracing/pain control  - Recommend spinal precautions with bracing for all mobility:   - Pt was unable to tolerate TLSO brace; high back LSO ordered with improved tolerance, recommend wearing at all times except when laying flat   - Okay for PT/OT and OOB mobility with LSO brace in place  - Endocrinology consulted for osteoporosis given the progression of her spinal instability in the absence of trauma  - Multimodal pain control per primary  - No need for repeat MRI at this time  - Follow up upright/supine XR's lumbar spine in brace (ordered - will review once completed)  - NSGY will sign off. Outpatient f/u scheduled for 1/7/25.  - Please notify NSGY with any questions/concerns or acute decline in neurologic exam    Case and plan discussed with attending neurosurgeon Dr. Dmaon

## 2024-11-21 ENCOUNTER — TELEPHONE (OUTPATIENT)
Dept: PAIN MEDICINE | Facility: CLINIC | Age: 84
End: 2024-11-21
Payer: MEDICARE

## 2024-11-21 ENCOUNTER — PATIENT MESSAGE (OUTPATIENT)
Dept: ENDOCRINOLOGY | Facility: CLINIC | Age: 84
End: 2024-11-21
Payer: MEDICARE

## 2024-11-21 VITALS
WEIGHT: 100.06 LBS | SYSTOLIC BLOOD PRESSURE: 145 MMHG | BODY MASS INDEX: 18.41 KG/M2 | RESPIRATION RATE: 18 BRPM | TEMPERATURE: 98 F | HEIGHT: 62 IN | OXYGEN SATURATION: 99 % | DIASTOLIC BLOOD PRESSURE: 75 MMHG | HEART RATE: 71 BPM

## 2024-11-21 DIAGNOSIS — M81.0 OSTEOPOROSIS WITHOUT PATHOLOGICAL FRACTURE: ICD-10-CM

## 2024-11-21 DIAGNOSIS — S32.010A CLOSED COMPRESSION FRACTURE OF BODY OF L1 VERTEBRA: Primary | ICD-10-CM

## 2024-11-21 LAB
ANION GAP SERPL CALC-SCNC: 9 MMOL/L (ref 8–16)
BUN SERPL-MCNC: 11 MG/DL (ref 8–23)
CALCIUM 24H UR-MRATE: 8 MG/HR (ref 4–12)
CALCIUM SERPL-MCNC: 9.6 MG/DL (ref 8.7–10.5)
CALCIUM UR-MCNC: 12.6 MG/DL (ref 0–15)
CALCIUM URINE (MG/SPEC): 202 MG/SPEC
CHLORIDE SERPL-SCNC: 103 MMOL/L (ref 95–110)
CO2 SERPL-SCNC: 23 MMOL/L (ref 23–29)
CREAT 24H UR-MRATE: 27.3 MG/HR (ref 40–75)
CREAT SERPL-MCNC: 0.6 MG/DL (ref 0.5–1.4)
CREAT UR-MCNC: 41 MG/DL (ref 15–325)
CREATININE, URINE (MG/SPEC): 656 MG/SPEC
ERYTHROCYTE [DISTWIDTH] IN BLOOD BY AUTOMATED COUNT: 14.2 % (ref 11.5–14.5)
EST. GFR  (NO RACE VARIABLE): >60 ML/MIN/1.73 M^2
GLUCOSE SERPL-MCNC: 89 MG/DL (ref 70–110)
HCT VFR BLD AUTO: 34.9 % (ref 37–48.5)
HGB BLD-MCNC: 11.8 G/DL (ref 12–16)
MAGNESIUM SERPL-MCNC: 2.3 MG/DL (ref 1.6–2.6)
MCH RBC QN AUTO: 31.1 PG (ref 27–31)
MCHC RBC AUTO-ENTMCNC: 33.8 G/DL (ref 32–36)
MCV RBC AUTO: 92 FL (ref 82–98)
PLATELET # BLD AUTO: 297 K/UL (ref 150–450)
PMV BLD AUTO: 8.9 FL (ref 9.2–12.9)
POTASSIUM SERPL-SCNC: 4.1 MMOL/L (ref 3.5–5.1)
RBC # BLD AUTO: 3.8 M/UL (ref 4–5.4)
SODIUM SERPL-SCNC: 135 MMOL/L (ref 136–145)
URINE COLLECTION DURATION: 24 HR
URINE COLLECTION DURATION: 24 HR
URINE VOLUME: 1600 ML
URINE VOLUME: 1600 ML
WBC # BLD AUTO: 9.18 K/UL (ref 3.9–12.7)

## 2024-11-21 PROCEDURE — 36415 COLL VENOUS BLD VENIPUNCTURE: CPT | Mod: HCNC

## 2024-11-21 PROCEDURE — 82340 ASSAY OF CALCIUM IN URINE: CPT | Mod: HCNC

## 2024-11-21 PROCEDURE — 97530 THERAPEUTIC ACTIVITIES: CPT | Mod: HCNC,CQ

## 2024-11-21 PROCEDURE — 97535 SELF CARE MNGMENT TRAINING: CPT | Mod: HCNC

## 2024-11-21 PROCEDURE — G0378 HOSPITAL OBSERVATION PER HR: HCPCS | Mod: HCNC

## 2024-11-21 PROCEDURE — 25000003 PHARM REV CODE 250: Mod: HCNC

## 2024-11-21 PROCEDURE — 83735 ASSAY OF MAGNESIUM: CPT | Mod: HCNC

## 2024-11-21 PROCEDURE — 85027 COMPLETE CBC AUTOMATED: CPT | Mod: HCNC

## 2024-11-21 PROCEDURE — 82570 ASSAY OF URINE CREATININE: CPT | Mod: HCNC | Performed by: STUDENT IN AN ORGANIZED HEALTH CARE EDUCATION/TRAINING PROGRAM

## 2024-11-21 PROCEDURE — 80048 BASIC METABOLIC PNL TOTAL CA: CPT | Mod: HCNC

## 2024-11-21 RX ORDER — METHOCARBAMOL 500 MG/1
500 TABLET, FILM COATED ORAL 4 TIMES DAILY
Qty: 20 TABLET | Refills: 0 | Status: CANCELLED | OUTPATIENT
Start: 2024-11-21 | End: 2024-11-26

## 2024-11-21 RX ORDER — TRAMADOL HYDROCHLORIDE 50 MG/1
50 TABLET ORAL EVERY 6 HOURS PRN
Qty: 10 TABLET | Refills: 0 | Status: SHIPPED | OUTPATIENT
Start: 2024-11-21 | End: 2024-11-27 | Stop reason: SDUPTHER

## 2024-11-21 RX ORDER — TERIPARATIDE 250 UG/ML
20 INJECTION, SOLUTION SUBCUTANEOUS DAILY
Qty: 2.4 ML | Refills: 11 | Status: SHIPPED | OUTPATIENT
Start: 2024-11-21 | End: 2025-11-21

## 2024-11-21 RX ORDER — METHOCARBAMOL 500 MG/1
500 TABLET, FILM COATED ORAL 4 TIMES DAILY
Qty: 40 TABLET | Refills: 0 | Status: SHIPPED | OUTPATIENT
Start: 2024-11-21 | End: 2024-12-01

## 2024-11-21 RX ORDER — TRAMADOL HYDROCHLORIDE 50 MG/1
50 TABLET ORAL EVERY 6 HOURS PRN
Qty: 8 TABLET | Refills: 0 | Status: CANCELLED | OUTPATIENT
Start: 2024-11-21 | End: 2024-11-23

## 2024-11-21 RX ADMIN — ASPIRIN 81 MG CHEWABLE TABLET 81 MG: 81 TABLET CHEWABLE at 10:11

## 2024-11-21 RX ADMIN — ACETAMINOPHEN 1000 MG: 500 TABLET ORAL at 01:11

## 2024-11-21 RX ADMIN — VALSARTAN 80 MG: 40 TABLET, FILM COATED ORAL at 10:11

## 2024-11-21 RX ADMIN — SENNOSIDES 8.6 MG: 8.6 TABLET, FILM COATED ORAL at 10:11

## 2024-11-21 RX ADMIN — PANTOPRAZOLE SODIUM 40 MG: 40 TABLET, DELAYED RELEASE ORAL at 10:11

## 2024-11-21 RX ADMIN — Medication 1 TABLET: at 10:11

## 2024-11-21 RX ADMIN — METHOCARBAMOL 500 MG: 500 TABLET ORAL at 01:11

## 2024-11-21 RX ADMIN — EZETIMIBE 10 MG: 10 TABLET ORAL at 10:11

## 2024-11-21 RX ADMIN — METHOCARBAMOL 500 MG: 500 TABLET ORAL at 10:11

## 2024-11-21 RX ADMIN — METOPROLOL SUCCINATE 12.5 MG: 25 TABLET, EXTENDED RELEASE ORAL at 10:11

## 2024-11-21 RX ADMIN — LIDOCAINE 1 PATCH: 50 PATCH CUTANEOUS at 06:11

## 2024-11-21 RX ADMIN — ACETAMINOPHEN 1000 MG: 500 TABLET ORAL at 06:11

## 2024-11-21 RX ADMIN — TRAMADOL HYDROCHLORIDE 50 MG: 50 TABLET, COATED ORAL at 02:11

## 2024-11-21 RX ADMIN — POLYETHYLENE GLYCOL 3350 17 G: 17 POWDER, FOR SOLUTION ORAL at 10:11

## 2024-11-21 NOTE — PLAN OF CARE
Problem: Adult Inpatient Plan of Care  Goal: Plan of Care Review  Outcome: Progressing  Goal: Patient-Specific Goal (Individualized)  Outcome: Progressing  Goal: Absence of Hospital-Acquired Illness or Injury  Outcome: Progressing  Goal: Optimal Comfort and Wellbeing  Outcome: Progressing  Goal: Readiness for Transition of Care  Outcome: Progressing     Problem: Infection  Goal: Absence of Infection Signs and Symptoms  Outcome: Progressing     Problem: Skin Injury Risk Increased  Goal: Skin Health and Integrity  Outcome: Progressing     Problem: Fall Injury Risk  Goal: Absence of Fall and Fall-Related Injury  Outcome: Progressing     Problem: Pain Acute  Goal: Optimal Pain Control and Function  Outcome: Progressing     Problem: Orthopaedic Fracture  Goal: Fracture Stability  Outcome: Progressing  Goal: Optimal Pain Control and Function  Outcome: Progressing     Problem: Comorbidity Management  Goal: Blood Pressure in Desired Range  Outcome: Progressing  Goal: Maintenance of Osteoarthritis Symptom Control  Outcome: Progressing

## 2024-11-21 NOTE — PT/OT/SLP PROGRESS
Physical Therapy Treatment    Patient Name:  Betzy Cooley   MRN:  [id number]    Recommendations:     Discharge Recommendations: Moderate Intensity Therapy  Discharge Equipment Recommendations: bedside commode  Barriers to discharge: Decreased caregiver support and pt requires 24/7 assist     Assessment:     Betzy Cooley is a 84 y.o. female admitted with a medical diagnosis of Closed compression fracture of L3 vertebra.  She presents with the following impairments/functional limitations: weakness, impaired endurance, impaired self care skills, impaired functional mobility, pain, decreased lower extremity function.    Pt met with HOB elevated. Pt declines functional mobility and transfers this session. Time spent educating pt on spinal precautions, LSO brace wearing schedule, and safe return to home environment. Pt will continue to benefit from skilled therapy services.    Rehab Prognosis: Good; patient would benefit from acute skilled PT services to address these deficits and reach maximum level of function.    Recent Surgery: * No surgery found *      Plan:     During this hospitalization, patient to be seen 4 x/week to address the identified rehab impairments via gait training, therapeutic activities, therapeutic exercises, neuromuscular re-education and progress toward the following goals:    Plan of Care Expires:  12/20/24    Subjective     Chief Complaint: 2/10 pain  Patient/Family Comments/goals: pt and  hopeful that epidural procedure scheduled for tomorrow relieves some of her pain  Pain/Comfort:  Pain Rating 1: 2/10  Location - Orientation 1: generalized  Location 1: back  Pain Rating Post-Intervention 1: 2/10      Objective:     Communicated with RN (Nathaniel) prior to session.  Patient found HOB elevated with PureWick upon PTA entry to room.     General Precautions: Standard, fall  Orthopedic Precautions: spinal precautions  Braces: LSO  Respiratory Status: Room air     Functional Mobility:  Pt  declines functional mobility at this time    AM-PAC 6 CLICK MOBILITY  Turning over in bed (including adjusting bedclothes, sheets and blankets)?: 3  Sitting down on and standing up from a chair with arms (e.g., wheelchair, bedside commode, etc.): 3  Moving from lying on back to sitting on the side of the bed?: 3  Moving to and from a bed to a chair (including a wheelchair)?: 3  Need to walk in hospital room?: 3  Climbing 3-5 steps with a railing?: 2  Basic Mobility Total Score: 17       Treatment & Education:  Pt educated on LSO brace wearing schedule and spinal precautions. Also highlighted the importance of utilizing logroll technique during bed mobility to adhere to spinal precautions.    Pt educated on safe return to home environment. All questions and concerns addressed within PTA scope of practice.     Patient left HOB elevated with all lines intact, call button in reach, RN notified, and  present.    GOALS:   Multidisciplinary Problems       Physical Therapy Goals          Problem: Physical Therapy    Goal Priority Disciplines Outcome Interventions   Physical Therapy Goal     PT, PT/OT Progressing    Description: Goals to be met by: 2024     Patient will increase functional independence with mobility by performin. Supine to sit with Stand-by Assistance  2. Sit to supine with Stand-by Assistance  3. Sit to stand transfer with Stand-by Assistance  4. Bed to chair transfer with Stand-by Assistance using Rolling Walker  5. Gait  x 50 feet with Stand-by Assistance using Rolling Walker.   6. Lower extremity exercise program x10 reps per handout, with supervision                         Time Tracking:     PT Received On: 24  PT Start Time: 1414     PT Stop Time: 1430  PT Total Time (min): 16 min     Billable Minutes: Therapeutic Activity 16    Treatment Type: Treatment  PT/PTA: PTA     Number of PTA visits since last PT visit: 1     2024

## 2024-11-21 NOTE — ASSESSMENT & PLAN NOTE
Closed compression fracture of body of L1 vertebra  Back pain     - 83 yo F with PMHx of HTN, GERD, CAD s/p PCI, and recent L1 compression fracture who presented to ED for worsening back pain.   - Lumbar XR with continued demonstration of L1 compression deformity with severe loss of height, but now also showing new L3 compression deformity.   - CT lumbar spine: Acute L3 burst fracture with severe height loss and 0.5 cm retropulsion resulting in mild canal stenosis   - MM pain control with scheduled tylenol, lidocaine patches and robaxin, and prn tramadol   - PT/ OT consulted, recommend moderate therapy. Agreeable to SNF  - NSGY consulted, no surgical intervention at this time given osteoporosis  - Patient fitted for back brace, wear at all times, if not lying flat on bed  - q4hr neuro checks

## 2024-11-21 NOTE — PT/OT/SLP PROGRESS
Occupational Therapy   Co-Treatment    Name: Betzy Cooley  MRN: [id number]  Admitting Diagnosis:  Closed compression fracture of L3 vertebra       Recommendations:     Discharge Recommendations: Moderate Intensity Therapy  Discharge Equipment Recommendations:  bedside commode (Tub transfer bench)  Barriers to discharge:  Other (Comment) (Increased skilled assistance)    Assessment:     Betzy Cooley is a 84 y.o. female with a medical diagnosis of Closed compression fracture of L3 vertebra.  She presents with the following performance deficits affecting function are weakness, impaired endurance, impaired self care skills, impaired functional mobility, pain, decreased lower extremity function.     Pt receptive to OT education and verbalized understanding. However, pt declined OOB activity and mentioned that she would like to wait until her appt for tomorrow to start back up with therapy. Pt educated on the importance of OOB activity, adherence to brace schedule & spinal precautions.    Pt would continue to benefit from skilled OT services to maximize functional independence with ADLs and functional mobility, reduce caregiver burden, and facilitate safe discharge in the least restrictive environment.      Rehab Prognosis:  Good; patient would benefit from acute skilled OT services to address these deficits and reach maximum level of function.       Plan:     Patient to be seen 4 x/week to address the above listed problems via self-care/home management, therapeutic activities, therapeutic exercises  Plan of Care Expires: 12/20/24  Plan of Care Reviewed with: patient, spouse    Subjective     Chief Complaint: none reported  Patient/Family Comments/goals: attend appt tomorrow and continue therapy with home health  Pain/Comfort:  Pain Rating 1: 0/10  Pain Addressed 1: Distraction    Objective:     Additional staff present: Page Wisdom PTA    Communicated with: RN prior to session.  Patient found HOB elevated with  peripheral IV, PureWick upon OT entry to room.    General Precautions: Standard, fall    Orthopedic Precautions:spinal precautions  Braces: LSO  Respiratory Status: Room air     Occupational Performance:     Bed Mobility:    Pt declined and mentioned that she is awaiting discharge. Pt also expressed that she doesn't want to move because she doesn't want to start hurting    Functional Mobility/Transfers:  Pt declined and mentioned that she is awaiting discharge    Activities of Daily Living:  Pt declined and mentioned that she is awaiting discharge    [facility] 6 Click ADL: 16    Treatment & Education:  -Education on task modification to maximize safety and (I) during ADLs and mobility/transfers  -Education on benefits of using tub transfer bench (TTB) to increase safety during functional transfers & bathing task  -Education on importance of OOB activity to improve overall strength, activity tolerance and promote recovery  - Education on log roll technique to maintain spinal precautions during bed mobility  -Pt educated to call for assistance and to transfer with hospital staff only  Pt had no further questions & when asked whether there were any concerns pt reported none.     Patient left HOB elevated with all lines intact and spouse present    GOALS:   Multidisciplinary Problems       Occupational Therapy Goals          Problem: Occupational Therapy    Goal Priority Disciplines Outcome Interventions   Occupational Therapy Goal     OT, PT/OT Progressing    Description: Goals to be met by: 12/20/2024     Patient will increase functional independence with ADLs by performing:    UE Dressing with Set-up Assistance.  LE Dressing with Modified [place].  Grooming while standing at sink with Set-up Assistance.  Toileting from toilet with Modified [place] for hygiene and clothing management.   Supine to sit with Modified [place].  Step transfer with Modified [place]  Toilet transfer to toilet with Modified  [place].                         Time Tracking:     OT Date of Treatment: 11/21/24  OT Start Time: 1414  OT Stop Time: 1431  OT Total Time (min): 17 min    Billable Minutes:Self Care/Home Management 17    OT/REINA: OT          11/21/2024

## 2024-11-21 NOTE — PLAN OF CARE
[facility]      HOME HEALTH ORDERS  FACE TO FACE ENCOUNTER    Patient Name: Betzy Cooley  YOB: 1940    PCP: Praful Paul MD   PCP Address: [address] / [place] [address]  PCP Phone Number: [phone]  PCP Fax: [phone]    Encounter Date: 11/19/24    Admit to Home Health    Diagnoses:  Active Hospital Problems    Diagnosis  POA    *Closed compression fracture of L3 vertebra [S32.030A]  Yes    Body mass index (BMI) less than 19 [Z68.1]  Not Applicable    Back pain [M54.9]  Yes    Age-related osteoporosis with current pathological fracture [M80.00XA]  Yes    Closed compression fracture of body of L1 vertebra [S32.010A]  Yes    Dyslipidemia [E78.5]  Yes    CAD S/P percutaneous coronary angioplasty [I25.10, Z98.61]  Not Applicable    Hypertension [I10]  Yes    GERD (gastroesophageal reflux disease) [K21.9]  Yes      Resolved Hospital Problems    Diagnosis Date Resolved POA    Compression fracture of L3 vertebra [S32.030A] 11/19/2024 Unknown       Follow Up Appointments:  Future Appointments   Date Time Provider Department Center   12/2/2024  1:00 PM Gillies, Connor M, DO [facility] CARDIO [facility]   1/7/2025  9:30 AM Grzegorz Damon DO [facility] NEUROS8 [facility]   1/21/2025  9:00 AM Surinder Gibson MD [facility] ENDODIA [facility]       Allergies:  Review of patient's allergies indicates:   Allergen Reactions    Sulfa (sulfonamide antibiotics) Hives     Other reaction(s): Anaphylaxis    Itching     Shortness of breath       Medications: Review discharge medications with patient and family and provide education.    Current Facility-Administered Medications   Medication Dose Route Frequency Provider Last Rate Last Admin    acetaminophen tablet 1,000 mg  1,000 mg Oral Q8H Humera Baires PA-C   1,000 mg at 11/21/24 1335    albuterol-ipratropium 2.5 mg-0.5 mg/3 mL nebulizer solution 3 mL  3 mL Nebulization Q4H PRN Humera Baires PA-C        aluminum-magnesium  hydroxide-simethicone 200-200-20 mg/5 mL suspension 30 mL  30 mL Oral QID PRN Humera Baires, PA-C        aspirin chewable tablet 81 mg  81 mg Oral Daily Ha, Eleazar P, PA-C   81 mg at 11/21/24 1035    atorvastatin tablet 40 mg  40 mg Oral QHS Ha, Eleazar P, PA-C   40 mg at 11/20/24 2048    calcium-vitamin D3 500 mg-5 mcg (200 unit) per tablet 1 tablet  1 tablet Oral BID WM Ha, Eleazar P, PA-C   1 tablet at 11/21/24 1043    dextrose 10% bolus 125 mL 125 mL  12.5 g Intravenous PRN Ha, Eleazar P, PA-C        dextrose 10% bolus 250 mL 250 mL  25 g Intravenous PRN Ha, Eleazar P, PA-C        diclofenac sodium 1 % gel 2 g  2 g Topical (Top) Daily PRN Ha, Eleazar P, PA-C        enoxaparin injection 30 mg  30 mg Subcutaneous Daily Duncan Gillette MD   30 mg at 11/20/24 1749    ezetimibe tablet 10 mg  10 mg Oral Daily Ha, Eleazar P, PA-C   10 mg at 11/21/24 1035    glucagon (human recombinant) injection 1 mg  1 mg Intramuscular PRN Ha, Eleazar P, PA-C        glucose chewable tablet 16 g  16 g Oral PRN Ha, Eleazar P, PA-C        glucose chewable tablet 24 g  24 g Oral PRN Ha, Eleazar P, PA-C        LIDOcaine 5 % patch 1 patch  1 patch Transdermal QAM Humera Baires, PA-C   1 patch at 11/21/24 0618    melatonin tablet 6 mg  6 mg Oral Nightly PRN Dequan Humera, PA-C        methocarbamoL tablet 500 mg  500 mg Oral QID Humera Baires, PA-C   500 mg at 11/21/24 1335    methocarbamoL tablet 500 mg  500 mg Oral TID PRN Ha, Eleazar P, PA-C        metoprolol succinate (TOPROL-XL) 24 hr split tablet 12.5 mg  12.5 mg Oral Daily Ha, Eleazar P, PA-C   12.5 mg at 11/21/24 1035    naloxone 0.4 mg/mL injection 0.02 mg  0.02 mg Intravenous PRN Humera Baires PA-C        naloxone 0.4 mg/mL injection 0.02 mg  0.02 mg Intravenous PRN Eleazar Bonner PA-C        nitroGLYCERIN SL tablet 0.4 mg  0.4 mg Sublingual Q5 Min PRN Eleazar Bonner PA-C        ondansetron disintegrating tablet 8 mg  8 mg Oral Q8H PRN Dequan  ELIAS Grubbs        pantoprazole EC tablet 40 mg  40 mg Oral BID Eleazar Bonner PA-C   40 mg at 11/21/24 1035    pilocarpine tablet 5 mg  5 mg Oral Daily Eleazar Bonner PA-C   5 mg at 11/19/24 1140    polyethylene glycol packet 17 g  17 g Oral BID PRN Humera Baires PA-C        polyethylene glycol packet 17 g  17 g Oral BID Faustina Prescott PA-DORIE   17 g at 11/21/24 1035    prochlorperazine injection Soln 5 mg  5 mg Intravenous Q6H PRN Humera Baires PA-C        senna tablet 8.6 mg  8.6 mg Oral BID Faustina Prescott PA-C   8.6 mg at 11/21/24 1035    simethicone chewable tablet 80 mg  1 tablet Oral QID PRN Humera Baires PA-C        sodium chloride 0.65 % nasal spray 1 spray  1 spray Each Nostril PRN Eleazar Bonner PA-C        sodium chloride 0.9% flush 10 mL  10 mL Intravenous Q12H PRN Eleazar Bonner PA-C        sodium chloride 0.9% flush 5 mL  5 mL Intravenous PRN Humera Baires PA-C        traMADoL tablet 50 mg  50 mg Oral Q6H PRN Humera Baires PA-C   50 mg at 11/21/24 0248    valsartan tablet 80 mg  80 mg Oral Daily Eleazar Bonner, PA-C   80 mg at 11/21/24 1035    white petrolatum-mineral oil (SYSTANE NIGHTTIME) ophthalmic ointment   Both Eyes PRN Eleazar Bonner PA-C         Facility-Administered Medications Ordered in Other Encounters   Medication Dose Route Frequency Provider Last Rate Last Admin    lactated ringers infusion   Intravenous Continuous Zbigniew Dougherty MD            Medication List        START taking these medications      traMADoL 50 mg tablet  Commonly known as: ULTRAM  Take 1 tablet (50 mg total) by mouth every 6 (six) hours as needed for Pain.            CHANGE how you take these medications      methocarbamoL 500 MG Tab  Commonly known as: ROBAXIN  Take 1 tablet (500 mg total) by mouth 4 (four) times daily. for 10 days  What changed:   when to take this  reasons to take this            CONTINUE taking these medications      alendronate 70 MG tablet  Commonly  known as: FOSAMAX  Take 1 tablet (70 mg total) by mouth every 7 days.     ARTIFICIAL TEARS OPHT  Place 1 drop into both eyes once daily.     aspirin 81 MG Chew  Take 81 mg by mouth once daily. Not chewable     atorvastatin 40 MG tablet  Commonly known as: LIPITOR  Take 1 tablet (40 mg total) by mouth every evening.     calcium-vitamin D3 500 mg-5 mcg (200 unit) per tablet  Commonly known as: OS-ESTIVEN 500 + D3  Take 1 tablet by mouth 2 (two) times daily with meals.     ezetimibe 10 mg tablet  Commonly known as: ZETIA  Take 1 tablet (10 mg total) by mouth once daily.     metoprolol succinate 25 MG 24 hr tablet  Commonly known as: TOPROL-XL  Take 0.5 tablets (12.5 mg total) by mouth once daily.     multivitamin per tablet  Commonly known as: THERAGRAN  Take 1 tablet by mouth once daily.     nitroGLYCERIN 0.4 MG SL tablet  Commonly known as: NITROSTAT  Place 1 tablet (0.4 mg total) under the tongue every 5 (five) minutes as needed for Chest pain.     pantoprazole 40 MG tablet  Commonly known as: PROTONIX  Take 1 tablet (40 mg total) by mouth 2 (two) times daily.     pilocarpine 5 MG Tab  Commonly known as: SALAGEN  Take 1 tablet (5 mg total) by mouth once daily.     SALINE MIST NASL  1 spray by Each Nostril route daily as needed (Congestion).     ticagrelor 90 mg tablet  Commonly known as: BRILINTA  Take 1 tablet (90 mg total) by mouth 2 (two) times daily.     valsartan 80 MG tablet  Commonly known as: DIOVAN  Take 1 tablet (80 mg total) by mouth once daily.     vitamin A-vitamin C-vit E-min Tab  Take 1 tablet by mouth once daily.     VOLTAREN ARTHRITIS PAIN 1 % Gel  Generic drug: diclofenac sodium  Apply 2 g topically daily as needed (Pain).            STOP taking these medications      HYDROcodone-acetaminophen 5-325 mg per tablet  Commonly known as: NORCO                I have seen and examined this patient within the last 30 days. My clinical findings that support the need for the home health skilled services and home  bound status are the following:no   Weakness/numbness causing balance and gait disturbance due to Fracture and Weakness/Debility making it taxing to leave home.  Requiring assistive device to leave home due to unsteady gait caused by  Fracture and Weakness/Debility.     Diet:   cardiac diet    Labs:  Report Lab results to PCP.    Referrals/ Consults  Physical Therapy to evaluate and treat. Evaluate for home safety and equipment needs; Establish/upgrade home exercise program. Perform / instruct on therapeutic exercises, gait training, transfer training, and Range of Motion.  Occupational Therapy to evaluate and treat. Evaluate home environment for safety and equipment needs. Perform/Instruct on transfers, ADL training, ROM, and therapeutic exercises.   to evaluate for community resources/long-range planning.  Aide to provide assistance with personal care, ADLs, and vital signs.    Activities:   activity as tolerated    Nursing:   Agency to admit patient within 24 hours of hospital discharge unless specified on physician order or at patient request    SN to complete comprehensive assessment including routine vital signs. Instruct on disease process and s/s of complications to report to MD. Review/verify medication list sent home with the patient at time of discharge  and instruct patient/caregiver as needed. Frequency may be adjusted depending on start of care date.     Skilled nurse to perform up to 3 visits PRN for symptoms related to diagnosis    Notify MD if SBP > 160 or < 90; DBP > 90 or < 50; HR > 120 or < 50; Temp > 101; O2 < 88%;     Ok to schedule additional visits based on staff availability and patient request on consecutive days within the home health episode.    When multiple disciplines ordered:    Start of Care occurs on Sunday - Wednesday schedule remaining discipline evaluations as ordered on separate consecutive days following the start of care.    Thursday SOC -schedule subsequent  evaluations Friday and Monday the following week.     Friday - Saturday SOC - schedule subsequent discipline evaluations on consecutive days starting Monday of the following week.    For all post-discharge communication and subsequent orders please contact patient's primary care physician. If unable to reach primary care physician or do not receive response within 30 minutes, please contact PCP for clinical staff order clarification    Miscellaneous   Make sure patient is wearing LSO brace during therapy sessions    Home Health Aide:  Nursing Three times weekly, Physical Therapy Three times weekly, Occupational Therapy Three times weekly, and Medical Social Work Three times weekly    Wound Care Orders  no    I certify that this patient is confined to her home and needs intermittent skilled nursing care, physical therapy, and occupational therapy.

## 2024-11-21 NOTE — SUBJECTIVE & OBJECTIVE
Interval History: NAEON. VSS. Patient examined at bedside with . No acute distress. Reports continued improvement of pain. Reports that she was not wearing brace at time of encounter as she was just eating breakfast and the brace makes it hard for her to swallow. Agreeable to brace otherwise though would like to bring a brace she has at home for NSGY/PT to look at since she is more comfortable in it. Pending supine XR with brace. PT/OT recommending moderate intensity therapy. Patient and family is agreeable to SNF.     Review of Systems   Constitutional:  Negative for chills and fever.   Respiratory:  Negative for chest tightness and shortness of breath.    Cardiovascular:  Negative for chest pain and leg swelling.   Gastrointestinal:  Negative for abdominal pain and nausea.   Musculoskeletal:  Positive for arthralgias (L Hip), back pain and gait problem.   Neurological:  Negative for dizziness and weakness.     Objective:     Vital Signs (Most Recent):  Temp: 98.2 °F (36.8 °C) (11/21/24 0707)  Pulse: 72 (11/21/24 0707)  Resp: 18 (11/21/24 0707)  BP: 128/81 (11/21/24 0707)  SpO2: 96 % (11/21/24 0707) Vital Signs (24h Range):  Temp:  [97.5 °F (36.4 °C)-98.2 °F (36.8 °C)] 98.2 °F (36.8 °C)  Pulse:  [61-91] 72  Resp:  [16-18] 18  SpO2:  [96 %-99 %] 96 %  BP: (125-165)/(65-88) 128/81     Weight: 45.4 kg (100 lb 1.4 oz)  Body mass index is 18.31 kg/m².    Intake/Output Summary (Last 24 hours) at 11/21/2024 1026  Last data filed at 11/21/2024 0637  Gross per 24 hour   Intake 330 ml   Output 1600 ml   Net -1270 ml         Physical Exam  Vitals and nursing note reviewed.   Constitutional:       General: She is not in acute distress.     Appearance: Normal appearance.   HENT:      Head: Normocephalic and atraumatic.   Cardiovascular:      Rate and Rhythm: Normal rate and regular rhythm.      Heart sounds: Normal heart sounds. No murmur heard.  Pulmonary:      Effort: Pulmonary effort is normal. No respiratory  distress.      Breath sounds: Normal breath sounds.   Abdominal:      General: Abdomen is flat.      Tenderness: There is no abdominal tenderness.   Musculoskeletal:         General: Tenderness present. Normal range of motion.      Lumbar back: Tenderness and bony tenderness present.      Right lower leg: No edema.      Left lower leg: No edema.   Skin:     General: Skin is warm and dry.   Neurological:      General: No focal deficit present.      Mental Status: She is alert. Mental status is at baseline.   Psychiatric:         Mood and Affect: Mood normal.         Speech: Speech is delayed.         Behavior: Behavior normal.             Significant Labs: All pertinent labs within the past 24 hours have been reviewed.  CBC:   Recent Labs   Lab 11/20/24 0347 11/21/24 0247   WBC 8.64 9.18   HGB 11.4* 11.8*   HCT 35.3* 34.9*    297     CMP:   Recent Labs   Lab 11/20/24 0347 11/21/24 0247    135*   K 3.9 4.1    103   CO2 25 23   GLU 88 89   BUN 12 11   CREATININE 0.7 0.6   CALCIUM 9.4 9.6   ANIONGAP 8 9       Significant Imaging: I have reviewed all pertinent imaging results/findings within the past 24 hours.

## 2024-11-21 NOTE — PLAN OF CARE
[facility]  Discharge Final Note    Primary Care Provider: Praful Paul MD    Expected Discharge Date: 11/21/2024    Final Discharge Note (most recent)       Final Note - 11/21/24 1426          Final Note    Assessment Type Final Discharge Note     Anticipated Discharge Disposition Home-Health Care [facility]     What phone number can be called within the next 1-3 days to see how you are doing after discharge? [phone]     Hospital Resources/Appts/Education Provided Provided patient/caregiver with written discharge plan information;Appointments scheduled and added to AVS        Post-Acute Status    Post-Acute Authorization Home Health     Home Health Status Set-up Complete/Auth obtained     Patient choice form signed by patient/caregiver List with quality metrics by geographic area provided     Discharge Delays None known at this time                     Contact Info       Praful Paul MD   Specialty: Internal Medicine   Relationship: [facility] - [facility] [address]   Phone: [phone]       Next Steps: Follow up in 1 week(s)          Patient to discharge to home today with [facility]. Patient and her  declined SNF placement. Outpatient case management referral completed.     Future Appointments   Date Time Provider Department Center   12/2/2024  1:00 PM Gillies, Connor M, DO [facility] CARDIO [facility]   1/7/2025  9:30 AM Grzegorz Damon DO [facility] NEUROS8 [facility]   1/21/2025  9:00 AM Surinder Gibson MD [facility] ENDODIA [facility]  [facility]   Ext: [phone]

## 2024-11-21 NOTE — PLAN OF CARE
"ENDOCRINOLOGY PLAN OF CARE    Admit Date: 11/19/2024      Betzy Cooley is a 84 y.o. female with PMH significant for GERD, CAD s/p 7 stents, Osteoporosis on drug holiday from fosamax and HTN who presented to the hospital with complaint of acute worsening of back pain. Patient was also unstable with standing. In the emergency department she was found to have a new compression fracture at L3. Endocrinology is consulted to assist with osteoporosis management.    Patient was hospitalized in August with an L1 compression fracture and recently completed rehab for that. She was treated with Fosamax in the past (greater than 10 years ago she reports) for ten years but has been on holiday since then. She takes daily calcium citrate with vitamin D and does not exercise.    COMPARISON:  Comparison study done on 10/07/2022. Lumbar spine BMD 0.803 g/cm2 and the T-score -2.2.  The Total Hip BMD 0.731 g/cm2 and the T-score -1.7.     FINDINGS:  LS: T-score is -2.9, and Z-score is 0.0., Compared with previous DXA, BMD at the lumbar spine has declined by -8.8%.  FN: T-score is -3.1, and Z-score is -0.6.  TH: T-score is -2.7, and Z-score is -0.4. Compared with previous DXA, BMD at the total hip has declined by -16.4%.  The trabecular bone score (TBS) indicates degraded bone quality.     Fracture Risk (FRAX adjusted for Trabecular Bone Score)  24% risk of a major osteoporotic fracture in the next 10 years.  9.7% risk of hip fracture in the next 10 years.      Interval HPI: No acute events documented overnight. Vital signs reviewed are stable although patient is significantly hypertensive.    BP (!) 145/75 (BP Location: Left arm, Patient Position: Sitting)   Pulse 71   Temp 97.6 °F (36.4 °C) (Oral)   Resp 18   Ht 5' 2" (1.575 m)   Wt 45.4 kg (100 lb 1.4 oz)   SpO2 99%   BMI 18.31 kg/m²     Lab Results   Component Value Date/Time     (L) 11/21/2024 02:47 AM    K 4.1 11/21/2024 02:47 AM    CALCIUM 9.6 11/21/2024 02:47 AM    " ALBUMIN 3.5 11/19/2024 05:45 AM    CO2 23 11/21/2024 02:47 AM    ANIONGAP 9 11/21/2024 02:47 AM    TSH 2.569 07/30/2021 11:54 AM       Orthopedic  Age-related osteoporosis with current pathological fracture  Lumbar spine compression fractures and BMD findings confirm osteoporosis  Surgical spine management per neurosurgery     Patient would benefit from anabolic agents for improvement in BMD, we would recommend Forteo, once daily injections which can be started as an outpatient.  We can set her up for fellows discharge clinic when patient is ready for discharge.     While patient is hospitalized we can perform secondary osteoporosis workup which includes: PTH and Vitamin D as well as 24 hour urine calcium. Patient does not have significant hypercalcemia to suspect hyperparathyroidism. This work up is usually completed as an outpatient.     - PTH unremarkable and vitamin D is sufficient  - 24 hour urinary calcium pending    Will send rx for forteo to [facility] for approval.      Real Wallis DO  [facility] End[facility]    Office: [phone]  Fax: [phone]    The above history labs imaging impression and plan were discussed with attending physician who is in agreement and also took part in this patient's care.  I personally reviewed all of the patients available medications, labs, imaging, vitals, allergies, medical history.
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
## 2024-11-21 NOTE — PLAN OF CARE
"   11/21/24 1035   Post-Acute Status   Post-Acute Authorization Placement   Post-Acute Placement Status Patient List Provided   Discharge Delays (!) Post-Acute Set-up   Discharge Plan   Discharge Plan A Skilled Nursing Facility   Discharge Plan B Home Health     Patients dispo plan is now SNF and patient and her  are agreeable. SW provided patient a list of SNF facilities and instructed patient to pick at least 5 facilities to send her referral to. Patient and her  understood. SW will follow up with patient later this day.     2:26 PM  SW was notified by MD in the afternoon to speak with patients  as he is exhibiting unrealistic expectations for patient. SW approached patients  in the hallway outside of patients room.  stated he does not want to spend another night in the hospital with patient while we wait for patient to go to a skilled nursing facility. SW explained he is not required to stay over night with patient but  stated "that just wont do". Mr. Cooley then stated he will take the patient home while we wait for her to get placed into SNF and bring her to the facility when its set up. MEHDI explained that it does not work that way-due to insurance guidelines, patient has to admit into SNF from the hospital. Mr. Cooley was unhappy with that information and then stated he was going to drive himself to both [facility] and [place] (the other SNF of choice) and speak with them to find, "another way". MEHDI expressed to Mr. Cooley that he can feel free to do what he feels is necessary but it will be a waste of time. Mr. Cooley then stated he wants to take patient home with home health today. MEHDI explained the risks of taking patient home with home health due to fall risk, further injuring herself without proper assistance, etc. Mr. Cooley still stated he would like to patient home today.     MD approved patient to go home with home health. Patients  would prefer [facility]. " MEHDI sent the referral to [facility] and it was approved.     MADAI Brown  [facility]

## 2024-11-22 ENCOUNTER — OUTPATIENT CASE MANAGEMENT (OUTPATIENT)
Dept: ADMINISTRATIVE | Facility: OTHER | Age: 84
End: 2024-11-22
Payer: MEDICARE

## 2024-11-22 ENCOUNTER — HOSPITAL ENCOUNTER (OUTPATIENT)
Facility: HOSPITAL | Age: 84
Discharge: HOME | End: 2024-11-22
Attending: STUDENT IN AN ORGANIZED HEALTH CARE EDUCATION/TRAINING PROGRAM | Admitting: STUDENT IN AN ORGANIZED HEALTH CARE EDUCATION/TRAINING PROGRAM
Payer: MEDICARE

## 2024-11-22 VITALS
DIASTOLIC BLOOD PRESSURE: 72 MMHG | WEIGHT: 100 LBS | TEMPERATURE: 98 F | RESPIRATION RATE: 17 BRPM | BODY MASS INDEX: 19.63 KG/M2 | HEART RATE: 69 BPM | SYSTOLIC BLOOD PRESSURE: 146 MMHG | HEIGHT: 60 IN | OXYGEN SATURATION: 100 %

## 2024-11-22 DIAGNOSIS — M54.16 LUMBAR RADICULOPATHY: Primary | ICD-10-CM

## 2024-11-22 DIAGNOSIS — G89.29 CHRONIC PAIN: ICD-10-CM

## 2024-11-22 PROCEDURE — 64483 NJX AA&/STRD TFRM EPI L/S 1: CPT | Mod: LT | Performed by: STUDENT IN AN ORGANIZED HEALTH CARE EDUCATION/TRAINING PROGRAM

## 2024-11-22 PROCEDURE — 63600175 PHARM REV CODE 636 W HCPCS: Performed by: STUDENT IN AN ORGANIZED HEALTH CARE EDUCATION/TRAINING PROGRAM

## 2024-11-22 PROCEDURE — 64484 NJX AA&/STRD TFRM EPI L/S EA: CPT | Mod: HCNC,LT,, | Performed by: STUDENT IN AN ORGANIZED HEALTH CARE EDUCATION/TRAINING PROGRAM

## 2024-11-22 PROCEDURE — 64484 NJX AA&/STRD TFRM EPI L/S EA: CPT | Mod: LT | Performed by: STUDENT IN AN ORGANIZED HEALTH CARE EDUCATION/TRAINING PROGRAM

## 2024-11-22 PROCEDURE — 99152 MOD SED SAME PHYS/QHP 5/>YRS: CPT | Performed by: STUDENT IN AN ORGANIZED HEALTH CARE EDUCATION/TRAINING PROGRAM

## 2024-11-22 PROCEDURE — 64483 NJX AA&/STRD TFRM EPI L/S 1: CPT | Mod: HCNC,LT,, | Performed by: STUDENT IN AN ORGANIZED HEALTH CARE EDUCATION/TRAINING PROGRAM

## 2024-11-22 PROCEDURE — 99152 MOD SED SAME PHYS/QHP 5/>YRS: CPT | Mod: HCNC,,, | Performed by: STUDENT IN AN ORGANIZED HEALTH CARE EDUCATION/TRAINING PROGRAM

## 2024-11-22 PROCEDURE — 25500020 PHARM REV CODE 255: Performed by: STUDENT IN AN ORGANIZED HEALTH CARE EDUCATION/TRAINING PROGRAM

## 2024-11-22 RX ORDER — FENTANYL CITRATE 50 UG/ML
INJECTION, SOLUTION INTRAMUSCULAR; INTRAVENOUS
Status: DISCONTINUED | OUTPATIENT
Start: 2024-11-22 | End: 2024-11-22 | Stop reason: HOSPADM

## 2024-11-22 RX ORDER — LIDOCAINE HYDROCHLORIDE 10 MG/ML
INJECTION, SOLUTION EPIDURAL; INFILTRATION; INTRACAUDAL; PERINEURAL
Status: DISCONTINUED | OUTPATIENT
Start: 2024-11-22 | End: 2024-11-22 | Stop reason: HOSPADM

## 2024-11-22 RX ORDER — DEXAMETHASONE SODIUM PHOSPHATE 10 MG/ML
INJECTION INTRAMUSCULAR; INTRAVENOUS
Status: DISCONTINUED | OUTPATIENT
Start: 2024-11-22 | End: 2024-11-22 | Stop reason: HOSPADM

## 2024-11-22 RX ORDER — LIDOCAINE HYDROCHLORIDE 10 MG/ML
1 INJECTION, SOLUTION EPIDURAL; INFILTRATION; INTRACAUDAL; PERINEURAL ONCE
Status: DISCONTINUED | OUTPATIENT
Start: 2024-11-22 | End: 2024-11-22 | Stop reason: HOSPADM

## 2024-11-22 RX ORDER — SODIUM CHLORIDE 9 MG/ML
500 INJECTION, SOLUTION INTRAVENOUS CONTINUOUS
Status: DISCONTINUED | OUTPATIENT
Start: 2024-11-22 | End: 2024-11-22 | Stop reason: HOSPADM

## 2024-11-22 RX ORDER — MIDAZOLAM HYDROCHLORIDE 1 MG/ML
INJECTION INTRAMUSCULAR; INTRAVENOUS
Status: DISCONTINUED | OUTPATIENT
Start: 2024-11-22 | End: 2024-11-22 | Stop reason: HOSPADM

## 2024-11-22 RX ORDER — LIDOCAINE HYDROCHLORIDE 20 MG/ML
INJECTION, SOLUTION EPIDURAL; INFILTRATION; INTRACAUDAL; PERINEURAL
Status: DISCONTINUED | OUTPATIENT
Start: 2024-11-22 | End: 2024-11-22 | Stop reason: HOSPADM

## 2024-11-22 NOTE — ASSESSMENT & PLAN NOTE
Closed compression fracture of body of L1 vertebra  Back pain     - 85 yo F with PMHx of HTN, GERD, CAD s/p PCI, and recent L1 compression fracture who presented to ED for worsening back pain.   - Lumbar XR with continued demonstration of L1 compression deformity with severe loss of height, but now also showing new L3 compression deformity.   - CT lumbar spine: Acute L3 burst fracture with severe height loss and 0.5 cm retropulsion resulting in mild canal stenosis   - MM pain control with scheduled tylenol, lidocaine patches and robaxin, and prn tramadol   - PT/ OT consulted, recommend moderate therapy. Agreeable to SNF  - NSGY consulted, no surgical intervention at this time given osteoporosis  - Patient fitted for back brace, wear at all times, if not lying flat on bed  - stable for discharge with HH

## 2024-11-22 NOTE — H&P
HPI  Patient presenting for Procedure(s) (LRB):  left L3/4 and L4/5 TFESI (Left)     Patient on Anti-coagulation No    No health changes since previous encounter    Past Medical History:   Diagnosis Date    Arthritis     Cataract     GERD (gastroesophageal reflux disease)     HEARING LOSS     Hypertension     Osteoporosis, postmenopausal     Squamous Cell Carcinoma 2007    right forearm    Urinary incontinence     rare mixed     Past Surgical History:   Procedure Laterality Date    ANGIOGRAM, CORONARY, WITH LEFT HEART CATHETERIZATION Bilateral 5/17/2024    Procedure: Angiogram, Coronary, with Left Heart Cath;  Surgeon: Miguel Jacob MD;  Location: [facility] CATH LAB;  Service: Cardiology;  Laterality: Bilateral;    COLPOPEXY N/A 08/06/2019    Procedure: COLPOPEXY SSL FIXATION;  Surgeon: Alma Dorsey MD;  Location: [address];  Service: Urology;  Laterality: N/A;    CORONARY ANGIOGRAPHY Left 5/16/2024    Procedure: ANGIOGRAM, CORONARY ARTERY;  Surgeon: Miguel Jacob MD;  Location: [facility] CATH LAB;  Service: Cardiology;  Laterality: Left;    CYSTOSCOPY N/A 08/06/2019    Procedure: CYSTOSCOPY;  Surgeon: Alma Dorsey MD;  Location: [address];  Service: Urology;  Laterality: N/A;    ESOPHAGOGASTRODUODENOSCOPY N/A 4/1/2024    Procedure: EGD (ESOPHAGOGASTRODUODENOSCOPY);  Surgeon: Zbigniew Dougherty MD;  Location: [facility];  Service: Endoscopy;  Laterality: N/A;    FRACTURE SURGERY Left 2008    open radius/ulna fracture    HYSTERECTOMY      TANIA/ovaries remain--fibroids- in her late 30's / early 40's    IVUS, CORONARY  5/17/2024    Procedure: IVUS, Coronary;  Surgeon: Miguel Jacob MD;  Location: [facility] CATH LAB;  Service: Cardiology;;    STENT, DRUG ELUTING, MULTI VESSEL, CORONARY  5/17/2024    Procedure: Stent, Drug Eluting, Multi Vessel, Coronary;  Surgeon: Miguel Jacob MD;  Location: [facility] CATH LAB;  Service: Cardiology;;     Review of patient's allergies indicates:    Allergen Reactions    Sulfa (sulfonamide antibiotics) Hives     Other reaction(s): Anaphylaxis    Itching     Shortness of breath      No current facility-administered medications for this encounter.     Facility-Administered Medications Ordered in Other Encounters   Medication    lactated ringers infusion       PMHx, PSHx, Allergies, Medications reviewed in epic    ROS negative except pain complaints in HPI    OBJECTIVE:    There were no vitals taken for this visit.    PHYSICAL EXAMINATION:    GENERAL: Well appearing, in no acute distress, alert and oriented x3.  PSYCH:  Mood and affect appropriate.  SKIN: Skin color, texture, turgor normal, no rashes or lesions which will impact the procedure.  CV: RRR with palpation of the radial artery.  PULM: No evidence of respiratory difficulty, symmetric chest rise. Clear to auscultation.  NEURO: Cranial nerves grossly intact.    Plan:    Proceed with procedure as planned Procedure(s) (LRB):  left L3/4 and L4/5 TFESI (Left)    Norman Marx  11/22/2024

## 2024-11-22 NOTE — ASSESSMENT & PLAN NOTE
- Endocrine consulted to improve osteoporosis for surgical viability, appreciate recs  - Secondary osteoporosis work up   - PTH, Vit-D unremarkable   - 24 Urine calcium pending  - plan to start forteo injections outpatient  - endocrine follow up

## 2024-11-22 NOTE — PROGRESS NOTES
Outpatient Care Management  Initial Patient Assessment    Patient: Betzy Cooley  MRN: [id number]  Date of Service: 11/22/2024  Completed by: Elsy Nation RN  Referral Date: 11/20/2024  Date of Eligibility: 11/21/2024  Program:   High Risk  Status: Ongoing  Effective Dates: 11/22/2024 - present  Responsible Staff: Elsy Nation RN        Reason for Visit   Patient presents with    OPCM Enrollment Call     11/22/2024    Nursing Assessment     11/22/2024       Brief Summary:  Betzy Cooley was referred by Dr. Duncan Gillette, [facility] provider for back pain. Patient qualifies for program based on high risk.   Active problem list, medical, surgical and social history reviewed. Active comorbidities include hypertension, back pain, osteoporosis, GERD, palpitations, anxiety, CAD with angina, h/o 7 stents. Areas of need identified by patient include coordination of care, education of chronic conditions, appointment with rheumatology.   Next steps: Educate patient on chronic conditions of hypertension. Pain management, and fall precautions. Assist in scheduling an appointment with rheumatology.      Disability Status  Is the patient alert and oriented (person, place, time, and situation)?: Alert and oriented x 4  Hearing Difficulty or Deaf: no  Visual Difficulty or Blind: no  Visual and Hearing Needs Conclusion: Patient does not wear glasses or use a hearing aid  Difficulty Concentrating, Remembering or Making Decisions: no  Communication Difficulty: no  Eating/Swallowing Difficulty: no  Walking or Climbing Stairs Difficulty: yes  Walking or Climbing Stairs: ambulation difficulty, requires equipment  Mobility Management: Patient uses a walker or cane for ambulation  Dressing/Bathing Difficulty: yes  Dressing/Bathing: bathing difficulty, assistance 1 person; dressing difficulty, assistance 1 person  Dressing/Bathing Management: Spouse assists with bathing and dressing.  Toileting : Assistance Required (Patient needed  help last 3-4 days, prior to that did on her own.)  Continence : Continence - Not a problem  Difficulty Managing Errands Independently: yes  Errands Management: Spouse runs errands and brings her to her appointments. Spouse does all the household chores.  Equipment Currently Used at Home: rollator; walker, rolling; cane, straight; blood pressure machine; scale; other (see comments); 3-in-1 commode (Pulse ox, foot pedal)  ADL Conclusion Statement: Spouse is available to assist in adl's, patient with recent decline in adl's due to bath pain.  Change in Functional Status Since Onset of Current Illness/Injury: yes        Spiritual Beliefs  Spiritual, Cultural Beliefs, Nondenominational Practices, Values that Affect Care: no      Social History     Socioeconomic History    Marital status:    Tobacco Use    Smoking status: Never     Passive exposure: Never    Smokeless tobacco: Never   Substance and Sexual Activity    Alcohol use: Not Currently     Comment: 1-2 glasses wine weekly    Drug use: No    Sexual activity: Yes     Partners: Male     Birth control/protection: None     Social Drivers of Health     Financial Resource Strain: Low Risk  (11/19/2024)    Overall Financial Resource Strain (CARDIA)     Difficulty of Paying Living Expenses: Not very hard   Food Insecurity: No Food Insecurity (11/19/2024)    Hunger Vital Sign     Worried About Running Out of Food in the Last Year: Never true     Ran Out of Food in the Last Year: Never true   Transportation Needs: No Transportation Needs (11/19/2024)    TRANSPORTATION NEEDS     Transportation : No   Physical Activity: Inactive (11/19/2024)    Exercise Vital Sign     Days of Exercise per Week: 0 days     Minutes of Exercise per Session: 0 min   Stress: No Stress Concern Present (11/19/2024)    [employer] - Occupational Stress Questionnaire     Feeling of Stress : Not at all   Housing Stability: Low Risk  (11/19/2024)    Housing Stability Vital  Sign     Unable to Pay for Housing in the Last Year: No     Homeless in the Last Year: No       Roles and Relationships  Primary Source of Support/Comfort: spouse  Name of Support/Comfort Primary Source: Zander Cooley  Secondary Source of Support/Comfort: child(raphael)  Name of Support/Comfort Secondary Source: 2 daughters and a granddaughter but both all have jobs and are busy.      Advance Directives (For Healthcare)  Advance Directive  (If Adv Dir status is received, view document under Adv Dir in header or Chart Review Media tab): Patient has advance directive, copy not received.  Patient Requests Assistance: Patient will do independently        Patient Reported Insurance  Verified current insurance plan:: [employer] Advantage  [employer] benefits discussed:: OTC Prescription Discounts; Well Dine; Transportation; Silver Sneakers; Mail Order Pharmacy            11/22/2024     3:02 PM 11/13/2024     9:53 AM 10/29/2024    10:44 AM 10/1/2024     3:11 PM 9/17/2024    10:12 AM 8/27/2024     8:07 AM 7/29/2024     8:33 AM   Depression Patient Health Questionnaire   Over the last two weeks how often have you been bothered by little interest or pleasure in doing things Not at all Several days Several days Several days Not at all Not at all Not at all   Over the last two weeks how often have you been bothered by feeling down, depressed or hopeless Not at all Several days Several days Several days Not at all Not at all Not at all   PHQ-2 Total Score 0 2 2 2 0 0 0       Learning Assessment       11/19/2024 1556 [facility] - Observation 11 (11/19/2024 - 11/21/2024)   Created by Mairbell Crespo, RN - RN (Nurse) Status: Complete                 PRIMARY LEARNER     Primary Learner Name:  Mrs. Cooley LL - 11/19/2024 1556    Relationship:  Patient, Significant Other LL - 11/19/2024 1556    Does the primary learner have any barriers to learning?:  No Barriers LL - 11/19/2024 1556    What is the preferred language of the primary  learner?:  English  - 11/19/2024 1556    Is an  required?:  No  - 11/19/2024 1556    How does the primary learner prefer to learn new concepts?:  Listening, Demonstration  - 11/19/2024 1556    How often do you need to have someone help you read instructions, pamphlets, or written material from your doctor or pharmacy?:  Sometimes  - 11/19/2024 1556        CO-LEARNER #1     No question answered        CO-LEARNER #2     No question answered        SPECIAL TOPICS     No question answered        ANSWERED BY:     No question answered        Comments         Edit History       Maribell Crespo, RN - RN (Nurse)   11/19/2024 155

## 2024-11-22 NOTE — DISCHARGE INSTRUCTIONS
[facility]/[place]  Dr. Denver Cordon  Messaging service # [phone]    POST-PROCEDURE INSTRUCTIONS:  HELPFUL TIPS:    Except for block procedures (medial branch blocks, genicular blocks, hip/shoulder blocks), most procedures take about 2 weeks to start seeing the full effect toward your pain.  If you feel like the pain relief goes away after a day, please wait up to 2 weeks to decide if the procedure provided you any relief    If you had a block procedure (medial branch blocks, genicular blocks, hip/shoulder blocks), you MUST submit your pain diary and percentage relief scores to proceed with the next block and/or procedure.  Please submit the diary/percentages through the [facility] OR you can call [phone] ([email or web address]) OR [phone] ([email or web address]) during clinic hours (Monday - Friday, 8AM - 5PM)    Follow up in 2 weeks with either the provider that suggested this procedure OR with Dr. Cordon (including his team members at [facility]).  You may already have a follow up appointment scheduled.  If not, you may make an appointment through the [facility] or you can contact the office that suggested your procedure.  If you would like to make an appointment with Dr. Cordon, you may do so through the [facility] OR you can call [phone] ([email or web address]) OR [phone] ([email or web address]) during clinic hours (Monday - Friday, 8AM - 5PM).      What you need to do:    Keep a record of your response to the injection you had today.    How much relief did you get?   When did the relief start and how long did it last?  Were you able to decrease the use of any of your pain medications?  Were you able to increase your level of activity?  How long did the relief last?    What to watch out for:    If you experience any of the following symptoms after your procedure, please notify the messaging service immediately (see above for contact information):   fever (increased oral temperature)   bleeding or  swelling at the injection site,    drainage, rash or redness at the injection site    possible signs of infection    increased pain at the injection site   worsening of your usual pain   severe headache   new or worsening numbness    new arm and/or leg weakness, or    changes in bowel and/or bladder function: urinating or defecating on yourself and not knowing that you did it.    PLEASE FOLLOW ALL INSTRUCTIONS CAREFULLY     Do not engage in strenuous activity (e.g., lifting or pushing heavy objects or repeated bending) for 24 hours.     Do not take a bath, swim or use Jacuzzi for 24 hours after procedure. (A shower is fine).   Remove any Band-Aids when you get home.    Use cold/ice, as needed for comfort.  We recommend the use of cold therapy alternating on for 20 minutes, off for 20 minutes.    Do not apply direct heat (heating pad or heat packs) to the injection site for 24 hours.     Resume your usual medications, unless instructed otherwise by your Pain Physician.     If you are on warfarin (Coumadin) or other blood thinner, resume this medication as instructed by your prescribing Physician.    IF AT ANY POINT YOU ARE VERY CONCERNED ABOUT YOUR SYMPTOMS, PLEASE GO TO THE EMERGENCY ROOM.    If you develop worsening pain, weakness, numbness, lose bowel or bladder control (i.e., having an accident where you did not even know you had to go to the bathroom and suddenly noticed you soiled yourself), saddle anesthesia (a loss of sensation restricted to the area of the buttocks, anus and between the legs -- i.e., those parts of your body that would touch a saddle if you were sitting on one) you need to go immediately to the emergency department for evaluation and treatment.    ----------------------------------------------------------------------------------------------------------------------------------------------------------------  If you received Sedation please read the following instructions:  POST SEDATION  INSTRUCTIONS    Today you received intravenous medication (also known as sedation) that was used to help you relax and/or decrease discomfort during your procedure. This medication will be acting in your body for the next 24 hours, so you might feel a little tired or sleepy. This feeling will slowly wear off.   Common side effects associated with these medications include: drowsiness, dizziness, sleepiness, confusion, feeling excited, difficulty remembering things, lack of steadiness with walking or balance, loss of fine muscle control, slowed reflexes, difficulty focusing, and blurred vision.  Some over-the-counter and prescription medications (e.g., muscle relaxants, opioids, mood-altering medications, sedatives/hypnotics, antihistamines) can interact with the intravenous medication you received and cause an increased risk of the side effects listed above in addition to other potentially life threatening side effects. Use extreme caution if you are taking such medications, and consult with your Pain Physician or prescribing physician if you have any questions.  For the next 12-24 hours:    DO NOT--Drive a car, operate machinery or power tools   DO NOT--Drink any alcoholic beverages (not even beer), they may dangerously increase the risk of side effects.    DO NOT--Make any important legal or business decisions or sign important documents.  We advise you to have someone to assist you at home. Move slowly and carefully. Do not make sudden changes in position. Be aware of dizziness or light-headedness and move accordingly.   If you seek medical treatment within 24 hours, let the nurse or doctor caring for you know that you have received the above medications. If you have any questions or concerns related to your sedation or treatment today please contact us.

## 2024-11-22 NOTE — LETTER
Betzy Cooley  [address] [address]    Dear Betzy Cooley,     Welcome to [facility]. We are here to assist patients with multiple long-term (chronic) conditions who often need more personalized healthcare.    It was a pleasure talking with you today. My name is Elsy Nation RN. I look forward to working with you as your Care Manager. I will be contacting you by telephone routinely to help coordinate care and resolve issues.    My goal is to help you function at the healthiest and highest level possible. You can contact me directly at [phone].    As an [facility] patient with [employer], some of the services we provide, at no cost to you, include:     Development of an individualized care plan with a Registered Nurse   Connection with a   Assistance from a Community Health Worker  Connection with available resources and services    Coordinate communication among your care team members   Provide coaching and education  Help you understand your doctor's treatment plan  Help you obtain information about your insurance coverage.    All services provided by [facility] and other care team members are coordinated with and communicated to your primary care team.      As part of your enrollment, you will be receiving education materials and more information about these services in your [facility] account, by phone, or through the mail. If you do not wish to participate or receive information, you can Opt Out by contacting our office at [phone].      Sincerely,        Elsy Nation RN  [facility]   Outpatient Care Management

## 2024-11-25 ENCOUNTER — PATIENT OUTREACH (OUTPATIENT)
Dept: ADMINISTRATIVE | Facility: OTHER | Age: 84
End: 2024-11-25
Payer: MEDICARE

## 2024-11-25 ENCOUNTER — PATIENT MESSAGE (OUTPATIENT)
Dept: PAIN MEDICINE | Facility: CLINIC | Age: 84
End: 2024-11-25
Payer: MEDICARE

## 2024-11-25 DIAGNOSIS — M80.00XA AGE-RELATED OSTEOPOROSIS WITH CURRENT PATHOLOGICAL FRACTURE, INITIAL ENCOUNTER: ICD-10-CM

## 2024-11-25 DIAGNOSIS — M51.362 DEGENERATION OF INTERVERTEBRAL DISC OF LUMBAR REGION WITH DISCOGENIC BACK PAIN AND LOWER EXTREMITY PAIN: Primary | ICD-10-CM

## 2024-11-25 NOTE — PROGRESS NOTES
This Community Health Worker (CHW) completed Social Determinant of Health (SDOH)  Questionnaire with patient/caregiver via telephone today.  Notified [facility] CM Elsy Nation RN of completion.      Caregiver denied any SDOH needs at this time.

## 2024-11-27 ENCOUNTER — OUTPATIENT CASE MANAGEMENT (OUTPATIENT)
Dept: ADMINISTRATIVE | Facility: OTHER | Age: 84
End: 2024-11-27
Payer: MEDICARE

## 2024-11-27 ENCOUNTER — TELEPHONE (OUTPATIENT)
Dept: PAIN MEDICINE | Facility: CLINIC | Age: 84
End: 2024-11-27
Payer: MEDICARE

## 2024-11-27 RX ORDER — TRAMADOL HYDROCHLORIDE 50 MG/1
50 TABLET ORAL EVERY 8 HOURS PRN
Qty: 21 TABLET | Refills: 0 | Status: SHIPPED | OUTPATIENT
Start: 2024-11-27

## 2024-11-27 NOTE — OP NOTE
Lumbar Transforaminal Epidural Steroid Injection under Fluoroscopic Guidance    The procedure, risks, benefits, and options were discussed with the patient. There are no contraindications to the procedure. The patent expressed understanding and agreed to the procedure. Informed written consent was obtained prior to the start of the procedure and can be found in the patient's chart.    PATIENT NAME: Betzy Cooley   MRN: [id number]     DATE OF PROCEDURE: 11/27/2024    PROCEDURE:  Left  L3/4 and L4/5 Lumbar Transforaminal Epidural Steroid Injection under Fluoroscopic Guidance    PRE-OP DIAGNOSIS: Lumbar radiculopathy [M54.16] Lumbar radiculopathy [M54.16]    POST-OP DIAGNOSIS: Same    PHYSICIAN: Denver Cordon MD    ASSISTANTS: Arya Behkradi, MD     MEDICATIONS INJECTED: Preservative-free Decadron 10mg with 5cc of Lidocaine 1% MPF     LOCAL ANESTHETIC INJECTED: Xylocaine 2%     SEDATION: Versed 0.5mg and Fentanyl 50mcg                                                                                                                                                                                     Conscious sedation ordered by M.D. Patient re-evaluation prior to administration of conscious sedation. No changes noted in patient's status from initial evaluation. The patient's vital signs were monitored by RN and patient remained hemodynamically stable throughout the procedure.    Event Time In   Sedation Start 1020   Sedation End 1032       ESTIMATED BLOOD LOSS: None    COMPLICATIONS: None    TECHNIQUE: Time-out was performed to identify the patient and procedure to be performed. With the patient laying in a prone position, the surgical area was prepped and draped in the usual sterile fashion using ChloraPrep and a fenestrated drape.The levels were determined under fluoroscopy guidance. Skin anesthesia was achieved by injecting Lidocaine 2% over the injection sites. The transforaminal spaces were then approached with a 22  gauge, 3.5 inch spinal quinke needle that was introduced under fluoroscopic guidance in the AP and Lateral views. Once the needle tip was in the area of the transforaminal space, and there was no blood, CSF or paraesthesias, contrast dye Omnipaque (300mg/mL) was injected to confirm placement and there was no vascular runoff. Fluoroscopic imaging in the AP and lateral views revealed a clear outline of the spinal nerve with proximal spread of agent through the neural foramen into the epidural space. 3 mL of the medication mixture listed above was injected slowly at each site. Displacement of the radio opaque contrast after injection of the medication confirmed that the medication went into the area of the transforaminal spaces. The needles were removed and bleeding was nil. A sterile dressing was applied. No specimens collected. The patient tolerated the procedure well.     The patient was monitored after the procedure in the recovery area. They were given post-procedure and discharge instructions to follow at home. The patient was discharged in a stable condition.    Denver Cordon MD

## 2024-11-27 NOTE — DISCHARGE SUMMARY
Discharge Note  Short Stay      SUMMARY     Admit Date: 11/22/2024    Attending Physician: Denver Cordon MD PhD    Discharge Physician: Denver Cordon      Discharge Date: 11/27/2024 8:18 AM    Procedure(s) (LRB):  left L3/4 and L4/5 TFESI (Left)    Final Diagnosis: Lumbar radiculopathy [M54.16]    Disposition: Home or self care    Patient Instructions:   Discharge Medication List as of 11/22/2024  7:04 AM        START taking these medications    Details   aspirin 81 MG Chew Take 81 mg by mouth once daily. Not chewable, Historical Med      atorvastatin (LIPITOR) 40 MG tablet Take 1 tablet (40 mg total) by mouth every evening., Starting Sat 5/18/2024, Until Sun 5/18/2025, Normal      calcium-vitamin D3 500 mg(1,250mg) -200 unit per tablet Take 1 tablet by mouth 2 (two) times daily with meals., Until Discontinued, Historical Med      ezetimibe (ZETIA) 10 mg tablet Take 1 tablet (10 mg total) by mouth once daily., Starting Mon 6/17/2024, Until Tue 6/17/2025, Normal      lanolin/mineral oil/petrolatum (ARTIFICIAL TEARS OPHT) Place 1 drop into both eyes once daily., Historical Med      methocarbamoL (ROBAXIN) 500 MG Tab Take 1 tablet (500 mg total) by mouth 4 (four) times daily. for 10 days, Starting Thu 11/21/2024, Until Sun 12/1/2024, Normal      multivitamin (THERAGRAN) per tablet Take 1 tablet by mouth once daily., Historical Med      pilocarpine (SALAGEN) 5 MG Tab Take 1 tablet (5 mg total) by mouth once daily., Starting Thu 11/7/2024, Normal      valsartan (DIOVAN) 80 MG tablet Take 1 tablet (80 mg total) by mouth once daily., Starting Wed 7/3/2024, Normal      vitamin A-vitamin C-vit E-min Tab Take 1 tablet by mouth once daily., Historical Med      traMADoL (ULTRAM) 50 mg tablet Take 1 tablet (50 mg total) by mouth every 6 (six) hours as needed for Pain., Starting u 11/21/2024, Normal      alendronate (FOSAMAX) 70 MG tablet Take 1 tablet (70 mg total) by mouth every 7 days., Starting Wed 10/9/2024, Until Thu  10/9/2025, Normal      diclofenac sodium (VOLTAREN ARTHRITIS PAIN) 1 % Gel Apply 2 g topically daily as needed (Pain)., Historical Med      nitroGLYCERIN (NITROSTAT) 0.4 MG SL tablet Place 1 tablet (0.4 mg total) under the tongue every 5 (five) minutes as needed for Chest pain., Starting Mon 5/27/2024, Until Tue 5/27/2025 at 2359, Normal      pantoprazole (PROTONIX) 40 MG tablet Take 1 tablet (40 mg total) by mouth 2 (two) times daily., Starting Mon 4/29/2024, Normal      sodium chloride (SALINE MIST NASL) 1 spray by Each Nostril route daily as needed (Congestion)., Historical Med      teriparatide (FORTEO) 20 mcg/dose (600mcg/2.4mL) PnIj Inject 0.08 mLs (20 mcg total) into the skin once daily., Starting Thu 11/21/2024, Until Fri 11/21/2025, Normal      ticagrelor (BRILINTA) 90 mg tablet Take 1 tablet (90 mg total) by mouth 2 (two) times daily., Starting Sat 5/18/2024, Until Sun 5/18/2025, Normal           CONTINUE these medications which have NOT CHANGED    Details   metoprolol succinate (TOPROL-XL) 25 MG 24 hr tablet Take 0.5 tablets (12.5 mg total) by mouth once daily., Starting Thu 5/23/2024, Until Wed 11/13/2024, Normal                 Discharge Diagnosis: Lumbar radiculopathy [M54.16]  Condition on Discharge: Stable with no complications to procedure   Diet on Discharge: Same as before.  Activity: as per instruction sheet.  Discharge to: Home with a responsible adult.  Follow up: 2-4 weeks       Please call my office or pager at [phone] if experienced any weakness or loss of sensation, fever > 101.5, pain uncontrolled with oral medications, persistent nausea/vomiting/or diarrhea, redness or drainage from the incisions, or any other worrisome concerns. If physician on call was not reached or could not communicate with our office for any reason please go to the nearest emergency department      Denver Cordon MD PhD

## 2024-11-27 NOTE — TELEPHONE ENCOUNTER
----- Message from Ruth sent at 11/27/2024  3:59 PM CST -----  Regarding: appt  Contact: [phone]  ..Type:  Patient Returning Call    Who Called:Zander, spouse   Who Left Message for Patient:Yun Arndt  Does the patient know what this is regarding?:scheduling PO appt   Would the patient rather a call back or a response via [facility]? Call back   Best Call Back Number:[phone]  Additional Information: n/a

## 2024-12-02 ENCOUNTER — OFFICE VISIT (OUTPATIENT)
Dept: ENDOCRINOLOGY | Facility: CLINIC | Age: 84
End: 2024-12-02
Payer: MEDICARE

## 2024-12-02 ENCOUNTER — PATIENT MESSAGE (OUTPATIENT)
Dept: ENDOCRINOLOGY | Facility: CLINIC | Age: 84
End: 2024-12-02

## 2024-12-02 ENCOUNTER — OFFICE VISIT (OUTPATIENT)
Dept: CARDIOLOGY | Facility: CLINIC | Age: 84
End: 2024-12-02
Payer: MEDICARE

## 2024-12-02 VITALS
DIASTOLIC BLOOD PRESSURE: 78 MMHG | HEIGHT: 61 IN | SYSTOLIC BLOOD PRESSURE: 121 MMHG | BODY MASS INDEX: 18.4 KG/M2 | WEIGHT: 97.44 LBS

## 2024-12-02 VITALS
DIASTOLIC BLOOD PRESSURE: 72 MMHG | BODY MASS INDEX: 19.22 KG/M2 | OXYGEN SATURATION: 98 % | HEIGHT: 60 IN | HEART RATE: 68 BPM | SYSTOLIC BLOOD PRESSURE: 120 MMHG | WEIGHT: 97.88 LBS

## 2024-12-02 DIAGNOSIS — Z98.61 CAD S/P PERCUTANEOUS CORONARY ANGIOPLASTY: Primary | ICD-10-CM

## 2024-12-02 DIAGNOSIS — I25.10 CAD S/P PERCUTANEOUS CORONARY ANGIOPLASTY: Primary | ICD-10-CM

## 2024-12-02 DIAGNOSIS — E78.5 DYSLIPIDEMIA: ICD-10-CM

## 2024-12-02 DIAGNOSIS — I10 PRIMARY HYPERTENSION: ICD-10-CM

## 2024-12-02 DIAGNOSIS — M81.0 OSTEOPOROSIS WITHOUT PATHOLOGICAL FRACTURE: Primary | ICD-10-CM

## 2024-12-02 PROCEDURE — 3078F DIAST BP <80 MM HG: CPT | Mod: HCNC,CPTII,GC,S$GLB | Performed by: STUDENT IN AN ORGANIZED HEALTH CARE EDUCATION/TRAINING PROGRAM

## 2024-12-02 PROCEDURE — 1101F PT FALLS ASSESS-DOCD LE1/YR: CPT | Mod: HCNC,CPTII,GC,S$GLB | Performed by: STUDENT IN AN ORGANIZED HEALTH CARE EDUCATION/TRAINING PROGRAM

## 2024-12-02 PROCEDURE — 3074F SYST BP LT 130 MM HG: CPT | Mod: HCNC,CPTII,GC,S$GLB | Performed by: STUDENT IN AN ORGANIZED HEALTH CARE EDUCATION/TRAINING PROGRAM

## 2024-12-02 PROCEDURE — 3288F FALL RISK ASSESSMENT DOCD: CPT | Mod: HCNC,CPTII,GC,S$GLB | Performed by: STUDENT IN AN ORGANIZED HEALTH CARE EDUCATION/TRAINING PROGRAM

## 2024-12-02 PROCEDURE — 99999 PR PBB SHADOW E&M-EST. PATIENT-LVL V: CPT | Mod: PBBFAC,HCNC,GC, | Performed by: STUDENT IN AN ORGANIZED HEALTH CARE EDUCATION/TRAINING PROGRAM

## 2024-12-02 PROCEDURE — 99499 UNLISTED E&M SERVICE: CPT | Mod: HCNC,S$GLB,, | Performed by: INTERNAL MEDICINE

## 2024-12-02 PROCEDURE — 99999 PR PBB SHADOW E&M-EST. PATIENT-LVL IV: CPT | Mod: PBBFAC,HCNC,,

## 2024-12-02 PROCEDURE — 99214 OFFICE O/P EST MOD 30 MIN: CPT | Mod: HCNC,GC,S$GLB, | Performed by: STUDENT IN AN ORGANIZED HEALTH CARE EDUCATION/TRAINING PROGRAM

## 2024-12-02 NOTE — PATIENT INSTRUCTIONS
Deidra Gomes, PharmD office phone number [phone] for any questions or concerns    Follow-up appointment with Dr. Gibson 1/21/2025 at 9:00am

## 2024-12-02 NOTE — PROGRESS NOTES
PCP - Praful Paul MD  Referring Physician:     Subjective:   Patient ID:  Betzy Cooley is a 84 y.o. female with CAD s/p PCI on 5/17/24 after NSTEMI (was declined by CTS due to frailty), HTN, GERD/gastritis who presents to cardiology clinic for follow up for her coronary artery disease. Her main complaint is back pain. She had a recent admission due to intractable back pain with multiple spinal fractures after falls and is being treated for osteoporosis.    She states she has good and bad days. The patient denies any chest discomfort including with exertion, SOB, DUFFY, orthopnea, PND, leg swelling, palpitations, lightheadedness, pre syncope or syncope. Her main limiting symptoms are related to her orthopedic problems. She is currently walking with a walker and slightly improving after a back injection recently.     Her BP at home is usually around upper 120s-130/70s.    She is on ASA, Plavix and doesn't miss doses.    History:     Social History     Tobacco Use    Smoking status: Never     Passive exposure: Never    Smokeless tobacco: Never   Substance Use Topics    Alcohol use: Not Currently     Comment: 1-2 glasses wine weekly     Family History   Problem Relation Name Age of Onset    Heart failure Mother copd     Macular degeneration Mother copd     Heart disease Mother copd     COPD Mother copd     Diabetes Sister      Arthritis Sister          rheumatoid arthritis    COPD Father      No Known Problems Brother      No Known Problems Son Saad     No Known Problems Daughter Jordyn     No Known Problems Sister      No Known Problems Sister      No Known Problems Sister      No Known Problems Brother      No Known Problems Brother      No Known Problems Brother      No Known Problems Brother      No Known Problems Brother      No Known Problems Daughter Reyes     No Known Problems Son Arie     Breast cancer Neg Hx      Ovarian cancer Neg Hx      Amblyopia Neg Hx      Blindness Neg Hx      Cancer Neg  Hx      Cataracts Neg Hx      Glaucoma Neg Hx      Hypertension Neg Hx      Retinal detachment Neg Hx      Strabismus Neg Hx      Stroke Neg Hx      Thyroid disease Neg Hx      Cervical cancer Neg Hx      Endometrial cancer Neg Hx      Vaginal cancer Neg Hx      Colon cancer Neg Hx         Meds:     Review of patient's allergies indicates:   Allergen Reactions    Sulfa (sulfonamide antibiotics) Hives     Other reaction(s): Anaphylaxis    Itching     Shortness of breath       Current Outpatient Medications:     alendronate (FOSAMAX) 70 MG tablet, Take 1 tablet (70 mg total) by mouth every 7 days., Disp: 4 tablet, Rfl: 11    aspirin 81 MG Chew, Take 81 mg by mouth once daily. Not chewable, Disp: , Rfl:     atorvastatin (LIPITOR) 40 MG tablet, Take 1 tablet (40 mg total) by mouth every evening., Disp: 360 tablet, Rfl: 0    calcium-vitamin D3 500 mg(1,250mg) -200 unit per tablet, Take 1 tablet by mouth 2 (two) times daily with meals., Disp: , Rfl:     diclofenac sodium (VOLTAREN ARTHRITIS PAIN) 1 % Gel, Apply 2 g topically daily as needed (Pain). (Patient not taking: Reported on 11/22/2024), Disp: , Rfl:     ezetimibe (ZETIA) 10 mg tablet, Take 1 tablet (10 mg total) by mouth once daily., Disp: 90 tablet, Rfl: 3    lanolin/mineral oil/petrolatum (ARTIFICIAL TEARS OPHT), Place 1 drop into both eyes once daily., Disp: , Rfl:     methocarbamoL (ROBAXIN) 500 MG Tab, Take 1 tablet (500 mg total) by mouth 4 (four) times daily. for 10 days, Disp: 40 tablet, Rfl: 0    metoprolol succinate (TOPROL-XL) 25 MG 24 hr tablet, Take 0.5 tablets (12.5 mg total) by mouth once daily., Disp: 45 tablet, Rfl: 0    multivitamin (THERAGRAN) per tablet, Take 1 tablet by mouth once daily., Disp: , Rfl:     nitroGLYCERIN (NITROSTAT) 0.4 MG SL tablet, Place 1 tablet (0.4 mg total) under the tongue every 5 (five) minutes as needed for Chest pain. (Patient not taking: Reported on 11/22/2024), Disp: 30 tablet, Rfl: 2    pantoprazole (PROTONIX) 40 MG  tablet, Take 1 tablet (40 mg total) by mouth 2 (two) times daily., Disp: 180 tablet, Rfl: 3    pilocarpine (SALAGEN) 5 MG Tab, Take 1 tablet (5 mg total) by mouth once daily., Disp: 21 tablet, Rfl: 0    sodium chloride (SALINE MIST NASL), 1 spray by Each Nostril route daily as needed (Congestion)., Disp: , Rfl:     teriparatide (FORTEO) 20 mcg/dose (600mcg/2.4mL) PnIj, Inject 0.08 mLs (20 mcg total) into the skin once daily. (Patient not taking: Reported on 11/22/2024), Disp: 2.4 mL, Rfl: 11    ticagrelor (BRILINTA) 90 mg tablet, Take 1 tablet (90 mg total) by mouth 2 (two) times daily., Disp: 60 tablet, Rfl: 11    traMADoL (ULTRAM) 50 mg tablet, Take 1 tablet (50 mg total) by mouth every 8 (eight) hours as needed for Pain., Disp: 21 tablet, Rfl: 0    valsartan (DIOVAN) 80 MG tablet, Take 1 tablet (80 mg total) by mouth once daily., Disp: 90 tablet, Rfl: 3    vitamin A-vitamin C-vit E-min Tab, Take 1 tablet by mouth once daily., Disp: , Rfl:   No current facility-administered medications for this visit.    Facility-Administered Medications Ordered in Other Visits:     lactated ringers infusion, , Intravenous, Continuous, Zbigniew Dougherty MD    Constitution: Negative for fever, chills, weight loss or gain.   HENT: Negative for sore throat, rhinorrhea, or headache.  Eyes: Negative for blurred or double vision.   Cardiovascular: See above  Pulmonary: Negative for SOB   Gastrointestinal: Negative for abdominal pain, nausea, vomiting, or diarrhea.   : Negative for dysuria.   Neurological: Negative for focal weakness or sensory changes.    Objective:   /72   Pulse 68   Ht 5' (1.524 m)   Wt 44.4 kg (97 lb 14.2 oz)   SpO2 98%   BMI 19.12 kg/m²   Constitutional: No distress, appropriately conversant  HEENT: Sclera anicteric, PERRLA  Neck: No JVD, no masses, good movement  CV: RRR, S1 and S2 normal, no additional heart sounds or murmurs  Pulm: Lung sounds clear bilaterally without respiratory distress    GI: Abdomen soft, non-tender, good bowel sounds  Extremities: Extremities grossly normal, warm, well perfused, no edema  Skin: No ecchymosis, erythema, or ulcers  Psych: AOx3, appropriate affect    Most Recent Lab Data:     CBC:   Lab Results   Component Value Date    WBC 9.18 11/21/2024    HGB 11.8 (L) 11/21/2024    HCT 34.9 (L) 11/21/2024     11/21/2024    MCV 92 11/21/2024    RDW 14.2 11/21/2024     BMP:   Lab Results   Component Value Date     (L) 11/21/2024    K 4.1 11/21/2024     11/21/2024    CO2 23 11/21/2024    BUN 11 11/21/2024    CREATININE 0.6 11/21/2024    GLU 89 11/21/2024    CALCIUM 9.6 11/21/2024    MG 2.3 11/21/2024    PHOS 2.5 (L) 05/07/2024     LFTS;   Lab Results   Component Value Date    PROT 6.6 11/19/2024    ALBUMIN 3.5 11/19/2024    BILITOT 0.4 11/19/2024    AST 19 11/19/2024    ALKPHOS 104 11/19/2024    ALT 13 11/19/2024     COAGS:   Lab Results   Component Value Date    INR 0.9 01/04/2019     FLP:   Lab Results   Component Value Date    CHOL 155 05/15/2024    HDL 57 05/15/2024    LDLCALC 86.8 05/15/2024    TRIG 56 05/15/2024    CHOLHDL 36.8 05/15/2024     CARDIAC:   Lab Results   Component Value Date    TROPONINI <0.006 08/22/2024    BNP 41 08/21/2024         Cardiovascular Imaging:      Stress Echo 5/15/24:    Left Ventricle: The left ventricle is normal in size. Normal wall thickness. Septal motion is abnormal. There is normal systolic function with a visually estimated ejection fraction of 60 - 65%. There is normal diastolic function. Inconclusive left ventricular filling pressure.    Right Ventricle: Normal right ventricular cavity size. Wall thickness is normal. Systolic function is normal.    The left atrium is severely dilated.    Aortic Valve: There is mild aortic valve sclerosis. Mildly restricted motion.    Pulmonary Artery: The estimated pulmonary artery systolic pressure is 34 mmHg.    IVC/SVC: Normal venous pressure at 3 mmHg.    Stress Protocol: The  patient was infused intravenously with dobutamine. The patient received a graduated infusion of the stress agent beginning at 10.0 mcg/kg/min to a peak dose of 40.0 mcg/kg/min. A total dobutamine dose of 40.0 mg was injected. The peak heart rate was 121.0 bpm, which is 92% of age predicted maximum heart rate. The patient was also given atropine. The patient reported 8/10 chest discomfort during the stress test.    Baseline ECG: The Baseline ECG reveals sinus bradycardia. There is left axis deviation. The baseline ECG has ST and/or T wave abnormalities suggestive of myocardial ischemia.    Stress ECG: There are no ST segment deviation identified during the protocol. There are no arrhythmias during stress. There is normal blood pressure response with stress.    ECG Conclusion: The ECG portion of the study is negative for ischemia, though the deep T wave inversion seen at rest normalizes/pseudonormalizes with stress.    Post-stress Impression: At peak stress and into recovery, the mid to distal LAD territory including the apex becomes hypokinetic, suggesting stress-induced ischemia in that region of myocardium.    Overall, this study is consistent with stress-induced ischemia in the mid to distal LAD territory.       [facility] 5/17/24:    This was a successful PCI for acute myocardial infarction.    Successful PCI with JOSE M of LMCA, Proximal to mid LAD, D1, Ramus and RCA.    The Prox RCA to Mid RCA lesion was 75% stenosed with 0% stenosis post-intervention.    Prox RCA to Mid RCA lesion: A STENT SYNERGY XD 3.5X32MM stent was successfully placed.    The Dist RCA lesion was 80% stenosed with 0% stenosis post-intervention.    Dist RCA lesion: A STENT SYNERGY XD 2.5X32MM stent was successfully placed.    The Ramus lesion was 75% stenosed with 0% stenosis post-intervention.    Ramus lesion: A STENT SYNERGY XD 3.0X12MM stent was successfully placed.    The Prox LAD to Mid LAD lesion was 90% stenosed with 0% stenosis  post-intervention.    Prox LAD to Mid LAD lesion: A STENT SYNERGY XD 3.5X32MM stent was successfully placed.    The Mid LAD lesion was 75% stenosed with 0% stenosis post-intervention.    Mid LAD lesion: A STENT SYNERGY XD 2.5X28MM stent was successfully placed.    The 1st Diag lesion was 95% stenosed with 0% stenosis post-intervention.    1st Diag lesion: A STENT SYNERGY XD 2.5X16MM stent was successfully placed.    The Mid LM lesion was 50% stenosed with 0% stenosis post-intervention.    Mid LM lesion: A STENT SYNERGY XD 5.0X12MM stent was successfully placed.    The pre-procedure left ventricular end diastolic pressure was 7.    IVUS used for multivessel PCI guidance    The estimated blood loss was <50 mL.    Assessment & Plan:     1. CAD S/P percutaneous coronary angioplasty    2. Dyslipidemia    3. Primary hypertension        #CAD  Continue ASA, Brilinta until 1yr post PCI and can reduce to ASA monotherapy  Stable without angina. No bleeding issues.  Continue BP control as below  Lipid control as below  Continue BB    #HLD  Repeat lipid panel  Continue Lipitor 40 plus Zetia that was added this spring  Goal LDL < 70    #HTN  Stable on Toprol 12.5 qd, valsartan 80  Continue current regimen, goal < 130/90      Discussed mediterranean diet  Discussed exercise therapy based on 150-min per week AHA recommendations for moderate intensity exercise/75 min for high-intensity exercise    Follow up in about 6 months (around 6/2/2025).    This plan was formulated after discussion with Dr. McKinnie Connor Gillies, DO, PGY-V  [facility]

## 2024-12-02 NOTE — PROGRESS NOTES
[facility] [place]  Endocrinology Clinic  Pharmacist Note     Today's Visit Date: 12/2/2024     Reason for Visit: Forteo (teriparatide) injection training     History of Present Illness:  Patient was last seen by Dr. Noble while hospitalized 11/19-11/21 for a a new compression fracture at L3. Endocrinology was consulted for osteoporosis management. She is here today with her  for Forteo injection training.  Per Dr. Noble: Patient would benefit from anabolic agents for improvement in BMD, we would recommend Forteo, once daily injections which can be started as an outpatient. He would like me to instruct patient and her  how to administer Forteo.  She was treated with Fosamax in the past (greater than 10 years ago she reports) for ten years but has been on holiday since then. She takes daily calcium citrate with vitamin D and does not exercise.       Summary:  The patient and her  were provided detailed education on Forteo, including its purpose in treating osteoporosis and its role in stimulating bone formation. Training covered  Injection technique: Proper handling of the pre-filled pen, priming the device, dose selection, and administering the injection into recommended sites (abdomen or thigh).  Site rotation: Emphasis on rotating injection sites to minimize irritation or tissue damage.  Storage instructions: Importance of keeping the medication refrigerated and avoiding freezing.  Disposal protocols: Proper disposal of used needles in a sharps container.    The following specifics for the Forteo delivery device were reviewed:  - Attach a new needle before each injection  - Prime each new pen  - The skin may need to be pinched up to avoid a deep injection into the muscle   - Store in the refrigerator right after use  - Can use pen for up to 28 days; after 28 days throw away the pen even if it is not completely empty    The patient and her  were given the opportunity  to practice in clinic by administering the first dose of Forteo and successfully performed the injection under supervision. The patient and her  voiced understanding of all instructions and asked relevant questions, which were addressed. They expressed confidence in their ability to self-administer the medication at home.    No adverse reactions, technical challenges, or concerns arose during the visit. Written materials and contact information for follow-up questions were provided. The patient was reminded to report any side effects, especially symptoms of hypercalcemia or injection site reactions, and to attend scheduled follow-up appointments to monitor treatment progress.     Plan: Continue with prescribed regimen of Forteo 20 mcg once daily. Continue calcium and vitamin D supplementation. STOP Fosamax. Keep follow-up appt with Dr. Gibson 1/21/2025.    Deidra Gomes, PharmD  Clinical Pharmacist - Endocrinology

## 2024-12-03 ENCOUNTER — TELEPHONE (OUTPATIENT)
Dept: PAIN MEDICINE | Facility: CLINIC | Age: 84
End: 2024-12-03
Payer: MEDICARE

## 2024-12-03 ENCOUNTER — PATIENT MESSAGE (OUTPATIENT)
Dept: RHEUMATOLOGY | Facility: CLINIC | Age: 84
End: 2024-12-03
Payer: MEDICARE

## 2024-12-03 NOTE — TELEPHONE ENCOUNTER
----- Message from Cyndi sent at 12/3/2024  8:17 AM CST -----  Regarding:  returned call for Yun regarding an appt  Contact: [phone]  Type:  Patient Returning Call        Who Called:Zander        Who Left Message for Patient:Yun        Does the patient know what this is regarding? returned call for Yun regarding an appt. Please advise         Best Call Back Number:Telephone Information:  Mobile         [phone]            Additional Information:

## 2024-12-04 ENCOUNTER — LAB VISIT (OUTPATIENT)
Dept: LAB | Facility: HOSPITAL | Age: 84
End: 2024-12-04
Payer: MEDICARE

## 2024-12-04 DIAGNOSIS — I25.10 CAD S/P PERCUTANEOUS CORONARY ANGIOPLASTY: ICD-10-CM

## 2024-12-04 DIAGNOSIS — E78.5 DYSLIPIDEMIA: ICD-10-CM

## 2024-12-04 DIAGNOSIS — Z98.61 CAD S/P PERCUTANEOUS CORONARY ANGIOPLASTY: ICD-10-CM

## 2024-12-04 LAB
CHOLEST SERPL-MCNC: 116 MG/DL (ref 120–199)
CHOLEST/HDLC SERPL: 1.5 {RATIO} (ref 2–5)
HDLC SERPL-MCNC: 75 MG/DL (ref 40–75)
HDLC SERPL: 64.7 % (ref 20–50)
LDLC SERPL CALC-MCNC: 32.2 MG/DL (ref 63–159)
NONHDLC SERPL-MCNC: 41 MG/DL
TRIGL SERPL-MCNC: 44 MG/DL (ref 30–150)

## 2024-12-04 PROCEDURE — 36415 COLL VENOUS BLD VENIPUNCTURE: CPT | Mod: HCNC | Performed by: STUDENT IN AN ORGANIZED HEALTH CARE EDUCATION/TRAINING PROGRAM

## 2024-12-04 PROCEDURE — 80061 LIPID PANEL: CPT | Mod: HCNC | Performed by: STUDENT IN AN ORGANIZED HEALTH CARE EDUCATION/TRAINING PROGRAM

## 2024-12-11 ENCOUNTER — OUTPATIENT CASE MANAGEMENT (OUTPATIENT)
Dept: ADMINISTRATIVE | Facility: OTHER | Age: 84
End: 2024-12-11
Payer: MEDICARE

## 2024-12-17 ENCOUNTER — OFFICE VISIT (OUTPATIENT)
Dept: PAIN MEDICINE | Facility: CLINIC | Age: 84
End: 2024-12-17
Payer: MEDICARE

## 2024-12-17 VITALS
WEIGHT: 97.44 LBS | BODY MASS INDEX: 18.4 KG/M2 | HEART RATE: 65 BPM | SYSTOLIC BLOOD PRESSURE: 151 MMHG | HEIGHT: 61 IN | DIASTOLIC BLOOD PRESSURE: 77 MMHG

## 2024-12-17 DIAGNOSIS — M80.00XA AGE-RELATED OSTEOPOROSIS WITH CURRENT PATHOLOGICAL FRACTURE, INITIAL ENCOUNTER: ICD-10-CM

## 2024-12-17 DIAGNOSIS — M51.362 DEGENERATION OF INTERVERTEBRAL DISC OF LUMBAR REGION WITH DISCOGENIC BACK PAIN AND LOWER EXTREMITY PAIN: Primary | ICD-10-CM

## 2024-12-17 DIAGNOSIS — S32.010A COMPRESSION FRACTURE OF L1 VERTEBRA, INITIAL ENCOUNTER: ICD-10-CM

## 2024-12-17 PROCEDURE — 1159F MED LIST DOCD IN RCRD: CPT | Mod: CPTII,S$GLB,, | Performed by: STUDENT IN AN ORGANIZED HEALTH CARE EDUCATION/TRAINING PROGRAM

## 2024-12-17 PROCEDURE — 99213 OFFICE O/P EST LOW 20 MIN: CPT | Mod: S$GLB,,, | Performed by: STUDENT IN AN ORGANIZED HEALTH CARE EDUCATION/TRAINING PROGRAM

## 2024-12-17 PROCEDURE — G2211 COMPLEX E/M VISIT ADD ON: HCPCS | Mod: S$GLB,,, | Performed by: STUDENT IN AN ORGANIZED HEALTH CARE EDUCATION/TRAINING PROGRAM

## 2024-12-17 PROCEDURE — 3077F SYST BP >= 140 MM HG: CPT | Mod: CPTII,S$GLB,, | Performed by: STUDENT IN AN ORGANIZED HEALTH CARE EDUCATION/TRAINING PROGRAM

## 2024-12-17 PROCEDURE — 3078F DIAST BP <80 MM HG: CPT | Mod: CPTII,S$GLB,, | Performed by: STUDENT IN AN ORGANIZED HEALTH CARE EDUCATION/TRAINING PROGRAM

## 2024-12-17 PROCEDURE — 1160F RVW MEDS BY RX/DR IN RCRD: CPT | Mod: CPTII,S$GLB,, | Performed by: STUDENT IN AN ORGANIZED HEALTH CARE EDUCATION/TRAINING PROGRAM

## 2024-12-17 PROCEDURE — 1126F AMNT PAIN NOTED NONE PRSNT: CPT | Mod: CPTII,S$GLB,, | Performed by: STUDENT IN AN ORGANIZED HEALTH CARE EDUCATION/TRAINING PROGRAM

## 2024-12-17 PROCEDURE — 3288F FALL RISK ASSESSMENT DOCD: CPT | Mod: CPTII,S$GLB,, | Performed by: STUDENT IN AN ORGANIZED HEALTH CARE EDUCATION/TRAINING PROGRAM

## 2024-12-17 PROCEDURE — 1101F PT FALLS ASSESS-DOCD LE1/YR: CPT | Mod: CPTII,S$GLB,, | Performed by: STUDENT IN AN ORGANIZED HEALTH CARE EDUCATION/TRAINING PROGRAM

## 2024-12-17 PROCEDURE — 99999 PR PBB SHADOW E&M-EST. PATIENT-LVL III: CPT | Mod: PBBFAC,,, | Performed by: STUDENT IN AN ORGANIZED HEALTH CARE EDUCATION/TRAINING PROGRAM

## 2024-12-17 NOTE — PROGRESS NOTES
Chronic patient Established Note (Follow up visit)      SUBJECTIVE:  INTERVAL HISTORY 12/17/2024:  Ms. Cooley returns for lower back pain. Current Pain level is 1/10.  She reports that the pain she was having down the leg is completely gone.  She does report that she has persistent pain in the back and buttocks.  She reports she is working with home health physical therapy and she feels soreness after her sessions.  She feels this is helping a great deal.  She feels the pain is more of a discomfort.  She feels her mobility has also improved.  She was also started on forteo injections for improving her bone quality.     INTERVAL HISTORY 10/29/2024:  Ms. Cooley returns for lower back pain with left leg radiation. Current Pain level is 3/10.  She reports that she has been having good days and bad days.  She has been working with physical therapy and she feels this has been feeling overall improvement in her pain. She does feel that after her therapy, her pain does get worse initially, however she has been taking her Norco 5-325mg as needed, which has also been helping.    INTERVAL HISTORY 10/1/2024:  Betzy Cooley presents to the clinic for a follow-up appointment for chronic pain. Current pain intensity is 10/10.  Since the last visit, Betzy Cooley states:  she reports that she underwent a CT scan which significantly worsened in her pain.  She feels her pain is in her lower back and radiates down her left leg.  She reports that since her last visit, she stopped therapy and feels her pain has since worsened.     PRIOR HISTORY:   Betzy Cooley presents to the clinic for the evaluation of lower back pain and left leg pain. The pain started about 4 months ago following a fall on the right side and symptoms have been improving.The pain is located in the lower back area and radiates to the left leg (toward the foot).  The pain is described as numbing and tight band and is rated as 0/10. The pain is rated with a  score of  0/10 on the BEST day and a score of 4/10 on the WORST day.  Symptoms interfere with daily activity. The pain is exacerbated by Walking.  The pain is mitigated by rest.     Patient denies urinary incontinence and bowel incontinence. She and her  report the fall occurred after she had stents placed.  She does report her pain has progressively improved. She feels the LSO brace doesn't really help her pain, but she doesn't feel the pain severely limits her life.    Physical Therapy/Home Exercise: no      Pain Disability Index Review:      12/17/2024     2:34 PM 10/29/2024    10:41 AM 10/1/2024     3:07 PM   Last 3 PDI Scores   Pain Disability Index (PDI) 0 28 63       Pain Medications:   - tylenol (helps a great deal)     report:  Reviewed and consistent with medication use as prescribed.    Pain Procedures:   None    Imaging:   MRI LUMBAR SPINE WITHOUT CONTRAST     CLINICAL HISTORY:  Spine fracture, lumbar, pathological;     TECHNIQUE:  Multiplanar, multisequence MR images were acquired from the thoracolumbar junction to the sacrum without contrast.     COMPARISON:  CTA 08/21/2024     FINDINGS:  Alignment: Grade 1 anterolisthesis L4 on L5.  Retropulsion of L1 vertebral body.     Vertebrae: Compression fracture of the L1 vertebral body with marked height loss and 6 mm retropulsion.  Essentially complete height loss of the ventral and central aspect of the vertebral body.  Patchy edema-like signal within the L1 vertebra would in keeping with recent fracture.  No large paraspinous hematoma.  No definite intraspinal hemorrhage.  Elsewhere in the lumbar spine, centrally normal marrow signal for age; no endplate edema-like signal.     Discs: Disc desiccation and height loss at L2-L3 and L3-L4.     Cord: Conus terminates at L1-L2 and appears unremarkable.  Cauda equina appears unremarkable.     Degenerative findings:     *T12-L1: Compression fracture of the L1 vertebral body with retropulsion resulting in  no more than moderate bilateral neural foraminal narrowing and moderate spinal canal stenosis.  *L1-L2: Compression fracture of the L1 vertebral body with retropulsion resulting in moderate bilateral neural foraminal narrowing and mild spinal canal stenosis.  Minimal AP thecal sac diameter approximately 6.9 mm at level of maximum compression.  *L2-L3: Facet arthropathy and circumferential disc bulge resulting in mild bilateral neural foraminal narrowing and mild spinal canal stenosis.  *L3-L4: Facet arthropathy and ligamentum flavum buckling resulting in moderate bilateral neural foraminal narrowing and mild spinal canal stenosis.  *L4-L5: Grade 1 anterolisthesis, facet arthropathy and ligamentum flavum buckling with unroofing of the intervertebral disc resulting in severe bilateral subarticular recess stenosis (with encroachment and possible contact of traversing L5 nerve roots), mild left neural foraminal narrowing and moderate spinal canal stenosis.  *L5-S1: Facet arthropathy resulting in mild bilateral neural foraminal narrowing.  No spinal canal stenosis.  Paraspinal muscles & soft tissues: Unremarkable.     Impression:     L1 vertebral body compression fracture resulting in vertebra plana configuration with 6 mm retropulsion.  Finding results in no more than moderate spinal canal stenosis at T12-L1 and moderate bilateral neural foraminal narrowing at L1-L2.     Multilevel lumbar spondylosis, as characterized above, notable for moderate bilateral neural foraminal narrowing at L3-L4, severe bilateral subarticular recess stenosis at L4-L5 and moderate spinal canal stenosis at L4-L5.     Additional details, as provided in the body of report.     Electronically signed by resident: Malcolm Vazquez  Date:                                            08/22/2024  Time:                                           06:19     Electronically signed by:Kenneth Dillon  Date:                                             08/22/2024  Time:                                           08:22                XR LUMBAR SPINE LATERAL SUPINE AND LATERAL UPRIGHT     CLINICAL HISTORY:  L1 burst fracture;     TECHNIQUE:  Two views of the lumbar spine were obtained, with lateral views performed weight-bearing and non weight-bearing.     COMPARISON:  Reference is made to the CT examination of 08/21/2024 and to an MR exam of 08/22/2024 and a prior conventional radiographic exam of the lumbar spine dated 05/09/2024.     FINDINGS:  Severe compression deformity with marked loss of height involving the L1 vertebral body is again observed, unchanged from the 08/21/2024 CT examination but representing a significant interval loss of height of L1 since the older conventional radiograph of 05/29/2024.  There is a modest degree of retropulsion of the posterior aspect of this markedly compressed L1 vertebral body noted, as was optimally demonstrated on the prior CT and MR examinations referenced above.  9 mm of anterolisthesis of L4 in relation to L5 is appreciated, as is a lesser degree of anterolisthesis of L5 in relation to S1, secondary to facet arthropathy at these levels.  Alignment elsewhere in the lumbar and visualized lower thoracic spine appears unremarkable.  Aside from L1, the visualized vertebral body heights are normally maintained without compression deformity.  Disc narrowing is seen at every level from L1-2 through L4-5.  Vertebral endplates appear well defined.  No osseous destructive process.     Impression:     1. Compression deformity with severe loss of height of the L1 vertebral body with a modest degree of retropulsion of the posterior aspect of this compressed vertebral body.  The appearance is similar to the recent CT and MR examinations, although it does represent a detrimental change since of earlier conventional radiograph of 05/29/2024.  2. Anterolisthesis of L4 in relation to L5 and L5 in relation to S1, secondary to facet  arthropathy at each level.  3. No alignment change between the supine and weight-bearing images is observed.        Electronically signed by:Bravo Medina MD  Date:                                            08/22/2024  Time:                                           09:32    Past Medical History:   Diagnosis Date    Arthritis     Cataract     GERD (gastroesophageal reflux disease)     HEARING LOSS     Hypertension     Osteoporosis, postmenopausal     Squamous Cell Carcinoma 2007    right forearm    Urinary incontinence     rare mixed     Past Surgical History:   Procedure Laterality Date    ANGIOGRAM, CORONARY, WITH LEFT HEART CATHETERIZATION Bilateral 05/17/2024    Procedure: Angiogram, Coronary, with Left Heart Cath;  Surgeon: Miguel Jacob MD;  Location: [facility] CATH LAB;  Service: Cardiology;  Laterality: Bilateral;    cardiac stents  04/01/2024    COLPOPEXY N/A 08/06/2019    Procedure: COLPOPEXY SSL FIXATION;  Surgeon: Alma Dorsey MD;  [facility];  Service: Urology;  Laterality: N/A;    CORONARY ANGIOGRAPHY Left 05/16/2024    Procedure: ANGIOGRAM, CORONARY ARTERY;  Surgeon: Miguel Jacob MD;  Location: [facility] CATH LAB;  Service: Cardiology;  Laterality: Left;    CYSTOSCOPY N/A 08/06/2019    Procedure: CYSTOSCOPY;  Surgeon: Alma Dorsey MD;  [facility];  Service: Urology;  Laterality: N/A;    ESOPHAGOGASTRODUODENOSCOPY N/A 04/01/2024    Procedure: EGD (ESOPHAGOGASTRODUODENOSCOPY);  Surgeon: Zbigniew Dougherty MD;  Location: [facility];  Service: Endoscopy;  Laterality: N/A;    FRACTURE SURGERY Left 2008    open radius/ulna fracture    HYSTERECTOMY      TANIA/ovaries remain--fibroids- in her late 30's / early 40's    INJECTION, SPINE, LUMBOSACRAL, TRANSFORAMINAL APPROACH Left 11/22/2024    Procedure: left L3/4 and L4/5 TFESI;  Surgeon: Denver Cordon MD;  Location: [facility] PAIN MANAGEMENT;  Service: Pain Management;  Laterality: Left;  20 mins ASA ok  Brilinta 5  days    IVUS, CORONARY  05/17/2024    Procedure: IVUS, Coronary;  Surgeon: Miguel Jacob MD;  Location: [facility] CATH LAB;  Service: Cardiology;;    STENT, DRUG ELUTING, MULTI VESSEL, CORONARY  05/17/2024    Procedure: Stent, Drug Eluting, Multi Vessel, Coronary;  Surgeon: Miguel Jacob MD;  Location: [facility] CATH LAB;  Service: Cardiology;;     Social History     Socioeconomic History    Marital status:    Tobacco Use    Smoking status: Never     Passive exposure: Never    Smokeless tobacco: Never   Substance and Sexual Activity    Alcohol use: Not Currently     Comment: 1-2 glasses wine weekly    Drug use: No    Sexual activity: Yes     Partners: Male     Birth control/protection: None     Social Drivers of Health     Financial Resource Strain: Low Risk  (11/25/2024)    Overall Financial Resource Strain (CARDIA)     Difficulty of Paying Living Expenses: Not hard at all   Food Insecurity: No Food Insecurity (11/25/2024)    Hunger Vital Sign     Worried About Running Out of Food in the Last Year: Never true     Ran Out of Food in the Last Year: Never true   Transportation Needs: No Transportation Needs (11/25/2024)    PRAPARE - Transportation     Lack of Transportation (Medical): No     Lack of Transportation (Non-Medical): No   Physical Activity: Inactive (11/25/2024)    Exercise Vital Sign     Days of Exercise per Week: 0 days     Minutes of Exercise per Session: 0 min   Stress: Stress Concern Present (11/25/2024)    [employer] - Occupational Stress Questionnaire     Feeling of Stress : To some extent   Housing Stability: Low Risk  (11/25/2024)    Housing Stability Vital Sign     Unable to Pay for Housing in the Last Year: No     Homeless in the Last Year: No     Family History   Problem Relation Name Age of Onset    Heart failure Mother copd     Macular degeneration Mother copd     Heart disease Mother copd     COPD Mother copd     Diabetes Sister      Arthritis Sister           rheumatoid arthritis    COPD Father      No Known Problems Brother      No Known Problems Son Saad     No Known Problems Daughter Jordyn     No Known Problems Sister      No Known Problems Sister      No Known Problems Sister      No Known Problems Brother      No Known Problems Brother      No Known Problems Brother      No Known Problems Brother      No Known Problems Brother      No Known Problems Daughter Reyes     No Known Problems Son Arie     Breast cancer Neg Hx      Ovarian cancer Neg Hx      Amblyopia Neg Hx      Blindness Neg Hx      Cancer Neg Hx      Cataracts Neg Hx      Glaucoma Neg Hx      Hypertension Neg Hx      Retinal detachment Neg Hx      Strabismus Neg Hx      Stroke Neg Hx      Thyroid disease Neg Hx      Cervical cancer Neg Hx      Endometrial cancer Neg Hx      Vaginal cancer Neg Hx      Colon cancer Neg Hx         Review of patient's allergies indicates:   Allergen Reactions    Sulfa (sulfonamide antibiotics) Hives     Other reaction(s): Anaphylaxis    Itching     Shortness of breath       Current Outpatient Medications   Medication Sig    alendronate (FOSAMAX) 70 MG tablet Take 1 tablet (70 mg total) by mouth every 7 days.    aspirin 81 MG Chew Take 81 mg by mouth once daily. Not chewable    atorvastatin (LIPITOR) 40 MG tablet Take 1 tablet (40 mg total) by mouth every evening.    calcium-vitamin D3 500 mg(1,250mg) -200 unit per tablet Take 1 tablet by mouth 2 (two) times daily with meals.    diclofenac sodium (VOLTAREN ARTHRITIS PAIN) 1 % Gel Apply 2 g topically daily as needed (Pain).    ezetimibe (ZETIA) 10 mg tablet Take 1 tablet (10 mg total) by mouth once daily.    lanolin/mineral oil/petrolatum (ARTIFICIAL TEARS OPHT) Place 1 drop into both eyes once daily.    multivitamin (THERAGRAN) per tablet Take 1 tablet by mouth once daily.    nitroGLYCERIN (NITROSTAT) 0.4 MG SL tablet Place 1 tablet (0.4 mg total) under the tongue every 5 (five) minutes as needed for Chest pain.     pantoprazole (PROTONIX) 40 MG tablet Take 1 tablet (40 mg total) by mouth 2 (two) times daily.    pilocarpine (SALAGEN) 5 MG Tab Take 1 tablet (5 mg total) by mouth once daily.    sodium chloride (SALINE MIST NASL) 1 spray by Each Nostril route daily as needed (Congestion).    teriparatide (FORTEO) 20 mcg/dose (600mcg/2.4mL) PnIj Inject 0.08 mLs (20 mcg total) into the skin once daily.    ticagrelor (BRILINTA) 90 mg tablet Take 1 tablet (90 mg total) by mouth 2 (two) times daily.    traMADoL (ULTRAM) 50 mg tablet Take 1 tablet (50 mg total) by mouth every 8 (eight) hours as needed for Pain.    valsartan (DIOVAN) 80 MG tablet Take 1 tablet (80 mg total) by mouth once daily.    vitamin A-vitamin C-vit E-min Tab Take 1 tablet by mouth once daily.    metoprolol succinate (TOPROL-XL) 25 MG 24 hr tablet Take 0.5 tablets (12.5 mg total) by mouth once daily.     No current facility-administered medications for this visit.     Facility-Administered Medications Ordered in Other Visits   Medication    lactated ringers infusion       REVIEW OF SYSTEMS:    GENERAL:  current fevers or chills, recent use of antibiotics denies.  HEENT:  History of migraines/headaches: denies, History of major throat surgery: denies  RESPIRATORY:  History of home oxygen or pulmonary hypertension/severe breathing dysfunction: denies  CARDIOVASCULAR:  Hx of palpitations/rhythm problems: reports Hx of Heart Attacks/Surgery: reports Stents placed in May 2024  GI:  Recent abdominal discomfort or recent change in bowel habits denies  MUSCULOSKELETAL:  See HPI.  SKIN:  unhealed wounds or rashes: denies  PSYCH: major psychiatric history or recent psychosocial stressors denies  HEMATOLOGY/LYMPHOLOGY:  Hx of prolonged bleeding, Hx of Blood thinner usage: reports brilinta ; reason: stents in May 2024  NEURO:   history of seizures, strokes, chronic/old weakness (such as paralysis or paresis of any body part): denies  All other reviewed and negative other than  "HPI.    OBJECTIVE:    BP (!) 151/77 (BP Location: Right arm, Patient Position: Sitting)   Pulse 65   Ht 5' 1" (1.549 m)   Wt 44.2 kg (97 lb 7.1 oz)   BMI 18.41 kg/m²     PHYSICAL EXAMINATION:  General appearance: Well appearing, in no acute distress, alert and appropriately communicative.  Psych:  Mood and affect appropriate.  Skin: Skin color, texture, turgor normal, no rashes or lesions, in both upper and lower body for exposed skin.  Head/face:  Atraumatic, normocephalic.  Cor: regular rate  Pulm: non-labored breathing  GI: Abdomen non-distended and non-tender.  Back: Straight leg raising in the sitting and supine positions is positive to radicular pain on the left. pain to palpation over the spine or paraspinal muscles. Pain with extension and facet loading  Extremities: No deformities, significant lymphedema, or skin discoloration. No significant open wounds. No major amputations.  Musculoskeletal: hip, sacroiliac and knee provocative maneuvers are negative. Bilateral upper and lower extremity strength is normal and symmetric.  No atrophy or tone abnormalities are noted.  Neuro: Bilateral upper and lower extremity coordination and muscle stretch reflexes abnormalities noted: none.  Monae and/or Clonus: negative; loss of sensation to light touch: none  Gait: walker    CMP  Sodium   Date Value Ref Range Status   11/21/2024 135 (L) 136 - 145 mmol/L Final     Potassium   Date Value Ref Range Status   11/21/2024 4.1 3.5 - 5.1 mmol/L Final     Chloride   Date Value Ref Range Status   11/21/2024 103 95 - 110 mmol/L Final     CO2   Date Value Ref Range Status   11/21/2024 23 23 - 29 mmol/L Final     Glucose   Date Value Ref Range Status   11/21/2024 89 70 - 110 mg/dL Final     BUN   Date Value Ref Range Status   11/21/2024 11 8 - 23 mg/dL Final     Creatinine   Date Value Ref Range Status   11/21/2024 0.6 0.5 - 1.4 mg/dL Final     Calcium   Date Value Ref Range Status   11/21/2024 9.6 8.7 - 10.5 mg/dL Final "     Total Protein   Date Value Ref Range Status   11/19/2024 6.6 6.0 - 8.4 g/dL Final     Albumin   Date Value Ref Range Status   11/19/2024 3.5 3.5 - 5.2 g/dL Final     Total Bilirubin   Date Value Ref Range Status   11/19/2024 0.4 0.1 - 1.0 mg/dL Final     Comment:     For infants and newborns, interpretation of results should be based  on gestational age, weight and in agreement with clinical  observations.    Premature Infant recommended reference ranges:  Up to 24 hours.............<8.0 mg/dL  Up to 48 hours............<12.0 mg/dL  3-5 days..................<15.0 mg/dL  6-29 days.................<15.0 mg/dL       Alkaline Phosphatase   Date Value Ref Range Status   11/19/2024 104 40 - 150 U/L Final     AST   Date Value Ref Range Status   11/19/2024 19 10 - 40 U/L Final     ALT   Date Value Ref Range Status   11/19/2024 13 10 - 44 U/L Final     Anion Gap   Date Value Ref Range Status   11/21/2024 9 8 - 16 mmol/L Final     eGFR   Date Value Ref Range Status   11/21/2024 >60.0 >60 mL/min/1.73 m^2 Final     ASSESSMENT: 84 y.o. year old female with lower back pain, consistent with:    1. Degeneration of intervertebral disc of lumbar region with discogenic back pain and lower extremity pain        2. Compression fracture of L1 vertebra, initial encounter        3. Age-related osteoporosis with current pathological fracture, initial encounter            IMPRESSION: Betzy Cooley presents today for lower back pain with some radiation down the left leg. History and physical exam are consistent with axial lower back pain/facetogenic pain and lumbar radiculopathy.  My independent interpretation of the imaging is consistent with L1 compression fracture (vertebra plana) with retropulsion and lumbar degenerative disc disease with multilevel foraminal stenosis, L3 compression fracture, as well as lumbar facet spondylosis.  She is now s/p lumbar transforaminal epidural steroid injection with significant improvement in her  lower extremity pain and improved function/quality of life.  They are doing well today.  They feel they have improved in terms of their pain control and their functional abilities. They are satisfied with their management of pain thus far.  We can continue with conservative management with a robust home exercise program and medications, as appropriate.    PLAN:   - I have stressed the importance of physical activity and a home exercise plan to help with pain and improve health.  - Patient can continue with medications for now since they are providing benefits, using them appropriately, and without side effects.  - continue physical therapy for lower back pain  - ok to take tylenol 1000mg every 8 hours as needed; < 3000mg total in a day  - continue robaxin 500mg every 8 hours as needed for muscular pain  - RTC as needed  - Counseled patient regarding the importance of activity modification and physical therapy.    The above plan and management options were discussed at length with patient. Patient is in agreement with the above and verbalized understanding. It will be communicated with the referring physician via electronic record, fax, or mail.    Denver Cordon  12/17/2024

## 2024-12-18 ENCOUNTER — OFFICE VISIT (OUTPATIENT)
Dept: RHEUMATOLOGY | Facility: CLINIC | Age: 84
End: 2024-12-18
Payer: MEDICARE

## 2024-12-18 VITALS
WEIGHT: 99.19 LBS | SYSTOLIC BLOOD PRESSURE: 119 MMHG | BODY MASS INDEX: 18.74 KG/M2 | DIASTOLIC BLOOD PRESSURE: 69 MMHG | HEART RATE: 60 BPM

## 2024-12-18 DIAGNOSIS — M15.0 PRIMARY OSTEOARTHRITIS INVOLVING MULTIPLE JOINTS: ICD-10-CM

## 2024-12-18 DIAGNOSIS — M80.00XS AGE-RELATED OSTEOPOROSIS WITH CURRENT PATHOLOGICAL FRACTURE, SEQUELA: ICD-10-CM

## 2024-12-18 DIAGNOSIS — M25.552 LEFT HIP PAIN: Primary | ICD-10-CM

## 2024-12-18 DIAGNOSIS — M35.00 SICCA SYNDROME: ICD-10-CM

## 2024-12-18 PROCEDURE — 99999 PR PBB SHADOW E&M-EST. PATIENT-LVL III: CPT | Mod: PBBFAC,HCNC,, | Performed by: INTERNAL MEDICINE

## 2024-12-18 NOTE — PROGRESS NOTES
Patient ID:  Betzy Cooley    YOB: 1940     MRN:  [id number]     Subjective:     Chief Complaint: Left hip pain     History of Present Illness:  Pt is a 84 y.o. female with a PMHx as listed below who presents today for initial evaluation of the above complaint. Patient reports pain in her left hip that has been present for the last several months. She states she previously had pain in her right hip, but that she is now having pain in her left. She has a history of osteoporosis with vertebral compression fractures, previously on alendronate, and she recently had a fall in 11/2024 that resulted in a hospitalization. During that hospitalization, she was seen by Endocrinology, and alendronate was discontinued in favor of teriparatide for bone remodeling. She currently follows with pain management and underwent a lumber ALLISON three weeks ago for chronic lower back pain that has provided significant relief in her symptoms. She states she is currently only having the pain in her left hip. She states it is worse with activity and better with rest. She states she takes tylenol which relieves her symptoms. She states she has previously tried short courses of opioid pain medication that resulted in relief of her symptoms but caused constipation. She denies current radiation of her pain into her legs, headache, chest pain, shortness of breath, nausea, abdominal pain, constipation, or diarrhea.    Patient also endorses a chronic history of dry eyes and dry mouth. She states these symptoms have been persistent but not worsening. She reports using eye drops for her dryness that provide some relief, and states she chews gum and drinks lots of water to help with her dry mouth. She states she was taking pilocarpine for a few days to help with her dry mouth but that she did not notice relief and is no longer taking it. She denies any mouth sores, trouble swallowing, rashes, skin changes, joint swelling, joint tenderness,  or headaches.    She states she has a sister with RA and a daughter with an autoimmune disease.    Denies hair loss, dry eyes, vision changes, dry mouth, oral/nasal ulcers, trouble swallowing, new rashes, joint swelling, morning stiffness, or GI disturbances.      Review of Systems   Review of Systems   Constitutional:  Negative for chills, fatigue, fever and unexpected weight change.   HENT:  Negative for mouth sores, sore throat and trouble swallowing.         Dry mouth   Eyes:  Negative for redness and visual disturbance.        Dry eyes   Respiratory:  Negative for cough, choking and shortness of breath.    Cardiovascular:  Negative for chest pain, palpitations and leg swelling.   Gastrointestinal:  Negative for abdominal pain, constipation, diarrhea and nausea.   Genitourinary:  Negative for dysuria and genital sores.   Musculoskeletal:  Positive for arthralgias (left hip, worse with activity) and back pain. Negative for joint swelling.   Skin:  Negative for rash.   Neurological:  Negative for dizziness, light-headedness and headaches.   Hematological:  Bruises/bleeds easily.   Psychiatric/Behavioral:  Negative for sleep disturbance.         Past Medical History:  Past Medical History:   Diagnosis Date    Arthritis     Cataract     GERD (gastroesophageal reflux disease)     HEARING LOSS     Hypertension     Osteoporosis, postmenopausal     Squamous Cell Carcinoma 2007    right forearm    Urinary incontinence     rare mixed        Past Surgical History:  Past Surgical History:   Procedure Laterality Date    ANGIOGRAM, CORONARY, WITH LEFT HEART CATHETERIZATION Bilateral 05/17/2024    Procedure: Angiogram, Coronary, with Left Heart Cath;  Surgeon: Miguel Jacob MD;  Location: [facility] CATH LAB;  Service: Cardiology;  Laterality: Bilateral;    cardiac stents  04/01/2024    COLPOPEXY N/A 08/06/2019    Procedure: COLPOPEXY SSL FIXATION;  Surgeon: Alma Dorsey MD;  Location: [facility] OR [address] [place] [address];  Service: Urology;   Laterality: N/A;    CORONARY ANGIOGRAPHY Left 05/16/2024    Procedure: ANGIOGRAM, CORONARY ARTERY;  Surgeon: Miguel Jacob MD;  Location: [facility] CATH LAB;  Service: Cardiology;  Laterality: Left;    CYSTOSCOPY N/A 08/06/2019    Procedure: CYSTOSCOPY;  Surgeon: Alma Dorsey MD;  Location: [facility] OR [address] [place] [address];  Service: Urology;  Laterality: N/A;    ESOPHAGOGASTRODUODENOSCOPY N/A 04/01/2024    Procedure: EGD (ESOPHAGOGASTRODUODENOSCOPY);  Surgeon: Zbigniew Dougherty MD;  Location: [facility] ENDO;  Service: Endoscopy;  Laterality: N/A;    FRACTURE SURGERY Left 2008    open radius/ulna fracture    HYSTERECTOMY      TANIA/ovaries remain--fibroids- in her late 30's / early 40's    INJECTION, SPINE, LUMBOSACRAL, TRANSFORAMINAL APPROACH Left 11/22/2024    Procedure: left L3/4 and L4/5 TFESI;  Surgeon: Denver Cordon MD;  Location: [facility] PAIN MANAGEMENT;  Service: Pain Management;  Laterality: Left;  20 mins ASA ok  Brilinta 5 days    IVUS, CORONARY  05/17/2024    Procedure: IVUS, Coronary;  Surgeon: Miguel Jacob MD;  Location: [facility] CATH LAB;  Service: Cardiology;;    STENT, DRUG ELUTING, MULTI VESSEL, CORONARY  05/17/2024    Procedure: Stent, Drug Eluting, Multi Vessel, Coronary;  Surgeon: Miguel Jacob MD;  Location: [facility] CATH LAB;  Service: Cardiology;;         Current Medications:    Current Outpatient Medications:     aspirin 81 MG Chew, Take 81 mg by mouth once daily. Not chewable, Disp: , Rfl:     atorvastatin (LIPITOR) 40 MG tablet, Take 1 tablet (40 mg total) by mouth every evening., Disp: 360 tablet, Rfl: 0    calcium-vitamin D3 500 mg(1,250mg) -200 unit per tablet, Take 1 tablet by mouth 2 (two) times daily with meals., Disp: , Rfl:     diclofenac sodium (VOLTAREN ARTHRITIS PAIN) 1 % Gel, Apply 2 g topically daily as needed (Pain)., Disp: , Rfl:     ezetimibe (ZETIA) 10 mg tablet, Take 1 tablet (10 mg total) by mouth once daily., Disp: 90 tablet, Rfl: 3    lanolin/mineral oil/petrolatum  (ARTIFICIAL TEARS OPHT), Place 1 drop into both eyes once daily., Disp: , Rfl:     multivitamin (THERAGRAN) per tablet, Take 1 tablet by mouth once daily., Disp: , Rfl:     nitroGLYCERIN (NITROSTAT) 0.4 MG SL tablet, Place 1 tablet (0.4 mg total) under the tongue every 5 (five) minutes as needed for Chest pain., Disp: 30 tablet, Rfl: 2    pantoprazole (PROTONIX) 40 MG tablet, Take 1 tablet (40 mg total) by mouth 2 (two) times daily., Disp: 180 tablet, Rfl: 3    pilocarpine (SALAGEN) 5 MG Tab, Take 1 tablet (5 mg total) by mouth once daily., Disp: 21 tablet, Rfl: 0    sodium chloride (SALINE MIST NASL), 1 spray by Each Nostril route daily as needed (Congestion)., Disp: , Rfl:     teriparatide (FORTEO) 20 mcg/dose (600mcg/2.4mL) PnIj, Inject 0.08 mLs (20 mcg total) into the skin once daily., Disp: 2.4 mL, Rfl: 11    ticagrelor (BRILINTA) 90 mg tablet, Take 1 tablet (90 mg total) by mouth 2 (two) times daily., Disp: 60 tablet, Rfl: 11    valsartan (DIOVAN) 80 MG tablet, Take 1 tablet (80 mg total) by mouth once daily., Disp: 90 tablet, Rfl: 3    vitamin A-vitamin C-vit E-min Tab, Take 1 tablet by mouth once daily., Disp: , Rfl:     alendronate (FOSAMAX) 70 MG tablet, Take 1 tablet (70 mg total) by mouth every 7 days. (Patient not taking: Reported on 12/18/2024), Disp: 4 tablet, Rfl: 11    metoprolol succinate (TOPROL-XL) 25 MG 24 hr tablet, Take 0.5 tablets (12.5 mg total) by mouth once daily., Disp: 45 tablet, Rfl: 0    traMADoL (ULTRAM) 50 mg tablet, Take 1 tablet (50 mg total) by mouth every 8 (eight) hours as needed for Pain. (Patient not taking: Reported on 12/18/2024), Disp: 21 tablet, Rfl: 0  No current facility-administered medications for this visit.    Facility-Administered Medications Ordered in Other Visits:     lactated ringers infusion, , Intravenous, Continuous, Zbigniew Dougherty MD    Objective:     Vitals:    12/18/24 1104   BP: 119/69   Pulse: 60      Body mass index is 18.74 kg/m².      Physical Exam   Constitutional: normal appearance.  Non-toxic appearance.   HENT:   Head: Normocephalic and atraumatic.   Right Ear: External ear normal.   Left Ear: External ear normal.   Nose: Nose normal.   Mouth/Throat: Mucous membranes are moist. No oropharyngeal exudate or posterior oropharyngeal erythema. Oropharynx is clear.   Eyes: Right eye exhibits no discharge. Left eye exhibits no discharge.   Dry conjunctiva   Cardiovascular: Normal rate, regular rhythm and normal heart sounds. Exam reveals no gallop and no friction rub.   No murmur heard.  Pulmonary/Chest: Effort normal and breath sounds normal. No respiratory distress. She has no wheezes. She has no rales.   Abdominal: Soft. There is no abdominal tenderness.   Musculoskeletal:         General: No swelling, tenderness or signs of injury.      Right shoulder: Normal.      Left shoulder: Normal.      Right elbow: Normal.      Left elbow: Normal.      Right wrist: Normal.      Left wrist: Normal.      Cervical back: Normal range of motion. No rigidity or tenderness.      Right knee: Normal. No effusion.      Left knee: Normal. No effusion.      Right lower leg: No edema.      Left lower leg: No edema.      Comments: Back brace in place, ambulates with rolling walker   Neurological: She is alert. She displays no weakness.   Skin: Skin is warm and dry. No rash noted. No pallor.   Psychiatric: Mood normal.   Vitals reviewed.      Right Side Rheumatological Exam     Examination finds the shoulder, elbow, wrist, knee, 1st PIP, 1st MCP, 2nd PIP, 2nd MCP, 3rd PIP, 3rd MCP, 4th PIP, 4th MCP, 5th PIP and 5th MCP normal.    Knee Exam     Range of Motion   Extension:  abnormal   Flexion:  normal   Patellofemoral Crepitus: negative  Effusion: negative  Warmth: negative    Hip Exam   Tenderness Location: no tenderness    Range of Motion   External rotation:  abnormal   Internal rotation:  abnormal     Muscle Strength (0-5 scale):  Neck Flexion:  5  Neck  Extension: 5  Deltoid:  4.8  Biceps: 4.8/5   Triceps:  4.8  : 4.8/5   Iliopsoas: 4.8  Quadriceps:  4.8   Distal Lower Extremity: 4.8    Left Side Rheumatological Exam     Examination finds the shoulder, elbow, wrist, knee, 1st PIP, 1st MCP, 2nd PIP, 2nd MCP, 3rd PIP, 3rd MCP, 4th PIP, 4th MCP, 5th PIP and 5th MCP normal.    Knee Exam     Range of Motion   Extension:  abnormal   Flexion:  normal     Patellofemoral Crepitus: negative  Effusion: negative  Warmth: negative    Hip Exam   Tenderness Location: posterior    Range of Motion   External rotation:  abnormal   Internal rotation: abnormal     Muscle Strength (0-5 scale):  Deltoid:  4.8  Biceps: 4.8/5   Triceps:  4.8  :  4.8/5   Iliopsoas: 4.8  Quadriceps:  4.8   Distal Lower Extremity: 4.8            No data to display     There is currently no information documented on the homunculus. Go to the Rheumatology activity and complete the homunculus joint exam.      Assessment:     1. Left hip pain    2. Age-related osteoporosis with current pathological fracture, sequela    3. Primary osteoarthritis involving multiple joints    4. Sicca syndrome        84 year old female with osteoporosis presenting with left hip pain. Recent imaging shows degenerative changes in her bilateral hips (L>R). Laboratory studies obtained previously showed no evidence of inflammatory arthritis. Symptoms are likely due to osteoarthritis.    Plan:      Problem List Items Addressed This Visit          Orthopedic    Age-related osteoporosis with current pathological fracture     Other Visit Diagnoses       Left hip pain    -  Primary    Primary osteoarthritis involving multiple joints        Sicca syndrome                  F/u with Endocrinology, Pain Management  RTC as needed     Bravo Rod M.D.  PGY-1  [employer] [facility]    I have personally taken the history and examined the patient and concur with the resident's note as above.  She has a history of chronic back problems.  She had recent  vertebral compression fractures.  She is followed by pain management.  She is followed by endocrinology for her osteoporosis.  She was recently started on Forteo.  She had recent increase in her hip pain.  X-ray shows evidence of osteoarthritis but she was referred to make sure there was not an underlying connective tissue disease.  Laboratory studies done by her primary doctor, primarily for sicca symptom, were all normal.  I feel we are just dealing with osteoarthritis.  We have no therapeutic recommendations at this time.  She does not require further diagnostic testing from our standpoint.

## 2024-12-23 RX ORDER — METOPROLOL SUCCINATE 25 MG/1
12.5 TABLET, EXTENDED RELEASE ORAL DAILY
Qty: 45 TABLET | Refills: 0 | Status: SHIPPED | OUTPATIENT
Start: 2024-12-23

## 2024-12-23 NOTE — TELEPHONE ENCOUNTER
No care due was identified.  [facility] Embedded Care Due Messages. Reference number: [id number].   12/23/2024 9:45:28 AM CST

## 2024-12-23 NOTE — TELEPHONE ENCOUNTER
Refill Routing Note   Medication(s) are not appropriate for processing by [facility] for the following reason(s):        No active prescription written by provider  ED/Hospital Visit since last OV with provider    [facility] action(s):  Defer             Appointments  past 12m or future 3m with PCP    Date Provider   Last Visit   11/13/2024 Praful Paul MD   Next Visit   Visit date not found Praful Paul MD   ED visits in past 90 days: 0        Note composed:5:50 PM 12/23/2024

## 2024-12-30 ENCOUNTER — OFFICE VISIT (OUTPATIENT)
Dept: URGENT CARE | Facility: CLINIC | Age: 84
End: 2024-12-30
Payer: MEDICARE

## 2024-12-30 VITALS
TEMPERATURE: 99 F | OXYGEN SATURATION: 98 % | RESPIRATION RATE: 19 BRPM | HEIGHT: 61 IN | HEART RATE: 66 BPM | DIASTOLIC BLOOD PRESSURE: 72 MMHG | BODY MASS INDEX: 18.69 KG/M2 | WEIGHT: 99 LBS | SYSTOLIC BLOOD PRESSURE: 132 MMHG

## 2024-12-30 DIAGNOSIS — R35.0 FREQUENCY OF URINATION: Primary | ICD-10-CM

## 2024-12-30 LAB
BILIRUBIN, UA POC OHS: NEGATIVE
BLOOD, UA POC OHS: ABNORMAL
CLARITY, UA POC OHS: CLEAR
COLOR, UA POC OHS: YELLOW
GLUCOSE, UA POC OHS: NEGATIVE
KETONES, UA POC OHS: NEGATIVE
LEUKOCYTES, UA POC OHS: ABNORMAL
NITRITE, UA POC OHS: NEGATIVE
PH, UA POC OHS: 7
PROTEIN, UA POC OHS: NEGATIVE
SPECIFIC GRAVITY, UA POC OHS: 1.01
UROBILINOGEN, UA POC OHS: 0.2

## 2024-12-30 PROCEDURE — 87086 URINE CULTURE/COLONY COUNT: CPT | Mod: HCNC | Performed by: FAMILY MEDICINE

## 2024-12-30 PROCEDURE — 99213 OFFICE O/P EST LOW 20 MIN: CPT | Mod: HCNC,S$GLB,, | Performed by: FAMILY MEDICINE

## 2024-12-30 PROCEDURE — 81003 URINALYSIS AUTO W/O SCOPE: CPT | Mod: QW,HCNC,S$GLB, | Performed by: FAMILY MEDICINE

## 2024-12-30 RX ORDER — CEFDINIR 300 MG/1
300 CAPSULE ORAL 2 TIMES DAILY
Qty: 20 CAPSULE | Refills: 0 | Status: SHIPPED | OUTPATIENT
Start: 2024-12-30 | End: 2025-01-09

## 2024-12-30 NOTE — PROGRESS NOTES
"Subjective:      Patient ID: Betzy Cooley is a 84 y.o. female.    Vitals:  height is 5' 1" (1.549 m) and weight is 44.9 kg (99 lb). Her oral temperature is 98.5 °F (36.9 °C). Her blood pressure is 132/72 and her pulse is 66. Her respiration is 19 and oxygen saturation is 98%.     Chief Complaint: No chief complaint on file.    This is a 84 y.o. female who presents today with a chief complaint of abdomen onset today. Pt states she woke up 1-2 hours ago and realized the pain. Pt did drink coffee, ate and took her meds like she does every morning. Pt states she is experiencing pressure with urination.     Abdominal Pain  This is a new problem. The current episode started today. The onset quality is sudden. The problem occurs constantly. The problem has been unchanged. The pain is located in the epigastric region and suprapubic region. The pain is at a severity of 2/10. The pain is mild. The quality of the pain is cramping and a sensation of fullness. The abdominal pain does not radiate. Associated symptoms include frequency. Pertinent negatives include no anorexia, arthralgias, belching, constipation, diarrhea, dysuria, fever, flatus, headaches, hematochezia, hematuria, melena, myalgias, nausea, vomiting or weight loss. Nothing aggravates the pain. The pain is relieved by Nothing. She has tried nothing for the symptoms. There is no history of abdominal surgery, colon cancer, Crohn's disease, gallstones, GERD, irritable bowel syndrome, pancreatitis, PUD or ulcerative colitis. Patient's medical history does not include kidney stones and UTI.       Constitution: Negative for fever.   Gastrointestinal:  Positive for abdominal pain. Negative for history of abdominal surgery, nausea, vomiting, constipation, diarrhea and bright red blood in stool.   Genitourinary:  Positive for frequency. Negative for dysuria and hematuria.   Musculoskeletal:  Negative for joint pain and muscle ache.   Neurological:  Negative for " headaches.      Objective:     Physical Exam   Constitutional: She does not appear ill. No distress.   HENT:   Head: Normocephalic and atraumatic.   Neck: Neck supple.   Cardiovascular: Normal rate, regular rhythm, normal heart sounds and normal pulses.   Pulmonary/Chest: Effort normal and breath sounds normal.   Abdominal: Normal appearance. There is no left CVA tenderness and no right CVA tenderness.   Neurological: She is alert.   Nursing note and vitals reviewed.    Results for orders placed or performed in visit on 12/30/24   POCT Urinalysis(Instrument)    Collection Time: 12/30/24  8:55 AM   Result Value Ref Range    Color, POC UA Yellow Yellow, Straw, Colorless    Clarity, POC UA Clear Clear    Glucose, POC UA Negative Negative    Bilirubin, POC UA Negative Negative    Ketones, POC UA Negative Negative    Spec Grav POC UA 1.010 1.005 - 1.030    Blood, POC UA Trace-intact (A) Negative    pH, POC UA 7.0 5.0 - 8.0    Protein, POC UA Negative Negative    Urobilinogen, POC UA 0.2 <=1.0    Nitrite, POC UA Negative Negative    WBC, POC UA Trace (A) Negative   Culture, Urine    Collection Time: 12/30/24  9:00 AM    Specimen: Urine, Clean Catch   Result Value Ref Range    Urine Culture, Routine No growth         Assessment:     1. Frequency of urination      Possible UTI, will treat presumptively pending cultures  Plan:       Frequency of urination  -     POCT Urinalysis(Instrument)  -     Culture, Urine  -     cefdinir (OMNICEF) 300 MG capsule; Take 1 capsule (300 mg total) by mouth 2 (two) times daily. for 10 days  Dispense: 20 capsule; Refill: 0

## 2024-12-31 LAB — BACTERIA UR CULT: NO GROWTH

## 2025-01-01 ENCOUNTER — TELEPHONE (OUTPATIENT)
Dept: URGENT CARE | Facility: CLINIC | Age: 85
End: 2025-01-01
Payer: MEDICARE

## 2025-01-02 ENCOUNTER — OUTPATIENT CASE MANAGEMENT (OUTPATIENT)
Dept: ADMINISTRATIVE | Facility: OTHER | Age: 85
End: 2025-01-02
Payer: MEDICARE

## 2025-01-07 ENCOUNTER — OFFICE VISIT (OUTPATIENT)
Dept: NEUROSURGERY | Facility: CLINIC | Age: 85
End: 2025-01-07
Payer: MEDICARE

## 2025-01-07 DIAGNOSIS — M80.00XA AGE-RELATED OSTEOPOROSIS WITH CURRENT PATHOLOGICAL FRACTURE, INITIAL ENCOUNTER: ICD-10-CM

## 2025-01-07 DIAGNOSIS — S32.030G CLOSED COMPRESSION FRACTURE OF L3 LUMBAR VERTEBRA WITH DELAYED HEALING, SUBSEQUENT ENCOUNTER: Primary | ICD-10-CM

## 2025-01-07 DIAGNOSIS — S32.010A COMPRESSION FRACTURE OF L1 VERTEBRA, INITIAL ENCOUNTER: ICD-10-CM

## 2025-01-07 PROCEDURE — 99214 OFFICE O/P EST MOD 30 MIN: CPT | Mod: HCNC,S$GLB,, | Performed by: STUDENT IN AN ORGANIZED HEALTH CARE EDUCATION/TRAINING PROGRAM

## 2025-01-07 PROCEDURE — 1159F MED LIST DOCD IN RCRD: CPT | Mod: HCNC,CPTII,S$GLB, | Performed by: STUDENT IN AN ORGANIZED HEALTH CARE EDUCATION/TRAINING PROGRAM

## 2025-01-07 PROCEDURE — 1126F AMNT PAIN NOTED NONE PRSNT: CPT | Mod: HCNC,CPTII,S$GLB, | Performed by: STUDENT IN AN ORGANIZED HEALTH CARE EDUCATION/TRAINING PROGRAM

## 2025-01-07 PROCEDURE — 3288F FALL RISK ASSESSMENT DOCD: CPT | Mod: HCNC,CPTII,S$GLB, | Performed by: STUDENT IN AN ORGANIZED HEALTH CARE EDUCATION/TRAINING PROGRAM

## 2025-01-07 PROCEDURE — 1101F PT FALLS ASSESS-DOCD LE1/YR: CPT | Mod: HCNC,CPTII,S$GLB, | Performed by: STUDENT IN AN ORGANIZED HEALTH CARE EDUCATION/TRAINING PROGRAM

## 2025-01-07 NOTE — PROGRESS NOTES
Neurosurgery  Established Patient    SUBJECTIVE:     History of Present Illness:  Betzy Cooley is an 85yo woman w/PMHx osteoporosis on Fosamax, CAD s/p multiple stent placement 05/2024 on Brilinta (held since Sunday for planned ALLISON), known L1 burst fracture managed non-surgically presenting with acute on chronic LBP with LLE radiculopathy. She denies any recent falls or injuries. The pain has worsened over the past 2 weeks. Denies saddle anesthesia, bladder or bowel dysfunction. Exam remains stable in comparison to her last clinic visit on 10/22. CT Lumbar spine demonstrates new acute L3 compression fracture with severe height loss and 0.5cm retropulsion.  This new fracture was seen back in late November.  She has since been dc.    Interval fu 1/7/25:  Pt presents in fu.  She is doing very well without back or leg pain.  She is on forteo for her osteoporosis and is working with PT.  She has been wearing her brace.  She underwent left L3/4, 4/5 TFESI on 11/27/24 which she believes has helped her leg pain.    Review of patient's allergies indicates:   Allergen Reactions    Sulfa (sulfonamide antibiotics) Hives     Other reaction(s): Anaphylaxis    Itching     Shortness of breath       Current Outpatient Medications   Medication Sig Dispense Refill    alendronate (FOSAMAX) 70 MG tablet Take 1 tablet (70 mg total) by mouth every 7 days. 4 tablet 11    aspirin 81 MG Chew Take 81 mg by mouth once daily. Not chewable      atorvastatin (LIPITOR) 40 MG tablet Take 1 tablet (40 mg total) by mouth every evening. 360 tablet 0    calcium-vitamin D3 500 mg(1,250mg) -200 unit per tablet Take 1 tablet by mouth 2 (two) times daily with meals.      cefdinir (OMNICEF) 300 MG capsule Take 1 capsule (300 mg total) by mouth 2 (two) times daily. for 10 days 20 capsule 0    diclofenac sodium (VOLTAREN ARTHRITIS PAIN) 1 % Gel Apply 2 g topically daily as needed (Pain).      ezetimibe (ZETIA) 10 mg tablet Take 1 tablet (10 mg total) by  mouth once daily. 90 tablet 3    lanolin/mineral oil/petrolatum (ARTIFICIAL TEARS OPHT) Place 1 drop into both eyes once daily.      metoprolol succinate (TOPROL-XL) 25 MG 24 hr tablet Take 0.5 tablets (12.5 mg total) by mouth once daily. 45 tablet 0    multivitamin (THERAGRAN) per tablet Take 1 tablet by mouth once daily.      nitroGLYCERIN (NITROSTAT) 0.4 MG SL tablet Place 1 tablet (0.4 mg total) under the tongue every 5 (five) minutes as needed for Chest pain. 30 tablet 2    pantoprazole (PROTONIX) 40 MG tablet Take 1 tablet (40 mg total) by mouth 2 (two) times daily. 180 tablet 3    pilocarpine (SALAGEN) 5 MG Tab Take 1 tablet (5 mg total) by mouth once daily. 21 tablet 0    sodium chloride (SALINE MIST NASL) 1 spray by Each Nostril route daily as needed (Congestion).      teriparatide (FORTEO) 20 mcg/dose (600mcg/2.4mL) PnIj Inject 0.08 mLs (20 mcg total) into the skin once daily. 2.4 mL 11    ticagrelor (BRILINTA) 90 mg tablet Take 1 tablet (90 mg total) by mouth 2 (two) times daily. 60 tablet 11    traMADoL (ULTRAM) 50 mg tablet Take 1 tablet (50 mg total) by mouth every 8 (eight) hours as needed for Pain. 21 tablet 0    valsartan (DIOVAN) 80 MG tablet Take 1 tablet (80 mg total) by mouth once daily. 90 tablet 3    vitamin A-vitamin C-vit E-min Tab Take 1 tablet by mouth once daily.       No current facility-administered medications for this visit.     Facility-Administered Medications Ordered in Other Visits   Medication Dose Route Frequency Provider Last Rate Last Admin    lactated ringers infusion   Intravenous Continuous Zbigniew Dougherty MD           Past Medical History:   Diagnosis Date    Arthritis     Cataract     GERD (gastroesophageal reflux disease)     HEARING LOSS     Hypertension     Osteoporosis, postmenopausal     Squamous Cell Carcinoma 2007    right forearm    Urinary incontinence     rare mixed     Past Surgical History:   Procedure Laterality Date    ANGIOGRAM, CORONARY, WITH  LEFT HEART CATHETERIZATION Bilateral 05/17/2024    Procedure: Angiogram, Coronary, with Left Heart Cath;  Surgeon: Miguel Jacob MD;  Location: [facility] CATH LAB;  Service: Cardiology;  Laterality: Bilateral;    cardiac stents  04/01/2024    COLPOPEXY N/A 08/06/2019    Procedure: COLPOPEXY SSL FIXATION;  Surgeon: Alma Dorsey MD;  [facility];  Service: Urology;  Laterality: N/A;    CORONARY ANGIOGRAPHY Left 05/16/2024    Procedure: ANGIOGRAM, CORONARY ARTERY;  Surgeon: Miguel Jacob MD;  Location: [facility] CATH LAB;  Service: Cardiology;  Laterality: Left;    CYSTOSCOPY N/A 08/06/2019    Procedure: CYSTOSCOPY;  Surgeon: Alma Dorsey MD;  [facility];  Service: Urology;  Laterality: N/A;    ESOPHAGOGASTRODUODENOSCOPY N/A 04/01/2024    Procedure: EGD (ESOPHAGOGASTRODUODENOSCOPY);  Surgeon: Zbigniew Dougherty MD;  Location[facility];  Service: Endoscopy;  Laterality: N/A;    FRACTURE SURGERY Left 2008    open radius/ulna fracture    HYSTERECTOMY      TANIA/ovaries remain--fibroids- in her late 30's / early 40's    INJECTION, SPINE, LUMBOSACRAL, TRANSFORAMINAL APPROACH Left 11/22/2024    Procedure: left L3/4 and L4/5 TFESI;  Surgeon: Denver Cordon MD;  Location: [facility] PAIN MANAGEMENT;  Service: Pain Management;  Laterality: Left;  20 mins ASA ok  Brilinta 5 days    IVUS, CORONARY  05/17/2024    Procedure: IVUS, Coronary;  Surgeon: Miguel Jacob MD;  Location: [facility] CATH LAB;  Service: Cardiology;;    STENT, DRUG ELUTING, MULTI VESSEL, CORONARY  05/17/2024    Procedure: Stent, Drug Eluting, Multi Vessel, Coronary;  Surgeon: Miguel Jacob MD;  Location: [facility] CATH LAB;  Service: Cardiology;;     Family History       Problem Relation (Age of Onset)    Arthritis Sister    COPD Mother, Father    Diabetes Sister    Heart disease Mother    Heart failure Mother    Macular degeneration Mother    No Known Problems Brother, Son, Daughter, Sister, Sister, Sister,  Brother, Brother, Brother, Brother, Brother, Daughter, Son          Social History     Socioeconomic History    Marital status:    Tobacco Use    Smoking status: Never     Passive exposure: Never    Smokeless tobacco: Never   Substance and Sexual Activity    Alcohol use: Not Currently     Comment: 1-2 glasses wine weekly    Drug use: No    Sexual activity: Yes     Partners: Male     Birth control/protection: None     Social Drivers of Health     Financial Resource Strain: Low Risk  (11/25/2024)    Overall Financial Resource Strain (CARDIA)     Difficulty of Paying Living Expenses: Not hard at all   Food Insecurity: No Food Insecurity (11/25/2024)    Hunger Vital Sign     Worried About Running Out of Food in the Last Year: Never true     Ran Out of Food in the Last Year: Never true   Transportation Needs: No Transportation Needs (11/25/2024)    PRAPARE - Transportation     Lack of Transportation (Medical): No     Lack of Transportation (Non-Medical): No   Physical Activity: Inactive (11/25/2024)    Exercise Vital Sign     Days of Exercise per Week: 0 days     Minutes of Exercise per Session: 0 min   Stress: Stress Concern Present (11/25/2024)    [employer] [place] [employer] - Occupational Stress Questionnaire     Feeling of Stress : To some extent   Housing Stability: Low Risk  (11/25/2024)    Housing Stability Vital Sign     Unable to Pay for Housing in the Last Year: No     Homeless in the Last Year: No       Review of Systems  14 point ROS was negative    OBJECTIVE:     Vital Signs  Pain Score: 0-No pain  There is no height or weight on file to calculate BMI.    Physical Exam:    Constitutional: She appears well-developed and well-nourished.     Eyes: Pupils are equal, round, and reactive to light.     Cardiovascular: Normal rate and regular rhythm.     Abdominal: Soft.     Psych/Behavior: She is alert. She is oriented to person, place, and time. She has a normal mood and affect.      Musculoskeletal: Gait is abnormal.        Neck: Range of motion is limited.        Back: Range of motion is limited.        Right Upper Extremities: Muscle strength is 5/5.        Left Upper Extremities: Muscle strength is 5/5.       Right Lower Extremities: Muscle strength is 5/5.        Left Lower Extremities: Muscle strength is 5/5.     Neurological:        Sensory: There is no sensory deficit in the trunk. There is no sensory deficit in the extremities.        DTRs: DTRs are DTRS NORMAL AND SYMMETRICnormal and symmetric.        Cranial nerves: Cranial nerve(s) II, III, IV, V, VI, VII, VIII, IX, X, XI and XII are intact.       Diagnostic Results:  None new to review    ASSESSMENT/PLAN:     84 F with hx of osteoporosis on forteo and known L1,3 compression fractures.  She is doing well without back pain or leg pain.  Will plan to get updated CT L and upright/supine lumbar xrays.  I would like her to continue to wear her brace in the interim until the updated imaging is reviewed.  Will also order her outpt PT.  Fu in 3 mo.

## 2025-01-09 ENCOUNTER — EXTERNAL HOME HEALTH (OUTPATIENT)
Dept: HOME HEALTH SERVICES | Facility: HOSPITAL | Age: 85
End: 2025-01-09
Payer: MEDICARE

## 2025-01-15 ENCOUNTER — CLINICAL SUPPORT (OUTPATIENT)
Dept: REHABILITATION | Facility: HOSPITAL | Age: 85
End: 2025-01-15
Payer: MEDICARE

## 2025-01-15 DIAGNOSIS — R53.1 DECREASED STRENGTH: ICD-10-CM

## 2025-01-15 DIAGNOSIS — M54.50 LUMBAR PAIN: Primary | ICD-10-CM

## 2025-01-15 DIAGNOSIS — S32.030G CLOSED COMPRESSION FRACTURE OF L3 LUMBAR VERTEBRA WITH DELAYED HEALING, SUBSEQUENT ENCOUNTER: ICD-10-CM

## 2025-01-15 PROCEDURE — 97161 PT EVAL LOW COMPLEX 20 MIN: CPT

## 2025-01-15 NOTE — PLAN OF CARE
[facility]   Physical Therapy Initial Evaluation      Name: Betzy Cooley  Clinic Number: [id number]    Therapy Diagnosis:   Encounter Diagnoses   Name Primary?    Closed compression fracture of L3 lumbar vertebra with delayed healing, subsequent encounter     Lumbar pain Yes    Decreased strength         Physician: Grzegorz Damon DO    Physician Orders: PT Eval and Treat   Medical Diagnosis from Referral: S32.030G (ICD-10-CM) - Closed compression fracture of L3 lumbar vertebra with delayed healing, subsequent encounter   Evaluation Date: 1/15/2025  Authorization Period Expiration: 1/7/26  Plan of Care Expiration: 2/28/25  Progress Note Due: 2/15/25  Visit # / Visits authorized: 1/ 1   FOTO: 1/ 3    Precautions: Standard osteoporosis     Time In: 11:08 am   Time Out: 11:40 am   Total Billable Time: 32 minutes    Subjective     Date of onset: chronic     History of current condition - Betzy reports: that in November 2024 she went to hospital for back pain and they wanted to send her to inpatient PT, but she was scheduled to get an epidural, so had home health PT instead. Pt stated that she finished home health PT yesterday. Pt stated that her back has been feeling ok and started feeling better two weeks after getting injection.   Pt denies experiencing pain in her legs.  Pt stated that (L) side of lower back gets sore when she does activities. Pt stated that MD told her to wear back brace all the time. Pt's  stated that pt is on a new medicine for osteoporosis and thinks back fractures will heal on their own.     Falls: fell on (R) Side about 9 months ago     Imaging: see imaging section     Prior Therapy: yes outpatient PT last year, home health PT that ended yesterday  Social History:  lives with their family, one threshold to enter [employer] story home, lives on first floor   Occupation: not currently employed   Prior Level of Function: ambulating with rollator  Current Level  of Function: ambulating with rollator, report of pain when performing aggravating factors listed below     Pain:  Current 2/10, worst 2/10, best 0/10   Location: (L) back   Description: soreness  Aggravating Factors: activities   Easing Factors: epidural, some exercises     Patients goals: to be able not to have pain and keeps muscles in her legs good, strengthen core, pt's  stated improve balance and mobility       Medical History:   Past Medical History:   Diagnosis Date    Arthritis     Cataract     GERD (gastroesophageal reflux disease)     HEARING LOSS     Hypertension     Osteoporosis, postmenopausal     Squamous Cell Carcinoma 2007    right forearm    Urinary incontinence     rare mixed       Surgical History:   Betzy Cooley  has a past surgical history that includes Fracture surgery (Left, 2008); Hysterectomy; Cystoscopy (N/A, 08/06/2019); Colpopexy (N/A, 08/06/2019); Esophagogastroduodenoscopy (N/A, 04/01/2024); Coronary angiography (Left, 05/16/2024); ANGIOGRAM, CORONARY, WITH LEFT HEART CATHETERIZATION (Bilateral, 05/17/2024); ivus, coronary (05/17/2024); stent, drug eluting, multi vessel, coronary (05/17/2024); injection, spine, lumbosacral, transforaminal approach (Left, 11/22/2024); and cardiac stents (04/01/2024).    Medications:   Betzy has a current medication list which includes the following prescription(s): alendronate, aspirin, atorvastatin, calcium-vitamin d3, diclofenac sodium, ezetimibe, lanolin/mineral oil/petrolatum, metoprolol succinate, multivitamin, nitroglycerin, pantoprazole, pilocarpine, sodium chloride, teriparatide, ticagrelor, tramadol, valsartan, and vitamin a-vitamin c-vit e-min, and the following Facility-Administered Medications: lactated ringers.    Allergies:   Review of patient's allergies indicates:   Allergen Reactions    Sulfa (sulfonamide antibiotics) Hives     Other reaction(s): Anaphylaxis    Itching     Shortness of breath        Objective      Lumbar  AROM deferred: pt stated that MD told her to wear back brace at all times    Hip Right  Left  Pain/Dysfunction with Movement    AROM MMT AROM MMT NT = not tested    Flexion WFL 4/5 WFL 4/5    Extension NT NT NT NT  Pt performed fair bridge against gravity    Abduction WFL 4-/5 WFL 4-/5       Knee Right  Left  Pain/Dysfunction with Movement    AROM MMT AROM MMT    Flexion WFL 4/5 WFL 4/5    Extension WFL 4/5 WFL 4/5      Ankle Right  Left  Pain/Dysfunction with Movement    AROM MMT AROM MMT    Plantarflexion WFL 4/5 WFL 4/5    Dorsiflexion WFL 4+/5 WFL 4+/5      TU seconds w/rollator     30 Second Sit to Stand Test: 10 w/UE support     Posture: leaning to (L) when sitting, wearing small back brace      Intake Outcome Measure for FOTO Lumbar Survey    Therapist reviewed FOTO scores for Betzy Cooley on 1/15/2025.   FOTO report - see Media section or FOTO account episode details.    Intake Score: 54         Treatment     Total Treatment time (time-based codes) separate from Evaluation: 0 minutes     Patient Education and Home Exercises     Education provided:   - Role of PT - pt verbalized understanding    Assessment     Betzy is a 84 y.o. female referred to outpatient Physical Therapy with a medical diagnosis of Closed compression fracture of L3 lumbar vertebra with delayed healing, subsequent encounter. Patient presents with decreased LE MMT scores noted above, impaired gait, impaired posture, and decreased functional mobility.     Patient prognosis is Fair.   Patient will benefit from skilled outpatient Physical Therapy to address the deficits stated above and in the chart below, provide patient /family education, and to maximize patientt's level of independence.     Plan of care discussed with patient: Yes  Patient's spiritual, cultural and educational needs considered and patient is agreeable to the plan of care and goals as stated below:     Anticipated Barriers for therapy: chronicity of symptoms      Medical Necessity is demonstrated by the following  History  Co-morbidities and personal factors that may impact the plan of care [] LOW: no personal factors / co-morbidities  [x] MODERATE: 1-2 personal factors / co-morbidities  [] HIGH: 3+ personal factors / co-morbidities    Moderate / High Support Documentation:   Co-morbidities affecting plan of care: HTN    Personal Factors:   no deficits     Examination  Body Structures and Functions, activity limitations and participation restrictions that may impact the plan of care [] LOW: addressing 1-2 elements  [] MODERATE: 3+ elements  [] HIGH: 4+ elements (please support below)    Moderate / High Support Documentation: ROM, strength, gait, transfers     Clinical Presentation [x] LOW: stable  [] MODERATE: Evolving  [] HIGH: Unstable     Decision Making/ Complexity Score: low       Goals:  Short Term Goals: 2 weeks   Pt will be compliant with HEP to supplement PT with decreasing pain and improving functional mobility    Long Term Goals: 6 weeks   Pt will improve FOTO score to at least 62 in order to demo improved functional mobility  Pt will improve LE MMT scores by at least 1/3 grade where deficits noted in order to improve strength for functional tasks  Pt will perform TUG in </= 13 seconds in order to demo improve gait speed and decreased risk of falling  Pt will perform at least 5 sit to stands without UE support in order to demo improved ability to perform transfers  Plan     Plan of care Certification: 1/15/2025 to 2/28/25.    Outpatient Physical Therapy 2 times weekly for 6 weeks to include the following interventions: Gait Training, Manual Therapy, Moist Heat/ Ice, Neuromuscular Re-ed, Patient Education, Therapeutic Activities, Therapeutic Exercise, and IASTM, dry needling, and modalities prn .     Laurie Jernigan, PT        Physician's Signature: _________________________________________ Date: ________________

## 2025-01-20 ENCOUNTER — PATIENT MESSAGE (OUTPATIENT)
Dept: ADMINISTRATIVE | Facility: OTHER | Age: 85
End: 2025-01-20
Payer: MEDICARE

## 2025-01-20 ENCOUNTER — PATIENT MESSAGE (OUTPATIENT)
Dept: ENDOCRINOLOGY | Facility: CLINIC | Age: 85
End: 2025-01-20
Payer: MEDICARE

## 2025-01-23 ENCOUNTER — OUTPATIENT CASE MANAGEMENT (OUTPATIENT)
Dept: ADMINISTRATIVE | Facility: OTHER | Age: 85
End: 2025-01-23
Payer: MEDICARE

## 2025-01-24 ENCOUNTER — CLINICAL SUPPORT (OUTPATIENT)
Dept: REHABILITATION | Facility: HOSPITAL | Age: 85
End: 2025-01-24
Payer: MEDICARE

## 2025-01-24 DIAGNOSIS — R53.1 DECREASED STRENGTH: ICD-10-CM

## 2025-01-24 DIAGNOSIS — M54.50 LUMBAR PAIN: Primary | ICD-10-CM

## 2025-01-24 PROCEDURE — 97112 NEUROMUSCULAR REEDUCATION: CPT | Mod: HCNC,CQ

## 2025-01-24 PROCEDURE — 97110 THERAPEUTIC EXERCISES: CPT | Mod: HCNC,CQ

## 2025-01-24 NOTE — PROGRESS NOTES
"[facility]   Physical Therapy Treatment Note      Name: Betzy Cooley  Clinic Number: [id number]    Therapy Diagnosis:   Encounter Diagnoses   Name Primary?    Lumbar pain Yes    Decreased strength      Physician: Grzegroz Damon DO    Visit Date: 1/24/2025    Physician Orders: PT Eval and Treat   Medical Diagnosis from Referral: S32.030G (ICD-10-CM) - Closed compression fracture of L3 lumbar vertebra with delayed healing, subsequent encounter   Evaluation Date: 1/15/2025  Authorization Period Expiration: 1/7/26  Plan of Care Expiration: 2/28/25  Progress Note Due: 2/15/25  Visit # / Visits authorized: 1/ 1, 1/20   FOTO: 1/ 3     Precautions: Standard osteoporosis      PTA Visit #: 1/5     Time In: 2:23 pm   Time Out: 3:16 pm   Total Billable Time: 53 minutes    Subjective     Pt reports: no new complaints upon entering clinic. Pt states that she "can feel it" during exercises.   She  was not yet given   home exercise program.  Response to previous treatment: last session was initial eval  Functional change: none at this time     Pain: *see subjective*   Location:    Objective      Objective Measures updated at progress report unless specified.     Treatment     Betzy received the treatments listed below:      therapeutic exercises to develop strength, endurance, ROM, and flexibility for 38 minutes including:  Recumbent bike 6 min (for LE endurance and ROM)   LAQ 1lb 2x10 BLE   Standing hip abd 2x10 BLE (with BUE support and SBA)   Standing hipext 2x10 BLE (with BUE support and SBA)   Standing calf raises 2x10 (with BUE support and SBA)   SAQ 1lb 2x10 BLE   Supine inclined clams RTB 2x10   HEP review       neuromuscular re-education activities to improve: Coordination, Proprioception, Posture, and Motor Control for 15 minutes. The following activities were included:  Seated no money YTB 3" 2x10   Rows RTB 3" 2x10   Seated hip adduction iso c/ ball 3" 2x10   TrA brace 3" 2x10 "       therapeutic activities to improve functional performance for 0  minutes, including:        Patient Education and Home Exercises       Education provided:   - HEP    Written Home Exercises Provided: yes. Exercises were reviewed with pt and spouse and Betzy was able to demonstrate them prior to the end of the session.  Betzy demonstrated good  understanding of the education provided. See EMR under Patient Instructions for exercises provided during therapy sessions    Assessment     Initiated therapy program following pt's initial evaluation with no adverse effects. Alternating standing and seated exercises to avoid excessive fatigue in patient. Maintained SBA during standing activities and patient transfers for safety and to easily provide assistance if needed. Pt with appropriate muscle burn noted during several activities. Will progress per pt's tolerance.     Betzy Is progressing well towards her goals.   Pt prognosis is Fair.     Pt will continue to benefit from skilled outpatient physical therapy to address the deficits listed in the problem list box on initial evaluation, provide pt/family education and to maximize pt's level of independence in the home and community environment.     Pt's spiritual, cultural and educational needs considered and pt agreeable to plan of care and goals.     Anticipated barriers to physical therapy:  chronicity of symptoms     Goals:   Short Term Goals: 2 weeks   Pt will be compliant with HEP to supplement PT with decreasing pain and improving functional mobility     Long Term Goals: 6 weeks   Pt will improve FOTO score to at least 62 in order to demo improved functional mobility  Pt will improve LE MMT scores by at least 1/3 grade where deficits noted in order to improve strength for functional tasks  Pt will perform TUG in </= 13 seconds in order to demo improve gait speed and decreased risk of falling  Pt will perform at least 5 sit to stands without UE support in  order to demo improved ability to perform transfers    Plan     Plan of care Certification: 1/15/2025 to 2/28/25.    PT/PTA met face to face to discuss pt's treatment plan and progress towards established goals. Pt will be seen by a physical therapist minimally every 6th visit or every 30 days.      Yolanda Akhtar PTA

## 2025-01-27 ENCOUNTER — CLINICAL SUPPORT (OUTPATIENT)
Dept: REHABILITATION | Facility: HOSPITAL | Age: 85
End: 2025-01-27
Payer: MEDICARE

## 2025-01-27 DIAGNOSIS — M54.50 LUMBAR PAIN: Primary | ICD-10-CM

## 2025-01-27 DIAGNOSIS — R53.1 DECREASED STRENGTH: ICD-10-CM

## 2025-01-27 PROCEDURE — 97110 THERAPEUTIC EXERCISES: CPT | Mod: HCNC,CQ

## 2025-01-27 PROCEDURE — 97112 NEUROMUSCULAR REEDUCATION: CPT | Mod: HCNC,CQ

## 2025-01-27 NOTE — PROGRESS NOTES
"[facility]   Physical Therapy Treatment Note      Name: Betzy Cooley  Clinic Number: [id number]    Therapy Diagnosis:   Encounter Diagnoses   Name Primary?    Lumbar pain Yes    Decreased strength        Physician: Grzegorz Damon DO    Visit Date: 1/27/2025    Physician Orders: PT Eval and Treat   Medical Diagnosis from Referral: S32.030G (ICD-10-CM) - Closed compression fracture of L3 lumbar vertebra with delayed healing, subsequent encounter   Evaluation Date: 1/15/2025  Authorization Period Expiration: 1/7/26  Plan of Care Expiration: 2/28/25  Progress Note Due: 2/15/25  Visit # / Visits authorized: 1/ 1, 2/20   FOTO: 1/ 3     Precautions: Standard osteoporosis      PTA Visit #: 2/5     Time In: 12:04 pm   Time Out: 1:04 pm   Total Billable Time: 60 minutes    Subjective     Pt reports: that she felt a little sore after her last session but it went away about a day later.   She  was not yet given   home exercise program.  Response to previous treatment: soreness and back pain   Functional change: none at this time     Pain: *see subjective*   Location:    Objective      Objective Measures updated at progress report unless specified.     Treatment     Betzy received the treatments listed below:      therapeutic exercises to develop strength, endurance, ROM, and flexibility for 42 minutes including:  Recumbent bike 6 min (for LE endurance and ROM)   LAQ 1lb 3x10 BLE   Standing hip abd 2x10 BLE (with BUE support and SBA)   Standing hip ext 2x10 BLE (with BUE support and SBA)   Standing calf raises 3x10 (with BUE support and SBA)   Standing marches 2x10 (with BUE support and SBA)   SAQ 1lb 3x10 BLE   Supine inclined clams RTB 3x10   HEP review       neuromuscular re-education activities to improve: Coordination, Proprioception, Posture, and Motor Control for 18 minutes. The following activities were included:  Seated no money YTB 3" 2x10   Seated rows RTB 3" 2x10   Seated shld " "ext RTB 3" 2x10   Seated hip adduction iso c/ ball 3" 2x10   TrA brace 3" 2x10       therapeutic activities to improve functional performance for 0  minutes, including:        Patient Education and Home Exercises       Education provided:   - HEP    Written Home Exercises Provided: yes. Exercises were reviewed with pt and spouse and Betzy was able to demonstrate them prior to the end of the session.  Betzy demonstrated good  understanding of the education provided. See EMR under Patient Instructions for exercises provided during therapy sessions    Assessment     Pt exhibited tolerance to progression of TE and NMR this session. Pt exhibited increased endurance during standing activities with good tolerance to additional standing activities.  Minimal verbal cues provided during rows and shoulder extensions to ensure proper UE alignment and follow through.  Pt noted that she would like to work on getting up and down from a seat without her hands, therefore consider adding sit to stands or mini squats next session.    Betzy Is progressing well towards her goals.   Pt prognosis is Fair.     Pt will continue to benefit from skilled outpatient physical therapy to address the deficits listed in the problem list box on initial evaluation, provide pt/family education and to maximize pt's level of independence in the home and community environment.     Pt's spiritual, cultural and educational needs considered and pt agreeable to plan of care and goals.     Anticipated barriers to physical therapy:  chronicity of symptoms     Goals:   Short Term Goals: 2 weeks   Pt will be compliant with HEP to supplement PT with decreasing pain and improving functional mobility     Long Term Goals: 6 weeks   Pt will improve FOTO score to at least 62 in order to demo improved functional mobility  Pt will improve LE MMT scores by at least 1/3 grade where deficits noted in order to improve strength for functional tasks  Pt will perform " TUG in </= 13 seconds in order to demo improve gait speed and decreased risk of falling  Pt will perform at least 5 sit to stands without UE support in order to demo improved ability to perform transfers    Plan     Plan of care Certification: 1/15/2025 to 2/28/25.    PT/PTA met face to face to discuss pt's treatment plan and progress towards established goals. Pt will be seen by a physical therapist minimally every 6th visit or every 30 days.      Yolanda Akhtra PTA

## 2025-01-30 ENCOUNTER — CLINICAL SUPPORT (OUTPATIENT)
Dept: REHABILITATION | Facility: HOSPITAL | Age: 85
End: 2025-01-30
Payer: MEDICARE

## 2025-01-30 DIAGNOSIS — R53.1 DECREASED STRENGTH: ICD-10-CM

## 2025-01-30 DIAGNOSIS — M54.50 LUMBAR PAIN: Primary | ICD-10-CM

## 2025-01-30 PROCEDURE — 97112 NEUROMUSCULAR REEDUCATION: CPT | Mod: HCNC

## 2025-01-30 PROCEDURE — 97110 THERAPEUTIC EXERCISES: CPT | Mod: HCNC

## 2025-01-30 NOTE — PROGRESS NOTES
"  Physical Therapy Daily Treatment Note     Name: Betzy Cooley  Clinic Number: [id number]    Therapy Diagnosis:   Encounter Diagnoses   Name Primary?    Lumbar pain Yes    Decreased strength      Physician: Grzegorz Damon DO    Visit Date: 1/30/2025    Physician Orders: PT Eval and Treat   Medical Diagnosis from Referral: S32.030G (ICD-10-CM) - Closed compression fracture of L3 lumbar vertebra with delayed healing, subsequent encounter   Evaluation Date: 1/15/2025  Authorization Period Expiration: 1/7/26  Plan of Care Expiration: 2/28/25  Progress Note Due: 2/15/25  Visit # / Visits authorized: 1/ 1, 3/20   FOTO: 1/ 3    Time In: 10:59 am   Time Out: 11:57 am   Total Billable Time: 58 minutes    Precautions: Standard osteoporosis      Subjective     Pt reports: that her back is feeling uncomfortable, but denies experiencing pain. Pt stated that she has been wearing back brace.   She was compliant with home exercise program.  Response to previous treatment: soreness   Functional change: progressing    Pain: pt reported feeling uncomfortable in her back     Objective       therapeutic exercises to develop strength, endurance, ROM, and flexibility for 39 minutes including:  Recumbent bike 6 min (for LE endurance )   LAQ 1lb 3x10 BLE   Standing hip abd 2x10 BLE (with BUE support and SBA) 1#  Standing hip ext 2x10 BLE (with BUE support and SBA) 1#  Standing calf raises 3x10 (with BUE support and SBA)   Standing marches 2x10 (with BUE support and SBA) 1#  SAQ 1lb 3x10 BLE not performed   Seated clams RTB 3x10       neuromuscular re-education activities to improve: Coordination, core/lumbar/scap stabilization Posture, and Motor Control for 19 minutes. The following activities were included:  Seated no money w/scap retraction YTB 3" 2x10   Seated rows w/scap retraction RTB 3" 2x10   Seated shld ext RTB 3" 2x10  not performed   Seated hip adduction iso c/ ball w/TA brace 3" 2x10   TrA brace 5" 2x10       Home Exercises " Provided and Patient Education Provided     Education provided:   - HEP     Written Home Exercises Provided: Patient instructed to cont prior HEP.   Betzy demonstrated good  understanding of the education provided.     See EMR under Patient Instructions for exercises provided prior visit.      Assessment     Pt was able to tolerate adding weight to standing therex noted above. Pt received VC for upright posture when performing seated activities. Pt received short rest breaks due to fatigue. Pt tolerated therapy session without any adverse reactions.   Betzy Is progressing towards her goals.   Pt prognosis is Fair.     Pt will continue to benefit from skilled outpatient physical therapy to address the deficits listed in the problem list box on initial evaluation, provide pt/family education and to maximize pt's level of independence in the home and community environment.     Pt's spiritual, cultural and educational needs considered and pt agreeable to plan of care and goals.    Anticipated barriers to physical therapy:  chronicity of symptoms     Goals:   Short Term Goals: 2 weeks   Pt will be compliant with HEP to supplement PT with decreasing pain and improving functional mobility     Long Term Goals: 6 weeks   Pt will improve FOTO score to at least 62 in order to demo improved functional mobility  Pt will improve LE MMT scores by at least 1/3 grade where deficits noted in order to improve strength for functional tasks  Pt will perform TUG in </= 13 seconds in order to demo improve gait speed and decreased risk of falling  Pt will perform at least 5 sit to stands without UE support in order to demo improved ability to perform transfers    Plan     Continue per POC, progress as tolerated     Laurie Jernigan, PT

## 2025-02-03 ENCOUNTER — CLINICAL SUPPORT (OUTPATIENT)
Dept: REHABILITATION | Facility: HOSPITAL | Age: 85
End: 2025-02-03
Payer: MEDICARE

## 2025-02-03 DIAGNOSIS — M54.50 LUMBAR PAIN: Primary | ICD-10-CM

## 2025-02-03 DIAGNOSIS — R53.1 DECREASED STRENGTH: ICD-10-CM

## 2025-02-03 PROCEDURE — 97112 NEUROMUSCULAR REEDUCATION: CPT | Mod: HCNC,CQ

## 2025-02-03 PROCEDURE — 97110 THERAPEUTIC EXERCISES: CPT | Mod: HCNC,CQ

## 2025-02-03 NOTE — PROGRESS NOTES
"  Physical Therapy Daily Treatment Note     Name: Betzy Coloey  Clinic Number: [id number]    Therapy Diagnosis:   Encounter Diagnoses   Name Primary?    Lumbar pain Yes    Decreased strength      Physician: Grzegorz Damon DO    Visit Date: 2/3/2025    Physician Orders: PT Eval and Treat   Medical Diagnosis from Referral: S32.030G (ICD-10-CM) - Closed compression fracture of L3 lumbar vertebra with delayed healing, subsequent encounter   Evaluation Date: 1/15/2025  Authorization Period Expiration: 1/7/26  Plan of Care Expiration: 2/28/25  Progress Note Due: 2/15/25  Visit # / Visits authorized: 1/ 1, 4/20   FOTO: 1/ 3    Time In: 11:00 am   Time Out: 11:50 am   Total Billable Time: 50 minutes    Precautions: Standard osteoporosis      Subjective     Pt reports: that she has been having L lateral hip pain recently. She feels that the back brace is very uncomfortable.  She was compliant with home exercise program.  Response to previous treatment: soreness   Functional change: progressing    Pain: pt reported feeling uncomfortable in her back     Objective       therapeutic exercises to develop strength, endurance, ROM, and flexibility for 30 minutes including:  Recumbent bike 6 min (for LE endurance )   LAQ 1lb 3x10 BLE   Standing hip abd 2x10 BLE (with BUE support and SBA) 1#  Standing hip ext 2x10 BLE (with BUE support and SBA) 1#  Standing calf raises 3x10 (with BUE support and SBA)   Standing marches 2x10 (with BUE support and SBA) 1#  SAQ 1lb 3x10 BLE not performed   Seated clams RTB 3x10   STS 1 airex B UE support EOM 2x10    neuromuscular re-education activities to improve: Coordination, core/lumbar/scap stabilization Posture, and Motor Control for 20 minutes. The following activities were included:  Seated no money w/scap retraction YTB 3" 2x10   Seated rows w/scap retraction RTB 3" 2x10   Seated shld ext RTB 3" 2x10  not performed   Seated hip adduction iso c/ ball w/TA brace 3" 2x10   TrA brace 5" 2x10 "       Home Exercises Provided and Patient Education Provided     Education provided:   - HEP     Written Home Exercises Provided: Patient instructed to cont prior HEP.   Betzy demonstrated good  understanding of the education provided.     See EMR under Patient Instructions for exercises provided prior visit.      Assessment     Pt arrived to clinic with rollator and L listing gait pattern. Progressed patients functional strengthening today with STS. Heavy cueing to let go of arm rest on chair to promote full stance and decrease posterior lean. Second set of STS PTA incorporated support in front of patient to encourage to forward lean. Pt tolerated therapy session without any adverse reactions.   Betzy Is progressing towards her goals.   Pt prognosis is Fair.     Pt will continue to benefit from skilled outpatient physical therapy to address the deficits listed in the problem list box on initial evaluation, provide pt/family education and to maximize pt's level of independence in the home and community environment.     Pt's spiritual, cultural and educational needs considered and pt agreeable to plan of care and goals.    Anticipated barriers to physical therapy:  chronicity of symptoms     Goals:   Short Term Goals: 2 weeks   Pt will be compliant with HEP to supplement PT with decreasing pain and improving functional mobility     Long Term Goals: 6 weeks   Pt will improve FOTO score to at least 62 in order to demo improved functional mobility  Pt will improve LE MMT scores by at least 1/3 grade where deficits noted in order to improve strength for functional tasks  Pt will perform TUG in </= 13 seconds in order to demo improve gait speed and decreased risk of falling  Pt will perform at least 5 sit to stands without UE support in order to demo improved ability to perform transfers    Plan     Continue per POC, progress as tolerated     Dieudonne Richter, PTA

## 2025-02-08 ENCOUNTER — OFFICE VISIT (OUTPATIENT)
Dept: URGENT CARE | Facility: CLINIC | Age: 85
End: 2025-02-08
Payer: MEDICARE

## 2025-02-08 VITALS
HEIGHT: 61 IN | BODY MASS INDEX: 18.69 KG/M2 | DIASTOLIC BLOOD PRESSURE: 90 MMHG | OXYGEN SATURATION: 98 % | TEMPERATURE: 99 F | HEART RATE: 65 BPM | WEIGHT: 99 LBS | SYSTOLIC BLOOD PRESSURE: 160 MMHG | RESPIRATION RATE: 16 BRPM

## 2025-02-08 DIAGNOSIS — K52.9 ACUTE GASTROENTERITIS: ICD-10-CM

## 2025-02-08 DIAGNOSIS — R19.5 CHANGE IN CONSISTENCY OF STOOL: ICD-10-CM

## 2025-02-08 DIAGNOSIS — R14.0 GASSINESS: Primary | ICD-10-CM

## 2025-02-08 PROCEDURE — 99213 OFFICE O/P EST LOW 20 MIN: CPT | Mod: S$GLB,,, | Performed by: NURSE PRACTITIONER

## 2025-02-08 NOTE — PATIENT INSTRUCTIONS
Drink powerade, pedialyte-or any electrolyte rich drink to replace fluids lost  Eat a bland diet: avoid greasy, spicy, and dairy until well  Eat foods that might harden your stool-such as plain pasta, rice, potatoes, applesauce, toast  Good handwashing and contact precautions as diarrhea can be contagious (wipe surfaces in bathroom)  Rest   Follow up with your primary care provider if symptoms persist  Seek ER care with any dehydration (decreased urine output), blood in stool, persistent symptoms (diarrhea >10 times per day or >1 week), lethargy, any distress, or any other concerns

## 2025-02-08 NOTE — PROGRESS NOTES
"Subjective:      Patient ID: Betzy Cooley is a 84 y.o. female.    Vitals:  height is 5' 1" (1.549 m) and weight is 44.9 kg (99 lb). Her oral temperature is 98.5 °F (36.9 °C). Her blood pressure is 160/90 (abnormal) and her pulse is 65. Her respiration is 16 and oxygen saturation is 98%.     Chief Complaint: Diarrhea    This is a 84 y.o. female who presents today with a chief complaint of soft stool and increased gas.   Symptoms began 2 days ago.   She states stool is not really loose and runny, but more soft.   Denies fever and denies blood in stool.   Unsure of any foods that may have caused symptoms, but yesterday she ate gumbo and thinks this worsened the symptoms.   She has only had 2 stool episodes per day for the last 2 days.   Denies abdominal pain. Denies dysuria. Denies any respiratory symptoms.   Also feels bloated. She has taken immodium and gas-ex with little relief.     Diarrhea   This is a new problem. The current episode started yesterday. The problem occurs 5 to 10 times per day. The problem has been unchanged. Diarrhea characteristics: soft. The patient states that diarrhea awakens her from sleep. Associated symptoms include bloating. Pertinent negatives include no abdominal pain, arthralgias, chills, coughing, fever, headaches, increased  flatus, myalgias, sweats, URI, vomiting or weight loss. Nothing aggravates the symptoms. There are no known risk factors. She has tried increased fluids for the symptoms. There is no history of bowel resection, inflammatory bowel disease, irritable bowel syndrome, malabsorption, a recent abdominal surgery or short gut syndrome.       Constitution: Positive for fatigue. Negative for chills and fever.   HENT:  Negative for congestion.    Respiratory:  Negative for cough, shortness of breath and wheezing.    Gastrointestinal:  Positive for abdominal bloating and diarrhea. Negative for abdominal pain, nausea, vomiting, constipation, bright red blood in stool, dark " colored stools and heartburn.   Musculoskeletal:  Negative for joint pain and muscle ache.   Neurological:  Negative for headaches.      Objective:     Physical Exam   Constitutional: She is oriented to person, place, and time.  Non-toxic appearance. She does not appear ill. No distress.   HENT:   Head: Normocephalic.   Nose: Nose normal.   Mouth/Throat: Mucous membranes are moist.   Cardiovascular: Normal rate and regular rhythm.   Pulmonary/Chest: Effort normal and breath sounds normal.   Abdominal: Normal appearance. She exhibits no distension. Soft. Bowel sounds are increased. flat abdomen There is no abdominal tenderness. There is no rebound, no guarding, no left CVA tenderness and no right CVA tenderness.   Musculoskeletal: Normal range of motion.         General: Normal range of motion.   Neurological: She is alert and oriented to person, place, and time.   Skin: Skin is warm, dry and not diaphoretic.   Psychiatric: Her behavior is normal. Mood normal.   Nursing note and vitals reviewed.      Assessment:     1. Gassiness    2. Change in consistency of stool    3. Acute gastroenteritis        Plan:       Gassiness    Change in consistency of stool  Comments:  soft stool    Acute gastroenteritis        Patient Instructions   Drink powerade, pedialyte-or any electrolyte rich drink to replace fluids lost  Eat a bland diet: avoid greasy, spicy, and dairy until well  Eat foods that might harden your stool-such as plain pasta, rice, potatoes, applesauce, toast  Good handwashing and contact precautions as diarrhea can be contagious (wipe surfaces in bathroom)  Rest   Follow up with your primary care provider if symptoms persist  Seek ER care with any dehydration (decreased urine output), blood in stool, persistent symptoms (diarrhea >10 times per day or >1 week), lethargy, any distress, or any other concerns

## 2025-02-10 ENCOUNTER — CLINICAL SUPPORT (OUTPATIENT)
Dept: REHABILITATION | Facility: HOSPITAL | Age: 85
End: 2025-02-10
Payer: MEDICARE

## 2025-02-10 DIAGNOSIS — M54.50 CHRONIC BILATERAL LOW BACK PAIN WITHOUT SCIATICA: Primary | ICD-10-CM

## 2025-02-10 DIAGNOSIS — G89.29 CHRONIC BILATERAL LOW BACK PAIN WITHOUT SCIATICA: Primary | ICD-10-CM

## 2025-02-10 DIAGNOSIS — M53.86 DECREASED ROM OF LUMBAR SPINE: ICD-10-CM

## 2025-02-10 DIAGNOSIS — R29.898 WEAKNESS OF BOTH LOWER EXTREMITIES: ICD-10-CM

## 2025-02-10 NOTE — PROGRESS NOTES
"  Physical Therapy Daily Treatment Note     Name: Betzy Cooley  Clinic Number: [id number]    Therapy Diagnosis:   Encounter Diagnoses   Name Primary?    Chronic bilateral low back pain without sciatica Yes    Decreased ROM of lumbar spine     Weakness of both lower extremities      Physician: Grzegorz Damon DO    Visit Date: 2/10/2025    Physician Orders: PT Eval and Treat   Medical Diagnosis from Referral: S32.030G (ICD-10-CM) - Closed compression fracture of L3 lumbar vertebra with delayed healing, subsequent encounter   Evaluation Date: 1/15/2025  Authorization Period Expiration: 1/7/26  Plan of Care Expiration: 2/28/25  Progress Note Due: 2/15/25  Visit # / Visits authorized: 1/ 1, 4/20   FOTO: 1/ 3    Time In: 11:02 am   Time Out: 11:50 am   Total Billable Time: 48 minutes    Precautions: Standard osteoporosis      Subjective     Pt reports: still having difficulty sleeping.  She was compliant with home exercise program.  Response to previous treatment: soreness   Functional change: progressing    Pain: pt reported feeling uncomfortable in her back     Objective       therapeutic exercises to develop strength, endurance, ROM, and flexibility for 30 minutes including:  Recumbent bike 6 min (for LE endurance )   LAQ 1lb 3x10 BLE   Standing hip abd 2x10 BLE (with BUE support and SBA) 1#  Standing hip ext 2x10 BLE (with BUE support and SBA) 1#  Standing calf raises 3x10 (with BUE support and SBA)   Standing marches 2x10 (with BUE support and SBA) 1#  SAQ 1lb 3x10 BLE not performed   Seated clams RTB 3x10   STS 1 airex B UE support EOM 2x10    neuromuscular re-education activities to improve: Coordination, core/lumbar/scap stabilization Posture, and Motor Control for 20 minutes. The following activities were included:  Seated no money w/scap retraction YTB 3" 2x10   Seated rows w/scap retraction RTB 3" 2x10   Seated shld ext RTB 3" 2x10  not performed   Seated hip adduction iso c/ ball w/TA brace 3" 2x10   TrA " "brace 5" 2x10   Seated SB press downs 3x10 3s    Home Exercises Provided and Patient Education Provided     Education provided:   - HEP     Written Home Exercises Provided: Patient instructed to cont prior HEP.   Betzy demonstrated good  understanding of the education provided.     See EMR under Patient Instructions for exercises provided prior visit.      Assessment     Pt did well today as she was able to perform program and tolerated NMR progression. STS proved challenging. Pt tolerated therapy session without any adverse reactions.   Betzy Is progressing towards her goals.   Pt prognosis is Fair.     Pt will continue to benefit from skilled outpatient physical therapy to address the deficits listed in the problem list box on initial evaluation, provide pt/family education and to maximize pt's level of independence in the home and community environment.     Pt's spiritual, cultural and educational needs considered and pt agreeable to plan of care and goals.    Anticipated barriers to physical therapy:  chronicity of symptoms     Goals:   Short Term Goals: 2 weeks   Pt will be compliant with HEP to supplement PT with decreasing pain and improving functional mobility     Long Term Goals: 6 weeks   Pt will improve FOTO score to at least 62 in order to demo improved functional mobility  Pt will improve LE MMT scores by at least 1/3 grade where deficits noted in order to improve strength for functional tasks  Pt will perform TUG in </= 13 seconds in order to demo improve gait speed and decreased risk of falling  Pt will perform at least 5 sit to stands without UE support in order to demo improved ability to perform transfers    Plan     Continue per POC, progress as tolerated     Dieudonne Richter, PTA         "

## 2025-02-11 ENCOUNTER — OFFICE VISIT (OUTPATIENT)
Dept: INTERNAL MEDICINE | Facility: CLINIC | Age: 85
End: 2025-02-11
Payer: MEDICARE

## 2025-02-11 VITALS
SYSTOLIC BLOOD PRESSURE: 134 MMHG | OXYGEN SATURATION: 100 % | BODY MASS INDEX: 18.78 KG/M2 | WEIGHT: 99.44 LBS | HEART RATE: 54 BPM | DIASTOLIC BLOOD PRESSURE: 82 MMHG | HEIGHT: 61 IN | TEMPERATURE: 98 F

## 2025-02-11 DIAGNOSIS — I10 PRIMARY HYPERTENSION: ICD-10-CM

## 2025-02-11 DIAGNOSIS — Z98.61 CAD S/P PERCUTANEOUS CORONARY ANGIOPLASTY: ICD-10-CM

## 2025-02-11 DIAGNOSIS — R42 VERTIGO: Primary | ICD-10-CM

## 2025-02-11 DIAGNOSIS — I25.10 CAD S/P PERCUTANEOUS CORONARY ANGIOPLASTY: ICD-10-CM

## 2025-02-11 DIAGNOSIS — H81.10 BENIGN PAROXYSMAL POSITIONAL VERTIGO, UNSPECIFIED LATERALITY: ICD-10-CM

## 2025-02-11 PROCEDURE — 99999 PR PBB SHADOW E&M-EST. PATIENT-LVL V: CPT | Mod: PBBFAC,HCNC,, | Performed by: FAMILY MEDICINE

## 2025-02-11 NOTE — PATIENT INSTRUCTIONS
Suspect BPPV (Benign Positional Paroxysmal Vertigo)     KEEP HEAD IN A GENERALLY NEUTRAL POSITION AND AVOID QUICK HEAD MOVEMENTS TO PREVENT RECURRENCE.     CAN TAKE OVER THE COUNTER MECLIZINE AS NEEDED IF REOCCURS AND THERE ARE ALSO SPECIFIC  PHYSICAL THERAPY EXERCISES THAT CAN HELP IN THE FUTURE IF NEEDED.

## 2025-02-11 NOTE — PROGRESS NOTES
Office Visit    Patient Name: Betzy Cooley    : 1940  MRN: [id number]    Subjective:  Betzy is a 84 y.o. female who presents today for:    Dizziness (Pt had symptoms of vertigo. Pt stated the room was spinning. Stated the dizziness is better. She stated that her right ear hurts. )    84-year-old patient of PCP Dr. Paul (most recent visit 2024 and labs 2024 with normal kidney function, electrolytes, chronic mild anemia with hematocrit 34.9)   With pertinent medical history of coronary artery disease status post angioplasty, controlled hypertension, osteoporosis-currently in PT.    She presents today for UC visit to discuss an episode of vertigo that occurred this morning.  She has never had room spinning vertigo previously like she did this morning.  She reports that she got out of bed very quickly as she woke up in the early morning and had to urinate and was trying to get to the bathroom.  When she got out of bed after turning her head quickly she experienced sudden onset of room spinning vertigo.  No lightheadedness/near syncopal symptoms, no chest pain, shortness of breath, palpitations.      Her  assisted her and she sats still on side of her bed and the symptoms did subside within a few minutes.  She was able to get to the bathroom and has since gone about her day a bit more slowly with her movements and has not had a recurrence of the vertigo.    Has some right posterior ear pain but otherwise no new sinus concerns. Has not felt ill.         PAST MEDICAL HISTORY, SURGICAL/SOCIAL/FAMILY HISTORY REVIEWED AS PER CHART, WITH PERTINENT FINDINGS INCLUDED IN HISTORY SECTION OF NOTE.     Current Medications    Medication List with Changes/Refills   Current Medications    ASPIRIN 81 MG CHEW    Take 81 mg by mouth once daily. Not chewable    ATORVASTATIN (LIPITOR) 40 MG TABLET    Take 1 tablet (40 mg total) by mouth every evening.    CALCIUM-VITAMIN D3 500 MG(1,250MG) -200 UNIT PER  TABLET    Take 1 tablet by mouth 2 (two) times daily with meals.    DICLOFENAC SODIUM (VOLTAREN ARTHRITIS PAIN) 1 % GEL    Apply 2 g topically daily as needed (Pain).    EZETIMIBE (ZETIA) 10 MG TABLET    Take 1 tablet (10 mg total) by mouth once daily.    LANOLIN/MINERAL OIL/PETROLATUM (ARTIFICIAL TEARS OPHT)    Place 1 drop into both eyes once daily.    METOPROLOL SUCCINATE (TOPROL-XL) 25 MG 24 HR TABLET    Take 0.5 tablets (12.5 mg total) by mouth once daily.    MULTIVITAMIN (THERAGRAN) PER TABLET    Take 1 tablet by mouth once daily.    NITROGLYCERIN (NITROSTAT) 0.4 MG SL TABLET    Place 1 tablet (0.4 mg total) under the tongue every 5 (five) minutes as needed for Chest pain.    PANTOPRAZOLE (PROTONIX) 40 MG TABLET    Take 1 tablet (40 mg total) by mouth 2 (two) times daily.    PILOCARPINE (SALAGEN) 5 MG TAB    Take 1 tablet (5 mg total) by mouth once daily.    SODIUM CHLORIDE (SALINE MIST NASL)    1 spray by Each Nostril route daily as needed (Congestion).    TERIPARATIDE (FORTEO) 20 MCG/DOSE (600MCG/2.4ML) PNIJ    Inject 0.08 mLs (20 mcg total) into the skin once daily.    TICAGRELOR (BRILINTA) 90 MG TABLET    Take 1 tablet (90 mg total) by mouth 2 (two) times daily.    TRAMADOL (ULTRAM) 50 MG TABLET    Take 1 tablet (50 mg total) by mouth every 8 (eight) hours as needed for Pain.    VALSARTAN (DIOVAN) 80 MG TABLET    Take 1 tablet (80 mg total) by mouth once daily.    VITAMIN A-VITAMIN C-VIT E-MIN TAB    Take 1 tablet by mouth once daily.       Allergies   Review of patient's allergies indicates:   Allergen Reactions    Sulfa (sulfonamide antibiotics) Hives     Other reaction(s): Anaphylaxis    Itching     Shortness of breath         Review of Systems (Pertinent positives)  Review of Systems   Constitutional:  Negative for fever.   HENT:  Positive for ear pain. Negative for ear discharge, sinus pain and sore throat.    Respiratory:  Negative for shortness of breath.    Cardiovascular:  Negative for chest pain  "and palpitations.   Neurological:  Positive for dizziness (now resolved). Negative for syncope and light-headedness.       /82 (BP Location: Left arm, Patient Position: Sitting)   Pulse (!) 54   Temp 97.5 °F (36.4 °C)   Ht 5' 1" (1.549 m)   Wt 45.1 kg (99 lb 6.8 oz)   SpO2 100%   BMI 18.79 kg/m²     Physical Exam  Vitals reviewed.   Constitutional:       General: She is not in acute distress.     Appearance: Normal appearance. She is well-developed and underweight.      Comments: Back brace   HENT:      Head: Normocephalic and atraumatic.        Right Ear: Tympanic membrane normal. There is no impacted cerumen.      Left Ear: Tympanic membrane normal. There is no impacted cerumen.      Nose: Rhinorrhea present. No congestion.      Mouth/Throat:      Pharynx: No oropharyngeal exudate or posterior oropharyngeal erythema.   Eyes:      Conjunctiva/sclera: Conjunctivae normal.   Cardiovascular:      Rate and Rhythm: Normal rate and regular rhythm.   Pulmonary:      Effort: Pulmonary effort is normal.      Breath sounds: Normal breath sounds.   Musculoskeletal:      Right lower leg: No edema.      Left lower leg: No edema.   Skin:     General: Skin is warm and dry.   Neurological:      General: No focal deficit present.      Mental Status: She is alert and oriented to person, place, and time.   Psychiatric:         Mood and Affect: Mood normal.         Behavior: Behavior normal.           Assessment/Plan:  Betzy Cooley is a 84 y.o. female who presents today for :        ICD-10-CM ICD-9-CM    1. Vertigo  R42 780.4       2. Benign paroxysmal positional vertigo, unspecified laterality  H81.10 386.11       3. CAD S/P percutaneous coronary angioplasty  I25.10 414.00     Z98.61 V45.82       4. Primary hypertension  I10 401.9         BPPV:  Isolated self-limited brief episode this morning in conjunction with a quick movement of her head.  Extensive education provided regarding BPPV and triggers for this.  As this " was her 1st episode and it resolved quickly with keeping her head relatively still/neutral, advised monitoring and discussed ways of getting in and out of bed via log roll technique to avoid head movements that may provoke symptoms.  Advise that if this becomes more of a recurrent issue that we could perform Clair-Hallpike/Epley maneuvers either here in the office or with her physical therapist.  Also advised on meclizine as needed but holding off on prescription as her symptoms will hopefully not reoccur.      No concerns on HEENT exam-does have some mild right mastoid tenderness-this may have to do with her neck pain or potentially a dental issue as she notes increased pain with opening her mouth-she will bring this up with her dentist at her upcoming visit.    CORONARY ARTERY DISEASE, HYPERTENSION:  Stable, well-controlled on current regimen, not thought to be contributing to recent episode of self-limited vertigo.  Kidney function recently normal, stable mild anemia, blood pressure controlled.  Continue regular PCP follow-up.    Patient Instructions   Suspect BPPV (Benign Positional Paroxysmal Vertigo)     KEEP HEAD IN A GENERALLY NEUTRAL POSITION AND AVOID QUICK HEAD MOVEMENTS TO PREVENT RECURRENCE.     CAN TAKE OVER THE COUNTER MECLIZINE AS NEEDED IF REOCCURS AND THERE ARE ALSO SPECIFIC  PHYSICAL THERAPY EXERCISES THAT CAN HELP IN THE FUTURE IF NEEDED.       Follow up for return as needed for new concerns.

## 2025-02-11 NOTE — PROGRESS NOTES
Subjective:      Patient ID: Betzy Cooley is a 84 y.o. female.    Chief Complaint:  Osteoporosis    History of Present Illness  Betzy Cooley is here for initial evaluation of osteoporosis.  She was seen by our inpatient endocrine team in 10/2024.     With regards to osteoporosis:     Patient recently seen as IP per Dr. Noble for new compression fracture a L1 and  L3.  BMD done showing osteoporosis based on low T scores, elevated FRAX and pathological fracture.  Secondary evaluation done and unremarkable.  Patient stopped Fosamax and started on Forteo 12/2/2024.  Patient reports doing well on Forteo with no side effects not missing any doses.    DXA Scan 10/10/2024:   COMPARISON:  Comparison study done on 10/07/2022. Lumbar spine BMD 0.803 g/cm2 and the T-score -2.2.  The Total Hip BMD 0.731 g/cm2 and the T-score -1.7.     FINDINGS:  Lumbar spine (L1-L4):               T-score is -2.9, and Z-score is 0.0.  Compared with previous DXA, BMD at the lumbar spine has declined by -8.8%.  Femoral neck:                          T-score is -3.1, and Z-score is -0.6.  Total hip:                                T-score is -2.7, and Z-score is -0.4.  Compared with previous DXA, BMD at the total hip has declined by -16.4%.  Distal 1/3 radius:                      Not applicable      Fracture Risk (FRAX adjusted for Trabecular Bone Score)  24% risk of a major osteoporotic fracture in the next 10 years.  9.7% risk of hip fracture in the next 10 years.    Medications:   History of Fosamax - Patient has extensive history of Fosamax.  PCP stopped as she had been on it for an extensive amount of time.  Restarted a couple months before IP visit and switch to Forteo.  Currently on Forteo since 12/2/2024  Calcium intake: Calcium Citracal 2 tablets BID  Vit D intake: Unknown dosage    Weight bearing exercise: PT twice weekly  Falls: Denies  Fractures: Non traumatic L1 and L3 compression fracture  Significant height loss (>2 inches):  "1 inch    Family history: Mother    Menopause: Late 30s early 40s.  Patient reports taking mediation for 10 years but unsure what.  Believe to be bisphosphonate.    Tobacco Use: Denies  Alcohol Use: Occasionally for holidays  Glucocorticoid History: Denies  Anticoagulant Use: Brilinta  GERD/PPI Use: Protonix twice daily  History of Malabsorption: Denies  Antiseizure Medications: Denies  History of Thyroid Disease: Denies  Kidney Disease: Denies  Personal or family history of kidney stones: Denies  MI: Denies - history of stents  Stroke: Denies  Dental Visit: UTD  Cancer Hx:  Denies  Radiation Treatment:  Denies    Denies point tenderness along spine  Denies bilateral hip tenderness  Gait Disturbances/Ambulates Independently:  Uses walker and cane    Lab Results   Component Value Date    CALCIUM 9.6 11/21/2024    PHOS 2.5 (L) 05/07/2024     Vit D, 25-Hydroxy   Date Value Ref Range Status   11/20/2024 48 30 - 96 ng/mL Final     Comment:     Vitamin D deficiency.........<10 ng/mL                              Vitamin D insufficiency......10-29 ng/mL       Vitamin D sufficiency........> or equal to 30 ng/mL  Vitamin D toxicity............>100 ng/mL         Review of Systems - As above    Objective:   Physical Exam  Vitals and nursing note reviewed.         Visit Vitals  /82 (BP Location: Left arm, Patient Position: Sitting)   Ht 5' 1" (1.549 m)   Wt 45.7 kg (100 lb 10.2 oz)   BMI 19.02 kg/m²       Body mass index is 19.02 kg/m².    Lab Review:   Lab Results   Component Value Date    HGBA1C 5.5 05/15/2024       Lab Results   Component Value Date    CHOL 116 (L) 12/04/2024    HDL 75 12/04/2024    LDLCALC 32.2 (L) 12/04/2024    TRIG 44 12/04/2024    CHOLHDL 64.7 (H) 12/04/2024     Lab Results   Component Value Date     (L) 11/21/2024    K 4.1 11/21/2024     11/21/2024    CO2 23 11/21/2024    GLU 89 11/21/2024    BUN 11 11/21/2024    CREATININE 0.6 11/21/2024    CALCIUM 9.6 11/21/2024    PROT 6.6 11/19/2024 "    ALBUMIN 3.5 11/19/2024    BILITOT 0.4 11/19/2024    ALKPHOS 104 11/19/2024    AST 19 11/19/2024    ALT 13 11/19/2024    ANIONGAP 9 11/21/2024    ESTGFRAFRICA >60.0 07/30/2021    EGFRNONAA >60.0 07/30/2021    TSH 2.569 07/30/2021     Vit D, 25-Hydroxy   Date Value Ref Range Status   11/20/2024 48 30 - 96 ng/mL Final     Comment:     Vitamin D deficiency.........<10 ng/mL                              Vitamin D insufficiency......10-29 ng/mL       Vitamin D sufficiency........> or equal to 30 ng/mL  Vitamin D toxicity............>100 ng/mL       Assessment and Plan     1. Age-related osteoporosis with current pathological fracture, sequela  DXA Bone Density Axial Skeleton 1 or more sites      2. Age-related osteoporosis with current pathological fracture, vertebra(e), initial encounter for fracture  DXA Bone Density Axial Skeleton 1 or more sites      3. Body mass index (BMI) less than 19            Age-related osteoporosis with current pathological fracture  Risk factors include low BMI,  race, menopause, family history, PPI therapy.  Reviewed basics of quantity versus quality  Secondary workup completed previously and unremarkable  RDA of calcium and Vitamin D discussed  Fall precautions emphasized  Weight bearing exercises encouraged  Treatment options and potential side effects discussed for Forteo    Treatment: Continue Forteo for 24 months followed by Reclast infusion    Fall precautions made in the home.  Alerted that if dental work needs to be done it should be done prior to initiating therapy. Dental health: UTD  Repeat DEXA scan 10/2026 unless patient otherwise desires.  Discussed that repeat DXA prior to 2 years will not change course of treatment and may show improvements in BMD.      Body mass index (BMI) less than 19  See Above      Follow up in about 1 year (around 2/12/2026).    Lisa Lier, FNP-C [facility]     Case discussed with Dr Gibson.  Recommendations were discussed with the  patient in detail.  The patient verbalized understanding and agrees with the plan outlined as above.     Visit today included increased complexity associated with the care of the episodic problem osteoporosis addressed and managing the longitudinal care of the patient due to the serious and/or complex managed problem(s).     I have reviewed and concur with the NP's history, assessment, and plan.  I have personally interviewed and examined the patient at bedside.  See below addendum for my evaluation and additional findings.       Surinder Gibson M.D. [facility]

## 2025-02-12 ENCOUNTER — OFFICE VISIT (OUTPATIENT)
Dept: ENDOCRINOLOGY | Facility: CLINIC | Age: 85
End: 2025-02-12
Payer: MEDICARE

## 2025-02-12 VITALS
WEIGHT: 100.63 LBS | SYSTOLIC BLOOD PRESSURE: 112 MMHG | HEIGHT: 61 IN | DIASTOLIC BLOOD PRESSURE: 82 MMHG | BODY MASS INDEX: 19 KG/M2

## 2025-02-12 DIAGNOSIS — M80.08XA AGE-RELATED OSTEOPOROSIS WITH CURRENT PATHOLOGICAL FRACTURE, VERTEBRA(E), INITIAL ENCOUNTER FOR FRACTURE: ICD-10-CM

## 2025-02-12 DIAGNOSIS — M80.00XS AGE-RELATED OSTEOPOROSIS WITH CURRENT PATHOLOGICAL FRACTURE, SEQUELA: Primary | ICD-10-CM

## 2025-02-12 PROBLEM — M81.0 OSTEOPOROSIS WITHOUT PATHOLOGICAL FRACTURE: Status: ACTIVE | Noted: 2024-11-19

## 2025-02-12 PROCEDURE — 99999 PR PBB SHADOW E&M-EST. PATIENT-LVL IV: CPT | Mod: PBBFAC,HCNC,, | Performed by: INTERNAL MEDICINE

## 2025-02-12 NOTE — ASSESSMENT & PLAN NOTE
Risk factors include low BMI,  race, menopause, family history, PPI therapy.  Reviewed basics of quantity versus quality  Secondary workup completed previously and unremarkable  RDA of calcium and Vitamin D discussed  Fall precautions emphasized  Weight bearing exercises encouraged  Treatment options and potential side effects discussed for Forteo    Treatment: Continue Forteo for 24 months followed by Reclast infusion    Fall precautions made in the home.  Alerted that if dental work needs to be done it should be done prior to initiating therapy. Dental health: UTD  Repeat DEXA scan 10/2026 unless patient otherwise desires.  Discussed that repeat DXA prior to 2 years will not change course of treatment and may show improvements in BMD.    
See Above  
Right arm;

## 2025-02-13 ENCOUNTER — CLINICAL SUPPORT (OUTPATIENT)
Dept: REHABILITATION | Facility: HOSPITAL | Age: 85
End: 2025-02-13
Payer: MEDICARE

## 2025-02-13 DIAGNOSIS — M54.50 LUMBAR PAIN: Primary | ICD-10-CM

## 2025-02-13 DIAGNOSIS — R53.1 DECREASED STRENGTH: ICD-10-CM

## 2025-02-13 PROCEDURE — 97112 NEUROMUSCULAR REEDUCATION: CPT | Mod: HCNC

## 2025-02-13 PROCEDURE — 97110 THERAPEUTIC EXERCISES: CPT | Mod: HCNC

## 2025-02-13 NOTE — PROGRESS NOTES
Physical Therapy Daily Treatment Note     Name: Betzy Cooley  Clinic Number: [id number]    Therapy Diagnosis:   Encounter Diagnoses   Name Primary?    Lumbar pain Yes    Decreased strength      Physician: Grzegorz Damon DO    Visit Date: 2025    Physician Orders: PT Eval and Treat   Medical Diagnosis from Referral: S32.030G (ICD-10-CM) - Closed compression fracture of L3 lumbar vertebra with delayed healing, subsequent encounter   Evaluation Date: 1/15/2025  Authorization Period Expiration: 26  Plan of Care Expiration: 25  Progress Note Due: 2/15/25  Visit # / Visits authorized: ,    FOTO: 1/ 3    Time In: 11:00 am   Time Out: 11:56 am   Total Billable Time: 56 minutes    Precautions:  Standard, osteoporosis      Subjective     Pt reports: feeling so-so today. Pt stated that she is experiencing pain across (B) lumbar region.   She was compliant with home exercise program.  Response to previous treatment: no adverse effects  Functional change: see assessment     Pain: pain across (B) lumbar region, not subject to scale     Objective       Lumbar AROM deferred: pt stated that MD told her to wear back brace at all times     Hip Right   Left   Pain/Dysfunction with Movement     AROM MMT AROM MMT NT = not tested    Flexion WFL 4/5 WFL 4/5     Extension NT NT NT NT  Pt performed fair bridge against gravity    Abduction WFL 4/5 WFL 4/5        Knee Right   Left   Pain/Dysfunction with Movement     AROM MMT AROM MMT     Flexion WFL 4+/5 WFL 4+/5     Extension WFL 4/5 WFL 4/5        Ankle Right   Left   Pain/Dysfunction with Movement     AROM MMT AROM MMT     Plantarflexion WFL 4/5 WFL 4/5     Dorsiflexion WFL 4+/5 WFL 4+/5        TU seconds w/rollator      30 Second Sit to Stand Test: 10 w/UE support     Objective measurements and therapeutic exercises to develop strength, endurance,and flexibility for 38 minutes including:  Recumbent bike 6 min (for LE endurance )   LAQ 1lb 3x10 BLE  "  Standing hip abd 2x10 BLE (with BUE support and SBA) 1#  Standing hip ext 2x10 BLE (with BUE support and SBA) 1#  Standing calf raises 3x10 (with BUE support and SBA)   Standing marches 2x10 (with BUE support and SBA) 1#  SAQ 1lb 3x10 BLE not performed   Seated clams RTB 3x10   STS 1 airex B UE support EOM 2x10 not performed     neuromuscular re-education activities to improve: Coordination, core/lumbar/scap stabilization Posture, and Motor Control for 18 minutes. The following activities were included:  Seated no money w/scap retraction YTB 3" 3x10   Seated rows w/scap retraction RTB 3" 3x10   Seated shld ext RTB 3" 2x10  not performed   Seated hip adduction iso c/ ball w/TA brace 3" 2x10   Seated SB press downs 3x10 3s      Home Exercises Provided and Patient Education Provided     Education provided:   - HEP     Written Home Exercises Provided: Patient instructed to cont prior HEP.   Betzy demonstrated good  understanding of the education provided.     See EMR under Patient Instructions for exercises provided prior visit.      Assessment     Updated measurements taken and pt demo improvements in TUG score, (B) hip abduction MMT scores, and (B) knee flexion MMT scores compared to measurements taken on initial evaluation. Pt reported that her back felt better at end of session compared to beginning of session.   Betzy Is progressing towards her goals.   Pt prognosis is Fair.     Pt will continue to benefit from skilled outpatient physical therapy to address the deficits listed in the problem list box on initial evaluation, provide pt/family education and to maximize pt's level of independence in the home and community environment.     Pt's spiritual, cultural and educational needs considered and pt agreeable to plan of care and goals.    Anticipated barriers to physical therapy:  chronicity of symptoms     Goals:   Short Term Goals: 2 weeks   Pt will be compliant with HEP to supplement PT with decreasing " pain and improving functional mobility     Long Term Goals: 6 weeks   Pt will improve FOTO score to at least 62 in order to demo improved functional mobility  Pt will improve LE MMT scores by at least 1/3 grade where deficits noted in order to improve strength for functional tasks  Pt will perform TUG in </= 13 seconds in order to demo improve gait speed and decreased risk of falling  Pt will perform at least 5 sit to stands without UE support in order to demo improved ability to perform transfers    Plan     Continue per POC, progress as tolerated     Laurie Jernigan, PT

## 2025-02-15 ENCOUNTER — PATIENT MESSAGE (OUTPATIENT)
Dept: ADMINISTRATIVE | Facility: OTHER | Age: 85
End: 2025-02-15
Payer: MEDICARE

## 2025-02-17 ENCOUNTER — CLINICAL SUPPORT (OUTPATIENT)
Dept: REHABILITATION | Facility: HOSPITAL | Age: 85
End: 2025-02-17
Payer: MEDICARE

## 2025-02-17 DIAGNOSIS — R53.1 DECREASED STRENGTH: ICD-10-CM

## 2025-02-17 DIAGNOSIS — M54.50 LUMBAR PAIN: Primary | ICD-10-CM

## 2025-02-17 PROCEDURE — 97112 NEUROMUSCULAR REEDUCATION: CPT | Mod: HCNC,CQ

## 2025-02-17 PROCEDURE — 97110 THERAPEUTIC EXERCISES: CPT | Mod: HCNC,CQ

## 2025-02-17 NOTE — PROGRESS NOTES
"  Physical Therapy Daily Treatment Note     Name: Betzy Cooley  Clinic Number: [id number]    Therapy Diagnosis:   Encounter Diagnoses   Name Primary?    Lumbar pain Yes    Decreased strength      Physician: Grzegorz Damon DO    Visit Date: 2/17/2025    Physician Orders: PT Eval and Treat   Medical Diagnosis from Referral: S32.030G (ICD-10-CM) - Closed compression fracture of L3 lumbar vertebra with delayed healing, subsequent encounter   Evaluation Date: 1/15/2025  Authorization Period Expiration: 1/7/26  Plan of Care Expiration: 2/28/25  Progress Note Due: 2/15/25  Visit # / Visits authorized: 1/ 1, 7/20   FOTO: 1/ 3    Time In: 11:00 am   Time Out: 11:50 am   Total Billable Time: 50 minutes    Precautions:  Standard, osteoporosis      Subjective     Pt reports: not having back pain today and she has been using the cane around the house lately.  She was compliant with home exercise program.  Response to previous treatment: no adverse effects  Functional change: see assessment     Pain: pain across (B) lumbar region, not subject to scale     Objective       therapeutic exercises to develop strength, endurance,and flexibility for 35 minutes including:  Recumbent bike 6 min (for LE endurance )   LAQ 1lb 3x10 BLE   Standing hip abd 2x10 BLE (with BUE support and SBA) 1#  Standing hip ext 2x10 BLE (with BUE support and SBA) 1#  Standing calf raises 3x10 (with BUE support and SBA)   Standing marches 2x10 (with BUE support and SBA) 1#  SAQ 1lb 3x10 BLE not performed   Seated clams RTB 3x10   STS 1 airex B UE support EOM 2x10 not performed     neuromuscular re-education activities to improve: Coordination, core/lumbar/scap stabilization Posture, and Motor Control for 15 minutes. The following activities were included:  Seated no money w/scap retraction YTB 3" 3x10   Seated rows w/scap retraction RTB 3" 3x10   Seated shld ext RTB 3" 2x10  not performed   Seated hip adduction iso c/ ball w/TA brace 3" 2x10   Seated SB " press downs 3x10 3s      Home Exercises Provided and Patient Education Provided     Education provided:   - HEP     Written Home Exercises Provided: Patient instructed to cont prior HEP.   Betzy demonstrated good  understanding of the education provided.     See EMR under Patient Instructions for exercises provided prior visit.      Assessment   Pt presents to therapy without LBP. She states that her mobility has improved as evidenced by decreased need for rollator in home setting. Cueing while performing standing marches to focus on elevating the knee vs knee flexion. Pt continues to tolerate     Betzy Is progressing towards her goals.   Pt prognosis is Fair.     Pt will continue to benefit from skilled outpatient physical therapy to address the deficits listed in the problem list box on initial evaluation, provide pt/family education and to maximize pt's level of independence in the home and community environment.     Pt's spiritual, cultural and educational needs considered and pt agreeable to plan of care and goals.    Anticipated barriers to physical therapy:  chronicity of symptoms     Goals:   Short Term Goals: 2 weeks   Pt will be compliant with HEP to supplement PT with decreasing pain and improving functional mobility     Long Term Goals: 6 weeks   Pt will improve FOTO score to at least 62 in order to demo improved functional mobility  Pt will improve LE MMT scores by at least 1/3 grade where deficits noted in order to improve strength for functional tasks  Pt will perform TUG in </= 13 seconds in order to demo improve gait speed and decreased risk of falling  Pt will perform at least 5 sit to stands without UE support in order to demo improved ability to perform transfers    Plan     Continue per POC, progress as tolerated     Dieudonne Richter, PTA

## 2025-02-20 ENCOUNTER — CLINICAL SUPPORT (OUTPATIENT)
Dept: REHABILITATION | Facility: HOSPITAL | Age: 85
End: 2025-02-20
Payer: MEDICARE

## 2025-02-20 DIAGNOSIS — R53.1 DECREASED STRENGTH: ICD-10-CM

## 2025-02-20 DIAGNOSIS — M54.50 LUMBAR PAIN: Primary | ICD-10-CM

## 2025-02-20 PROCEDURE — 97112 NEUROMUSCULAR REEDUCATION: CPT | Mod: HCNC

## 2025-02-20 PROCEDURE — 97110 THERAPEUTIC EXERCISES: CPT | Mod: HCNC

## 2025-02-20 NOTE — PROGRESS NOTES
"  Physical Therapy Daily Treatment Note     Name: Betzy Cooley  Clinic Number: [id number]    Therapy Diagnosis:   Encounter Diagnoses   Name Primary?    Lumbar pain Yes    Decreased strength      Physician: Grzegorz Damon DO    Visit Date: 2/20/2025    Physician Orders: PT Eval and Treat   Medical Diagnosis from Referral: S32.030G (ICD-10-CM) - Closed compression fracture of L3 lumbar vertebra with delayed healing, subsequent encounter   Evaluation Date: 1/15/2025  Authorization Period Expiration: 1/7/26  Plan of Care Expiration: 2/28/25  Progress Note Due: 2/15/25  Visit # / Visits authorized: 1/ 1, 8/20   FOTO: 1/ 3    Time In: 11:01 am   Time Out: 11:51 am   Total Billable Time: 50 minutes    Precautions: Standard, osteoporosis      Subjective     Pt reports: that she is experiencing tightness in (B) back.    Response to previous treatment: no adverse effects  Functional change: see note from 2/13/25    Pain: pt reported experiencing tightness in (B) back, not subject to scale     Objective     therapeutic exercises to develop strength, endurance,and flexibility for 35 minutes including:  Recumbent bike 6 min (for LE endurance )   LAQ 2# 3x10 BLE   Standing hip abd 2x10 BLE (with BUE support and SBA) 2#  Standing hip ext 2x10 BLE (with BUE support and SBA)2#  Standing calf raises 3x10 (with BUE support and SBA)   Standing marches 2x10 (with BUE support and SBA) 2#  SAQ 1lb 3x10 BLE not performed   Seated clams RTB 3x10       neuromuscular re-education activities to improve: Coordination, core/lumbar/scap stabilization Posture, and Motor Control for 12 minutes. The following activities were included:  Seated no money w/scap retraction YTB 3" 3x10   Seated rows w/scap retraction RTB 3" 3x10   Seated shld ext RTB 3" 2x10  not performed   Seated hip adduction iso c/ ball w/TA brace 3" 2x10 not performed   Seated SB press downs 2x10 3s      Therapeutic activities for improved functional performance x 3:   Sit to " stands 2x10 w/1 UE support      Home Exercises Provided and Patient Education Provided     Education provided:   - HEP     Written Home Exercises Provided: Patient instructed to cont prior HEP.      Assessment     Pt was able to tolerate increased resistance for LE strengthening therex noted above. Added sit to stands with only 1/UE support for improved functional performance/mobility. Pt tolerated therapy session without any adverse reactions.   Betzy Is progressing well towards her goals.   Pt prognosis is Fair.     Pt will continue to benefit from skilled outpatient physical therapy to address the deficits listed in the problem list box on initial evaluation, provide pt/family education and to maximize pt's level of independence in the home and community environment.     Pt's spiritual, cultural and educational needs considered and pt agreeable to plan of care and goals.    Anticipated barriers to physical therapy:  chronicity of symptoms     Goals:   Short Term Goals: 2 weeks   Pt will be compliant with HEP to supplement PT with decreasing pain and improving functional mobility     Long Term Goals: 6 weeks   Pt will improve FOTO score to at least 62 in order to demo improved functional mobility  Pt will improve LE MMT scores by at least 1/3 grade where deficits noted in order to improve strength for functional tasks  Pt will perform TUG in </= 13 seconds in order to demo improve gait speed and decreased risk of falling  Pt will perform at least 5 sit to stands without UE support in order to demo improved ability to perform transfers    Plan     Continue per POC, progress as tolerated     Laurie Jernigan, PT

## 2025-02-22 ENCOUNTER — OFFICE VISIT (OUTPATIENT)
Dept: URGENT CARE | Facility: CLINIC | Age: 85
End: 2025-02-22
Payer: MEDICARE

## 2025-02-22 VITALS
HEIGHT: 61 IN | SYSTOLIC BLOOD PRESSURE: 183 MMHG | HEART RATE: 62 BPM | BODY MASS INDEX: 18.88 KG/M2 | RESPIRATION RATE: 16 BRPM | DIASTOLIC BLOOD PRESSURE: 88 MMHG | TEMPERATURE: 98 F | OXYGEN SATURATION: 99 % | WEIGHT: 100 LBS

## 2025-02-22 DIAGNOSIS — Z00.00 ENCOUNTER FOR MEDICARE ANNUAL WELLNESS EXAM: ICD-10-CM

## 2025-02-22 DIAGNOSIS — K59.00 CONSTIPATION, UNSPECIFIED CONSTIPATION TYPE: ICD-10-CM

## 2025-02-22 DIAGNOSIS — H00.014 HORDEOLUM EXTERNUM OF LEFT UPPER EYELID: Primary | ICD-10-CM

## 2025-02-22 PROCEDURE — 99213 OFFICE O/P EST LOW 20 MIN: CPT | Mod: S$GLB,,, | Performed by: NURSE PRACTITIONER

## 2025-02-22 RX ORDER — POLYETHYLENE GLYCOL 3350 17 G/17G
17 POWDER, FOR SOLUTION ORAL DAILY
Qty: 510 G | Refills: 0 | Status: SHIPPED | OUTPATIENT
Start: 2025-02-22 | End: 2025-03-24

## 2025-02-22 RX ORDER — ERYTHROMYCIN 5 MG/G
OINTMENT OPHTHALMIC 3 TIMES DAILY
Qty: 1 G | Refills: 0 | Status: SHIPPED | OUTPATIENT
Start: 2025-02-22 | End: 2025-03-01

## 2025-02-22 NOTE — PATIENT INSTRUCTIONS
Wash eyelid with Derek and Derek baby shampoo       Please follow up with your Primary care provider or Opthalmologist within 48-72 hours if your signs and symptoms have not improved.

## 2025-02-22 NOTE — PROGRESS NOTES
"Subjective:      Patient ID: Betzy Cooley is a 84 y.o. female.    Vitals:  height is 5' 1" (1.549 m) and weight is 45.4 kg (100 lb). Her oral temperature is 98 °F (36.7 °C). Her blood pressure is 183/88 (abnormal) and her pulse is 62. Her respiration is 16 and oxygen saturation is 99%.     Chief Complaint: Stye    This is a 84 y.o. female who presents today with a chief complaint of stye  Patient presents with:  Stye since 2 days. Pt states this is recurrent one from 6 months ago. Pt states she has tried no meds for this time.   Provider note begins below    Patient states she had a stye on this eyelid a few months ago she used over-the-counter medicines.  It took a long time to go away.  Patient complains of constipation.        Eye Problem   The left eye is affected. This is a recurrent problem. The current episode started yesterday. The problem occurs constantly. The problem has been unchanged. There was no injury mechanism. The pain is at a severity of 10/10. The pain is severe. There is No known exposure to pink eye. She Does not wear contacts. Associated symptoms include eye redness and itching. Pertinent negatives include no blurred vision, eye discharge, double vision, fever, foreign body sensation, nausea, photophobia, recent URI or vomiting. She has tried nothing for the symptoms. The treatment provided no relief.       Constitution: Negative for chills, sweating, fatigue and fever.   Eyes:  Positive for eye itching, eye redness and eyelid swelling. Negative for eye trauma, foreign body in eye, eye discharge, eye pain, photophobia, double vision and blurred vision.   Gastrointestinal:  Negative for nausea and vomiting.      Objective:     Physical Exam   Constitutional: She is oriented to person, place, and time.   HENT:   Head: Normocephalic and atraumatic.   Eyes: Left eye exhibits hordeolum.       Cardiovascular: Normal rate.   Pulmonary/Chest: Effort normal. No respiratory distress.   Abdominal: " Normal appearance.   Neurological: She is alert and oriented to person, place, and time.   Skin: Skin is warm and dry.   Psychiatric: Her behavior is normal. Mood normal.       Assessment:     1. Hordeolum externum of left upper eyelid    2. Constipation, unspecified constipation type        Plan:   Warm compresses   Handwashing   Derek and Derek baby shampoo to eyelid   Erythromycin ointment   Constipation-increase fiber in your diet.  Fruits with skins on them.  Prune pair and pineapple juice   MiraLax       Please follow up with your Primary care provider or Opthalmologist within 48-72 hours if your signs and symptoms have not improved.      If your condition worsens or fails to improve we recommend that you receive another evaluation at the emergency room immediately or contact your primary medical clinic or opthalmology to discuss your concerns.     If you were given an antibiotic today, please complete all your antibiotic as directed.       Use warm compresses to eye followed by a gentle eye massage of the area.  You may clean the lid with very diluted baby shampoo and water with a Qtip or soft swab.  You can also use artificial tears as needed.     You must understand that you've received an urgent care treatment only and that you may be released before all your medical problems are known or treated. You the patient will arrange for followup care as instructed.   Use the eye drops as prescribed while awake initially. Use the eye drops for 24 hours after the last day of eye symptoms.   Do not wear your contact lens ( if you use them) for at least 5 days after you stop having symptoms and are rechecked by your doctor. Throw away the contacts, contact solution and carrying case you were using and start with new material. You may also need to throw away any eye makeup/mascara that you used while your eye was irritated.  If you develop increase eye symptoms or change in your vision seek medical care  immediately either with your ophthalomologist or the ER or return here.      Hordeolum externum of left upper eyelid  -     erythromycin (ROMYCIN) ophthalmic ointment; Place into the left eye 3 (three) times daily. for 7 days  Dispense: 1 g; Refill: 0    Constipation, unspecified constipation type  -     polyethylene glycol (GLYCOLAX) 17 gram/dose powder; Take 17 g by mouth once daily. Take until you have a good bowel movement  Dispense: 510 g; Refill: 0

## 2025-02-23 NOTE — PROGRESS NOTES
Office Visit    Patient Name: Betzy Cooley    : 1940  MRN: [id number]    Subjective:  Betzy is a 84 y.o. female who presents today for:    Stye (Left eye, went to  and wants a follow up on it)    PREVIOUSLY SEEN BY ME FOR BPPV 2025    PCP [facility]-- most recent PCP visit 2024.  Had labs 2024 showing mild anemia with hematocrit of 34.9, normal kidney function with EGFR greater than 60/normal electrolytes, glucose 89  PMH:  Artery disease status post angioplasty, hypertension, postmenopausal osteoporosis with history of fracture, GERD with history of dysphagia.  She had an unremarkable colonoscopy 2014 with no specimens collected except the prep was noted to be poor.    Per Review of UC note from 25:     This is a 84 y.o. female who presents today with a chief complaint of stye  Patient presents with:  Stye since 2 days. Pt states this is recurrent one from 6 months ago. Pt states she has tried no meds for this time.      Patient states she had a stye on this eyelid a few months ago she used over-the-counter medicines.  It took a long time to go away.  Patient complains of constipation.    She was advised/prescribed the following treatments:  Warm compresses, Handwashing   Derek and Derek baby shampoo to eyelid   Erythromycin ointment   Constipation-increase fiber in your diet.  Fruits with skins on them.  Prune pair and pineapple juice   MiraLax -- working work     She reports that her left eyelid has overall improved with using the erythromycin ointment regularly.  The stye itself seems to have resolved but her left eyelid is still a bit swollen and red.    Constipation is also significantly improving with use of MiraLax-she has no concerns in this regard and feels good about continuing to use it as part of her regular bowel regimen.    PAST MEDICAL HISTORY, SURGICAL/SOCIAL/FAMILY HISTORY REVIEWED AS PER CHART, WITH PERTINENT FINDINGS INCLUDED IN HISTORY SECTION  OF NOTE.     Current Medications    Medication List with Changes/Refills   Current Medications    ASPIRIN 81 MG CHEW    Take 81 mg by mouth once daily. Not chewable    ATORVASTATIN (LIPITOR) 40 MG TABLET    Take 1 tablet (40 mg total) by mouth every evening.    CALCIUM-VITAMIN D3 500 MG(1,250MG) -200 UNIT PER TABLET    Take 1 tablet by mouth 2 (two) times daily with meals.    DICLOFENAC SODIUM (VOLTAREN ARTHRITIS PAIN) 1 % GEL    Apply 2 g topically daily as needed (Pain).    ERYTHROMYCIN (ROMYCIN) OPHTHALMIC OINTMENT    Place into the left eye 3 (three) times daily. for 7 days    EZETIMIBE (ZETIA) 10 MG TABLET    Take 1 tablet (10 mg total) by mouth once daily.    LANOLIN/MINERAL OIL/PETROLATUM (ARTIFICIAL TEARS OPHT)    Place 1 drop into both eyes once daily.    METOPROLOL SUCCINATE (TOPROL-XL) 25 MG 24 HR TABLET    Take 0.5 tablets (12.5 mg total) by mouth once daily.    MULTIVITAMIN (THERAGRAN) PER TABLET    Take 1 tablet by mouth once daily.    NITROGLYCERIN (NITROSTAT) 0.4 MG SL TABLET    Place 1 tablet (0.4 mg total) under the tongue every 5 (five) minutes as needed for Chest pain.    PANTOPRAZOLE (PROTONIX) 40 MG TABLET    Take 1 tablet (40 mg total) by mouth 2 (two) times daily.    PILOCARPINE (SALAGEN) 5 MG TAB    Take 1 tablet (5 mg total) by mouth once daily.    POLYETHYLENE GLYCOL (GLYCOLAX) 17 GRAM/DOSE POWDER    Take 17 g by mouth once daily. Take until you have a good bowel movement    SODIUM CHLORIDE (SALINE MIST NASL)    1 spray by Each Nostril route daily as needed (Congestion).    TERIPARATIDE (FORTEO) 20 MCG/DOSE (600MCG/2.4ML) PNIJ    Inject 0.08 mLs (20 mcg total) into the skin once daily.    TICAGRELOR (BRILINTA) 90 MG TABLET    Take 1 tablet (90 mg total) by mouth 2 (two) times daily.    TRAMADOL (ULTRAM) 50 MG TABLET    Take 1 tablet (50 mg total) by mouth every 8 (eight) hours as needed for Pain.    VALSARTAN (DIOVAN) 80 MG TABLET    Take 1 tablet (80 mg total) by mouth once daily.     "VITAMIN A-VITAMIN C-VIT E-MIN TAB    Take 1 tablet by mouth once daily.       Allergies   Review of patient's allergies indicates:   Allergen Reactions    Sulfa (sulfonamide antibiotics) Hives     Other reaction(s): Anaphylaxis    Itching     Shortness of breath         Review of Systems (Pertinent positives)  Review of Systems   Constitutional:  Negative for activity change and unexpected weight change.   HENT:  Negative for hearing loss, rhinorrhea and trouble swallowing.    Eyes:  Negative for pain, discharge, redness and visual disturbance.   Respiratory:  Negative for chest tightness and wheezing.    Cardiovascular:  Negative for chest pain and palpitations.   Gastrointestinal:  Positive for constipation. Negative for blood in stool, diarrhea and vomiting.   Endocrine: Negative for polydipsia and polyuria.   Genitourinary:  Negative for difficulty urinating, dysuria, hematuria and menstrual problem.   Musculoskeletal:  Negative for arthralgias, joint swelling and neck pain.   Neurological:  Negative for weakness and headaches.   Psychiatric/Behavioral:  Negative for confusion and dysphoric mood.        BP (!) 154/72   Pulse 60   Temp 98 °F (36.7 °C)   Ht 5' 1" (1.549 m)   Wt 44 kg (97 lb)   SpO2 98%   BMI 18.33 kg/m²     Physical Exam  Vitals reviewed.   Constitutional:       General: She is not in acute distress.     Appearance: Normal appearance. She is well-developed and underweight.   HENT:      Head: Normocephalic and atraumatic.   Eyes:      Extraocular Movements:      Right eye: Normal extraocular motion.      Left eye: Normal extraocular motion.      Conjunctiva/sclera: Conjunctivae normal.      Right eye: Right conjunctiva is not injected. No exudate or hemorrhage.     Left eye: Left conjunctiva is not injected. No exudate or hemorrhage.    Pulmonary:      Effort: Pulmonary effort is normal.   Neurological:      Mental Status: She is alert and oriented to person, place, and time.   Psychiatric:   "       Mood and Affect: Mood normal.         Behavior: Behavior normal.           Assessment/Plan:  Betzy Cooley is a 84 y.o. female who presents today for :        ICD-10-CM ICD-9-CM    1. Hordeolum externum of left upper eyelid  H00.014 373.11 Ambulatory referral/consult to Optometry      2. Other constipation  K59.09 564.09       3. Primary hypertension  I10 401.9       4. CAD S/P percutaneous coronary angioplasty  I25.10 414.00     Z98.61 V45.82         STYE OF LEFT UPPER LID:  Continue t.i.d. erythromycin ointment for a total of 5-7 days then can back off.  Continue Steri lid scrubs at least once a day before bedtime.  Because she is having recurring stye issues a referral was placed to optometry for further evaluation and she is due for a regular eye exam.      CONSTIPATION:  Improving with high-fiber diet, hydration and p.r.n. MiraLax, she has no current concerns and is doing much better since initiating MiraLax.      HYPERTENSION WAS UNDERLYING CORONARY ARTERY DISEASE STATUS POST ANGIOPLASTY:  No chest pain or concerns today.  Blood pressure improved on recheck to 154 systolic after initial reading in the 170s systolic.  Ongoing home monitoring advised as her pressure and lipids have been at goal on current regimen.  Continue management as per PCP.    There are no Patient Instructions on file for this visit.      Follow up for return as needed for new concerns.

## 2025-02-24 ENCOUNTER — PATIENT MESSAGE (OUTPATIENT)
Dept: OPHTHALMOLOGY | Facility: CLINIC | Age: 85
End: 2025-02-24
Payer: MEDICARE

## 2025-02-24 ENCOUNTER — CLINICAL SUPPORT (OUTPATIENT)
Dept: REHABILITATION | Facility: HOSPITAL | Age: 85
End: 2025-02-24
Payer: MEDICARE

## 2025-02-24 ENCOUNTER — OFFICE VISIT (OUTPATIENT)
Dept: INTERNAL MEDICINE | Facility: CLINIC | Age: 85
End: 2025-02-24
Payer: MEDICARE

## 2025-02-24 VITALS
TEMPERATURE: 98 F | WEIGHT: 97 LBS | HEIGHT: 61 IN | OXYGEN SATURATION: 98 % | HEART RATE: 60 BPM | SYSTOLIC BLOOD PRESSURE: 154 MMHG | DIASTOLIC BLOOD PRESSURE: 72 MMHG | BODY MASS INDEX: 18.31 KG/M2

## 2025-02-24 DIAGNOSIS — I25.10 CAD S/P PERCUTANEOUS CORONARY ANGIOPLASTY: ICD-10-CM

## 2025-02-24 DIAGNOSIS — M54.50 CHRONIC BILATERAL LOW BACK PAIN WITHOUT SCIATICA: Primary | ICD-10-CM

## 2025-02-24 DIAGNOSIS — I10 PRIMARY HYPERTENSION: ICD-10-CM

## 2025-02-24 DIAGNOSIS — G89.29 CHRONIC BILATERAL LOW BACK PAIN WITHOUT SCIATICA: Primary | ICD-10-CM

## 2025-02-24 DIAGNOSIS — Z98.61 CAD S/P PERCUTANEOUS CORONARY ANGIOPLASTY: ICD-10-CM

## 2025-02-24 DIAGNOSIS — K59.09 OTHER CONSTIPATION: ICD-10-CM

## 2025-02-24 DIAGNOSIS — M53.86 DECREASED ROM OF LUMBAR SPINE: ICD-10-CM

## 2025-02-24 DIAGNOSIS — H00.014 HORDEOLUM EXTERNUM OF LEFT UPPER EYELID: Primary | ICD-10-CM

## 2025-02-24 DIAGNOSIS — R29.898 WEAKNESS OF BOTH LOWER EXTREMITIES: ICD-10-CM

## 2025-02-24 PROCEDURE — 1159F MED LIST DOCD IN RCRD: CPT | Mod: HCNC,CPTII,S$GLB, | Performed by: FAMILY MEDICINE

## 2025-02-24 PROCEDURE — 3078F DIAST BP <80 MM HG: CPT | Mod: HCNC,CPTII,S$GLB, | Performed by: FAMILY MEDICINE

## 2025-02-24 PROCEDURE — 3077F SYST BP >= 140 MM HG: CPT | Mod: HCNC,CPTII,S$GLB, | Performed by: FAMILY MEDICINE

## 2025-02-24 PROCEDURE — 99999 PR PBB SHADOW E&M-EST. PATIENT-LVL V: CPT | Mod: PBBFAC,HCNC,, | Performed by: FAMILY MEDICINE

## 2025-02-24 PROCEDURE — 1101F PT FALLS ASSESS-DOCD LE1/YR: CPT | Mod: HCNC,CPTII,S$GLB, | Performed by: FAMILY MEDICINE

## 2025-02-24 PROCEDURE — 1126F AMNT PAIN NOTED NONE PRSNT: CPT | Mod: HCNC,CPTII,S$GLB, | Performed by: FAMILY MEDICINE

## 2025-02-24 PROCEDURE — 99214 OFFICE O/P EST MOD 30 MIN: CPT | Mod: HCNC,S$GLB,, | Performed by: FAMILY MEDICINE

## 2025-02-24 PROCEDURE — 1160F RVW MEDS BY RX/DR IN RCRD: CPT | Mod: HCNC,CPTII,S$GLB, | Performed by: FAMILY MEDICINE

## 2025-02-24 PROCEDURE — 3288F FALL RISK ASSESSMENT DOCD: CPT | Mod: HCNC,CPTII,S$GLB, | Performed by: FAMILY MEDICINE

## 2025-02-24 PROCEDURE — 97112 NEUROMUSCULAR REEDUCATION: CPT | Mod: HCNC,CQ

## 2025-02-24 NOTE — PROGRESS NOTES
"  Physical Therapy Daily Treatment Note     Name: Betzy Cooley  Clinic Number: [id number]    Therapy Diagnosis:   Encounter Diagnoses   Name Primary?    Chronic bilateral low back pain without sciatica Yes    Decreased ROM of lumbar spine     Weakness of both lower extremities      Physician: Grzegorz Damon DO    Visit Date: 2/24/2025    Physician Orders: PT Eval and Treat   Medical Diagnosis from Referral: S32.030G (ICD-10-CM) - Closed compression fracture of L3 lumbar vertebra with delayed healing, subsequent encounter   Evaluation Date: 1/15/2025  Authorization Period Expiration: 1/7/26  Plan of Care Expiration: 2/28/25  Progress Note Due: 2/15/25  Visit # / Visits authorized: 1/ 1, 9/20   FOTO: 1/ 3    Time In: 12:30 pm   Time Out: 120 pm   Total Billable Time: 50 minutes    Precautions: Standard, osteoporosis      Subjective     Pt reports: that she has been able to do more in the kitchen using the cane instead of the rollator.    Response to previous treatment: no adverse effects  Functional change: see note from 2/13/25    Pain: pt reported experiencing tightness in (B) back 2/10    Objective     therapeutic exercises to develop strength, endurance,and flexibility for 35 minutes including:  Recumbent bike 6 min (for LE endurance )   LAQ 2# 3x10 BLE   Standing hip abd 2x10 BLE (with BUE support and SBA) 2#  Standing hip ext 2x10 BLE (with BUE support and SBA)2#  Standing calf raises 3x10 (with BUE support and SBA)   Standing marches 2x10 (with BUE support and SBA) 2#  SAQ 1lb 3x10 BLE not performed   Seated clams RTB 3x10       neuromuscular re-education activities to improve: Coordination, core/lumbar/scap stabilization Posture, and Motor Control for 12 minutes. The following activities were included:  Seated no money w/scap retraction YTB 3" 3x10   Seated rows w/scap retraction RTB 3" 3x10   Seated shld ext RTB 3" 2x10  not performed   Seated hip adduction iso c/ ball w/TA brace 3" 2x10   Seated SB " press downs 2x10 3s      Therapeutic activities for improved functional performance x 3:   Sit to stands 2x10 w/1 UE support      Home Exercises Provided and Patient Education Provided     Education provided:   - HEP     Written Home Exercises Provided: Patient instructed to cont prior HEP.      Assessment     Pt is tolerating greater resistances and better tolerance to prolonged standing per subjective. VC needed for seated TrA activation in order to correctly perform. Pt tolerated therapy session without any adverse reactions.   Betzy Is progressing well towards her goals.   Pt prognosis is Fair.     Pt will continue to benefit from skilled outpatient physical therapy to address the deficits listed in the problem list box on initial evaluation, provide pt/family education and to maximize pt's level of independence in the home and community environment.     Pt's spiritual, cultural and educational needs considered and pt agreeable to plan of care and goals.    Anticipated barriers to physical therapy:  chronicity of symptoms     Goals:   Short Term Goals: 2 weeks   Pt will be compliant with HEP to supplement PT with decreasing pain and improving functional mobility     Long Term Goals: 6 weeks   Pt will improve FOTO score to at least 62 in order to demo improved functional mobility  Pt will improve LE MMT scores by at least 1/3 grade where deficits noted in order to improve strength for functional tasks  Pt will perform TUG in </= 13 seconds in order to demo improve gait speed and decreased risk of falling  Pt will perform at least 5 sit to stands without UE support in order to demo improved ability to perform transfers    Plan     Continue per POC, progress as tolerated     Dieudonne Richter, PTA

## 2025-02-26 ENCOUNTER — OFFICE VISIT (OUTPATIENT)
Dept: OPTOMETRY | Facility: CLINIC | Age: 85
End: 2025-02-26
Payer: MEDICARE

## 2025-02-26 DIAGNOSIS — H00.014 HORDEOLUM EXTERNUM OF LEFT UPPER EYELID: Primary | ICD-10-CM

## 2025-02-26 PROCEDURE — 99999 PR PBB SHADOW E&M-EST. PATIENT-LVL IV: CPT | Mod: PBBFAC,HCNC,, | Performed by: OPTOMETRIST

## 2025-02-26 PROCEDURE — 99214 OFFICE O/P EST MOD 30 MIN: CPT | Mod: HCNC,S$GLB,, | Performed by: OPTOMETRIST

## 2025-02-26 PROCEDURE — 1126F AMNT PAIN NOTED NONE PRSNT: CPT | Mod: HCNC,CPTII,S$GLB, | Performed by: OPTOMETRIST

## 2025-02-26 PROCEDURE — 1101F PT FALLS ASSESS-DOCD LE1/YR: CPT | Mod: HCNC,CPTII,S$GLB, | Performed by: OPTOMETRIST

## 2025-02-26 PROCEDURE — 3288F FALL RISK ASSESSMENT DOCD: CPT | Mod: HCNC,CPTII,S$GLB, | Performed by: OPTOMETRIST

## 2025-02-26 PROCEDURE — 1159F MED LIST DOCD IN RCRD: CPT | Mod: HCNC,CPTII,S$GLB, | Performed by: OPTOMETRIST

## 2025-02-26 NOTE — PROGRESS NOTES
HPI    Pt reports stye over 2 months ago and resolved.  Returned 3 days ago   Pt reports started last week went to urgent care Saturday and PCP   yesterday   Erythromycin TID   Pt reports good compliance   Pt reports relief   Pt reports cleaning lids c baby shampoo     Last edited by Ankur Reynolds, OD on 2/26/2025 10:30 AM.            Assessment /Plan     For exam results, see Encounter Report.    Hordeolum externum of left upper eyelid  -     Ambulatory referral/consult to Optometry  -Resolving  -Erythromycin Ointment after 10 min Hot Compress BID till runs out  -Long term nightly Lid Cleaning (Ocusoft vs Baby Shampoo)      RTC routine eye exam

## 2025-02-27 ENCOUNTER — CLINICAL SUPPORT (OUTPATIENT)
Dept: REHABILITATION | Facility: HOSPITAL | Age: 85
End: 2025-02-27
Payer: MEDICARE

## 2025-02-27 DIAGNOSIS — R53.1 DECREASED STRENGTH: ICD-10-CM

## 2025-02-27 DIAGNOSIS — M54.50 LUMBAR PAIN: Primary | ICD-10-CM

## 2025-02-27 PROCEDURE — 97110 THERAPEUTIC EXERCISES: CPT | Mod: HCNC

## 2025-02-27 PROCEDURE — 97112 NEUROMUSCULAR REEDUCATION: CPT | Mod: HCNC

## 2025-02-27 NOTE — PROGRESS NOTES
Physical Therapy Daily Treatment Note/Updated POC      Name: Betzy Cooley  Clinic Number: [id number]    Therapy Diagnosis:   Encounter Diagnoses   Name Primary?    Lumbar pain Yes    Decreased strength      Physician: Grzegorz Damon DO    Visit Date: 2/27/2025    Physician Orders: PT Eval and Treat   Medical Diagnosis from Referral: S32.030G (ICD-10-CM) - Closed compression fracture of L3 lumbar vertebra with delayed healing, subsequent encounter   Evaluation Date: 1/15/2025  Authorization Period Expiration: 1/7/26  Plan of Care Expiration: 2/28/25  Progress Note Due: 2/15/25  Visit # / Visits authorized: 1/ 1, 10/20   FOTO: 1/ 3    Time In: 11:02 am   Time Out: 12:00 pm   Total Billable Time: 58 minutes    Precautions: Standard, osteoporosis      Subjective     Pt reports: experiencing a little aggravation across lower back. Pt stated that she feels like PT has been helping and she feels stronger. Pt's  stated that pt's mobility has improved. Pt and pt's  stated that pt has been trying to use cane more often. Pt stated that pt's  stated they would like to continue PT.   She was compliant with home exercise program.  Response to previous treatment: no adverse effects  Functional change: see assessment     Pain: pt reported experiencing a little aggravation across her back, not subject to scale     Objective     Lumbar AROM deferred: pt stated that MD told her to wear back brace at all times     Hip Right   Left   Pain/Dysfunction with Movement     AROM MMT AROM MMT NT = not tested    Flexion WFL 4+/5 WFL 4+/5     Extension NT NT NT NT  Pt performed fair bridge against gravity    Abduction WFL 4/5 WFL 4/5        Knee Right   Left   Pain/Dysfunction with Movement     AROM MMT AROM MMT     Flexion WFL 4+/5 WFL 4+/5     Extension WFL 4/5 WFL 4/5        Ankle Right   Left   Pain/Dysfunction with Movement     AROM MMT AROM MMT     Plantarflexion WFL 4/5 WFL 4/5     Dorsiflexion WFL 5/5 WFL 5/5  "       TU seconds w/rollator      30 Second Sit to Stand Test: 6 w/UE support     Objective measurements and therapeutic exercises to develop strength, endurance,and flexibility for 38 minutes including:  Recumbent bike 6 min (for LE endurance )   LAQ 2# 3x10 BLE   Standing hip abd 2x10 BLE (with BUE support and SBA) 2#  Standing hip ext 2x10 BLE (with BUE support and SBA)2#  Standing calf raises 3x10 (with BUE support and SBA) not performed   Standing marches 2x10 (with BUE support and SBA) 2#  SAQ 1lb 3x10 BLE not performed   Seated clams RTB 3x10 not performed       neuromuscular re-education activities to improve: Coordination, core/lumbar/scap stabilization Posture, balance, and Motor Control for 20 minutes. The following activities were included:  Seated no money w/scap retraction YTB 3" 3x10   Seated rows w/scap retraction RTB 3" 3x10 not performed  Seated shld ext RTB 3" 2x10  not performed   Seated hip adduction iso c/ ball w/TA brace 3" 2x10 not performed  Seated SB press downs 2x10 3s 3 ways  Balance clock       Therapeutic activities for improved functional performance x 0:   Sit to stands 2x10 w/1 UE support  not performed       Home Exercises Provided and Patient Education Provided     Education provided:   - HEP     Written Home Exercises Provided: Patient instructed to cont prior HEP.    Betzy demonstrated good  understanding of the education provided.     See EMR under Patient Instructions for exercises provided prior visit.      Assessment     Updated measurements taken and pt demo improvements in (B) hip flexion and abduction MMT scores, (B) knee flexion MMT scores, and TUG score compared to measurements taken on initial evaluation. Despite these improvements, pt still presents with decreased LE MMT scores noted above, impaired gait/balance, and decreased functional mobility. Pt will benefit from skilled PT.   Betzy Is progressing well towards her goals.   Pt prognosis is Fair.     Pt " will continue to benefit from skilled outpatient physical therapy to address the deficits listed in the problem list box on initial evaluation, provide pt/family education and to maximize pt's level of independence in the home and community environment.     Pt's spiritual, cultural and educational needs considered and pt agreeable to plan of care and goals.    Anticipated barriers to physical therapy:  chronicity of symptoms     Goals:   Short Term Goals: 2 weeks   Pt will be compliant with HEP to supplement PT with decreasing pain and improving functional mobility - Met      Long Term Goals: 6 weeks   Pt will improve FOTO score to at least 62 in order to demo improved functional mobility- progressing not met   Pt will improve LE MMT scores by at least 1/3 grade where deficits noted in order to improve strength for functional tasks - progressing not met   Pt will perform TUG in </= 13 seconds in order to demo improve gait speed and decreased risk of falling - progressing not met   Pt will perform at least 5 sit to stands without UE support in order to demo improved ability to perform transfers - progressing not met     Plan     Continue per POC, Extend POC 2x/week for 4 more weeks. New updated POC dates: 2/27/25 to 3/28/25    Laurie Jernigan, PT

## 2025-03-03 ENCOUNTER — CLINICAL SUPPORT (OUTPATIENT)
Dept: REHABILITATION | Facility: HOSPITAL | Age: 85
End: 2025-03-03
Payer: MEDICARE

## 2025-03-03 DIAGNOSIS — R53.1 DECREASED STRENGTH: ICD-10-CM

## 2025-03-03 DIAGNOSIS — M54.50 LUMBAR PAIN: Primary | ICD-10-CM

## 2025-03-03 PROCEDURE — 97112 NEUROMUSCULAR REEDUCATION: CPT | Mod: HCNC

## 2025-03-03 PROCEDURE — 97530 THERAPEUTIC ACTIVITIES: CPT | Mod: HCNC

## 2025-03-03 NOTE — PROGRESS NOTES
"  Physical Therapy Daily Treatment Note     Name: Betzy Cooley  Clinic Number: [id number]    Therapy Diagnosis:   Encounter Diagnoses   Name Primary?    Lumbar pain Yes    Decreased strength      Physician: Grzegorz Damon DO    Visit Date: 3/3/2025    Physician Orders: PT Eval and Treat   Medical Diagnosis from Referral: S32.030G (ICD-10-CM) - Closed compression fracture of L3 lumbar vertebra with delayed healing, subsequent encounter   Evaluation Date: 1/15/2025  Authorization Period Expiration: 1/7/26  Plan of Care Expiration: 2/28/25  Progress Note Due: 2/15/25  Visit # / Visits authorized: 1/ 1, 11/20   FOTO: 1/ 3    Time In: 12:30 pm   Time Out: 1:30 pm  Total Billable Time: 36 minutes    Precautions: Standard, osteoporosis     Subjective     Pt reports: that she is not currently experiencing back pain, but did experience back pain yesterday and thinks from too much walking.   She was compliant with home exercise program.  Response to previous treatment: no adverse effects  Functional change: see updated POC     Pain: 0/10 (B) back       Objective      therapeutic exercises to develop strength, endurance,and flexibility for 27 minutes including:  Recumbent bike 6 min (for LE endurance )   LAQ 2# 3x10 BLE   Standing hip abd 2x10 BLE (with BUE support and SBA) 2#  Standing hip ext 2x10 BLE (with BUE support and SBA)2#  Standing calf raises 3x10 (with BUE support and SBA) not performed   Standing marches 2x10 (with BUE support and SBA) 2#  SAQ 1lb 3x10 BLE not performed   Seated clams RTB 3x10 not performed       neuromuscular re-education activities to improve: Coordination, core/lumbar/scap stabilization Posture, balance, and Motor Control for 22 minutes. The following activities were included:  Seated no money w/scap retraction YTB 3" 3x10   Seated rows w/scap retraction RTB 3" 3x10 not performed  Seated hip adduction iso c/ ball w/TA brace 3" 2x10 not performed  Seated SB press downs 2x10 3s 3 " "ways  Seated Med ball press 2x10 RMB   Balance clock   Tandem stance 2x30" B       Therapeutic activities for improved functional performance x11' :   Sit to stands 2x10 w/UE support   Step ups L1 x5 B         Home Exercises Provided and Patient Education Provided     Education provided:   - HEP     Written Home Exercises Provided: Patient instructed to cont prior HEP.   Betzy demonstrated good  understanding of the education provided.     See EMR under Patient Instructions for exercises provided prior visit.      Assessment     Pt demo difficulty performing sit to stands today and required VC to scoot forward in chair and to control lowering back down. Pt required UE support to maintain balance when performing tandem stance. Pt tolerated therapy session without any adverse reactions.   Betzy Is progressing towards her goals.   Pt prognosis is Fair.     Pt will continue to benefit from skilled outpatient physical therapy to address the deficits listed in the problem list box on initial evaluation, provide pt/family education and to maximize pt's level of independence in the home and community environment.     Pt's spiritual, cultural and educational needs considered and pt agreeable to plan of care and goals.    Anticipated barriers to physical therapy:  chronicity of symptoms     Goals:   Short Term Goals: 2 weeks   Pt will be compliant with HEP to supplement PT with decreasing pain and improving functional mobility - Met      Long Term Goals: 6 weeks   Pt will improve FOTO score to at least 62 in order to demo improved functional mobility- progressing not met   Pt will improve LE MMT scores by at least 1/3 grade where deficits noted in order to improve strength for functional tasks - progressing not met   Pt will perform TUG in </= 13 seconds in order to demo improve gait speed and decreased risk of falling - progressing not met   Pt will perform at least 5 sit to stands without UE support in order to demo " improved ability to perform transfers - progressing not met     Plan     Continue per POC, progress as tolerated     Laurie Jernigan, PT

## 2025-03-07 ENCOUNTER — CLINICAL SUPPORT (OUTPATIENT)
Dept: REHABILITATION | Facility: HOSPITAL | Age: 85
End: 2025-03-07
Payer: MEDICARE

## 2025-03-07 DIAGNOSIS — R53.1 DECREASED STRENGTH: ICD-10-CM

## 2025-03-07 DIAGNOSIS — M54.50 LUMBAR PAIN: Primary | ICD-10-CM

## 2025-03-07 PROCEDURE — 97530 THERAPEUTIC ACTIVITIES: CPT | Mod: CQ

## 2025-03-07 NOTE — PROGRESS NOTES
"  Physical Therapy Daily Treatment Note     Name: Betzy Cooley  Clinic Number: [id number]    Therapy Diagnosis:   Encounter Diagnoses   Name Primary?    Lumbar pain Yes    Decreased strength      Physician: Grzegorz Damon DO    Visit Date: 3/7/2025    Physician Orders: PT Eval and Treat   Medical Diagnosis from Referral: S32.030G (ICD-10-CM) - Closed compression fracture of L3 lumbar vertebra with delayed healing, subsequent encounter   Evaluation Date: 1/15/2025  Authorization Period Expiration: 1/7/26  Plan of Care Expiration: 2/28/25  Progress Note Due: 2/15/25  Visit # / Visits authorized: 1/ 1, 12/20   FOTO: 1/ 3    Time In: 1:30 pm   Time Out: 2:27 pm  Total Billable Time: 12 minutes (some time not billed d/t medicare billing practices and 5 minutes not billed d/t bathroom break)     Precautions: Standard, osteoporosis     Subjective     Pt reports: that today she is feeling "alright" and has some pain in her left side of her low back depending on how she is moving.   She was compliant with home exercise program.  Response to previous treatment: no adverse effects  Functional change: see updated POC     Pain: 0/10 (B) back       Objective      therapeutic exercises to develop strength, endurance,and flexibility for 27 minutes including:  Recumbent bike 6 min (for LE endurance )   LAQ 2# 3x10 BLE   Standing hip abd 2x10 BLE (with BUE support and SBA) 2#  Standing hip ext 2x10 BLE (with BUE support and SBA)2#  Standing calf raises 3x10 (with BUE support and SBA) not performed   Standing marches 2x10 (with BUE support and SBA) 2#  SAQ 1lb 3x10 BLE not performed   Seated clams RTB 3x10 not performed       neuromuscular re-education activities to improve: Coordination, core/lumbar/scap stabilization Posture, balance, and Motor Control for 20 minutes. The following activities were included:  Seated no money w/scap retraction YTB 3" 3x10   Seated rows w/scap retraction RTB 3" 3x10   Seated hip adduction iso c/ " "ball w/TA brace 3" 2x10 not performed  Seated SB press downs 2x10 3s 3 ways  Seated Med ball press 2x10 RMB   Balance clock (not performed)   Tandem stance 2x30" B       Therapeutic activities for improved functional performance x10' :   Sit to stands 2x10 w/UE support   Step ups L1 10x B         Home Exercises Provided and Patient Education Provided     Education provided:   - HEP     Written Home Exercises Provided: Patient instructed to cont prior HEP.   Betzy demonstrated good  understanding of the education provided.     See EMR under Patient Instructions for exercises provided prior visit.      Assessment     Continued with established activities this session. Pt given verbal cues during sit to stands to come to full standing position.     Betzy Is progressing towards her goals.   Pt prognosis is Fair.     Pt will continue to benefit from skilled outpatient physical therapy to address the deficits listed in the problem list box on initial evaluation, provide pt/family education and to maximize pt's level of independence in the home and community environment.     Pt's spiritual, cultural and educational needs considered and pt agreeable to plan of care and goals.    Anticipated barriers to physical therapy:  chronicity of symptoms     Goals:   Short Term Goals: 2 weeks   Pt will be compliant with HEP to supplement PT with decreasing pain and improving functional mobility - Met      Long Term Goals: 6 weeks   Pt will improve FOTO score to at least 62 in order to demo improved functional mobility- progressing not met   Pt will improve LE MMT scores by at least 1/3 grade where deficits noted in order to improve strength for functional tasks - progressing not met   Pt will perform TUG in </= 13 seconds in order to demo improve gait speed and decreased risk of falling - progressing not met   Pt will perform at least 5 sit to stands without UE support in order to demo improved ability to perform transfers - " progressing not met     Plan     Continue per POC, progress as tolerated     Yolanda Akhtar, PTA

## 2025-03-09 ENCOUNTER — PATIENT MESSAGE (OUTPATIENT)
Dept: CARDIOLOGY | Facility: CLINIC | Age: 85
End: 2025-03-09
Payer: MEDICARE

## 2025-03-11 ENCOUNTER — CLINICAL SUPPORT (OUTPATIENT)
Dept: REHABILITATION | Facility: HOSPITAL | Age: 85
End: 2025-03-11
Payer: MEDICARE

## 2025-03-11 DIAGNOSIS — M54.50 LUMBAR PAIN: Primary | ICD-10-CM

## 2025-03-11 DIAGNOSIS — R53.1 DECREASED STRENGTH: ICD-10-CM

## 2025-03-11 PROCEDURE — 97530 THERAPEUTIC ACTIVITIES: CPT | Mod: CQ

## 2025-03-11 PROCEDURE — 97112 NEUROMUSCULAR REEDUCATION: CPT | Mod: CQ

## 2025-03-11 NOTE — PROGRESS NOTES
"  Physical Therapy Daily Treatment Note     Name: Betzy Cooley  Clinic Number: [id number]    Therapy Diagnosis:   Encounter Diagnoses   Name Primary?    Lumbar pain Yes    Decreased strength      Physician: Grzegorz Damon DO    Visit Date: 3/11/2025    Physician Orders: PT Eval and Treat   Medical Diagnosis from Referral: S32.030G (ICD-10-CM) - Closed compression fracture of L3 lumbar vertebra with delayed healing, subsequent encounter   Evaluation Date: 1/15/2025  Authorization Period Expiration: 1/7/26  Plan of Care Expiration: 2/28/25  Progress Note Due: 2/15/25  Visit # / Visits authorized: 1/ 1, 12/20   FOTO: 1/ 3    Time In: 2:04 pm   Time Out: 2:56 pm  Total Billable Time: 26 minutes (some time not billed d/t medicare billing practices)     Precautions: Standard, osteoporosis     Subjective     Pt reports: that today she is feeling "alright" and has some pain in her left side of her low back depending on how she is moving.   She was compliant with home exercise program.  Response to previous treatment: no adverse effects  Functional change: see updated POC     Pain: 0/10 (B) back       Objective      therapeutic exercises to develop strength, endurance,and flexibility for 22 minutes including:  Recumbent bike 6 min (for LE endurance )   LAQ 2# 3x10 BLE   Standing hip abd 2x10 BLE (with BUE support and SBA) 2#  Standing hip ext 2x10 BLE (with BUE support and SBA)2#  Standing calf raises 3x10 (with BUE support and SBA) not performed   Standing marches 2x10 (with BUE support and SBA) 2#  SAQ 1lb 3x10 BLE not performed   Seated clams RTB 3x10 not performed       neuromuscular re-education activities to improve: Coordination, core/lumbar/scap stabilization Posture, balance, and Motor Control for 18 minutes. The following activities were included:  Seated no money w/scap retraction YTB 3" 3x10   Seated rows w/scap retraction RTB 3" 3x10   Seated hip adduction iso c/ ball w/TA brace 3" 2x10   Seated SB press " "downs 2x10 3s 3 ways  Seated Med ball press 2x10 RMB   Balance clock    Tandem stance 2x30" B       Therapeutic activities for improved functional performance x12' :   Sit to stands 2x10 w/UE support   Step ups L1 10x B   Step ups on airex 10x B         Home Exercises Provided and Patient Education Provided     Education provided:   - HEP     Written Home Exercises Provided: Patient instructed to cont prior HEP.   Betzy demonstrated good  understanding of the education provided.     See EMR under Patient Instructions for exercises provided prior visit.      Assessment     Pt given verbal cues during standing hip extension and sit to stands to maintain upright posture. Progressed TA with the addition of step ups on airex, with pt only requiring SBA and bilateral UE support on mat.     Betzy Is progressing towards her goals.   Pt prognosis is Fair.     Pt will continue to benefit from skilled outpatient physical therapy to address the deficits listed in the problem list box on initial evaluation, provide pt/family education and to maximize pt's level of independence in the home and community environment.     Pt's spiritual, cultural and educational needs considered and pt agreeable to plan of care and goals.    Anticipated barriers to physical therapy:  chronicity of symptoms     Goals:   Short Term Goals: 2 weeks   Pt will be compliant with HEP to supplement PT with decreasing pain and improving functional mobility - Met      Long Term Goals: 6 weeks   Pt will improve FOTO score to at least 62 in order to demo improved functional mobility- progressing not met   Pt will improve LE MMT scores by at least 1/3 grade where deficits noted in order to improve strength for functional tasks - progressing not met   Pt will perform TUG in </= 13 seconds in order to demo improve gait speed and decreased risk of falling - progressing not met   Pt will perform at least 5 sit to stands without UE support in order to demo " improved ability to perform transfers - progressing not met     Plan     Continue per POC, progress as tolerated     Yolanda Akhtar, PTA

## 2025-03-13 ENCOUNTER — CLINICAL SUPPORT (OUTPATIENT)
Dept: REHABILITATION | Facility: HOSPITAL | Age: 85
End: 2025-03-13
Payer: MEDICARE

## 2025-03-13 DIAGNOSIS — M54.50 LUMBAR PAIN: Primary | ICD-10-CM

## 2025-03-13 DIAGNOSIS — R53.1 DECREASED STRENGTH: ICD-10-CM

## 2025-03-13 PROCEDURE — 97110 THERAPEUTIC EXERCISES: CPT

## 2025-03-13 PROCEDURE — 97530 THERAPEUTIC ACTIVITIES: CPT

## 2025-03-13 PROCEDURE — 97112 NEUROMUSCULAR REEDUCATION: CPT

## 2025-03-13 NOTE — PROGRESS NOTES
"  Physical Therapy Daily Treatment Note     Name: Betzy Cooley  Clinic Number: [id number]    Therapy Diagnosis:   Encounter Diagnoses   Name Primary?    Lumbar pain Yes    Decreased strength      Physician: Grzegorz Damon DO    Visit Date: 3/13/2025    Physician Orders: PT Eval and Treat   Medical Diagnosis from Referral: S32.030G (ICD-10-CM) - Closed compression fracture of L3 lumbar vertebra with delayed healing, subsequent encounter   Evaluation Date: 1/15/2025  Authorization Period Expiration: 1/7/26  Plan of Care Expiration: 2/28/25, 3/28/25  Progress Note Due: 2/15/25  Visit # / Visits authorized: 1/ 1, 13/20   FOTO: 1/ 3    Time In: 11:00 am   Time Out: 12:01 pm   Total Billable Time: 61 minutes    Precautions: Standard, osteoporosis     Subjective     Pt reports: that her back is bothering her a little today and her back was hurting yesterday.  She was compliant with home exercise program.  Response to previous treatment: no adverse effects  Functional change: see updated POC     Pain: 2/10 (B) back       Objective       therapeutic exercises to develop strength, endurance,and flexibility for 21 minutes including:  Recumbent bike 7 min (for LE endurance )   Standing hip abd 2x10 BLE (with BUE support and SBA) 2#  Standing hip ext 2x10 BLE (with BUE support and SBA)2#  Standing calf raises 3x10 (with BUE support and SBA)   Standing marches 2x10 (with BUE support and SBA) 2#      neuromuscular re-education activities to improve: Coordination, core/lumbar/scap stabilization Posture, balance, and Motor Control for 25 minutes. The following activities were included:  Seated no money w/scap retraction YTB 3" 3x10   Seated rows w/scap retraction RTB 3" 2x10   Seated hip adduction iso c/ ball w/TA brace 3" 2x10 not performed   Seated SB press downs 2x10 3s 3 ways  Seated Med ball press 2x10 RMB   Balance clock    Tandem stance 3x30" B   Standing on airex with eyes closed and NBOS 3x30"         Therapeutic " activities for improved functional performance x15' :   Sit to stands 2x10 w/UE support   Step ups L2 2x10 B   Step downs L1 1x10 B         Home Exercises Provided and Patient Education Provided     Education provided:   - HEP     Written Home Exercises Provided: Patient instructed to cont prior HEP.  Betzy demonstrated good  understanding of the education provided.     See EMR under Patient Instructions for exercises provided prior visit.      Assessment     Attempted to have pt perform sit to stands with only one UE support, but pt required two UE support when standing up from chair, pt was able to lower down to sit with only one UE support. Pt was able to perform step ups on L2 step and addition of step downs. Pt required intermittent UE support when performing tandem stance and eyes closed with NBOS on airex. Pt demo difficulty performing and required increased time to perform balance clock.   Betzy Is progressing towards her goals.   Pt prognosis is Fair.     Pt will continue to benefit from skilled outpatient physical therapy to address the deficits listed in the problem list box on initial evaluation, provide pt/family education and to maximize pt's level of independence in the home and community environment.     Pt's spiritual, cultural and educational needs considered and pt agreeable to plan of care and goals.    Anticipated barriers to physical therapy:  chronicity of symptoms     Goals:   Short Term Goals: 2 weeks   Pt will be compliant with HEP to supplement PT with decreasing pain and improving functional mobility - Met      Long Term Goals: 6 weeks   Pt will improve FOTO score to at least 62 in order to demo improved functional mobility- progressing not met   Pt will improve LE MMT scores by at least 1/3 grade where deficits noted in order to improve strength for functional tasks - progressing not met   Pt will perform TUG in </= 13 seconds in order to demo improve gait speed and decreased risk  of falling - progressing not met   Pt will perform at least 5 sit to stands without UE support in order to demo improved ability to perform transfers - progressing not met     Plan     Continue per POC, progress as tolerated     Laurie Jernigan, PT

## 2025-03-15 ENCOUNTER — OFFICE VISIT (OUTPATIENT)
Dept: URGENT CARE | Facility: CLINIC | Age: 85
End: 2025-03-15
Payer: MEDICARE

## 2025-03-15 VITALS
RESPIRATION RATE: 18 BRPM | HEIGHT: 61 IN | WEIGHT: 97 LBS | SYSTOLIC BLOOD PRESSURE: 165 MMHG | TEMPERATURE: 98 F | OXYGEN SATURATION: 97 % | HEART RATE: 58 BPM | BODY MASS INDEX: 18.31 KG/M2 | DIASTOLIC BLOOD PRESSURE: 82 MMHG

## 2025-03-15 DIAGNOSIS — B37.31 CANDIDA VAGINITIS: Primary | ICD-10-CM

## 2025-03-15 LAB
BILIRUBIN, UA POC OHS: NEGATIVE
BLOOD, UA POC OHS: NEGATIVE
CLARITY, UA POC OHS: ABNORMAL
COLOR, UA POC OHS: YELLOW
GLUCOSE, UA POC OHS: NEGATIVE
KETONES, UA POC OHS: NEGATIVE
LEUKOCYTES, UA POC OHS: NEGATIVE
NITRITE, UA POC OHS: NEGATIVE
PH, UA POC OHS: 7.5
PROTEIN, UA POC OHS: NEGATIVE
SPECIFIC GRAVITY, UA POC OHS: 1.01
UROBILINOGEN, UA POC OHS: 0.2

## 2025-03-15 PROCEDURE — 81003 URINALYSIS AUTO W/O SCOPE: CPT | Mod: QW,S$GLB,, | Performed by: FAMILY MEDICINE

## 2025-03-15 PROCEDURE — 99213 OFFICE O/P EST LOW 20 MIN: CPT | Mod: S$GLB,,, | Performed by: FAMILY MEDICINE

## 2025-03-15 RX ORDER — FLUCONAZOLE 150 MG/1
150 TABLET ORAL DAILY
Qty: 2 TABLET | Refills: 0 | Status: SHIPPED | OUTPATIENT
Start: 2025-03-15 | End: 2025-03-16

## 2025-03-16 ENCOUNTER — HOSPITAL ENCOUNTER (OUTPATIENT)
Facility: HOSPITAL | Age: 85
Discharge: HOME | End: 2025-03-17
Attending: EMERGENCY MEDICINE | Admitting: EMERGENCY MEDICINE
Payer: MEDICARE

## 2025-03-16 DIAGNOSIS — R55 POSTURAL DIZZINESS WITH PRESYNCOPE: ICD-10-CM

## 2025-03-16 DIAGNOSIS — R42 POSTURAL DIZZINESS WITH PRESYNCOPE: ICD-10-CM

## 2025-03-16 DIAGNOSIS — R42 LIGHTHEADED: Primary | ICD-10-CM

## 2025-03-16 DIAGNOSIS — R07.9 CHEST PAIN: ICD-10-CM

## 2025-03-16 LAB
ALBUMIN SERPL BCP-MCNC: 3.9 G/DL (ref 3.5–5.2)
ALP SERPL-CCNC: 101 U/L (ref 40–150)
ALT SERPL W/O P-5'-P-CCNC: 17 U/L (ref 10–44)
ANION GAP SERPL CALC-SCNC: 10 MMOL/L (ref 8–16)
AST SERPL-CCNC: 26 U/L (ref 10–40)
BASOPHILS # BLD AUTO: 0.07 K/UL (ref 0–0.2)
BASOPHILS NFR BLD: 0.8 % (ref 0–1.9)
BILIRUB SERPL-MCNC: 0.3 MG/DL (ref 0.1–1)
BNP SERPL-MCNC: 90 PG/ML (ref 0–99)
BUN SERPL-MCNC: 18 MG/DL (ref 8–23)
CALCIUM SERPL-MCNC: 10.7 MG/DL (ref 8.7–10.5)
CHLORIDE SERPL-SCNC: 105 MMOL/L (ref 95–110)
CO2 SERPL-SCNC: 27 MMOL/L (ref 23–29)
CREAT SERPL-MCNC: 0.9 MG/DL (ref 0.5–1.4)
DIFFERENTIAL METHOD BLD: ABNORMAL
EOSINOPHIL # BLD AUTO: 0.2 K/UL (ref 0–0.5)
EOSINOPHIL NFR BLD: 2 % (ref 0–8)
ERYTHROCYTE [DISTWIDTH] IN BLOOD BY AUTOMATED COUNT: 12.5 % (ref 11.5–14.5)
EST. GFR  (NO RACE VARIABLE): >60 ML/MIN/1.73 M^2
GLUCOSE SERPL-MCNC: 125 MG/DL (ref 70–110)
HCT VFR BLD AUTO: 33.1 % (ref 37–48.5)
HGB BLD-MCNC: 10.5 G/DL (ref 12–16)
IMM GRANULOCYTES # BLD AUTO: 0.02 K/UL (ref 0–0.04)
IMM GRANULOCYTES NFR BLD AUTO: 0.2 % (ref 0–0.5)
LYMPHOCYTES # BLD AUTO: 2 K/UL (ref 1–4.8)
LYMPHOCYTES NFR BLD: 23.2 % (ref 18–48)
MAGNESIUM SERPL-MCNC: 2 MG/DL (ref 1.6–2.6)
MCH RBC QN AUTO: 30.7 PG (ref 27–31)
MCHC RBC AUTO-ENTMCNC: 31.7 G/DL (ref 32–36)
MCV RBC AUTO: 97 FL (ref 82–98)
MONOCYTES # BLD AUTO: 0.9 K/UL (ref 0.3–1)
MONOCYTES NFR BLD: 10.4 % (ref 4–15)
NEUTROPHILS # BLD AUTO: 5.4 K/UL (ref 1.8–7.7)
NEUTROPHILS NFR BLD: 63.4 % (ref 38–73)
NRBC BLD-RTO: 0 /100 WBC
PLATELET # BLD AUTO: 261 K/UL (ref 150–450)
PMV BLD AUTO: 9 FL (ref 9.2–12.9)
POTASSIUM SERPL-SCNC: 3.1 MMOL/L (ref 3.5–5.1)
PROT SERPL-MCNC: 7.2 G/DL (ref 6–8.4)
RBC # BLD AUTO: 3.42 M/UL (ref 4–5.4)
SODIUM SERPL-SCNC: 142 MMOL/L (ref 136–145)
TROPONIN I SERPL DL<=0.01 NG/ML-MCNC: 5 NG/L (ref 0–14)
WBC # BLD AUTO: 8.52 K/UL (ref 3.9–12.7)

## 2025-03-16 PROCEDURE — 93010 ELECTROCARDIOGRAM REPORT: CPT | Mod: ,,, | Performed by: INTERNAL MEDICINE

## 2025-03-16 PROCEDURE — 80053 COMPREHEN METABOLIC PANEL: CPT | Performed by: EMERGENCY MEDICINE

## 2025-03-16 PROCEDURE — 99285 EMERGENCY DEPT VISIT HI MDM: CPT | Mod: 25

## 2025-03-16 PROCEDURE — 81001 URINALYSIS AUTO W/SCOPE: CPT | Performed by: EMERGENCY MEDICINE

## 2025-03-16 PROCEDURE — 83880 ASSAY OF NATRIURETIC PEPTIDE: CPT | Performed by: EMERGENCY MEDICINE

## 2025-03-16 PROCEDURE — 0241U SARS-COV2 (COVID) WITH FLU/RSV BY PCR: CPT

## 2025-03-16 PROCEDURE — 84484 ASSAY OF TROPONIN QUANT: CPT | Performed by: EMERGENCY MEDICINE

## 2025-03-16 PROCEDURE — 93005 ELECTROCARDIOGRAM TRACING: CPT

## 2025-03-16 PROCEDURE — 83735 ASSAY OF MAGNESIUM: CPT | Performed by: EMERGENCY MEDICINE

## 2025-03-16 PROCEDURE — 85025 COMPLETE CBC W/AUTO DIFF WBC: CPT | Performed by: EMERGENCY MEDICINE

## 2025-03-16 NOTE — Clinical Note
Diagnosis: Syncope [206001]   Future Attending Provider: SHEREE CARDENAS [[id number]]   Is the patient being sent to ED Observation?: Yes

## 2025-03-17 VITALS
RESPIRATION RATE: 18 BRPM | SYSTOLIC BLOOD PRESSURE: 182 MMHG | HEIGHT: 61 IN | BODY MASS INDEX: 18.88 KG/M2 | DIASTOLIC BLOOD PRESSURE: 96 MMHG | OXYGEN SATURATION: 96 % | WEIGHT: 100 LBS | HEART RATE: 59 BPM | TEMPERATURE: 98 F

## 2025-03-17 PROBLEM — R55 POSTURAL DIZZINESS WITH PRESYNCOPE: Status: ACTIVE | Noted: 2025-03-17

## 2025-03-17 PROBLEM — R42 POSTURAL DIZZINESS WITH PRESYNCOPE: Status: ACTIVE | Noted: 2025-03-17

## 2025-03-17 LAB
AMORPH CRY UR QL COMP ASSIST: NORMAL
AV AREA BY CONTINUOUS VTI: 2.1 CM2
AV INDEX (PROSTH): 0.64
AV LVOT MEAN GRADIENT: 3 MMHG
AV LVOT PEAK GRADIENT: 5 MMHG
AV MEAN GRADIENT: 5 MMHG
AV PEAK GRADIENT: 9 MMHG
AV VALVE AREA BY VELOCITY RATIO: 2.3 CM²
AV VALVE AREA: 2 CM2
AV VELOCITY RATIO: 0.73
BILIRUB UR QL STRIP: NEGATIVE
BSA FOR ECHO PROCEDURE: 1.4 M2
CLARITY UR REFRACT.AUTO: ABNORMAL
COLOR UR AUTO: YELLOW
CV ECHO LV RWT: 0.34 CM
DOP CALC AO PEAK VEL: 1.5 M/S
DOP CALC AO VTI: 36.3 CM
DOP CALC LVOT AREA: 3.1 CM2
DOP CALC LVOT DIAMETER: 2 CM
DOP CALC LVOT PEAK VEL: 1.1 M/S
DOP CALC LVOT STROKE VOLUME: 73.5 CM3
DOP CALCLVOT PEAK VEL VTI: 23.4 CM
E WAVE DECELERATION TIME: 251 MS
E/A RATIO: 0.95
E/E' RATIO: 9 M/S
ECHO EF ESTIMATED: 70 %
ECHO LV POSTERIOR WALL: 0.8 CM (ref 0.6–1.1)
FRACTIONAL SHORTENING: 40.4 % (ref 28–44)
GLUCOSE UR QL STRIP: NEGATIVE
HGB UR QL STRIP: NEGATIVE
INFLUENZA A, MOLECULAR: NOT DETECTED
INFLUENZA B, MOLECULAR: NOT DETECTED
INTERVENTRICULAR SEPTUM: 0.9 CM (ref 0.6–1.1)
KETONES UR QL STRIP: NEGATIVE
LA MAJOR: 4.7 CM
LA MINOR: 5 CM
LA WIDTH: 4.1 CM
LEFT ATRIUM SIZE: 4.3 CM
LEFT ATRIUM VOLUME INDEX: 51 ML/M2
LEFT ATRIUM VOLUME: 73 CM3
LEFT INTERNAL DIMENSION IN SYSTOLE: 2.8 CM (ref 2.1–4)
LEFT VENTRICLE DIASTOLIC VOLUME INDEX: 73.05 ML/M2
LEFT VENTRICLE DIASTOLIC VOLUME: 103 ML
LEFT VENTRICLE MASS INDEX: 93.8 G/M2
LEFT VENTRICLE SYSTOLIC VOLUME INDEX: 22 ML/M2
LEFT VENTRICLE SYSTOLIC VOLUME: 31 ML
LEFT VENTRICULAR INTERNAL DIMENSION IN DIASTOLE: 4.7 CM (ref 3.5–6)
LEFT VENTRICULAR MASS: 132.3 G
LEUKOCYTE ESTERASE UR QL STRIP: ABNORMAL
LV LATERAL E/E' RATIO: 8.8
LV SEPTAL E/E' RATIO: 10
MICROSCOPIC COMMENT: NORMAL
MV PEAK A VEL: 0.74 M/S
MV PEAK E VEL: 0.7 M/S
NITRITE UR QL STRIP: NEGATIVE
OHS CV RV/LV RATIO: 0.51 CM
OHS LV EJECTION FRACTION SIMPSONS BIPLANE MOD: 53 %
OHS QRS DURATION: 94 MS
OHS QTC CALCULATION: 418 MS
PH UR STRIP: 8 [PH] (ref 5–8)
PROT UR QL STRIP: NEGATIVE
RA MAJOR: 5 CM
RA PRESSURE ESTIMATED: 8 MMHG
RA WIDTH: 2.88 CM
RBC #/AREA URNS AUTO: 3 /HPF (ref 0–4)
RIGHT VENTRICLE DIASTOLIC BASEL DIMENSION: 2.4 CM
RSV AG BY MOLECULAR METHOD: NOT DETECTED
SARS-COV-2 RNA RESP QL NAA+PROBE: NOT DETECTED
SINUS: 2.69 CM
SP GR UR STRIP: 1.01 (ref 1–1.03)
SQUAMOUS #/AREA URNS AUTO: 1 /HPF
STJ: 2.64 CM
TDI LATERAL: 0.08 M/S
TDI SEPTAL: 0.07 M/S
TDI: 0.08 M/S
TRICUSPID ANNULAR PLANE SYSTOLIC EXCURSION: 2.63 CM
TROPONIN I SERPL DL<=0.01 NG/ML-MCNC: 5 NG/L (ref 0–14)
URN SPEC COLLECT METH UR: ABNORMAL
WBC #/AREA URNS AUTO: 2 /HPF (ref 0–5)
Z-SCORE OF LEFT VENTRICULAR DIMENSION IN END DIASTOLE: 0.77
Z-SCORE OF LEFT VENTRICULAR DIMENSION IN END SYSTOLE: 0.28

## 2025-03-17 PROCEDURE — 25000003 PHARM REV CODE 250

## 2025-03-17 PROCEDURE — 25000003 PHARM REV CODE 250: Performed by: PHYSICIAN ASSISTANT

## 2025-03-17 PROCEDURE — 84484 ASSAY OF TROPONIN QUANT: CPT

## 2025-03-17 PROCEDURE — G0378 HOSPITAL OBSERVATION PER HR: HCPCS

## 2025-03-17 RX ORDER — IBUPROFEN 200 MG
24 TABLET ORAL
Status: DISCONTINUED | OUTPATIENT
Start: 2025-03-17 | End: 2025-03-17 | Stop reason: HOSPADM

## 2025-03-17 RX ORDER — NAPROXEN SODIUM 220 MG/1
81 TABLET, FILM COATED ORAL DAILY
Status: DISCONTINUED | OUTPATIENT
Start: 2025-03-17 | End: 2025-03-17 | Stop reason: HOSPADM

## 2025-03-17 RX ORDER — ATORVASTATIN CALCIUM 40 MG/1
40 TABLET, FILM COATED ORAL NIGHTLY
Status: DISCONTINUED | OUTPATIENT
Start: 2025-03-17 | End: 2025-03-17 | Stop reason: HOSPADM

## 2025-03-17 RX ORDER — SODIUM CHLORIDE 0.9 % (FLUSH) 0.9 %
10 SYRINGE (ML) INJECTION EVERY 12 HOURS PRN
Status: DISCONTINUED | OUTPATIENT
Start: 2025-03-17 | End: 2025-03-17 | Stop reason: HOSPADM

## 2025-03-17 RX ORDER — ONDANSETRON HYDROCHLORIDE 2 MG/ML
4 INJECTION, SOLUTION INTRAVENOUS EVERY 8 HOURS PRN
Status: DISCONTINUED | OUTPATIENT
Start: 2025-03-17 | End: 2025-03-17 | Stop reason: HOSPADM

## 2025-03-17 RX ORDER — EZETIMIBE 10 MG/1
10 TABLET ORAL NIGHTLY
Status: DISCONTINUED | OUTPATIENT
Start: 2025-03-17 | End: 2025-03-17 | Stop reason: HOSPADM

## 2025-03-17 RX ORDER — ACETAMINOPHEN 325 MG/1
650 TABLET ORAL EVERY 4 HOURS PRN
Status: DISCONTINUED | OUTPATIENT
Start: 2025-03-17 | End: 2025-03-17 | Stop reason: HOSPADM

## 2025-03-17 RX ORDER — VALSARTAN 80 MG/1
80 TABLET ORAL DAILY
Status: DISCONTINUED | OUTPATIENT
Start: 2025-03-17 | End: 2025-03-17 | Stop reason: HOSPADM

## 2025-03-17 RX ORDER — IBUPROFEN 200 MG
16 TABLET ORAL
Status: DISCONTINUED | OUTPATIENT
Start: 2025-03-17 | End: 2025-03-17 | Stop reason: HOSPADM

## 2025-03-17 RX ORDER — NALOXONE HCL 0.4 MG/ML
0.02 VIAL (ML) INJECTION
Status: DISCONTINUED | OUTPATIENT
Start: 2025-03-17 | End: 2025-03-17 | Stop reason: HOSPADM

## 2025-03-17 RX ORDER — GLUCAGON 1 MG
1 KIT INJECTION
Status: DISCONTINUED | OUTPATIENT
Start: 2025-03-17 | End: 2025-03-17 | Stop reason: HOSPADM

## 2025-03-17 RX ORDER — PANTOPRAZOLE SODIUM 40 MG/1
40 TABLET, DELAYED RELEASE ORAL 2 TIMES DAILY
Status: DISCONTINUED | OUTPATIENT
Start: 2025-03-17 | End: 2025-03-17 | Stop reason: HOSPADM

## 2025-03-17 RX ADMIN — METOPROLOL SUCCINATE 12.5 MG: 25 TABLET, EXTENDED RELEASE ORAL at 09:03

## 2025-03-17 RX ADMIN — PANTOPRAZOLE SODIUM 40 MG: 40 TABLET, DELAYED RELEASE ORAL at 09:03

## 2025-03-17 RX ADMIN — ASPIRIN 81 MG: 81 TABLET, CHEWABLE ORAL at 09:03

## 2025-03-17 RX ADMIN — TICAGRELOR 90 MG: 90 TABLET ORAL at 09:03

## 2025-03-17 RX ADMIN — VALSARTAN 80 MG: 80 TABLET, FILM COATED ORAL at 09:03

## 2025-03-17 RX ADMIN — POTASSIUM BICARBONATE 40 MEQ: 391 TABLET, EFFERVESCENT ORAL at 12:03

## 2025-03-17 NOTE — PLAN OF CARE
[facility] - Emergency Dept  Discharge Final Note    Primary Care Provider: Praful Paul MD    Expected Discharge Date: 3/17/2025    Pt d/c home with no needs. Pt spouse to provide transportation home.     Future Appointments   Date Time Provider Department Center   3/18/2025  9:00 AM Yolanda Akhtar, PTA [facility]   3/20/2025 11:00 AM Laurie Jernigan, PT [facility]   3/24/2025 10:00 AM Laurie Jernigan, PT [facility]   3/27/2025 12:00 PM Laurie Jernigan, PT [facility]   4/3/2025 10:30 AM [facility]  CT1 [facility] CTSCAN [place]   4/3/2025 10:45 AM [facility]  XR1 700LB LIMIT [facility] XRAY [place]   4/3/2025 11:30 AM Grzegorz Damon, DO [facility] NEUSRG [place]   6/10/2025  9:20 AM Anu Jensen, OD [facility] OPT[facility]TY [facility]   10/14/2025 11:20 AM [facility], DEXA1 [facility] BMD [facility]         Final Discharge Note (most recent)       Final Note - 03/17/25 1504          Final Note    Assessment Type Final Discharge Note (P)      Anticipated Discharge Disposition Home or Self Care (P)         Post-Acute Status    Post-Acute Authorization Other (P)      Other Status No Post-Acute Service Needs (P)      Discharge Delays None known at this time (P)                      Important Message from Medicare             Contact Info       Praful Paul MD   Specialty: Internal Medicine   Relationship: PCP - General    [address]  [place] [address]   Phone: [phone]       Next Steps: Schedule an appointment as soon as possible for a visit    [facility] - Emergency Dept   Specialty: Emergency Medicine    [address] [facility] [address]   Phone: [phone]       Next Steps: Go to    Instructions: If symptoms worsen          MADAI Hale, MIKEYW.   Case Management  [facility]  Email: [email or web address]

## 2025-03-17 NOTE — ED NOTES
Assumed care of the patient. Report received from Millie. Pt on continuous cardiac monitoring, continuous pulse oximetry, and automatic BP cuff cycling Q15min. Pt in hospital gown, side rails up X2, bed low and locked, and call light is placed within reach. One family/visitors at bedside at this time. Pt denies any complaints or needs.

## 2025-03-17 NOTE — PLAN OF CARE
[facility] - Emergency Dept  Initial Discharge Assessment       Primary Care Provider: Praful Paul MD    Admission Diagnosis: Syncope [R55]    Admission Date: 3/16/2025    Pt is independent with their ADL's. Pt uses a cane and walker to assist with their ambulation. Post acute was discussed with pt. Pt declined post acute services. Pt spouse Zander,who's at bedside to provide transportation home.     Expected Discharge Date:     Transition of Care Barriers: (P) None    Payor: [employer] MEDICARE / Plan: [employer] MEDICARE HMO / Product Type: Capitation /     Extended Emergency Contact Information  Primary Emergency Contact: DeliaZander wall  Address: [address]  Home Phone: [phone]  Mobile Phone: [phone]  Relation: Spouse   needed? No    Discharge Plan A: (P) Home, Home with family  Discharge Plan B: (P) Home with family, Home      [facility] [address]  [address] [address]  Phone: [phone] Fax: [phone]    [id number] [address]  [address] [facility] [address]  Phone: [phone] Fax: [phone]      Initial Assessment (most recent)       Adult Discharge Assessment - 03/17/25 1244          Discharge Assessment    Assessment Type Discharge Planning Assessment (P)      Confirmed/corrected address, phone number and insurance Yes (P)      Confirmed Demographics Correct on Facesheet (P)      Source of Information patient (P)      Does patient/caregiver understand observation status Yes (P)      Reason For Admission Postural dizziness with presyncope (P)      People in Home spouse (P)      Do you expect to return to your current living situation? Yes (P)      Do you have help at home or someone to help you manage your care at home? No (P)      Prior to hospitilization cognitive status: Alert/Oriented (P)       Current cognitive status: Alert/Oriented (P)      Walking or Climbing Stairs Difficulty no (P)      Dressing/Bathing Difficulty no (P)      Home Accessibility stairs to enter home (P)      Number of Stairs, Main Entrance one (P)      Home Layout Able to live on 1st floor (P)      Equipment Currently Used at Home cane, straight;walker, standard (P)      Readmission within 30 days? No (P)      Patient currently being followed by outpatient case management? No (P)      Do you currently have service(s) that help you manage your care at home? No (P)      Do you take prescription medications? Yes (P)      Do you have prescription coverage? Yes (P)      Coverage Payor: HU[employer] MANAGED MEDICARE - HU[employer]NA MEDICARE O - (P)      Do you have any problems affording any of your prescribed medications? No (P)      Is the patient taking medications as prescribed? yes (P)      Who is going to help you get home at discharge? family/friend (P)      How do you get to doctors appointments? family or friend will provide (P)      Are you on dialysis? No (P)      Do you take coumadin? No (P)      Discharge Plan A Home;Home with family (P)      Discharge Plan B Home with family;Home (P)      DME Needed Upon Discharge  none (P)      Discharge Plan discussed with: Patient (P)      Transition of Care Barriers None (P)         Physical Activity    On average, how many days per week do you engage in moderate to strenuous exercise (like a brisk walk)? 0 days (P)      On average, how many minutes do you engage in exercise at this level? 0 min (P)         Financial Resource Strain    How hard is it for you to pay for the very basics like food, housing, medical care, and heating? Not very hard (P)         Housing Stability    In the last 12 months, was there a time when you were not able to pay the mortgage or rent on time? No (P)      At any time in the past 12 months, were you homeless or living in a shelter (including now)? No (P)          Transportation Needs    Has the lack of transportation kept you from medical appointments, meetings, work or from getting things needed for daily living? No (P)         Food Insecurity    Within the past 12 months, you worried that your food would run out before you got the money to buy more. Never true (P)      Within the past 12 months, the food you bought just didn't last and you didn't have money to get more. Never true (P)         Stress    Do you feel stress - tense, restless, nervous, or anxious, or unable to sleep at night because your mind is troubled all the time - these days? Only a little (P)         Social Isolation    How often do you feel lonely or isolated from those around you?  Never (P)         Alcohol Use    Q1: How often do you have a drink containing alcohol? Never (P)      Q2: How many drinks containing alcohol do you have on a typical day when you are drinking? Patient does not drink (P)      Q3: How often do you have six or more drinks on one occasion? Never (P)         Utilities    In the past 12 months has the electric, gas, oil, or water company threatened to shut off services in your home? No (P)         Health Literacy    How often do you need to have someone help you when you read instructions, pamphlets, or other written material from your doctor or pharmacy? Never (P)         OTHER    Name(s) of People in Home spouse (P)                    MADAI Hale, CSW.   Case Management  [facility]  Email: [email or web address]

## 2025-03-17 NOTE — PLAN OF CARE
ECHO at bedside, SW unable to complete assessment.     Carlotta Bender MSW, CSW.   Case Management  [facility]  Email: [email or web address]

## 2025-03-17 NOTE — ED PROVIDER NOTES
Encounter Date: 3/16/2025       History     Chief Complaint   Patient presents with    Shortness of Breath     Pt presents to the ED c/o SOB and slight dizziness. Pt states she was outside most of the day around a lot of pollen and when she got home she felt as though it was hard to breathe. Pt also endorses an episode of slight dizziness, however, denies any dizziness now. Pt denies chest pain, N/V/D.      84 y.o. female with a PMH of CAD s/p 7 stents placed in May 2024, osteoporosis, dementia, HTN, HLD, and GERD presents to [facility] Emergency Department for evaluation of dyspnea and lightheadedness today. She states she became very lightheaded after being outside and felt that she was going to pass out. She additionally endorses congestion, dyspnea with exertion, and generalized weakness//fatigue. She denies fever, chest pain, cough, n/v/d, or abdominal pain. She was treated at  yesterday for a vaginal yeast infection which she reports is improving.      The history is provided by the patient, the spouse and medical records.     Review of patient's allergies indicates:   Allergen Reactions    Sulfa (sulfonamide antibiotics) Hives     Other reaction(s): Anaphylaxis    Itching     Shortness of breath     Past Medical History:   Diagnosis Date    Arthritis     Cataract     GERD (gastroesophageal reflux disease)     HEARING LOSS     Hypertension     Osteoporosis, postmenopausal     Squamous Cell Carcinoma 2007    right forearm    Urinary incontinence     rare mixed     Past Surgical History:   Procedure Laterality Date    ANGIOGRAM, CORONARY, WITH LEFT HEART CATHETERIZATION Bilateral 05/17/2024    Procedure: Angiogram, Coronary, with Left Heart Cath;  Surgeon: Miguel Jacob MD;  Location: [facility] CATH LAB;  Service: Cardiology;  Laterality: Bilateral;    cardiac stents  04/01/2024    COLPOPEXY N/A 08/06/2019    Procedure: COLPOPEXY SSL FIXATION;  Surgeon: Alma Dorsey MD;  Location: [facility] OR [address] [place] [address];  Service:  Urology;  Laterality: N/A;    CORONARY ANGIOGRAPHY Left 05/16/2024    Procedure: ANGIOGRAM, CORONARY ARTERY;  Surgeon: Miguel Jacob MD;  Location: [facility] CATH LAB;  Service: Cardiology;  Laterality: Left;    CYSTOSCOPY N/A 08/06/2019    Procedure: CYSTOSCOPY;  Surgeon: Alma Dorsey MD;  Location: [facility] OR [address] [place] [address];  Service: Urology;  Laterality: N/A;    ESOPHAGOGASTRODUODENOSCOPY N/A 04/01/2024    Procedure: EGD (ESOPHAGOGASTRODUODENOSCOPY);  Surgeon: Zbigniew Dougherty MD;  Location: [facility] ENDO;  Service: Endoscopy;  Laterality: N/A;    FRACTURE SURGERY Left 2008    open radius/ulna fracture    HYSTERECTOMY      TANIA/ovaries remain--fibroids- in her late 30's / early 40's    INJECTION, SPINE, LUMBOSACRAL, TRANSFORAMINAL APPROACH Left 11/22/2024    Procedure: left L3/4 and L4/5 TFESI;  Surgeon: Denver Cordon MD;  Location: [facility] PAIN MANAGEMENT;  Service: Pain Management;  Laterality: Left;  20 mins ASA ok  Brilinta 5 days    IVUS, CORONARY  05/17/2024    Procedure: IVUS, Coronary;  Surgeon: Miguel Jacob MD;  Location: [facility] CATH LAB;  Service: Cardiology;;    STENT, DRUG ELUTING, MULTI VESSEL, CORONARY  05/17/2024    Procedure: Stent, Drug Eluting, Multi Vessel, Coronary;  Surgeon: Miguel Jacob MD;  Location: [facility] CATH LAB;  Service: Cardiology;;     Family History   Problem Relation Name Age of Onset    Heart failure Mother copd     Macular degeneration Mother copd     Heart disease Mother copd     COPD Mother copd     Diabetes Sister      Arthritis Sister          rheumatoid arthritis    COPD Father      No Known Problems Brother      No Known Problems Son Saad     No Known Problems Daughter Jordyn     No Known Problems Sister      No Known Problems Sister      No Known Problems Sister      No Known Problems Brother      No Known Problems Brother      No Known Problems Brother      No Known Problems Brother      No Known Problems Brother      No Known Problems Daughter Reyes     No  Known Problems Son Arie     Breast cancer Neg Hx      Ovarian cancer Neg Hx      Amblyopia Neg Hx      Blindness Neg Hx      Cancer Neg Hx      Cataracts Neg Hx      Glaucoma Neg Hx      Hypertension Neg Hx      Retinal detachment Neg Hx      Strabismus Neg Hx      Stroke Neg Hx      Thyroid disease Neg Hx      Cervical cancer Neg Hx      Endometrial cancer Neg Hx      Vaginal cancer Neg Hx      Colon cancer Neg Hx       Social History[1]  Review of Systems    Physical Exam     Initial Vitals [03/16/25 2125]   BP Pulse Resp Temp SpO2   (!) 164/78 73 18 97.7 °F (36.5 °C) 99 %      MAP       --         Physical Exam    Nursing note and vitals reviewed.  Constitutional: She appears well-developed and well-nourished. No distress.   Elderly female   HENT:   Head: Normocephalic. Mouth/Throat: Oropharynx is clear and moist.   Eyes: Pupils are equal, round, and reactive to light. No scleral icterus.   Neck: Neck supple.   Cardiovascular:  Normal rate and regular rhythm.           Pulmonary/Chest: Breath sounds normal. No stridor. No respiratory distress.   Abdominal: Abdomen is soft. She exhibits no distension. There is no abdominal tenderness.   Musculoskeletal:         General: Edema (1+ pitting edema to bilateral lower extremities, symmetric) present.      Cervical back: Neck supple.     Neurological: She is alert.   Answers questions appropriately, appears slightly anxious    Skin: Skin is warm and dry.         ED Course   Procedures  Labs Reviewed   CBC W/ AUTO DIFFERENTIAL - Abnormal       Result Value    WBC 8.52      RBC 3.42 (*)     Hemoglobin 10.5 (*)     Hematocrit 33.1 (*)     MCV 97      MCH 30.7      MCHC 31.7 (*)     RDW 12.5      Platelets 261      MPV 9.0 (*)     Immature Granulocytes 0.2      Gran # (ANC) 5.4      Immature Grans (Abs) 0.02      Lymph # 2.0      Mono # 0.9      Eos # 0.2      Baso # 0.07      nRBC 0      Gran % 63.4      Lymph % 23.2      Mono % 10.4      Eosinophil % 2.0      Basophil %  0.8      Differential Method Automated     COMPREHENSIVE METABOLIC PANEL - Abnormal    Sodium 142      Potassium 3.1 (*)     Chloride 105      CO2 27      Glucose 125 (*)     BUN 18      Creatinine 0.9      Calcium 10.7 (*)     Total Protein 7.2      Albumin 3.9      Total Bilirubin 0.3      Alkaline Phosphatase 101      AST 26      ALT 17      eGFR >60.0      Anion Gap 10     URINALYSIS, REFLEX TO URINE CULTURE - Abnormal    Specimen UA Urine, Clean Catch      Color, UA Yellow      Appearance, UA Hazy (*)     pH, UA 8.0      Specific Gravity, UA 1.015      Protein, UA Negative      Glucose, UA Negative      Ketones, UA Negative      Bilirubin (UA) Negative      Occult Blood UA Negative      Nitrite, UA Negative      Leukocytes, UA Trace (*)     Narrative:     Specimen Source->Urine   B-TYPE NATRIURETIC PEPTIDE    BNP 90     TROPONIN I HIGH SENSITIVITY    Troponin I High Sensitivity 5     MAGNESIUM    Magnesium 2.0     SARS-COV2 (COVID) WITH FLU/RSV BY PCR    SARS-CoV2 (COVID-19) Qualitative PCR Not Detected      Influenza A, Molecular Not Detected      Influenza B, Molecular Not Detected      RSV Ag by Molecular Method Not Detected     URINALYSIS MICROSCOPIC    RBC, UA 3      WBC, UA 2      Squam Epithel, UA 1      Amorphous, UA Occasional      Microscopic Comment SEE COMMENT      Narrative:     Specimen Source->Urine   TROPONIN I HIGH SENSITIVITY    Troponin I High Sensitivity 5       EKG Readings: (Independently Interpreted)   Initial Reading: No STEMI. Previous EKG: Compared with most recent EKG Rhythm: Normal Sinus Rhythm. Heart Rate: 68. Ectopy: No Ectopy. T Waves Flipped: III, V1, V2 and V3.       Imaging Results              X-Ray Chest AP Portable (Final result)  Result time 03/16/25 23:46:01      Final result by Octavio Jeffrey MD (03/16/25 23:46:01)                   Impression:      No acute abnormality.      Electronically signed by: Octavio Jeffrey  Date:    03/16/2025  Time:    23:46                Narrative:    EXAMINATION:  XR CHEST AP PORTABLE    CLINICAL HISTORY:  Dizziness and giddiness    TECHNIQUE:  Single frontal view of the chest was performed.    COMPARISON:  08/21/2024    FINDINGS:  The lungs are clear, with normal appearance of pulmonary vasculature and no pleural effusion or pneumothorax.    The cardiac silhouette is normal in size. The hilar and mediastinal contours are unremarkable.    Bones are intact.  Hiatal hernia is either reduced or collapsed.                                    X-Rays:   Independently Interpreted Readings:   Chest X-Ray: No acute abnormalities.     Medications   potassium bicarbonate disintegrating tablet 40 mEq (40 mEq Oral Given 3/17/25 0006)     Medical Decision Making  84-year-old female presents to the ED for evaluation of dyspnea, congestion, generalized weakness, and lightheadedness.  Symptoms began today.    Patient is afebrile, hemodynamically stable, and in no acute distress on arrival.   Differential diagnoses considered include, but not limited to: ACS, pneumonia, viral URI, CHF, valve problem    EKG without acute ischemic changes.  COVID and flu negative.  Hemoglobin 10.5, near baseline.  BNP normal.  Troponin normal and flat.  UA without evidence of infection.  Given patient's cardiac history and episodes of lightheadedness/presyncope, will admit to [facility]OU for echocardiogram.  Discussed all findings and plan with the patient and her  at bedside and they expressed understanding and agreement.    Amount and/or Complexity of Data Reviewed  Labs: ordered.  Radiology: ordered.    Risk  Prescription drug management.               ED Course as of 03/17/25 0445   Sun Mar 16, 2025   2212 EKG normal sinus rhythm, rate 68, T-wave inversions in V1 - V3 and lead III similar to prior, no STEMI, normal intervals per my independent interpretation [OW]      ED Course User Index  [OW] Deidra Muller MD                           Clinical Impression:  Final  diagnoses:  [R42] Lightheaded  [R55] Syncope          ED Disposition Condition    Observation Stable                    [1]   Social History  Tobacco Use    Smoking status: Never     Passive exposure: Never    Smokeless tobacco: Never   Substance Use Topics    Alcohol use: Not Currently     Comment: 1-2 glasses wine weekly    Drug use: No        Deidra Muller MD  Resident  03/17/25 5663

## 2025-03-17 NOTE — ED TRIAGE NOTES
Betzy Cooley, a 84 y.o. female presents to the ED w/ complaint of episode of SOB. States wa outside all day in the elements and when she got home she felt SOB and had some dizziness. States symptoms resolved. Cardiac hx. 7 stents.    Triage note:  Chief Complaint   Patient presents with    Shortness of Breath     Pt presents to the ED c/o SOB and slight dizziness. Pt states she was outside most of the day around a lot of pollen and when she got home she felt as though it was hard to breathe. Pt also endorses an episode of slight dizziness, however, denies any dizziness now. Pt denies chest pain, N/V/D.      Review of patient's allergies indicates:   Allergen Reactions    Sulfa (sulfonamide antibiotics) Hives     Other reaction(s): Anaphylaxis    Itching     Shortness of breath     Past Medical History:   Diagnosis Date    Arthritis     Cataract     GERD (gastroesophageal reflux disease)     HEARING LOSS     Hypertension     Osteoporosis, postmenopausal     Squamous Cell Carcinoma 2007    right forearm    Urinary incontinence     rare mixed

## 2025-03-17 NOTE — ED NOTES
Betzy Cooley, a 84 y.o. female     Triage note:  Chief Complaint   Patient presents with    Shortness of Breath     Pt presents to the ED c/o SOB and slight dizziness. Pt states she was outside most of the day around a lot of pollen and when she got home she felt as though it was hard to breathe. Pt also endorses an episode of slight dizziness, however, denies any dizziness now. Pt denies chest pain, N/V/D.

## 2025-03-17 NOTE — DISCHARGE INSTRUCTIONS
Your workup is reassuring and does not show any significant abnormalities.   Please follow up closely with your primary doctor and cardiology or return to the ER for any new or worsening symptoms.

## 2025-03-17 NOTE — H&P
ED Observation Unit  History and Physical      I assumed care of this patient from the Main ED at onset of my shift at 0700 on 03/17/2025.       History of Present Illness:    84 y.o. female with a PMH of CAD s/p 7 stents placed in May 2024, osteoporosis, dementia, HTN, HLD, and GERD presents to [facility] Emergency Department for evaluation of dyspnea and lightheadedness today. She states she became very lightheaded after being outside all day and felt that she was going to pass out.  reports that she has complained of feeling lightheaded for the last week. Symptoms last a few minutes then resolve. She additionally endorses congestion, dyspnea with exertion, and generalized weakness//fatigue. She denies fever, chest pain, cough, n/v/d, or abdominal pain. She was treated at  yesterday for a vaginal yeast infection which she reports is improving.       The history is provided by the patient, the spouse and medical records.     I reviewed the ED Provider Note dated 03/16/25 prior to my evaluation of this patient.  I reviewed all labs and imaging performed in the Main ED, prior to patient being placed in Observation. Patient was placed in the ED Observation Unit for Postural dizziness with presyncope.    PMHx   Past Medical History:   Diagnosis Date    Arthritis     Cataract     GERD (gastroesophageal reflux disease)     HEARING LOSS     Hypertension     Osteoporosis, postmenopausal     Squamous Cell Carcinoma 2007    right forearm    Urinary incontinence     rare mixed      Past Surgical History:   Procedure Laterality Date    ANGIOGRAM, CORONARY, WITH LEFT HEART CATHETERIZATION Bilateral 05/17/2024    Procedure: Angiogram, Coronary, with Left Heart Cath;  Surgeon: Miguel Jacob MD;  Location: [facility] CATH LAB;  Service: Cardiology;  Laterality: Bilateral;    cardiac stents  04/01/2024    COLPOPEXY N/A 08/06/2019    Procedure: COLPOPEXY SSL FIXATION;  Surgeon: Alma Dorsey MD;  Location: [facility] OR [address] [place] [address];   Service: Urology;  Laterality: N/A;    CORONARY ANGIOGRAPHY Left 05/16/2024    Procedure: ANGIOGRAM, CORONARY ARTERY;  Surgeon: Miguel Jacob MD;  Location: [facility] CATH LAB;  Service: Cardiology;  Laterality: Left;    CYSTOSCOPY N/A 08/06/2019    Procedure: CYSTOSCOPY;  Surgeon: Alma Dorsey MD;  Location: [facility] OR [address] [place] [address];  Service: Urology;  Laterality: N/A;    ESOPHAGOGASTRODUODENOSCOPY N/A 04/01/2024    Procedure: EGD (ESOPHAGOGASTRODUODENOSCOPY);  Surgeon: Zbigniew Dougherty MD;  Location: [facility] ENDO;  Service: Endoscopy;  Laterality: N/A;    FRACTURE SURGERY Left 2008    open radius/ulna fracture    HYSTERECTOMY      TANIA/ovaries remain--fibroids- in her late 30's / early 40's    INJECTION, SPINE, LUMBOSACRAL, TRANSFORAMINAL APPROACH Left 11/22/2024    Procedure: left L3/4 and L4/5 TFESI;  Surgeon: Denver Cordon MD;  Location: [facility] PAIN MANAGEMENT;  Service: Pain Management;  Laterality: Left;  20 mins ASA ok  Brilinta 5 days    IVUS, CORONARY  05/17/2024    Procedure: IVUS, Coronary;  Surgeon: Miguel Jacob MD;  Location: [facility] CATH LAB;  Service: Cardiology;;    STENT, DRUG ELUTING, MULTI VESSEL, CORONARY  05/17/2024    Procedure: Stent, Drug Eluting, Multi Vessel, Coronary;  Surgeon: Miguel Jacob MD;  Location: [facility] CATH LAB;  Service: Cardiology;;        Family Hx   Family History   Problem Relation Name Age of Onset    Heart failure Mother copd     Macular degeneration Mother copd     Heart disease Mother copd     COPD Mother copd     Diabetes Sister      Arthritis Sister          rheumatoid arthritis    COPD Father      No Known Problems Brother      No Known Problems Son Saad     No Known Problems Daughter Jordyn     No Known Problems Sister      No Known Problems Sister      No Known Problems Sister      No Known Problems Brother      No Known Problems Brother      No Known Problems Brother      No Known Problems Brother      No Known Problems Brother      No Known Problems  Daughter Reyes     No Known Problems Son Arie     Breast cancer Neg Hx      Ovarian cancer Neg Hx      Amblyopia Neg Hx      Blindness Neg Hx      Cancer Neg Hx      Cataracts Neg Hx      Glaucoma Neg Hx      Hypertension Neg Hx      Retinal detachment Neg Hx      Strabismus Neg Hx      Stroke Neg Hx      Thyroid disease Neg Hx      Cervical cancer Neg Hx      Endometrial cancer Neg Hx      Vaginal cancer Neg Hx      Colon cancer Neg Hx          Social Hx   Social History     Socioeconomic History    Marital status:    Tobacco Use    Smoking status: Never     Passive exposure: Never    Smokeless tobacco: Never   Substance and Sexual Activity    Alcohol use: Not Currently     Comment: 1-2 glasses wine weekly    Drug use: No    Sexual activity: Yes     Partners: Male     Birth control/protection: None     Social Drivers of Health     Financial Resource Strain: Low Risk  (11/25/2024)    Overall Financial Resource Strain (CARDIA)     Difficulty of Paying Living Expenses: Not hard at all   Food Insecurity: No Food Insecurity (2/11/2025)    Hunger Vital Sign     Worried About Running Out of Food in the Last Year: Never true     Ran Out of Food in the Last Year: Never true   Transportation Needs: No Transportation Needs (11/25/2024)    PRAPARE - Transportation     Lack of Transportation (Medical): No     Lack of Transportation (Non-Medical): No   Physical Activity: Insufficiently Active (2/11/2025)    Exercise Vital Sign     Days of Exercise per Week: 1 day     Minutes of Exercise per Session: 30 min   Stress: Stress Concern Present (2/11/2025)    [employer] [place] - Occupational Stress Questionnaire     Feeling of Stress : To some extent   Housing Stability: Unknown (2/11/2025)    Housing Stability Vital Sign     Unable to Pay for Housing in the Last Year: No     Homeless in the Last Year: No        Vital Signs   Vitals:    03/17/25 0432 03/17/25 0502 03/17/25 0600 03/17/25 0705   BP: (!)  177/82 (!) 146/70 (!) 163/85 (!) 193/92   BP Location:  Left arm Left arm Left arm   Patient Position:  Sitting Lying Lying   Pulse: 67 (!) 53 (!) 58 (!) 59   Resp: 17 16 16 16   Temp:   97.5 °F (36.4 °C) 98 °F (36.7 °C)   TempSrc:   Oral Oral   SpO2: 99% 97% 99% 99%   Weight:       Height:            Review of Systems  Review of Systems   Respiratory:  Negative for shortness of breath.    Neurological:  Negative for loss of consciousness.       Physical Exam  Physical Exam  Vitals and nursing note reviewed.   Constitutional:       General: She is not in acute distress.     Appearance: She is well-developed and normal weight. She is not ill-appearing, toxic-appearing or diaphoretic.   HENT:      Head: Normocephalic and atraumatic.      Right Ear: External ear normal.      Left Ear: External ear normal.      Mouth/Throat:      Mouth: Mucous membranes are moist.      Pharynx: Oropharynx is clear.   Eyes:      Extraocular Movements: Extraocular movements intact.      Pupils: Pupils are equal, round, and reactive to light.   Cardiovascular:      Rate and Rhythm: Normal rate and regular rhythm.   Pulmonary:      Effort: Pulmonary effort is normal.      Breath sounds: Normal breath sounds.   Abdominal:      General: Bowel sounds are normal.      Palpations: Abdomen is soft.      Tenderness: There is no abdominal tenderness.   Musculoskeletal:         General: Normal range of motion.      Cervical back: Normal range of motion and neck supple.      Right lower leg: No edema.      Left lower leg: No edema.   Skin:     General: Skin is warm and dry.      Capillary Refill: Capillary refill takes less than 2 seconds.   Neurological:      General: No focal deficit present.      Mental Status: She is alert and oriented to person, place, and time.   Psychiatric:         Mood and Affect: Mood normal. Mood is not anxious.         Behavior: Behavior normal. Behavior is not agitated.         Thought Content: Thought content normal.          Judgment: Judgment normal.         Medications:   Scheduled Meds:   aspirin  81 mg Oral Daily    atorvastatin  40 mg Oral QHS    ezetimibe  10 mg Oral QHS    metoprolol succinate  12.5 mg Oral Daily    pantoprazole  40 mg Oral BID    ticagrelor  90 mg Oral BID    valsartan  80 mg Oral Daily     Continuous Infusions:  PRN Meds:.  Current Facility-Administered Medications:     acetaminophen, 650 mg, Oral, Q4H PRN    dextrose 50%, 12.5 g, Intravenous, PRN    dextrose 50%, 25 g, Intravenous, PRN    glucagon (human recombinant), 1 mg, Intramuscular, PRN    glucose, 16 g, Oral, PRN    glucose, 24 g, Oral, PRN    naloxone, 0.02 mg, Intravenous, PRN    ondansetron, 4 mg, Intravenous, Q8H PRN    sodium chloride 0.9%, 10 mL, Intravenous, Q12H PRN      Assessment/Plan:  Presyncope and exertional dyspnea   - no severe hematologic derangements   - hypokalemia of 3.1, repleted orally in ED   - Troponin WNL x 2   - BNP WNL   - echocardiogram ordered and pending       Case was discussed with the ED provider, MD Renzo.

## 2025-03-17 NOTE — ED NOTES
Assumed care of patient at this time. Pt placed in hospital gown and currently lying in stretcher. Vital signs are stable, provided pt with warm blanket. Pt denied restroom use. No other complaints from pt at this time.

## 2025-03-17 NOTE — DISCHARGE SUMMARY
ED Observation Unit  Discharge Summary        History of Present Illness:    84 y.o. female with a PMH of CAD s/p 7 stents placed in May 2024, osteoporosis, dementia, HTN, HLD, and GERD [place] [facility] for evaluation of dyspnea and lightheadedness today. She states she became very lightheaded after being outside all day and felt that she was going to pass out.  reports that she has complained of feeling lightheaded for the last week. Symptoms last a few minutes then resolve. She additionally endorses congestion, dyspnea with exertion, and generalized weakness//fatigue. She denies fever, chest pain, cough, n/v/d, or abdominal pain. She was treated at  yesterday for a vaginal yeast infection which she reports is improving.      Observation Course:    Patient was placed in the ED observation unit for lightheadedness and dyspnea.  She has remain asymptomatic while in the ED observation unit.  She is able to walk without desaturation or without feeling presyncopal.  Her workup is reassuring.  Troponin within normal limits x2. Echocardiogram with findings that are similar to prior. Patient and  comfortable with discharge home and close outpatient follow up. Return precautions given. All questions answered.   The patient was instructed to follow up with a primary care provider and cardiology or to return to the emergency department for worsening symptoms. The treatment plan was discussed with the patient who demonstrated understanding and comfort with plan.     Consultants:    None     Final Diagnosis:  Lightheadedness, shortness of breath     Discharge Condition: Good    Disposition: Home or Self Care     Time spent on the discharge of the patient including review of hospital course with the patient. reviewing discharge medications and arranging follow-up care 35 minutes.  Patient was seen and examined on the date of discharge and determined to be suitable for discharge.    Follow  Up:  Future Appointments   Date Time Provider Department Center   3/18/2025  9:00 AM Yolanda Akhtar, PTA [facility] [place] [facility]   3/20/2025 11:00 AM Laurie Jernigan, PT [facility] [place]   3/24/2025 10:00 AM Laurie Jernigan, PT [facility] [place]   3/27/2025 12:00 PM Laurie Jernigan, PT [facility] [place]   4/3/2025 10:30 AM [facility]  CT1 [facility] CTSCAN [place]   4/3/2025 10:45 AM [facility]  XR1 700LB LIMIT [facility] XRAY [place]   4/3/2025 11:30 AM Grzegorz Damon, DO [facility] NEUSRG [place]   6/10/2025  9:20 AM Anu Jensen, OD [facility] OPTOMTY [place]   10/14/2025 11:20 AM [facility], DEXA1 [facility] BMD [place]

## 2025-03-18 ENCOUNTER — PATIENT OUTREACH (OUTPATIENT)
Dept: ADMINISTRATIVE | Facility: CLINIC | Age: 85
End: 2025-03-18
Payer: MEDICARE

## 2025-03-18 ENCOUNTER — CLINICAL SUPPORT (OUTPATIENT)
Dept: REHABILITATION | Facility: HOSPITAL | Age: 85
End: 2025-03-18
Payer: MEDICARE

## 2025-03-18 DIAGNOSIS — R53.1 DECREASED STRENGTH: ICD-10-CM

## 2025-03-18 DIAGNOSIS — M54.50 LUMBAR PAIN: Primary | ICD-10-CM

## 2025-03-18 PROCEDURE — 97110 THERAPEUTIC EXERCISES: CPT | Mod: CQ

## 2025-03-18 PROCEDURE — 97112 NEUROMUSCULAR REEDUCATION: CPT | Mod: CQ

## 2025-03-18 NOTE — PROGRESS NOTES
"  Physical Therapy Daily Treatment Note     Name: Betzy Cooley  Clinic Number: [id number]    Therapy Diagnosis:   Encounter Diagnoses   Name Primary?    Lumbar pain Yes    Decreased strength      Physician: Grzegorz Damon DO    Visit Date: 3/18/2025    Physician Orders: PT Eval and Treat   Medical Diagnosis from Referral: S32.030G (ICD-10-CM) - Closed compression fracture of L3 lumbar vertebra with delayed healing, subsequent encounter   Evaluation Date: 1/15/2025  Authorization Period Expiration: 1/7/26  Plan of Care Expiration: 2/28/25, 3/28/25  Progress Note Due: 2/15/25  Visit # / Visits authorized: 1/ 1, 15/20   FOTO: 1/ 3    Time In: 9:00 am   Time Out: 9:53 am   Total Billable Time: 53 minutes    Precautions: Standard, osteoporosis     Subjective     Pt reports: that she is having a rough start to the day as she was in the hospital overnight 2 days ago. Pt states that she didn't get much sleep and doesn't want to do too much today.   She was compliant with home exercise program.  Response to previous treatment: no adverse effects  Functional change: see updated POC     Pain: 2/10 (B) back       Objective       therapeutic exercises to develop strength, endurance,and flexibility for 25 minutes including:  Recumbent bike 7 min (for LE endurance )   Standing hip abd 2x10 BLE (with BUE support and SBA) 2#  Standing hip ext 2x10 BLE (with BUE support and SBA)2#  Standing calf raises 3x10 (with BUE support and SBA)   Standing marches 2x10 (with BUE support and SBA) 2#      neuromuscular re-education activities to improve: Coordination, core/lumbar/scap stabilization Posture, balance, and Motor Control for 28 minutes. The following activities were included:  Seated no money w/scap retraction YTB 3" 3x10   Seated rows w/scap retraction GTB 3" 2x10   Seated hip adduction iso c/ ball w/TA brace 3" 2x10   Seated SB press downs 2x10 3s 3 ways  Seated Med ball press 2x10 RMB   Balance clock    Tandem stance 3x30" B " "  Standing on airex with eyes closed and NBOS 3x30"         Therapeutic activities for improved functional performance x0 :   Sit to stands 2x10 w/UE support (held d/t fatigue)   Step ups L2 2x10 B (held d/t fatigue)   Step downs L1 1x10 B (held d/t fatigue)         Home Exercises Provided and Patient Education Provided     Education provided:   - HEP     Written Home Exercises Provided: Patient instructed to cont prior HEP.  Betzy demonstrated good  understanding of the education provided.     See EMR under Patient Instructions for exercises provided prior visit.      Assessment     Deferred TA this session due to patient being tired after spending the night in the hospital. Pt tolerated all completed activities well, however alternated between seated and standing activities to avoid excessive fatigue. Plan to reincorporate TA as tolerated at next session.     Betzy Is progressing towards her goals.   Pt prognosis is Fair.     Pt will continue to benefit from skilled outpatient physical therapy to address the deficits listed in the problem list box on initial evaluation, provide pt/family education and to maximize pt's level of independence in the home and community environment.     Pt's spiritual, cultural and educational needs considered and pt agreeable to plan of care and goals.    Anticipated barriers to physical therapy:  chronicity of symptoms     Goals:   Short Term Goals: 2 weeks   Pt will be compliant with HEP to supplement PT with decreasing pain and improving functional mobility - Met      Long Term Goals: 6 weeks   Pt will improve FOTO score to at least 62 in order to demo improved functional mobility- progressing not met   Pt will improve LE MMT scores by at least 1/3 grade where deficits noted in order to improve strength for functional tasks - progressing not met   Pt will perform TUG in </= 13 seconds in order to demo improve gait speed and decreased risk of falling - progressing not met   Pt " will perform at least 5 sit to stands without UE support in order to demo improved ability to perform transfers - progressing not met     Plan     Continue per POC, progress as tolerated     Yolanda Akhtar, PTA

## 2025-03-20 ENCOUNTER — CLINICAL SUPPORT (OUTPATIENT)
Dept: REHABILITATION | Facility: HOSPITAL | Age: 85
End: 2025-03-20
Payer: MEDICARE

## 2025-03-20 ENCOUNTER — OFFICE VISIT (OUTPATIENT)
Dept: URGENT CARE | Facility: CLINIC | Age: 85
End: 2025-03-20
Payer: MEDICARE

## 2025-03-20 VITALS
TEMPERATURE: 98 F | DIASTOLIC BLOOD PRESSURE: 76 MMHG | OXYGEN SATURATION: 98 % | HEART RATE: 78 BPM | RESPIRATION RATE: 16 BRPM | SYSTOLIC BLOOD PRESSURE: 137 MMHG | WEIGHT: 100 LBS | BODY MASS INDEX: 18.89 KG/M2

## 2025-03-20 DIAGNOSIS — R53.1 DECREASED STRENGTH: ICD-10-CM

## 2025-03-20 DIAGNOSIS — N89.8 VAGINAL DRYNESS: ICD-10-CM

## 2025-03-20 DIAGNOSIS — M54.50 LUMBAR PAIN: Primary | ICD-10-CM

## 2025-03-20 DIAGNOSIS — S41.112A SKIN TEAR OF LEFT UPPER ARM WITHOUT COMPLICATION, INITIAL ENCOUNTER: Primary | ICD-10-CM

## 2025-03-20 PROCEDURE — 81515 NFCT DS BV&VAGINITIS DNA ALG: CPT | Performed by: FAMILY MEDICINE

## 2025-03-20 PROCEDURE — 97112 NEUROMUSCULAR REEDUCATION: CPT

## 2025-03-20 PROCEDURE — 99214 OFFICE O/P EST MOD 30 MIN: CPT | Mod: S$GLB,,, | Performed by: FAMILY MEDICINE

## 2025-03-20 NOTE — PROCEDURES
Wound care    Date/Time: 3/20/2025 3:15 PM    Performed by: Sherry Ohara MA  Authorized by: Jose Manuel Muller MD  Location: L arm.  Laceration length: 6 cm  Anesthesia: see MAR for details  Dressing: wet to dry dressing. Sterile NS soaked 4x4s, covered w non-stick sterile dsg and secured with coflex.  Patient tolerance: Patient tolerated the procedure well with no immediate complications

## 2025-03-20 NOTE — PROGRESS NOTES
Subjective:      Patient ID: Betzy Cooley is a 84 y.o. female.    Vitals:  weight is 45.4 kg (100 lb). Her oral temperature is 98 °F (36.7 °C). Her blood pressure is 137/76 and her pulse is 78. Her respiration is 16 and oxygen saturation is 98%.     Chief Complaint: multiple skin tears    This is a 84 y.o. female who presents today with a chief complaint of multiple L arm skin tear today at approx 2:4pm at home. Pt was getting ready to leave the house when she btripped using her walker, believing the walker was locked when it wasn't. Pt's  and daughter helped pt to not fall to the floor and instead helped easy pt to sitting. Upon the fall, pt scraped L arm against her walker causing the skin tear.    Home tx: ice, bandage    [facility]: blood thinners; last tetanus 2022      Wound cleanse    Date/Time: 3/20/2025 3:15 PM    Performed by: Sherry Ohara MA  Authorized by: Jose Manuel Muller MD  Location: L arm.  Wound length (cm): 6.  Contamination: The wound is contaminated.  Foreign bodies: no foreign bodies  Tendon involvement: none  Nerve involvement: none  Anesthesia: see MAR for details  Preparation: Patient was prepped and draped in the usual sterile fashion.  Irrigation solution: saline  Irrigation method: 40cc NS wound cleanse.  Amount of cleaning: standard  Debridement: none  Degree of undermining: none  Dressing: non-stick sterile dressing  Patient tolerance: Patient tolerated the procedure well with no immediate complications    Wound care    Date/Time: 3/20/2025 3:15 PM    Performed by: Sherry Ohara MA  Authorized by: Jose Manuel Muller MD  Location: L arm.  Laceration length: 6 cm  Anesthesia: see MAR for details  Dressing: wet to dry dressing. Sterile NS soaked 4x4s, covered w non-stick sterile dsg and secured with coflex.  Patient tolerance: Patient tolerated the procedure well with no immediate complications              Laceration   The incident occurred less than 1 hour ago. The laceration is  located on the Left arm. The laceration is 6 cm (6cm, 4cm, 2cm) in size. The laceration mechanism was a blunt object. Her tetanus status is UTD.       Skin:  Positive for laceration.      Objective:     Physical Exam   Constitutional: She does not appear ill. She appears distressed. normal  HENT:   Head: Normocephalic and atraumatic.   Cardiovascular: Normal rate, regular rhythm, normal heart sounds and normal pulses.   Pulmonary/Chest: Effort normal and breath sounds normal.   Abdominal: Normal appearance.   Genitourinary: vaginal rash (excoriations noted).    Pelvic exam was performed with patient supine.   There is rash (mild erythema) on the right labia. There is rash on the left labia.   Neurological: She is alert.   Nursing note and vitals reviewed.chaperone present (Sherry Ohara)       Assessment:     1. Skin tear of left upper arm without complication, initial encounter    2. Vaginal dryness        Plan:       Skin tear of left upper arm without complication, initial encounter  -     wound cleanse  -     wound care  -     Ambulatory referral/consult to Wound Clinic    Vaginal dryness  -     conjugated estrogens (PREMARIN) vaginal cream; Place 0.5 g vaginally once daily.  Dispense: 30 g; Refill: 1  -     Vaginosis Screen by DNA Probe    Advised to return to clinic for wound care/dressing change and to continue until appointment with care. Reviewed signs and symptoms of infection and wound care with  and daughter

## 2025-03-20 NOTE — PROCEDURES
Wound cleanse    Date/Time: 3/20/2025 3:15 PM    Performed by: Sherry Ohara MA  Authorized by: Jose Manuel Muller MD  Location: L arm.  Wound length (cm): 6.  Contamination: The wound is contaminated.  Foreign bodies: no foreign bodies  Tendon involvement: none  Nerve involvement: none  Anesthesia: see MAR for details  Preparation: Patient was prepped and draped in the usual sterile fashion.  Irrigation solution: saline  Irrigation method: 40cc NS wound cleanse.  Amount of cleaning: standard  Debridement: none  Degree of undermining: none  Dressing: non-stick sterile dressing  Patient tolerance: Patient tolerated the procedure well with no immediate complications

## 2025-03-20 NOTE — PROGRESS NOTES
"  Physical Therapy Daily Treatment Note     Name: Betzy Cooley  Clinic Number: [id number]    Therapy Diagnosis:   Encounter Diagnoses   Name Primary?    Lumbar pain Yes    Decreased strength      Physician: Grzegorz Damon DO    Visit Date: 3/20/2025    Physician Orders: PT Eval and Treat   Medical Diagnosis from Referral: S32.030G (ICD-10-CM) - Closed compression fracture of L3 lumbar vertebra with delayed healing, subsequent encounter   Evaluation Date: 1/15/2025  Authorization Period Expiration: 1/7/26  Plan of Care Expiration: 2/28/25, 3/28/25  Progress Note Due: 2/15/25  Visit # / Visits authorized: 1/ 1, 16/20   FOTO: 1/ 3    Time In: 11:01 am   Time Out: 11:58 am   Total Billable Time: 35 minutes    Precautions: Standard, osteoporosis    Subjective     Pt reports: feeling so so today. Pt stated that she is not having back pain, just tightness in her back.     Response to previous treatment: no adverse effects  Functional change: see updated POC     Pain: 0/10 (B) back       Objective       therapeutic exercises to develop strength, endurance,and flexibility for 28 minutes including:  Recumbent bike 7 min (for LE endurance )   Standing hip abd 2x10 BLE (with BUE support and SBA) 2#  Standing hip ext 2x10 BLE (with BUE support and SBA)2#  Standing calf raises 3x10 (with BUE support and SBA)   Standing marches 2x10 (with BUE support and SBA) 2#      neuromuscular re-education activities to improve: Coordination, core/lumbar/scap stabilization Posture, balance, and Motor Control for 23 minutes. The following activities were included:  Seated no money w/scap retraction YTB 3" 3x10   Seated rows w/scap retraction GTB 3" 2x10   Seated hip adduction iso c/ ball w/TA brace 3" 2x10   Seated SB press downs 2x10 3s 3 ways  Seated Med ball press 2x10 RMB   Balance clock    Tandem stance 3x30" B not performed   Standing on airex with eyes closed and NBOS 3x30"         Therapeutic activities for improved functional " performance x6' :   Sit to stands 2x10 w/UE support - not performed    Step ups L2 2x10 B   Step downs L1 1x10 B     Home Exercises Provided and Patient Education Provided     Education provided:   - HEP     Written Home Exercises Provided: Patient instructed to cont prior HEP.  Betzy demonstrated good  understanding of the education provided.     See EMR under Patient Instructions for exercises provided prior visit.      Assessment     Pt was able to tolerate resuming TA noted above. Pt tolerated therapy session without any adverse reactions.   Betzy Is progressing towards her goals.   Pt prognosis is Fair.     Pt will continue to benefit from skilled outpatient physical therapy to address the deficits listed in the problem list box on initial evaluation, provide pt/family education and to maximize pt's level of independence in the home and community environment.     Pt's spiritual, cultural and educational needs considered and pt agreeable to plan of care and goals.    Anticipated barriers to physical therapy:  chronicity of symptoms     Goals:   Short Term Goals: 2 weeks   Pt will be compliant with HEP to supplement PT with decreasing pain and improving functional mobility - Met      Long Term Goals: 6 weeks   Pt will improve FOTO score to at least 62 in order to demo improved functional mobility- progressing not met   Pt will improve LE MMT scores by at least 1/3 grade where deficits noted in order to improve strength for functional tasks - progressing not met   Pt will perform TUG in </= 13 seconds in order to demo improve gait speed and decreased risk of falling - progressing not met   Pt will perform at least 5 sit to stands without UE support in order to demo improved ability to perform transfers - progressing not met     Plan     Continue per POC, progress as tolerated     Laurie Jernigan, PT

## 2025-03-21 ENCOUNTER — OCCUPATIONAL HEALTH (OUTPATIENT)
Dept: URGENT CARE | Facility: CLINIC | Age: 85
End: 2025-03-21

## 2025-03-21 VITALS
TEMPERATURE: 98 F | SYSTOLIC BLOOD PRESSURE: 175 MMHG | WEIGHT: 100 LBS | HEART RATE: 59 BPM | HEIGHT: 61 IN | DIASTOLIC BLOOD PRESSURE: 81 MMHG | RESPIRATION RATE: 14 BRPM | BODY MASS INDEX: 18.88 KG/M2 | OXYGEN SATURATION: 98 %

## 2025-03-21 DIAGNOSIS — Z51.89 VISIT FOR WOUND CHECK: Primary | ICD-10-CM

## 2025-03-21 LAB
BACTERIAL VAGINOSIS DNA: NOT DETECTED
CANDIDA GLABRATA/KRUSEI: NOT DETECTED
CANDIDA RRNA VAG QL PROBE: NOT DETECTED
TRICHOMONAS VAGINALIS: NOT DETECTED

## 2025-03-21 NOTE — PROGRESS NOTES
"Subjective:      Patient ID: Betzy Cooley is a 84 y.o. female.    Vitals:  height is 5' 1" (1.549 m) and weight is 45.4 kg (100 lb). Her tympanic temperature is 97.5 °F (36.4 °C). Her blood pressure is 175/81 (abnormal) and her pulse is 59 (abnormal). Her respiration is 14 and oxygen saturation is 98%.     Chief Complaint: Wound Check    Encounter with pt for wound care of LT arm.    ROS   Objective:     Physical Exam    Assessment:     1. Visit for wound check        Plan:       Visit for wound check                Pt here for wound check and dressing change completed by NIRAV Gordillo. Dressing removed. Cleansed with hibiclins and sterile water. Non-adherent dressings applied. Number given to patient to f/u with wound care.     "

## 2025-03-22 ENCOUNTER — RESULTS FOLLOW-UP (OUTPATIENT)
Dept: URGENT CARE | Facility: CLINIC | Age: 85
End: 2025-03-22

## 2025-03-22 ENCOUNTER — TELEPHONE (OUTPATIENT)
Dept: URGENT CARE | Facility: CLINIC | Age: 85
End: 2025-03-22

## 2025-03-22 NOTE — TELEPHONE ENCOUNTER
Pt was called at 11:09am, pt was left a vm about test results being ready.     ----- Message from MALENA Vicente sent at 3/22/2025 10:56 AM CDT -----  Please inform pt of negative vaginal swab tests  ----- Message -----  From: [facility]  Sent: 3/21/2025   9:50 PM CDT  [facility]

## 2025-03-24 ENCOUNTER — TELEPHONE (OUTPATIENT)
Dept: WOUND CARE | Facility: HOSPITAL | Age: 85
End: 2025-03-24
Payer: MEDICARE

## 2025-03-24 ENCOUNTER — CLINICAL SUPPORT (OUTPATIENT)
Dept: REHABILITATION | Facility: HOSPITAL | Age: 85
End: 2025-03-24
Payer: MEDICARE

## 2025-03-24 ENCOUNTER — TELEPHONE (OUTPATIENT)
Dept: SURGERY | Facility: CLINIC | Age: 85
End: 2025-03-24
Payer: MEDICARE

## 2025-03-24 ENCOUNTER — OFFICE VISIT (OUTPATIENT)
Dept: URGENT CARE | Facility: CLINIC | Age: 85
End: 2025-03-24
Payer: MEDICARE

## 2025-03-24 VITALS
DIASTOLIC BLOOD PRESSURE: 82 MMHG | TEMPERATURE: 98 F | SYSTOLIC BLOOD PRESSURE: 189 MMHG | BODY MASS INDEX: 18.89 KG/M2 | OXYGEN SATURATION: 99 % | RESPIRATION RATE: 16 BRPM | HEART RATE: 83 BPM | WEIGHT: 100 LBS

## 2025-03-24 DIAGNOSIS — M54.50 LUMBAR PAIN: Primary | ICD-10-CM

## 2025-03-24 DIAGNOSIS — R53.1 DECREASED STRENGTH: ICD-10-CM

## 2025-03-24 DIAGNOSIS — S41.112A SKIN TEAR OF LEFT UPPER ARM WITHOUT COMPLICATION, INITIAL ENCOUNTER: Primary | ICD-10-CM

## 2025-03-24 PROCEDURE — 97112 NEUROMUSCULAR REEDUCATION: CPT

## 2025-03-24 PROCEDURE — 99499 UNLISTED E&M SERVICE: CPT | Mod: S$GLB,,, | Performed by: FAMILY MEDICINE

## 2025-03-24 PROCEDURE — 97530 THERAPEUTIC ACTIVITIES: CPT

## 2025-03-24 PROCEDURE — 97110 THERAPEUTIC EXERCISES: CPT

## 2025-03-24 NOTE — PROGRESS NOTES
Subjective:      Patient ID: Betzy Cooley is a 84 y.o. female.    Vitals:  weight is 45.4 kg (100 lb). Her oral temperature is 97.7 °F (36.5 °C). Her blood pressure is 189/82 (abnormal) and her pulse is 83. Her respiration is 16 and oxygen saturation is 99%.     Chief Complaint: Wound Check    This is a 84 y.o. female who presents today with a chief complaint of bandage change and wound check. Pt had multiple skin tears to L arm and has been receiving daily wound care from  clinic. S/s of infection: improved redness, no swelling, no pus, improved px, no heat    Home tx: mupirocin    PPMH: blood thinners    Wound Check  She was originally treated 3 to 5 days ago. Previous treatment included laceration repair. Her temperature was unmeasured prior to arrival. There has been bloody discharge from the wound. There is no redness present. There is no swelling present. There is no pain present. She has no difficulty moving the affected extremity or digit.     ROS   Objective:     Physical Exam   Constitutional: normal  Cardiovascular: Normal rate, regular rhythm, normal heart sounds and normal pulses.   Pulmonary/Chest: Effort normal and breath sounds normal.   Abdominal: Normal appearance.   Neurological: She is alert.   Skin: lesion (jagged irregular skin tear left upper arm, well healed clean pink granulation tissues. skin flaps well adhered. no evidnce of infection)   Nursing note and vitals reviewed.    Assessment:     1. Skin tear of left upper arm without complication, initial encounter        Plan:       Skin tear of left upper arm without complication, initial encounter  -     Wound Care    Continue with wound care as prescribed. Follow up in one day  Awaiting wound care referral appointment

## 2025-03-24 NOTE — PROGRESS NOTES
Wound Care    Date/Time: 3/24/2025 12:15 PM    Performed by: Sherry Ohara MA  Authorized by: Jose Manuel Muller MD  Location: L arm.  Anesthesia: see MAR for details  Dressing: antibiotic ointment, non-stick sterile dressing and bulky dressing (dressig change)  Patient tolerance: Patient tolerated the procedure well with no immediate complications  Comments: Secured with [employer]

## 2025-03-24 NOTE — TELEPHONE ENCOUNTER
Spoke with pt . Only wants to be seen at main campus. Appt scheduled    ----- Message from [employer] sent at 3/24/2025  1:36 PM CDT -----  Regarding: Returning Call  Contact: [phone]  Type:  Patient Returning CallWho Called:  PT  Zander Who Left Message for Patient: EVER Bonner Does the patient know what this is regarding?: Yes, Schedule appt for wound care. Would the patient rather a call back or a response via [facility]? Both Best Call Back Number: [phone]

## 2025-03-24 NOTE — TELEPHONE ENCOUNTER
Left voice message requesting return call to #[phone] option 1 to assist with scheduling a wound care appointment for skin tear to arm

## 2025-03-24 NOTE — PROGRESS NOTES
"  Physical Therapy Daily Treatment Note     Name: Betzy Cooley  Clinic Number: [id number]    Therapy Diagnosis:   Encounter Diagnoses   Name Primary?    Lumbar pain Yes    Decreased strength      Physician: Grzegorz Damon DO    Visit Date: 3/24/2025    Physician Orders: PT Eval and Treat   Medical Diagnosis from Referral: S32.030G (ICD-10-CM) - Closed compression fracture of L3 lumbar vertebra with delayed healing, subsequent encounter   Evaluation Date: 1/15/2025  Authorization Period Expiration: 1/7/26  Plan of Care Expiration: 2/28/25, 3/28/25  Progress Note Due: 2/15/25  Visit # / Visits authorized: 1/ 1, 17/20   FOTO: 1/ 3    Time In: 10:00 am   Time Out: 10:58 am   Total Billable Time: 58 minutes    Precautions: Standard, osteoporosis    Subjective     Pt reports: that her back is a little stiff, but not hurting. Pt stated that on Thursday she was at home and stood up and rollator moved forward and she lost her balance and scraped (L) UE and has a skin tear on (L) UE. Pt stated that she did not fall. Pt's  stated that pt went to urgent care and was told to schedule an appointment with wound care for (L) UE.   She was compliant with home exercise program.  Response to previous treatment: no adverse effects  Functional change: see updated POC     Pain: 0/10 (B) back       Objective       Subjective assessment and therapeutic exercises to develop strength, endurance,and flexibility for 28 minutes including:  Recumbent bike 7 min (for LE endurance )   Standing hip abd 2x10 BLE (with BUE support and SBA) 2#  Standing hip ext 2x10 BLE (with BUE support and SBA)2#  Standing calf raises 3x10 (with BUE support and SBA)   Standing marches 2x10 (with BUE support and SBA) 2#      neuromuscular re-education activities to improve: Coordination, core/lumbar/scap stabilization Posture, balance, and Motor Control for 14 minutes. The following activities were included:  Seated no money w/scap retraction YTB 3" 3x10 " "not performed   Seated rows w/scap retraction GTB 3" 2x10  not performed  Seated hip adduction iso c/ ball w/TA brace 3" 2x10   Seated SB press downs 2x10 3s 3 ways not performed  Seated Med ball press 2x10 RMB  not performed   Balance clock    Tandem stance 3x30" B on airex w/(R) UE support only  Standing on airex with eyes closed and NBOS 3x30" w/(R) UE support only        Therapeutic activities for improved functional performance x16' :   Sit to stands 2x10 w/(R) UE support   Fwd Step ups L2 2x10 B   Lateral step ups L2 2x10 B  Step downs L1 1x10 B       Home Exercises Provided and Patient Education Provided     Education provided:   - HEP - educated pt to not perform NMR activities with TB due to skin tear on (L) UE - pt verbalized understanding    Written Home Exercises Provided: yes.   Betzy demonstrated good  understanding of the education provided.     See EMR under Patient Instructions for exercises provided prior visit.      Assessment     Deferred NMR activities requiring UE movement with TB or with ball today due to skin tear on (L )UE. Pt received VC for proper foot placement and to scoot forward in chair when performing sit to stands. Pt tolerated therapy session without any adverse reactions.   Betzy Is progressing  towards her goals.   Pt prognosis is Fair.     Pt will continue to benefit from skilled outpatient physical therapy to address the deficits listed in the problem list box on initial evaluation, provide pt/family education and to maximize pt's level of independence in the home and community environment.     Pt's spiritual, cultural and educational needs considered and pt agreeable to plan of care and goals.    Anticipated barriers to physical therapy:  chronicity of symptoms     Goals:   Short Term Goals: 2 weeks   Pt will be compliant with HEP to supplement PT with decreasing pain and improving functional mobility - Met      Long Term Goals: 6 weeks   Pt will improve FOTO score to at " least 62 in order to demo improved functional mobility- progressing not met   Pt will improve LE MMT scores by at least 1/3 grade where deficits noted in order to improve strength for functional tasks - progressing not met   Pt will perform TUG in </= 13 seconds in order to demo improve gait speed and decreased risk of falling - progressing not met   Pt will perform at least 5 sit to stands without UE support in order to demo improved ability to perform transfers - progressing not met     Plan     Continue per POC, progress as tolerated     Laurie Jernigan, PT

## 2025-03-24 NOTE — TELEPHONE ENCOUNTER
Called all # on file to schedule wound care appt with sanna ortega for arm wound. No answer to any number. Voicemail left with callback number to all.

## 2025-03-25 ENCOUNTER — PATIENT MESSAGE (OUTPATIENT)
Dept: INTERNAL MEDICINE | Facility: CLINIC | Age: 85
End: 2025-03-25
Payer: MEDICARE

## 2025-03-26 ENCOUNTER — CLINICAL SUPPORT (OUTPATIENT)
Dept: URGENT CARE | Facility: CLINIC | Age: 85
End: 2025-03-26
Payer: MEDICARE

## 2025-03-26 DIAGNOSIS — S41.112A SKIN TEAR OF LEFT UPPER ARM WITHOUT COMPLICATION, INITIAL ENCOUNTER: Primary | ICD-10-CM

## 2025-03-26 NOTE — PROGRESS NOTES
Wound care bandage change. Unwrapped old bandage wound looks same just not as red and very minimal blood loss. Applied Bactroban and nonstick Telfa pad and wrapped in Kerlix and secured with coban. Patient tolerated well.

## 2025-03-27 ENCOUNTER — OFFICE VISIT (OUTPATIENT)
Dept: INTERNAL MEDICINE | Facility: CLINIC | Age: 85
End: 2025-03-27
Payer: MEDICARE

## 2025-03-27 ENCOUNTER — PATIENT MESSAGE (OUTPATIENT)
Dept: INTERNAL MEDICINE | Facility: CLINIC | Age: 85
End: 2025-03-27

## 2025-03-27 ENCOUNTER — TELEPHONE (OUTPATIENT)
Dept: INTERNAL MEDICINE | Facility: CLINIC | Age: 85
End: 2025-03-27

## 2025-03-27 ENCOUNTER — CLINICAL SUPPORT (OUTPATIENT)
Dept: REHABILITATION | Facility: HOSPITAL | Age: 85
End: 2025-03-27
Payer: MEDICARE

## 2025-03-27 ENCOUNTER — LAB VISIT (OUTPATIENT)
Dept: LAB | Facility: HOSPITAL | Age: 85
End: 2025-03-27
Attending: INTERNAL MEDICINE
Payer: MEDICARE

## 2025-03-27 VITALS
HEART RATE: 57 BPM | WEIGHT: 101.19 LBS | SYSTOLIC BLOOD PRESSURE: 138 MMHG | OXYGEN SATURATION: 97 % | HEIGHT: 61 IN | BODY MASS INDEX: 19.11 KG/M2 | DIASTOLIC BLOOD PRESSURE: 80 MMHG | RESPIRATION RATE: 19 BRPM

## 2025-03-27 DIAGNOSIS — E87.6 HYPOKALEMIA: ICD-10-CM

## 2025-03-27 DIAGNOSIS — I10 HYPERTENSION, UNSPECIFIED TYPE: ICD-10-CM

## 2025-03-27 DIAGNOSIS — R42 LIGHTHEADEDNESS: ICD-10-CM

## 2025-03-27 DIAGNOSIS — J01.00 ACUTE NON-RECURRENT MAXILLARY SINUSITIS: ICD-10-CM

## 2025-03-27 DIAGNOSIS — R53.1 DECREASED STRENGTH: ICD-10-CM

## 2025-03-27 DIAGNOSIS — M54.50 LUMBAR PAIN: Primary | ICD-10-CM

## 2025-03-27 DIAGNOSIS — S41.112A SKIN TEAR OF LEFT UPPER ARM WITHOUT COMPLICATION, INITIAL ENCOUNTER: Primary | ICD-10-CM

## 2025-03-27 DIAGNOSIS — M81.0 OSTEOPOROSIS WITHOUT PATHOLOGICAL FRACTURE: ICD-10-CM

## 2025-03-27 LAB
ALBUMIN SERPL BCP-MCNC: 3.8 G/DL (ref 3.5–5.2)
ANION GAP (OHS): 7 MMOL/L (ref 8–16)
BUN SERPL-MCNC: 17 MG/DL (ref 8–23)
CALCIUM SERPL-MCNC: 10.4 MG/DL (ref 8.7–10.5)
CHLORIDE SERPL-SCNC: 106 MMOL/L (ref 95–110)
CO2 SERPL-SCNC: 29 MMOL/L (ref 23–29)
CREAT SERPL-MCNC: 0.8 MG/DL (ref 0.5–1.4)
GFR SERPLBLD CREATININE-BSD FMLA CKD-EPI: >60 ML/MIN/1.73/M2
GLUCOSE SERPL-MCNC: 99 MG/DL (ref 70–110)
PHOSPHATE SERPL-MCNC: 3.1 MG/DL (ref 2.7–4.5)
POTASSIUM SERPL-SCNC: 3.8 MMOL/L (ref 3.5–5.1)
SODIUM SERPL-SCNC: 142 MMOL/L (ref 136–145)

## 2025-03-27 PROCEDURE — 1125F AMNT PAIN NOTED PAIN PRSNT: CPT | Mod: CPTII,S$GLB,, | Performed by: INTERNAL MEDICINE

## 2025-03-27 PROCEDURE — 3288F FALL RISK ASSESSMENT DOCD: CPT | Mod: CPTII,S$GLB,, | Performed by: INTERNAL MEDICINE

## 2025-03-27 PROCEDURE — 36415 COLL VENOUS BLD VENIPUNCTURE: CPT

## 2025-03-27 PROCEDURE — 3075F SYST BP GE 130 - 139MM HG: CPT | Mod: CPTII,S$GLB,, | Performed by: INTERNAL MEDICINE

## 2025-03-27 PROCEDURE — 97112 NEUROMUSCULAR REEDUCATION: CPT

## 2025-03-27 PROCEDURE — 1159F MED LIST DOCD IN RCRD: CPT | Mod: CPTII,S$GLB,, | Performed by: INTERNAL MEDICINE

## 2025-03-27 PROCEDURE — 97110 THERAPEUTIC EXERCISES: CPT

## 2025-03-27 PROCEDURE — 82374 ASSAY BLOOD CARBON DIOXIDE: CPT

## 2025-03-27 PROCEDURE — 3079F DIAST BP 80-89 MM HG: CPT | Mod: CPTII,S$GLB,, | Performed by: INTERNAL MEDICINE

## 2025-03-27 PROCEDURE — G2211 COMPLEX E/M VISIT ADD ON: HCPCS | Mod: S$GLB,,, | Performed by: INTERNAL MEDICINE

## 2025-03-27 PROCEDURE — 99999 PR PBB SHADOW E&M-EST. PATIENT-LVL V: CPT | Mod: PBBFAC,,, | Performed by: INTERNAL MEDICINE

## 2025-03-27 PROCEDURE — 1101F PT FALLS ASSESS-DOCD LE1/YR: CPT | Mod: CPTII,S$GLB,, | Performed by: INTERNAL MEDICINE

## 2025-03-27 PROCEDURE — 99214 OFFICE O/P EST MOD 30 MIN: CPT | Mod: S$GLB,,, | Performed by: INTERNAL MEDICINE

## 2025-03-27 RX ORDER — METOPROLOL SUCCINATE 25 MG/1
12.5 TABLET, EXTENDED RELEASE ORAL DAILY
Qty: 45 TABLET | Refills: 0 | Status: SHIPPED | OUTPATIENT
Start: 2025-03-27

## 2025-03-27 RX ORDER — AMOXICILLIN 875 MG/1
875 TABLET, FILM COATED ORAL 2 TIMES DAILY
Qty: 14 TABLET | Refills: 0 | Status: SHIPPED | OUTPATIENT
Start: 2025-03-27

## 2025-03-27 NOTE — TELEPHONE ENCOUNTER
BMP was linked to the 10:20 AM appointment today but was unlinked, and the Renal Function Panel ordered by Dr. Real Wallis in December 2024 was linked and processed instead. The BMP was finalized but not taken. [facility] transferred me to [facility], but they couldnt identify who made the changes since the appointment was closed.

## 2025-03-27 NOTE — PROGRESS NOTES
Physical Therapy Daily Treatment Note/Discharge Summary     Name: Betzy Cooley  Clinic Number: [id number]    Therapy Diagnosis:   Encounter Diagnoses   Name Primary?    Lumbar pain Yes    Decreased strength      Physician: Grzegorz Damon DO    Visit Date: 3/27/2025    Physician Orders: PT Eval and Treat   Medical Diagnosis from Referral: S32.030G (ICD-10-CM) - Closed compression fracture of L3 lumbar vertebra with delayed healing, subsequent encounter   Evaluation Date: 1/15/2025  Authorization Period Expiration: 26  Plan of Care Expiration: 25, 3/28/25  Progress Note Due: 2/15/25  Visit # / Visits authorized: ,    FOTO: 1/ 3    Time In: 12:00 pm   Time Out: 1:00 pm   Total Billable Time: 60 minutes    Precautions: Standard, osteoporosis    Subjective     Pt reports: Pt stated that she is feeling so so today. Pt stated that she had PCP appointment this morning. Pt stated that she has appointment with wound care tomorrow. Pt stated that she has been going to [facility] past few days to get banadage changed on (L) UE and yesterday bandage was put on too tight and (L)UE swelled so went to PCP this morning and re bandaged (L) UE. Pt stated that she is ok continuing with HEP.    She was compliant with home exercise program.  Response to previous treatment: no adverse effects  Functional change: see assessment    Pain: 0/10 (B) back    Objective        Lumbar AROM deferred: pt stated that MD told her to wear back brace at all times     Hip Right   Left   Pain/Dysfunction with Movement     AROM MMT AROM MMT NT = not tested    Flexion WFL 4+/5 WFL 4+/5     Extension NT NT NT NT     Abduction WFL 4/5 WFL 4/5        Knee Right   Left   Pain/Dysfunction with Movement     AROM MMT AROM MMT     Flexion WFL 4+/5 WFL 4+/5     Extension WFL 4/5 WFL 4/5        Ankle Right   Left   Pain/Dysfunction with Movement     AROM MMT AROM MMT     Plantarflexion WFL 4/5 WFL 4/5     Dorsiflexion WFL 5/5 WFL 5/5        TU  "seconds w/rollator      30 Second Sit to Stand Test: 4 w/only (R) UE support       Subjective assessment, objective measurements, education, and therapeutic exercises to develop strength, endurance,and flexibility for 40 minutes including:  Recumbent bike 7 min (for LE endurance )   Standing hip abd 2x10 BLE  2#  Standing hip ext 2x10 BLE 2#  Standing calf raises 3x10   Standing marches 2x10  2#      neuromuscular re-education activities to improve: Coordination, core/lumbar/scap stabilization Posture, balance, and Motor Control for 13 minutes. The following activities were included:  Seated no money w/scap retraction YTB 3" 3x10 not performed   Seated rows w/scap retraction GTB 3" 2x10  not performed  Seated hip adduction iso c/ ball w/TA brace 3" 2x10   Seated SB press downs 2x10 3s 3 ways not performed  Seated Med ball press 2x10 RMB  not performed   Balance clock  not performed   Tandem stance 3x30" B on airex w/(R) UE support only  Standing on airex with eyes closed and NBOS 3x30" w/(R) UE support only        Therapeutic activities for improved functional performance x7' :   Sit to stands 2x10 w/(R) UE support not performed   Fwd Step ups L2 2x10 B   Lateral step ups L2 2x10 B  Step downs L1 1x10 B not performed       Home Exercises Provided and Patient Education Provided     Education provided:   - HEP - educated pt to wait to perform band activities with UE until wound on (L) UE heals. Also educated pt to try performing sit to stands with just (R) UE, but if she feels like she needs to use (B) UE then wait until wound on (L) UE heals. Pt verbalized understanding  - Recommended that pt continue to ambulate with rollator - pt verbalized understanding  Written Home Exercises Provided: yes.  Exercises were reviewed and Betzy was able to demonstrate them prior to the end of the session.  Betzy demonstrated good  understanding of the education provided.     See EMR under Patient Instructions for exercises " provided 3/27/2025.      Assessment     Updated measurements taken and pt demo improvements in (B) hip abduction MMT scores, (B) knee flexion MMT scores, and TUG score compared to measurements taken on initial evaluation. No improvement in measurements noted compared to updated POC. Pt has reached max rehab potential for present time and pt is appropriate to continue with HEP at this time.       Pt's spiritual, cultural and educational needs considered and pt agreeable to plan of care and goals.    Anticipated barriers to physical therapy:  chronicity of symptoms     Goals:   Short Term Goals: 2 weeks   Pt will be compliant with HEP to supplement PT with decreasing pain and improving functional mobility - Met      Long Term Goals: 6 weeks   Pt will improve FOTO score to at least 62 in order to demo improved functional mobility- not met   Pt will improve LE MMT scores by at least 1/3 grade where deficits noted in order to improve strength for functional tasks - not met   Pt will perform TUG in </= 13 seconds in order to demo improve gait speed and decreased risk of falling - not met   Pt will perform at least 5 sit to stands without UE support in order to demo improved ability to perform transfers -  not met     Plan     Discharge from PT, continue with HEP, follow up with MD norris Jernigan, PT

## 2025-03-27 NOTE — PROGRESS NOTES
HPI  History of Present Illness    CHIEF COMPLAINT:  Betzy presents for follow-up after a recent emergency room visit and to address ongoing sinus pain and a wound on her left arm.    HPI:  Betzy was recently evaluated at the emergency room due to feeling unwell. She underwent extensive testing including urinalysis, COVID-19 test, EKG, and tests for heart failure, blood count, and blood chemistry. The results were generally good, with no major abnormalities detected.    Betzy has bilateral sinus pain, more severe on the left side, for approximately 2 to 2.5 weeks. The pain is described as throbbing, with frequent nasal discharge on one side.    About a week ago, patient sustained a wound on her left upper arm or forearm due to a fall. She was initially evaluated at Urgent Care on the 20th, where Dr. Muller treated her and took a photograph of the wound. She was instructed to have the bandage changed daily and was referred to Wound Care. A follow-up visit occurred on the 24th. The wound has been dressed with Bactroban, a non-stick bandage, gauze, and an outer wrap. This morning, her arm was swollen, necessitating loosening of the bandage. She has been unable to shower for 1 week due to instructions to keep the wound dry.    Betzy's recent labs showed low potassium levels. She typically consumes a banana daily for potassium intake, but her diet has been irregular due to recent emergency room visits.    Betzy reports attending therapy, with today being her last day.    Betzy denies ear pain or teeth trouble on the affected side.  Thoroughly reviewed the labs and cardiovascular studies from the emergency department.    MEDICATIONS:  Betzy is on Metoprolol, taking half of a small pill daily to slow her heart rate. She is also consuming one banana daily for potassium intake.    MEDICAL HISTORY:  Betzy has a history of mild stiffness in her heart valves.    TEST RESULTS:  Betzy recently had  several tests completed during an emergency room visit. These include a blood count, blood chemistry, and a urine test to check for infection. Her potassium levels were found to be low in a recent test. She recently underwent an EKG, which showed stable results compared to one from 6 months prior. A COVID test was also performed during her recent emergency room visit.    IMAGING:  Betzy recently had an echocardiogram, which revealed a few minor findings. Her heart valves were found to be slightly stiff but not significantly regurgitant. All valves were scored as mild.      ROS:  ENT: -ear pain, +nasal congestion, +ear pressure  Musculoskeletal: +limb swelling, +upper extremity swelling  Skin: +wound  Head: +sinus pressure             Review of Systems   Constitutional:  Positive for activity change. Negative for unexpected weight change.   HENT:  Negative for hearing loss, rhinorrhea and trouble swallowing.    Eyes:  Negative for discharge and visual disturbance.   Respiratory:  Negative for chest tightness and wheezing.    Cardiovascular:  Negative for chest pain and palpitations.   Gastrointestinal:  Negative for blood in stool, constipation, diarrhea and vomiting.   Endocrine: Negative for polydipsia and polyuria.   Genitourinary:  Negative for difficulty urinating, dysuria, hematuria and menstrual problem.   Musculoskeletal:  Negative for arthralgias, joint swelling and neck pain.   Integumentary:  Positive for wound (left arm).   Neurological:  Negative for weakness and headaches.   Psychiatric/Behavioral:  Positive for confusion and dysphoric mood.        Past Medical History:   Diagnosis Date    Arthritis     Cataract     GERD (gastroesophageal reflux disease)     HEARING LOSS     Hypertension     Osteoporosis, postmenopausal     Squamous Cell Carcinoma 2007    right forearm    Urinary incontinence     rare mixed     Past Surgical History:   Procedure Laterality Date    ANGIOGRAM, CORONARY, WITH LEFT HEART  CATHETERIZATION Bilateral 05/17/2024    Procedure: Angiogram, Coronary, with Left Heart Cath;  Surgeon: Miguel Jacob MD;  Location: [facility] CATH LAB;  Service: Cardiology;  Laterality: Bilateral;    cardiac stents  04/01/2024    COLPOPEXY N/A 08/06/2019    Procedure: COLPOPEXY SSL FIXATION;  Surgeon: Alma Dorsey MD;  [facility];  Service: Urology;  Laterality: N/A;    CORONARY ANGIOGRAPHY Left 05/16/2024    Procedure: ANGIOGRAM, CORONARY ARTERY;  Surgeon: Miguel Jacob MD;  Location: [facility] CATH LAB;  Service: Cardiology;  Laterality: Left;    CYSTOSCOPY N/A 08/06/2019    Procedure: CYSTOSCOPY;  Surgeon: Alma Dorsey MD;  [facility];  Service: Urology;  Laterality: N/A;    ESOPHAGOGASTRODUODENOSCOPY N/A 04/01/2024    Procedure: EGD (ESOPHAGOGASTRODUODENOSCOPY);  Surgeon: Zbigniew Dougherty MD;  Location: [facility];  Service: Endoscopy;  Laterality: N/A;    FRACTURE SURGERY Left 2008    open radius/ulna fracture    HYSTERECTOMY      TANIA/ovaries remain--fibroids- in her late 30's / early 40's    INJECTION, SPINE, LUMBOSACRAL, TRANSFORAMINAL APPROACH Left 11/22/2024    Procedure: left L3/4 and L4/5 TFESI;  Surgeon: Denver Cordon MD;  Location: [facility] PAIN MANAGEMENT;  Service: Pain Management;  Laterality: Left;  20 mins ASA ok  Brilinta 5 days    IVUS, CORONARY  05/17/2024    Procedure: IVUS, Coronary;  Surgeon: Miguel Jacob MD;  Location: [facility] CATH LAB;  Service: Cardiology;;    STENT, DRUG ELUTING, MULTI VESSEL, CORONARY  05/17/2024    Procedure: Stent, Drug Eluting, Multi Vessel, Coronary;  Surgeon: Miguel Jacob MD;  Location: [facility] CATH LAB;  Service: Cardiology;;      Problem List[1]       Objective:      Physical Exam    Ears: Ears clear, no wax or infection.  Nose: Tenderness over left maxillary sinus.  Extremities: Left upper arm skin tear-see photo.  Mild tenderness.  No drainage.  Skin is not fully approximated or regrown.  Pulses intact     Skin: Wound on left upper arm drying up slowly.  Lungs: All normal.     Heart: Not tachycardic, positive murmur  Physical Exam      Assessment:       Problem List Items Addressed This Visit          Cardiac/Vascular    Hypertension    Relevant Medications    metoprolol succinate (TOPROL-XL) 25 MG 24 hr tablet       Renal/    Hypokalemia    Relevant Orders    Basic Metabolic Panel     Other Visit Diagnoses         Skin tear of left upper arm without complication, initial encounter    -  Primary      Lightheadedness        Relevant Orders    Basic Metabolic Panel      Acute non-recurrent maxillary sinusitis        Relevant Medications    amoxicillin (AMOXIL) 875 MG tablet            Plan:       Betzy was seen today for hospital follow up.    Diagnoses and all orders for this visit:    Skin tear of left upper arm without complication, initial encounter    Hypokalemia  -     Basic Metabolic Panel; Future    Lightheadedness  -     Basic Metabolic Panel; Future    Hypertension, unspecified type  -     metoprolol succinate (TOPROL-XL) 25 MG 24 hr tablet; Take 0.5 tablets (12.5 mg total) by mouth once daily.    Acute non-recurrent maxillary sinusitis  -     amoxicillin (AMOXIL) 875 MG tablet; Take 1 tablet (875 mg total) by mouth 2 (two) times daily.         Update me if the sinus pressure and pain or not improving   Consider adding potassium if lab has not improved   Keep wound care follow-up appointment.  Wound was re bandaged after inspection with photographs as above.    Review pending potassium    As this patient's PCP, I am actively managing and/or treating their chronic medical conditions including HTN and have been for at least 1 year. This includes, but is not limited to, medication management, coordination of care, documentation review from their specialists and labs/imaging review where pertinent.      Portions of this note may have been created with [employer] voice recognition software, discussed with  "patient and . Occasional "wrong-word" or "sound-a-like" substitutions may have occurred due to the inherent limitations of voice recognition software. Please, read the note carefully and recognize, using context, where substitutions have occurred.         [1]   Patient Active Problem List  Diagnosis    Hearing loss, sensorineural    Nuclear sclerosis - Both Eyes    Cortical senile cataract - Both Eyes    Choroidal nevus - Both Eyes    Palpitations    Encounter for screening colonoscopy    GERD (gastroesophageal reflux disease)    History of skin cancer    Tinnitus    Elevated blood pressure reading    Abnormal EKG    Posture imbalance    Shoulder weakness    Systolic murmur    Hypertension    Gastritis    Coronary artery disease with refractory angina pectoris    Elevated troponin    Anxiety    Weakness of right lower extremity    Pain of right lower extremity    Fall    Dyslipidemia    CAD S/P percutaneous coronary angioplasty    Closed compression fracture of body of L1 vertebra    Dysphagia    Hypokalemia    Chronic bilateral low back pain without sciatica    Decreased ROM of lumbar spine    Weakness of both lower extremities    Back pain    Closed compression fracture of L3 vertebra    Age-related osteoporosis with current pathological fracture    Body mass index (BMI) less than 19    Lumbar pain    Decreased strength    Postural dizziness with presyncope     "

## 2025-03-28 ENCOUNTER — OFFICE VISIT (OUTPATIENT)
Dept: WOUND CARE | Facility: CLINIC | Age: 85
End: 2025-03-28
Payer: MEDICARE

## 2025-03-28 DIAGNOSIS — S41.112D SKIN TEAR OF LEFT UPPER ARM WITHOUT COMPLICATION, SUBSEQUENT ENCOUNTER: Primary | ICD-10-CM

## 2025-03-28 PROCEDURE — 1159F MED LIST DOCD IN RCRD: CPT | Mod: CPTII,S$GLB,, | Performed by: CLINICAL NURSE SPECIALIST

## 2025-03-28 PROCEDURE — 99214 OFFICE O/P EST MOD 30 MIN: CPT | Mod: S$GLB,,, | Performed by: CLINICAL NURSE SPECIALIST

## 2025-03-28 PROCEDURE — 1160F RVW MEDS BY RX/DR IN RCRD: CPT | Mod: CPTII,S$GLB,, | Performed by: CLINICAL NURSE SPECIALIST

## 2025-03-28 PROCEDURE — 99999 PR PBB SHADOW E&M-EST. PATIENT-LVL III: CPT | Mod: PBBFAC,,, | Performed by: CLINICAL NURSE SPECIALIST

## 2025-03-28 NOTE — PROGRESS NOTES
Subjective     Patient ID: Betzy Cooley is a 84 y.o. female.    Chief Complaint: Wound Check (Skin tear)    This is new pt who comes with skin tear to left upper arm about one week ago now, has been going to her local urgent care for dressing changes prior to this visit . They are using bactroban and telfa. No signs of infection and no pain       Review of Systems   Constitutional: Negative.    Respiratory: Negative.     Cardiovascular: Negative.    Genitourinary: Negative.    Integumentary:  Positive for wound.          Objective     Physical Exam  Constitutional:       Comments: Frail    Pulmonary:      Effort: Pulmonary effort is normal. No respiratory distress.   Skin:     Comments: Healing skin tears x 2   Neurological:      Mental Status: She is alert.                 Two tears of upper left arm,  healing no signs of infection  will cover and protect for re epithelization   Cleansed with VASHE   Applied MEPILEX TRANSFER AG and secured with kerlex and flexinet   Can leave this special dressing for 10 days !!!  Discussed showering and how to keep dry   use Press and Seal   Reviewed instructions with her and her CG       Assessment and Plan     1. Skin tear of left upper arm without complication, subsequent encounter        Skin tear care as above with supplies given   Fu as needed  Return if not healed in 3 week s  I spent a total of 30 minutes on the day of the visit.  This includes face to face time and non-face to face time preparing to see the patient (eg, review of tests), obtaining and/or reviewing separately obtained history, documenting clinical information in the electronic or other health record, independently interpreting results and communicating results to the patient/family/caregiver, or care coordinator.             No follow-ups on file.

## 2025-03-31 ENCOUNTER — RESULTS FOLLOW-UP (OUTPATIENT)
Dept: ENDOCRINOLOGY | Facility: CLINIC | Age: 85
End: 2025-03-31

## 2025-04-03 ENCOUNTER — PATIENT MESSAGE (OUTPATIENT)
Dept: WOUND CARE | Facility: CLINIC | Age: 85
End: 2025-04-03
Payer: MEDICARE

## 2025-04-03 ENCOUNTER — HOSPITAL ENCOUNTER (OUTPATIENT)
Dept: RADIOLOGY | Facility: HOSPITAL | Age: 85
Discharge: HOME | End: 2025-04-03
Attending: STUDENT IN AN ORGANIZED HEALTH CARE EDUCATION/TRAINING PROGRAM
Payer: MEDICARE

## 2025-04-03 ENCOUNTER — OFFICE VISIT (OUTPATIENT)
Dept: NEUROSURGERY | Facility: CLINIC | Age: 85
End: 2025-04-03
Payer: MEDICARE

## 2025-04-03 DIAGNOSIS — S32.030G CLOSED COMPRESSION FRACTURE OF L3 LUMBAR VERTEBRA WITH DELAYED HEALING, SUBSEQUENT ENCOUNTER: ICD-10-CM

## 2025-04-03 DIAGNOSIS — S41.102S ARM WOUND, LEFT, SEQUELA: ICD-10-CM

## 2025-04-03 DIAGNOSIS — S32.030G CLOSED COMPRESSION FRACTURE OF L3 LUMBAR VERTEBRA WITH DELAYED HEALING, SUBSEQUENT ENCOUNTER: Primary | ICD-10-CM

## 2025-04-03 PROCEDURE — 99999 PR PBB SHADOW E&M-EST. PATIENT-LVL III: CPT | Mod: PBBFAC,,, | Performed by: STUDENT IN AN ORGANIZED HEALTH CARE EDUCATION/TRAINING PROGRAM

## 2025-04-03 PROCEDURE — 72080 X-RAY EXAM THORACOLMB 2/> VW: CPT | Mod: TC

## 2025-04-03 PROCEDURE — 3288F FALL RISK ASSESSMENT DOCD: CPT | Mod: CPTII,S$GLB,, | Performed by: STUDENT IN AN ORGANIZED HEALTH CARE EDUCATION/TRAINING PROGRAM

## 2025-04-03 PROCEDURE — 1126F AMNT PAIN NOTED NONE PRSNT: CPT | Mod: CPTII,S$GLB,, | Performed by: STUDENT IN AN ORGANIZED HEALTH CARE EDUCATION/TRAINING PROGRAM

## 2025-04-03 PROCEDURE — 72131 CT LUMBAR SPINE W/O DYE: CPT | Mod: 26,,, | Performed by: INTERNAL MEDICINE

## 2025-04-03 PROCEDURE — 72080 X-RAY EXAM THORACOLMB 2/> VW: CPT | Mod: 26,,, | Performed by: RADIOLOGY

## 2025-04-03 PROCEDURE — 99214 OFFICE O/P EST MOD 30 MIN: CPT | Mod: S$GLB,,, | Performed by: STUDENT IN AN ORGANIZED HEALTH CARE EDUCATION/TRAINING PROGRAM

## 2025-04-03 PROCEDURE — 1101F PT FALLS ASSESS-DOCD LE1/YR: CPT | Mod: CPTII,S$GLB,, | Performed by: STUDENT IN AN ORGANIZED HEALTH CARE EDUCATION/TRAINING PROGRAM

## 2025-04-03 PROCEDURE — 72131 CT LUMBAR SPINE W/O DYE: CPT | Mod: TC

## 2025-04-03 PROCEDURE — 1159F MED LIST DOCD IN RCRD: CPT | Mod: CPTII,S$GLB,, | Performed by: STUDENT IN AN ORGANIZED HEALTH CARE EDUCATION/TRAINING PROGRAM

## 2025-04-03 NOTE — PROGRESS NOTES
Neurosurgery  Established Patient    SUBJECTIVE:     History of Present Illness:  Betzy Cooley is an 83yo woman w/PMHx osteoporosis on Fosamax, CAD s/p multiple stent placement 05/2024 on Brilinta (held since Sunday for planned ALLISON), known L1 burst fracture managed non-surgically presenting with acute on chronic LBP with LLE radiculopathy. She denies any recent falls or injuries. The pain has worsened over the past 2 weeks. Denies saddle anesthesia, bladder or bowel dysfunction. Exam remains stable in comparison to her last clinic visit on 10/22. CT Lumbar spine demonstrates new acute L3 compression fracture with severe height loss and 0.5cm retropulsion.  This new fracture was seen back in late November.  She has since been dc.     Interval fu 1/7/25:  Pt presents in fu.  She is doing very well without back or leg pain.  She is on forteo for her osteoporosis and is working with PT.  She has been wearing her brace.  She underwent left L3/4, 4/5 TFESI on 11/27/24 which she believes has helped her leg pain.    Interval fu 4/3/25:  Pt presents in fu.  She is doing well and has just completed PT.  She has no back pain and only endorses muscle pain in her buttocks.  She has no radiating leg pain.  She is now walking with a cane at home which is an improvement.  She still uses her walker for long distances.  She is on forteo.  She did stumble and scrape her LUE recently and was seen at urgent care who placed a bandage on her arm.  She was told to remove the bandage in 10 days which will be this coming Saturday however the  and pt would like someone to evaluate the wound following dressing removal.    Review of patient's allergies indicates:   Allergen Reactions    Sulfa (sulfonamide antibiotics) Hives     Other reaction(s): Anaphylaxis    Itching     Shortness of breath       Current Medications[1]    Past Medical History:   Diagnosis Date    Arthritis     Cataract     GERD (gastroesophageal reflux disease)      HEARING LOSS     Hypertension     Osteoporosis, postmenopausal     Squamous Cell Carcinoma 2007    right forearm    Urinary incontinence     rare mixed     Past Surgical History:   Procedure Laterality Date    ANGIOGRAM, CORONARY, WITH LEFT HEART CATHETERIZATION Bilateral 05/17/2024    Procedure: Angiogram, Coronary, with Left Heart Cath;  Surgeon: Miguel Jacob MD;  Location: [facility] CATH LAB;  Service: Cardiology;  Laterality: Bilateral;    cardiac stents  04/01/2024    COLPOPEXY N/A 08/06/2019    Procedure: COLPOPEXY SSL FIXATION;  Surgeon: Alma Dorsey MD;  Location: [address];  Service: Urology;  Laterality: N/A;    CORONARY ANGIOGRAPHY Left 05/16/2024    Procedure: ANGIOGRAM, CORONARY ARTERY;  Surgeon: Miguel Jacob MD;  Location: [facility] CATH LAB;  Service: Cardiology;  Laterality: Left;    CYSTOSCOPY N/A 08/06/2019    Procedure: CYSTOSCOPY;  Surgeon: Alma Dorsey MD;  [facility];  Service: Urology;  Laterality: N/A;    ESOPHAGOGASTRODUODENOSCOPY N/A 04/01/2024    Procedure: EGD (ESOPHAGOGASTRODUODENOSCOPY);  Surgeon: Zbigniew Dougherty MD;  Location: [facility] ENDO;  Service: Endoscopy;  Laterality: N/A;    FRACTURE SURGERY Left 2008    open radius/ulna fracture    HYSTERECTOMY      TANIA/ovaries remain--fibroids- in her late 30's / early 40's    INJECTION, SPINE, LUMBOSACRAL, TRANSFORAMINAL APPROACH Left 11/22/2024    Procedure: left L3/4 and L4/5 TFESI;  Surgeon: Denver Cordon MD;  Location: [facility] PAIN MANAGEMENT;  Service: Pain Management;  Laterality: Left;  20 mins ASA ok  Brilinta 5 days    IVUS, CORONARY  05/17/2024    Procedure: IVUS, Coronary;  Surgeon: Miguel Jacob MD;  Location: [facility] CATH LAB;  Service: Cardiology;;    STENT, DRUG ELUTING, MULTI VESSEL, CORONARY  05/17/2024    Procedure: Stent, Drug Eluting, Multi Vessel, Coronary;  Surgeon: Miguel Jacob MD;  Location: [facility] CATH LAB;  Service: Cardiology;;     Family History       Problem  Relation (Age of Onset)    Arthritis Sister    COPD Mother, Father    Diabetes Sister    Heart disease Mother    Heart failure Mother    Macular degeneration Mother    No Known Problems Brother, Son, Daughter, Sister, Sister, Sister, Brother, Brother, Brother, Brother, Brother, Daughter, Son          Social History     Socioeconomic History    Marital status:    Tobacco Use    Smoking status: Never     Passive exposure: Never    Smokeless tobacco: Never   Substance and Sexual Activity    Alcohol use: Not Currently     Comment: 1-2 glasses wine weekly    Drug use: No    Sexual activity: Yes     Partners: Male     Birth control/protection: None     Social Drivers of Health     Financial Resource Strain: Low Risk  (3/17/2025)    Overall Financial Resource Strain (CARDIA)     Difficulty of Paying Living Expenses: Not very hard   Food Insecurity: No Food Insecurity (3/17/2025)    Hunger Vital Sign     Worried About Running Out of Food in the Last Year: Never true     Ran Out of Food in the Last Year: Never true   Transportation Needs: No Transportation Needs (11/25/2024)    PRAPARE - Transportation     Lack of Transportation (Medical): No     Lack of Transportation (Non-Medical): No   Physical Activity: Inactive (3/17/2025)    Exercise Vital Sign     Days of Exercise per Week: 0 days     Minutes of Exercise per Session: 0 min   Stress: No Stress Concern Present (3/17/2025)    [employer] - Occupational Stress Questionnaire     Feeling of Stress : Only a little   Recent Concern: Stress - Stress Concern Present (2/11/2025)    [employer] - Occupational Stress Questionnaire     Feeling of Stress : To some extent   Housing Stability: Low Risk  (3/17/2025)    Housing Stability Vital Sign     Unable to Pay for Housing in the Last Year: No     Homeless in the Last Year: No       Review of Systems  14 point ROS was negative    OBJECTIVE:     Vital Signs  Pain  Score: 0-No pain  There is no height or weight on file to calculate BMI.    Neurosurgery Physical Exam  Constitutional: She appears well-developed and well-nourished.      Eyes: Pupils are equal, round, and reactive to light.      Cardiovascular: Normal rate and regular rhythm.      Abdominal: Soft.      Psych/Behavior: She is alert. She is oriented to person, place, and time. She has a normal mood and affect.      Musculoskeletal: Gait is abnormal.        Neck: Range of motion is limited.        Back: Range of motion is limited.        Right Upper Extremities: Muscle strength is 5/5.        Left Upper Extremities: Muscle strength is 5/5.       Right Lower Extremities: Muscle strength is 5/5.        Left Lower Extremities: Muscle strength is 5/5.      Neurological:        Sensory: There is no sensory deficit in the trunk. There is no sensory deficit in the extremities.        DTRs: DTRs are DTRS NORMAL AND SYMMETRICnormal and symmetric.        Cranial nerves: Cranial nerve(s) II, III, IV, V, VI, VII, VIII, IX, X, XI and XII are intact.     Diagnostic Results:  T/L xrays: stable alignment from prior  CT L: stable L1, 3 burst fractures from prior.  Reviewed    ASSESSMENT/PLAN:     84 F with hx of osteoporosis on forteo and known L1,3 compression fractures. She is doing well without back pain or leg pain.  She has completed PT with improvement in her gait.  Her updated imaging looks stable from prior.  I discussed that she can wean her brace and increase activity as tolerated.  I will place a wound care referral so her LUE wound can be evaluated after the dressing removal.             [1]   Current Outpatient Medications   Medication Sig Dispense Refill    amoxicillin (AMOXIL) 875 MG tablet Take 1 tablet (875 mg total) by mouth 2 (two) times daily. 14 tablet 0    aspirin 81 MG Chew Take 81 mg by mouth once daily. Not chewable      atorvastatin (LIPITOR) 40 MG tablet Take 1 tablet (40 mg total) by mouth every evening.  360 tablet 0    calcium-vitamin D3 500 mg(1,250mg) -200 unit per tablet Take 1 tablet by mouth 2 (two) times daily with meals.      conjugated estrogens (PREMARIN) vaginal cream Place 0.5 g vaginally once daily. 30 g 1    diclofenac sodium (VOLTAREN ARTHRITIS PAIN) 1 % Gel Apply 2 g topically daily as needed (Pain).      ezetimibe (ZETIA) 10 mg tablet Take 1 tablet (10 mg total) by mouth once daily. 90 tablet 3    lanolin/mineral oil/petrolatum (ARTIFICIAL TEARS OPHT) Place 1 drop into both eyes once daily.      metoprolol succinate (TOPROL-XL) 25 MG 24 hr tablet Take 0.5 tablets (12.5 mg total) by mouth once daily. 45 tablet 0    multivitamin (THERAGRAN) per tablet Take 1 tablet by mouth once daily.      nitroGLYCERIN (NITROSTAT) 0.4 MG SL tablet Place 1 tablet (0.4 mg total) under the tongue every 5 (five) minutes as needed for Chest pain. 30 tablet 2    pantoprazole (PROTONIX) 40 MG tablet Take 1 tablet (40 mg total) by mouth 2 (two) times daily. 180 tablet 3    pilocarpine (SALAGEN) 5 MG Tab Take 1 tablet (5 mg total) by mouth once daily. 21 tablet 0    sodium chloride (SALINE MIST NASL) 1 spray by Each Nostril route daily as needed (Congestion).      teriparatide (FORTEO) 20 mcg/dose (600mcg/2.4mL) PnIj Inject 0.08 mLs (20 mcg total) into the skin once daily. 2.4 mL 11    ticagrelor (BRILINTA) 90 mg tablet Take 1 tablet (90 mg total) by mouth 2 (two) times daily. 60 tablet 11    traMADoL (ULTRAM) 50 mg tablet Take 1 tablet (50 mg total) by mouth every 8 (eight) hours as needed for Pain. 21 tablet 0    valsartan (DIOVAN) 80 MG tablet Take 1 tablet (80 mg total) by mouth once daily. 90 tablet 3    vitamin A-vitamin C-vit E-min Tab Take 1 tablet by mouth once daily.       No current facility-administered medications for this visit.     Facility-Administered Medications Ordered in Other Visits   Medication Dose Route Frequency Provider Last Rate Last Admin    lactated ringers infusion   Intravenous Continuous  Zbigniew Dougherty MD

## 2025-04-07 ENCOUNTER — OFFICE VISIT (OUTPATIENT)
Dept: WOUND CARE | Facility: CLINIC | Age: 85
End: 2025-04-07
Payer: MEDICARE

## 2025-04-07 DIAGNOSIS — S41.112D SKIN TEAR OF LEFT UPPER ARM WITHOUT COMPLICATION, SUBSEQUENT ENCOUNTER: Primary | ICD-10-CM

## 2025-04-07 PROCEDURE — 99999 PR PBB SHADOW E&M-EST. PATIENT-LVL III: CPT | Mod: PBBFAC,HCNC,, | Performed by: CLINICAL NURSE SPECIALIST

## 2025-04-07 PROCEDURE — 1160F RVW MEDS BY RX/DR IN RCRD: CPT | Mod: HCNC,CPTII,S$GLB, | Performed by: CLINICAL NURSE SPECIALIST

## 2025-04-07 PROCEDURE — 99214 OFFICE O/P EST MOD 30 MIN: CPT | Mod: HCNC,S$GLB,, | Performed by: CLINICAL NURSE SPECIALIST

## 2025-04-07 PROCEDURE — 1159F MED LIST DOCD IN RCRD: CPT | Mod: HCNC,CPTII,S$GLB, | Performed by: CLINICAL NURSE SPECIALIST

## 2025-04-14 ENCOUNTER — OFFICE VISIT (OUTPATIENT)
Dept: URGENT CARE | Facility: CLINIC | Age: 85
End: 2025-04-14
Payer: MEDICARE

## 2025-04-14 VITALS
OXYGEN SATURATION: 97 % | WEIGHT: 101 LBS | SYSTOLIC BLOOD PRESSURE: 183 MMHG | DIASTOLIC BLOOD PRESSURE: 85 MMHG | HEART RATE: 66 BPM | HEIGHT: 61 IN | RESPIRATION RATE: 16 BRPM | BODY MASS INDEX: 19.07 KG/M2 | TEMPERATURE: 97 F

## 2025-04-14 DIAGNOSIS — R00.2 PALPITATIONS: ICD-10-CM

## 2025-04-14 DIAGNOSIS — Z98.61 CAD S/P PERCUTANEOUS CORONARY ANGIOPLASTY: ICD-10-CM

## 2025-04-14 DIAGNOSIS — I10 PRIMARY HYPERTENSION: Primary | ICD-10-CM

## 2025-04-14 DIAGNOSIS — I21.4 NSTEMI (NON-ST ELEVATED MYOCARDIAL INFARCTION): ICD-10-CM

## 2025-04-14 DIAGNOSIS — I25.10 CAD S/P PERCUTANEOUS CORONARY ANGIOPLASTY: ICD-10-CM

## 2025-04-14 DIAGNOSIS — R07.89 MIDSTERNAL CHEST PAIN: Primary | ICD-10-CM

## 2025-04-14 LAB
CTP QC/QA: YES
CTP QC/QA: YES
POC MOLECULAR INFLUENZA A AGN: NEGATIVE
POC MOLECULAR INFLUENZA B AGN: NEGATIVE
SARS CORONAVIRUS 2 ANTIGEN: NEGATIVE

## 2025-04-14 PROCEDURE — 87811 SARS-COV-2 COVID19 W/OPTIC: CPT | Mod: QW,S$GLB,, | Performed by: NURSE PRACTITIONER

## 2025-04-14 PROCEDURE — 99214 OFFICE O/P EST MOD 30 MIN: CPT | Mod: S$GLB,,, | Performed by: NURSE PRACTITIONER

## 2025-04-14 PROCEDURE — 93010 ELECTROCARDIOGRAM REPORT: CPT | Mod: S$GLB,,, | Performed by: INTERNAL MEDICINE

## 2025-04-14 PROCEDURE — 93005 ELECTROCARDIOGRAM TRACING: CPT | Mod: S$GLB,,, | Performed by: NURSE PRACTITIONER

## 2025-04-14 PROCEDURE — 87502 INFLUENZA DNA AMP PROBE: CPT | Mod: QW,S$GLB,, | Performed by: NURSE PRACTITIONER

## 2025-04-14 NOTE — PROGRESS NOTES
"Subjective:      Patient ID: Betzy Cooley is a 84 y.o. female.    Vitals:  height is 5' 1" (1.549 m) and weight is 45.8 kg (101 lb). Her oral temperature is 97.3 °F (36.3 °C). Her blood pressure is 183/85 (abnormal) and her pulse is 66. Her respiration is 16 and oxygen saturation is 97%.     Chief Complaint: Breathing Problem    This is a 84 y.o. female who presents today with a chief complaint of midsternal chest pain, with shortness of breath at times x3 days. Reports chest congestion, but her  states she does not have a cough.  Denies fever.  Denies belching. Denies abdominal pain. Points to area of pain over the xyphoid process. Her  states she has been doing physical therapy, and she may have pulled muscles. He also states she has h/o anxiety and has anxiolytisc for as needed use.     Breathing Problem  She complains of chest tightness, difficulty breathing and shortness of breath. There is no cough, frequent throat clearing, hemoptysis, hoarse voice, sputum production or wheezing. This is a new problem. The current episode started in the past 7 days. The problem occurs constantly. The problem has been unchanged. Associated symptoms include chest pain. Pertinent negatives include no appetite change, ear congestion, ear pain, fever, headaches, heartburn (has h/o hearburn and on nightly antacid), malaise/fatigue, myalgias, nasal congestion, orthopnea, PND, postnasal drip, rhinorrhea, sneezing, sore throat, sweats, trouble swallowing or weight loss. Her symptoms are aggravated by nothing. Her symptoms are alleviated by nothing. She reports no improvement on treatment.       Constitution: Negative for appetite change, fatigue and fever.   HENT:  Positive for congestion. Negative for ear pain, postnasal drip, sore throat and trouble swallowing.    Cardiovascular:  Positive for chest pain. Negative for palpitations.   Respiratory:  Positive for shortness of breath. Negative for cough, sputum " production, bloody sputum and wheezing.    Gastrointestinal:  Negative for heartburn (has h/o hearburn and on nightly antacid).   Musculoskeletal:  Negative for muscle ache.   Allergic/Immunologic: Negative for sneezing.   Neurological:  Negative for headaches.      Objective:     Physical Exam   Constitutional:  Non-toxic appearance. She does not appear ill. No distress.   HENT:   Head: Normocephalic.   Nose: Nose normal.   Mouth/Throat: Mucous membranes are moist.   Neck: Neck supple. No neck rigidity present.   Cardiovascular: Normal rate and regular rhythm.   Pulmonary/Chest: Effort normal and breath sounds normal. No stridor. No respiratory distress. She has no wheezes. She has no rhonchi. She has no rales. She exhibits tenderness (over xyphoid process to palpation).   Abdominal: Normal appearance. She exhibits no distension. Soft. flat abdomen There is no abdominal tenderness. There is no rebound and no guarding.   Musculoskeletal: Normal range of motion.         General: Normal range of motion.   Neurological: She is alert and at baseline.   Skin: Skin is warm, dry and not diaphoretic.   Psychiatric: Mood normal.   Nursing note and vitals reviewed.    Results for orders placed or performed in visit on 04/14/25   SARS Coronavirus 2 Antigen, POCT Manual Read    Collection Time: 04/14/25  6:42 PM   Result Value Ref Range    SARS Coronavirus 2 Antigen Negative Negative, Presumptive Negative     Acceptable Yes    POCT Influenza A/B MOLECULAR    Collection Time: 04/14/25  6:42 PM   Result Value Ref Range    POC Molecular Influenza A Ag Negative Negative    POC Molecular Influenza B Ag Negative Negative     Acceptable Yes       EKG: NSR with left axis deviation.    Assessment:     1. Midsternal chest pain        Plan:   Considered CXR, but pt has clear and equal BBS, and is moving air well throughout all lung fields. Sa02 is very good. Pt appears stable and is no obvious distress.    Strict ER precautions given    Patient Instructions   Seek ER care with ongoing or worsening symptoms      Midsternal chest pain  -     SARS Coronavirus 2 Antigen, POCT Manual Read  -     POCT Influenza A/B MOLECULAR  -     EKG 12-lead; Future

## 2025-04-15 LAB
OHS QRS DURATION: 90 MS
OHS QTC CALCULATION: 422 MS

## 2025-04-16 ENCOUNTER — OFFICE VISIT (OUTPATIENT)
Dept: WOUND CARE | Facility: CLINIC | Age: 85
End: 2025-04-16
Payer: MEDICARE

## 2025-04-16 DIAGNOSIS — S41.112D SKIN TEAR OF LEFT UPPER ARM WITHOUT COMPLICATION, SUBSEQUENT ENCOUNTER: Primary | ICD-10-CM

## 2025-04-16 PROCEDURE — 99999 PR PBB SHADOW E&M-EST. PATIENT-LVL III: CPT | Mod: PBBFAC,HCNC,, | Performed by: CLINICAL NURSE SPECIALIST

## 2025-04-16 PROCEDURE — 1160F RVW MEDS BY RX/DR IN RCRD: CPT | Mod: HCNC,CPTII,S$GLB, | Performed by: CLINICAL NURSE SPECIALIST

## 2025-04-16 PROCEDURE — 1159F MED LIST DOCD IN RCRD: CPT | Mod: HCNC,CPTII,S$GLB, | Performed by: CLINICAL NURSE SPECIALIST

## 2025-04-16 PROCEDURE — 99213 OFFICE O/P EST LOW 20 MIN: CPT | Mod: HCNC,S$GLB,, | Performed by: CLINICAL NURSE SPECIALIST

## 2025-04-16 NOTE — PROGRESS NOTES
Subjective     Patient ID: Betzy Cooley is a 84 y.o. female.    Chief Complaint: Wound Check and Wound Care    This is a fu visit for eval of skin tear , when last seen by me I placed a silver dressing that can remain up to 2 weeks. Spouse feels more comfortable with my changing and eval , No signs of infection and no pain  Pt comes walking slowly with walker       Review of Systems   Constitutional: Negative.    Respiratory: Negative.     Cardiovascular: Negative.    Genitourinary: Negative.    Integumentary:  Positive for wound.          Objective     Physical Exam  Constitutional:       Comments: Frail    Pulmonary:      Effort: Pulmonary effort is normal. No respiratory distress.   Skin:     Comments: Healing skin tears x 2   Neurological:      Mental Status: She is alert.           4/16  the upper tear is not healed and some of thin skin unroofed but lower tear is healed  I applied DUODERM today and pt to return in week  4/7/25 Partial thickness skin loss is healing,      no signs of infection , some non viable tissue   removed, cleansed well with VASHE   and placed another Mepilex silver transfer   for another 10 days . Secured with tubigrip      Two tears of upper left arm,  healing no signs of infection  will cover and protect for re epithelization   Cleansed with VASHE   Applied MEPILEX TRANSFER AG and secured with kerlex and flexinet   Can leave this special dressing for 10 days !!!  Discussed showering and how to keep dry   use Press and Seal   Reviewed instructions with her and her CG       Assessment and Plan     1. Skin tear of left upper arm without complication, subsequent encounter        Skin tear care as above with extra supplies given if needed before next appt  FU in one week    I spent a total of 20 minutes on the day of the visit.  This includes face to face time and non-face to face time preparing to see the patient (eg, review of tests), obtaining and/or reviewing separately obtained  history, documenting clinical information in the electronic or other health record, independently interpreting results and communicating results to the patient/family/caregiver, or care coordinator.                   No follow-ups on file.

## 2025-04-23 ENCOUNTER — OFFICE VISIT (OUTPATIENT)
Dept: WOUND CARE | Facility: CLINIC | Age: 85
End: 2025-04-23
Payer: MEDICARE

## 2025-04-23 DIAGNOSIS — S41.112D SKIN TEAR OF LEFT UPPER ARM WITHOUT COMPLICATION, SUBSEQUENT ENCOUNTER: Primary | ICD-10-CM

## 2025-04-23 PROCEDURE — 99999 PR PBB SHADOW E&M-EST. PATIENT-LVL III: CPT | Mod: PBBFAC,HCNC,, | Performed by: CLINICAL NURSE SPECIALIST

## 2025-04-23 PROCEDURE — 99213 OFFICE O/P EST LOW 20 MIN: CPT | Mod: HCNC,S$GLB,, | Performed by: CLINICAL NURSE SPECIALIST

## 2025-04-23 PROCEDURE — 1160F RVW MEDS BY RX/DR IN RCRD: CPT | Mod: HCNC,CPTII,S$GLB, | Performed by: CLINICAL NURSE SPECIALIST

## 2025-04-23 PROCEDURE — 1159F MED LIST DOCD IN RCRD: CPT | Mod: HCNC,CPTII,S$GLB, | Performed by: CLINICAL NURSE SPECIALIST

## 2025-04-23 NOTE — PROGRESS NOTES
Subjective     Patient ID: Betzy Cooley is a 84 y.o. female.    Chief Complaint: Wound Care    This is a fu visit for eval of skin tear , when last seen by me I placed duoderm that did not holdup well this week so will resume silver dressing  Spouse feels more comfortable with my changing and eval , No signs of infection and no pain  Pt comes walking slowly with walker       Review of Systems   Constitutional: Negative.    Respiratory: Negative.     Cardiovascular: Negative.    Genitourinary: Negative.    Integumentary:  Positive for wound.          Objective     Physical Exam  Constitutional:       Comments: Frail    Pulmonary:      Effort: Pulmonary effort is normal. No respiratory distress.   Skin:     Comments: Healing skin tears x 2   Neurological:      Mental Status: She is alert.                   4/23 4/7/25 Partial thickness skin loss is healing,      no signs of infection , some non viable tissue   removed, cleansed well with VASHE   and placed another Mepilex silver transfer   for another 10 days . Secured with tubigrip      Two tears of upper left arm,  healing no signs of infection  will cover and protect for re epithelization   Cleansed with VASHE   Applied MEPILEX TRANSFER AG and secured with kerlex and flexinet   Can leave this special dressing for 10 days !!!  Discussed showering and how to keep dry   use Press and Seal   Reviewed instructions with her and her CG       Assessment and Plan     1. Skin tear of left upper arm without complication, subsequent encounter        Skin tear care Applied MEPILEX TRANSFER AG   FU in 10 days   I spent a total of 20 minutes on the day of the visit.  This includes face to face time and non-face to face time preparing to see the patient (eg, review of tests), obtaining and/or reviewing separately obtained history, documenting clinical information in the electronic or other health record, independently interpreting results and communicating results to the  patient/family/caregiver, or care coordinator.                         No follow-ups on file.

## 2025-05-02 ENCOUNTER — OFFICE VISIT (OUTPATIENT)
Dept: WOUND CARE | Facility: CLINIC | Age: 85
End: 2025-05-02
Payer: MEDICARE

## 2025-05-02 DIAGNOSIS — S41.112D SKIN TEAR OF LEFT UPPER ARM WITHOUT COMPLICATION, SUBSEQUENT ENCOUNTER: Primary | ICD-10-CM

## 2025-05-02 PROCEDURE — 99999 PR PBB SHADOW E&M-EST. PATIENT-LVL III: CPT | Mod: PBBFAC,HCNC,, | Performed by: CLINICAL NURSE SPECIALIST

## 2025-05-02 NOTE — PROGRESS NOTES
Subjective     Patient ID: Betzy Cooley is a 84 y.o. female.    Chief Complaint: Wound Check    This is a fu visit for eval of skin tear ,  Wound should be healed today and I find it has epithelized so no further wound dressing required.  Pt comes walking slowly with walker and spouse      Review of Systems   Constitutional: Negative.    Respiratory: Negative.     Cardiovascular: Negative.    Genitourinary: Negative.    Integumentary:  Positive for wound.          Objective     Physical Exam  Constitutional:       Comments: Frail    Pulmonary:      Effort: Pulmonary effort is normal. No respiratory distress.   Skin:     Comments: Healing skin tears x 2   Neurological:      Mental Status: She is alert.       5/2/25 Healed but no photo              4/23 4/7/25 Partial thickness skin loss is healing,      no signs of infection , some non viable tissue   removed, cleansed well with VASHE   and placed another Mepilex silver transfer   for another 10 days . Secured with tubigrip      Two tears of upper left arm,  healing no signs of infection  will cover and protect for re epithelization   Cleansed with VASHE   Applied MEPILEX TRANSFER AG and secured with kerlex and flexinet   Can leave this special dressing for 10 days !!!  Discussed showering and how to keep dry   use Press and Seal   Reviewed instructions with her and her CG       Assessment and Plan     1. Skin tear of left upper arm without complication, subsequent encounter        WOUND IS HEALED  NO INTERVENTION NEEDED   I spent a total of 10 minutes on the day of the visit.  This includes face to face time and non-face to face time preparing to see the patient (eg, review of tests), obtaining and/or reviewing separately obtained history, documenting clinical information in the electronic or other health record, independently interpreting results and communicating results to the patient/family/caregiver, or care coordinator.                             No  follow-ups on file.

## 2025-05-06 ENCOUNTER — TELEPHONE (OUTPATIENT)
Dept: PAIN MEDICINE | Facility: CLINIC | Age: 85
End: 2025-05-06

## 2025-05-06 ENCOUNTER — OFFICE VISIT (OUTPATIENT)
Dept: PAIN MEDICINE | Facility: CLINIC | Age: 85
End: 2025-05-06
Payer: MEDICARE

## 2025-05-06 VITALS
HEIGHT: 61 IN | DIASTOLIC BLOOD PRESSURE: 94 MMHG | WEIGHT: 101.19 LBS | BODY MASS INDEX: 19.11 KG/M2 | HEART RATE: 62 BPM | SYSTOLIC BLOOD PRESSURE: 186 MMHG

## 2025-05-06 DIAGNOSIS — M54.16 LUMBAR RADICULOPATHY: Primary | ICD-10-CM

## 2025-05-06 DIAGNOSIS — M80.00XA AGE-RELATED OSTEOPOROSIS WITH CURRENT PATHOLOGICAL FRACTURE, INITIAL ENCOUNTER: ICD-10-CM

## 2025-05-06 DIAGNOSIS — M51.362 DEGENERATION OF INTERVERTEBRAL DISC OF LUMBAR REGION WITH DISCOGENIC BACK PAIN AND LOWER EXTREMITY PAIN: Primary | ICD-10-CM

## 2025-05-06 PROCEDURE — 99999 PR PBB SHADOW E&M-EST. PATIENT-LVL IV: CPT | Mod: PBBFAC,,, | Performed by: STUDENT IN AN ORGANIZED HEALTH CARE EDUCATION/TRAINING PROGRAM

## 2025-05-06 PROCEDURE — 1159F MED LIST DOCD IN RCRD: CPT | Mod: CPTII,S$GLB,, | Performed by: STUDENT IN AN ORGANIZED HEALTH CARE EDUCATION/TRAINING PROGRAM

## 2025-05-06 PROCEDURE — 3077F SYST BP >= 140 MM HG: CPT | Mod: CPTII,S$GLB,, | Performed by: STUDENT IN AN ORGANIZED HEALTH CARE EDUCATION/TRAINING PROGRAM

## 2025-05-06 PROCEDURE — 1125F AMNT PAIN NOTED PAIN PRSNT: CPT | Mod: CPTII,S$GLB,, | Performed by: STUDENT IN AN ORGANIZED HEALTH CARE EDUCATION/TRAINING PROGRAM

## 2025-05-06 PROCEDURE — G2211 COMPLEX E/M VISIT ADD ON: HCPCS | Mod: S$GLB,,, | Performed by: STUDENT IN AN ORGANIZED HEALTH CARE EDUCATION/TRAINING PROGRAM

## 2025-05-06 PROCEDURE — 1160F RVW MEDS BY RX/DR IN RCRD: CPT | Mod: CPTII,S$GLB,, | Performed by: STUDENT IN AN ORGANIZED HEALTH CARE EDUCATION/TRAINING PROGRAM

## 2025-05-06 PROCEDURE — 3288F FALL RISK ASSESSMENT DOCD: CPT | Mod: CPTII,S$GLB,, | Performed by: STUDENT IN AN ORGANIZED HEALTH CARE EDUCATION/TRAINING PROGRAM

## 2025-05-06 PROCEDURE — 1101F PT FALLS ASSESS-DOCD LE1/YR: CPT | Mod: CPTII,S$GLB,, | Performed by: STUDENT IN AN ORGANIZED HEALTH CARE EDUCATION/TRAINING PROGRAM

## 2025-05-06 PROCEDURE — 3080F DIAST BP >= 90 MM HG: CPT | Mod: CPTII,S$GLB,, | Performed by: STUDENT IN AN ORGANIZED HEALTH CARE EDUCATION/TRAINING PROGRAM

## 2025-05-06 PROCEDURE — 99214 OFFICE O/P EST MOD 30 MIN: CPT | Mod: S$GLB,,, | Performed by: STUDENT IN AN ORGANIZED HEALTH CARE EDUCATION/TRAINING PROGRAM

## 2025-05-06 NOTE — H&P (VIEW-ONLY)
Chronic patient Established Note (Follow up visit)      SUBJECTIVE:  INTERVAL HISTORY 5/6/2025:  Ms. Cooley returns for lower back pain. Current Pain level is 5/10.  She reports that her pain is on both sides of her back. She reports she had excellent relief with her last epidural and is interested in repeating this again.  She did have an CT scan which demonstrates further compression at L3.      INTERVAL HISTORY 12/17/2024:  Ms. Cooley returns for lower back pain. Current Pain level is 1/10.  She reports that the pain she was having down the leg is completely gone.  She does report that she has persistent pain in the back and buttocks.  She reports she is working with home health physical therapy and she feels soreness after her sessions.  She feels this is helping a great deal.  She feels the pain is more of a discomfort.  She feels her mobility has also improved.  She was also started on forteo injections for improving her bone quality.     INTERVAL HISTORY 10/29/2024:  Ms. Cooley returns for lower back pain with left leg radiation. Current Pain level is 3/10.  She reports that she has been having good days and bad days.  She has been working with physical therapy and she feels this has been feeling overall improvement in her pain. She does feel that after her therapy, her pain does get worse initially, however she has been taking her Norco 5-325mg as needed, which has also been helping.    INTERVAL HISTORY 10/1/2024:  Betzy Cooley presents to the clinic for a follow-up appointment for chronic pain. Current pain intensity is 10/10.  Since the last visit, Betzy Cooley states:  she reports that she underwent a CT scan which significantly worsened in her pain.  She feels her pain is in her lower back and radiates down her left leg.  She reports that since her last visit, she stopped therapy and feels her pain has since worsened.     PRIOR HISTORY:   Betzy Cooley presents to the clinic for the evaluation of  lower back pain and left leg pain. The pain started about 4 months ago following a fall on the right side and symptoms have been improving.The pain is located in the lower back area and radiates to the left leg (toward the foot).  The pain is described as numbing and tight band and is rated as 0/10. The pain is rated with a score of  0/10 on the BEST day and a score of 4/10 on the WORST day.  Symptoms interfere with daily activity. The pain is exacerbated by Walking.  The pain is mitigated by rest.     Patient denies urinary incontinence and bowel incontinence. She and her  report the fall occurred after she had stents placed.  She does report her pain has progressively improved. She feels the LSO brace doesn't really help her pain, but she doesn't feel the pain severely limits her life.    Physical Therapy/Home Exercise: no      Pain Disability Index Review:      5/6/2025     1:48 PM 12/17/2024     2:34 PM 10/29/2024    10:41 AM   Last 3 PDI Scores   Pain Disability Index (PDI) 56 0 28       Pain Medications:   - tylenol (helps a great deal)     report:  Reviewed and consistent with medication use as prescribed.    Pain Procedures:   11/22/2024 - Left L3/4 and L4/5 TFESI    Imaging:   MRI LUMBAR SPINE WITHOUT CONTRAST     CLINICAL HISTORY:  Spine fracture, lumbar, pathological;     TECHNIQUE:  Multiplanar, multisequence MR images were acquired from the thoracolumbar junction to the sacrum without contrast.     COMPARISON:  CTA 08/21/2024     FINDINGS:  Alignment: Grade 1 anterolisthesis L4 on L5.  Retropulsion of L1 vertebral body.     Vertebrae: Compression fracture of the L1 vertebral body with marked height loss and 6 mm retropulsion.  Essentially complete height loss of the ventral and central aspect of the vertebral body.  Patchy edema-like signal within the L1 vertebra would in keeping with recent fracture.  No large paraspinous hematoma.  No definite intraspinal hemorrhage.  Elsewhere in the lumbar  spine, centrally normal marrow signal for age; no endplate edema-like signal.     Discs: Disc desiccation and height loss at L2-L3 and L3-L4.     Cord: Conus terminates at L1-L2 and appears unremarkable.  Cauda equina appears unremarkable.     Degenerative findings:     *T12-L1: Compression fracture of the L1 vertebral body with retropulsion resulting in no more than moderate bilateral neural foraminal narrowing and moderate spinal canal stenosis.  *L1-L2: Compression fracture of the L1 vertebral body with retropulsion resulting in moderate bilateral neural foraminal narrowing and mild spinal canal stenosis.  Minimal AP thecal sac diameter approximately 6.9 mm at level of maximum compression.  *L2-L3: Facet arthropathy and circumferential disc bulge resulting in mild bilateral neural foraminal narrowing and mild spinal canal stenosis.  *L3-L4: Facet arthropathy and ligamentum flavum buckling resulting in moderate bilateral neural foraminal narrowing and mild spinal canal stenosis.  *L4-L5: Grade 1 anterolisthesis, facet arthropathy and ligamentum flavum buckling with unroofing of the intervertebral disc resulting in severe bilateral subarticular recess stenosis (with encroachment and possible contact of traversing L5 nerve roots), mild left neural foraminal narrowing and moderate spinal canal stenosis.  *L5-S1: Facet arthropathy resulting in mild bilateral neural foraminal narrowing.  No spinal canal stenosis.  Paraspinal muscles & soft tissues: Unremarkable.     Impression:     L1 vertebral body compression fracture resulting in vertebra plana configuration with 6 mm retropulsion.  Finding results in no more than moderate spinal canal stenosis at T12-L1 and moderate bilateral neural foraminal narrowing at L1-L2.     Multilevel lumbar spondylosis, as characterized above, notable for moderate bilateral neural foraminal narrowing at L3-L4, severe bilateral subarticular recess stenosis at L4-L5 and moderate spinal  canal stenosis at L4-L5.     Additional details, as provided in the body of report.     Electronically signed by resident: Malcolm Vazquez  Date:                                            08/22/2024  Time:                                           06:19     Electronically signed by:Kenneth Dillon  Date:                                            08/22/2024  Time:                                           08:22                XR LUMBAR SPINE LATERAL SUPINE AND LATERAL UPRIGHT     CLINICAL HISTORY:  L1 burst fracture;     TECHNIQUE:  Two views of the lumbar spine were obtained, with lateral views performed weight-bearing and non weight-bearing.     COMPARISON:  Reference is made to the CT examination of 08/21/2024 and to an MR exam of 08/22/2024 and a prior conventional radiographic exam of the lumbar spine dated 05/09/2024.     FINDINGS:  Severe compression deformity with marked loss of height involving the L1 vertebral body is again observed, unchanged from the 08/21/2024 CT examination but representing a significant interval loss of height of L1 since the older conventional radiograph of 05/29/2024.  There is a modest degree of retropulsion of the posterior aspect of this markedly compressed L1 vertebral body noted, as was optimally demonstrated on the prior CT and MR examinations referenced above.  9 mm of anterolisthesis of L4 in relation to L5 is appreciated, as is a lesser degree of anterolisthesis of L5 in relation to S1, secondary to facet arthropathy at these levels.  Alignment elsewhere in the lumbar and visualized lower thoracic spine appears unremarkable.  Aside from L1, the visualized vertebral body heights are normally maintained without compression deformity.  Disc narrowing is seen at every level from L1-2 through L4-5.  Vertebral endplates appear well defined.  No osseous destructive process.     Impression:     1. Compression deformity with severe loss of height of the L1 vertebral body with a  modest degree of retropulsion of the posterior aspect of this compressed vertebral body.  The appearance is similar to the recent CT and MR examinations, although it does represent a detrimental change since of earlier conventional radiograph of 05/29/2024.  2. Anterolisthesis of L4 in relation to L5 and L5 in relation to S1, secondary to facet arthropathy at each level.  3. No alignment change between the supine and weight-bearing images is observed.        Electronically signed by:Bravo Medina MD  Date:                                            08/22/2024  Time:                                           09:32    Past Medical History:   Diagnosis Date    Arthritis     Cataract     GERD (gastroesophageal reflux disease)     HEARING LOSS     Hypertension     Osteoporosis, postmenopausal     Squamous Cell Carcinoma 2007    right forearm    Urinary incontinence     rare mixed     Past Surgical History:   Procedure Laterality Date    ANGIOGRAM, CORONARY, WITH LEFT HEART CATHETERIZATION Bilateral 05/17/2024    Procedure: Angiogram, Coronary, with Left Heart Cath;  Surgeon: Miguel Jacob MD;  Location: [facility] CATH LAB;  Service: Cardiology;  Laterality: Bilateral;    cardiac stents  04/01/2024    COLPOPEXY N/A 08/06/2019    Procedure: COLPOPEXY SSL FIXATION;  Surgeon: Alma Dorsey MD;  [facility];  Service: Urology;  Laterality: N/A;    CORONARY ANGIOGRAPHY Left 05/16/2024    Procedure: ANGIOGRAM, CORONARY ARTERY;  Surgeon: Miguel Jacob MD;  Location: [facility] CATH LAB;  Service: Cardiology;  Laterality: Left;    CYSTOSCOPY N/A 08/06/2019    Procedure: CYSTOSCOPY;  Surgeon: Alma Dorsey MD;  [facility];  Service: Urology;  Laterality: N/A;    ESOPHAGOGASTRODUODENOSCOPY N/A 04/01/2024    Procedure: EGD (ESOPHAGOGASTRODUODENOSCOPY);  Surgeon: Zbigniew Dougherty MD;  Location: [facility];  Service: Endoscopy;  Laterality: N/A;    FRACTURE SURGERY Left 2008    open  radius/ulna fracture    HYSTERECTOMY      TANIA/ovaries remain--fibroids- in her late 30's / early 40's    INJECTION, SPINE, LUMBOSACRAL, TRANSFORAMINAL APPROACH Left 11/22/2024    Procedure: left L3/4 and L4/5 TFESI;  Surgeon: Denver Cordon MD;  Location: [facility] PAIN MANAGEMENT;  Service: Pain Management;  Laterality: Left;  20 mins ASA ok  Brilinta 5 days    IVUS, CORONARY  05/17/2024    Procedure: IVUS, Coronary;  Surgeon: Miguel Jacob MD;  Location: [facility] CATH LAB;  Service: Cardiology;;    STENT, DRUG ELUTING, MULTI VESSEL, CORONARY  05/17/2024    Procedure: Stent, Drug Eluting, Multi Vessel, Coronary;  Surgeon: Miguel Jacob MD;  Location: [facility] CATH LAB;  Service: Cardiology;;     Social History     Socioeconomic History    Marital status:    Tobacco Use    Smoking status: Never     Passive exposure: Never    Smokeless tobacco: Never   Substance and Sexual Activity    Alcohol use: Not Currently     Comment: 1-2 glasses wine weekly    Drug use: No    Sexual activity: Yes     Partners: Male     Birth control/protection: None     Social Drivers of Health     Financial Resource Strain: Low Risk  (3/17/2025)    Overall Financial Resource Strain (CARDIA)     Difficulty of Paying Living Expenses: Not very hard   Food Insecurity: No Food Insecurity (3/17/2025)    Hunger Vital Sign     Worried About Running Out of Food in the Last Year: Never true     Ran Out of Food in the Last Year: Never true   Transportation Needs: No Transportation Needs (11/25/2024)    PRAPARE - Transportation     Lack of Transportation (Medical): No     Lack of Transportation (Non-Medical): No   Physical Activity: Inactive (3/17/2025)    Exercise Vital Sign     Days of Exercise per Week: 0 days     Minutes of Exercise per Session: 0 min   Stress: No Stress Concern Present (3/17/2025)    [employer] [place] [employer] - Occupational Stress Questionnaire     Feeling of Stress : Only a little   Recent Concern: Stress -  Stress Concern Present (2/11/2025)    [employer] [place] [employer] - Occupational Stress Questionnaire     Feeling of Stress : To some extent   Housing Stability: Low Risk  (3/17/2025)    Housing Stability Vital Sign     Unable to Pay for Housing in the Last Year: No     Homeless in the Last Year: No     Family History   Problem Relation Name Age of Onset    Heart failure Mother copd     Macular degeneration Mother copd     Heart disease Mother copd     COPD Mother copd     Diabetes Sister      Arthritis Sister          rheumatoid arthritis    COPD Father      No Known Problems Brother      No Known Problems Son Saad     No Known Problems Daughter Jordyn     No Known Problems Sister      No Known Problems Sister      No Known Problems Sister      No Known Problems Brother      No Known Problems Brother      No Known Problems Brother      No Known Problems Brother      No Known Problems Brother      No Known Problems Daughter Reyes     No Known Problems Son Arie     Breast cancer Neg Hx      Ovarian cancer Neg Hx      Amblyopia Neg Hx      Blindness Neg Hx      Cancer Neg Hx      Cataracts Neg Hx      Glaucoma Neg Hx      Hypertension Neg Hx      Retinal detachment Neg Hx      Strabismus Neg Hx      Stroke Neg Hx      Thyroid disease Neg Hx      Cervical cancer Neg Hx      Endometrial cancer Neg Hx      Vaginal cancer Neg Hx      Colon cancer Neg Hx         Review of patient's allergies indicates:   Allergen Reactions    Sulfa (sulfonamide antibiotics) Hives     Other reaction(s): Anaphylaxis    Itching     Shortness of breath       Current Outpatient Medications   Medication Sig    amoxicillin (AMOXIL) 875 MG tablet Take 1 tablet (875 mg total) by mouth 2 (two) times daily.    aspirin 81 MG Chew Take 81 mg by mouth once daily. Not chewable    atorvastatin (LIPITOR) 40 MG tablet Take 1 tablet (40 mg total) by mouth every evening.    calcium-vitamin D3 500 mg(1,250mg) -200 unit per tablet Take 1 tablet by  mouth 2 (two) times daily with meals.    conjugated estrogens (PREMARIN) vaginal cream Place 0.5 g vaginally once daily.    diclofenac sodium (VOLTAREN ARTHRITIS PAIN) 1 % Gel Apply 2 g topically daily as needed (Pain).    ezetimibe (ZETIA) 10 mg tablet Take 1 tablet (10 mg total) by mouth once daily.    lanolin/mineral oil/petrolatum (ARTIFICIAL TEARS OPHT) Place 1 drop into both eyes once daily.    metoprolol succinate (TOPROL-XL) 25 MG 24 hr tablet Take 0.5 tablets (12.5 mg total) by mouth once daily.    multivitamin (THERAGRAN) per tablet Take 1 tablet by mouth once daily.    nitroGLYCERIN (NITROSTAT) 0.4 MG SL tablet Place 1 tablet (0.4 mg total) under the tongue every 5 (five) minutes as needed for Chest pain.    pantoprazole (PROTONIX) 40 MG tablet Take 1 tablet (40 mg total) by mouth 2 (two) times daily.    pilocarpine (SALAGEN) 5 MG Tab Take 1 tablet (5 mg total) by mouth once daily.    sodium chloride (SALINE MIST NASL) 1 spray by Each Nostril route daily as needed (Congestion).    teriparatide (FORTEO) 20 mcg/dose (600mcg/2.4mL) PnIj Inject 0.08 mLs (20 mcg total) into the skin once daily.    ticagrelor (BRILINTA) 90 mg tablet Take 1 tablet (90 mg total) by mouth 2 (two) times daily.    traMADoL (ULTRAM) 50 mg tablet Take 1 tablet (50 mg total) by mouth every 8 (eight) hours as needed for Pain.    valsartan (DIOVAN) 80 MG tablet Take 1 tablet (80 mg total) by mouth once daily.    vitamin A-vitamin C-vit E-min Tab Take 1 tablet by mouth once daily.     No current facility-administered medications for this visit.     Facility-Administered Medications Ordered in Other Visits   Medication    lactated ringers infusion       REVIEW OF SYSTEMS:    GENERAL:  current fevers or chills, recent use of antibiotics denies.  HEENT:  History of migraines/headaches: denies, History of major throat surgery: denies  RESPIRATORY:  History of home oxygen or pulmonary hypertension/severe breathing dysfunction:  "denies  CARDIOVASCULAR:  Hx of palpitations/rhythm problems: reports Hx of Heart Attacks/Surgery: reports Stents placed in May 2024  GI:  Recent abdominal discomfort or recent change in bowel habits denies  MUSCULOSKELETAL:  See HPI.  SKIN:  unhealed wounds or rashes: denies  PSYCH: major psychiatric history or recent psychosocial stressors denies  HEMATOLOGY/LYMPHOLOGY:  Hx of prolonged bleeding, Hx of Blood thinner usage: reports brilinta ; reason: stents in May 2024  NEURO:   history of seizures, strokes, chronic/old weakness (such as paralysis or paresis of any body part): denies  All other reviewed and negative other than HPI.    OBJECTIVE:    BP (!) 186/94 (BP Location: Right arm, Patient Position: Sitting)   Pulse 62   Ht 5' 1" (1.549 m)   Wt 45.9 kg (101 lb 3.1 oz)   BMI 19.12 kg/m²     PHYSICAL EXAMINATION:  General appearance: Well appearing, in no acute distress, alert and appropriately communicative.  Psych:  Mood and affect appropriate.  Skin: Skin color, texture, turgor normal, no rashes or lesions, in both upper and lower body for exposed skin.  Head/face:  Atraumatic, normocephalic.  Cor: regular rate  Pulm: non-labored breathing  GI: Abdomen non-distended and non-tender.  Back: Straight leg raising in the sitting and supine positions is negative. pain to palpation over the spine or paraspinal muscles. Pain to percussion. Pain with extension and facet loading  Extremities: No deformities, significant lymphedema, or skin discoloration. No significant open wounds. No major amputations.  Musculoskeletal: hip, sacroiliac and knee provocative maneuvers are negative. Bilateral upper and lower extremity strength is normal and symmetric.  No atrophy or tone abnormalities are noted.  Neuro: Bilateral upper and lower extremity coordination and muscle stretch reflexes abnormalities noted: none.  Monae and/or Clonus: negative; loss of sensation to light touch: none  Gait: walker    CMP  Sodium   Date Value " Ref Range Status   03/27/2025 142 136 - 145 mmol/L Final   03/16/2025 142 136 - 145 mmol/L Final     Potassium   Date Value Ref Range Status   03/27/2025 3.8 3.5 - 5.1 mmol/L Final   03/16/2025 3.1 (L) 3.5 - 5.1 mmol/L Final     Chloride   Date Value Ref Range Status   03/27/2025 106 95 - 110 mmol/L Final   03/16/2025 105 95 - 110 mmol/L Final     CO2   Date Value Ref Range Status   03/27/2025 29 23 - 29 mmol/L Final   03/16/2025 27 23 - 29 mmol/L Final     Glucose   Date Value Ref Range Status   03/27/2025 99 70 - 110 mg/dL Final   03/16/2025 125 (H) 70 - 110 mg/dL Final     BUN   Date Value Ref Range Status   03/27/2025 17 8 - 23 mg/dL Final     Creatinine   Date Value Ref Range Status   03/27/2025 0.8 0.5 - 1.4 mg/dL Final     Calcium   Date Value Ref Range Status   03/27/2025 10.4 8.7 - 10.5 mg/dL Final   03/16/2025 10.7 (H) 8.7 - 10.5 mg/dL Final     Total Protein   Date Value Ref Range Status   03/16/2025 7.2 6.0 - 8.4 g/dL Final     Albumin   Date Value Ref Range Status   03/27/2025 3.8 3.5 - 5.2 g/dL Final   03/16/2025 3.9 3.5 - 5.2 g/dL Final     Total Bilirubin   Date Value Ref Range Status   03/16/2025 0.3 0.1 - 1.0 mg/dL Final     Comment:     For infants and newborns, interpretation of results should be based  on gestational age, weight and in agreement with clinical  observations.    Premature Infant recommended reference ranges:  Up to 24 hours.............<8.0 mg/dL  Up to 48 hours............<12.0 mg/dL  3-5 days..................<15.0 mg/dL  6-29 days.................<15.0 mg/dL       Alkaline Phosphatase   Date Value Ref Range Status   03/16/2025 101 40 - 150 U/L Final     AST   Date Value Ref Range Status   03/16/2025 26 10 - 40 U/L Final     ALT   Date Value Ref Range Status   03/16/2025 17 10 - 44 U/L Final     Anion Gap   Date Value Ref Range Status   03/27/2025 7 (L) 8 - 16 mmol/L Final     eGFR   Date Value Ref Range Status   03/27/2025 >60 >60 mL/min/1.73/m2 Final     Comment:     Estimated  GFR calculated using the CKD-EPI creatinine (2021) equation.   03/16/2025 >60.0 >60 mL/min/1.73 m^2 Final     ASSESSMENT: 84 y.o. year old female with lower back pain, consistent with:    1. Degeneration of intervertebral disc of lumbar region with discogenic back pain and lower extremity pain  Procedure Order to Pain Management    Ambulatory referral/consult to Neurology      2. Age-related osteoporosis with current pathological fracture, initial encounter  Procedure Order to Pain Management    Ambulatory referral/consult to Neurology        IMPRESSION: Betzy Cooley presents today for lower back pain with some radiation down the left leg. History and physical exam are consistent with axial lower back pain/facetogenic pain and lumbar radiculopathy.  My independent interpretation of the imaging is consistent with L1 compression fracture (vertebra plana) with retropulsion and lumbar degenerative disc disease with multilevel foraminal stenosis, L3 compression fracture, as well as lumbar facet spondylosis.  She has recurrent axial lower back pain (both sides) which she felt improved after her last transforaminal epidural injection. She is interested in repeating this again.    PLAN:   - I have stressed the importance of physical activity and a home exercise plan to help with pain and improve health.  - Patient can continue with medications for now since they are providing benefits, using them appropriately, and without side effects.  - continue physical therapy for lower back pain  - schedule for bilateral L3/4 transforaminal epidural steroid injection  - ok to take tylenol 1000mg every 8 hours as needed; < 3000mg total in a day  - continue robaxin 500mg every 8 hours as needed for muscular pain  - RTC 2 weeks after epidural  - Counseled patient regarding the importance of activity modification and physical therapy.    The above plan and management options were discussed at length with patient. Patient is in  agreement with the above and verbalized understanding. It will be communicated with the referring physician via electronic record, fax, or mail.    Denver Cordon  05/06/2025

## 2025-05-06 NOTE — TELEPHONE ENCOUNTER
----- Message from Shavonne Mcgrath sent at 2025  2:27 PM CDT -----  Regarding: Order for BLAYNE WHITAKER    Patient Name: BLAYNE WHITAKER([id number])  Sex: Female  : 1940      PCP: FIDELIA CHAVES    Center: [facility] CENTRAL BILLING OFFICE     Types of orders made on 2025: Outpatient Referral, Procedure Request    Order Date:2025  Ordering User:SHAVONNE MCGRATH [[id number]]  Encounter Provider:Shavonne Mcgrath MD [[id number]]  Authorizing Provider: Shavonne Mcgrath MD [[id number]]  Department:[facility] PAIN [facility]NAGEMENT[[id number]]    Common Order Information  Procedure -> Transforaminal Injection (Specify level and laterality) Cmt:             Bilateral L3/4 TFESI    Order Specific Information  Order: Procedure Order to Pain Management [Custom: [id number]]  Order #:          [id number]: 1 FUTURE    Priority: Routine  Class: Clinic Performed    Future Order Information      Expires on:2026            Expected by:2025                   Associated Diagnoses      M51.362 Degeneration of intervertebral disc of lumbar region with       discogenic back pain and lower extremity pain      M80.00XA Age-related osteoporosis with current pathological fracture,       initial encounter      Physician -> ALCIDES         Is patient on anti-coagulants? -> Yes         Facility Name: -> [facility]         Follow-up: -> 2 weeks           Priority: Routine  Class: Clinic Performed    Future Order Information      Expires on:2026            Expected by:2025                   Associated Diagnoses      M51.362 Degeneration of intervertebral disc of lumbar region with       discogenic back pain and lower extremity pain      M80.00XA Age-related osteoporosis with current pathological fracture,       initial encounter      Procedure -> Transforaminal Injection (Specify level and laterality) Cmt:                 Bilateral L3/4 TFESI        Physician -> ALCIDES         Is patient on anti-coagulants? -> Yes         Facility Name: -> [facility]          Follow-up: -> 2 weeks

## 2025-05-07 ENCOUNTER — E-CONSULT (OUTPATIENT)
Dept: CARDIOLOGY | Facility: CLINIC | Age: 85
End: 2025-05-07
Payer: MEDICARE

## 2025-05-07 ENCOUNTER — TELEPHONE (OUTPATIENT)
Dept: PAIN MEDICINE | Facility: CLINIC | Age: 85
End: 2025-05-07
Payer: MEDICARE

## 2025-05-07 DIAGNOSIS — Z98.61 CAD S/P PERCUTANEOUS CORONARY ANGIOPLASTY: ICD-10-CM

## 2025-05-07 DIAGNOSIS — I25.10 CAD S/P PERCUTANEOUS CORONARY ANGIOPLASTY: ICD-10-CM

## 2025-05-07 DIAGNOSIS — Z01.810 PREOPERATIVE CARDIOVASCULAR EXAMINATION: Primary | ICD-10-CM

## 2025-05-07 DIAGNOSIS — M54.16 LUMBAR RADICULOPATHY: Primary | ICD-10-CM

## 2025-05-07 NOTE — CONSULTS
[facility] Cardiology [address]  Response for E-Consult     Patient Name: Betzy Cooley  MRN: [id number]  Primary Care Provider: Praful Paul MD   Requesting Provider: Denver Cordon MD  E-Consult to General Cardiology  Consult performed by: Robin Leavitt III, MD  Consult ordered by: Denver Cordon MD        Recommendation: pre-procedural management of medications    Ms Cooley underwent multivessel PCI to left main, LAD, D1, ramus, and RCA on 5/17/24 for an acute MI.  She has been on ASA/brilinta for the past year.  Planning bilateral L3/4 transforaminal epidural steroid injection on 5/16 with Dr Cordon at [place].    Given the extensive coronary interventions performed one year ago, she should ideally minimize interruptions in DAPT.  Unable to complete spinal injection while on brilinta.   OK to hold brilinta for 5 days. She must be able to continue aspirin 81mg daily without interruption during this time.   Following the procedure, she should resume brilinta and continue aspirin.     She should follow up with general cardiology clinic in the next 1 month to discuss reduction in brilinta dose for continued chronic use.    Total time of Consultation: 10 minutes    I did not speak to the requesting provider verbally about this.     *This eConsult is based on the clinical data available to me and is furnished without benefit of a physical examination. The eConsult will need to be interpreted in light of any clinical issues or changes in patient status not available to me at the time of filing this eConsults. Significant changes in patient condition or level of acuity should result in immediate formal consultation and reevaluation. Please alert me if you have further questions.    Thank you for this eConsult referral.     MD Lion Quiroz [facility] Cardiology [address]

## 2025-05-09 ENCOUNTER — TELEPHONE (OUTPATIENT)
Dept: PAIN MEDICINE | Facility: CLINIC | Age: 85
End: 2025-05-09
Payer: MEDICARE

## 2025-05-13 RX ORDER — TICAGRELOR 90 MG/1
90 TABLET ORAL 2 TIMES DAILY
Qty: 60 TABLET | Refills: 2 | Status: SHIPPED | OUTPATIENT
Start: 2025-05-13

## 2025-05-13 RX ORDER — ATORVASTATIN CALCIUM 40 MG/1
40 TABLET, FILM COATED ORAL NIGHTLY
Qty: 90 TABLET | Refills: 1 | Status: SHIPPED | OUTPATIENT
Start: 2025-05-13

## 2025-05-13 NOTE — TELEPHONE ENCOUNTER
Refill Routing Note   Medication(s) are not appropriate for processing by [facility] for the following reason(s):        No active prescription written by provider  Outside of protocol    [facility] action(s):  Defer  Route               Appointments  past 12m or future 3m with PCP    Date Provider   Last Visit   3/27/2025 Praful Paul MD   Next Visit   Visit date not found Praful Paul MD   ED visits in past 90 days: 0        Note composed:5:20 PM 05/13/2025

## 2025-05-13 NOTE — TELEPHONE ENCOUNTER
No care due was identified.  [facility] Embedded Care Due Messages. Reference number: [id number].   5/13/2025 2:53:07 PM CDT

## 2025-05-14 ENCOUNTER — TELEPHONE (OUTPATIENT)
Dept: PAIN MEDICINE | Facility: HOSPITAL | Age: 85
End: 2025-05-14
Payer: MEDICARE

## 2025-05-15 ENCOUNTER — TELEPHONE (OUTPATIENT)
Dept: PAIN MEDICINE | Facility: HOSPITAL | Age: 85
End: 2025-05-15
Payer: MEDICARE

## 2025-05-16 ENCOUNTER — HOSPITAL ENCOUNTER (OUTPATIENT)
Facility: HOSPITAL | Age: 85
Discharge: HOME | End: 2025-05-16
Attending: STUDENT IN AN ORGANIZED HEALTH CARE EDUCATION/TRAINING PROGRAM | Admitting: STUDENT IN AN ORGANIZED HEALTH CARE EDUCATION/TRAINING PROGRAM
Payer: MEDICARE

## 2025-05-16 VITALS
RESPIRATION RATE: 14 BRPM | WEIGHT: 100 LBS | BODY MASS INDEX: 18.88 KG/M2 | HEIGHT: 61 IN | SYSTOLIC BLOOD PRESSURE: 167 MMHG | HEART RATE: 60 BPM | TEMPERATURE: 97 F | DIASTOLIC BLOOD PRESSURE: 86 MMHG | OXYGEN SATURATION: 99 %

## 2025-05-16 DIAGNOSIS — M54.16 LUMBAR RADICULOPATHY: Primary | ICD-10-CM

## 2025-05-16 DIAGNOSIS — G89.29 CHRONIC PAIN: ICD-10-CM

## 2025-05-16 PROCEDURE — 25500020 PHARM REV CODE 255: Performed by: STUDENT IN AN ORGANIZED HEALTH CARE EDUCATION/TRAINING PROGRAM

## 2025-05-16 PROCEDURE — 64483 NJX AA&/STRD TFRM EPI L/S 1: CPT | Mod: 50,HCNC,, | Performed by: STUDENT IN AN ORGANIZED HEALTH CARE EDUCATION/TRAINING PROGRAM

## 2025-05-16 PROCEDURE — 63600175 PHARM REV CODE 636 W HCPCS: Performed by: STUDENT IN AN ORGANIZED HEALTH CARE EDUCATION/TRAINING PROGRAM

## 2025-05-16 PROCEDURE — 64483 NJX AA&/STRD TFRM EPI L/S 1: CPT | Mod: 50 | Performed by: STUDENT IN AN ORGANIZED HEALTH CARE EDUCATION/TRAINING PROGRAM

## 2025-05-16 RX ORDER — LIDOCAINE HYDROCHLORIDE 10 MG/ML
INJECTION, SOLUTION EPIDURAL; INFILTRATION; INTRACAUDAL; PERINEURAL
Status: DISCONTINUED | OUTPATIENT
Start: 2025-05-16 | End: 2025-05-16 | Stop reason: HOSPADM

## 2025-05-16 RX ORDER — DEXAMETHASONE SODIUM PHOSPHATE 10 MG/ML
INJECTION, SOLUTION INTRA-ARTICULAR; INTRALESIONAL; INTRAMUSCULAR; INTRAVENOUS; SOFT TISSUE
Status: DISCONTINUED | OUTPATIENT
Start: 2025-05-16 | End: 2025-05-16 | Stop reason: HOSPADM

## 2025-05-16 RX ORDER — SODIUM CHLORIDE 9 MG/ML
500 INJECTION, SOLUTION INTRAVENOUS CONTINUOUS
Status: DISCONTINUED | OUTPATIENT
Start: 2025-05-16 | End: 2025-05-16 | Stop reason: HOSPADM

## 2025-05-16 RX ORDER — LIDOCAINE HYDROCHLORIDE 20 MG/ML
INJECTION, SOLUTION EPIDURAL; INFILTRATION; INTRACAUDAL; PERINEURAL
Status: DISCONTINUED | OUTPATIENT
Start: 2025-05-16 | End: 2025-05-16 | Stop reason: HOSPADM

## 2025-05-16 RX ORDER — MIDAZOLAM HYDROCHLORIDE 1 MG/ML
INJECTION INTRAMUSCULAR; INTRAVENOUS
Status: DISCONTINUED | OUTPATIENT
Start: 2025-05-16 | End: 2025-05-16 | Stop reason: HOSPADM

## 2025-05-16 RX ORDER — LIDOCAINE HYDROCHLORIDE 10 MG/ML
1 INJECTION, SOLUTION EPIDURAL; INFILTRATION; INTRACAUDAL; PERINEURAL ONCE
Status: DISCONTINUED | OUTPATIENT
Start: 2025-05-16 | End: 2025-05-16 | Stop reason: HOSPADM

## 2025-05-16 RX ORDER — FENTANYL CITRATE 50 UG/ML
INJECTION, SOLUTION INTRAMUSCULAR; INTRAVENOUS
Status: DISCONTINUED | OUTPATIENT
Start: 2025-05-16 | End: 2025-05-16 | Stop reason: HOSPADM

## 2025-05-16 NOTE — OP NOTE
Lumbar Transforaminal Epidural Steroid Injection under Fluoroscopic Guidance    The procedure, risks, benefits, and options were discussed with the patient. There are no contraindications to the procedure. The patent expressed understanding and agreed to the procedure. Informed written consent was obtained prior to the start of the procedure and can be found in the patient's chart.    PATIENT NAME: Betzy Cooley   MRN: [id number]     DATE OF PROCEDURE: 05/16/2025    PROCEDURE:  Bilateral  L3/4 Lumbar Transforaminal Epidural Steroid Injection under Fluoroscopic Guidance    PRE-OP DIAGNOSIS: Lumbar radiculopathy [M54.16] Lumbar radiculopathy [M54.16]    POST-OP DIAGNOSIS: Same    PHYSICIAN: Denver Cordon MD    ASSISTANTS: None     MEDICATIONS INJECTED: Preservative-free Decadron 10mg with 5cc of Lidocaine 1% MPF     LOCAL ANESTHETIC INJECTED: Xylocaine 2%     SEDATION: Versed 1mg and Fentanyl 50mcg                                                                                                                                                                                     Conscious sedation ordered by M.D. Patient re-evaluation prior to administration of conscious sedation. No changes noted in patient's status from initial evaluation. The patient's vital signs were monitored by RN and patient remained hemodynamically stable throughout the procedure.    Event Time In   Sedation Start 1239   Sedation End 1247       ESTIMATED BLOOD LOSS: None    COMPLICATIONS: None    TECHNIQUE: Time-out was performed to identify the patient and procedure to be performed. With the patient laying in a prone position, the surgical area was prepped and draped in the usual sterile fashion using ChloraPrep and a fenestrated drape.The levels were determined under fluoroscopy guidance. Skin anesthesia was achieved by injecting Lidocaine 2% over the injection sites. The transforaminal spaces were then approached with a 22 gauge, 3.5 inch spinal  quinke needle that was introduced under fluoroscopic guidance in the AP and Lateral views. Once the needle tip was in the area of the transforaminal space, and there was no blood, CSF or paraesthesias, contrast dye Omnipaque (300mg/mL) was injected to confirm placement and there was no vascular runoff. Fluoroscopic imaging in the AP and lateral views revealed a clear outline of the spinal nerve with proximal spread of agent through the neural foramen into the epidural space. 3 mL of the medication mixture listed above was injected slowly at each site. Displacement of the radio opaque contrast after injection of the medication confirmed that the medication went into the area of the transforaminal spaces. The needles were removed and bleeding was nil. A sterile dressing was applied. No specimens collected. The patient tolerated the procedure well.     The patient was monitored after the procedure in the recovery area. They were given post-procedure and discharge instructions to follow at home. The patient was discharged in a stable condition.    Denver Cordon MD

## 2025-05-16 NOTE — DISCHARGE SUMMARY
Discharge Note  Short Stay      SUMMARY     Admit Date: 5/16/2025    Attending Physician: Denver Cordon MD PhD    Discharge Physician: Denver Cordon      Discharge Date: 5/16/2025 12:48 PM    Procedure(s) (LRB):  Bilateral L3/4 TFESI (Bilateral)    Final Diagnosis: Lumbar radiculopathy [M54.16]    Disposition: Home or self care    Patient Instructions:   Current Discharge Medication List        CONTINUE these medications which have NOT CHANGED    Details   aspirin 81 MG Chew Take 81 mg by mouth once daily. Not chewable      atorvastatin (LIPITOR) 40 MG tablet TAKE 1 TABLET BY MOUTH EVERY DAY IN THE EVENING  Qty: 90 tablet, Refills: 1      calcium-vitamin D3 500 mg(1,250mg) -200 unit per tablet Take 1 tablet by mouth 2 (two) times daily with meals.      ezetimibe (ZETIA) 10 mg tablet Take 1 tablet (10 mg total) by mouth once daily.  Qty: 90 tablet, Refills: 3      metoprolol succinate (TOPROL-XL) 25 MG 24 hr tablet Take 0.5 tablets (12.5 mg total) by mouth once daily.  Qty: 45 tablet, Refills: 0    Comments: .  Associated Diagnoses: Hypertension, unspecified type      multivitamin (THERAGRAN) per tablet Take 1 tablet by mouth once daily.      pantoprazole (PROTONIX) 40 MG tablet Take 1 tablet (40 mg total) by mouth 2 (two) times daily.  Qty: 180 tablet, Refills: 3    Comments: This prescription was filled on 4/3/2024. Any refills authorized will be placed on file.  Associated Diagnoses: Gastroesophageal reflux disease, unspecified whether esophagitis present      teriparatide (FORTEO) 20 mcg/dose (560mcg/2.24mL) PnIj Inject 0.08 mLs (20 mcg total) into the skin once daily.  Qty: 2.4 mL, Refills: 11    Associated Diagnoses: Osteoporosis without pathological fracture      valsartan (DIOVAN) 80 MG tablet Take 1 tablet (80 mg total) by mouth once daily.  Qty: 90 tablet, Refills: 3    Comments: .      vitamin A-vitamin C-vit E-min Tab Take 1 tablet by mouth once daily.      amoxicillin (AMOXIL) 875 MG tablet Take 1 tablet  (875 mg total) by mouth 2 (two) times daily.  Qty: 14 tablet, Refills: 0    Associated Diagnoses: Acute non-recurrent maxillary sinusitis      BRILINTA 90 mg tablet TAKE 1 TABLET BY MOUTH TWICE DAILY  Qty: 60 tablet, Refills: 2      conjugated estrogens (PREMARIN) vaginal cream Place 0.5 g vaginally once daily.  Qty: 30 g, Refills: 1    Associated Diagnoses: Vaginal dryness      diclofenac sodium (VOLTAREN ARTHRITIS PAIN) 1 % Gel Apply 2 g topically daily as needed (Pain).      lanolin/mineral oil/petrolatum (ARTIFICIAL TEARS OPHT) Place 1 drop into both eyes once daily.      nitroGLYCERIN (NITROSTAT) 0.4 MG SL tablet Place 1 tablet (0.4 mg total) under the tongue every 5 (five) minutes as needed for Chest pain.  Qty: 30 tablet, Refills: 2      pilocarpine (SALAGEN) 5 MG Tab Take 1 tablet (5 mg total) by mouth once daily.  Qty: 21 tablet, Refills: 0    Associated Diagnoses: Dry mouth      sodium chloride (SALINE MIST NASL) 1 spray by Each Nostril route daily as needed (Congestion).      traMADoL (ULTRAM) 50 mg tablet Take 1 tablet (50 mg total) by mouth every 8 (eight) hours as needed for Pain.  Qty: 21 tablet, Refills: 0    Comments: Quantity prescribed more than 7 day supply? No  Associated Diagnoses: Age-related osteoporosis with current pathological fracture, initial encounter                 Discharge Diagnosis: Lumbar radiculopathy [M54.16]  Condition on Discharge: Stable with no complications to procedure   Diet on Discharge: Same as before.  Activity: as per instruction sheet.  Discharge to: Home with a responsible adult.  Follow up: 2-4 weeks       Please call my office or pager at [phone] if experienced any weakness or loss of sensation, fever > 101.5, pain uncontrolled with oral medications, persistent nausea/vomiting/or diarrhea, redness or drainage from the incisions, or any other worrisome concerns. If physician on call was not reached or could not communicate with our office for any reason please  go to the nearest emergency department      Denver Cordon MD PhD

## 2025-05-16 NOTE — DISCHARGE INSTRUCTIONS
[facility]/[place]  Dr. Denver Cordon  Messaging service # [phone]    POST-PROCEDURE INSTRUCTIONS:  HELPFUL TIPS:    Except for block procedures (medial branch blocks, genicular blocks, hip/shoulder blocks), most procedures take about 2 weeks to start seeing the full effect toward your pain.  If you feel like the pain relief goes away after a day, please wait up to 2 weeks to decide if the procedure provided you any relief    If you had a block procedure (medial branch blocks, genicular blocks, hip/shoulder blocks), you MUST submit your pain diary and percentage relief scores to proceed with the next block and/or procedure.  Please submit the diary/percentages through the [facility] OR you can call [phone] ([employer]) OR [phone] ([employer]) during clinic hours (Monday - Friday, 8AM - 5PM)    Follow up in 2 weeks with either the provider that suggested this procedure OR with Dr. Cordon (including his team members at [facility]).  You may already have a follow up appointment scheduled.  If not, you may make an appointment through the [facility] or you can contact the office that suggested your procedure.  If you would like to make an appointment with Dr. Cordon, you may do so through the [facility] OR you can call [phone] ([facility]) OR [phone] ([employer]) during clinic hours (Monday - Friday, 8AM - 5PM).      What you need to do:    Keep a record of your response to the injection you had today.    How much relief did you get?   When did the relief start and how long did it last?  Were you able to decrease the use of any of your pain medications?  Were you able to increase your level of activity?  How long did the relief last?    What to watch out for:    If you experience any of the following symptoms after your procedure, please notify the messaging service immediately (see above for contact information):   fever (increased oral temperature)   bleeding or  swelling at the injection site,    drainage, rash or redness at the injection site    possible signs of infection    increased pain at the injection site   worsening of your usual pain   severe headache   new or worsening numbness    new arm and/or leg weakness, or    changes in bowel and/or bladder function: urinating or defecating on yourself and not knowing that you did it.    PLEASE FOLLOW ALL INSTRUCTIONS CAREFULLY     Do not engage in strenuous activity (e.g., lifting or pushing heavy objects or repeated bending) for 24 hours.     Do not take a bath, swim or use Jacuzzi for 24 hours after procedure. (A shower is fine).   Remove any Band-Aids when you get home.    Use cold/ice, as needed for comfort.  We recommend the use of cold therapy alternating on for 20 minutes, off for 20 minutes.    Do not apply direct heat (heating pad or heat packs) to the injection site for 24 hours.     Resume your usual medications, unless instructed otherwise by your Pain Physician.     If you are on warfarin (Coumadin) or other blood thinner, resume this medication as instructed by your prescribing Physician.    IF AT ANY POINT YOU ARE VERY CONCERNED ABOUT YOUR SYMPTOMS, PLEASE GO TO THE EMERGENCY ROOM.    If you develop worsening pain, weakness, numbness, lose bowel or bladder control (i.e., having an accident where you did not even know you had to go to the bathroom and suddenly noticed you soiled yourself), saddle anesthesia (a loss of sensation restricted to the area of the buttocks, anus and between the legs -- i.e., those parts of your body that would touch a saddle if you were sitting on one) you need to go immediately to the emergency department for evaluation and treatment.    ----------------------------------------------------------------------------------------------------------------------------------------------------------------  If you received Sedation please read the following instructions:  POST SEDATION  INSTRUCTIONS    Today you received intravenous medication (also known as sedation) that was used to help you relax and/or decrease discomfort during your procedure. This medication will be acting in your body for the next 24 hours, so you might feel a little tired or sleepy. This feeling will slowly wear off.   Common side effects associated with these medications include: drowsiness, dizziness, sleepiness, confusion, feeling excited, difficulty remembering things, lack of steadiness with walking or balance, loss of fine muscle control, slowed reflexes, difficulty focusing, and blurred vision.  Some over-the-counter and prescription medications (e.g., muscle relaxants, opioids, mood-altering medications, sedatives/hypnotics, antihistamines) can interact with the intravenous medication you received and cause an increased risk of the side effects listed above in addition to other potentially life threatening side effects. Use extreme caution if you are taking such medications, and consult with your Pain Physician or prescribing physician if you have any questions.  For the next 12-24 hours:    DO NOT--Drive a car, operate machinery or power tools   DO NOT--Drink any alcoholic beverages (not even beer), they may dangerously increase the risk of side effects.    DO NOT--Make any important legal or business decisions or sign important documents.  We advise you to have someone to assist you at home. Move slowly and carefully. Do not make sudden changes in position. Be aware of dizziness or light-headedness and move accordingly.   If you seek medical treatment within 24 hours, let the nurse or doctor caring for you know that you have received the above medications. If you have any questions or concerns related to your sedation or treatment today please contact us.

## 2025-05-16 NOTE — PLAN OF CARE
Pt in preop bay 21, VSS and IV inserted. Pt denies any open wounds on body or the use of any immunizations or antibiotics in the past 2 weeks. Pt needs consents, otherwise ready to roll.

## 2025-05-16 NOTE — INTERVAL H&P NOTE
The patient was examined and no significant changes were noted from the updated H&P or last clinic note.    The risks and benefits of this procedure, including alternative therapies, were discussed with the patient.  The discussion of risks included infection, bleeding, need for additional procedures or surgery, nerve damage, paralysis, adverse medication reaction(s), stroke, and if appropriate for the procedure, death.  Questions regarding the procedure, risks, expected outcome, and possible side effects were solicited and answered to Betzy's satisfaction.  Betzy Cooley wishes to proceed with the injection or procedure as confirmed by written consent.

## 2025-05-16 NOTE — PLAN OF CARE
Betzy Cooley has met all discharge criteria from Phase II. Vital Signs are stable, ambulating  without difficulty. Discharge instructions given, patient verbalized understanding. Discharged from facility via wheelchair in stable condition. Patient has all belongings.

## 2025-05-20 ENCOUNTER — OFFICE VISIT (OUTPATIENT)
Dept: URGENT CARE | Facility: CLINIC | Age: 85
End: 2025-05-20
Payer: MEDICARE

## 2025-05-20 VITALS
HEART RATE: 62 BPM | HEIGHT: 61 IN | RESPIRATION RATE: 18 BRPM | TEMPERATURE: 98 F | OXYGEN SATURATION: 98 % | BODY MASS INDEX: 18.88 KG/M2 | DIASTOLIC BLOOD PRESSURE: 93 MMHG | WEIGHT: 100 LBS | SYSTOLIC BLOOD PRESSURE: 181 MMHG
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
## 2025-05-20 PROCEDURE — 99213 OFFICE O/P EST LOW 20 MIN: CPT | Mod: S$GLB,,, | Performed by: FAMILY MEDICINE

## 2025-05-20 NOTE — PROGRESS NOTES
"Subjective:      Patient ID: Betzy Cooley is a 84 y.o. female.    Vitals:  height is 5' 1" (1.549 m) and weight is 45.4 kg (100 lb). Her oral temperature is 97.8 °F (36.6 °C). Her blood pressure is 181/93 (abnormal) and her pulse is 62. Her respiration is 18 and oxygen saturation is 98%.     Chief Complaint: Fall    This is a 84 y.o. female who presents today with a chief complaint of  a fall  pt slid off end of bed and hit carpet floor hurting  Rt hip and Rt shoulder. Husbands reports apparently contradicts patient's complaint. She now denies right shoulder pain and reports minimal righ hip pain. Ambulating with walker with minimal discomfort. - "my muscle is bruised".  No head trauma    Fall  The accident occurred 1 to 3 hours ago. The fall occurred from a bed. She landed on Carpet. The point of impact was the right shoulder and right hip. The pain is present in the right hip and right shoulder. The pain is at a severity of 2/10. The symptoms are aggravated by movement and rotation. Pertinent negatives include no abdominal pain, bowel incontinence, fever, headaches, hearing loss, hematuria, loss of consciousness, nausea, numbness, tingling, visual change or vomiting.     Constitution: Negative for fever.   Gastrointestinal:  Negative for abdominal pain, nausea, vomiting and bowel incontinence.   Genitourinary:  Negative for hematuria.   Musculoskeletal:  Positive for joint pain.   Neurological:  Negative for dizziness, passing out (no head trauma reported), headaches, loss of consciousness and numbness.      Objective:     Physical Exam   Constitutional: She is oriented to person, place, and time. She does not appear ill. No distress. normal  Abdominal: Normal appearance.   Musculoskeletal:         General: No swelling, tenderness (right hip and shoulder), deformity or signs of injury.   Neurological: no focal deficit. She is alert, oriented to person, place, and time and at baseline. She displays no weakness. " No cranial nerve deficit. Coordination (shuffling gait) and gait (with walker (at baseline per )) abnormal.   Skin: bruising (multiple old bruises on extremities - upper and lower)   Nursing note and vitals reviewed.      Assessment:     1. Fall, initial encounter    No obvious signs of fracture. Patient and  declined xray tonight given hour. Low suspicion - will schedule xray of right hip tomorrow  OTC analgesia recommended. ER precautions  Plan:       Fall, initial encounter  -     X-Ray Hip 2 or 3 views Right with Pelvis when performed; Future; Expected date: 05/20/2025

## 2025-05-21 ENCOUNTER — HOSPITAL ENCOUNTER (OUTPATIENT)
Dept: RADIOLOGY | Facility: HOSPITAL | Age: 85
Discharge: HOME | End: 2025-05-21
Attending: FAMILY MEDICINE
Payer: MEDICARE
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
## 2025-05-21 PROCEDURE — 73502 X-RAY EXAM HIP UNI 2-3 VIEWS: CPT | Mod: 26,HCNC,RT, | Performed by: RADIOLOGY

## 2025-05-21 PROCEDURE — 73502 X-RAY EXAM HIP UNI 2-3 VIEWS: CPT | Mod: TC,HCNC,RT

## 2025-05-24 ENCOUNTER — TELEPHONE (OUTPATIENT)
Dept: URGENT CARE | Facility: CLINIC | Age: 85
End: 2025-05-24
Payer: MEDICARE

## 2025-06-01 RX ORDER — EZETIMIBE 10 MG/1
10 TABLET ORAL
Qty: 90 TABLET | Refills: 1 | Status: SHIPPED | OUTPATIENT
Start: 2025-06-01

## 2025-06-02 ENCOUNTER — TELEPHONE (OUTPATIENT)
Dept: CARDIOLOGY | Facility: CLINIC | Age: 85
End: 2025-06-02
Payer: MEDICARE

## 2025-06-05 ENCOUNTER — OFFICE VISIT (OUTPATIENT)
Dept: CARDIOLOGY | Facility: CLINIC | Age: 85
End: 2025-06-05
Payer: MEDICARE

## 2025-06-05 ENCOUNTER — HOSPITAL ENCOUNTER (OUTPATIENT)
Dept: CARDIOLOGY | Facility: CLINIC | Age: 85
Discharge: HOME | End: 2025-06-05
Payer: MEDICARE

## 2025-06-05 VITALS
HEART RATE: 57 BPM | DIASTOLIC BLOOD PRESSURE: 84 MMHG | BODY MASS INDEX: 18.73 KG/M2 | WEIGHT: 99.19 LBS | OXYGEN SATURATION: 99 % | SYSTOLIC BLOOD PRESSURE: 169 MMHG | HEIGHT: 61 IN

## 2025-06-05 DIAGNOSIS — I25.10 CAD S/P PERCUTANEOUS CORONARY ANGIOPLASTY: ICD-10-CM

## 2025-06-05 DIAGNOSIS — R03.0 ELEVATED BLOOD PRESSURE READING: ICD-10-CM

## 2025-06-05 DIAGNOSIS — Z98.61 CAD S/P PERCUTANEOUS CORONARY ANGIOPLASTY: ICD-10-CM

## 2025-06-05 DIAGNOSIS — I21.4 NSTEMI (NON-ST ELEVATED MYOCARDIAL INFARCTION): ICD-10-CM

## 2025-06-05 DIAGNOSIS — I10 PRIMARY HYPERTENSION: ICD-10-CM

## 2025-06-05 DIAGNOSIS — R00.2 PALPITATIONS: ICD-10-CM

## 2025-06-05 DIAGNOSIS — I10 PRIMARY HYPERTENSION: Primary | ICD-10-CM

## 2025-06-05 DIAGNOSIS — E78.5 DYSLIPIDEMIA: ICD-10-CM

## 2025-06-05 PROBLEM — Z01.810 PREOPERATIVE CARDIOVASCULAR EXAMINATION: Status: RESOLVED | Noted: 2025-05-07 | Resolved: 2025-06-05

## 2025-06-05 PROBLEM — I25.112: Status: RESOLVED | Noted: 2024-05-15 | Resolved: 2025-06-05

## 2025-06-05 PROBLEM — R79.89 ELEVATED TROPONIN: Status: RESOLVED | Noted: 2024-05-22 | Resolved: 2025-06-05

## 2025-06-05 LAB
OHS QRS DURATION: 90 MS
OHS QTC CALCULATION: 392 MS

## 2025-06-05 PROCEDURE — 99214 OFFICE O/P EST MOD 30 MIN: CPT | Mod: HCNC,S$GLB,, | Performed by: INTERNAL MEDICINE

## 2025-06-05 PROCEDURE — 1160F RVW MEDS BY RX/DR IN RCRD: CPT | Mod: CPTII,HCNC,S$GLB, | Performed by: INTERNAL MEDICINE

## 2025-06-05 PROCEDURE — 1159F MED LIST DOCD IN RCRD: CPT | Mod: CPTII,HCNC,S$GLB, | Performed by: INTERNAL MEDICINE

## 2025-06-05 PROCEDURE — 1125F AMNT PAIN NOTED PAIN PRSNT: CPT | Mod: CPTII,HCNC,S$GLB, | Performed by: INTERNAL MEDICINE

## 2025-06-05 PROCEDURE — 3288F FALL RISK ASSESSMENT DOCD: CPT | Mod: CPTII,HCNC,S$GLB, | Performed by: INTERNAL MEDICINE

## 2025-06-05 PROCEDURE — 3079F DIAST BP 80-89 MM HG: CPT | Mod: CPTII,HCNC,S$GLB, | Performed by: INTERNAL MEDICINE

## 2025-06-05 PROCEDURE — 1101F PT FALLS ASSESS-DOCD LE1/YR: CPT | Mod: CPTII,HCNC,S$GLB, | Performed by: INTERNAL MEDICINE

## 2025-06-05 PROCEDURE — 3077F SYST BP >= 140 MM HG: CPT | Mod: CPTII,HCNC,S$GLB, | Performed by: INTERNAL MEDICINE

## 2025-06-05 PROCEDURE — 99999 PR PBB SHADOW E&M-EST. PATIENT-LVL IV: CPT | Mod: PBBFAC,HCNC,, | Performed by: INTERNAL MEDICINE

## 2025-06-05 RX ORDER — AMLODIPINE BESYLATE 5 MG/1
5 TABLET ORAL DAILY
Qty: 90 TABLET | Refills: 3 | Status: SHIPPED | OUTPATIENT
Start: 2025-06-05 | End: 2026-06-05

## 2025-06-10 ENCOUNTER — TELEPHONE (OUTPATIENT)
Dept: OPTOMETRY | Facility: CLINIC | Age: 85
End: 2025-06-10
Payer: MEDICARE

## 2025-06-10 ENCOUNTER — OFFICE VISIT (OUTPATIENT)
Dept: OPTOMETRY | Facility: CLINIC | Age: 85
End: 2025-06-10
Payer: MEDICARE

## 2025-06-10 DIAGNOSIS — H04.123 DRY EYE SYNDROME OF BOTH EYES: ICD-10-CM

## 2025-06-10 DIAGNOSIS — H25.013 CORTICAL SENILE CATARACT, BILATERAL: Primary | ICD-10-CM

## 2025-06-10 DIAGNOSIS — H52.202 ASTIGMATISM OF LEFT EYE, UNSPECIFIED TYPE: ICD-10-CM

## 2025-06-10 DIAGNOSIS — H52.4 PRESBYOPIA OF BOTH EYES: ICD-10-CM

## 2025-06-10 DIAGNOSIS — H52.01 HYPEROPIA OF RIGHT EYE: ICD-10-CM

## 2025-06-10 DIAGNOSIS — D31.32 CHOROIDAL NEVUS OF LEFT EYE: ICD-10-CM

## 2025-06-10 DIAGNOSIS — H25.13 NUCLEAR SCLEROSIS, BILATERAL: ICD-10-CM

## 2025-06-10 PROCEDURE — 3288F FALL RISK ASSESSMENT DOCD: CPT | Mod: CPTII,HCNC,S$GLB, | Performed by: OPTOMETRIST

## 2025-06-10 PROCEDURE — 1159F MED LIST DOCD IN RCRD: CPT | Mod: CPTII,HCNC,S$GLB, | Performed by: OPTOMETRIST

## 2025-06-10 PROCEDURE — 99214 OFFICE O/P EST MOD 30 MIN: CPT | Mod: HCNC,S$GLB,, | Performed by: OPTOMETRIST

## 2025-06-10 PROCEDURE — 99999 PR PBB SHADOW E&M-EST. PATIENT-LVL III: CPT | Mod: PBBFAC,HCNC,, | Performed by: OPTOMETRIST

## 2025-06-10 PROCEDURE — 92015 DETERMINE REFRACTIVE STATE: CPT | Mod: HCNC,S$GLB,, | Performed by: OPTOMETRIST

## 2025-06-10 PROCEDURE — 1100F PTFALLS ASSESS-DOCD GE2>/YR: CPT | Mod: CPTII,HCNC,S$GLB, | Performed by: OPTOMETRIST

## 2025-06-10 PROCEDURE — 1126F AMNT PAIN NOTED NONE PRSNT: CPT | Mod: CPTII,HCNC,S$GLB, | Performed by: OPTOMETRIST

## 2025-06-10 NOTE — TELEPHONE ENCOUNTER
----- Message from Tech Lisy sent at 6/10/2025 10:16 AM CDT -----  Hi, can you schedule this patient for a dry eye check next available, per Dr. Jensen please? Thank you!

## 2025-06-11 ENCOUNTER — PATIENT MESSAGE (OUTPATIENT)
Dept: CARDIOLOGY | Facility: CLINIC | Age: 85
End: 2025-06-11
Payer: MEDICARE

## 2025-06-11 NOTE — PROGRESS NOTES
HPI    Last eye exam was 2/26/25 with Dr. Reynolds.  Patient states decrease in near vision without readers.   Patient denies diplopia, headaches, flashes/floaters, and pain.    Occusoft wipes QD OU  Last edited by Faustina Broussard MA on 6/10/2025  9:21 AM.            Assessment /Plan     For exam results, see Encounter Report.    Cortical senile cataract, bilateral    Nuclear sclerosis, bilateral    Choroidal nevus of left eye    Presbyopia of both eyes    Astigmatism of left eye, unspecified type    Hyperopia of right eye    Dry eye syndrome of both eyes    MONITOR. ED PT ON ALL EXAM FINDINGS  RX FINAL SPECS   OCULAR HEALTH STABLE OD, OS; LONGSTANDING NEVUS OS; STABLE; FLAT   MILD CS/NS OU; PRESURGICAL; UV PROTECTION; MONITOR.  DISCUSSED DRY EYE THERAPIES; HAS DIFFICULTY W/ DROPS; INTERESTED IN OTHER OPTIONS; PREFERABLY SPRAYS; REFER FOR CONSULT   RTC 1 YEAR FOR REE/DFE

## 2025-06-17 ENCOUNTER — OFFICE VISIT (OUTPATIENT)
Dept: PAIN MEDICINE | Facility: CLINIC | Age: 85
End: 2025-06-17
Payer: MEDICARE

## 2025-06-17 VITALS — HEART RATE: 64 BPM | DIASTOLIC BLOOD PRESSURE: 75 MMHG | SYSTOLIC BLOOD PRESSURE: 140 MMHG

## 2025-06-17 DIAGNOSIS — M47.816 LUMBAR SPONDYLOSIS: ICD-10-CM

## 2025-06-17 DIAGNOSIS — S32.010A COMPRESSION FRACTURE OF L1 VERTEBRA, INITIAL ENCOUNTER: Primary | ICD-10-CM

## 2025-06-17 DIAGNOSIS — M51.360 DEGENERATION OF INTERVERTEBRAL DISC OF LUMBAR REGION WITH DISCOGENIC BACK PAIN: ICD-10-CM

## 2025-06-17 DIAGNOSIS — M79.18 MYOFASCIAL PAIN SYNDROME: ICD-10-CM

## 2025-06-17 PROCEDURE — 3077F SYST BP >= 140 MM HG: CPT | Mod: CPTII,S$GLB,, | Performed by: STUDENT IN AN ORGANIZED HEALTH CARE EDUCATION/TRAINING PROGRAM

## 2025-06-17 PROCEDURE — 99214 OFFICE O/P EST MOD 30 MIN: CPT | Mod: S$GLB,,, | Performed by: STUDENT IN AN ORGANIZED HEALTH CARE EDUCATION/TRAINING PROGRAM

## 2025-06-17 PROCEDURE — 1125F AMNT PAIN NOTED PAIN PRSNT: CPT | Mod: CPTII,S$GLB,, | Performed by: STUDENT IN AN ORGANIZED HEALTH CARE EDUCATION/TRAINING PROGRAM

## 2025-06-17 PROCEDURE — 1101F PT FALLS ASSESS-DOCD LE1/YR: CPT | Mod: CPTII,S$GLB,, | Performed by: STUDENT IN AN ORGANIZED HEALTH CARE EDUCATION/TRAINING PROGRAM

## 2025-06-17 PROCEDURE — 1159F MED LIST DOCD IN RCRD: CPT | Mod: CPTII,S$GLB,, | Performed by: STUDENT IN AN ORGANIZED HEALTH CARE EDUCATION/TRAINING PROGRAM

## 2025-06-17 PROCEDURE — 3288F FALL RISK ASSESSMENT DOCD: CPT | Mod: CPTII,S$GLB,, | Performed by: STUDENT IN AN ORGANIZED HEALTH CARE EDUCATION/TRAINING PROGRAM

## 2025-06-17 PROCEDURE — 99999 PR PBB SHADOW E&M-EST. PATIENT-LVL III: CPT | Mod: PBBFAC,,, | Performed by: STUDENT IN AN ORGANIZED HEALTH CARE EDUCATION/TRAINING PROGRAM

## 2025-06-17 PROCEDURE — 3078F DIAST BP <80 MM HG: CPT | Mod: CPTII,S$GLB,, | Performed by: STUDENT IN AN ORGANIZED HEALTH CARE EDUCATION/TRAINING PROGRAM

## 2025-06-17 PROCEDURE — 1160F RVW MEDS BY RX/DR IN RCRD: CPT | Mod: CPTII,S$GLB,, | Performed by: STUDENT IN AN ORGANIZED HEALTH CARE EDUCATION/TRAINING PROGRAM

## 2025-06-17 PROCEDURE — G2211 COMPLEX E/M VISIT ADD ON: HCPCS | Mod: S$GLB,,, | Performed by: STUDENT IN AN ORGANIZED HEALTH CARE EDUCATION/TRAINING PROGRAM

## 2025-06-17 NOTE — PROGRESS NOTES
Chronic patient Established Note (Follow up visit)      SUBJECTIVE:  INTERVAL HISTORY 6/17/2025:  Ms. Cooley returns for back pain. Current Pain level is 2/10.   She does feel the injections continue to She reports that she also takes tylenol which also helps with her pain.  She does feel she is taking too many prescription medications. She is interested in going back to physical therapy.    INTERVAL HISTORY 5/6/2025:  Ms. Cooley returns for lower back pain. Current Pain level is 5/10.  She reports that her pain is on both sides of her back. She reports she had excellent relief with her last epidural and is interested in repeating this again.  She did have an CT scan which demonstrates further compression at L3.      INTERVAL HISTORY 12/17/2024:  Ms. Cooley returns for lower back pain. Current Pain level is 1/10.  She reports that the pain she was having down the leg is completely gone.  She does report that she has persistent pain in the back and buttocks.  She reports she is working with home health physical therapy and she feels soreness after her sessions.  She feels this is helping a great deal.  She feels the pain is more of a discomfort.  She feels her mobility has also improved.  She was also started on forteo injections for improving her bone quality.     INTERVAL HISTORY 10/29/2024:  Ms. Cooley returns for lower back pain with left leg radiation. Current Pain level is 3/10.  She reports that she has been having good days and bad days.  She has been working with physical therapy and she feels this has been feeling overall improvement in her pain. She does feel that after her therapy, her pain does get worse initially, however she has been taking her Norco 5-325mg as needed, which has also been helping.    INTERVAL HISTORY 10/1/2024:  Betzy Cooley presents to the clinic for a follow-up appointment for chronic pain. Current pain intensity is 10/10.  Since the last visit, Betzy Cooley states:  she reports  that she underwent a CT scan which significantly worsened in her pain.  She feels her pain is in her lower back and radiates down her left leg.  She reports that since her last visit, she stopped therapy and feels her pain has since worsened.     PRIOR HISTORY:   Betzy Cooley presents to the clinic for the evaluation of lower back pain and left leg pain. The pain started about 4 months ago following a fall on the right side and symptoms have been improving.The pain is located in the lower back area and radiates to the left leg (toward the foot).  The pain is described as numbing and tight band and is rated as 0/10. The pain is rated with a score of  0/10 on the BEST day and a score of 4/10 on the WORST day.  Symptoms interfere with daily activity. The pain is exacerbated by Walking.  The pain is mitigated by rest.     Patient denies urinary incontinence and bowel incontinence. She and her  report the fall occurred after she had stents placed.  She does report her pain has progressively improved. She feels the LSO brace doesn't really help her pain, but she doesn't feel the pain severely limits her life.    Physical Therapy/Home Exercise: no      Pain Disability Index Review:      6/17/2025    10:41 AM 5/6/2025     1:48 PM 12/17/2024     2:34 PM   Last 3 PDI Scores   Pain Disability Index (PDI) 35 56 0       Pain Medications:   - tylenol (helps a great deal)     report:  Reviewed and consistent with medication use as prescribed.    Pain Procedures:   11/22/2024 - Left L3/4 and L4/5 TFESI  5/16/2025 - B/L L3/4 TFESI    Imaging:   MRI LUMBAR SPINE WITHOUT CONTRAST     CLINICAL HISTORY:  Spine fracture, lumbar, pathological;     TECHNIQUE:  Multiplanar, multisequence MR images were acquired from the thoracolumbar junction to the sacrum without contrast.     COMPARISON:  CTA 08/21/2024     FINDINGS:  Alignment: Grade 1 anterolisthesis L4 on L5.  Retropulsion of L1 vertebral body.     Vertebrae: Compression  fracture of the L1 vertebral body with marked height loss and 6 mm retropulsion.  Essentially complete height loss of the ventral and central aspect of the vertebral body.  Patchy edema-like signal within the L1 vertebra would in keeping with recent fracture.  No large paraspinous hematoma.  No definite intraspinal hemorrhage.  Elsewhere in the lumbar spine, centrally normal marrow signal for age; no endplate edema-like signal.     Discs: Disc desiccation and height loss at L2-L3 and L3-L4.     Cord: Conus terminates at L1-L2 and appears unremarkable.  Cauda equina appears unremarkable.     Degenerative findings:     *T12-L1: Compression fracture of the L1 vertebral body with retropulsion resulting in no more than moderate bilateral neural foraminal narrowing and moderate spinal canal stenosis.  *L1-L2: Compression fracture of the L1 vertebral body with retropulsion resulting in moderate bilateral neural foraminal narrowing and mild spinal canal stenosis.  Minimal AP thecal sac diameter approximately 6.9 mm at level of maximum compression.  *L2-L3: Facet arthropathy and circumferential disc bulge resulting in mild bilateral neural foraminal narrowing and mild spinal canal stenosis.  *L3-L4: Facet arthropathy and ligamentum flavum buckling resulting in moderate bilateral neural foraminal narrowing and mild spinal canal stenosis.  *L4-L5: Grade 1 anterolisthesis, facet arthropathy and ligamentum flavum buckling with unroofing of the intervertebral disc resulting in severe bilateral subarticular recess stenosis (with encroachment and possible contact of traversing L5 nerve roots), mild left neural foraminal narrowing and moderate spinal canal stenosis.  *L5-S1: Facet arthropathy resulting in mild bilateral neural foraminal narrowing.  No spinal canal stenosis.  Paraspinal muscles & soft tissues: Unremarkable.     Impression:     L1 vertebral body compression fracture resulting in vertebra plana configuration with 6 mm  retropulsion.  Finding results in no more than moderate spinal canal stenosis at T12-L1 and moderate bilateral neural foraminal narrowing at L1-L2.     Multilevel lumbar spondylosis, as characterized above, notable for moderate bilateral neural foraminal narrowing at L3-L4, severe bilateral subarticular recess stenosis at L4-L5 and moderate spinal canal stenosis at L4-L5.     Additional details, as provided in the body of report.     Electronically signed by resident: Malcolm Vazquez  Date:                                            08/22/2024  Time:                                           06:19     Electronically signed by:Kenneth Dillon  Date:                                            08/22/2024  Time:                                           08:22                XR LUMBAR SPINE LATERAL SUPINE AND LATERAL UPRIGHT     CLINICAL HISTORY:  L1 burst fracture;     TECHNIQUE:  Two views of the lumbar spine were obtained, with lateral views performed weight-bearing and non weight-bearing.     COMPARISON:  Reference is made to the CT examination of 08/21/2024 and to an MR exam of 08/22/2024 and a prior conventional radiographic exam of the lumbar spine dated 05/09/2024.     FINDINGS:  Severe compression deformity with marked loss of height involving the L1 vertebral body is again observed, unchanged from the 08/21/2024 CT examination but representing a significant interval loss of height of L1 since the older conventional radiograph of 05/29/2024.  There is a modest degree of retropulsion of the posterior aspect of this markedly compressed L1 vertebral body noted, as was optimally demonstrated on the prior CT and MR examinations referenced above.  9 mm of anterolisthesis of L4 in relation to L5 is appreciated, as is a lesser degree of anterolisthesis of L5 in relation to S1, secondary to facet arthropathy at these levels.  Alignment elsewhere in the lumbar and visualized lower thoracic spine appears unremarkable.   Aside from L1, the visualized vertebral body heights are normally maintained without compression deformity.  Disc narrowing is seen at every level from L1-2 through L4-5.  Vertebral endplates appear well defined.  No osseous destructive process.     Impression:     1. Compression deformity with severe loss of height of the L1 vertebral body with a modest degree of retropulsion of the posterior aspect of this compressed vertebral body.  The appearance is similar to the recent CT and MR examinations, although it does represent a detrimental change since of earlier conventional radiograph of 05/29/2024.  2. Anterolisthesis of L4 in relation to L5 and L5 in relation to S1, secondary to facet arthropathy at each level.  3. No alignment change between the supine and weight-bearing images is observed.        Electronically signed by:Bravo Medina MD  Date:                                            08/22/2024  Time:                                           09:32    Past Medical History:   Diagnosis Date    Arthritis     Cataract     GERD (gastroesophageal reflux disease)     HEARING LOSS     Hypertension     Osteoporosis, postmenopausal     Squamous Cell Carcinoma 2007    right forearm    Urinary incontinence     rare mixed     Past Surgical History:   Procedure Laterality Date    ANGIOGRAM, CORONARY, WITH LEFT HEART CATHETERIZATION Bilateral 05/17/2024    Procedure: Angiogram, Coronary, with Left Heart Cath;  Surgeon: Miguel Jacob MD;  Location: [facility] CATH LAB;  Service: Cardiology;  Laterality: Bilateral;    cardiac stents  04/01/2024    COLPOPEXY N/A 08/06/2019    Procedure: COLPOPEXY SSL FIXATION;  Surgeon: Alma Dorsey MD;  Location: [facility] OR [address] [place] [address];  Service: Urology;  Laterality: N/A;    CORONARY ANGIOGRAPHY Left 05/16/2024    Procedure: ANGIOGRAM, CORONARY ARTERY;  Surgeon: Miguel Jacob MD;  Location: [facility] CATH LAB;  Service: Cardiology;  Laterality: Left;    CYSTOSCOPY N/A 08/06/2019     Procedure: CYSTOSCOPY;  Surgeon: Alma Dorsey MD;  Location: [facility] OR [address] [place] [address];  Service: Urology;  Laterality: N/A;    ESOPHAGOGASTRODUODENOSCOPY N/A 04/01/2024    Procedure: EGD (ESOPHAGOGASTRODUODENOSCOPY);  Surgeon: Zbigniew Dougherty MD;  Location: [facility];  Service: Endoscopy;  Laterality: N/A;    FRACTURE SURGERY Left 2008    open radius/ulna fracture    HYSTERECTOMY      TANIA/ovaries remain--fibroids- in her late 30's / early 40's    INJECTION, SPINE, LUMBOSACRAL, TRANSFORAMINAL APPROACH Left 11/22/2024    Procedure: left L3/4 and L4/5 TFESI;  Surgeon: Denver Cordon MD;  Location: [facility] PAIN MANAGEMENT;  Service: Pain Management;  Laterality: Left;  20 mins ASA ok  Brilinta 5 days    INJECTION, SPINE, LUMBOSACRAL, TRANSFORAMINAL APPROACH Bilateral 5/16/2025    Procedure: Bilateral L3/4 TFESI;  Surgeon: Denver Cordon MD;  Location: [facility] PAIN MANAGEMENT;  Service: Pain Management;  Laterality: Bilateral;  Brilint 5 days - ASA Ok    IVUS, CORONARY  05/17/2024    Procedure: IVUS, Coronary;  Surgeon: Miguel Jacob MD;  Location: [facility] CATH LAB;  Service: Cardiology;;    STENT, DRUG ELUTING, MULTI VESSEL, CORONARY  05/17/2024    Procedure: Stent, Drug Eluting, Multi Vessel, Coronary;  Surgeon: Miguel Jacob MD;  Location: [facility] CATH LAB;  Service: Cardiology;;     Social History     Socioeconomic History    Marital status:    Tobacco Use    Smoking status: Never     Passive exposure: Never    Smokeless tobacco: Never   Substance and Sexual Activity    Alcohol use: Not Currently     Comment: 1-2 glasses wine weekly    Drug use: No    Sexual activity: Yes     Partners: Male     Birth control/protection: None     Social Drivers of Health     Financial Resource Strain: Low Risk  (3/17/2025)    Overall Financial Resource Strain (CARDIA)     Difficulty of Paying Living Expenses: Not very hard   Food Insecurity: No Food Insecurity (3/17/2025)    Hunger Vital Sign     Worried About Running Out  of Food in the Last Year: Never true     Ran Out of Food in the Last Year: Never true   Transportation Needs: No Transportation Needs (11/25/2024)    PRAPARE - Transportation     Lack of Transportation (Medical): No     Lack of Transportation (Non-Medical): No   Physical Activity: Inactive (3/17/2025)    Exercise Vital Sign     Days of Exercise per Week: 0 days     Minutes of Exercise per Session: 0 min   Stress: No Stress Concern Present (3/17/2025)    [employer] [place] [employer] - Occupational Stress Questionnaire     Feeling of Stress : Only a little   Recent Concern: Stress - Stress Concern Present (2/11/2025)    [employer] [place] [employer] - Occupational Stress Questionnaire     Feeling of Stress : To some extent   Housing Stability: Low Risk  (3/17/2025)    Housing Stability Vital Sign     Unable to Pay for Housing in the Last Year: No     Homeless in the Last Year: No     Family History   Problem Relation Name Age of Onset    Heart failure Mother copd     Macular degeneration Mother copd     Heart disease Mother copd     COPD Mother copd     Diabetes Sister      Arthritis Sister          rheumatoid arthritis    COPD Father      No Known Problems Brother      No Known Problems Son Saad     No Known Problems Daughter Jordyn     No Known Problems Sister      No Known Problems Sister      No Known Problems Sister      No Known Problems Brother      No Known Problems Brother      No Known Problems Brother      No Known Problems Brother      No Known Problems Brother      No Known Problems Daughter Reyes     No Known Problems Son Arie     Breast cancer Neg Hx      Ovarian cancer Neg Hx      Amblyopia Neg Hx      Blindness Neg Hx      Cancer Neg Hx      Cataracts Neg Hx      Glaucoma Neg Hx      Hypertension Neg Hx      Retinal detachment Neg Hx      Strabismus Neg Hx      Stroke Neg Hx      Thyroid disease Neg Hx      Cervical cancer Neg Hx      Endometrial cancer Neg Hx      Vaginal  cancer Neg Hx      Colon cancer Neg Hx         Review of patient's allergies indicates:   Allergen Reactions    Sulfa (sulfonamide antibiotics) Hives     Other reaction(s): Anaphylaxis    Itching     Shortness of breath       Current Outpatient Medications   Medication Sig    amLODIPine (NORVASC) 5 MG tablet Take 1 tablet (5 mg total) by mouth once daily.    aspirin 81 MG Chew Take 81 mg by mouth once daily. Not chewable    atorvastatin (LIPITOR) 40 MG tablet TAKE 1 TABLET BY MOUTH EVERY DAY IN THE EVENING    calcium-vitamin D3 500 mg(1,250mg) -200 unit per tablet Take 1 tablet by mouth 2 (two) times daily with meals.    conjugated estrogens (PREMARIN) vaginal cream Place 0.5 g vaginally once daily.    diclofenac sodium (VOLTAREN ARTHRITIS PAIN) 1 % Gel Apply 2 g topically daily as needed (Pain).    ezetimibe (ZETIA) 10 mg tablet TAKE 1 TABLET BY MOUTH ONCE DAILY    lanolin/mineral oil/petrolatum (ARTIFICIAL TEARS OPHT) Place 1 drop into both eyes once daily.    metoprolol succinate (TOPROL-XL) 25 MG 24 hr tablet Take 0.5 tablets (12.5 mg total) by mouth once daily.    multivitamin (THERAGRAN) per tablet Take 1 tablet by mouth once daily.    pantoprazole (PROTONIX) 40 MG tablet Take 1 tablet (40 mg total) by mouth 2 (two) times daily.    pilocarpine (SALAGEN) 5 MG Tab Take 1 tablet (5 mg total) by mouth once daily.    sodium chloride (SALINE MIST NASL) 1 spray by Each Nostril route daily as needed (Congestion).    teriparatide (FORTEO) 20 mcg/dose (560mcg/2.24mL) PnIj Inject 0.08 mLs (20 mcg total) into the skin once daily.    traMADoL (ULTRAM) 50 mg tablet Take 1 tablet (50 mg total) by mouth every 8 (eight) hours as needed for Pain.    valsartan (DIOVAN) 80 MG tablet Take 1 tablet (80 mg total) by mouth once daily.    vitamin A-vitamin C-vit E-min Tab Take 1 tablet by mouth once daily.    nitroGLYCERIN (NITROSTAT) 0.4 MG SL tablet Place 1 tablet (0.4 mg total) under the tongue every 5 (five) minutes as needed for  Chest pain.     No current facility-administered medications for this visit.       REVIEW OF SYSTEMS:    GENERAL:  current fevers or chills, recent use of antibiotics denies.  HEENT:  History of migraines/headaches: denies, History of major throat surgery: denies  RESPIRATORY:  History of home oxygen or pulmonary hypertension/severe breathing dysfunction: denies  CARDIOVASCULAR:  Hx of palpitations/rhythm problems: reports Hx of Heart Attacks/Surgery: reports Stents placed in May 2024  GI:  Recent abdominal discomfort or recent change in bowel habits denies  MUSCULOSKELETAL:  See HPI.  SKIN:  unhealed wounds or rashes: denies  PSYCH: major psychiatric history or recent psychosocial stressors denies  HEMATOLOGY/LYMPHOLOGY:  Hx of prolonged bleeding, Hx of Blood thinner usage: reports brilinta ; reason: stents in May 2024  NEURO:   history of seizures, strokes, chronic/old weakness (such as paralysis or paresis of any body part): denies  All other reviewed and negative other than HPI.    OBJECTIVE:    BP (!) 140/75 (BP Location: Right arm)   Pulse 64     PHYSICAL EXAMINATION:  General appearance: Well appearing, in no acute distress, alert and appropriately communicative.  Psych:  Mood and affect appropriate.  Skin: Skin color, texture, turgor normal, no rashes or lesions, in both upper and lower body for exposed skin.  Head/face:  Atraumatic, normocephalic.  Cor: regular rate  Pulm: non-labored breathing  GI: Abdomen non-distended and non-tender.  Back: Straight leg raising in the sitting and supine positions is negative. pain to palpation over the spine or paraspinal muscles. Pain to percussion. Pain with extension and facet loading  Extremities: No deformities, significant lymphedema, or skin discoloration. No significant open wounds. No major amputations.  Musculoskeletal: hip, sacroiliac and knee provocative maneuvers are negative. Bilateral upper and lower extremity strength is normal and symmetric.  No atrophy  or tone abnormalities are noted.  Neuro: Bilateral upper and lower extremity coordination and muscle stretch reflexes abnormalities noted: none.  Monae and/or Clonus: negative; loss of sensation to light touch: none  Gait: walker    CMP  Sodium   Date Value Ref Range Status   03/27/2025 142 136 - 145 mmol/L Final   03/16/2025 142 136 - 145 mmol/L Final     Potassium   Date Value Ref Range Status   03/27/2025 3.8 3.5 - 5.1 mmol/L Final   03/16/2025 3.1 (L) 3.5 - 5.1 mmol/L Final     Chloride   Date Value Ref Range Status   03/27/2025 106 95 - 110 mmol/L Final   03/16/2025 105 95 - 110 mmol/L Final     CO2   Date Value Ref Range Status   03/27/2025 29 23 - 29 mmol/L Final   03/16/2025 27 23 - 29 mmol/L Final     Glucose   Date Value Ref Range Status   03/27/2025 99 70 - 110 mg/dL Final   03/16/2025 125 (H) 70 - 110 mg/dL Final     BUN   Date Value Ref Range Status   03/27/2025 17 8 - 23 mg/dL Final     Creatinine   Date Value Ref Range Status   03/27/2025 0.8 0.5 - 1.4 mg/dL Final     Calcium   Date Value Ref Range Status   03/27/2025 10.4 8.7 - 10.5 mg/dL Final   03/16/2025 10.7 (H) 8.7 - 10.5 mg/dL Final     Total Protein   Date Value Ref Range Status   03/16/2025 7.2 6.0 - 8.4 g/dL Final     Albumin   Date Value Ref Range Status   03/27/2025 3.8 3.5 - 5.2 g/dL Final   03/16/2025 3.9 3.5 - 5.2 g/dL Final     Total Bilirubin   Date Value Ref Range Status   03/16/2025 0.3 0.1 - 1.0 mg/dL Final     Comment:     For infants and newborns, interpretation of results should be based  on gestational age, weight and in agreement with clinical  observations.    Premature Infant recommended reference ranges:  Up to 24 hours.............<8.0 mg/dL  Up to 48 hours............<12.0 mg/dL  3-5 days..................<15.0 mg/dL  6-29 days.................<15.0 mg/dL       Alkaline Phosphatase   Date Value Ref Range Status   03/16/2025 101 40 - 150 U/L Final     AST   Date Value Ref Range Status   03/16/2025 26 10 - 40 U/L Final      ALT   Date Value Ref Range Status   03/16/2025 17 10 - 44 U/L Final     Anion Gap   Date Value Ref Range Status   03/27/2025 7 (L) 8 - 16 mmol/L Final     eGFR   Date Value Ref Range Status   03/27/2025 >60 >60 mL/min/1.73/m2 Final     Comment:     Estimated GFR calculated using the CKD-EPI creatinine (2021) equation.   03/16/2025 >60.0 >60 mL/min/1.73 m^2 Final     ASSESSMENT: 84 y.o. year old female with lower back pain, consistent with:    1. Compression fracture of L1 vertebra, initial encounter  Ambulatory Referral/Consult to Physical Therapy      2. Degeneration of intervertebral disc of lumbar region with discogenic back pain        3. Myofascial pain syndrome  Ambulatory Referral/Consult to Physical Therapy      4. Lumbar spondylosis  Ambulatory Referral/Consult to Physical Therapy          IMPRESSION: Betyz Cooley presents today for lower back pain with some radiation down the left leg. History and physical exam are consistent with axial lower back pain/facetogenic pain and lumbar radiculopathy.  My independent interpretation of the imaging is consistent with L1 compression fracture (vertebra plana) with retropulsion and lumbar degenerative disc disease with multilevel foraminal stenosis, L3 compression fracture, as well as lumbar facet spondylosis.  She has again had relief with her lumbar transforaminal epidural steroid injection with improved quality of life.  She does however still have some lower axial/lumbar facetogenic pain after labor-intensive days, but she feels this is manageable with occasional tylenol.    PLAN:   - I have stressed the importance of physical activity and a home exercise plan to help with pain and improve health.  - Patient can continue with medications for now since they are providing benefits, using them appropriately, and without side effects.  - referral to physical therapy for lower back pain  - we discussed the option of B/L L3, L4, L5 MBBs, but she feels her pain  is manageable at this time  - ok to take tylenol 1000mg every 8 hours as needed; < 3000mg total in a day  - continue robaxin 500mg every 8 hours as needed for muscular pain  - RTC as needed  - Counseled patient regarding the importance of activity modification and physical therapy.    The above plan and management options were discussed at length with patient. Patient is in agreement with the above and verbalized understanding. It will be communicated with the referring physician via electronic record, fax, or mail.    Denver Cordon  06/17/2025

## 2025-06-26 ENCOUNTER — PATIENT MESSAGE (OUTPATIENT)
Dept: CARDIOLOGY | Facility: CLINIC | Age: 85
End: 2025-06-26
Payer: MEDICARE

## 2025-06-27 RX ORDER — VALSARTAN 80 MG/1
80 TABLET ORAL DAILY
Qty: 90 TABLET | Refills: 3 | OUTPATIENT
Start: 2025-06-27

## 2025-06-27 NOTE — TELEPHONE ENCOUNTER
Refill Decision Note   Betzy Cooley  is requesting a refill authorization.  Brief Assessment and Rationale for Refill:  Quick Discontinue     Medication Therapy Plan: initial request (06/25/25)      Comments:     Note composed:6:12 PM 06/27/2025

## 2025-06-30 ENCOUNTER — PATIENT MESSAGE (OUTPATIENT)
Dept: CARDIOLOGY | Facility: CLINIC | Age: 85
End: 2025-06-30
Payer: MEDICARE

## 2025-07-01 ENCOUNTER — TELEPHONE (OUTPATIENT)
Dept: PAIN MEDICINE | Facility: CLINIC | Age: 85
End: 2025-07-01
Payer: MEDICARE

## 2025-07-01 ENCOUNTER — PATIENT MESSAGE (OUTPATIENT)
Dept: INTERNAL MEDICINE | Facility: CLINIC | Age: 85
End: 2025-07-01
Payer: MEDICARE

## 2025-07-01 ENCOUNTER — OFFICE VISIT (OUTPATIENT)
Dept: PAIN MEDICINE | Facility: CLINIC | Age: 85
End: 2025-07-01
Payer: MEDICARE

## 2025-07-01 VITALS
SYSTOLIC BLOOD PRESSURE: 174 MMHG | BODY MASS INDEX: 19.32 KG/M2 | WEIGHT: 102.31 LBS | DIASTOLIC BLOOD PRESSURE: 83 MMHG | HEIGHT: 61 IN | HEART RATE: 52 BPM

## 2025-07-01 DIAGNOSIS — M47.816 LUMBAR SPONDYLOSIS: Primary | ICD-10-CM

## 2025-07-01 DIAGNOSIS — M79.18 MYOFASCIAL PAIN SYNDROME: ICD-10-CM

## 2025-07-01 DIAGNOSIS — M51.362 DEGENERATION OF INTERVERTEBRAL DISC OF LUMBAR REGION WITH DISCOGENIC BACK PAIN AND LOWER EXTREMITY PAIN: ICD-10-CM

## 2025-07-01 PROCEDURE — 1159F MED LIST DOCD IN RCRD: CPT | Mod: CPTII,S$GLB,, | Performed by: STUDENT IN AN ORGANIZED HEALTH CARE EDUCATION/TRAINING PROGRAM

## 2025-07-01 PROCEDURE — 1160F RVW MEDS BY RX/DR IN RCRD: CPT | Mod: CPTII,S$GLB,, | Performed by: STUDENT IN AN ORGANIZED HEALTH CARE EDUCATION/TRAINING PROGRAM

## 2025-07-01 PROCEDURE — 3077F SYST BP >= 140 MM HG: CPT | Mod: CPTII,S$GLB,, | Performed by: STUDENT IN AN ORGANIZED HEALTH CARE EDUCATION/TRAINING PROGRAM

## 2025-07-01 PROCEDURE — 1125F AMNT PAIN NOTED PAIN PRSNT: CPT | Mod: CPTII,S$GLB,, | Performed by: STUDENT IN AN ORGANIZED HEALTH CARE EDUCATION/TRAINING PROGRAM

## 2025-07-01 PROCEDURE — 3288F FALL RISK ASSESSMENT DOCD: CPT | Mod: CPTII,S$GLB,, | Performed by: STUDENT IN AN ORGANIZED HEALTH CARE EDUCATION/TRAINING PROGRAM

## 2025-07-01 PROCEDURE — 99214 OFFICE O/P EST MOD 30 MIN: CPT | Mod: S$GLB,,, | Performed by: STUDENT IN AN ORGANIZED HEALTH CARE EDUCATION/TRAINING PROGRAM

## 2025-07-01 PROCEDURE — 3079F DIAST BP 80-89 MM HG: CPT | Mod: CPTII,S$GLB,, | Performed by: STUDENT IN AN ORGANIZED HEALTH CARE EDUCATION/TRAINING PROGRAM

## 2025-07-01 PROCEDURE — 1101F PT FALLS ASSESS-DOCD LE1/YR: CPT | Mod: CPTII,S$GLB,, | Performed by: STUDENT IN AN ORGANIZED HEALTH CARE EDUCATION/TRAINING PROGRAM

## 2025-07-01 PROCEDURE — 99999 PR PBB SHADOW E&M-EST. PATIENT-LVL III: CPT | Mod: PBBFAC,,, | Performed by: STUDENT IN AN ORGANIZED HEALTH CARE EDUCATION/TRAINING PROGRAM

## 2025-07-01 PROCEDURE — G2211 COMPLEX E/M VISIT ADD ON: HCPCS | Mod: S$GLB,,, | Performed by: STUDENT IN AN ORGANIZED HEALTH CARE EDUCATION/TRAINING PROGRAM

## 2025-07-01 RX ORDER — VALSARTAN 80 MG/1
80 TABLET ORAL DAILY
Qty: 90 TABLET | Refills: 3 | Status: SHIPPED | OUTPATIENT
Start: 2025-07-01

## 2025-07-01 RX ORDER — VALSARTAN 80 MG/1
80 TABLET ORAL DAILY
Qty: 90 TABLET | Refills: 3 | OUTPATIENT
Start: 2025-07-01

## 2025-07-01 NOTE — TELEPHONE ENCOUNTER
Refill Routing Note   Medication(s) are not appropriate for processing by [facility] for the following reason(s):      Required vitals abnormal    [facility] action(s):  Defer Care Due:  None identified              Appointments  past 12m or future 3m with PCP    Date Provider   Last Visit   6/5/2025 Quentin Schultz MD   Next Visit   Visit date not found Quentin Schultz MD   ED visits in past 90 days: 0        Note composed:6:25 PM 07/01/2025

## 2025-07-01 NOTE — TELEPHONE ENCOUNTER
----- Message from Shavonne Mcgrath sent at 2025 10:50 AM CDT -----  Regarding: Order for BLAYNE WHITAKER    Patient Name: BLAYNE WHITAKER([id number])  Sex: Female  : 1940      PCP: FIDEILA CHAVES    Center: [facility] CENTRAL BILLING OFFICE     Types of orders made on 2025: Procedure Request    Order Date:2025  Ordering User:SHAVONNE MCGRATH [[id number]]  Encounter Provider:Shavonne Mcgrath MD [[id number]]  Authorizing Provider: Shavonne Mcgrath MD [[id number]]  Department:[facility][[id number]]    Common Order Information  Procedure -> Medial Branch Block (Specify level and laterality) Cmt: B/L L3,             L4, L5 MBB    Order Specific Information  Order: Procedure Order to Pain Management [Custom: [id number]]  Order #:          [id number]: 1 FUTURE    Priority: Routine  Class: Clinic Performed    Future Order Information      Expires on:2026            Expected by:2025                   Associated Diagnoses      M47.816 Lumbar spondylosis      Physician -> ALCIDES         Is patient on anti-coagulants? -> Yes Cmt: ASA 81mg        Facility Name: -> [facility]         Follow-up: -> 2 weeks           Priority: Routine  Class: Clinic Performed    Future Order Information      Expires on:2026            Expected by:2025                   Associated Diagnoses      M47.816 Lumbar spondylosis      Procedure -> Medial Branch Block (Specify level and laterality) Cmt: B/L                 L3, L4, L5 MBB        Physician -> ALCIDES         Is patient on anti-coagulants? -> Yes Cmt: ASA 81mg        Facility Name: -> [facility]         Follow-up: -> 2 weeks

## 2025-07-01 NOTE — PROGRESS NOTES
Chronic patient Established Note (Follow up visit)      SUBJECTIVE:  INTERVAL HISTORY 7/1/2025:  Ms. Cooley returns for back pain. Current Pain level is 4/10.   Since her last visit, she has continued to suffer with low back pain, but she denies any radiation down the leg.  She feels that epidural injections have helped with this.  She is interested in additional interventions which could help her.    INTERVAL HISTORY 6/17/2025:  Ms. Cooley returns for back pain. Current Pain level is 2/10.   She does feel the injections continue to She reports that she also takes tylenol which also helps with her pain.  She does feel she is taking too many prescription medications. She is interested in going back to physical therapy.    INTERVAL HISTORY 5/6/2025:  Ms. Cooley returns for lower back pain. Current Pain level is 5/10.  She reports that her pain is on both sides of her back. She reports she had excellent relief with her last epidural and is interested in repeating this again.  She did have an CT scan which demonstrates further compression at L3.      INTERVAL HISTORY 12/17/2024:  Ms. Cooley returns for lower back pain. Current Pain level is 1/10.  She reports that the pain she was having down the leg is completely gone.  She does report that she has persistent pain in the back and buttocks.  She reports she is working with home health physical therapy and she feels soreness after her sessions.  She feels this is helping a great deal.  She feels the pain is more of a discomfort.  She feels her mobility has also improved.  She was also started on forteo injections for improving her bone quality.     INTERVAL HISTORY 10/29/2024:  Ms. Cooley returns for lower back pain with left leg radiation. Current Pain level is 3/10.  She reports that she has been having good days and bad days.  She has been working with physical therapy and she feels this has been feeling overall improvement in her pain. She does feel that after her  therapy, her pain does get worse initially, however she has been taking her Norco 5-325mg as needed, which has also been helping.    INTERVAL HISTORY 10/1/2024:  Betzy Cooley presents to the clinic for a follow-up appointment for chronic pain. Current pain intensity is 10/10.  Since the last visit, Betzy Cooley states:  she reports that she underwent a CT scan which significantly worsened in her pain.  She feels her pain is in her lower back and radiates down her left leg.  She reports that since her last visit, she stopped therapy and feels her pain has since worsened.     PRIOR HISTORY:   Betzy Cooley presents to the clinic for the evaluation of lower back pain and left leg pain. The pain started about 4 months ago following a fall on the right side and symptoms have been improving.The pain is located in the lower back area and radiates to the left leg (toward the foot).  The pain is described as numbing and tight band and is rated as 0/10. The pain is rated with a score of  0/10 on the BEST day and a score of 4/10 on the WORST day.  Symptoms interfere with daily activity. The pain is exacerbated by Walking.  The pain is mitigated by rest.     Patient denies urinary incontinence and bowel incontinence. She and her  report the fall occurred after she had stents placed.  She does report her pain has progressively improved. She feels the LSO brace doesn't really help her pain, but she doesn't feel the pain severely limits her life.    Physical Therapy/Home Exercise: no      Pain Disability Index Review:      7/1/2025    10:24 AM 6/17/2025    10:41 AM 5/6/2025     1:48 PM   Last 3 PDI Scores   Pain Disability Index (PDI) 49 35 56       Pain Medications:   - tylenol (helps a great deal)     report:  Reviewed and consistent with medication use as prescribed.    Pain Procedures:   11/22/2024 - Left L3/4 and L4/5 TFESI  5/16/2025 - B/L L3/4 TFESI    Imaging:   MRI LUMBAR SPINE WITHOUT CONTRAST      CLINICAL HISTORY:  Spine fracture, lumbar, pathological;     TECHNIQUE:  Multiplanar, multisequence MR images were acquired from the thoracolumbar junction to the sacrum without contrast.     COMPARISON:  CTA 08/21/2024     FINDINGS:  Alignment: Grade 1 anterolisthesis L4 on L5.  Retropulsion of L1 vertebral body.     Vertebrae: Compression fracture of the L1 vertebral body with marked height loss and 6 mm retropulsion.  Essentially complete height loss of the ventral and central aspect of the vertebral body.  Patchy edema-like signal within the L1 vertebra would in keeping with recent fracture.  No large paraspinous hematoma.  No definite intraspinal hemorrhage.  Elsewhere in the lumbar spine, centrally normal marrow signal for age; no endplate edema-like signal.     Discs: Disc desiccation and height loss at L2-L3 and L3-L4.     Cord: Conus terminates at L1-L2 and appears unremarkable.  Cauda equina appears unremarkable.     Degenerative findings:     *T12-L1: Compression fracture of the L1 vertebral body with retropulsion resulting in no more than moderate bilateral neural foraminal narrowing and moderate spinal canal stenosis.  *L1-L2: Compression fracture of the L1 vertebral body with retropulsion resulting in moderate bilateral neural foraminal narrowing and mild spinal canal stenosis.  Minimal AP thecal sac diameter approximately 6.9 mm at level of maximum compression.  *L2-L3: Facet arthropathy and circumferential disc bulge resulting in mild bilateral neural foraminal narrowing and mild spinal canal stenosis.  *L3-L4: Facet arthropathy and ligamentum flavum buckling resulting in moderate bilateral neural foraminal narrowing and mild spinal canal stenosis.  *L4-L5: Grade 1 anterolisthesis, facet arthropathy and ligamentum flavum buckling with unroofing of the intervertebral disc resulting in severe bilateral subarticular recess stenosis (with encroachment and possible contact of traversing L5 nerve roots),  mild left neural foraminal narrowing and moderate spinal canal stenosis.  *L5-S1: Facet arthropathy resulting in mild bilateral neural foraminal narrowing.  No spinal canal stenosis.  Paraspinal muscles & soft tissues: Unremarkable.     Impression:     L1 vertebral body compression fracture resulting in vertebra plana configuration with 6 mm retropulsion.  Finding results in no more than moderate spinal canal stenosis at T12-L1 and moderate bilateral neural foraminal narrowing at L1-L2.     Multilevel lumbar spondylosis, as characterized above, notable for moderate bilateral neural foraminal narrowing at L3-L4, severe bilateral subarticular recess stenosis at L4-L5 and moderate spinal canal stenosis at L4-L5.     Additional details, as provided in the body of report.     Electronically signed by resident: Malcolm Vazquez  Date:                                            08/22/2024  Time:                                           06:19     Electronically signed by:Kenneth Dillon  Date:                                            08/22/2024  Time:                                           08:22                XR LUMBAR SPINE LATERAL SUPINE AND LATERAL UPRIGHT     CLINICAL HISTORY:  L1 burst fracture;     TECHNIQUE:  Two views of the lumbar spine were obtained, with lateral views performed weight-bearing and non weight-bearing.     COMPARISON:  Reference is made to the CT examination of 08/21/2024 and to an MR exam of 08/22/2024 and a prior conventional radiographic exam of the lumbar spine dated 05/09/2024.     FINDINGS:  Severe compression deformity with marked loss of height involving the L1 vertebral body is again observed, unchanged from the 08/21/2024 CT examination but representing a significant interval loss of height of L1 since the older conventional radiograph of 05/29/2024.  There is a modest degree of retropulsion of the posterior aspect of this markedly compressed L1 vertebral body noted, as was  optimally demonstrated on the prior CT and MR examinations referenced above.  9 mm of anterolisthesis of L4 in relation to L5 is appreciated, as is a lesser degree of anterolisthesis of L5 in relation to S1, secondary to facet arthropathy at these levels.  Alignment elsewhere in the lumbar and visualized lower thoracic spine appears unremarkable.  Aside from L1, the visualized vertebral body heights are normally maintained without compression deformity.  Disc narrowing is seen at every level from L1-2 through L4-5.  Vertebral endplates appear well defined.  No osseous destructive process.     Impression:     1. Compression deformity with severe loss of height of the L1 vertebral body with a modest degree of retropulsion of the posterior aspect of this compressed vertebral body.  The appearance is similar to the recent CT and MR examinations, although it does represent a detrimental change since of earlier conventional radiograph of 05/29/2024.  2. Anterolisthesis of L4 in relation to L5 and L5 in relation to S1, secondary to facet arthropathy at each level.  3. No alignment change between the supine and weight-bearing images is observed.        Electronically signed by:Bravo Medina MD  Date:                                            08/22/2024  Time:                                           09:32    Past Medical History:   Diagnosis Date    Arthritis     Cataract     GERD (gastroesophageal reflux disease)     HEARING LOSS     Hypertension     Osteoporosis, postmenopausal     Squamous Cell Carcinoma 2007    right forearm    Urinary incontinence     rare mixed     Past Surgical History:   Procedure Laterality Date    ANGIOGRAM, CORONARY, WITH LEFT HEART CATHETERIZATION Bilateral 05/17/2024    Procedure: Angiogram, Coronary, with Left Heart Cath;  Surgeon: Miguel Jacob MD;  Location: [facility] CATH LAB;  Service: Cardiology;  Laterality: Bilateral;    cardiac stents  04/01/2024    COLPOPEXY N/A 08/06/2019     Procedure: COLPOPEXY SSL FIXATION;  Surgeon: Alma Dorsey MD;  [address];  Service: Urology;  Laterality: N/A;    CORONARY ANGIOGRAPHY Left 05/16/2024    Procedure: ANGIOGRAM, CORONARY ARTERY;  Surgeon: Miguel Jacob MD;  Location: [facility] CATH LAB;  Service: Cardiology;  Laterality: Left;    CYSTOSCOPY N/A 08/06/2019    Procedure: CYSTOSCOPY;  Surgeon: Alma Dorsey MD;  [address];  Service: Urology;  Laterality: N/A;    ESOPHAGOGASTRODUODENOSCOPY N/A 04/01/2024    Procedure: EGD (ESOPHAGOGASTRODUODENOSCOPY);  Surgeon: Zbigniew Dougherty MD;  Location: [facility];  Service: Endoscopy;  Laterality: N/A;    FRACTURE SURGERY Left 2008    open radius/ulna fracture    HYSTERECTOMY      TANIA/ovaries remain--fibroids- in her late 30's / early 40's    INJECTION, SPINE, LUMBOSACRAL, TRANSFORAMINAL APPROACH Left 11/22/2024    Procedure: left L3/4 and L4/5 TFESI;  Surgeon: Denver Cordon MD;  Location: [facility] PAIN MANAGEMENT;  Service: Pain Management;  Laterality: Left;  20 mins ASA ok  Brilinta 5 days    INJECTION, SPINE, LUMBOSACRAL, TRANSFORAMINAL APPROACH Bilateral 5/16/2025    Procedure: Bilateral L3/4 TFESI;  Surgeon: Denver Cordon MD;  Location: [facility] PAIN MANAGEMENT;  Service: Pain Management;  Laterality: Bilateral;  Brilint 5 days - ASA Ok    IVUS, CORONARY  05/17/2024    Procedure: IVUS, Coronary;  Surgeon: Miguel Jacob MD;  Location: [facility] CATH LAB;  Service: Cardiology;;    STENT, DRUG ELUTING, MULTI VESSEL, CORONARY  05/17/2024    Procedure: Stent, Drug Eluting, Multi Vessel, Coronary;  Surgeon: Miguel Jacob MD;  Location: [facility] CATH LAB;  Service: Cardiology;;     Social History     Socioeconomic History    Marital status:    Tobacco Use    Smoking status: Never     Passive exposure: Never    Smokeless tobacco: Never   Substance and Sexual Activity    Alcohol use: Not Currently     Comment: 1-2 glasses wine weekly    Drug use: No    Sexual  activity: Yes     Partners: Male     Birth control/protection: None     Social Drivers of Health     Financial Resource Strain: Low Risk  (3/17/2025)    Overall Financial Resource Strain (CARDIA)     Difficulty of Paying Living Expenses: Not very hard   Food Insecurity: No Food Insecurity (3/17/2025)    Hunger Vital Sign     Worried About Running Out of Food in the Last Year: Never true     Ran Out of Food in the Last Year: Never true   Transportation Needs: No Transportation Needs (11/25/2024)    PRAPARE - Transportation     Lack of Transportation (Medical): No     Lack of Transportation (Non-Medical): No   Physical Activity: Inactive (3/17/2025)    Exercise Vital Sign     Days of Exercise per Week: 0 days     Minutes of Exercise per Session: 0 min   Stress: No Stress Concern Present (3/17/2025)    [employer] - Occupational Stress Questionnaire     Feeling of Stress : Only a little   Recent Concern: Stress - Stress Concern Present (2/11/2025)    [employer] - Occupational Stress Questionnaire     Feeling of Stress : To some extent   Housing Stability: Low Risk  (3/17/2025)    Housing Stability Vital Sign     Unable to Pay for Housing in the Last Year: No     Homeless in the Last Year: No     Family History   Problem Relation Name Age of Onset    Heart failure Mother copd     Macular degeneration Mother copd     Heart disease Mother copd     COPD Mother copd     Diabetes Sister      Arthritis Sister          rheumatoid arthritis    COPD Father      No Known Problems Brother      No Known Problems Son Saad     No Known Problems Daughter Jordyn     No Known Problems Sister      No Known Problems Sister      No Known Problems Sister      No Known Problems Brother      No Known Problems Brother      No Known Problems Brother      No Known Problems Brother      No Known Problems Brother      No Known Problems Daughter Reyes     No Known Problems Son Arie      Breast cancer Neg Hx      Ovarian cancer Neg Hx      Amblyopia Neg Hx      Blindness Neg Hx      Cancer Neg Hx      Cataracts Neg Hx      Glaucoma Neg Hx      Hypertension Neg Hx      Retinal detachment Neg Hx      Strabismus Neg Hx      Stroke Neg Hx      Thyroid disease Neg Hx      Cervical cancer Neg Hx      Endometrial cancer Neg Hx      Vaginal cancer Neg Hx      Colon cancer Neg Hx         Review of patient's allergies indicates:   Allergen Reactions    Sulfa (sulfonamide antibiotics) Hives     Other reaction(s): Anaphylaxis    Itching     Shortness of breath       Current Outpatient Medications   Medication Sig    amLODIPine (NORVASC) 5 MG tablet Take 1 tablet (5 mg total) by mouth once daily.    aspirin 81 MG Chew Take 81 mg by mouth once daily. Not chewable    atorvastatin (LIPITOR) 40 MG tablet TAKE 1 TABLET BY MOUTH EVERY DAY IN THE EVENING    calcium-vitamin D3 500 mg(1,250mg) -200 unit per tablet Take 1 tablet by mouth 2 (two) times daily with meals.    conjugated estrogens (PREMARIN) vaginal cream Place 0.5 g vaginally once daily.    diclofenac sodium (VOLTAREN ARTHRITIS PAIN) 1 % Gel Apply 2 g topically daily as needed (Pain).    ezetimibe (ZETIA) 10 mg tablet TAKE 1 TABLET BY MOUTH ONCE DAILY    lanolin/mineral oil/petrolatum (ARTIFICIAL TEARS OPHT) Place 1 drop into both eyes once daily.    metoprolol succinate (TOPROL-XL) 25 MG 24 hr tablet Take 0.5 tablets (12.5 mg total) by mouth once daily.    multivitamin (THERAGRAN) per tablet Take 1 tablet by mouth once daily.    pantoprazole (PROTONIX) 40 MG tablet Take 1 tablet (40 mg total) by mouth 2 (two) times daily.    pilocarpine (SALAGEN) 5 MG Tab Take 1 tablet (5 mg total) by mouth once daily.    sodium chloride (SALINE MIST NASL) 1 spray by Each Nostril route daily as needed (Congestion).    teriparatide (FORTEO) 20 mcg/dose (560mcg/2.24mL) PnIj Inject 0.08 mLs (20 mcg total) into the skin once daily.    traMADoL (ULTRAM) 50 mg tablet Take 1 tablet  "(50 mg total) by mouth every 8 (eight) hours as needed for Pain.    valsartan (DIOVAN) 80 MG tablet Take 1 tablet (80 mg total) by mouth once daily.    vitamin A-vitamin C-vit E-min Tab Take 1 tablet by mouth once daily.    nitroGLYCERIN (NITROSTAT) 0.4 MG SL tablet Place 1 tablet (0.4 mg total) under the tongue every 5 (five) minutes as needed for Chest pain.     No current facility-administered medications for this visit.       REVIEW OF SYSTEMS:    GENERAL:  current fevers or chills, recent use of antibiotics denies.  HEENT:  History of migraines/headaches: denies, History of major throat surgery: denies  RESPIRATORY:  History of home oxygen or pulmonary hypertension/severe breathing dysfunction: denies  CARDIOVASCULAR:  Hx of palpitations/rhythm problems: reports Hx of Heart Attacks/Surgery: reports Stents placed in May 2024  GI:  Recent abdominal discomfort or recent change in bowel habits denies  MUSCULOSKELETAL:  See HPI.  SKIN:  unhealed wounds or rashes: denies  PSYCH: major psychiatric history or recent psychosocial stressors denies  HEMATOLOGY/LYMPHOLOGY:  Hx of prolonged bleeding, Hx of Blood thinner usage: reports brilinta ; reason: stents in May 2024  NEURO:   history of seizures, strokes, chronic/old weakness (such as paralysis or paresis of any body part): denies  All other reviewed and negative other than HPI.    OBJECTIVE:    BP (!) 174/83 (BP Location: Right arm, Patient Position: Sitting)   Pulse (!) 52   Ht 5' 1" (1.549 m)   Wt 46.4 kg (102 lb 4.7 oz)   BMI 19.33 kg/m²     PHYSICAL EXAMINATION:  General appearance: Well appearing, in no acute distress, alert and appropriately communicative.  Psych:  Mood and affect appropriate.  Skin: Skin color, texture, turgor normal, no rashes or lesions, in both upper and lower body for exposed skin.  Head/face:  Atraumatic, normocephalic.  Cor: regular rate  Pulm: non-labored breathing  GI: Abdomen non-distended and non-tender.  Back: Straight leg " raising in the sitting and supine positions is negative. pain to palpation over the spine or paraspinal muscles. Pain to percussion. Pain with extension and facet loading  Extremities: No deformities, significant lymphedema, or skin discoloration. No significant open wounds. No major amputations.  Musculoskeletal: hip, sacroiliac and knee provocative maneuvers are negative. Bilateral upper and lower extremity strength is normal and symmetric.  No atrophy or tone abnormalities are noted.  Neuro: Bilateral upper and lower extremity coordination and muscle stretch reflexes abnormalities noted: none.  Monae and/or Clonus: negative; loss of sensation to light touch: none  Gait: walker    CMP  Sodium   Date Value Ref Range Status   03/27/2025 142 136 - 145 mmol/L Final   03/16/2025 142 136 - 145 mmol/L Final     Potassium   Date Value Ref Range Status   03/27/2025 3.8 3.5 - 5.1 mmol/L Final   03/16/2025 3.1 (L) 3.5 - 5.1 mmol/L Final     Chloride   Date Value Ref Range Status   03/27/2025 106 95 - 110 mmol/L Final   03/16/2025 105 95 - 110 mmol/L Final     CO2   Date Value Ref Range Status   03/27/2025 29 23 - 29 mmol/L Final   03/16/2025 27 23 - 29 mmol/L Final     Glucose   Date Value Ref Range Status   03/27/2025 99 70 - 110 mg/dL Final   03/16/2025 125 (H) 70 - 110 mg/dL Final     BUN   Date Value Ref Range Status   03/27/2025 17 8 - 23 mg/dL Final     Creatinine   Date Value Ref Range Status   03/27/2025 0.8 0.5 - 1.4 mg/dL Final     Calcium   Date Value Ref Range Status   03/27/2025 10.4 8.7 - 10.5 mg/dL Final   03/16/2025 10.7 (H) 8.7 - 10.5 mg/dL Final     Total Protein   Date Value Ref Range Status   03/16/2025 7.2 6.0 - 8.4 g/dL Final     Albumin   Date Value Ref Range Status   03/27/2025 3.8 3.5 - 5.2 g/dL Final   03/16/2025 3.9 3.5 - 5.2 g/dL Final     Total Bilirubin   Date Value Ref Range Status   03/16/2025 0.3 0.1 - 1.0 mg/dL Final     Comment:     For infants and newborns, interpretation of results  should be based  on gestational age, weight and in agreement with clinical  observations.    Premature Infant recommended reference ranges:  Up to 24 hours.............<8.0 mg/dL  Up to 48 hours............<12.0 mg/dL  3-5 days..................<15.0 mg/dL  6-29 days.................<15.0 mg/dL       Alkaline Phosphatase   Date Value Ref Range Status   03/16/2025 101 40 - 150 U/L Final     AST   Date Value Ref Range Status   03/16/2025 26 10 - 40 U/L Final     ALT   Date Value Ref Range Status   03/16/2025 17 10 - 44 U/L Final     Anion Gap   Date Value Ref Range Status   03/27/2025 7 (L) 8 - 16 mmol/L Final     eGFR   Date Value Ref Range Status   03/27/2025 >60 >60 mL/min/1.73/m2 Final     Comment:     Estimated GFR calculated using the CKD-EPI creatinine (2021) equation.   03/16/2025 >60.0 >60 mL/min/1.73 m^2 Final     ASSESSMENT: 84 y.o. year old female with lower back pain, consistent with:    1. Lumbar spondylosis  Procedure Order to Pain Management      2. Degeneration of intervertebral disc of lumbar region with discogenic back pain and lower extremity pain        3. Myofascial pain syndrome            IMPRESSION: Betzy Cooley presents today for lower back pain with some radiation down the left leg. History and physical exam are consistent with axial lower back pain/facetogenic pain and lumbar radiculopathy.  My independent interpretation of the imaging is consistent with L1 compression fracture (vertebra plana) with retropulsion and lumbar degenerative disc disease with multilevel foraminal stenosis, L3 compression fracture, as well as lumbar facet spondylosis.  She continues have some lower axial/lumbar facetogenic pain after labor-intensive days, and she is interested in options to help with this.    PLAN:   - I have stressed the importance of physical activity and a home exercise plan to help with pain and improve health.  - Patient can continue with medications for now since they are providing  benefits, using them appropriately, and without side effects.  - continue home exercises for lower back pain  - schedule for B/L L3, L4, L5 MBBs with plan for radiofrequency ablation, if indicated  - ok to take tylenol 1000mg every 8 hours as needed; < 3000mg total in a day  - continue robaxin 500mg every 8 hours as needed for muscular pain  - RTC after medial branch blocks vs. Ablations, if indicated  - Counseled patient regarding the importance of activity modification and physical therapy.    The above plan and management options were discussed at length with patient. Patient is in agreement with the above and verbalized understanding. It will be communicated with the referring physician via electronic record, fax, or mail.    Denver Cordon  07/01/2025

## 2025-07-01 NOTE — H&P (VIEW-ONLY)
Chronic patient Established Note (Follow up visit)      SUBJECTIVE:  INTERVAL HISTORY 7/1/2025:  Ms. Cooley returns for back pain. Current Pain level is 4/10.   Since her last visit, she has continued to suffer with low back pain, but she denies any radiation down the leg.  She feels that epidural injections have helped with this.  She is interested in additional interventions which could help her.    INTERVAL HISTORY 6/17/2025:  Ms. Cooley returns for back pain. Current Pain level is 2/10.   She does feel the injections continue to She reports that she also takes tylenol which also helps with her pain.  She does feel she is taking too many prescription medications. She is interested in going back to physical therapy.    INTERVAL HISTORY 5/6/2025:  Ms. Cooley returns for lower back pain. Current Pain level is 5/10.  She reports that her pain is on both sides of her back. She reports she had excellent relief with her last epidural and is interested in repeating this again.  She did have an CT scan which demonstrates further compression at L3.      INTERVAL HISTORY 12/17/2024:  Ms. Cooley returns for lower back pain. Current Pain level is 1/10.  She reports that the pain she was having down the leg is completely gone.  She does report that she has persistent pain in the back and buttocks.  She reports she is working with home health physical therapy and she feels soreness after her sessions.  She feels this is helping a great deal.  She feels the pain is more of a discomfort.  She feels her mobility has also improved.  She was also started on forteo injections for improving her bone quality.     INTERVAL HISTORY 10/29/2024:  Ms. Cooley returns for lower back pain with left leg radiation. Current Pain level is 3/10.  She reports that she has been having good days and bad days.  She has been working with physical therapy and she feels this has been feeling overall improvement in her pain. She does feel that after her  therapy, her pain does get worse initially, however she has been taking her Norco 5-325mg as needed, which has also been helping.    INTERVAL HISTORY 10/1/2024:  Betzy Cooley presents to the clinic for a follow-up appointment for chronic pain. Current pain intensity is 10/10.  Since the last visit, Betzy Cooley states:  she reports that she underwent a CT scan which significantly worsened in her pain.  She feels her pain is in her lower back and radiates down her left leg.  She reports that since her last visit, she stopped therapy and feels her pain has since worsened.     PRIOR HISTORY:   Betzy Cooley presents to the clinic for the evaluation of lower back pain and left leg pain. The pain started about 4 months ago following a fall on the right side and symptoms have been improving.The pain is located in the lower back area and radiates to the left leg (toward the foot).  The pain is described as numbing and tight band and is rated as 0/10. The pain is rated with a score of  0/10 on the BEST day and a score of 4/10 on the WORST day.  Symptoms interfere with daily activity. The pain is exacerbated by Walking.  The pain is mitigated by rest.     Patient denies urinary incontinence and bowel incontinence. She and her  report the fall occurred after she had stents placed.  She does report her pain has progressively improved. She feels the LSO brace doesn't really help her pain, but she doesn't feel the pain severely limits her life.    Physical Therapy/Home Exercise: no      Pain Disability Index Review:      7/1/2025    10:24 AM 6/17/2025    10:41 AM 5/6/2025     1:48 PM   Last 3 PDI Scores   Pain Disability Index (PDI) 49 [phone] 56       Pain Medications:   - tylenol (helps a great deal)     report:  Reviewed and consistent with medication use as prescribed.    Pain Procedures:   11/22/2024 - Left L3/4 and L4/5 TFESI  5/16/2025 - B/L L3/4 TFESI    Imaging:   MRI LUMBAR SPINE WITHOUT CONTRAST      CLINICAL HISTORY:  Spine fracture, lumbar, pathological;     TECHNIQUE:  Multiplanar, multisequence MR images were acquired from the thoracolumbar junction to the sacrum without contrast.     COMPARISON:  CTA 08/21/2024     FINDINGS:  Alignment: Grade 1 anterolisthesis L4 on L5.  Retropulsion of L1 vertebral body.     Vertebrae: Compression fracture of the L1 vertebral body with marked height loss and 6 mm retropulsion.  Essentially complete height loss of the ventral and central aspect of the vertebral body.  Patchy edema-like signal within the L1 vertebra would in keeping with recent fracture.  No large paraspinous hematoma.  No definite intraspinal hemorrhage.  Elsewhere in the lumbar spine, centrally normal marrow signal for age; no endplate edema-like signal.     Discs: Disc desiccation and height loss at L2-L3 and L3-L4.     Cord: Conus terminates at L1-L2 and appears unremarkable.  Cauda equina appears unremarkable.     Degenerative findings:     *T12-L1: Compression fracture of the L1 vertebral body with retropulsion resulting in no more than moderate bilateral neural foraminal narrowing and moderate spinal canal stenosis.  *L1-L2: Compression fracture of the L1 vertebral body with retropulsion resulting in moderate bilateral neural foraminal narrowing and mild spinal canal stenosis.  Minimal AP thecal sac diameter approximately 6.9 mm at level of maximum compression.  *L2-L3: Facet arthropathy and circumferential disc bulge resulting in mild bilateral neural foraminal narrowing and mild spinal canal stenosis.  *L3-L4: Facet arthropathy and ligamentum flavum buckling resulting in moderate bilateral neural foraminal narrowing and mild spinal canal stenosis.  *L4-L5: Grade 1 anterolisthesis, facet arthropathy and ligamentum flavum buckling with unroofing of the intervertebral disc resulting in severe bilateral subarticular recess stenosis (with encroachment and possible contact of traversing L5 nerve roots),  mild left neural foraminal narrowing and moderate spinal canal stenosis.  *L5-S1: Facet arthropathy resulting in mild bilateral neural foraminal narrowing.  No spinal canal stenosis.  Paraspinal muscles & soft tissues: Unremarkable.     Impression:     L1 vertebral body compression fracture resulting in vertebra plana configuration with 6 mm retropulsion.  Finding results in no more than moderate spinal canal stenosis at T12-L1 and moderate bilateral neural foraminal narrowing at L1-L2.     Multilevel lumbar spondylosis, as characterized above, notable for moderate bilateral neural foraminal narrowing at L3-L4, severe bilateral subarticular recess stenosis at L4-L5 and moderate spinal canal stenosis at L4-L5.     Additional details, as provided in the body of report.     Electronically signed by resident: Malcolm Vazquez  Date:                                            08/22/2024  Time:                                           06:19     Electronically signed by:Kenneth Dillon  Date:                                            08/22/2024  Time:                                           08:22                XR LUMBAR SPINE LATERAL SUPINE AND LATERAL UPRIGHT     CLINICAL HISTORY:  L1 burst fracture;     TECHNIQUE:  Two views of the lumbar spine were obtained, with lateral views performed weight-bearing and non weight-bearing.     COMPARISON:  Reference is made to the CT examination of 08/21/2024 and to an MR exam of 08/22/2024 and a prior conventional radiographic exam of the lumbar spine dated 05/09/2024.     FINDINGS:  Severe compression deformity with marked loss of height involving the L1 vertebral body is again observed, unchanged from the 08/21/2024 CT examination but representing a significant interval loss of height of L1 since the older conventional radiograph of 05/29/2024.  There is a modest degree of retropulsion of the posterior aspect of this markedly compressed L1 vertebral body noted, as was  optimally demonstrated on the prior CT and MR examinations referenced above.  9 mm of anterolisthesis of L4 in relation to L5 is appreciated, as is a lesser degree of anterolisthesis of L5 in relation to S1, secondary to facet arthropathy at these levels.  Alignment elsewhere in the lumbar and visualized lower thoracic spine appears unremarkable.  Aside from L1, the visualized vertebral body heights are normally maintained without compression deformity.  Disc narrowing is seen at every level from L1-2 through L4-5.  Vertebral endplates appear well defined.  No osseous destructive process.     Impression:     1. Compression deformity with severe loss of height of the L1 vertebral body with a modest degree of retropulsion of the posterior aspect of this compressed vertebral body.  The appearance is similar to the recent CT and MR examinations, although it does represent a detrimental change since of earlier conventional radiograph of 05/29/2024.  2. Anterolisthesis of L4 in relation to L5 and L5 in relation to S1, secondary to facet arthropathy at each level.  3. No alignment change between the supine and weight-bearing images is observed.        Electronically signed by:Bravo Medina MD  Date:                                            08/22/2024  Time:                                           09:32    Past Medical History:   Diagnosis Date    Arthritis     Cataract     GERD (gastroesophageal reflux disease)     HEARING LOSS     Hypertension     Osteoporosis, postmenopausal     Squamous Cell Carcinoma 2007    right forearm    Urinary incontinence     rare mixed     Past Surgical History:   Procedure Laterality Date    ANGIOGRAM, CORONARY, WITH LEFT HEART CATHETERIZATION Bilateral 05/17/2024    Procedure: Angiogram, Coronary, with Left Heart Cath;  Surgeon: Miguel Jacob MD;  Location: [facility] CATH LAB;  Service: Cardiology;  Laterality: Bilateral;    cardiac stents  04/01/2024    COLPOPEXY N/A 08/06/2019     Procedure: COLPOPEXY SSL FIXATION;  Surgeon: Alma Dorsey MD;  [address];  Service: Urology;  Laterality: N/A;    CORONARY ANGIOGRAPHY Left 05/16/2024    Procedure: ANGIOGRAM, CORONARY ARTERY;  Surgeon: Miguel Jacob MD;  Location: [facility] CATH LAB;  Service: Cardiology;  Laterality: Left;    CYSTOSCOPY N/A 08/06/2019    Procedure: CYSTOSCOPY;  Surgeon: Alma Dorsey MD;  [address];  Service: Urology;  Laterality: N/A;    ESOPHAGOGASTRODUODENOSCOPY N/A 04/01/2024    Procedure: EGD (ESOPHAGOGASTRODUODENOSCOPY);  Surgeon: Zbigniew Dougherty MD;  Location: [facility];  Service: Endoscopy;  Laterality: N/A;    FRACTURE SURGERY Left 2008    open radius/ulna fracture    HYSTERECTOMY      TANIA/ovaries remain--fibroids- in her late 30's / early 40's    INJECTION, SPINE, LUMBOSACRAL, TRANSFORAMINAL APPROACH Left 11/22/2024    Procedure: left L3/4 and L4/5 TFESI;  Surgeon: Denver Cordon MD;  Location: [facility] PAIN MANAGEMENT;  Service: Pain Management;  Laterality: Left;  20 mins ASA ok  Brilinta 5 days    INJECTION, SPINE, LUMBOSACRAL, TRANSFORAMINAL APPROACH Bilateral 5/16/2025    Procedure: Bilateral L3/4 TFESI;  Surgeon: Denver Cordon MD;  Location: [facility] PAIN MANAGEMENT;  Service: Pain Management;  Laterality: Bilateral;  Brilint 5 days - ASA Ok    IVUS, CORONARY  05/17/2024    Procedure: IVUS, Coronary;  Surgeon: Miguel Jacob MD;  Location: [facility] CATH LAB;  Service: Cardiology;;    STENT, DRUG ELUTING, MULTI VESSEL, CORONARY  05/17/2024    Procedure: Stent, Drug Eluting, Multi Vessel, Coronary;  Surgeon: Miguel Jacob MD;  Location: [facility] CATH LAB;  Service: Cardiology;;     Social History     Socioeconomic History    Marital status:    Tobacco Use    Smoking status: Never     Passive exposure: Never    Smokeless tobacco: Never   Substance and Sexual Activity    Alcohol use: Not Currently     Comment: 1-2 glasses wine weekly    Drug use: No    Sexual  activity: Yes     Partners: Male     Birth control/protection: None     Social Drivers of Health     Financial Resource Strain: Low Risk  (3/17/2025)    Overall Financial Resource Strain (CARDIA)     Difficulty of Paying Living Expenses: Not very hard   Food Insecurity: No Food Insecurity (3/17/2025)    Hunger Vital Sign     Worried About Running Out of Food in the Last Year: Never true     Ran Out of Food in the Last Year: Never true   Transportation Needs: No Transportation Needs (11/25/2024)    PRAPARE - Transportation     Lack of Transportation (Medical): No     Lack of Transportation (Non-Medical): No   Physical Activity: Inactive (3/17/2025)    Exercise Vital Sign     Days of Exercise per Week: 0 days     Minutes of Exercise per Session: 0 min   Stress: No Stress Concern Present (3/17/2025)    [place] citizen [place] [employer] - Occupational Stress Questionnaire     Feeling of Stress : Only a little   Recent Concern: Stress - Stress Concern Present (2/11/2025)    [place] citizen [place] [employer] - Occupational Stress Questionnaire     Feeling of Stress : To some extent   Housing Stability: Low Risk  (3/17/2025)    Housing Stability Vital Sign     Unable to Pay for Housing in the Last Year: No     Homeless in the Last Year: No     Family History   Problem Relation Name Age of Onset    Heart failure Mother copd     Macular degeneration Mother copd     Heart disease Mother copd     COPD Mother copd     Diabetes Sister      Arthritis Sister          rheumatoid arthritis    COPD Father      No Known Problems Brother      No Known Problems Son Saad     No Known Problems Daughter Jordyn     No Known Problems Sister      No Known Problems Sister      No Known Problems Sister      No Known Problems Brother      No Known Problems Brother      No Known Problems Brother      No Known Problems Brother      No Known Problems Brother      No Known Problems Daughter Reyes     No Known Problems Son Arie      Breast cancer Neg Hx      Ovarian cancer Neg Hx      Amblyopia Neg Hx      Blindness Neg Hx      Cancer Neg Hx      Cataracts Neg Hx      Glaucoma Neg Hx      Hypertension Neg Hx      Retinal detachment Neg Hx      Strabismus Neg Hx      Stroke Neg Hx      Thyroid disease Neg Hx      Cervical cancer Neg Hx      Endometrial cancer Neg Hx      Vaginal cancer Neg Hx      Colon cancer Neg Hx         Review of patient's allergies indicates:   Allergen Reactions    Sulfa (sulfonamide antibiotics) Hives     Other reaction(s): Anaphylaxis    Itching     Shortness of breath       Current Outpatient Medications   Medication Sig    amLODIPine (NORVASC) 5 MG tablet Take 1 tablet (5 mg total) by mouth once daily.    aspirin 81 MG Chew Take 81 mg by mouth once daily. Not chewable    atorvastatin (LIPITOR) 40 MG tablet TAKE 1 TABLET BY MOUTH EVERY DAY IN THE EVENING    calcium-vitamin D3 500 mg(1,250mg) -200 unit per tablet Take 1 tablet by mouth 2 (two) times daily with meals.    conjugated estrogens (PREMARIN) vaginal cream Place 0.5 g vaginally once daily.    diclofenac sodium (VOLTAREN ARTHRITIS PAIN) 1 % Gel Apply 2 g topically daily as needed (Pain).    ezetimibe (ZETIA) 10 mg tablet TAKE 1 TABLET BY MOUTH ONCE DAILY    lanolin/mineral oil/petrolatum (ARTIFICIAL TEARS OPHT) Place 1 drop into both eyes once daily.    metoprolol succinate (TOPROL-XL) 25 MG 24 hr tablet Take 0.5 tablets (12.5 mg total) by mouth once daily.    multivitamin (THERAGRAN) per tablet Take 1 tablet by mouth once daily.    pantoprazole (PROTONIX) 40 MG tablet Take 1 tablet (40 mg total) by mouth 2 (two) times daily.    pilocarpine (SALAGEN) 5 MG Tab Take 1 tablet (5 mg total) by mouth once daily.    sodium chloride (SALINE MIST NASL) 1 spray by Each Nostril route daily as needed (Congestion).    teriparatide (FORTEO) 20 mcg/dose (560mcg/2.24mL) PnIj Inject 0.08 mLs (20 mcg total) into the skin once daily.    traMADoL (ULTRAM) 50 mg tablet Take 1 tablet  "(50 mg total) by mouth every 8 (eight) hours as needed for Pain.    valsartan (DIOVAN) 80 MG tablet Take 1 tablet (80 mg total) by mouth once daily.    vitamin A-vitamin C-vit E-min Tab Take 1 tablet by mouth once daily.    nitroGLYCERIN (NITROSTAT) 0.4 MG SL tablet Place 1 tablet (0.4 mg total) under the tongue every 5 (five) minutes as needed for Chest pain.     No current facility-administered medications for this visit.       REVIEW OF SYSTEMS:    GENERAL:  current fevers or chills, recent use of antibiotics denies.  HEENT:  History of migraines/headaches: denies, History of major throat surgery: denies  RESPIRATORY:  History of home oxygen or pulmonary hypertension/severe breathing dysfunction: denies  CARDIOVASCULAR:  Hx of palpitations/rhythm problems: reports Hx of Heart Attacks/Surgery: reports Stents placed in May 2024  GI:  Recent abdominal discomfort or recent change in bowel habits denies  MUSCULOSKELETAL:  See HPI.  SKIN:  unhealed wounds or rashes: denies  PSYCH: major psychiatric history or recent psychosocial stressors denies  HEMATOLOGY/LYMPHOLOGY:  Hx of prolonged bleeding, Hx of Blood thinner usage: reports brilinta ; reason: stents in May 2024  NEURO:   history of seizures, strokes, chronic/old weakness (such as paralysis or paresis of any body part): denies  All other reviewed and negative other than HPI.    OBJECTIVE:    BP (!) 174/83 (BP Location: Right arm, Patient Position: Sitting)   Pulse (!) 52   Ht 5' 1" (1.549 m)   Wt 46.4 kg (102 lb 4.7 oz)   BMI 19.33 kg/m²     PHYSICAL EXAMINATION:  General appearance: Well appearing, in no acute distress, alert and appropriately communicative.  Psych:  Mood and affect appropriate.  Skin: Skin color, texture, turgor normal, no rashes or lesions, in both upper and lower body for exposed skin.  Head/face:  Atraumatic, normocephalic.  Cor: regular rate  Pulm: non-labored breathing  GI: Abdomen non-distended and non-tender.  Back: Straight leg " raising in the sitting and supine positions is negative. pain to palpation over the spine or paraspinal muscles. Pain to percussion. Pain with extension and facet loading  Extremities: No deformities, significant lymphedema, or skin discoloration. No significant open wounds. No major amputations.  Musculoskeletal: hip, sacroiliac and knee provocative maneuvers are negative. Bilateral upper and lower extremity strength is normal and symmetric.  No atrophy or tone abnormalities are noted.  Neuro: Bilateral upper and lower extremity coordination and muscle stretch reflexes abnormalities noted: none.  Monae and/or Clonus: negative; loss of sensation to light touch: none  Gait: walker    CMP  Sodium   Date Value Ref Range Status   03/27/2025 142 136 - 145 mmol/L Final   03/16/2025 142 136 - 145 mmol/L Final     Potassium   Date Value Ref Range Status   03/27/2025 3.8 3.5 - 5.1 mmol/L Final   03/16/2025 3.1 (L) 3.5 - 5.1 mmol/L Final     Chloride   Date Value Ref Range Status   03/27/2025 106 95 - 110 mmol/L Final   03/16/2025 105 95 - 110 mmol/L Final     CO2   Date Value Ref Range Status   03/27/2025 29 23 - 29 mmol/L Final   03/16/2025 27 23 - 29 mmol/L Final     Glucose   Date Value Ref Range Status   03/27/2025 99 70 - 110 mg/dL Final   03/16/2025 125 (H) 70 - 110 mg/dL Final     BUN   Date Value Ref Range Status   03/27/2025 17 8 - 23 mg/dL Final     Creatinine   Date Value Ref Range Status   03/27/2025 0.8 0.5 - 1.4 mg/dL Final     Calcium   Date Value Ref Range Status   03/27/2025 10.4 8.7 - 10.5 mg/dL Final   03/16/2025 10.7 (H) 8.7 - 10.5 mg/dL Final     Total Protein   Date Value Ref Range Status   03/16/2025 7.2 6.0 - 8.4 g/dL Final     Albumin   Date Value Ref Range Status   03/27/2025 3.8 3.5 - 5.2 g/dL Final   03/16/2025 3.9 3.5 - 5.2 g/dL Final     Total Bilirubin   Date Value Ref Range Status   03/16/2025 0.3 0.1 - 1.0 mg/dL Final     Comment:     For infants and newborns, interpretation of results  should be based  on gestational age, weight and in agreement with clinical  observations.    Premature Infant recommended reference ranges:  Up to 24 hours.............<8.0 mg/dL  Up to 48 hours............<12.0 mg/dL  3-5 days..................<15.0 mg/dL  6-29 days.................<15.0 mg/dL       Alkaline Phosphatase   Date Value Ref Range Status   03/16/2025 101 40 - 150 U/L Final     AST   Date Value Ref Range Status   03/16/2025 26 10 - 40 U/L Final     ALT   Date Value Ref Range Status   03/16/2025 17 10 - 44 U/L Final     Anion Gap   Date Value Ref Range Status   03/27/2025 7 (L) 8 - 16 mmol/L Final     eGFR   Date Value Ref Range Status   03/27/2025 >60 >60 mL/min/1.73/m2 Final     Comment:     Estimated GFR calculated using the CKD-EPI creatinine (2021) equation.   03/16/2025 >60.0 >60 mL/min/1.73 m^2 Final     ASSESSMENT: 84 y.o. year old female with lower back pain, consistent with:    1. Lumbar spondylosis  Procedure Order to Pain Management      2. Degeneration of intervertebral disc of lumbar region with discogenic back pain and lower extremity pain        3. Myofascial pain syndrome            IMPRESSION: Betzy Cooley presents today for lower back pain with some radiation down the left leg. History and physical exam are consistent with axial lower back pain/facetogenic pain and lumbar radiculopathy.  My independent interpretation of the imaging is consistent with L1 compression fracture (vertebra plana) with retropulsion and lumbar degenerative disc disease with multilevel foraminal stenosis, L3 compression fracture, as well as lumbar facet spondylosis.  She continues have some lower axial/lumbar facetogenic pain after labor-intensive days, and she is interested in options to help with this.    PLAN:   - I have stressed the importance of physical activity and a home exercise plan to help with pain and improve health.  - Patient can continue with medications for now since they are providing  benefits, using them appropriately, and without side effects.  - continue home exercises for lower back pain  - schedule for B/L L3, L4, L5 MBBs with plan for radiofrequency ablation, if indicated  - ok to take tylenol 1000mg every 8 hours as needed; < 3000mg total in a day  - continue robaxin 500mg every 8 hours as needed for muscular pain  - RTC after medial branch blocks vs. Ablations, if indicated  - Counseled patient regarding the importance of activity modification and physical therapy.    The above plan and management options were discussed at length with patient. Patient is in agreement with the above and verbalized understanding. It will be communicated with the referring physician via electronic record, fax, or mail.    Denver Cordon  07/01/2025

## 2025-07-01 NOTE — TELEPHONE ENCOUNTER
Refill Routing Note   Medication(s) are not appropriate for processing by [facility] for the following reason(s):      Required vitals abnormal  No active prescription written by PCP    [facility] action(s):  Defer Care Due:  None identified              Appointments  past 12m or future 3m with PCP    Date Provider   Last Visit   6/5/2025 Quentin Schultz MD   Next Visit   Visit date not found Quentin Schultz MD   ED visits in past 90 days: 0        Note composed:6:25 PM 07/01/2025

## 2025-07-01 NOTE — TELEPHONE ENCOUNTER
Refill Decision Note   Betzy Cooley  is requesting a refill authorization.  Brief Assessment and Rationale for Refill:  Quick Discontinue     Medication Therapy Plan:        Comments:     Note composed:6:26 PM 07/01/2025

## 2025-07-03 ENCOUNTER — CLINICAL SUPPORT (OUTPATIENT)
Dept: REHABILITATION | Facility: HOSPITAL | Age: 85
End: 2025-07-03
Attending: STUDENT IN AN ORGANIZED HEALTH CARE EDUCATION/TRAINING PROGRAM
Payer: MEDICARE

## 2025-07-03 DIAGNOSIS — M79.18 MYOFASCIAL PAIN SYNDROME: ICD-10-CM

## 2025-07-03 DIAGNOSIS — S32.010A COMPRESSION FRACTURE OF L1 VERTEBRA, INITIAL ENCOUNTER: ICD-10-CM

## 2025-07-03 DIAGNOSIS — M47.816 LUMBAR SPONDYLOSIS: ICD-10-CM

## 2025-07-03 DIAGNOSIS — R52 PAIN: Primary | Chronic | ICD-10-CM

## 2025-07-03 PROCEDURE — 97110 THERAPEUTIC EXERCISES: CPT | Performed by: PHYSICAL THERAPIST

## 2025-07-03 PROCEDURE — 97162 PT EVAL MOD COMPLEX 30 MIN: CPT | Performed by: PHYSICAL THERAPIST

## 2025-07-03 NOTE — PROGRESS NOTES
Outpatient Rehab    Physical Therapy Evaluation    Patient Name: Betzy Cooley  MRN: [id number]  YOB: 1940  Encounter Date: 7/3/2025    Therapy Diagnosis:   Encounter Diagnoses   Name Primary?    Compression fracture of L1 vertebra, initial encounter     Myofascial pain syndrome     Lumbar spondylosis     Pain Yes     Physician: Denver Cordon MD    Physician Orders: Eval and Treat  Medical Diagnosis: Compression fracture of L1 vertebra, initial encounter  Myofascial pain syndrome  Lumbar spondylosis  Surgical Diagnosis: Not applicable for this Episode   Surgical Date: Not applicable for this Episode  Days Since Last Surgery: Not applicable for this Episode    Visit # / Visits Authorized:  1 / 1  Insurance Authorization Period: 6/17/2025 to 6/17/2026  Date of Evaluation: 7/3/2025  Plan of Care Certification: 7/3/2025 to 8-28-25     Time In: 1000   Time Out: 1050  Total Time (in minutes): 50   Total Billable Time (in minutes): 50    Intake Outcome Measure for FOTO Survey    Therapist reviewed FOTO scores for Betzy Cooley on 7/3/2025.   FOTO report - see Media section or FOTO account episode details.     Intake Score:  (see media)    Precautions:       Subjective   History of Present Illness  Betzy is a 84 y.o. female who reports to physical therapy with a chief concern of back pain.     The patient reports a medical diagnosis of Compression fracture of L1 vertebra, initial encounter (S32.010A), Myofascial pain syndrome (M79.18), Lumbar spondylosis (M47.816).    Diagnostic tests related to this condition: CT scan.   CT Scan Details: ALIGNMENT: Dextroscoliosis centered at L3.  Posterior translation at T12-L1.  Grade 1 anterolisthesis at L4-5.     BONE: Diffuse osteopenia.  Unchanged L1 burst fracture with severe height loss, 0.8 cm retropulsion, and posterior translation.  L3 burst fracture with severe height loss and 0.5 cm retropulsion; height loss has increased slightly from 11/19/2024.  No new  compression fracture.  There is ill-defined sclerosis in the right sacral ala, which is less conspicuous compared to prior.  No focal osseous lesions.     JOINT: Multilevel degenerative disc disease and facet arthropathy.  Degenerative changes also involve the sacroiliac joints.     SPINAL CANAL: The conus medullaris and cauda equina nerve roots are difficult to visualize.  No mass or collection.     PARASPINAL SOFT TISSUES: Moderate atherosclerosis, including the coronary arteries.  Colonic diverticulosis.     SIGNIFICANT FINDINGS BY LEVEL: Osseous retropulsion at L1 resulting in moderate canal stenosis.  Osseous retropulsion at L3 resulting in mild canal stenosis.  Disc bulge, facet arthropathy, and anterolisthesis at L4-5 resulting in moderate canal stenosis.  There is high-grade foraminal stenosis at multiple levels, most severe at L2-3 on the left.     Impression:     Unchanged L1 burst fracture with severe height loss, 0.8 cm retropulsion, and posterior translation resulting in moderate canal stenosis.     L3 burst fracture with severe height loss, slightly increased from 11/19/2024, and 0.5 cm retropulsion resulting in mild canal stenosis.     Healing insufficiency fracture in the right sacral ala.     Alignment abnormalities and degenerative changes elsewhere in the spine as detailed above.    History of Present Condition/Illness: Pt to PT with c/o LBP with general weakness and impaired gait.  Pt has multiple health issues and has had PT many times in the past.  States she'd like to get her back stronger and improve her posture.  Pt denies LE radicular sx.  States pain began after a fall 2 yrs ago and has been off and on since.  States Recent ALLISON decreased sx.  States she'll have an ablation on 7-18-25.  States she needs a RW to ambulate safely.    Activities of Daily Living  Social history was obtained from Patient.                     Currently independent with activities of daily living? No  Activities  currently needing assistance include Bathing, Bed mobility, Functional mobility, and Dressing - upper body.            Pain     Patient reports a current pain level of 0/10. Pain at best is reported as 0/10. Pain at worst is reported as 7/10.   Location: [facility]  Clinical Progression (since onset): Worsening  Pain Qualities: Aching, Dull, Sharp, Tenderness  Pain-Relieving Factors: Rest  Pain-Aggravating Factors: Bending, Lifting         Review of Systems  Additional Review of Systems Details: See PMH for health issues.       Past Medical History/Physical Systems Review:   Betzy Cooley  has a past medical history of Arthritis, Cataract, GERD (gastroesophageal reflux disease), HEARING LOSS, Hypertension, Osteoporosis, postmenopausal, Squamous Cell Carcinoma, and Urinary incontinence.    Betzy Cooley  has a past surgical history that includes Fracture surgery (Left, 2008); Hysterectomy; Cystoscopy (N/A, 08/06/2019); Colpopexy (N/A, 08/06/2019); Esophagogastroduodenoscopy (N/A, 04/01/2024); Coronary angiography (Left, 05/16/2024); ANGIOGRAM, CORONARY, WITH LEFT HEART CATHETERIZATION (Bilateral, 05/17/2024); ivus, coronary (05/17/2024); stent, drug eluting, multi vessel, coronary (05/17/2024); injection, spine, lumbosacral, transforaminal approach (Left, 11/22/2024); cardiac stents (04/01/2024); and injection, spine, lumbosacral, transforaminal approach (Bilateral, 5/16/2025).    Betzy has a current medication list which includes the following prescription(s): amlodipine, aspirin, atorvastatin, calcium-vitamin d3, conjugated estrogens, diclofenac sodium, ezetimibe, lanolin/mineral oil/petrolatum, metoprolol succinate, multivitamin, nitroglycerin, pantoprazole, pilocarpine, sodium chloride, teriparatide, tramadol, valsartan, and vitamin a-vitamin c-vit e-min.    Review of patient's allergies indicates:   Allergen Reactions    Sulfa (sulfonamide antibiotics) Hives     Other reaction(s): Anaphylaxis    Itching      Shortness of breath        Objective   Posture                 Pt has FHP with increased TH kyphosis and decreased L lordosis.    Cervical Thoracic Sensation  General Cervical/Thoracic Sensation  Intact: Right and Left  Right Cervical/Thoracic Sensation  Intact: Light Touch, Static Two Point Discrimination, Dynamic Two Point Discrimination, Sharp/Dull Discrimination, Kinesthesia, and Proprioception       Left Cervical/Thoracic Sensation  Intact: Light Touch, Static Two Point Discrimination, Dynamic Two Point Discrimination, Sharp/Dull Discrimination, Kinesthesia, and Proprioception            Lower Extremity Sensation  General Lumbar/Lower Extremity Sensation  Intact: Right and Left  Right Lumbar/Lower Extremity Sensation  Intact: Light Touch, Sharp/Dull Discrimination, Static Two Point Discrimination, Dynamic Two Point Discrimination, Kinesthesia, and Proprioception  Right Lumbar/Lower Extremity Sensation Stocking Glove Pattern: No    Left Lumbar/Lower Extremity Sensation  Intact: Light Touch, Static Two Point Discrimination, Dynamic Two Point Discrimination, Sharp/Dull Discrimination, Kinesthesia, and Proprioception  Left Lumbar/Lower Extremity Sensation Stocking Glove Pattern: No             Spinal Mobility  Hypomobile: Thoracic and Lumbosacral       Spinal Muscle Palpation     No pain with palpation       No pain with palpation        Lumbar Range of Motion   Gross trunk AROM is greatly limited.  B PROM LE is WFL.           Thoracic Trunk Strength  gross trunk core is 4-/5.                 Gait Analysis  Gait Analysis Details  Pt ambulates with a RW.  Pt has resting tremor R LE.         Treatment:   THERAPEUTIC EXERCISES to develop strength, endurance, ROM, and flexibility for 20 minutes including   HEP        NEUROMUSCULAR RE-EDUCATION ACTIVITIES to improve Balance, Coordination, Kinesthetic, Sense, Proprioception, Posture, and Motor Control for 0 minutes.  The following were included:        THERAPEUTIC  ACTIVITIES to improve dynamic and functional performance for 0 minutes including    Time Entry(in minutes):  PT Evaluation (Moderate) Time Entry: 30  Therapeutic Exercise Time Entry: 20    Assessment & Plan   Assessment  Betzy presents with a condition of High complexity.   Presentation of Symptoms: Stable  Will Comorbidities Impact Care: Yes  See PMH    Functional Limitations: Pain with ADLs/IADLs, Gait limitations, Functional mobility, Range of motion  Impairments: Pain with functional activity, Impaired physical strength, Abnormal or restricted range of motion, Impaired balance    Patient Goal for Therapy (PT): improve L-strength; have pt independent with a HEP  Prognosis: Guarded  Assessment Details: Pt to PT with c/o LBP with weakness, decreased ROM and impaired gait secondary to L-compression fx.  Pt is having great difficulty functioning independently at home.    Plan  From a physical therapy perspective, the patient would benefit from: Skilled Rehab Services    Planned therapy interventions include: Therapeutic exercise, Gait training, Therapeutic activities, and Neuromuscular re-education.            Visit Frequency: 1 times Per Week for 8 Weeks.       This plan was discussed with Patient.   Discussion participants: Agreed Upon Plan of Care  Plan details: PT weekly for trunk/core strengthening, flexibility exercises and GTR with RW.          The patient's spiritual, cultural, and educational needs were considered, and the patient is agreeable to the plan of care and goals.           Goals:   Active       Functional outcome       Patient will demonstrate independence in home program for support of progression       Start:  07/03/25    Expected End:  08/28/25               Pain       Patient will report pain of < 5/10 demonstrating a reduction of overall pain with ADL       Start:  07/03/25    Expected End:  08/28/25               Strength       Patient will demonstrate gross trunk/core strength of 4-/5 to  4/5       Start:  07/03/25    Expected End:  08/28/25                Ramon Arellano, PT

## 2025-07-03 NOTE — PATIENT INSTRUCTIONS
Strengthening: Quadriceps Set    Tighten muscles on top of thighs by pushing knees down into surface. Hold 5 seconds.  Repeat 25 times . R/L or BL  leg per set. Do 1 sets per session. Do 2 sessions per day.      Straight Leg Raise     With R/L or BL leg straight, other leg bent, raise straight leg until knees are even. Slowly lower. Roll on your side and repeat lifting top leg up, on other side, lifting bottom leg up, and on stomach kicking back (squeezing your rear end before you kick back).  Repeat 25 times. Do 1 sets per session. Do 2 sessions per day.       Glute Squeeze, supine    Lie face up and squeeze your rear end. Do not tighten your abdominals or hold your breath. Squeeze our glutes and hold 5 seconds. Relax.  Repeat 25 times per set. Do 1 sets per session. Do 2 sessions per day.      Strengthening: Hip Adduction - Isometric    Sit back or lie down with ball or folded pillow between knees, and squeeze knees together. Hold 5 seconds.  Repeat 25 times per set. Do 1 sets per session. Do 2 sessions per day.      Strengthening: Hip Abductor - Resisted    Lie flat with band looped around both legs above knees, and push thighs apart, ensuring right and left move together.  Repeat 25 times per set. Do 1 sets per session. Do 2 sessions per day.

## 2025-07-05 DIAGNOSIS — K21.9 GASTROESOPHAGEAL REFLUX DISEASE, UNSPECIFIED WHETHER ESOPHAGITIS PRESENT: ICD-10-CM

## 2025-07-05 NOTE — TELEPHONE ENCOUNTER
No care due was identified.  [facility] Embedded Care Due Messages. Reference number: [id number].   7/05/2025 8:08:00 AM CDT

## 2025-07-07 RX ORDER — PANTOPRAZOLE SODIUM 40 MG/1
40 TABLET, DELAYED RELEASE ORAL 2 TIMES DAILY
Qty: 180 TABLET | Refills: 3 | Status: SHIPPED | OUTPATIENT
Start: 2025-07-07

## 2025-07-07 NOTE — TELEPHONE ENCOUNTER
Refill Routing Note   Medication(s) are not appropriate for processing by [facility] for the following reason(s):        Outside of protocol  Protonix total daily dose greater than 40 mg daily    [facility] action(s):  Route               Appointments  past 12m or future 3m with PCP    Date Provider   Last Visit   3/27/2025 Praful Paul MD   Next Visit   Visit date not found Praful Paul MD   ED visits in past 90 days: 0        Note composed:4:40 AM 07/07/2025

## 2025-07-08 ENCOUNTER — OFFICE VISIT (OUTPATIENT)
Facility: CLINIC | Age: 85
End: 2025-07-08
Payer: MEDICARE

## 2025-07-08 DIAGNOSIS — G20.C PARKINSONISM, UNSPECIFIED PARKINSONISM TYPE: Primary | ICD-10-CM

## 2025-07-08 PROCEDURE — G2211 COMPLEX E/M VISIT ADD ON: HCPCS | Mod: S$GLB,,, | Performed by: PSYCHIATRY & NEUROLOGY

## 2025-07-08 PROCEDURE — 3288F FALL RISK ASSESSMENT DOCD: CPT | Mod: CPTII,S$GLB,, | Performed by: PSYCHIATRY & NEUROLOGY

## 2025-07-08 PROCEDURE — 99205 OFFICE O/P NEW HI 60 MIN: CPT | Mod: S$GLB,,, | Performed by: PSYCHIATRY & NEUROLOGY

## 2025-07-08 PROCEDURE — 1159F MED LIST DOCD IN RCRD: CPT | Mod: CPTII,S$GLB,, | Performed by: PSYCHIATRY & NEUROLOGY

## 2025-07-08 PROCEDURE — 1101F PT FALLS ASSESS-DOCD LE1/YR: CPT | Mod: CPTII,S$GLB,, | Performed by: PSYCHIATRY & NEUROLOGY

## 2025-07-08 PROCEDURE — 99999 PR PBB SHADOW E&M-EST. PATIENT-LVL III: CPT | Mod: PBBFAC,,, | Performed by: PSYCHIATRY & NEUROLOGY

## 2025-07-08 RX ORDER — CARBIDOPA AND LEVODOPA 25; 100 MG/1; MG/1
TABLET ORAL
Qty: 281 TABLET | Refills: 0 | Status: SHIPPED | OUTPATIENT
Start: 2025-07-08 | End: 2025-10-13

## 2025-07-08 NOTE — PROGRESS NOTES
MOVEMENT DISORDERS CLINIC NEW CONSULT NOTE    PCP/Referring Provider:   Denver Cordon MD  [address]  [facility] [address]  [place] [address]  Date of Service: 7/8/2025    Chief Complaint: tremors    HPI: Betzy Cooley is a 84 y.o. female with PMH most notable for CAD , presenting for evaluation.    CHIEF COMPLAINT:  Patient presents with tremors in the right leg and balance issues, referred by a pain management specialist for evaluation of possible Parkinson's disease.    HPI:  Patient is an 85-year-old female with tremors in her right leg that began approximately 1 year ago. The tremors affect the entire right leg and are associated with balance issues, leading to walker use for the past year. Her  notes she is now frequently off balance, which is the primary reason for using the walker.    The onset of these symptoms appears to be linked to a fall that occurred about 2 years ago, resulting in broken toes on her right foot that healed on their own. She underwent an epidural injection for pain management about 1 year ago, which may have coincided with the start of the tremors.    Her gait has been affected, with her  describing a shuffling movement when she walks around the house without the walker. She has difficulty initiating steps, especially after sitting for a long time, and trouble maintaining a consistent rhythm when tapping her fingers or feet.    She reports constipation, for which she takes OTC Servin Milk of Magnesia (500mg magnesium) as needed. She also mentions back pain, for which she is scheduled to undergo nerve ablation surgery.    Her medical history includes a cardiac event about 2 years ago when she presented to the emergency room with severe chest pain. Initially thought to be acid reflux, further testing revealed blockages in 3 coronary arteries. She subsequently had 7 stents placed and was on anticoagulants for 1 year, which have recently been discontinued. She remains on  cholesterol medication.    She denies any family history of tremors or Parkinson's disease, issues with her sense of smell, significant movement during sleep, tremors in her arms or left leg, or acting out dreams during sleep.      ROS:  Cardiovascular: +intermittent chest pain  Gastrointestinal: +constipation  Musculoskeletal: +back pain  Neurological: +tremors, +balance issues          Current Medications:  Encounter Medications[1]    Past Medical History:  Problem List[2]    Past Surgical History:  Past Surgical History:   Procedure Laterality Date    ANGIOGRAM, CORONARY, WITH LEFT HEART CATHETERIZATION Bilateral 05/17/2024    Procedure: Angiogram, Coronary, with Left Heart Cath;  Surgeon: Miguel Jacob MD;  Location: [facility] CATH LAB;  Service: Cardiology;  Laterality: Bilateral;    cardiac stents  04/01/2024    COLPOPEXY N/A 08/06/2019    Procedure: COLPOPEXY SSL FIXATION;  Surgeon: Alma Dorsey MD;  Location: [address];  Service: Urology;  Laterality: N/A;    CORONARY ANGIOGRAPHY Left 05/16/2024    Procedure: ANGIOGRAM, CORONARY ARTERY;  Surgeon: Miguel Jacob MD;  Location: [facility] CATH LAB;  Service: Cardiology;  Laterality: Left;    CYSTOSCOPY N/A 08/06/2019    Procedure: CYSTOSCOPY;  Surgeon: Alma Dorsey MD;  Location: [address];  Service: Urology;  Laterality: N/A;    ESOPHAGOGASTRODUODENOSCOPY N/A 04/01/2024    Procedure: EGD (ESOPHAGOGASTRODUODENOSCOPY);  Surgeon: Zbigniew Dougherty MD;  Location: [facility];  Service: Endoscopy;  Laterality: N/A;    FRACTURE SURGERY Left 2008    open radius/ulna fracture    HYSTERECTOMY      TANIA/ovaries remain--fibroids- in her late 30's / early 40's    INJECTION, SPINE, LUMBOSACRAL, TRANSFORAMINAL APPROACH Left 11/22/2024    Procedure: left L3/4 and L4/5 TFESI;  Surgeon: Denver Cordon MD;  Location: [facility] PAIN MANAGEMENT;  Service: Pain Management;  Laterality: Left;  20 mins ASA ok  Brilinta 5 days    INJECTION, SPINE, LUMBOSACRAL,  TRANSFORAMINAL APPROACH Bilateral 5/16/2025    Procedure: Bilateral L3/4 TFESI;  Surgeon: Denver Cordon MD;  Location: [facility] PAIN MANAGEMENT;  Service: Pain Management;  Laterality: Bilateral;  Brilint 5 days - ASA Ok    IVUS, CORONARY  05/17/2024    Procedure: IVUS, Coronary;  Surgeon: Miguel Jacob MD;  Location: [facility] CATH LAB;  Service: Cardiology;;    STENT, DRUG ELUTING, MULTI VESSEL, CORONARY  05/17/2024    Procedure: Stent, Drug Eluting, Multi Vessel, Coronary;  Surgeon: Miguel Jacob MD;  Location: [facility] CATH LAB;  Service: Cardiology;;       Social:  Social History[3]    Family History:  Family History   Problem Relation Name Age of Onset    Heart failure Mother copd     Macular degeneration Mother copd     Heart disease Mother copd     COPD Mother copd     Diabetes Sister      Arthritis Sister          rheumatoid arthritis    COPD Father      No Known Problems Brother      No Known Problems Son Saad     No Known Problems Daughter Jordyn     No Known Problems Sister      No Known Problems Sister      No Known Problems Sister      No Known Problems Brother      No Known Problems Brother      No Known Problems Brother      No Known Problems Brother      No Known Problems Brother      No Known Problems Daughter Reyes     No Known Problems Son Arie     Breast cancer Neg Hx      Ovarian cancer Neg Hx      Amblyopia Neg Hx      Blindness Neg Hx      Cancer Neg Hx      Cataracts Neg Hx      Glaucoma Neg Hx      Hypertension Neg Hx      Retinal detachment Neg Hx      Strabismus Neg Hx      Stroke Neg Hx      Thyroid disease Neg Hx      Cervical cancer Neg Hx      Endometrial cancer Neg Hx      Vaginal cancer Neg Hx      Colon cancer Neg Hx         PHYSICAL:  There were no vitals taken for this visit.    General Medical Examination:  General: Good hygiene, appropriate appearance. Moderate severe hypomimia  HEENT: Normocephalic, atraumatic.   Chest: Unlabored breathing.   Ext: No clubbing, cyanosis, or  "edema.     Mental Status:  Mood/Affect: Appropriate/congruent.  Level of consciousness: Awake, alert.  Orientation: Oriented to person, place, time and situation.  Language: Normal fluency, able to name, repeat, and follow complex multi-step commands    Cranial nerves:  I: Not tested  II: VFF to counting  III, IV, VI: EOMI with conjugate gaze and no nystagmus on end gaze  V: Facial sensation intact and symmetric over the bilateral V1-V3  VII: Facial muscle activation intact and symmetric over the bilateral upper and lower face  VIII: Hearing intact iand symmetrical to finger rub in bilateral ears  IX, X, XII: tongue and palate midline with symmetric movement; no atrophy or fasiculations  X: SCMs and shoulder shrug full strength b/l and symmetric    Motor:   -UE: 5/5 deltoids; 5/5 biceps, triceps; 5/5 wrist flexors, extensors; 5/5 interosseous; 5/5   -LEs: 5/5 hip flexion, extension; 5/5 knee flexion, extension; 5/5 ankle flexion, extension  Fasciculations/Atrophy: none  Tone: increased in BUE  Bradykinesia: moderate severe in BUE, moderate in BLE  Right leg rest tremor     Gait:  -Arises from chair with use of hands.  -Stoop is moderate  FOG noted on turn and initiation  Moderate severe reduction in stride length and clearance     Laboratory Data:    Lab Results   Component Value Date    TSH 2.569 07/30/2021     No results found for: "[id number]"      Imaging:  No results found for this or any previous visit (from the past 2160 hours).    Assessment//Plan:   Problem List Items Addressed This Visit    None  Visit Diagnoses         Parkinsonism, unspecified Parkinsonism type    -  Primary    Relevant Medications    carbidopa-levodopa  mg (SINEMET)  mg per tablet            Assessment & Plan    OTHER CONDITIONS NOT LISTED IN PROVIDED ICD-10 CODES:  - Observed slowness in finger tapping and freezing of gait, consistent with Parkinsonism.  - Suspect Parkinson's disease based on clinical presentation, " despite atypical age of onset and lack of other classic symptoms.  - Explained Parkinson's disease as a dopamine deficiency in the brain.  - Discussed that levodopa converts to dopamine in the brain.  - Clarified that Parkinson's treatments are not curative but can improve symptoms and quality of life.  - Informed that Parkinson's generally does not shorten lifespan.  - Patient to continue with current physical therapy regimen.  - Started levodopa to improve movement, walking, and balance: Initial dose: Half tablet 3 times daily for 1 week.  - Increase to full tablet 3 times daily after 1 week if well-tolerated.  - Continue at this dose for 3 months before reassessment.  - Noted that tremor may be less responsive to levodopa compared to other motor symptoms.  - Contact the office if experiencing any side effects from the new medication (e.g., nausea, dizziness).  - Follow up in 2-3 months to assess response to levodopa therapy.    PLAN SUMMARY:  - Started levodopa therapy: Half tablet 3 times daily for 1 week, then increase to full tablet 3 times daily if well-tolerated  - Continue current physical therapy regimen  - Contact office if experiencing side effects from new medication  - Follow up in 2-3 months to assess response to levodopa therapy               This note was generated with the assistance of ambient listening technology. Verbal consent was obtained by the patient and accompanying visitor(s) for the recording of patient appointment to facilitate this note. I attest to having reviewed and edited the generated note for accuracy, though some syntax or spelling errors may persist. Please contact the author of this note for any clarification.     This new patient presented with motor symptoms concerning for parkinsonism, including freezing of gait and slowness in finger tapping. The visit involved high-complexity medical decision making due to diagnostic uncertainty, counseling on neurodegenerative disease,  and initiation of levodopa therapy with titration plan. Extensive education was provided on Parkinsons pathophysiology, treatment expectations, and medication side effects. A detailed follow-up strategy was outlined, with instructions to report adverse effects.  is appropriate as this visit initiated relationship-based, longitudinal care for a progressive neurologic condition requiring ongoing monitoring, coordination, and patient engagement.    Ruy Plummer MD  [facility]  Department of Neurology  Movement Disorders           [1]   Outpatient Encounter Medications as of 7/8/2025   Medication Sig Dispense Refill    amLODIPine (NORVASC) 5 MG tablet Take 1 tablet (5 mg total) by mouth once daily. 90 tablet 3    aspirin 81 MG Chew Take 81 mg by mouth once daily. Not chewable      atorvastatin (LIPITOR) 40 MG tablet TAKE 1 TABLET BY MOUTH EVERY DAY IN THE EVENING 90 tablet 1    calcium-vitamin D3 500 mg(1,250mg) -200 unit per tablet Take 1 tablet by mouth 2 (two) times daily with meals.      carbidopa-levodopa  mg (SINEMET)  mg per tablet Take 0.5 tablets by mouth 3 (three) times daily for 7 days, THEN 1 tablet 3 (three) times daily. 281 tablet 0    conjugated estrogens (PREMARIN) vaginal cream Place 0.5 g vaginally once daily. 30 g 1    diclofenac sodium (VOLTAREN ARTHRITIS PAIN) 1 % Gel Apply 2 g topically daily as needed (Pain).      ezetimibe (ZETIA) 10 mg tablet TAKE 1 TABLET BY MOUTH ONCE DAILY 90 tablet 1    lanolin/mineral oil/petrolatum (ARTIFICIAL TEARS OPHT) Place 1 drop into both eyes once daily.      metoprolol succinate (TOPROL-XL) 25 MG 24 hr tablet Take 0.5 tablets (12.5 mg total) by mouth once daily. 45 tablet 0    multivitamin (THERAGRAN) per tablet Take 1 tablet by mouth once daily.      nitroGLYCERIN (NITROSTAT) 0.4 MG SL tablet Place 1 tablet (0.4 mg total) under the tongue every 5 (five) minutes as needed for Chest pain. 30 tablet 2    pantoprazole (PROTONIX) 40 MG  tablet TAKE 1 TABLET BY MOUTH TWICE DAILY 180 tablet 3    pilocarpine (SALAGEN) 5 MG Tab Take 1 tablet (5 mg total) by mouth once daily. 21 tablet 0    sodium chloride (SALINE MIST NASL) 1 spray by Each Nostril route daily as needed (Congestion).      teriparatide (FORTEO) 20 mcg/dose (560mcg/2.24mL) PnIj Inject 0.08 mLs (20 mcg total) into the skin once daily. 2.4 mL 11    traMADoL (ULTRAM) 50 mg tablet Take 1 tablet (50 mg total) by mouth every 8 (eight) hours as needed for Pain. 21 tablet 0    valsartan (DIOVAN) 80 MG tablet Take 1 tablet (80 mg total) by mouth once daily. 90 tablet 3    vitamin A-vitamin C-vit E-min Tab Take 1 tablet by mouth once daily.      [DISCONTINUED] pantoprazole (PROTONIX) 40 MG tablet Take 1 tablet (40 mg total) by mouth 2 (two) times daily. 180 tablet 3    [DISCONTINUED] valsartan (DIOVAN) 80 MG tablet Take 1 tablet (80 mg total) by mouth once daily. 90 tablet 3     No facility-administered encounter medications on file as of 7/8/2025.   [2]   Patient Active Problem List  Diagnosis    Hearing loss, sensorineural    Nuclear sclerosis - Both Eyes    Cortical senile cataract - Both Eyes    Choroidal nevus - Both Eyes    Encounter for screening colonoscopy    GERD (gastroesophageal reflux disease)    History of skin cancer    Tinnitus    Elevated blood pressure reading    Abnormal EKG    Posture imbalance    Shoulder weakness    Systolic murmur    Hypertension    Gastritis    Anxiety    Weakness of right lower extremity    Pain of right lower extremity    Fall    Dyslipidemia    CAD S/P percutaneous coronary angioplasty    Closed compression fracture of body of L1 vertebra    Dysphagia    Hypokalemia    Chronic bilateral low back pain without sciatica    Decreased ROM of lumbar spine    Weakness of both lower extremities    Back pain    Closed compression fracture of L3 vertebra    Age-related osteoporosis with current pathological fracture    Body mass index (BMI) less than 19    Lumbar  pain    Decreased strength    Postural dizziness with presyncope    Pain   [3]   Social History  Socioeconomic History    Marital status:    Tobacco Use    Smoking status: Never     Passive exposure: Never    Smokeless tobacco: Never   Substance and Sexual Activity    Alcohol use: Not Currently     Comment: 1-2 glasses wine weekly    Drug use: No    Sexual activity: Yes     Partners: Male     Birth control/protection: None     Social Drivers of Health     Financial Resource Strain: Low Risk  (3/17/2025)    Overall Financial Resource Strain (CARDIA)     Difficulty of Paying Living Expenses: Not very hard   Food Insecurity: No Food Insecurity (3/17/2025)    Hunger Vital Sign     Worried About Running Out of Food in the Last Year: Never true     Ran Out of Food in the Last Year: Never true   Transportation Needs: No Transportation Needs (11/25/2024)    PRAPARE - Transportation     Lack of Transportation (Medical): No     Lack of Transportation (Non-Medical): No   Physical Activity: Inactive (3/17/2025)    Exercise Vital Sign     Days of Exercise per Week: 0 days     Minutes of Exercise per Session: 0 min   Stress: No Stress Concern Present (3/17/2025)    [employer] - Occupational Stress Questionnaire     Feeling of Stress : Only a little   Recent Concern: Stress - Stress Concern Present (2/11/2025)    [employer] - Occupational Stress Questionnaire     Feeling of Stress : To some extent   Housing Stability: Low Risk  (3/17/2025)    Housing Stability Vital Sign     Unable to Pay for Housing in the Last Year: No     Homeless in the Last Year: No

## 2025-07-11 ENCOUNTER — TELEPHONE (OUTPATIENT)
Dept: PAIN MEDICINE | Facility: CLINIC | Age: 85
End: 2025-07-11
Payer: MEDICARE

## 2025-07-14 ENCOUNTER — CLINICAL SUPPORT (OUTPATIENT)
Dept: REHABILITATION | Facility: HOSPITAL | Age: 85
End: 2025-07-14
Payer: MEDICARE

## 2025-07-14 DIAGNOSIS — R52 PAIN: Primary | Chronic | ICD-10-CM

## 2025-07-14 PROCEDURE — 97530 THERAPEUTIC ACTIVITIES: CPT | Mod: CQ

## 2025-07-14 PROCEDURE — 97112 NEUROMUSCULAR REEDUCATION: CPT | Mod: CQ

## 2025-07-14 NOTE — PROGRESS NOTES
Outpatient Rehab    Physical Therapy Visit    Patient Name: Betzy Cooley  MRN: [id number]  YOB: 1940  Encounter Date: 7/14/2025    Therapy Diagnosis:   Encounter Diagnosis   Name Primary?    Pain Yes     Physician: Grzegorz Damon DO    Physician Orders: Eval and Treat  Medical Diagnosis: Closed compression fracture of L3 lumbar vertebra with delayed healing, subsequent encounter  Compression fracture of L1 vertebra, initial encounter  Myofascial pain syndrome  Lumbar adjacent segment disease with spondylolisthesis  Surgical Diagnosis: Not applicable for this Episode   Surgical Date: Not applicable for this Episode  Days Since Last Surgery: Not applicable for this Episode    Visit # / Visits Authorized:  19 / 30  Insurance Authorization Period: 1/20/2025 to 9/20/2025  Date of Evaluation: 7/3/2025  Plan of Care Certification: 7/3/2025 to 8/28/2025      PT/PTA: PTA   Number of PTA visits since last PT visit:1  Time In: 1330   Time Out: 1419  Total Time (in minutes): 49   Total Billable Time (in minutes): 30    FOTO:  Intake Score: Not applicable for this Episode%  Survey Score 2: Not applicable for this Episode%  Survey Score 3: Not applicable for this Episode%    Precautions:         Subjective   Pt states she is walking more and walking better.  Family / care giver present for this visit:   Pain reported as 0/10.      Objective            Treatment:     THERAPEUTIC EXERCISES to develop strength, endurance, ROM, and flexibility for 10 minutes including    Rowing RTB  Seated marches  B SLR    NEUROMUSCULAR RE-EDUCATION ACTIVITIES to improve Balance, Coordination, Kinesthetic, Sense, Proprioception, and Posture for 20 minutes.  The following were included:    Clams RTB  No monies YTB  Hip ball squeeze  Seated RMB press outs  Glute Sets  Quad sets    THERAPEUTIC ACTIVITIES to improve dynamic and functional performance for 10 minutes including    STS B UE support 2x10  Standing hip ext  Standing hip  abd    Time Entry(in minutes):       Assessment & Plan   Assessment: Pt did well for first full session of PT with emphasis on pain modulation and core activation.  Evaluation/Treatment Tolerance: Patient tolerated treatment well    The patient will continue to benefit from skilled outpatient physical therapy in order to address the deficits listed in the problem list on the initial evaluation, provide patient and family education, and maximize the patients level of independence in the home and community environments.     The patient's spiritual, cultural, and educational needs were considered, and the patient is agreeable to the plan of care and goals.           Plan: Continue per POC towards PT goals    Goals:   Active       Functional outcome       Patient will demonstrate independence in home program for support of progression (Progressing)       Start:  07/03/25    Expected End:  08/28/25               Pain       Patient will report pain of < 5/10 demonstrating a reduction of overall pain with ADL (Progressing)       Start:  07/03/25    Expected End:  08/28/25               Strength       Patient will demonstrate gross trunk/core strength of 4-/5 to 4/5 (Progressing)       Start:  07/03/25    Expected End:  08/28/25                Dieduonne Richter PTA

## 2025-07-16 ENCOUNTER — TELEPHONE (OUTPATIENT)
Dept: PAIN MEDICINE | Facility: HOSPITAL | Age: 85
End: 2025-07-16
Payer: MEDICARE

## 2025-07-17 NOTE — DISCHARGE INSTRUCTIONS
[facility]/[place]  Dr. Denver Cordon  Messaging service # [phone]    POST-PROCEDURE INSTRUCTIONS:  HELPFUL TIPS:    Except for block procedures (medial branch blocks, genicular blocks, hip/shoulder blocks), most procedures take about 2 weeks to start seeing the full effect toward your pain.  If you feel like the pain relief goes away after a day, please wait up to 2 weeks to decide if the procedure provided you any relief    If you had a block procedure (medial branch blocks, genicular blocks, hip/shoulder blocks), you MUST submit your pain diary and percentage relief scores to proceed with the next block and/or procedure.  Please submit the diary/percentages through the [facility] OR you can call [phone] ([employer]) OR [phone] ([employer]) during clinic hours (Monday - Friday, 8AM - 5PM)    Follow up in 2 weeks with either the provider that suggested this procedure OR with Dr. Cordon (including his team members at [facility]).  You may already have a follow up appointment scheduled.  If not, you may make an appointment through the [facility] or you can contact the office that suggested your procedure.  If you would like to make an appointment with Dr. Cordon, you may do so through the [facility] OR you can call [phone] ([employer]) OR [phone] ([employer]) during clinic hours (Monday - Friday, 8AM - 5PM).      What you need to do:    Keep a record of your response to the injection you had today.    How much relief did you get?   When did the relief start and how long did it last?  Were you able to decrease the use of any of your pain medications?  Were you able to increase your level of activity?  How long did the relief last?    What to watch out for:    If you experience any of the following symptoms after your procedure, please notify the messaging service immediately (see above for contact information):   fever (increased oral temperature)   bleeding or  swelling at the injection site,    drainage, rash or redness at the injection site    possible signs of infection    increased pain at the injection site   worsening of your usual pain   severe headache   new or worsening numbness    new arm and/or leg weakness, or    changes in bowel and/or bladder function: urinating or defecating on yourself and not knowing that you did it.    PLEASE FOLLOW ALL INSTRUCTIONS CAREFULLY     Do not engage in strenuous activity (e.g., lifting or pushing heavy objects or repeated bending) for 24 hours.     Do not take a bath, swim or use Jacuzzi for 24 hours after procedure. (A shower is fine).   Remove any Band-Aids when you get home.    Use cold/ice, as needed for comfort.  We recommend the use of cold therapy alternating on for 20 minutes, off for 20 minutes.    Do not apply direct heat (heating pad or heat packs) to the injection site for 24 hours.     Resume your usual medications, unless instructed otherwise by your Pain Physician.     If you are on warfarin (Coumadin) or other blood thinner, resume this medication as instructed by your prescribing Physician.    IF AT ANY POINT YOU ARE VERY CONCERNED ABOUT YOUR SYMPTOMS, PLEASE GO TO THE EMERGENCY ROOM.    If you develop worsening pain, weakness, numbness, lose bowel or bladder control (i.e., having an accident where you did not even know you had to go to the bathroom and suddenly noticed you soiled yourself), saddle anesthesia (a loss of sensation restricted to the area of the buttocks, anus and between the legs -- i.e., those parts of your body that would touch a saddle if you were sitting on one) you need to go immediately to the emergency department for evaluation and treatment.    ----------------------------------------------------------------------------------------------------------------------------------------------------------------  If you received Sedation please read the following instructions:  POST SEDATION  INSTRUCTIONS    Today you received intravenous medication (also known as sedation) that was used to help you relax and/or decrease discomfort during your procedure. This medication will be acting in your body for the next 24 hours, so you might feel a little tired or sleepy. This feeling will slowly wear off.   Common side effects associated with these medications include: drowsiness, dizziness, sleepiness, confusion, feeling excited, difficulty remembering things, lack of steadiness with walking or balance, loss of fine muscle control, slowed reflexes, difficulty focusing, and blurred vision.  Some over-the-counter and prescription medications (e.g., muscle relaxants, opioids, mood-altering medications, sedatives/hypnotics, antihistamines) can interact with the intravenous medication you received and cause an increased risk of the side effects listed above in addition to other potentially life threatening side effects. Use extreme caution if you are taking such medications, and consult with your Pain Physician or prescribing physician if you have any questions.  For the next 12-24 hours:    DO NOT--Drive a car, operate machinery or power tools   DO NOT--Drink any alcoholic beverages (not even beer), they may dangerously increase the risk of side effects.    DO NOT--Make any important legal or business decisions or sign important documents.  We advise you to have someone to assist you at home. Move slowly and carefully. Do not make sudden changes in position. Be aware of dizziness or light-headedness and move accordingly.   If you seek medical treatment within 24 hours, let the nurse or doctor caring for you know that you have received the above medications. If you have any questions or concerns related to your sedation or treatment today please contact us.

## 2025-07-18 ENCOUNTER — HOSPITAL ENCOUNTER (OUTPATIENT)
Facility: HOSPITAL | Age: 85
Discharge: HOME | End: 2025-07-18
Attending: STUDENT IN AN ORGANIZED HEALTH CARE EDUCATION/TRAINING PROGRAM | Admitting: STUDENT IN AN ORGANIZED HEALTH CARE EDUCATION/TRAINING PROGRAM
Payer: MEDICARE

## 2025-07-18 VITALS
HEART RATE: 61 BPM | TEMPERATURE: 98 F | HEIGHT: 60 IN | BODY MASS INDEX: 19.63 KG/M2 | SYSTOLIC BLOOD PRESSURE: 159 MMHG | WEIGHT: 100 LBS | OXYGEN SATURATION: 100 % | DIASTOLIC BLOOD PRESSURE: 72 MMHG | RESPIRATION RATE: 16 BRPM

## 2025-07-18 DIAGNOSIS — G89.29 CHRONIC PAIN: ICD-10-CM

## 2025-07-18 DIAGNOSIS — M51.369 DEGENERATION OF INTERVERTEBRAL DISC OF LUMBAR REGION, UNSPECIFIED WHETHER PAIN PRESENT: Primary | ICD-10-CM

## 2025-07-18 PROCEDURE — 64493 INJ PARAVERT F JNT L/S 1 LEV: CPT | Mod: 50 | Performed by: STUDENT IN AN ORGANIZED HEALTH CARE EDUCATION/TRAINING PROGRAM

## 2025-07-18 PROCEDURE — 64494 INJ PARAVERT F JNT L/S 2 LEV: CPT | Mod: 50 | Performed by: STUDENT IN AN ORGANIZED HEALTH CARE EDUCATION/TRAINING PROGRAM

## 2025-07-18 PROCEDURE — 64493 INJ PARAVERT F JNT L/S 1 LEV: CPT | Mod: 50,,, | Performed by: STUDENT IN AN ORGANIZED HEALTH CARE EDUCATION/TRAINING PROGRAM

## 2025-07-18 PROCEDURE — 25000003 PHARM REV CODE 250

## 2025-07-18 PROCEDURE — 63600175 PHARM REV CODE 636 W HCPCS: Performed by: STUDENT IN AN ORGANIZED HEALTH CARE EDUCATION/TRAINING PROGRAM

## 2025-07-18 PROCEDURE — 64494 INJ PARAVERT F JNT L/S 2 LEV: CPT | Mod: 50,,, | Performed by: STUDENT IN AN ORGANIZED HEALTH CARE EDUCATION/TRAINING PROGRAM

## 2025-07-18 RX ORDER — BUPIVACAINE HYDROCHLORIDE 2.5 MG/ML
INJECTION, SOLUTION EPIDURAL; INFILTRATION; INTRACAUDAL; PERINEURAL
Status: DISCONTINUED | OUTPATIENT
Start: 2025-07-18 | End: 2025-07-18 | Stop reason: HOSPADM

## 2025-07-18 RX ORDER — ALPRAZOLAM 0.5 MG/1
0.5 TABLET, ORALLY DISINTEGRATING ORAL ONCE AS NEEDED
Status: COMPLETED | OUTPATIENT
Start: 2025-07-18 | End: 2025-07-18

## 2025-07-18 RX ORDER — LIDOCAINE HYDROCHLORIDE 20 MG/ML
INJECTION, SOLUTION EPIDURAL; INFILTRATION; INTRACAUDAL; PERINEURAL
Status: DISCONTINUED | OUTPATIENT
Start: 2025-07-18 | End: 2025-07-18 | Stop reason: HOSPADM

## 2025-07-18 RX ADMIN — ALPRAZOLAM 0.5 MG: 0.5 TABLET, ORALLY DISINTEGRATING ORAL at 12:07

## 2025-07-18 NOTE — PLAN OF CARE
Betzy Cooley has met all discharge criteria from Phase II. Vital Signs are stable, ambulating  without difficulty. Discharge instructions given, patient verbalized understanding. Discharged from facility via wheelchair in stable condition.

## 2025-07-18 NOTE — INTERVAL H&P NOTE
HPI  Patient presenting for Procedure(s) (LRB):  B/L L3,L4, L5 MBB #1 (Bilateral)     Patient on Anti-coagulation Yes, on ASA    No health changes since previous encounter    Past Medical History:   Diagnosis Date    Arthritis     Cataract     GERD (gastroesophageal reflux disease)     HEARING LOSS     Hypertension     Osteoporosis, postmenopausal     Squamous Cell Carcinoma 2007    right forearm    Urinary incontinence     rare mixed     Past Surgical History:   Procedure Laterality Date    ANGIOGRAM, CORONARY, WITH LEFT HEART CATHETERIZATION Bilateral 05/17/2024    Procedure: Angiogram, Coronary, with Left Heart Cath;  Surgeon: Miguel Jacob MD;  Location: [facility] CATH LAB;  Service: Cardiology;  Laterality: Bilateral;    cardiac stents  04/01/2024    COLPOPEXY N/A 08/06/2019    Procedure: COLPOPEXY SSL FIXATION;  Surgeon: Alma Dorsey MD;  Location: [address];  Service: Urology;  Laterality: N/A;    CORONARY ANGIOGRAPHY Left 05/16/2024    Procedure: ANGIOGRAM, CORONARY ARTERY;  Surgeon: Miguel Jacob MD;  Location: [facility] CATH LAB;  Service: Cardiology;  Laterality: Left;    CYSTOSCOPY N/A 08/06/2019    Procedure: CYSTOSCOPY;  Surgeon: Alma Dorsey MD;  [facility];  Service: Urology;  Laterality: N/A;    ESOPHAGOGASTRODUODENOSCOPY N/A 04/01/2024    Procedure: EGD (ESOPHAGOGASTRODUODENOSCOPY);  Surgeon: Zbigniew Dougherty MD;  Location: [facility];  Service: Endoscopy;  Laterality: N/A;    FRACTURE SURGERY Left 2008    open radius/ulna fracture    HYSTERECTOMY      TANIA/ovaries remain--fibroids- in her late 30's / early 40's    INJECTION, SPINE, LUMBOSACRAL, TRANSFORAMINAL APPROACH Left 11/22/2024    Procedure: left L3/4 and L4/5 TFESI;  Surgeon: Denver Cordon MD;  Location: [facility] PAIN MANAGEMENT;  Service: Pain Management;  Laterality: Left;  20 mins ASA ok  Brilinta 5 days    INJECTION, SPINE, LUMBOSACRAL, TRANSFORAMINAL APPROACH Bilateral 5/16/2025    Procedure:  Bilateral L3/4 TFESI;  Surgeon: Denver Cordon MD;  Location: [facility] PAIN MANAGEMENT;  Service: Pain Management;  Laterality: Bilateral;  Brilint 5 days - ASA Ok    IVUS, CORONARY  05/17/2024    Procedure: IVUS, Coronary;  Surgeon: Miguel Jacob MD;  Location: [facility] CATH LAB;  Service: Cardiology;;    STENT, DRUG ELUTING, MULTI VESSEL, CORONARY  05/17/2024    Procedure: Stent, Drug Eluting, Multi Vessel, Coronary;  Surgeon: Miguel Jacob MD;  Location: [facility] CATH LAB;  Service: Cardiology;;     Review of patient's allergies indicates:   Allergen Reactions    Sulfa (sulfonamide antibiotics) Hives     Other reaction(s): Anaphylaxis    Itching     Shortness of breath      No current facility-administered medications for this encounter.       PMHx, PSHx, Allergies, Medications reviewed in epic    ROS negative except pain complaints in HPI    OBJECTIVE:    There were no vitals taken for this visit.    PHYSICAL EXAMINATION:    GENERAL: Well appearing, in no acute distress, alert and oriented x3.  PSYCH:  Mood and affect appropriate.  SKIN: Skin color, texture, turgor normal, no rashes or lesions which will impact the procedure.  CV: RRR with palpation of the radial artery.  PULM: No evidence of respiratory difficulty, symmetric chest rise. Clear to auscultation.  NEURO: Cranial nerves grossly intact.    Plan:    Proceed with procedure as planned Procedure(s) (LRB):  B/L L3,L4, L5 MBB #1 (Bilateral)    Hannah Delarosa  07/18/2025

## 2025-07-18 NOTE — DISCHARGE SUMMARY
Discharge Note  Short Stay      SUMMARY     Admit Date: 7/18/2025    Attending Physician: Denver Cordon MD PhD    Discharge Physician: Denver Cordon      Discharge Date: 7/18/2025 3:57 PM    Procedure(s) (LRB):  B/L L3,L4, L5 MBB #1 (Bilateral)    Final Diagnosis: Lumbar spondylosis [M47.816]    Disposition: Home or self care    Patient Instructions:   Discharge Medication List as of 7/18/2025  7:03 AM        START taking these medications    Details   aspirin 81 MG Chew Take 81 mg by mouth once daily. Not chewable, Historical Med      atorvastatin (LIPITOR) 40 MG tablet TAKE 1 TABLET BY MOUTH EVERY DAY IN THE EVENING, Starting Tue 5/13/2025, Normal      carbidopa-levodopa  mg (SINEMET)  mg per tablet Multiple Dosages:Starting Tue 7/8/2025, Until Mon 7/14/2025 at 2359, THEN Starting Tue 7/15/2025, Until Sun 10/12/2025 at 2359Take 0.5 tablets by mouth 3 (three) times daily for 7 days, THEN 1 tablet 3 (three) times daily., Normal      ezetimibe (ZETIA) 10 mg tablet TAKE 1 TABLET BY MOUTH ONCE DAILY, Starting Sun 6/1/2025, Normal      metoprolol succinate (TOPROL-XL) 25 MG 24 hr tablet Take 0.5 tablets (12.5 mg total) by mouth once daily., Starting Thu 3/27/2025, Normal      pantoprazole (PROTONIX) 40 MG tablet TAKE 1 TABLET BY MOUTH TWICE DAILY, Starting Mon 7/7/2025, Normal      valsartan (DIOVAN) 80 MG tablet Take 1 tablet (80 mg total) by mouth once daily., Starting Tue 7/1/2025, Normal      amLODIPine (NORVASC) 5 MG tablet Take 1 tablet (5 mg total) by mouth once daily., Starting Thu 6/5/2025, Until Fri 6/5/2026, Normal      calcium-vitamin D3 500 mg(1,250mg) -200 unit per tablet Take 1 tablet by mouth 2 (two) times daily with meals., Until Discontinued, Historical Med      conjugated estrogens (PREMARIN) vaginal cream Place 0.5 g vaginally once daily., Starting Thu 3/20/2025, Until Fri 3/20/2026, Normal      diclofenac sodium (VOLTAREN ARTHRITIS PAIN) 1 % Gel Apply 2 g topically daily as needed (Pain).,  Historical Med      lanolin/mineral oil/petrolatum (ARTIFICIAL TEARS OPHT) Place 1 drop into both eyes once daily., Historical Med      multivitamin (THERAGRAN) per tablet Take 1 tablet by mouth once daily., Historical Med      pilocarpine (SALAGEN) 5 MG Tab Take 1 tablet (5 mg total) by mouth once daily., Starting Thu 11/7/2024, Normal      sodium chloride (SALINE MIST NASL) 1 spray by Each Nostril route daily as needed (Congestion)., Historical Med      teriparatide (FORTEO) 20 mcg/dose (560mcg/2.24mL) PnIj Inject 0.08 mLs (20 mcg total) into the skin once daily., Starting Thu 11/21/2024, Until Fri 11/21/2025, Fill Later      traMADoL (ULTRAM) 50 mg tablet Take 1 tablet (50 mg total) by mouth every 8 (eight) hours as needed for Pain., Starting Wed 11/27/2024, Normal      vitamin A-vitamin C-vit E-min Tab Take 1 tablet by mouth once daily., Historical Med           CONTINUE these medications which have NOT CHANGED    Details   nitroGLYCERIN (NITROSTAT) 0.4 MG SL tablet Place 1 tablet (0.4 mg total) under the tongue every 5 (five) minutes as needed for Chest pain., Starting Mon 5/27/2024, Until Tue 5/27/2025 at 2359, Normal                 Discharge Diagnosis: Lumbar spondylosis [M47.816]  Condition on Discharge: Stable with no complications to procedure   Diet on Discharge: Same as before.  Activity: as per instruction sheet.  Discharge to: Home with a responsible adult.  Follow up: 2-4 weeks       Please call my office or pager at [phone] if experienced any weakness or loss of sensation, fever > 101.5, pain uncontrolled with oral medications, persistent nausea/vomiting/or diarrhea, redness or drainage from the incisions, or any other worrisome concerns. If physician on call was not reached or could not communicate with our office for any reason please go to the nearest emergency department      Denver Cordon MD PhD

## 2025-07-18 NOTE — OP NOTE
Diagnostic Lumbar Medial Branch Block Under Fluoroscopy    The procedure, risks, benefits, and options were discussed with the patient. There are no contraindications to the procedure. The patent expressed understanding and agreed to the procedure. Informed written consent was obtained prior to the start of the procedure and can be found in the patient's chart.    PATIENT NAME: Betzy Cooley   MRN: [id number]     DATE OF PROCEDURE: 07/18/2025                                           PROCEDURE:  Diagnostic Bilateral L3, L4, and L5 Lumbar Medial Branch Block under Fluoroscopy    PRE-OP DIAGNOSIS: Lumbar spondylosis [M47.816] Lumbar spondylosis [M47.816]    POST-OP DIAGNOSIS: Same    PHYSICIAN: Denver Cordon MD    ASSISTANTS: Dr. Delarosa    MEDICATIONS INJECTED:  Bupivicaine 0.25%    LOCAL ANESTHETIC INJECTED:   Xylocaine 2%    SEDATION: None    ESTIMATED BLOOD LOSS:  None    COMPLICATIONS:  None.    INTERVAL HISTORY: Patient has clinical and imaging findings suggestive of facet mediated pain.    TECHNIQUE: Time-out was performed to identify the patient and procedure to be performed. With the patient laying in a prone position, the surgical area was prepped and draped in the usual sterile fashion using ChloraPrep and fenestrated drape. The levels were determined under fluoroscopic guidance. Skin anesthesia was achieved by injecting Lidocaine 2% over the injection sites. A 22 gauge, 3.5 inch needle was introduced into the medial branch nerves at the junctions of the superior articular process and the transverse processes of the targeted sites using AP, lateral and/or contralateral oblique fluoroscopic imaging. After negative aspiration for blood or CSF was confirmed, 3 mL of the anesthetic listed above was then slowly injected at each site. The needles were removed and bleeding was nil. A sterile dressing was applied. No specimens collected. The patient tolerated the procedure well.    PRE-PROCEDURE PAIN SCORE:  5/10    POST-PROCEDURE PAIN SCORE: 0/10    The patient was monitored after the procedure in the recovery area. They were given post-procedure and discharge instructions to follow at home. The patient was discharged in a stable condition.    Hannah Delarosa MD     I reviewed and edited the fellow's note. I conducted my own interview and physical examination. I agree with the findings. I was present and supervising all critical portions of the procedure.    Denver Cordon MD PhD

## 2025-07-21 ENCOUNTER — TELEPHONE (OUTPATIENT)
Dept: PAIN MEDICINE | Facility: CLINIC | Age: 85
End: 2025-07-21
Payer: MEDICARE

## 2025-07-21 ENCOUNTER — CLINICAL SUPPORT (OUTPATIENT)
Dept: REHABILITATION | Facility: HOSPITAL | Age: 85
End: 2025-07-21
Payer: MEDICARE

## 2025-07-21 DIAGNOSIS — R52 PAIN: Primary | Chronic | ICD-10-CM

## 2025-07-21 DIAGNOSIS — M47.816 LUMBAR SPONDYLOSIS: Primary | ICD-10-CM

## 2025-07-21 PROCEDURE — 97530 THERAPEUTIC ACTIVITIES: CPT | Mod: CQ

## 2025-07-21 PROCEDURE — 97112 NEUROMUSCULAR REEDUCATION: CPT | Mod: CQ

## 2025-07-21 NOTE — PROGRESS NOTES
Outpatient Rehab    Physical Therapy Visit    Patient Name: Betzy Cooley  MRN: [id number]  YOB: 1940  Encounter Date: 7/21/2025    Therapy Diagnosis:   Encounter Diagnosis   Name Primary?    Pain Yes     Physician: Grzegorz Damon DO    Physician Orders: Eval and Treat  Medical Diagnosis: Closed compression fracture of L3 lumbar vertebra with delayed healing, subsequent encounter  Compression fracture of L1 vertebra, initial encounter  Myofascial pain syndrome  Lumbar adjacent segment disease with spondylolisthesis  Surgical Diagnosis: Not applicable for this Episode   Surgical Date: Not applicable for this Episode  Days Since Last Surgery: Not applicable for this Episode    Visit # / Visits Authorized:  20 / 30  Insurance Authorization Period: 1/20/2025 to 9/20/2025  Date of Evaluation: 7/3/2025  Plan of Care Certification: 7/3/2025 to 8/28/2025      PT/PTA: PTA   Number of PTA visits since last PT visit:2  Time In: 1055   Time Out: 1155  Total Time (in minutes): 60   Total Billable Time (in minutes): 30    FOTO:  Intake Score: Not applicable for this Episode%  Survey Score 2: Not applicable for this Episode%  Survey Score 3: Not applicable for this Episode%    Precautions:         Subjective   Patient reports that for the past two days she was in more pain from injections she received on Friday..    Pain reported as 5/10. L/R lumbar    Objective            Treatment:     THERAPEUTIC EXERCISES to develop strength, endurance, ROM, and flexibility for 10 minutes including    Rowing RTB  Seated marches  B SLR    NEUROMUSCULAR RE-EDUCATION ACTIVITIES to improve Balance, Coordination, Kinesthetic, Sense, Proprioception, and Posture for 35 minutes.  The following were included:    Clams RTB  No monies YTB  Hip ball squeeze  Seated RMB press outs  Glute Sets  Quad sets    THERAPEUTIC ACTIVITIES to improve dynamic and functional performance for 15 minutes including    STS B UE support 2x10  Standing  hip ext  Standing hip abd  Nu step 6 min    Time Entry(in minutes):       Assessment & Plan   Assessment: Pt tolerated treatment well without any reports of LBP.  Evaluation/Treatment Tolerance: Patient tolerated treatment well    The patient will continue to benefit from skilled outpatient physical therapy in order to address the deficits listed in the problem list on the initial evaluation, provide patient and family education, and maximize the patients level of independence in the home and community environments.     The patient's spiritual, cultural, and educational needs were considered, and the patient is agreeable to the plan of care and goals.           Plan: Continue per POC towards PT goals    Goals:   Active       Functional outcome       Patient will demonstrate independence in home program for support of progression (Progressing)       Start:  07/03/25    Expected End:  08/28/25               Pain       Patient will report pain of < 5/10 demonstrating a reduction of overall pain with ADL (Progressing)       Start:  07/03/25    Expected End:  08/28/25               Strength       Patient will demonstrate gross trunk/core strength of 4-/5 to 4/5 (Progressing)       Start:  07/03/25    Expected End:  08/28/25                Dieudonne Richter PTA

## 2025-07-22 ENCOUNTER — TELEPHONE (OUTPATIENT)
Dept: PAIN MEDICINE | Facility: CLINIC | Age: 85
End: 2025-07-22
Payer: MEDICARE

## 2025-07-22 DIAGNOSIS — M47.816 LUMBAR SPONDYLOSIS: Primary | ICD-10-CM

## 2025-07-22 NOTE — TELEPHONE ENCOUNTER
----- Message from Shavonne Mcgrath sent at 2025 12:43 PM CDT -----  Regarding: Order for BLAYNE WHITAKER    Patient Name: BLAYNE WHITAKER([id number])  Sex: Female  : 1940      PCP: FIDELIA CHAVES    Center: [facility] CENTRAL BILLING OFFICE     Types of orders made on 2025: Procedure Request    Order Date:2025  Ordering User:SHAVONNE MCGRATH [[id number]]  Encounter Provider:Shavonne Mcgrath MD [[id number]]  Authorizing Provider: Shavonne Mcgrath MD [[id number]]  Department:[facility][[id number]]    Common Order Information  Procedure -> Medial Branch Block (Specify level and laterality) Cmt: B/L L3,             L4, L5 MBB (#2)    Order Specific Information  Order: Procedure Order to Pain Management [Custom: [id number]]  Order #:          [id number]: 1 FUTURE    Priority: Routine  Class: Clinic Performed    Future Order Information      Expires on:2026            Expected by:2025                   Associated Diagnoses      M47.816 Lumbar spondylosis      Physician -> ALCIDES         Is patient on anti-coagulants? -> Yes Cmt: ASA        Facility Name: -> [facility]         Follow-up: -> 2 weeks           Priority: Routine  Class: Clinic Performed    Future Order Information      Expires on:2026            Expected by:2025                   Associated Diagnoses      M47.816 Lumbar spondylosis      Procedure -> Medial Branch Block (Specify level and laterality) Cmt: B/L                 L3, L4, L5 MBB (#2)        Physician -> ALCIDES         Is patient on anti-coagulants? -> Yes Cmt: ASA        Facility Name: -> [facility]         Follow-up: -> 2 weeks

## 2025-07-25 ENCOUNTER — TELEPHONE (OUTPATIENT)
Dept: PAIN MEDICINE | Facility: CLINIC | Age: 85
End: 2025-07-25
Payer: MEDICARE

## 2025-07-29 ENCOUNTER — CLINICAL SUPPORT (OUTPATIENT)
Dept: REHABILITATION | Facility: HOSPITAL | Age: 85
End: 2025-07-29
Payer: MEDICARE

## 2025-07-29 DIAGNOSIS — R52 PAIN: Primary | Chronic | ICD-10-CM

## 2025-07-29 PROCEDURE — 97112 NEUROMUSCULAR REEDUCATION: CPT | Mod: HCNC | Performed by: PHYSICAL THERAPIST

## 2025-07-29 PROCEDURE — 97110 THERAPEUTIC EXERCISES: CPT | Mod: HCNC | Performed by: PHYSICAL THERAPIST

## 2025-07-29 PROCEDURE — 97530 THERAPEUTIC ACTIVITIES: CPT | Mod: HCNC | Performed by: PHYSICAL THERAPIST

## 2025-07-29 NOTE — PROGRESS NOTES
Outpatient Rehab    Physical Therapy Visit    Patient Name: Betzy Cooley  MRN: [id number]  YOB: 1940  Encounter Date: 7/29/2025    Therapy Diagnosis:   Encounter Diagnosis   Name Primary?    Pain Yes     Physician: Grzegorz Damon DO    Physician Orders: Eval and Treat  Medical Diagnosis: Closed compression fracture of L3 lumbar vertebra with delayed healing, subsequent encounter  Compression fracture of L1 vertebra, initial encounter  Myofascial pain syndrome  Lumbar adjacent segment disease with spondylolisthesis    Visit # / Visits Authorized:  21 / 30  Insurance Authorization Period: 1/20/2025 to 9/20/2025  Date of Evaluation: 7/3/2025  Plan of Care Certification: 7/3/2025 to 8/28/2025      Time In: 0950   Time Out: 1045  Total Time (in minutes): 55   Total Billable Time (in minutes): 45    FOTO:  3/5  Precautions:   fall      Subjective   Back is sore..    Pain reported as 2/10.      Objective    To PT via RW.  No TTP LB.        Treatment:     THERAPEUTIC EXERCISES to develop strength, endurance, ROM, and flexibility for 10 minutes including    Rowing RTB x 20 (all exercises)  Seated marches  B SLR    NEUROMUSCULAR RE-EDUCATION ACTIVITIES to improve Balance, Coordination, Kinesthetic, Sense, Proprioception, and Posture for 35 minutes.  The following were included:    Clams RTB  No monies YTB  Hip ball squeeze  Seated RMB press outs  Glute Sets  Quad sets  [employer] abd    THERAPEUTIC ACTIVITIES to improve dynamic and functional performance for 15 minutes including    STS B UE support 2x10  Standing hip ext  Standing hip abd  Nu step 6 min    Time Entry(in minutes):  Neuromuscular Re-Education Time Entry: 15  Therapeutic Activity Time Entry: 15  Therapeutic Exercise Time Entry: 15    Assessment & Plan   Assessment:    Evaluation/Treatment Tolerance: Patient tolerated treatment well    The patient will continue to benefit from skilled outpatient physical therapy in order to address the  deficits listed in the problem list on the initial evaluation, provide patient and family education, and maximize the patients level of independence in the home and community environments.     The patient's spiritual, cultural, and educational needs were considered, and the patient is agreeable to the plan of care and goals.           Plan: Continue PT per POC.    Goals:   Active       Functional outcome       Patient will demonstrate independence in home program for support of progression (Progressing)       Start:  07/03/25    Expected End:  08/28/25               Pain       Patient will report pain of < 5/10 demonstrating a reduction of overall pain with ADL (Progressing)       Start:  07/03/25    Expected End:  08/28/25               Strength       Patient will demonstrate gross trunk/core strength of 4-/5 to 4/5 (Progressing)       Start:  07/03/25    Expected End:  08/28/25                Ramon Arellano, PT

## 2025-07-30 ENCOUNTER — TELEPHONE (OUTPATIENT)
Dept: PAIN MEDICINE | Facility: HOSPITAL | Age: 85
End: 2025-07-30
Payer: MEDICARE

## 2025-08-01 ENCOUNTER — HOSPITAL ENCOUNTER (OUTPATIENT)
Facility: HOSPITAL | Age: 85
Discharge: HOME | End: 2025-08-01
Attending: STUDENT IN AN ORGANIZED HEALTH CARE EDUCATION/TRAINING PROGRAM | Admitting: STUDENT IN AN ORGANIZED HEALTH CARE EDUCATION/TRAINING PROGRAM
Payer: MEDICARE

## 2025-08-01 VITALS
SYSTOLIC BLOOD PRESSURE: 153 MMHG | OXYGEN SATURATION: 100 % | HEIGHT: 62 IN | BODY MASS INDEX: 18.4 KG/M2 | HEART RATE: 55 BPM | DIASTOLIC BLOOD PRESSURE: 69 MMHG | RESPIRATION RATE: 18 BRPM | WEIGHT: 100 LBS | TEMPERATURE: 98 F

## 2025-08-01 DIAGNOSIS — M47.816 LUMBAR SPONDYLOSIS: Primary | ICD-10-CM

## 2025-08-01 PROCEDURE — 64493 INJ PARAVERT F JNT L/S 1 LEV: CPT | Mod: 50,HCNC,, | Performed by: STUDENT IN AN ORGANIZED HEALTH CARE EDUCATION/TRAINING PROGRAM

## 2025-08-01 PROCEDURE — 64494 INJ PARAVERT F JNT L/S 2 LEV: CPT | Mod: 50 | Performed by: STUDENT IN AN ORGANIZED HEALTH CARE EDUCATION/TRAINING PROGRAM

## 2025-08-01 PROCEDURE — 63600175 PHARM REV CODE 636 W HCPCS: Performed by: STUDENT IN AN ORGANIZED HEALTH CARE EDUCATION/TRAINING PROGRAM

## 2025-08-01 PROCEDURE — 25000003 PHARM REV CODE 250: Performed by: STUDENT IN AN ORGANIZED HEALTH CARE EDUCATION/TRAINING PROGRAM

## 2025-08-01 PROCEDURE — 64493 INJ PARAVERT F JNT L/S 1 LEV: CPT | Mod: 50 | Performed by: STUDENT IN AN ORGANIZED HEALTH CARE EDUCATION/TRAINING PROGRAM

## 2025-08-01 PROCEDURE — 64494 INJ PARAVERT F JNT L/S 2 LEV: CPT | Mod: 50,HCNC,, | Performed by: STUDENT IN AN ORGANIZED HEALTH CARE EDUCATION/TRAINING PROGRAM

## 2025-08-01 RX ORDER — BUPIVACAINE HYDROCHLORIDE 2.5 MG/ML
INJECTION, SOLUTION EPIDURAL; INFILTRATION; INTRACAUDAL; PERINEURAL
Status: DISCONTINUED | OUTPATIENT
Start: 2025-08-01 | End: 2025-08-01 | Stop reason: HOSPADM

## 2025-08-01 RX ORDER — LIDOCAINE HYDROCHLORIDE 20 MG/ML
INJECTION, SOLUTION EPIDURAL; INFILTRATION; INTRACAUDAL; PERINEURAL
Status: DISCONTINUED | OUTPATIENT
Start: 2025-08-01 | End: 2025-08-01 | Stop reason: HOSPADM

## 2025-08-01 RX ORDER — ALPRAZOLAM 0.5 MG/1
0.5 TABLET, ORALLY DISINTEGRATING ORAL ONCE AS NEEDED
Status: COMPLETED | OUTPATIENT
Start: 2025-08-01 | End: 2025-08-01

## 2025-08-01 RX ADMIN — ALPRAZOLAM 0.5 MG: 0.5 TABLET, ORALLY DISINTEGRATING ORAL at 10:08

## 2025-08-01 NOTE — DISCHARGE INSTRUCTIONS
[facility]  Dr. Denver Cordon  Messaging service # [phone]    POST-PROCEDURE INSTRUCTIONS:  HELPFUL TIPS:    Except for block procedures (medial branch blocks, genicular blocks, hip/shoulder blocks), most procedures take about 2 weeks to start seeing the full effect toward your pain.  If you feel like the pain relief goes away after a day, please wait up to 2 weeks to decide if the procedure provided you any relief    If you had a block procedure (medial branch blocks, genicular blocks, hip/shoulder blocks), you MUST submit your pain diary and percentage relief scores to proceed with the next block and/or procedure.  Please submit the diary/percentages through the [facility] OR you can call [phone] ([employer]) OR [phone] ([employer]) during clinic hours (Monday - Friday, 8AM - 5PM)    Follow up in 2 weeks with either the provider that suggested this procedure OR with Dr. Cordon (including his team members at [facility]).  You may already have a follow up appointment scheduled.  If not, you may make an appointment through the [facility] or you can contact the office that suggested your procedure.  If you would like to make an appointment with Dr. Cordon, you may do so through the [facility] OR you can call [phone] ([employer]) OR [phone] ([employer]) during clinic hours (Monday - Friday, 8AM - 5PM).      What you need to do:    Keep a record of your response to the injection you had today.    How much relief did you get?   When did the relief start and how long did it last?  Were you able to decrease the use of any of your pain medications?  Were you able to increase your level of activity?  How long did the relief last?    What to watch out for:    If you experience any of the following symptoms after your procedure, please notify the messaging service immediately (see above for contact information):   fever (increased oral temperature)   bleeding or  swelling at the injection site,    drainage, rash or redness at the injection site    possible signs of infection    increased pain at the injection site   worsening of your usual pain   severe headache   new or worsening numbness    new arm and/or leg weakness, or    changes in bowel and/or bladder function: urinating or defecating on yourself and not knowing that you did it.    PLEASE FOLLOW ALL INSTRUCTIONS CAREFULLY     Do not engage in strenuous activity (e.g., lifting or pushing heavy objects or repeated bending) for 24 hours.     Do not take a bath, swim or use Jacuzzi for 24 hours after procedure. (A shower is fine).   Remove any Band-Aids when you get home.    Use cold/ice, as needed for comfort.  We recommend the use of cold therapy alternating on for 20 minutes, off for 20 minutes.    Do not apply direct heat (heating pad or heat packs) to the injection site for 24 hours.     Resume your usual medications, unless instructed otherwise by your Pain Physician.     If you are on warfarin (Coumadin) or other blood thinner, resume this medication as instructed by your prescribing Physician.    IF AT ANY POINT YOU ARE VERY CONCERNED ABOUT YOUR SYMPTOMS, PLEASE GO TO THE EMERGENCY ROOM.    If you develop worsening pain, weakness, numbness, lose bowel or bladder control (i.e., having an accident where you did not even know you had to go to the bathroom and suddenly noticed you soiled yourself), saddle anesthesia (a loss of sensation restricted to the area of the buttocks, anus and between the legs -- i.e., those parts of your body that would touch a saddle if you were sitting on one) you need to go immediately to the emergency department for evaluation and treatment.    ----------------------------------------------------------------------------------------------------------------------------------------------------------------  If you received Sedation please read the following instructions:  POST SEDATION  INSTRUCTIONS    Today you received intravenous medication (also known as sedation) that was used to help you relax and/or decrease discomfort during your procedure. This medication will be acting in your body for the next 24 hours, so you might feel a little tired or sleepy. This feeling will slowly wear off.   Common side effects associated with these medications include: drowsiness, dizziness, sleepiness, confusion, feeling excited, difficulty remembering things, lack of steadiness with walking or balance, loss of fine muscle control, slowed reflexes, difficulty focusing, and blurred vision.  Some over-the-counter and prescription medications (e.g., muscle relaxants, opioids, mood-altering medications, sedatives/hypnotics, antihistamines) can interact with the intravenous medication you received and cause an increased risk of the side effects listed above in addition to other potentially life threatening side effects. Use extreme caution if you are taking such medications, and consult with your Pain Physician or prescribing physician if you have any questions.  For the next 12-24 hours:    DO NOT--Drive a car, operate machinery or power tools   DO NOT--Drink any alcoholic beverages (not even beer), they may dangerously increase the risk of side effects.    DO NOT--Make any important legal or business decisions or sign important documents.  We advise you to have someone to assist you at home. Move slowly and carefully. Do not make sudden changes in position. Be aware of dizziness or light-headedness and move accordingly.   If you seek medical treatment within 24 hours, let the nurse or doctor caring for you know that you have received the above medications. If you have any questions or concerns related to your sedation or treatment today please contact us.

## 2025-08-01 NOTE — H&P
HPI  Patient presenting for Procedure(s) (LRB):  MBB#2 B/L L3,4,5 (Bilateral)     Patient on Anti-coagulation No    No health changes since previous encounter    Past Medical History:   Diagnosis Date    Arthritis     Cataract     GERD (gastroesophageal reflux disease)     HEARING LOSS     Hypertension     Osteoporosis, postmenopausal     Squamous Cell Carcinoma 2007    right forearm    Urinary incontinence     rare mixed     Past Surgical History:   Procedure Laterality Date    ANGIOGRAM, CORONARY, WITH LEFT HEART CATHETERIZATION Bilateral 05/17/2024    Procedure: Angiogram, Coronary, with Left Heart Cath;  Surgeon: Miguel Jacob MD;  Location: [facility] CATH LAB;  Service: Cardiology;  Laterality: Bilateral;    cardiac stents  04/01/2024    COLPOPEXY N/A 08/06/2019    Procedure: COLPOPEXY SSL FIXATION;  Surgeon: Alma Dorsey MD;  Location: [address];  Service: Urology;  Laterality: N/A;    CORONARY ANGIOGRAPHY Left 05/16/2024    Procedure: ANGIOGRAM, CORONARY ARTERY;  Surgeon: Miguel Jacob MD;  Location: [facility] CATH LAB;  Service: Cardiology;  Laterality: Left;    CYSTOSCOPY N/A 08/06/2019    Procedure: CYSTOSCOPY;  Surgeon: Alma Dorsey MD;  [facility];  Service: Urology;  Laterality: N/A;    ESOPHAGOGASTRODUODENOSCOPY N/A 04/01/2024    Procedure: EGD (ESOPHAGOGASTRODUODENOSCOPY);  Surgeon: Zbigniew Dougherty MD;  Location: [facility];  Service: Endoscopy;  Laterality: N/A;    FRACTURE SURGERY Left 2008    open radius/ulna fracture    HYSTERECTOMY      TANIA/ovaries remain--fibroids- in her late 30's / early 40's    INJECTION OF ANESTHETIC AGENT AROUND MEDIAL BRANCH NERVES INNERVATING LUMBAR FACET JOINT Bilateral 7/18/2025    Procedure: B/L L3,L4, L5 MBB #1;  Surgeon: Denver Cordon MD;  Location: [facility] PAIN MANAGEMENT;  Service: Pain Management;  Laterality: Bilateral;  oral sed ASA ok    INJECTION, SPINE, LUMBOSACRAL, TRANSFORAMINAL APPROACH Left 11/22/2024    Procedure:  "left L3/4 and L4/5 TFESI;  Surgeon: Denver Cordon MD;  Location: [facility] PAIN MANAGEMENT;  Service: Pain Management;  Laterality: Left;  20 mins ASA ok  Brilinta 5 days    INJECTION, SPINE, LUMBOSACRAL, TRANSFORAMINAL APPROACH Bilateral 5/16/2025    Procedure: Bilateral L3/4 TFESI;  Surgeon: Denver Cordon MD;  Location: [facility] PAIN MANAGEMENT;  Service: Pain Management;  Laterality: Bilateral;  Brilint 5 days - ASA Ok    IVUS, CORONARY  05/17/2024    Procedure: IVUS, Coronary;  Surgeon: Miguel Jacob MD;  Location: [facility] CATH LAB;  Service: Cardiology;;    STENT, DRUG ELUTING, MULTI VESSEL, CORONARY  05/17/2024    Procedure: Stent, Drug Eluting, Multi Vessel, Coronary;  Surgeon: Miguel Jacob MD;  Location: [facility] CATH LAB;  Service: Cardiology;;     Review of patient's allergies indicates:   Allergen Reactions    Sulfa (sulfonamide antibiotics) Hives     Other reaction(s): Anaphylaxis    Itching     Shortness of breath      No current facility-administered medications for this encounter.       PMHx, PSHx, Allergies, Medications reviewed in epic    ROS negative except pain complaints in HPI    OBJECTIVE:    BP (!) 149/72   Pulse (!) 56   Temp 98.2 °F (36.8 °C)   Resp 18   Ht 5' 2" (1.575 m)   Wt 4.536 kg (10 lb)   SpO2 99%   Breastfeeding No   BMI 1.83 kg/m²     PHYSICAL EXAMINATION:    GENERAL: Well appearing, in no acute distress, alert and oriented x3.  PSYCH:  Mood and affect appropriate.  SKIN: Skin color, texture, turgor normal, no rashes or lesions which will impact the procedure.  CV: RRR with palpation of the radial artery.  PULM: No evidence of respiratory difficulty, symmetric chest rise. Clear to auscultation.  NEURO: Cranial nerves grossly intact.    Plan:    Proceed with procedure as planned Procedure(s) (LRB):  MBB#2 B/L L3,4,5 (Bilateral)    Kalpana Nichole  08/01/2025            "

## 2025-08-01 NOTE — DISCHARGE SUMMARY
Discharge Note  Short Stay      SUMMARY     Admit Date: 8/1/2025    Attending Physician: Denver Cordon MD PhD    Discharge Physician: Denver Cordon      Discharge Date: 8/1/2025 12:32 PM    Procedure(s) (LRB):  MBB#2 B/L L3,4,5 (Bilateral)    Final Diagnosis: Lumbar spondylosis [M47.816]    Disposition: Home or self care    Patient Instructions:   Current Discharge Medication List        CONTINUE these medications which have NOT CHANGED    Details   amLODIPine (NORVASC) 5 MG tablet Take 1 tablet (5 mg total) by mouth once daily.  Qty: 90 tablet, Refills: 3    Comments: .      aspirin 81 MG Chew Take 81 mg by mouth once daily. Not chewable      atorvastatin (LIPITOR) 40 MG tablet TAKE 1 TABLET BY MOUTH EVERY DAY IN THE EVENING  Qty: 90 tablet, Refills: 1      calcium-vitamin D3 500 mg(1,250mg) -200 unit per tablet Take 1 tablet by mouth 2 (two) times daily with meals.      carbidopa-levodopa  mg (SINEMET)  mg per tablet Take 0.5 tablets by mouth 3 (three) times daily for 7 days, THEN 1 tablet 3 (three) times daily.  Qty: 281 tablet, Refills: 0    Associated Diagnoses: Parkinsonism, unspecified Parkinsonism type      diclofenac sodium (VOLTAREN ARTHRITIS PAIN) 1 % Gel Apply 2 g topically daily as needed (Pain).      ezetimibe (ZETIA) 10 mg tablet TAKE 1 TABLET BY MOUTH ONCE DAILY  Qty: 90 tablet, Refills: 1      lanolin/mineral oil/petrolatum (ARTIFICIAL TEARS OPHT) Place 1 drop into both eyes once daily.      metoprolol succinate (TOPROL-XL) 25 MG 24 hr tablet Take 0.5 tablets (12.5 mg total) by mouth once daily.  Qty: 45 tablet, Refills: 0    Comments: .  Associated Diagnoses: Hypertension, unspecified type      multivitamin (THERAGRAN) per tablet Take 1 tablet by mouth once daily.      pantoprazole (PROTONIX) 40 MG tablet TAKE 1 TABLET BY MOUTH TWICE DAILY  Qty: 180 tablet, Refills: 3    Associated Diagnoses: Gastroesophageal reflux disease, unspecified whether esophagitis present      sodium chloride  (SALINE MIST NASL) 1 spray by Each Nostril route daily as needed (Congestion).      teriparatide (FORTEO) 20 mcg/dose (560mcg/2.24mL) PnIj Inject 0.08 mLs (20 mcg total) into the skin once daily.  Qty: 2.4 mL, Refills: 11    Associated Diagnoses: Osteoporosis without pathological fracture      valsartan (DIOVAN) 80 MG tablet Take 1 tablet (80 mg total) by mouth once daily.  Qty: 90 tablet, Refills: 3    Comments: .      vitamin A-vitamin C-vit E-min Tab Take 1 tablet by mouth once daily.      conjugated estrogens (PREMARIN) vaginal cream Place 0.5 g vaginally once daily.  Qty: 30 g, Refills: 1    Associated Diagnoses: Vaginal dryness      nitroGLYCERIN (NITROSTAT) 0.4 MG SL tablet Place 1 tablet (0.4 mg total) under the tongue every 5 (five) minutes as needed for Chest pain.  Qty: 30 tablet, Refills: 2      pilocarpine (SALAGEN) 5 MG Tab Take 1 tablet (5 mg total) by mouth once daily.  Qty: 21 tablet, Refills: 0    Associated Diagnoses: Dry mouth      traMADoL (ULTRAM) 50 mg tablet Take 1 tablet (50 mg total) by mouth every 8 (eight) hours as needed for Pain.  Qty: 21 tablet, Refills: 0    Comments: Quantity prescribed more than 7 day supply? No  Associated Diagnoses: Age-related osteoporosis with current pathological fracture, initial encounter                 Discharge Diagnosis: Lumbar spondylosis [M47.816]  Condition on Discharge: Stable with no complications to procedure   Diet on Discharge: Same as before.  Activity: as per instruction sheet.  Discharge to: Home with a responsible adult.  Follow up: 2-4 weeks       Please call my office or pager at [phone] if experienced any weakness or loss of sensation, fever > 101.5, pain uncontrolled with oral medications, persistent nausea/vomiting/or diarrhea, redness or drainage from the incisions, or any other worrisome concerns. If physician on call was not reached or could not communicate with our office for any reason please go to the nearest emergency  department      Denver Cordon MD PhD

## 2025-08-01 NOTE — PLAN OF CARE
Pt in preop bay 24, VSS, meds given. Pt denies any open wounds on body or the use of any immunizations or antibiotics in the past 2 weeks. Pt needs site marking and consents, otherwise ready to roll.

## 2025-08-01 NOTE — OP NOTE
Diagnostic Lumbar Medial Branch Block Under Fluoroscopy    The procedure, risks, benefits, and options were discussed with the patient. There are no contraindications to the procedure. The patent expressed understanding and agreed to the procedure. Informed written consent was obtained prior to the start of the procedure and can be found in the patient's chart.    PATIENT NAME: Betzy Cooley   MRN: [id number]     DATE OF PROCEDURE: 08/01/2025                                           PROCEDURE:  Diagnostic Bilateral L3, L4, and L5 Lumbar Medial Branch Block under Fluoroscopy    PRE-OP DIAGNOSIS: Lumbar spondylosis [M47.816] Lumbar spondylosis [M47.816]    POST-OP DIAGNOSIS: Same    PHYSICIAN: Denver Cordon MD    ASSISTANTS: None    MEDICATIONS INJECTED:  Bupivicaine 0.25%    LOCAL ANESTHETIC INJECTED:   Xylocaine 2%    SEDATION: None    ESTIMATED BLOOD LOSS:  None    COMPLICATIONS:  None.    INTERVAL HISTORY: Patient has clinical and imaging findings suggestive of facet mediated pain.    TECHNIQUE: Time-out was performed to identify the patient and procedure to be performed. With the patient laying in a prone position, the surgical area was prepped and draped in the usual sterile fashion using ChloraPrep and fenestrated drape. The levels were determined under fluoroscopic guidance. Skin anesthesia was achieved by injecting Lidocaine 2% over the injection sites. A 25 gauge, 3.5 inch needle was introduced into the medial branch nerves at the junctions of the superior articular process and the transverse processes of the targeted sites using AP, lateral and/or contralateral oblique fluoroscopic imaging. After negative aspiration for blood or CSF was confirmed, 0.5 mL of the anesthetic listed above was then slowly injected at each site. The needles were removed and bleeding was nil. A sterile dressing was applied. No specimens collected. The patient tolerated the procedure well.    PRE-PROCEDURE PAIN SCORE:  3/10    POST-PROCEDURE PAIN SCORE: 0/10    The patient was monitored after the procedure in the recovery area. They were given post-procedure and discharge instructions to follow at home. The patient was discharged in a stable condition.    Denver Cordon MD

## 2025-08-04 ENCOUNTER — TELEPHONE (OUTPATIENT)
Dept: PAIN MEDICINE | Facility: CLINIC | Age: 85
End: 2025-08-04
Payer: MEDICARE

## 2025-08-04 ENCOUNTER — CLINICAL SUPPORT (OUTPATIENT)
Dept: REHABILITATION | Facility: HOSPITAL | Age: 85
End: 2025-08-04
Payer: MEDICARE

## 2025-08-04 DIAGNOSIS — M47.816 LUMBAR SPONDYLOSIS: Primary | ICD-10-CM

## 2025-08-04 DIAGNOSIS — R52 PAIN: Primary | Chronic | ICD-10-CM

## 2025-08-04 PROCEDURE — 97530 THERAPEUTIC ACTIVITIES: CPT | Mod: HCNC,CQ

## 2025-08-04 PROCEDURE — 97112 NEUROMUSCULAR REEDUCATION: CPT | Mod: HCNC,CQ

## 2025-08-04 NOTE — TELEPHONE ENCOUNTER
----- Message from Med Assistant Jain sent at 8/4/2025  9:02 AM CDT -----  Patient pain diary score       12pm- 2  1:pm -2  1:30 pm-0  2:30 pm -0  4:30 pm -0  6:30 pm -0  8:30 pm -0  10:30pm -0  2:30am-0  2:30pm-4      100% relief       Pain right is a 4 .

## 2025-08-04 NOTE — PROGRESS NOTES
Outpatient Rehab    Physical Therapy Visit    Patient Name: Betzy Cooley  MRN: [id number]  YOB: 1940  Encounter Date: 8/4/2025    Therapy Diagnosis:   Encounter Diagnosis   Name Primary?    Pain Yes     Physician: Denver Cordon MD    Physician Orders: Eval and Treat  Medical Diagnosis: Closed compression fracture of L3 lumbar vertebra with delayed healing, subsequent encounter  Compression fracture of L1 vertebra, initial encounter  Myofascial pain syndrome  Lumbar adjacent segment disease with spondylolisthesis    Visit # / Visits Authorized:  22 / 30  Insurance Authorization Period: 1/20/2025 to 9/20/2025  Date of Evaluation: 7/3/2025  Plan of Care Certification: 7/3/2025 to 8/28/2025      Time In: 1100   Time Out: 1155  Total Time (in minutes): 55   Total Billable Time (in minutes): 30    FOTO:  5/5  Precautions:   fall      Subjective   Pt states that she had a rough weekend after the procedure on Friday..    Pain reported as 4/10.      Objective            Treatment:     THERAPEUTIC EXERCISES to develop strength, endurance, ROM, and flexibility for 10 minutes including    Rowing RTB x 20 (all exercises)  Seated marches  B SLR    NEUROMUSCULAR RE-EDUCATION ACTIVITIES to improve Balance, Coordination, Kinesthetic, Sense, Proprioception, and Posture for 30 minutes.  The following were included:    Clams RTB  No monies YTB  Hip ball squeeze  Seated RMB press outs  Glute Sets  Quad sets  Pilates Kaw abd    THERAPEUTIC ACTIVITIES to improve dynamic and functional performance for 15 minutes including    STS B UE support 2x10  Standing hip ext  Standing hip abd  Nu step 6 min    Time Entry(in minutes):       Assessment & Plan   Assessment: Pt tolerated treatment well without any reports of LBP.       The patient will continue to benefit from skilled outpatient physical therapy in order to address the deficits listed in the problem list on the initial evaluation, provide patient and family education,  and maximize the patients level of independence in the home and community environments.     The patient's spiritual, cultural, and educational needs were considered, and the patient is agreeable to the plan of care and goals.           Plan: Continue PT per POC.    Goals:   Active       Functional outcome       Patient will demonstrate independence in home program for support of progression (Progressing)       Start:  07/03/25    Expected End:  08/28/25               Pain       Patient will report pain of < 5/10 demonstrating a reduction of overall pain with ADL (Progressing)       Start:  07/03/25    Expected End:  08/28/25               Strength       Patient will demonstrate gross trunk/core strength of 4-/5 to 4/5 (Progressing)       Start:  07/03/25    Expected End:  08/28/25                Dieudonne Richter PTA

## 2025-08-04 NOTE — TELEPHONE ENCOUNTER
Staff contacted patient, staff have received patient pain diary scores and submitted it over to  for review .

## 2025-08-05 ENCOUNTER — TELEPHONE (OUTPATIENT)
Dept: PAIN MEDICINE | Facility: CLINIC | Age: 85
End: 2025-08-05
Payer: MEDICARE

## 2025-08-05 DIAGNOSIS — M47.816 LUMBAR SPONDYLOSIS: Primary | ICD-10-CM

## 2025-08-05 NOTE — TELEPHONE ENCOUNTER
----- Message from Shavonne Mcgrath sent at 2025  6:54 PM CDT -----  Regarding: Order for BLAYNE WHITAKER    Patient Name: BLAYNE WHITAKER([id number])  Sex: Female  : 1940      PCP: FIDELIA CHAVES    Center: [facility] CENTRAL BILLING OFFICE     Types of orders made on 2025: Procedure Request    Order Date:2025  Ordering User:SHAVONNE MCGRATH [[id number]]  Encounter Provider:Shavonne Mcgrath MD [[id number]]  Authorizing Provider: Shavonne Mcgrath MD [[id number]]  Department:[facility] PAIN MANAGEMENT[[id number]]    Common Order Information  Procedure -> Radiofrequency Ablation (Specify level and laterality) Cmt: B/L             L3, L4, L5 RFA    Order Specific Information  Order: Procedure Order to Pain Management [Custom: [id number]]  Order #:          [id number]: 1 FUTURE    Priority: Routine  Class: Clinic Performed    Future Order Information      Expires on:2026            Expected by:2025                   Associated Diagnoses      M47.816 Lumbar spondylosis      Physician -> ALCIDES         Is patient on anti-coagulants? -> Yes Cmt: ASA        Facility Name: -> [place]         Follow-up: -> 2 weeks           Priority: Routine  Class: Clinic Performed    Future Order Information      Expires on:2026            Expected by:2025                   Associated Diagnoses      M47.816 Lumbar spondylosis      Procedure -> Radiofrequency Ablation (Specify level and laterality) Cmt:                 B/L L3, L4, L5 RFA        Physician -> ALCIDES         Is patient on anti-coagulants? -> Yes Cmt: ASA        Facility Name: -> [facility]         Follow-up: -> 2 weeks

## 2025-08-07 ENCOUNTER — TELEPHONE (OUTPATIENT)
Dept: PAIN MEDICINE | Facility: CLINIC | Age: 85
End: 2025-08-07
Payer: MEDICARE

## 2025-08-11 ENCOUNTER — CLINICAL SUPPORT (OUTPATIENT)
Dept: REHABILITATION | Facility: HOSPITAL | Age: 85
End: 2025-08-11
Payer: MEDICARE

## 2025-08-11 DIAGNOSIS — R52 PAIN: Primary | Chronic | ICD-10-CM

## 2025-08-11 PROCEDURE — 97530 THERAPEUTIC ACTIVITIES: CPT | Mod: HCNC,CQ

## 2025-08-11 PROCEDURE — 97112 NEUROMUSCULAR REEDUCATION: CPT | Mod: HCNC,CQ

## 2025-08-13 ENCOUNTER — OFFICE VISIT (OUTPATIENT)
Dept: URGENT CARE | Facility: CLINIC | Age: 85
End: 2025-08-13
Payer: MEDICARE

## 2025-08-13 VITALS
BODY MASS INDEX: 18.29 KG/M2 | RESPIRATION RATE: 16 BRPM | HEART RATE: 64 BPM | DIASTOLIC BLOOD PRESSURE: 81 MMHG | TEMPERATURE: 98 F | WEIGHT: 100 LBS | SYSTOLIC BLOOD PRESSURE: 150 MMHG | OXYGEN SATURATION: 98 %

## 2025-08-13 DIAGNOSIS — M79.89 SWELLING OF LEFT FOOT: ICD-10-CM

## 2025-08-13 DIAGNOSIS — L08.9 INFECTED SKIN LESION: Primary | ICD-10-CM

## 2025-08-13 PROCEDURE — 99214 OFFICE O/P EST MOD 30 MIN: CPT | Mod: S$GLB,,, | Performed by: NURSE PRACTITIONER

## 2025-08-13 RX ORDER — CEPHALEXIN 500 MG/1
500 CAPSULE ORAL EVERY 8 HOURS
Qty: 21 CAPSULE | Refills: 0 | Status: SHIPPED | OUTPATIENT
Start: 2025-08-13 | End: 2025-08-20

## 2025-08-13 RX ORDER — MUPIROCIN 20 MG/G
OINTMENT TOPICAL 3 TIMES DAILY
Qty: 30 G | Refills: 0 | Status: SHIPPED | OUTPATIENT
Start: 2025-08-13 | End: 2025-08-20

## 2025-08-18 ENCOUNTER — CLINICAL SUPPORT (OUTPATIENT)
Dept: REHABILITATION | Facility: HOSPITAL | Age: 85
End: 2025-08-18
Payer: MEDICARE

## 2025-08-18 DIAGNOSIS — R52 PAIN: Primary | Chronic | ICD-10-CM

## 2025-08-18 PROCEDURE — 97112 NEUROMUSCULAR REEDUCATION: CPT | Mod: HCNC,CQ

## 2025-08-18 PROCEDURE — 97110 THERAPEUTIC EXERCISES: CPT | Mod: HCNC,CQ

## 2025-08-18 PROCEDURE — 97530 THERAPEUTIC ACTIVITIES: CPT | Mod: HCNC,CQ

## 2025-08-22 ENCOUNTER — TELEPHONE (OUTPATIENT)
Dept: PAIN MEDICINE | Facility: CLINIC | Age: 85
End: 2025-08-22
Payer: MEDICARE

## 2025-08-23 ENCOUNTER — HOSPITAL ENCOUNTER (EMERGENCY)
Facility: HOSPITAL | Age: 85
Discharge: HOME | End: 2025-08-23
Attending: STUDENT IN AN ORGANIZED HEALTH CARE EDUCATION/TRAINING PROGRAM
Payer: MEDICARE

## 2025-08-23 VITALS
RESPIRATION RATE: 18 BRPM | TEMPERATURE: 98 F | DIASTOLIC BLOOD PRESSURE: 70 MMHG | HEART RATE: 62 BPM | HEIGHT: 62 IN | WEIGHT: 102 LBS | OXYGEN SATURATION: 99 % | BODY MASS INDEX: 18.77 KG/M2 | SYSTOLIC BLOOD PRESSURE: 142 MMHG

## 2025-08-23 DIAGNOSIS — R22.32 FOREARM MASS, LEFT: Primary | ICD-10-CM

## 2025-08-23 DIAGNOSIS — Z03.89 OBSERVATION FOR SUSPECTED CANCER: ICD-10-CM

## 2025-08-23 PROCEDURE — 99282 EMERGENCY DEPT VISIT SF MDM: CPT | Mod: HCNC

## 2025-08-25 ENCOUNTER — CLINICAL SUPPORT (OUTPATIENT)
Dept: REHABILITATION | Facility: HOSPITAL | Age: 85
End: 2025-08-25
Payer: MEDICARE

## 2025-08-25 DIAGNOSIS — R52 PAIN: Primary | Chronic | ICD-10-CM

## 2025-08-25 PROCEDURE — 97530 THERAPEUTIC ACTIVITIES: CPT | Mod: HCNC,CQ

## 2025-08-25 PROCEDURE — 97112 NEUROMUSCULAR REEDUCATION: CPT | Mod: HCNC,CQ

## 2025-08-28 ENCOUNTER — TELEPHONE (OUTPATIENT)
Dept: PAIN MEDICINE | Facility: CLINIC | Age: 85
End: 2025-08-28
Payer: MEDICARE

## 2025-09-02 ENCOUNTER — CLINICAL SUPPORT (OUTPATIENT)
Dept: REHABILITATION | Facility: HOSPITAL | Age: 85
End: 2025-09-02
Payer: MEDICARE

## 2025-09-02 DIAGNOSIS — R52 PAIN: Primary | Chronic | ICD-10-CM

## 2025-09-02 PROCEDURE — 97530 THERAPEUTIC ACTIVITIES: CPT | Mod: HCNC,CQ

## 2025-09-02 PROCEDURE — 97112 NEUROMUSCULAR REEDUCATION: CPT | Mod: HCNC,CQ

## (undated) DEVICE — GUIDE VISTA XB 3.5

## (undated) DEVICE — DEVICE SUTURE CAPTURING 25CM

## (undated) DEVICE — CATH EMERGE MR 12 X 2.50

## (undated) DEVICE — SEE MEDLINE ITEM 157181

## (undated) DEVICE — TRAY MINOR GEN SURG

## (undated) DEVICE — SUT CTD VICRYL 3-0 V.L BR

## (undated) DEVICE — CATH GUIDE LINER  V3 6F

## (undated) DEVICE — SEE MEDLINE ITEM 146313

## (undated) DEVICE — OMNIPAQUE 350MG 150ML VIAL

## (undated) DEVICE — HEMOSTAT VASC BAND REG 24CM

## (undated) DEVICE — ELECTRODE REM PLYHSV RETURN 9

## (undated) DEVICE — SYRINGE 0.9% NACL 10MIL PREFIL

## (undated) DEVICE — KIT COPILOT VALVE HEMO TOOL

## (undated) DEVICE — KIT GLIDESHEATH SLEND 6FR 10CM

## (undated) DEVICE — KIT CUSTOM MANIFOLD

## (undated) DEVICE — DRAPE STERI INSTRUMENT 1018

## (undated) DEVICE — TRAY CATH LAB OMC

## (undated) DEVICE — CONNECTOR DBL. MALE LUER LOCK

## (undated) DEVICE — SEE MEDLINE ITEM 154981

## (undated) DEVICE — GUIDEWIRE RUNTHROUGH EF 180CM

## (undated) DEVICE — INFLATOR ENCORE 26 BLLN INFL

## (undated) DEVICE — SOL NACL 0.9% INJ PF/100 150

## (undated) DEVICE — GUIDEWIRE SUPRA CORE 035 190CM

## (undated) DEVICE — SYR 10CC LUER LOCK

## (undated) DEVICE — RETRACTOR STERILE PLASTIC G2

## (undated) DEVICE — SUT 2/0 30IN SILK BLK BRAI

## (undated) DEVICE — PACK PERI/GYN OPTIMA

## (undated) DEVICE — SPIKE SHORT LG BORE 1-WAY 2IN

## (undated) DEVICE — PACK LAPSCP/PELVSCPY III TIBRN

## (undated) DEVICE — NDL HYPO REG 25G X 1 1/2

## (undated) DEVICE — BLADE SURG #15 CARBON STEEL

## (undated) DEVICE — GOWN SURGICAL X-LARGE

## (undated) DEVICE — SEE MEDLINE ITEM 152622

## (undated) DEVICE — SUT VICRYL 3-0 27 RB-1

## (undated) DEVICE — SUT COATED VICRYL 4/0 27IN

## (undated) DEVICE — SUT VICRYL 3-0 27 SH

## (undated) DEVICE — SUT 2-0 VICRYL / SH (J417)

## (undated) DEVICE — SPONGE DERMACEA GAUZE 4X4

## (undated) DEVICE — CATH EAGLE EYE PLATINUM

## (undated) DEVICE — HOOK STAY ELAS 5MM 8EA/PK

## (undated) DEVICE — CATH JACKY RADIAL 5FR 100CM

## (undated) DEVICE — TRAY FOLEY 16FR INFECTION CONT

## (undated) DEVICE — DRAPE ANGIO BRACH 38X44IN

## (undated) DEVICE — SOL IRR WATER STRL 3000 ML

## (undated) DEVICE — SUT CAPIO CT BRD POLY TC 0

## (undated) DEVICE — COVER PROBE US 5.5X58L NON LTX

## (undated) DEVICE — SUT 3-0 VICRYL SH CR/8 18

## (undated) DEVICE — CUP MEDICINE STERILE 2OZ

## (undated) DEVICE — OMNIPAQUE 300MG 50ML

## (undated) DEVICE — CATH EMERGE MR 12 X 3.50

## (undated) DEVICE — CATH ANG PIGTAIL 4FR INFINITY

## (undated) DEVICE — TRANSDUCER ADULT DISP

## (undated) DEVICE — GUIDE VISTA 6FR JR 4

## (undated) DEVICE — SET IRR URLGY 2LINE UNIV SPIKE

## (undated) DEVICE — LUBRICANT SURGILUBE 2 OZ

## (undated) DEVICE — PAD DEFIB CADENCE ADULT R2